# Patient Record
Sex: MALE | Race: BLACK OR AFRICAN AMERICAN | NOT HISPANIC OR LATINO | Employment: OTHER | URBAN - METROPOLITAN AREA
[De-identification: names, ages, dates, MRNs, and addresses within clinical notes are randomized per-mention and may not be internally consistent; named-entity substitution may affect disease eponyms.]

---

## 2017-04-06 ENCOUNTER — OFFICE VISIT (OUTPATIENT)
Dept: URGENT CARE | Facility: URGENT CARE | Age: 62
End: 2017-04-06
Payer: COMMERCIAL

## 2017-04-06 VITALS
DIASTOLIC BLOOD PRESSURE: 80 MMHG | BODY MASS INDEX: 30.6 KG/M2 | HEART RATE: 102 BPM | OXYGEN SATURATION: 97 % | TEMPERATURE: 98.6 F | WEIGHT: 189.6 LBS | SYSTOLIC BLOOD PRESSURE: 120 MMHG

## 2017-04-06 DIAGNOSIS — R05.9 COUGH: ICD-10-CM

## 2017-04-06 DIAGNOSIS — R52 BODY ACHES: ICD-10-CM

## 2017-04-06 DIAGNOSIS — J06.9 VIRAL URI: Primary | ICD-10-CM

## 2017-04-06 DIAGNOSIS — J02.9 ACUTE PHARYNGITIS, UNSPECIFIED: ICD-10-CM

## 2017-04-06 LAB
DEPRECATED S PYO AG THROAT QL EIA: NORMAL
FLUAV+FLUBV AG SPEC QL: NEGATIVE
FLUAV+FLUBV AG SPEC QL: NORMAL
MICRO REPORT STATUS: NORMAL
SPECIMEN SOURCE: NORMAL
SPECIMEN SOURCE: NORMAL

## 2017-04-06 PROCEDURE — 87804 INFLUENZA ASSAY W/OPTIC: CPT | Mod: 59 | Performed by: PHYSICIAN ASSISTANT

## 2017-04-06 PROCEDURE — 87880 STREP A ASSAY W/OPTIC: CPT | Performed by: FAMILY MEDICINE

## 2017-04-06 PROCEDURE — 99213 OFFICE O/P EST LOW 20 MIN: CPT | Performed by: PHYSICIAN ASSISTANT

## 2017-04-06 PROCEDURE — 87081 CULTURE SCREEN ONLY: CPT | Performed by: FAMILY MEDICINE

## 2017-04-06 NOTE — MR AVS SNAPSHOT
After Visit Summary   4/6/2017    Rachell Paige    MRN: [id number]           Patient Information     Date Of Birth          1955        Visit Information        Provider Department      4/6/2017 3:00 PM Flex Mejia PA-C [facility]        Today's Diagnoses     Viral URI    -  1    Acute pharyngitis, unspecified        Cough        Body aches          Care Instructions      Viral Upper Respiratory Illness (Adult)  You have a viral upper respiratory illness (URI), which is another term for the common cold. This illness is contagious during the first few days. It is spread through the air by coughing and sneezing. It may also be spread by direct contact (touching the sick person and then touching your own eyes, nose, or mouth). Frequent handwashing will decrease risk of spread. Most viral illnesses go away within 7 to 10 days with rest and simple home remedies. Sometimes the illness may last for several weeks. Antibiotics will not kill a virus, and they are generally not prescribed for this condition.    Home care    If symptoms are severe, rest at home for the first 2 to 3 days. When you resume activity, don't let yourself get too tired.    Avoid being exposed to cigarette smoke (yours or others ).    You may use acetaminophen or ibuprofen to control pain and fever, unless another medicine was prescribed. (Note: If you have chronic liver or kidney disease, have ever had a stomach ulcer or gastrointestinal bleeding, or are taking blood-thinning medicines, talk with your healthcare provider before using these medicines.) Aspirin should never be given to anyone under 18 years of age who is ill with a viral infection or fever. It may cause severe liver or brain damage.    Your appetite may be poor, so a light diet is fine. Avoid dehydration by drinking 6 to 8 glasses of fluids per day (water, soft drinks, juices, tea, or soup). Extra fluids will help loosen  secretions in the nose and lungs.    Over-the-counter cold medicines will not shorten the length of time you re sick, but they may be helpful for the following symptoms: cough, sore throat, and nasal and sinus congestion. (Note: Do not use decongestants if you have high blood pressure.)  Follow-up care  Follow up with your healthcare provider, or as advised.  When to seek medical advice  Call your healthcare provider right away if any of these occur:    Cough with lots of colored sputum (mucus)    Severe headache; face, neck, or ear pain    Difficulty swallowing due to throat pain    Fever of 100.4 F (38 C)  Call 911, or get immediate medical care  Call emergency services right away if any of these occur:    Chest pain, shortness of breath, wheezing, or difficulty breathing    Coughing up blood    Inability to swallow due to throat pain    [id number] [employer]. [address] [place] [address]. All rights reserved. This information is not intended as a substitute for professional medical care. Always follow your healthcare professional's instructions.              Follow-ups after your visit        Who to contact     If you have questions or need follow up information about today's clinic visit or your schedule please contact [facility] directly at [phone].  Normal or non-critical lab and imaging results will be communicated to you by [employer], letter or phone within 4 business days after the clinic has received the results. If you do not hear from us within 7 days, please contact the clinic through [employer] or phone. If you have a critical or abnormal lab result, we will notify you by phone as soon as possible.  Submit refill requests through [employer] or call your pharmacy and they will forward the refill request to us. Please allow 3 business days for your refill to be completed.          Additional Information About Your Visit        [employer] Information     [employer] lets  "you send messages to your doctor, view your test results, renew your prescriptions, schedule appointments and more. To sign up, go to [email or web address] . Click on \"Log in\" on the left side of the screen, which will take you to the Welcome page. Then click on \"Sign up Now\" on the right side of the page.     You will be asked to enter the access code listed below, as well as some personal information. Please follow the directions to create your username and password.     Your access code is: [email or web address]  Expires: 2017  5:20 PM     Your access code will  in 90 days. If you need help or a new code, please call your [facility] clinic or [phone].        Care EveryWhere ID     This is your Care EveryWhere ID. This could be used by other organizations to access your [facility] medical records  [id number]        Your Vitals Were     Pulse Temperature Pulse Oximetry BMI (Body Mass Index)          102 98.6  F (37  C) (Tympanic) 97% 30.6 kg/m2         Blood Pressure from Last 3 Encounters:   17 120/80   09/23/15 145/79   08/22/15 140/70    Weight from Last 3 Encounters:   17 189 lb 9.6 oz (86 kg)   08/21/15 180 lb (81.6 kg)   04/17/15 184 lb (83.5 kg)              We Performed the Following     Beta strep group A culture     Influenza A/B antigen     Strep, Rapid Screen        Primary Care Provider    Physician No Ref-Primary       No address on file        Thank you!     Thank you for choosing [facility]  for your care. Our goal is always to provide you with excellent care. Hearing back from our patients is one way we can continue to improve our services. Please take a few minutes to complete the written survey that you may receive in the mail after your visit with us. Thank you!             Your Updated Medication List - Protect others around you: Learn how to safely use, store and throw away your medicines at www.disposemymeds.org.          This list is accurate as of: 17  " 5:20 PM.  Always use your most recent med list.                   Brand Name Dispense Instructions for use    aspirin 81 MG tablet     30 tablet    Take 1 tablet (81 mg) by mouth daily       hydrocortisone 1%/eucerin 1% compounded cream     30 g    Apply topically 2 times daily       hydrOXYzine 25 MG tablet    ATARAX    20 tablet    Take 1-2 tablets (25-50 mg) by mouth every 6 hours as needed for itching       insulin aspart 100 UNITS/ML injection    NovoLOG VIAL    1 vial    For BS < 120     0 Units For -150 5  Units For -200 8  Units For -250 12  Units For -300 15  Units For -350 18  Units For -400 21 Units For -450 25 Units For  or greater call MD       insulin glargine 100 UNIT/ML injection    LANTUS    1 vial    42 units       lisinopril 5 MG tablet    PRINIVIL/ZESTRIL    19 tablet    Take 1 tablet (5 mg) by mouth daily SIG please see MD

## 2017-04-06 NOTE — PROGRESS NOTES
SUBJECTIVE:     Rachell Paige presents to  today for evaluation of URI sxs that may be consistent with Influenza.  Pt reports abrupt onset of fever (subjective), chills, ST, dry cough, nasal congestion, clear rhinorrhea, muscle and body aches and malaise 1 day ago.     ROS:     HEENT: Positive nasal congestion.   RESP: Positive dry cough as per above. Despite cough, denies any severe SOB.  Denies any hx of asthma or RAD.   GI: Denies any N/V/D. No abdominal pain. Normal BM's  SKIN: Denies rash  NEURO: Positive subjective fever as noted above. Denies any severe headaches, neck stiffness, photophobia, rash, mental status changes or lethargy.   ENDOCRINE: Positive hx of DM. Denies any elevated BS spikes since onset of acute illness sxs       Past Medical History:   Diagnosis Date     Anxiety 2/23/2015     Dermatitis 4/18/2015     DM type 2, goal A1C 7-8 2/23/2015     Does not have health insurance 4/18/2015     Financial problems 2/23/2015     HTN, goal below 140/90 2/23/2015     Hyperlipidemia LDL goal <100 2/23/2015     Microalbuminuria 4/18/2015     Onychomycosis 4/18/2015     Rash 2/23/2015       Current Outpatient Prescriptions:      lisinopril (PRINIVIL,ZESTRIL) 5 MG tablet, Take 1 tablet (5 mg) by mouth daily SIG please see MD, Disp: 19 tablet, Rfl: 0     aspirin 81 MG tablet, Take 1 tablet (81 mg) by mouth daily, Disp: 30 tablet, Rfl: OTC     hydrocortisone 1%/eucerin 1% compounded cream, Apply topically 2 times daily, Disp: 30 g, Rfl: 5     hydrOXYzine (ATARAX) 25 MG tablet, Take 1-2 tablets (25-50 mg) by mouth every 6 hours as needed for itching, Disp: 20 tablet, Rfl: 0     insulin glargine (LANTUS) 100 UNIT/ML vial, 42 units, Disp: 1 vial, Rfl: 5     insulin aspart (NOVOLOG VIAL) 100 UNITS/ML VIAL, For BS < 120     0 Units For -150 5  Units For -200 8  Units For -250 12  Units For -300 15  Units For -350 18  Units For -400 21 Units For -450 25 Units For   or greater call MD, Disp: 1 vial, Rfl: 5    Allergies   Allergen Reactions     Aleve      HA sweats     Clopidogrel Nausea and Vomiting     Pt to restart on 11/25/15 to see again if he tolerates it or not. His sx were only GI. Not a true allergy     Sulfamethoxazole-Trimethoprim      Other reaction(s): Renal Failure          OBJECTIVE:  /80 (BP Location: Right arm, Patient Position: Chair, Cuff Size: Adult Regular)  Pulse 102  Temp 98.6  F (37  C) (Tympanic)  Wt 189 lb 9.6 oz (86 kg)  SpO2 97%  BMI 30.6 kg/m2    General appearance: alert and no apparent distress  Skin color is uniform in color and without rash.  HEENT:   Conjunctiva not injected.  Sclera clear.  Left TM is normal: no effusions, no erythema, and normal landmarks.  Right TM is normal: no effusions, no erythema, and normal landmarks.  Nasal mucosa is congested  Oropharyngeal exam is normal: no lesions, erythema, adenopathy or exudate.  Neck is supple, FROM with no adenopathy  CARDIAC:NORMAL - regular rate and rhythm without murmur.  RESP: Normal - CTA without rales, rhonchi, or wheezing.    Component      Latest Ref Rng & Units 4/6/2017   Specimen Description       Throat   Rapid Strep A Screen       NEGATIVE: No Group A streptococcal antigen detected by immunoassay, await . . .   Micro Report Status       FINAL 04/06/2017   Influenza A/B Agn Specimen       Nasal   Influenza A      NEG Negative   Influenza B      NEG Negative . . .       ASSESSMENT/PLAN:    (J06.9,  B97.89) Viral URI  (primary encounter diagnosis)  Comment: Negative RST and Influenza testing today. Very reassuring exam.   Plan: Home comfort care measures advised. Follow-up with PCP  if sxs change, worsen or fail to resolve with home comfort care measures over the next 5-7 days.       (J02.9) Acute pharyngitis, unspecified  Comment: Patient requesting something to decrease pain in throat.   Plan: Strep, Rapid Screen, Influenza A/B antigen,         Beta strep group A culture,  lidocaine         (XYLOCAINE) 2 % solution    Follow-up with PCP if sxs change, worsen or fail to fully resolve with above tx.      (R05) Cough  Plan: Influenza A/B antigen  As per above     (R52) Body aches  Plan: Influenza A/B antigen  As per above

## 2017-04-06 NOTE — PATIENT INSTRUCTIONS

## 2017-04-06 NOTE — NURSING NOTE
"Chief Complaint   Patient presents with     Urgent Care     Pharyngitis     Pt states has had sore throat since last night.        Initial /80 (BP Location: Right arm, Patient Position: Chair, Cuff Size: Adult Regular)  Pulse 102  Temp 98.6  F (37  C) (Tympanic)  Wt 189 lb 9.6 oz (86 kg)  SpO2 97%  BMI 30.6 kg/m2 Estimated body mass index is 30.6 kg/(m^2) as calculated from the following:    Height as of 8/21/15: 5' 6\" (1.676 m).    Weight as of this encounter: 189 lb 9.6 oz (86 kg).  Medication Reconciliation: unable or not appropriate to perform   "

## 2017-04-08 LAB
BACTERIA SPEC CULT: NORMAL
MICRO REPORT STATUS: NORMAL
SPECIMEN SOURCE: NORMAL

## 2017-04-20 ENCOUNTER — APPOINTMENT (OUTPATIENT)
Dept: GENERAL RADIOLOGY | Facility: CLINIC | Age: 62
End: 2017-04-20
Attending: EMERGENCY MEDICINE
Payer: COMMERCIAL

## 2017-04-20 ENCOUNTER — APPOINTMENT (OUTPATIENT)
Dept: CT IMAGING | Facility: CLINIC | Age: 62
End: 2017-04-20
Attending: EMERGENCY MEDICINE
Payer: COMMERCIAL

## 2017-04-20 ENCOUNTER — HOSPITAL ENCOUNTER (OUTPATIENT)
Facility: CLINIC | Age: 62
Setting detail: OBSERVATION
Discharge: HOME | End: 2017-04-21
Attending: EMERGENCY MEDICINE | Admitting: HOSPITALIST
Payer: COMMERCIAL

## 2017-04-20 DIAGNOSIS — M62.81 GENERALIZED MUSCLE WEAKNESS: ICD-10-CM

## 2017-04-20 DIAGNOSIS — R55 NEAR SYNCOPE: ICD-10-CM

## 2017-04-20 DIAGNOSIS — I10 HTN, GOAL BELOW 140/90: ICD-10-CM

## 2017-04-20 DIAGNOSIS — R00.2 PALPITATIONS: ICD-10-CM

## 2017-04-20 DIAGNOSIS — R07.89 CHEST DISCOMFORT: ICD-10-CM

## 2017-04-20 LAB
ALBUMIN SERPL-MCNC: 3.5 G/DL (ref 3.4–5)
ALP SERPL-CCNC: 89 U/L (ref 40–150)
ALT SERPL W P-5'-P-CCNC: 23 U/L (ref 0–70)
ANION GAP SERPL CALCULATED.3IONS-SCNC: 8 MMOL/L (ref 3–14)
AST SERPL W P-5'-P-CCNC: 16 U/L (ref 0–45)
BASOPHILS # BLD AUTO: 0 10E9/L (ref 0–0.2)
BASOPHILS NFR BLD AUTO: 0.4 %
BILIRUB SERPL-MCNC: 0.4 MG/DL (ref 0.2–1.3)
BUN SERPL-MCNC: 21 MG/DL (ref 7–30)
CALCIUM SERPL-MCNC: 8.6 MG/DL (ref 8.5–10.1)
CHLORIDE SERPL-SCNC: 105 MMOL/L (ref 94–109)
CO2 SERPL-SCNC: 26 MMOL/L (ref 20–32)
CREAT SERPL-MCNC: 1.38 MG/DL (ref 0.66–1.25)
DIFFERENTIAL METHOD BLD: ABNORMAL
EOSINOPHIL # BLD AUTO: 0.4 10E9/L (ref 0–0.7)
EOSINOPHIL NFR BLD AUTO: 4.2 %
ERYTHROCYTE [DISTWIDTH] IN BLOOD BY AUTOMATED COUNT: 13.4 % (ref 10–15)
GFR SERPL CREATININE-BSD FRML MDRD: 52 ML/MIN/1.7M2
GLUCOSE BLDC GLUCOMTR-MCNC: 133 MG/DL (ref 70–99)
GLUCOSE BLDC GLUCOMTR-MCNC: 176 MG/DL (ref 70–99)
GLUCOSE BLDC GLUCOMTR-MCNC: 189 MG/DL (ref 70–99)
GLUCOSE BLDC GLUCOMTR-MCNC: 204 MG/DL (ref 70–99)
GLUCOSE SERPL-MCNC: 134 MG/DL (ref 70–99)
HCT VFR BLD AUTO: 39.9 % (ref 40–53)
HGB BLD-MCNC: 13.4 G/DL (ref 13.3–17.7)
IMM GRANULOCYTES # BLD: 0.1 10E9/L (ref 0–0.4)
IMM GRANULOCYTES NFR BLD: 0.8 %
INTERPRETATION ECG - MUSE: NORMAL
LACTATE BLD-SCNC: 1.2 MMOL/L (ref 0.7–2.1)
LYMPHOCYTES # BLD AUTO: 3.1 10E9/L (ref 0.8–5.3)
LYMPHOCYTES NFR BLD AUTO: 34.5 %
MAGNESIUM SERPL-MCNC: 1.9 MG/DL (ref 1.6–2.3)
MCH RBC QN AUTO: 28.8 PG (ref 26.5–33)
MCHC RBC AUTO-ENTMCNC: 33.6 G/DL (ref 31.5–36.5)
MCV RBC AUTO: 86 FL (ref 78–100)
MONOCYTES # BLD AUTO: 0.6 10E9/L (ref 0–1.3)
MONOCYTES NFR BLD AUTO: 6.5 %
NEUTROPHILS # BLD AUTO: 4.9 10E9/L (ref 1.6–8.3)
NEUTROPHILS NFR BLD AUTO: 53.6 %
NRBC # BLD AUTO: 0 10*3/UL
NRBC BLD AUTO-RTO: 0 /100
PLATELET # BLD AUTO: 354 10E9/L (ref 150–450)
POTASSIUM SERPL-SCNC: 4 MMOL/L (ref 3.4–5.3)
PROT SERPL-MCNC: 6.7 G/DL (ref 6.8–8.8)
RBC # BLD AUTO: 4.65 10E12/L (ref 4.4–5.9)
SODIUM SERPL-SCNC: 139 MMOL/L (ref 133–144)
TROPONIN I SERPL-MCNC: 0.03 UG/L (ref 0–0.04)
TROPONIN I SERPL-MCNC: 0.03 UG/L (ref 0–0.04)
TROPONIN I SERPL-MCNC: 0.04 UG/L (ref 0–0.04)
TROPONIN I SERPL-MCNC: 0.04 UG/L (ref 0–0.04)
WBC # BLD AUTO: 9.1 10E9/L (ref 4–11)

## 2017-04-20 PROCEDURE — G0378 HOSPITAL OBSERVATION PER HR: HCPCS

## 2017-04-20 PROCEDURE — 71020 XR CHEST 2 VW: CPT

## 2017-04-20 PROCEDURE — 00000146 ZZHCL STATISTIC GLUCOSE BY METER IP

## 2017-04-20 PROCEDURE — 96361 HYDRATE IV INFUSION ADD-ON: CPT

## 2017-04-20 PROCEDURE — 25000131 ZZH RX MED GY IP 250 OP 636 PS 637: Performed by: PHYSICIAN ASSISTANT

## 2017-04-20 PROCEDURE — 83735 ASSAY OF MAGNESIUM: CPT | Performed by: EMERGENCY MEDICINE

## 2017-04-20 PROCEDURE — 36415 COLL VENOUS BLD VENIPUNCTURE: CPT | Performed by: PHYSICIAN ASSISTANT

## 2017-04-20 PROCEDURE — 25000128 H RX IP 250 OP 636: Performed by: EMERGENCY MEDICINE

## 2017-04-20 PROCEDURE — 96372 THER/PROPH/DIAG INJ SC/IM: CPT

## 2017-04-20 PROCEDURE — 93005 ELECTROCARDIOGRAM TRACING: CPT

## 2017-04-20 PROCEDURE — 84484 ASSAY OF TROPONIN QUANT: CPT | Mod: 91 | Performed by: PHYSICIAN ASSISTANT

## 2017-04-20 PROCEDURE — 84484 ASSAY OF TROPONIN QUANT: CPT | Performed by: EMERGENCY MEDICINE

## 2017-04-20 PROCEDURE — 25000132 ZZH RX MED GY IP 250 OP 250 PS 637: Performed by: PHYSICIAN ASSISTANT

## 2017-04-20 PROCEDURE — 85025 COMPLETE CBC W/AUTO DIFF WBC: CPT | Performed by: EMERGENCY MEDICINE

## 2017-04-20 PROCEDURE — 80053 COMPREHEN METABOLIC PANEL: CPT | Performed by: EMERGENCY MEDICINE

## 2017-04-20 PROCEDURE — 99285 EMERGENCY DEPT VISIT HI MDM: CPT | Mod: 25

## 2017-04-20 PROCEDURE — 83605 ASSAY OF LACTIC ACID: CPT | Performed by: EMERGENCY MEDICINE

## 2017-04-20 PROCEDURE — 70450 CT HEAD/BRAIN W/O DYE: CPT

## 2017-04-20 PROCEDURE — 96374 THER/PROPH/DIAG INJ IV PUSH: CPT

## 2017-04-20 PROCEDURE — 25000128 H RX IP 250 OP 636: Performed by: PHYSICIAN ASSISTANT

## 2017-04-20 PROCEDURE — 36415 COLL VENOUS BLD VENIPUNCTURE: CPT | Performed by: EMERGENCY MEDICINE

## 2017-04-20 PROCEDURE — 96360 HYDRATION IV INFUSION INIT: CPT

## 2017-04-20 RX ORDER — NICOTINE POLACRILEX 4 MG
15-30 LOZENGE BUCCAL
Status: DISCONTINUED | OUTPATIENT
Start: 2017-04-20 | End: 2017-04-21 | Stop reason: HOSPADM

## 2017-04-20 RX ORDER — ACETAMINOPHEN 650 MG/1
650 SUPPOSITORY RECTAL EVERY 4 HOURS PRN
Status: DISCONTINUED | OUTPATIENT
Start: 2017-04-20 | End: 2017-04-21 | Stop reason: HOSPADM

## 2017-04-20 RX ORDER — NALOXONE HYDROCHLORIDE 0.4 MG/ML
.1-.4 INJECTION, SOLUTION INTRAMUSCULAR; INTRAVENOUS; SUBCUTANEOUS
Status: DISCONTINUED | OUTPATIENT
Start: 2017-04-20 | End: 2017-04-21 | Stop reason: HOSPADM

## 2017-04-20 RX ORDER — ASPIRIN 81 MG/1
162 TABLET, CHEWABLE ORAL ONCE
Status: DISCONTINUED | OUTPATIENT
Start: 2017-04-20 | End: 2017-04-21 | Stop reason: HOSPADM

## 2017-04-20 RX ORDER — ASPIRIN 325 MG
325 TABLET ORAL ONCE
Status: DISCONTINUED | OUTPATIENT
Start: 2017-04-20 | End: 2017-04-20

## 2017-04-20 RX ORDER — GABAPENTIN 300 MG/1
300 CAPSULE ORAL AT BEDTIME
COMMUNITY
End: 2020-07-28

## 2017-04-20 RX ORDER — SIMVASTATIN 10 MG
10 TABLET ORAL AT BEDTIME
COMMUNITY
End: 2020-09-01 | Stop reason: ALTCHOICE

## 2017-04-20 RX ORDER — INSULIN GLARGINE 100 [IU]/ML
52 INJECTION, SOLUTION SUBCUTANEOUS AT BEDTIME
Status: ON HOLD | COMMUNITY
End: 2018-12-27

## 2017-04-20 RX ORDER — LIDOCAINE 40 MG/G
CREAM TOPICAL
Status: DISCONTINUED | OUTPATIENT
Start: 2017-04-20 | End: 2017-04-21 | Stop reason: HOSPADM

## 2017-04-20 RX ORDER — SODIUM CHLORIDE 9 MG/ML
INJECTION, SOLUTION INTRAVENOUS CONTINUOUS
Status: ACTIVE | OUTPATIENT
Start: 2017-04-20 | End: 2017-04-21

## 2017-04-20 RX ORDER — ASPIRIN 81 MG/1
81 TABLET ORAL DAILY
Status: DISCONTINUED | OUTPATIENT
Start: 2017-04-21 | End: 2017-04-21 | Stop reason: HOSPADM

## 2017-04-20 RX ORDER — ALUMINA, MAGNESIA, AND SIMETHICONE 2400; 2400; 240 MG/30ML; MG/30ML; MG/30ML
15-30 SUSPENSION ORAL EVERY 4 HOURS PRN
Status: DISCONTINUED | OUTPATIENT
Start: 2017-04-20 | End: 2017-04-21 | Stop reason: HOSPADM

## 2017-04-20 RX ORDER — HYDROMORPHONE HCL/0.9% NACL/PF 0.2MG/0.2
0.2 SYRINGE (ML) INTRAVENOUS
Status: DISCONTINUED | OUTPATIENT
Start: 2017-04-20 | End: 2017-04-21 | Stop reason: HOSPADM

## 2017-04-20 RX ORDER — SODIUM CHLORIDE 9 MG/ML
1000 INJECTION, SOLUTION INTRAVENOUS CONTINUOUS
Status: DISCONTINUED | OUTPATIENT
Start: 2017-04-20 | End: 2017-04-20

## 2017-04-20 RX ORDER — LISINOPRIL 5 MG/1
5 TABLET ORAL DAILY
Status: DISCONTINUED | OUTPATIENT
Start: 2017-04-20 | End: 2017-04-21 | Stop reason: HOSPADM

## 2017-04-20 RX ORDER — ONDANSETRON 2 MG/ML
4 INJECTION INTRAMUSCULAR; INTRAVENOUS EVERY 6 HOURS PRN
Status: DISCONTINUED | OUTPATIENT
Start: 2017-04-20 | End: 2017-04-21 | Stop reason: HOSPADM

## 2017-04-20 RX ORDER — ACETAMINOPHEN 325 MG/1
650 TABLET ORAL EVERY 4 HOURS PRN
Status: DISCONTINUED | OUTPATIENT
Start: 2017-04-20 | End: 2017-04-21 | Stop reason: HOSPADM

## 2017-04-20 RX ORDER — DEXTROSE MONOHYDRATE 25 G/50ML
25-50 INJECTION, SOLUTION INTRAVENOUS
Status: DISCONTINUED | OUTPATIENT
Start: 2017-04-20 | End: 2017-04-21 | Stop reason: HOSPADM

## 2017-04-20 RX ORDER — ASPIRIN 81 MG/1
81 TABLET ORAL DAILY
Status: DISCONTINUED | OUTPATIENT
Start: 2017-04-21 | End: 2017-04-20

## 2017-04-20 RX ORDER — GABAPENTIN 300 MG/1
300 CAPSULE ORAL AT BEDTIME
Status: DISCONTINUED | OUTPATIENT
Start: 2017-04-20 | End: 2017-04-21 | Stop reason: HOSPADM

## 2017-04-20 RX ORDER — HYDROXYZINE HYDROCHLORIDE 25 MG/1
25-50 TABLET, FILM COATED ORAL EVERY 6 HOURS PRN
Status: DISCONTINUED | OUTPATIENT
Start: 2017-04-20 | End: 2017-04-21 | Stop reason: HOSPADM

## 2017-04-20 RX ORDER — ONDANSETRON 4 MG/1
4 TABLET, ORALLY DISINTEGRATING ORAL EVERY 6 HOURS PRN
Status: DISCONTINUED | OUTPATIENT
Start: 2017-04-20 | End: 2017-04-21 | Stop reason: HOSPADM

## 2017-04-20 RX ORDER — SIMVASTATIN 10 MG
10 TABLET ORAL AT BEDTIME
Status: DISCONTINUED | OUTPATIENT
Start: 2017-04-20 | End: 2017-04-21 | Stop reason: HOSPADM

## 2017-04-20 RX ORDER — LORAZEPAM 2 MG/ML
0.5 INJECTION INTRAMUSCULAR ONCE
Status: DISCONTINUED | OUTPATIENT
Start: 2017-04-20 | End: 2017-04-20

## 2017-04-20 RX ORDER — NITROGLYCERIN 0.4 MG/1
0.4 TABLET SUBLINGUAL EVERY 5 MIN PRN
Status: DISCONTINUED | OUTPATIENT
Start: 2017-04-20 | End: 2017-04-21 | Stop reason: HOSPADM

## 2017-04-20 RX ADMIN — INSULIN GLARGINE 52 UNITS: 100 INJECTION, SOLUTION SUBCUTANEOUS at 22:49

## 2017-04-20 RX ADMIN — ONDANSETRON 4 MG: 2 INJECTION INTRAMUSCULAR; INTRAVENOUS at 22:58

## 2017-04-20 RX ADMIN — GABAPENTIN 300 MG: 300 CAPSULE ORAL at 22:49

## 2017-04-20 RX ADMIN — SODIUM CHLORIDE 1000 ML: 9 INJECTION, SOLUTION INTRAVENOUS at 08:15

## 2017-04-20 RX ADMIN — LISINOPRIL 5 MG: 5 TABLET ORAL at 18:14

## 2017-04-20 RX ADMIN — SIMVASTATIN 10 MG: 10 TABLET, FILM COATED ORAL at 22:49

## 2017-04-20 RX ADMIN — SODIUM CHLORIDE: 9 INJECTION, SOLUTION INTRAVENOUS at 18:15

## 2017-04-20 ASSESSMENT — ENCOUNTER SYMPTOMS
NAUSEA: 1
BLOOD IN STOOL: 0
WEAKNESS: 1
DIAPHORESIS: 0
DIARRHEA: 0
LIGHT-HEADEDNESS: 1
FEVER: 0

## 2017-04-20 NOTE — ED PROVIDER NOTES
History     Chief Complaint:  Lightheadedness    HPI   Rachell Paige is a 61 year old male who presents to the emergency department today for evaluation of lightheadedness. The patient notes that this morning he woke up with some weakness. He stood up and suffered a fall as he was attempting to walk to the bathroom. There was no head trauma or loss of consciousness. He describes a feeling in his chest as well but this went away after a few seconds. The patient notes some nausea but no diarrhea, bloody stools, fevers, or diaphoresis. He states that recently he has been under more stress.     Allergies:  Aleve  Clopidogrel  Sulfamethoxazole      Medications:    Lidocaine  Lisinopril  Asprin   Atarax  Lantus  Novolog     Past Medical History:    Anxiety  Dermatitis  Diabetes mellitus type 2  Hypertension  Hyperlipidemia  Microalbuminuria  Onychomycosis     Past Surgical History:    History reviewed. No pertinent past surgical history.     Family History:    Family history of diabetes.     Social History:  The patient was accompanied to the ED by wife.  Smoking Status: former smoker  Alcohol Use: negative    Marital Status:   [2]    Review of Systems   Constitutional: Negative for diaphoresis and fever.   Gastrointestinal: Positive for nausea. Negative for blood in stool and diarrhea.   Neurological: Positive for weakness and light-headedness.   All other systems reviewed and are negative.    Physical Exam   Vitals:  Patient Vitals for the past 24 hrs:   BP Temp Temp src Pulse Resp SpO2 Height Weight   04/20/17 1245 133/82 - - - - 99 % - -   04/20/17 1200 163/84 - - - - - - -   04/20/17 1145 158/81 - - - - 100 % - -   04/20/17 1130 160/86 - - - - 96 % - -   04/20/17 1100 168/83 - - - - 96 % - -   04/20/17 1045 167/84 - - - - 96 % - -   04/20/17 1030 158/83 - - - - 97 % - -   04/20/17 1015 168/82 - - - - 96 % - -   04/20/17 1000 166/84 - - - - - - -   04/20/17 0930 161/90 - - - - 97 % - -   04/20/17 0900 (!)  "197/98 - - - - 97 % - -   04/20/17 0845 (!) 186/92 - - - - 96 % - -   04/20/17 0830 (!) 183/91 - - - - 97 % - -   04/20/17 0815 (!) 186/97 - - - - 97 % - -   04/20/17 0756 166/84 97.5  F (36.4  C) Oral 85 18 100 % 1.676 m (5' 6\") 85.7 kg (189 lb)       Physical Exam  General: Well-nourished, anxious   Eyes: PERRL, conjunctivae pink no scleral icterus or conjunctival injection  ENT:  Moist mucus membranes, posterior oropharynx clear without erythema or exudates  Respiratory:  Lungs clear to auscultation bilaterally, no crackles/rubs/wheezes.  Good air movement  CV: Normal rate and rhythm, no murmurs/rubs/gallops  GI:  Abdomen soft and non-distended.  Normoactive BS.  No tenderness, guarding or rebound  Skin: Warm, dry.  No rashes or petechiae  Musculoskeletal: No peripheral edema or calf tenderness  Neuro: Alert and oriented to person/place/time  Psychiatric: Normal affect      Emergency Department Course     ECG:  ECG taken at 0800, ECG read at 0804  Normal sinus rhythm  Normal ECG   Rate 85 bpm. OR interval 162. QRS duration 80. QT/QTc 342/406. P-R-T axes 57 -15 48.     Imaging:  Radiology findings were communicated with the patient who voiced understanding of the findings.    CT Head w/o Contrast   IMPRESSION: Normal head CT.      Radiation dose for this scan was reduced using automated exposure   control, adjustment of the mA and/or kV according to patient size, or   iterative reconstruction technique.      XR Chest 2 Views   IMPRESSION: Mildly enlarged cardiac silhouette not significantly   changed. No focal airspace disease, pleural effusion or pneumothorax.      [facility]     Laboratory:  Laboratory findings were communicated with the patient who voiced understanding of the findings.    CBC: WBC 9.1, HGB 13.4,   CMP: Glucose: 134 (H), GFR: 52 (L), Protein Total: 6.7 (L) o/w WNL (Creatinine: 1.38 (H))  Magnesium: 1.9   Lactic Acid (Collected at 0757): 1.2  Troponin (Collected 0757): 0.026   Glucose by " meter: 176 (H)   Troponin (Collected 1245): 0.036     Interventions:  0815 NS 1000 ml IV   Asprin 325 mg oral      Emergency Department Course:  Nursing notes and vitals reviewed.  I performed an exam of the patient as documented above.   The patient was sent for imaging per above while in the emergency department, results above.   IV was inserted and blood was drawn for laboratory testing, results above.   1:33 PM: I spoke with Dr. Luis of the hospitalist service regarding patient's presentation, findings, and plan of care.   I discussed the treatment plan with the patient. They expressed understanding of this plan and consented to admission. I discussed the patient with Dr. Luis, who will admit the patient to a monitored bed for further evaluation and treatment.   I personally reviewed the laboratory and imaging results with the patient and answered all related questions prior to admission.    Impression & Plan      Medical Decision Making:    HEART Score  Background  Calculates the overall risk of adverse event in patient's presenting with chest pain.  Based on 5 criteria (each assigned 0-2 points) including suspiciousness of history, EKG, age, risk factors and troponin.    Data  61 year old male  has DM type 2, goal A1C 7-8; HTN, goal below 140/90; Hyperlipidemia LDL goal <100; Financial problems; Anxiety; Rash; Microalbuminuria; Onychomycosis; Dermatitis; and Does not have health insurance on his problem list.   reports that he has quit smoking. He has never used smokeless tobacco.  family history is not on file.  Lab Results   Component Value Date    TROPI 0.036 04/20/2017     Criteria   0-2 points for each of 5 items (maximum of 10 points):  Score 1- History moderately suspicious for coronary syndrome  Score 0- EKG Normal  Score 1- Age 45 to 65 years old  Score 2- Three or more risk factors for or history of atherosclerotic disease  Score 0- Within normal limits for troponin levels  Interpretation  Risk  of adverse outcome  Heart Score: 4  Total Score 4-6- Adverse Outcome Risk 20.3% - Supports admission with standard rule-out management -serial troponins and stress testing    Diagnosis:    ICD-10-CM    1. Near syncope R55    2. Generalized muscle weakness M62.81      Rachell Paige is a 61 year old male with a history of diabetes, dyslipidemia, and hypertension who presents after a near syncopal episode with some vague complaints of chest discomfort and ongoing dizziness and not feeling well. His EKG is normal. His initial troponin was in the high side of normal limits. He continued to feel unwell here. Labs were otherwise unremarkable. Chest x-ray was clear. Head CT was obtained and is negative. He has a normal neurological examination, ambulated without difficulty, and is not vertiginous so I doubt a central cause. A repeat troponin was drawn and went up by more than 20% to 0.036. Given his risk factors and diabetes, I was concerned about the possibility of acute coronary syndrome. I feel he needs a formal rule out. I spoke with the hospitalist service where he will be admitted for observation for ongoing repeat troponins and cardiac monitoring given the near syncopal event. He was given an aspirin.      Disposition:   Admission    Scribe Disclosure:  I, Timi Uriel, am serving as a scribe at 7:58 AM on 4/20/2017 to document services personally performed by Hui Kinney MD, based on my observations and the provider's statements to me.   4/20/2017   [facility] EMERGENCY DEPARTMENT       Hui Kinney MD  04/20/17 8210

## 2017-04-20 NOTE — ED NOTES
"Attempted to administer Ativan, explained to pt it will help him to relax and possibly help with his dizziness as well. Pt refused, stated \"I don't need it\".   "

## 2017-04-20 NOTE — H&P
[facility] Outpatient / Observation Unit  History and Physical Exam     Rachell Paige MRN# [id number]   YOB: 1955 Age: 61 year old      Date of Admission:  4/20/2017    Primary care provider: No Ref-Primary, Physician          Assessment:   Rachell Paige is a 61 year old male with a PMH significant for DM II, PAD, HTN, HLD, who presents after an episode of sob, palpitations, and presyncope.   Work up in ED reveals: ECG demonstrates NSR w/ no ischemic changes. Serial troponins 0.026, then 0.036. CMP - 1.38, o/w unremarkable; CBC w/ diff wnl. CT of the head w/o contrast and CXR negative for acute abnormality.    Patient is being registered to observation for further evaluation and to rule out possible ACS.     1. Acute chest discomfort: Intermittent palpitations w/ associated sob that sounds like possible SVT. ECG demonstrates NSR w/ no ischemic findings and telemetry on ER has been unremarkable.  Currently free of discomfort.  Patient does not know his parents' or grandparents' past medical history but does have multiple personal cardiac risk factors including DM II, HTN, HLD.  Reports that his son is 36 y/o and has had two heart attacks.  Lifelong nonsmoker.  -Place on telemetry this evening.   -Trend 2 additional troponins.   -Echocardiogram.   -Lexiscan in AM (cannot tolerate exercise stress test as he has a left partial foot amputation).   2. Presyncope: Patient stood up from his bed this morning and either had a couple seconds-long period of syncope or simply presyncope that resolved after he immediately fell back into his bed. No head trauma.  He did note feeling warm and having palpitations and sob after this that resolved in a minute with rest sitting down.  He was then able to stand up and move about without difficulty.  Denies recent illness. Suspect related to dysrhythmia which will be worked up as listed above.  -Obtain orthostatics  -Telemetry and echocardiogram as listed above.  3. MARILYN on  CKD: Last creatinine 10/2016 was wnl at 1.18.  Today Cr 1.38.  Patient received 1 L IV NS in the ER. Will gently hydrate overnight and recheck BMP in AM.  4. DM II: His hemoglobin A1c 1/4/2017 is 7.4. He is s/p left partial foot amputation last year for worsening diabetic ulcers.  Has known peripheral neuropathy.  -Resume lantus 52 units HS.  -Resume PTA regimen of novolog 15-20 units with lunch and dinner.  5. HTN: Continue lisinopril 5 mg daily.  6. HLD: Continue simvastatin 10 mg HS.         Plan:     1. [place] to Observation  2. Continue telemetry  3. Follow serial troponins, check fasting lipids, obtain echocardiogram  4. Stress testing with Lexiscan Stress Thallium test  5. Cont Aspirin EC 81 mg po daily  6. Blood pressure control  7. Morphine and nitroglycerine PRN for pain    8. Cardiac prudent, low fat, low chol diet, No caffeine if Nuclear testing selected  9. DVT prophylaxis: pt at low risk, encourage ambulation  10. Code Status: FULL  11. Dispo: Anticipate < 2 evening stay                Chief Complaint:   Chest Pain         History of Present Illness:    Rachell, a 61 year old male, presents to the ED with complaint of presyncope, palpitations, and sob this morning.  The patient woke up this morning and when he tried to stand up out of bed be became profoundly lightheaded.  He is unsure if he truly lost consciousness, but he fell back into the bed and became short of breath and experienced palpitations and nausea for a little under a minute.  He denies any chest pain, diaphoresis, and vomiting.  He was able to stand up after that resolved with rest.  He reports that he has never had issues with presyncope/syncope before, but about 1-2 times a week for the past 5 months he has had issues with palpitations and sob.  He currently denies any chest discomfort or shortness of breath.  He denies recent illness or recent sick contacts.  He reports he has been drinking well, maybe a little decreased appetite.   No lower extremity swelling, PND, or orthopnea.  He endorses multiple emotional stressors, including his 36 y/o son who was recently discharged to his parents' home from a group home to recover from a CVA and his wife becoming ill as well.      When the patient arrived in the ED, VSS.  Work up in ED revealed: ECG demonstrates NSR w/ no ischemic changes. Serial troponins 0.026, then 0.036. CMP - 1.38, o/w unremarkable; CBC w/ diff wnl. CT of the head w/o contrast and CXR negative for acute abnormality.  The patient received 1 L IV NS, 325 mg aspirin, and 0.5 mg IV ativan in the ED.    Cardiac risk factors: abnormal lipids, diabetes mellitus, family history, hypertension, obesity, sedentary life style and stress          Past Medical History:     Past Medical History:   Diagnosis Date     Anxiety 2/23/2015     Dermatitis 4/18/2015     DM type 2, goal A1C 7-8 2/23/2015     Does not have health insurance 4/18/2015     Financial problems 2/23/2015     HTN, goal below 140/90 2/23/2015     Hyperlipidemia LDL goal <100 2/23/2015     Microalbuminuria 4/18/2015     Onychomycosis 4/18/2015     Rash 2/23/2015          Past Surgical History:   Left partial foot amputaiton         Social History:     Social History     Social History     Marital status:      Spouse name: N/A     Number of children: N/A     Years of education: N/A     Occupational History     Not on file.     Social History Main Topics     Smoking status: Former Smoker     Smokeless tobacco: Never Used     Alcohol use No     Drug use: No     Sexual activity: Yes     Partners: Female     Other Topics Concern     Not on file     Social History Narrative    Former  ([facility], [place])               Family History:     Family History   Problem Relation Age of Onset     DIABETES            Allergies:      Allergies   Allergen Reactions     Aleve      HA sweats     Clopidogrel Nausea and Vomiting     Pt to restart on 11/25/15 to see  "again if he tolerates it or not. His sx were only GI. Not a true allergy     Sulfamethoxazole-Trimethoprim      Other reaction(s): Renal Failure          Medications:     Prior to Admission medications    Medication Sig Last Dose Taking? Auth Provider   lidocaine (XYLOCAINE) 2 % solution Take 15 mLs by mouth every 6 hours as needed for moderate pain swish and spit every 6 hours as needed; max 8 doses/24 hour period   Flex Mejia PA-C   lisinopril (PRINIVIL,ZESTRIL) 5 MG tablet Take 1 tablet (5 mg) by mouth daily SIG please see Merrill Mosqueda MD   aspirin 81 MG tablet Take 1 tablet (81 mg) by mouth daily   Merrill Fonseca MD   hydrocortisone 1%/eucerin 1% compounded cream Apply topically 2 times daily   Merrill Fonseca MD   hydrOXYzine (ATARAX) 25 MG tablet Take 1-2 tablets (25-50 mg) by mouth every 6 hours as needed for itching   Antoine Ross MD   insulin glargine (LANTUS) 100 UNIT/ML vial 42 units   Merrill Fonseca MD   insulin aspart (NOVOLOG VIAL) 100 UNITS/ML VIAL For BS < 120     0 Units  For -150 5  Units  For -200 8  Units  For -250 12  Units  For -300 15  Units  For -350 18  Units  For -400 21 Units  For -450 25 Units  For  or greater call Merrill Mosqueda MD              Review of Systems:   A Comprehensive greater than 10 system review of systems was carried out.  Pertinent positives and negatives are noted above.  Otherwise negative for contributory information.          Physical Exam:   Blood pressure 133/82, pulse 85, temperature 97.5  F (36.4  C), temperature source Oral, resp. rate 18, height 1.676 m (5' 6\"), weight 85.7 kg (189 lb), SpO2 99 %.    GENERAL: healthy, alert and no distress  EYES: Eyes grossly normal to inspection, extraocular movements - intact, and PERRL  HENT: ear canals- normal; TMs- normal; Nose- normal; Mouth- no ulcers, no lesions  NECK: no tenderness, no adenopathy, no asymmetry, no " masses, no stiffness; thyroid- normal to palpation  RESP: lungs clear to auscultation - no rales, no rhonchi, no wheezes  CV: regular rates and rhythm and no murmur, click or rub  ABDOMEN: soft, no tenderness, no  hepatosplenomegaly, no masses, normal bowel sounds  MS: extremities- no gross deformities noted, no edema  SKIN: no suspicious lesions, no rashes  NEURO: strength and tone- normal, sensory exam- grossly normal, mentation- intact, speech- normal, reflexes- symmetric  PSYCH: Alert and oriented times 3; coherent speech. Affect is normal.            Data:     EKG demonstrates:  appears normal, NSR, normal axis, normal intervals, no acute ST/T changes c/w ischemia, no LVH by voltage criteria, unchanged from previous tracings.    Results for orders placed or performed during the hospital encounter of 04/20/17   XR Chest 2 Views    Narrative    XR CHEST 2 VW 4/20/2017 9:23 AM    COMPARISON: 9/11/2013    HISTORY: Chest pain      Impression    IMPRESSION: Mildly enlarged cardiac silhouette not significantly  changed. No focal airspace disease, pleural effusion or pneumothorax.    DESIRAE DE LA ROSA   CT Head w/o Contrast    Narrative    CT OF THE HEAD WITHOUT CONTRAST 4/20/2017 9:24 AM     COMPARISON: None.    HISTORY: Dizziness.    TECHNIQUE: Axial CT images of the head from the skull base to the  vertex were acquired without IV contrast.    FINDINGS: The ventricles and basal cisterns are within normal limits  in configuration. There is no midline shift. There are no extra-axial  fluid collections. Gray-white differentiation is well maintained.    No intracranial hemorrhage, mass or recent infarct.    The visualized paranasal sinuses are well-aerated. There is no  mastoiditis. There are no fractures of the visualized bones.      Impression    IMPRESSION: Normal head CT.      Radiation dose for this scan was reduced using automated exposure  control, adjustment of the mA and/or kV according to patient size, or  iterative  reconstruction technique.    JAMES GAMBOA MD   CBC with platelets differential   Result Value Ref Range    WBC 9.1 4.0 - 11.0 10e9/L    RBC Count 4.65 4.4 - 5.9 10e12/L    Hemoglobin 13.4 13.3 - 17.7 g/dL    Hematocrit 39.9 (L) 40.0 - 53.0 %    MCV 86 78 - 100 fl    MCH 28.8 26.5 - 33.0 pg    MCHC 33.6 31.5 - 36.5 g/dL    RDW 13.4 10.0 - 15.0 %    Platelet Count 354 150 - 450 10e9/L    Diff Method Automated Method     % Neutrophils 53.6 %    % Lymphocytes 34.5 %    % Monocytes 6.5 %    % Eosinophils 4.2 %    % Basophils 0.4 %    % Immature Granulocytes 0.8 %    Nucleated RBCs 0 0 /100    Absolute Neutrophil 4.9 1.6 - 8.3 10e9/L    Absolute Lymphocytes 3.1 0.8 - 5.3 10e9/L    Absolute Monocytes 0.6 0.0 - 1.3 10e9/L    Absolute Eosinophils 0.4 0.0 - 0.7 10e9/L    Absolute Basophils 0.0 0.0 - 0.2 10e9/L    Abs Immature Granulocytes 0.1 0 - 0.4 10e9/L    Absolute Nucleated RBC 0.0    Comprehensive metabolic panel   Result Value Ref Range    Sodium 139 133 - 144 mmol/L    Potassium 4.0 3.4 - 5.3 mmol/L    Chloride 105 94 - 109 mmol/L    Carbon Dioxide 26 20 - 32 mmol/L    Anion Gap 8 3 - 14 mmol/L    Glucose 134 (H) 70 - 99 mg/dL    Urea Nitrogen 21 7 - 30 mg/dL    Creatinine 1.38 (H) 0.66 - 1.25 mg/dL    GFR Estimate 52 (L) >60 mL/min/1.7m2    GFR Estimate If Black 64 >60 mL/min/1.7m2    Calcium 8.6 8.5 - 10.1 mg/dL    Bilirubin Total 0.4 0.2 - 1.3 mg/dL    Albumin 3.5 3.4 - 5.0 g/dL    Protein Total 6.7 (L) 6.8 - 8.8 g/dL    Alkaline Phosphatase 89 40 - 150 U/L    ALT 23 0 - 70 U/L    AST 16 0 - 45 U/L   Magnesium   Result Value Ref Range    Magnesium 1.9 1.6 - 2.3 mg/dL   Lactic acid whole blood   Result Value Ref Range    Lactic Acid 1.2 0.7 - 2.1 mmol/L   Troponin I   Result Value Ref Range    Troponin I ES 0.026 0.000 - 0.045 ug/L   Glucose by meter   Result Value Ref Range    Glucose 176 (H) 70 - 99 mg/dL   Troponin I (now)   Result Value Ref Range    Troponin I ES 0.036 0.000 - 0.045 ug/L   Glucose by meter    Result Value Ref Range    Glucose 133 (H) 70 - 99 mg/dL   EKG 12 lead   Result Value Ref Range    Interpretation ECG Click View Image link to view waveform and result      Keyla Berrios PA-C

## 2017-04-20 NOTE — PLAN OF CARE
"Problem: Discharge Planning  Goal: Discharge Planning (Adult, OB, Behavioral, Peds)  Outcome: Improving  PRIMARY DIAGNOSIS: Dizziness  OUTPATIENT/OBSERVATION GOALS TO BE MET BEFORE DISCHARGE:     1. Negative Serial Troponin: Yes, .026, .036, .034  2. Resolution of chest pain: Denies  3. Pain status: Denies  4. Negative stress test: Echo and samantha scheduled   5. Stable vital signs: Yes  6. ADLs back to baseline? Yes  7. Activity and level of assistance: indep  8. Barriers to discharge noted: Samantha and echo scheduled   9. Interpretation of rhythm per telemetry tech:      Vitals stable. Pt alert and oriented x4. Independent with cares. Pt denies chest pain/SOB/dizziness/palpitations. Pt does report \"a little\" nausea. Awaiting antiemetic order. trops neg but detectable. .026, .036, .034. Plan for echo and samantha scan. Pt resting comfortably. Wife at bedside. Will continue to monitor and provide supportive cares.                    "

## 2017-04-20 NOTE — PLAN OF CARE
Problem: Discharge Planning  Goal: Discharge Planning (Adult, OB, Behavioral, Peds)  Outcome: Improving  ROOM # 206-2     Living Situation (if not independent, order SW consult): with wife  Facility name: [facility]     Activity level at baseline: indep  Activity level on admit: indep     Is patient a falls risk? No  Falls armband on? No  Within Arm's Reach? No  Bed alarm turned on?  Na  Personal alarm in place and turned on?  NA     Patient registered to observation; given Patient Bill of Rights; given the opportunity to ask questions about observation status and their plan of care.  Patient has been oriented to the observation room, bathroom and call light is in place.     : Simon [phone]

## 2017-04-20 NOTE — ED NOTES
Pt presents via EMS c/o waking with dizziness and an episode of SOB this AM. Pt states he fell due to the dizziness this am-denies injuries. Pt also states he feels his heart is beating fast. Pt is still feeling dizziness. Pt is A&O, ABC's intact.

## 2017-04-20 NOTE — IP AVS SNAPSHOT
MRN:[id number]                      After Visit Summary   4/20/2017    Rachell Paige    MRN: [id number]           Thank you!     Thank you for choosing [facility] for your care. Our goal is always to provide you with excellent care. Hearing back from our patients is one way we can continue to improve our services. Please take a few minutes to complete the written survey that you may receive in the mail after you visit. If you would like to speak to someone directly about your visit please contact Patient Relations at [phone]. Thank you!          Patient Information     Date Of Birth          1955        About your hospital stay     You were admitted on:  April 20, 2017 You last received care in the:  [facility] Observation Department    You were discharged on:  April 21, 2017        Reason for your hospital stay       Dizziness, chest discomfort and palpitations                  Who to Call     For medical emergencies, please call 911.  For non-urgent questions about your medical care, please call your primary care provider or clinic, None          Attending Provider     Provider Specialty    Hui Kinney MD Emergency Medicine    Jt, Shad Delcid MD Internal Medicine    Chantale Paris DO Internal Medicine       Primary Care Provider    Physician No Ref-Primary       No address on file        After Care Instructions     Activity       Your activity upon discharge: activity as tolerated            Diet       Follow this diet upon discharge: Moderate consistent carbohydrate (9673-4351 ludivina / 4-6 CHO units per meal)                  Follow-up Appointments     Follow-up and recommended labs and tests        Follow up with primary care provider, Physician No Ref-Primary, within 7-14 days for hospital follow- up.  The following labs/tests are recommended: A1C. He is willing to see any Int Med physician.    He will also need follow up with endocrinology and  "diabetes ed.                  Your next 10 appointments already scheduled     2017  4:00 PM CDT   [facility]    [address]  [facility] [id number] [phone]           Bring a current list of meds and any records pertaining to this visit.  For Physicals, please bring immunization records and any forms needing to be filled out.  Please arrive 10 minutes early to complete paperwork.                         Pending Results     Date and Time Order Name Status Description    2017 1122 Zio Patch Holter In process             Statement of Approval     Ordered          17 1211  I have reviewed and agree with all the recommendations and orders detailed in this document.  EFFECTIVE NOW     Approved and electronically signed by:  Chad Luis PA-C             Admission Information     Date & Time Provider Department Dept. Phone    2017 Chantale Paris DO [facility] Observation Department [phone]      Your Vitals Were     Blood Pressure Pulse Temperature Respirations Height Weight    166/81 (BP Location: Left arm) 89 96.8  F (36  C) (Oral) 16 1.676 m (5' 6\") 83.8 kg (184 lb 12.8 oz)    Pulse Oximetry BMI (Body Mass Index)                99% 29.83 kg/m2          MyChart Information     [facility] lets you send messages to your doctor, view your test results, renew your prescriptions, schedule appointments and more. To sign up, go to [email or web address] . Click on \"Log in\" on the left side of the screen, which will take you to the Welcome page. Then click on \"Sign up Now\" on the right side of the page.     You will be asked to enter the access code listed below, as well as some personal information. Please follow the directions to create your username and password.     Your access code is: [email or web address]  Expires: 2017  5:20 PM     Your access code will  " in 90 days. If you need help or a new code, please call your [place] clinic or [phone].        Care EveryWhere ID     This is your Care EveryWhere ID. This could be used by other organizations to access your [place] medical records  [id number]           Review of your medicines      CONTINUE these medicines which have NOT CHANGED        Dose / Directions    aspirin 81 MG tablet   Used for:  DM type 2, goal A1C 7-8        Dose:  81 mg   Take 1 tablet (81 mg) by mouth daily   Quantity:  30 tablet   Refills:  OTC       gabapentin 300 MG capsule   Commonly known as:  NEURONTIN        Dose:  300 mg   Take 300 mg by mouth At Bedtime   Refills:  0       hydrOXYzine 25 MG tablet   Commonly known as:  ATARAX        Dose:  25-50 mg   Take 1-2 tablets (25-50 mg) by mouth every 6 hours as needed for itching   Quantity:  20 tablet   Refills:  0       insulin glargine 100 UNIT/ML injection   Commonly known as:  LANTUS        Dose:  52 Units   Inject 52 Units Subcutaneous At Bedtime   Refills:  0       lidocaine 2 % solution   Commonly known as:  XYLOCAINE   Used for:  Acute pharyngitis, unspecified        Dose:  15 mL   Take 15 mLs by mouth every 6 hours as needed for moderate pain swish and spit every 6 hours as needed; max 8 doses/24 hour period   Quantity:  100 mL   Refills:  0       lisinopril 5 MG tablet   Commonly known as:  PRINIVIL/ZESTRIL   Used for:  HTN, goal below 140/90        Dose:  5 mg   Take 1 tablet (5 mg) by mouth daily SIG please see MD   Quantity:  30 tablet   Refills:  1       NovoLOG FLEXPEN 100 UNIT/ML injection   Generic drug:  insulin aspart        Dose:  15-20 Units   Inject 15-20 Units Subcutaneous 2 times daily (with meals) With lunch and dinner   Refills:  0       ZOCOR 10 MG tablet   Generic drug:  simvastatin        Dose:  10 mg   Take 10 mg by mouth At Bedtime   Refills:  0            Where to get your medicines      These medications were sent to [facility]  [place], [place] - [address] [address]  [address] [facility] [address]     Phone:  [phone]     lisinopril 5 MG tablet                Protect others around you: Learn how to safely use, store and throw away your medicines at www.disposemymeds.org.             Medication List: This is a list of all your medications and when to take them. Check marks below indicate your daily home schedule. Keep this list as a reference.      Medications           Morning Afternoon Evening Bedtime As Needed    aspirin 81 MG tablet   Take 1 tablet (81 mg) by mouth daily                                gabapentin 300 MG capsule   Commonly known as:  NEURONTIN   Take 300 mg by mouth At Bedtime   Last time this was given:  300 mg on 4/20/2017 10:49 PM                                hydrOXYzine 25 MG tablet   Commonly known as:  ATARAX   Take 1-2 tablets (25-50 mg) by mouth every 6 hours as needed for itching                                insulin glargine 100 UNIT/ML injection   Commonly known as:  LANTUS   Inject 52 Units Subcutaneous At Bedtime   Last time this was given:  52 Units on 4/20/2017 10:49 PM                                lidocaine 2 % solution   Commonly known as:  XYLOCAINE   Take 15 mLs by mouth every 6 hours as needed for moderate pain swish and spit every 6 hours as needed; max 8 doses/24 hour period                                lisinopril 5 MG tablet   Commonly known as:  PRINIVIL/ZESTRIL   Take 1 tablet (5 mg) by mouth daily SIG please see MD   Last time this was given:  5 mg on 4/21/2017 12:13 PM                                NovoLOG FLEXPEN 100 UNIT/ML injection   Inject 15-20 Units Subcutaneous 2 times daily (with meals) With lunch and dinner   Generic drug:  insulin aspart                                ZOCOR 10 MG tablet   Take 10 mg by mouth At Bedtime   Last time this was given:  10 mg on 4/20/2017 10:49 PM   Generic drug:  simvastatin                                          More Information          *CHEST PAIN, UNCERTAIN CAUSE    Based on your exam today, the exact cause of your chest pain is not certain. Your condition does not seem serious at this time, and your pain does not appear to be coming from your heart. However, sometimes the signs of a serious problem take more time to appear. Therefore, watch for the warning signs listed below.  HOME CARE:  1. Rest today and avoid strenuous activity.  2. Take any prescribed medicine as directed.  FOLLOW UP with your doctor in 1-3 days.   GET PROMPT MEDICAL ATTENTION if any of the following occur:    A change in the type of pain: if it feels different, becomes more severe, lasts longer, or begins to spread into your shoulder, arm, neck, jaw or back    Shortness of breath or increased pain with breathing    Weakness, dizziness, or fainting    Cough with blood or dark colored sputum (phlegm)    Fever over 101  F (38.3  C)    Swelling, pain or redness in one leg    [id number] [address], [place] [address]. All rights reserved. This information is not intended as a substitute for professional medical care. Always follow your healthcare professional's instructions.

## 2017-04-20 NOTE — IP AVS SNAPSHOT
[facility] Observation Department    [address]    [facility] [address]    Phone:  [phone]                                       After Visit Summary   4/20/2017    Rachell Paige    MRN: [id number]           After Visit Summary Signature Page     I have received my discharge instructions, and my questions have been answered. I have discussed any challenges I see with this plan with the nurse or doctor.    ..........................................................................................................................................  Patient/Patient Representative Signature      ..........................................................................................................................................  Patient Representative Print Name and Relationship to Patient    ..................................................               ................................................  Date                                            Time    ..........................................................................................................................................  Reviewed by Signature/Title    ...................................................              ..............................................  Date                                                            Time

## 2017-04-20 NOTE — ED NOTES
Bed: ED11  Expected date: 4/20/17  Expected time: 7:48 AM  Means of arrival: Ambulance  Comments:  BV 60 m SOB

## 2017-04-20 NOTE — PHARMACY-ADMISSION MEDICATION HISTORY
Admission medication history interview status for this patient is complete. See [facility] admission navigator for allergy information, prior to admission medications and immunization status.     Medication history interview source(s):Patient  Medication history resources (including written lists, pill bottles, clinic record): none    Changes made to PTA medication list:  Added: prandial Novolog, simvastatin, gabapentin  Deleted: hydrocortisone/eucerin compounded cream, Novolog sliding scale  Changed: insulin glargine from 42 units 52 units qhs,     Actions taken by pharmacist (provider contacted, etc):None     Additional medication history information:None    Medication reconciliation/reorder completed by provider prior to medication history? No    For patients on insulin therapy: Yes  Lantus 52 units qhs  Sliding scale Novolog - NO   Patients eat three meals a day:   NO, skips breakfast, eats lunch and dinner    Any Barriers to therapy: NONE      Prior to Admission medications    Medication Sig Last Dose Taking? Auth Provider   insulin glargine (LANTUS) 100 UNIT/ML injection Inject 52 Units Subcutaneous At Bedtime 4/19/2017 at HS Yes Reported, Patient   insulin aspart (NOVOLOG FLEXPEN) 100 UNIT/ML injection Inject 15-20 Units Subcutaneous 2 times daily (with meals) With lunch and dinner 4/19/2017 Yes Reported, Patient   simvastatin (ZOCOR) 10 MG tablet Take 10 mg by mouth At Bedtime 4/19/2017 Yes Reported, Patient   gabapentin (NEURONTIN) 300 MG capsule Take 300 mg by mouth At Bedtime 4/19/2017 at HS Yes Reported, Patient   lidocaine (XYLOCAINE) 2 % solution Take 15 mLs by mouth every 6 hours as needed for moderate pain swish and spit every 6 hours as needed; max 8 doses/24 hour period  Yes Flex Mejia PA-C   lisinopril (PRINIVIL,ZESTRIL) 5 MG tablet Take 1 tablet (5 mg) by mouth daily SIG please see MD 4/19/2017 Yes Merrill Fonseca MD   aspirin 81 MG tablet Take 1 tablet (81 mg) by mouth daily    Merrill Fonseca MD   hydrOXYzine (ATARAX) 25 MG tablet Take 1-2 tablets (25-50 mg) by mouth every 6 hours as needed for itching   Antoine Ross MD

## 2017-04-21 ENCOUNTER — APPOINTMENT (OUTPATIENT)
Dept: NUCLEAR MEDICINE | Facility: CLINIC | Age: 62
End: 2017-04-21
Attending: PHYSICIAN ASSISTANT
Payer: COMMERCIAL

## 2017-04-21 ENCOUNTER — APPOINTMENT (OUTPATIENT)
Dept: CARDIOLOGY | Facility: CLINIC | Age: 62
End: 2017-04-21
Attending: PHYSICIAN ASSISTANT
Payer: COMMERCIAL

## 2017-04-21 VITALS
RESPIRATION RATE: 16 BRPM | TEMPERATURE: 96.8 F | DIASTOLIC BLOOD PRESSURE: 63 MMHG | BODY MASS INDEX: 29.7 KG/M2 | OXYGEN SATURATION: 100 % | SYSTOLIC BLOOD PRESSURE: 155 MMHG | HEART RATE: 90 BPM | WEIGHT: 184.8 LBS | HEIGHT: 66 IN

## 2017-04-21 PROBLEM — R07.89 ATYPICAL CHEST PAIN: Status: ACTIVE | Noted: 2017-04-21

## 2017-04-21 LAB
ANION GAP SERPL CALCULATED.3IONS-SCNC: 8 MMOL/L (ref 3–14)
BUN SERPL-MCNC: 17 MG/DL (ref 7–30)
CALCIUM SERPL-MCNC: 8.1 MG/DL (ref 8.5–10.1)
CHLORIDE SERPL-SCNC: 109 MMOL/L (ref 94–109)
CO2 SERPL-SCNC: 24 MMOL/L (ref 20–32)
CREAT SERPL-MCNC: 1.07 MG/DL (ref 0.66–1.25)
GFR SERPL CREATININE-BSD FRML MDRD: 70 ML/MIN/1.7M2
GLUCOSE BLDC GLUCOMTR-MCNC: 91 MG/DL (ref 70–99)
GLUCOSE BLDC GLUCOMTR-MCNC: 98 MG/DL (ref 70–99)
GLUCOSE SERPL-MCNC: 98 MG/DL (ref 70–99)
POTASSIUM SERPL-SCNC: 3.8 MMOL/L (ref 3.4–5.3)
SODIUM SERPL-SCNC: 141 MMOL/L (ref 133–144)

## 2017-04-21 PROCEDURE — 25000128 H RX IP 250 OP 636

## 2017-04-21 PROCEDURE — 99217 ZZC OBSERVATION CARE DISCHARGE: CPT | Performed by: PHYSICIAN ASSISTANT

## 2017-04-21 PROCEDURE — A9502 TC99M TETROFOSMIN: HCPCS | Performed by: INTERNAL MEDICINE

## 2017-04-21 PROCEDURE — 00000146 ZZHCL STATISTIC GLUCOSE BY METER IP

## 2017-04-21 PROCEDURE — 93227 XTRNL ECG REC<48 HR R&I: CPT | Performed by: INTERNAL MEDICINE

## 2017-04-21 PROCEDURE — 80048 BASIC METABOLIC PNL TOTAL CA: CPT | Performed by: PHYSICIAN ASSISTANT

## 2017-04-21 PROCEDURE — 93018 CV STRESS TEST I&R ONLY: CPT | Performed by: INTERNAL MEDICINE

## 2017-04-21 PROCEDURE — 78452 HT MUSCLE IMAGE SPECT MULT: CPT | Mod: 26 | Performed by: INTERNAL MEDICINE

## 2017-04-21 PROCEDURE — 78452 HT MUSCLE IMAGE SPECT MULT: CPT

## 2017-04-21 PROCEDURE — 93306 TTE W/DOPPLER COMPLETE: CPT | Mod: 26 | Performed by: INTERNAL MEDICINE

## 2017-04-21 PROCEDURE — 34300033 ZZH RX 343: Performed by: INTERNAL MEDICINE

## 2017-04-21 PROCEDURE — 93017 CV STRESS TEST TRACING ONLY: CPT

## 2017-04-21 PROCEDURE — 25000132 ZZH RX MED GY IP 250 OP 250 PS 637: Performed by: PHYSICIAN ASSISTANT

## 2017-04-21 PROCEDURE — 36415 COLL VENOUS BLD VENIPUNCTURE: CPT | Performed by: PHYSICIAN ASSISTANT

## 2017-04-21 PROCEDURE — 0296T ZIO PATCH HOLTER: CPT | Performed by: PHYSICIAN ASSISTANT

## 2017-04-21 PROCEDURE — G0378 HOSPITAL OBSERVATION PER HR: HCPCS

## 2017-04-21 PROCEDURE — 25500064 ZZH RX 255 OP 636: Performed by: INTERNAL MEDICINE

## 2017-04-21 PROCEDURE — 96361 HYDRATE IV INFUSION ADD-ON: CPT

## 2017-04-21 PROCEDURE — 40000264 ECHO COMPLETE WITH OPTISON

## 2017-04-21 RX ORDER — REGADENOSON 0.08 MG/ML
INJECTION, SOLUTION INTRAVENOUS
Status: COMPLETED
Start: 2017-04-21 | End: 2017-04-21

## 2017-04-21 RX ORDER — LISINOPRIL 5 MG/1
5 TABLET ORAL DAILY
Qty: 30 TABLET | Refills: 1 | Status: ON HOLD | OUTPATIENT
Start: 2017-04-21 | End: 2018-12-27

## 2017-04-21 RX ORDER — REGADENOSON 0.08 MG/ML
0.4 INJECTION, SOLUTION INTRAVENOUS ONCE
Status: COMPLETED | OUTPATIENT
Start: 2017-04-21 | End: 2017-04-21

## 2017-04-21 RX ADMIN — HUMAN ALBUMIN MICROSPHERES AND PERFLUTREN 3 ML: 10; .22 INJECTION, SOLUTION INTRAVENOUS at 08:03

## 2017-04-21 RX ADMIN — REGADENOSON 0.4 MG: 0.08 INJECTION, SOLUTION INTRAVENOUS at 08:58

## 2017-04-21 RX ADMIN — ASPIRIN 81 MG: 81 TABLET, COATED ORAL at 12:13

## 2017-04-21 RX ADMIN — TETROFOSMIN 33 MCI.: 0.23 INJECTION, POWDER, LYOPHILIZED, FOR SOLUTION INTRAVENOUS at 09:44

## 2017-04-21 RX ADMIN — LISINOPRIL 5 MG: 5 TABLET ORAL at 12:13

## 2017-04-21 RX ADMIN — TETROFOSMIN 11 MCI.: 0.23 INJECTION, POWDER, LYOPHILIZED, FOR SOLUTION INTRAVENOUS at 07:07

## 2017-04-21 NOTE — PROGRESS NOTES
Insurance inquiry showed that he is currently and has valid insurance as of April 2017.    Ely Hernández RN BSN CTS  [facility]   Care Management Coordinator  [email or web address]   [phone]

## 2017-04-21 NOTE — PLAN OF CARE
Problem: Discharge Planning  Goal: Discharge Planning (Adult, OB, Behavioral, Peds)  PRIMARY DIAGNOSIS: Dizziness  OUTPATIENT/OBSERVATION GOALS TO BE MET BEFORE DISCHARGE:      1. Negative Serial Troponin: Yes, 0.026, 0.036, 0.034, 0.036  2. Resolution of chest pain: Yes  3. Pain status: Denies  4. Negative stress test: Echo and samantha scheduled for AM   5. Stable vital signs: Yes  6. ADLs back to baseline? Yes  7. Activity and level of assistance: ind  8. Barriers to discharge noted: Samantha and echo scheduled for AM  9. Interpretation of rhythm per telemetry tech: SR HR 84      Denies chest pain, SOB, or diziness. Denies nausea. Trops neg but detectable; 0.026, 0.036, 0.034, 0.036. Plan for echo and samantha scan in AM. Pt resting comfortably. Will continue to monitor and provide supportive cares.

## 2017-04-21 NOTE — PLAN OF CARE
Problem: Discharge Planning  Goal: Discharge Planning (Adult, OB, Behavioral, Peds)  Pt states that he lost his supply of lisinopril. States wife pulled it out of his backpack yesterday to show ems what BP meds he is on. Can not find lisinopril. He stated he confirmed with his wife that she can't find meds and looked in backpack where originally found and unsure if it was lost in ems. Wanting lisinopril refilled even though he is not due until the 30th. Pharmacy notified.

## 2017-04-21 NOTE — PLAN OF CARE
Problem: Discharge Planning  Goal: Discharge Planning (Adult, OB, Behavioral, Peds)  Outcome: Adequate for Discharge Date Met:  04/21/17  Patient's After Visit Summary was reviewed with patient   Patient verbalized understanding of After Visit Summary, recommended follow up and was given an opportunity to ask questions.   Discharge medications sent home with patient/family: YES   Discharged by self     Pt. Reported he would take a taxi home     OBSERVATION patient END time: 1331

## 2017-04-21 NOTE — PROGRESS NOTES
Pre-procedure:    Initial vital signs: /88, , RR 16  Allergies: reviewed   Rhythm: Sinus tachycardia  Medications taken within 48 hours of procedure: NA   Last Caffeine: morning  Lung sounds: CTA, no wheezing, crackles or rtx  Health History (COPD, Asthma, etc): NA    Procedure: Lexiscan  Reaction/symptoms after receiving Samantha injection: Shortness of breath, headache  Vital Signs:/81, , RR 18  Reversal agent: N/A    Post:   Resolution of symptoms?: YES  Vital signs: /78, , RR 16  Walk: NO  Return to Radiology

## 2017-04-21 NOTE — PLAN OF CARE
Problem: Discharge Planning  Goal: Discharge Planning (Adult, OB, Behavioral, Peds)  PRIMARY DIAGNOSIS: Dizziness  OUTPATIENT/OBSERVATION GOALS TO BE MET BEFORE DISCHARGE:      1. Negative Serial Troponin: Yes, 0.026, 0.036, 0.034, 0.036  2. Resolution of chest pain: Yes  3. Pain status: Denies  4. Negative stress test: yes echo and samantha  5. Stable vital signs: Yes  6. ADLs back to baseline? Yes  7. Activity and level of assistance: ind  8. Barriers to discharge noted: none  9. Interpretation of rhythm per telemetry tech: SR HR 85      Patient will discharge with zio patch. Patient sent home with lisinopril. Care coordinator is aiding in helping with locating a primary to follow up on his zio patch for 14 days. Samantha and echo negative.

## 2017-04-21 NOTE — PLAN OF CARE
Problem: Discharge Planning  Goal: Discharge Planning (Adult, OB, Behavioral, Peds)  PRIMARY DIAGNOSIS: Dizziness  OUTPATIENT/OBSERVATION GOALS TO BE MET BEFORE DISCHARGE:      1. Negative Serial Troponin: Yes, 0.026, 0.036, 0.034, 0.036  2. Resolution of chest pain: Yes  3. Pain status: Denies  4. Negative stress test: Echo and samantha scheduled for AM   5. Stable vital signs: Yes  6. ADLs back to baseline? Yes  7. Activity and level of assistance: ind  8. Barriers to discharge noted: Samantha and echo scheduled for AM  9. Interpretation of rhythm per telemetry tech: SR HR 84      Pt A&Ox4, VSS. Independent with cares. Pt denies chest pain/SOB/dizziness/palpitations. Pt reports some nausea, VS checked and WNL, PRN zofran given. Trops neg but detectable; 0.026, 0.036, 0.034, 0.036. Plan for echo and samantha scan in AM. Pt resting comfortably. Will continue to monitor and provide supportive cares.

## 2017-04-21 NOTE — PLAN OF CARE
Problem: Discharge Planning  Goal: Discharge Planning (Adult, OB, Behavioral, Peds)  Outcome: Improving  PRIMARY DIAGNOSIS: Dizziness  OUTPATIENT/OBSERVATION GOALS TO BE MET BEFORE DISCHARGE:     1. Negative Serial Troponin: Yes, 0.026, 0.036, 0.034, 0.036  2. Resolution of chest pain: Yes  3. Pain status: Denies  4. Negative stress test: Echo and samantha scheduled for AM   5. Stable vital signs: Yes  6. ADLs back to baseline? Yes  7. Activity and level of assistance: ind  8. Barriers to discharge noted: Samantha and echo scheduled for AM  9. Interpretation of rhythm per telemetry tech: SR HR 84     Pt A&Ox4, VSS. Independent with cares. Pt denies chest pain/SOB/dizziness/palpitations. Trops neg but detectable; 0.026, 0.036, 0.034, 0.036. Plan for echo and samantha scan in AM. Pt resting comfortably. Will continue to monitor and provide supportive cares.

## 2017-04-21 NOTE — PROGRESS NOTES
Your next 10 appointments already scheduled      Apr 26, 2017  4:00 PM CDT   Office Visit with Richard Perez MD   [facility] ([facility])    [address]  [facility] [address] [phone]        Follow-up has been discussed with patient to see MD for hgb A1c and then again for the result of the ZIO-Patch.    Ely Hernández RN BSN CTS  [facility]   Care Management Coordinator  [email or web address]   [phone]

## 2017-04-21 NOTE — DISCHARGE SUMMARY
[facility]    Discharge Summary  Hospitalist    Date of Admission:  4/20/2017  Date of Discharge:  4/21/2017  Discharging Provider: Chad Luis PA-C  Date of Service (when I saw the patient): 04/21/17    Discharge Diagnoses   Acute chest pain/palpitations    History of Present Illness   Rachell Paige is an 61 year old male who presented with acute chest pain with associated SOB and palpitations. He also had a presyncopal/sycopal episode that resolved with sitting down.     Hospital Course   Rachell Paige was admitted on 4/20/2017.  The following problems were addressed during his hospitalization:    Mr. Paige was admitted for acute chest pain with palpitations and possible presyncopal episode vs orthostatic hypotension (though not orthostatic here). His TnIs were in the indeterminate range and possible relate to tachyarrhythmia, acutely elevated Cr (less likely) and/or possibly his acute HTN. His BP was in the 180s systolic on admission. His Cr was 1.38 on admission and improved with IVF. His other labs were unremarkable. His CXR was stable with mild cardiac enlargement and his EKG was without any acute ST-T changes. A head CT was performed for his dizziness and was negative. He was monitored on telemetry overnight and he remained in NSR without any arrhythmias. He was asymptomatic during his hospitalization. His TTE (full results below) was unremarkable. His Lexiscan showed nl EF and no evidence of ischemia (full results below). It is recommended that he go out with a Ziopatch to further monitor for arrhythmias such as A fib, VT or SVT. He may require BB pending those results. It is recommended that he see his PCP in the next few weeks (plans to establish care here) and he will need endocrinology/diabetes ed follow up for his diabetes. His last A1C was in 2015 and was 9.1 at that time. His ACE was refilled as he was out of this medication. It's possible he may require titration up for his BP,  but given Cr on admission would reassess as an outpatient. He was otherwise continued on his other home meds. He was discharged home with a Zio patch in place. He should follow up in the next few weeks as outlined above.      Principal Problem:    Atypical chest pain  Active Problems:    Type 2 diabetes mellitus with hemoglobin A1c goal of 7.0%-8.0% (H)    HTN, goal below 140/90    Hyperlipidemia LDL goal <100      Chad Luis PA-C    Significant Results and Procedures     TTE  Left ventricular systolic function is normal.  The visual ejection fraction is estimated at 55-60%.  No regional wall motion abnormalities noted.  The study was technically difficult. There is no comparison study available.    Lexiscan  Impression  1. Myocardial perfusion imaging using single isotope technique  demonstrated no evidence of ischemia or infarction.   2. Gated images demonstrated normal size left ventricle with normal  wall motion. The left ventricular systolic function is normal  ejection fraction 71%.    Pending Results   None  Unresulted Labs Ordered in the Past 30 Days of this Admission     No orders found for last 61 day(s).          Code Status   Full Code       Primary Care Physician   Physician No Ref-Primary    Physical Exam   Temp: 96.8  F (36  C) Temp src: Oral BP: 166/81 Pulse: 89   Resp: 16 SpO2: 99 % O2 Device: None (Room air)    Vitals:    04/20/17 0756 04/20/17 1629   Weight: 85.7 kg (189 lb) 83.8 kg (184 lb 12.8 oz)     Vital Signs with Ranges  Temp:  [95.4  F (35.2  C)-97.7  F (36.5  C)] 96.8  F (36  C)  Pulse:  [83-95] 89  Resp:  [16] 16  BP: (133-172)/(67-92) 166/81  SpO2:  [96 %-99 %] 99 %       General:  Patient appears comfortable and in no acute distress.   HEENT:  Head is atraumatic, normocephalic.  Pupils are equal, round and reactive to light.  No scleral icterus. Conjunctiva are without injection.     Neck: Neck is supple    Lymphatic: There is no cervical, supraclavicular adenopathy or tenderness  to palpation.   Respiratory: Lungs are clear to auscultation bilaterally.   Cardiovascular:   Regular rate and rhythm.  Normal S1 and S2.  No murmurs, rubs, or gallops.  Radial, dorsalis pedis and posterior tibialis pulses are 2+ bilaterally.  No jugular venous distention present.  No pretibial edema noted.    Abdomen:   Normal to visual inspection.  Normoactive bowel sounds.  Non-tender to palpation.  No masses or hepatosplenomegaly are appreciated.   Skin: No skin rashes or lesions to inspection or palpation.   Neurologic:  Cranial nerves II through XII are grossly intact and symmetric.   Sensation is intact to light touch in the upper and lower extremities bilaterally.   Musculoskeletal: There is full range of motion in the upper and lower extremities bilaterally.      Psychiatric: The patient is alert and oriented times 3.  Affect is not blunted and mood is appropriate.          Discharge Disposition   Discharged to home  Condition at discharge: Stable    Consultations This Hospital Stay   None    Time Spent on this Encounter   I, Chad Luis, personally saw the patient today and spent greater than 30 minutes discharging this patient.    Discharge Orders     Reason for your hospital stay   Dizziness, chest discomfort and palpitations     Follow-up and recommended labs and tests    Follow up with primary care provider, Physician No Ref-Primary, within 7-14 days for hospital follow- up.  The following labs/tests are recommended: A1C. He is willing to see any Int Med physician.    He will also need follow up with endocrinology and diabetes ed.     Activity   Your activity upon discharge: activity as tolerated     Full Code     Diet   Follow this diet upon discharge: Moderate consistent carbohydrate (8348-8882 ludivina / 4-6 CHO units per meal)       Discharge Medications   Current Discharge Medication List      CONTINUE these medications which have CHANGED    Details   lisinopril (PRINIVIL/ZESTRIL) 5 MG tablet Take 1  tablet (5 mg) by mouth daily SIG please see MD  Qty: 30 tablet, Refills: 1    Associated Diagnoses: HTN, goal below 140/90         CONTINUE these medications which have NOT CHANGED    Details   insulin glargine (LANTUS) 100 UNIT/ML injection Inject 52 Units Subcutaneous At Bedtime      insulin aspart (NOVOLOG FLEXPEN) 100 UNIT/ML injection Inject 15-20 Units Subcutaneous 2 times daily (with meals) With lunch and dinner      simvastatin (ZOCOR) 10 MG tablet Take 10 mg by mouth At Bedtime      gabapentin (NEURONTIN) 300 MG capsule Take 300 mg by mouth At Bedtime      lidocaine (XYLOCAINE) 2 % solution Take 15 mLs by mouth every 6 hours as needed for moderate pain swish and spit every 6 hours as needed; max 8 doses/24 hour period  Qty: 100 mL, Refills: 0    Associated Diagnoses: Acute pharyngitis, unspecified      aspirin 81 MG tablet Take 1 tablet (81 mg) by mouth daily  Qty: 30 tablet, Refills: OTC    Associated Diagnoses: DM type 2, goal A1C 7-8      hydrOXYzine (ATARAX) 25 MG tablet Take 1-2 tablets (25-50 mg) by mouth every 6 hours as needed for itching  Qty: 20 tablet, Refills: 0           Allergies   Allergies   Allergen Reactions     Aleve      HA sweats     Clopidogrel Nausea and Vomiting     Pt to restart on 11/25/15 to see again if he tolerates it or not. His sx were only GI. Not a true allergy     Sulfamethoxazole-Trimethoprim      Other reaction(s): Renal Failure     Data   Most Recent 3 CBC's:  Recent Labs   Lab Test  04/20/17   0757  09/22/15   2345  08/21/15   2345   WBC  9.1  11.4*  8.2   HGB  13.4  12.5*  12.9*   MCV  86  84  84   PLT  354  425  381      Most Recent 3 BMP's:  Recent Labs   Lab Test  04/21/17   0633  04/20/17   0757  09/22/15   2345   NA  141  139  135   POTASSIUM  3.8  4.0  4.1   CHLORIDE  109  105  102   CO2  24  26  26   BUN  17  21  14   CR  1.07  1.38*  1.43*   ANIONGAP  8  8  7   ALPHONSO  8.1*  8.6  8.6   GLC  98  134*  217*     Most Recent 2 LFT's:  Recent Labs   Lab Test   04/20/17   0757  04/17/15   1435   AST  16  13   ALT  23  25   ALKPHOS  89  113   BILITOTAL  0.4  0.5     Most Recent INR's and Anticoagulation Dosing History:  Anticoagulation Dose History     Recent Dosing and Labs Latest Ref Rng & Units 4/27/2011 1/22/2013    INR 0.86 - 1.14 0.94 0.90        Most Recent 3 Troponin's:  Recent Labs   Lab Test  04/20/17   2015  04/20/17   1611  04/20/17   1245   09/11/13   0523  09/11/13   0324   TROPI  0.036  0.034  0.036   < >   --    --    TROPONIN   --    --    --    --   0.00  0.01    < > = values in this interval not displayed.     Most Recent Cholesterol Panel:  Recent Labs   Lab Test  02/23/15   1500   CHOL  198   LDL  116   HDL  58   TRIG  119     Most Recent 6 Bacteria Isolates From Any Culture (See EPIC Reports for Culture Details):  Recent Labs   Lab Test  04/06/17   1606  09/22/15   2358  09/22/15   2345  03/17/12   2255  03/17/12   2253  04/27/11   1712   CULT  No Beta Streptococcus isolated  No growth  No growth  No growth after 6 days  No growth after 6 days  No Salmonella, Shigella, Campylobacter or E coli 0157 isolated.     Most Recent TSH, T4 and A1c Labs:  Recent Labs   Lab Test  04/17/15   1435   02/23/15   1500   TSH   --    --   1.60   A1C  9.1*   < >   --     < > = values in this interval not displayed.

## 2017-04-24 ENCOUNTER — TELEPHONE (OUTPATIENT)
Dept: FAMILY MEDICINE | Facility: CLINIC | Age: 62
End: 2017-04-24

## 2017-04-24 NOTE — TELEPHONE ENCOUNTER
ED / Discharge Outreach Protocol    Patient Contact    Attempt # 1    Was call answered?  No.  Left message on voicemail with information to call me back.    Evonne Mcginnis RN, BS  Clinical Nurse Triage.

## 2017-04-27 NOTE — TELEPHONE ENCOUNTER
ED / Discharge Outreach Protocol    Patient Contact    Attempt # 2    Was call answered?  No.  Left message on voicemail with information to call me back.    Sneha Stanley, RN, BSN, PHN

## 2017-06-24 ENCOUNTER — OFFICE VISIT (OUTPATIENT)
Dept: URGENT CARE | Facility: URGENT CARE | Age: 62
End: 2017-06-24
Payer: COMMERCIAL

## 2017-06-24 ENCOUNTER — RADIANT APPOINTMENT (OUTPATIENT)
Dept: GENERAL RADIOLOGY | Facility: CLINIC | Age: 62
End: 2017-06-24
Attending: FAMILY MEDICINE
Payer: COMMERCIAL

## 2017-06-24 VITALS
OXYGEN SATURATION: 97 % | BODY MASS INDEX: 29.73 KG/M2 | SYSTOLIC BLOOD PRESSURE: 124 MMHG | DIASTOLIC BLOOD PRESSURE: 64 MMHG | HEART RATE: 97 BPM | TEMPERATURE: 98.1 F | RESPIRATION RATE: 23 BRPM | WEIGHT: 184.2 LBS
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
## 2017-06-24 DIAGNOSIS — M54.2 CERVICALGIA: ICD-10-CM

## 2017-06-24 DIAGNOSIS — M54.9 ACUTE UPPER BACK PAIN: ICD-10-CM

## 2017-06-24 DIAGNOSIS — S16.1XXA STRAIN OF NECK MUSCLE, INITIAL ENCOUNTER: ICD-10-CM

## 2017-06-24 PROCEDURE — 99213 OFFICE O/P EST LOW 20 MIN: CPT | Performed by: FAMILY MEDICINE

## 2017-06-24 PROCEDURE — 72040 X-RAY EXAM NECK SPINE 2-3 VW: CPT
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
## 2017-06-25 NOTE — PATIENT INSTRUCTIONS
Tylenol  Ice onto the painful areas of the head, neck, and back for the first two days.   Place heat onto the painful areas of the head, neck, and back starting 2 days from now  follow up with the primary care provider if not better in 10 days.    Do the neck and upper back exercises described below.     Understanding Cervical Strain    There are 7 bones (vertebrae) in the neck that are part of the spine. These are called the cervical spine. Cervical strain is a medical term for neck pain. The neck has several layers of muscles. These are connected with tendons to the cervical spine and other bones. Neck pain is often the result of injury to these muscles and tendons.  Causes of cervical strain  Different types of stress on the neck can damage muscles and tendons (soft tissues) and cause cervical strain. Cervical tissues can be damaged by:    The neck being forced past its normal range of motion, such as in a car accident or sports injury    Constant, low-level stress, such as from poor posture or a poorly set-up workspace  Symptoms of cervical strain  These may include:    Neck pain or stiffness    Pain in the shoulders or upper back    Muscle spasms    Headache, often starting at the base of the neck    Irritability, difficulty concentrating, or sleeplessness  Treatment for cervical strain  This problem often gets better on its own. Treatments aim to reduce pain and inflammation and increase the range of motion of the neck. Possible treatments include:    Over-the-counter or prescription pain medicine. These help relieve pain and inflammation.    Stretching exercises to decrease neck stiffness.    Massage to decrease neck stiffness.    Cold or heat pack. These help reduce pain and swelling.  Call 911  Call emergency services right away if you have any of these:    Face drooping or numbness    Numbness or weakness, especially in the arms or on one side    Slurred speech or difficulty speaking    Blurred vision    When to call your healthcare provider  Call your healthcare provider right away if you have any of these:    Fever of 100.4 F (38 C) or higher, or as directed    Pain or stiffness that gets worse    Symptoms that don t get better, or get worse    Numbness, tingling, weakness or shooting pains into the arms or legs    New symptoms  Date Last Reviewed: 3/10/2016    2160-5752 [employer]. [address] [place] [address]. All rights reserved. This information is not intended as a substitute for professional medical care. Always follow your healthcare professional's instructions.        Reach and Hold Exercise       Do this exercise on your hands and knees. Keep your knees under your hips and your hands under your shoulders. Keep your spine in a neutral position (not arched or sagging). Keep your ears in line with your shoulders. Hold for a few seconds before starting the exercise:  1. Tighten your abdominal muscles and raise one arm straight in front of you, palm down. Hold for 5 seconds, then lower. Repeat 5 times.  2. Do the exercise again, this time lifting your arm to the side. Repeat 5 times.  3. Do the exercise again, this time lifting your arm backward, palm up. Repeat 5 times.  Switch sides and do each exercise with the other arm.  Date Last Reviewed: 8/16/2015 2000-2017 [employer]. [address] [place] [address]. All rights reserved. This information is not intended as a substitute for professional medical care. Always follow your healthcare professional's instructions.        Shoulder and Upper Back Stretch  To start, stand tall with your ears, shoulders, and hips in line. Your feet should be slightly apart, positioned just under your hips. Focus your eyes directly in front of you.  this position for a few seconds before starting your exercise. This helps increase your awareness of proper posture.  Reach overhead and slightly back with both arms.  Keep your shoulders and neck aligned and your elbows behind your shoulders:    With your palms facing the ceiling, turn your fingers inward.    Take a deep breath. Breathe out, and lower your elbows toward your buttocks. Hold for 5 seconds, then return to starting position.    Repeat 3 times.       Date Last Reviewed: 8/16/2015 2000-2017 [employer]. [address] [place] [address]. All rights reserved. This information is not intended as a substitute for professional medical care. Always follow your healthcare professional's instructions.        Shoulder Clock Exercise    To start, stand tall with your ears, shoulders, and hips in line. Your feet should be slightly apart, positioned just under your hips. Focus your eyes directly in front of you.  this position for a few seconds before starting your exercise. This helps increase your awareness of proper posture.    Imagine that your right shoulder is the center of a clock. With the outer point of your shoulder, roll it around to slowly trace the outer edge of the clock.    Move clockwise first, then counterclockwise.    Repeat 3 to 5 times. Switch shoulders.   Date Last Reviewed: 10/2/2015    8618-7763 [employer]. [address] [place] [address]. All rights reserved. This information is not intended as a substitute for professional medical care. Always follow your healthcare professional's instructions.        Shoulder Girdle Stretch    To start, sit in a chair with your feet flat on the floor. Your weight should be slightly forward so that you re balanced evenly on your buttocks. Relax your shoulders and keep your head level. Using a chair with arms may help you keep your balance:    Place 1 hand on the outside elbow of the other arm.    Pull the arm across your body. Hold for 30 to 60 seconds. Repeat once.    Switch sides.  For your safety, check with your healthcare provider before starting an exercise  program.   Date Last Reviewed: 8/16/2015 2000-2017 [employer]. [address] [place] [address]. All rights reserved. This information is not intended as a substitute for professional medical care. Always follow your healthcare professional's instructions.        Shoulder Exercises      To start, sit in a chair with your feet flat on the floor. Your weight should be slightly forward so that you re balanced evenly on your buttocks. Relax your shoulders and keep your head level. Avoid arching your back or rounding your shoulders. Using a chair with arms may help you keep your balance.    Raise your arms, elbows bent, to shoulder height.    Slowly move your forearms together. Hold for 5 seconds.    Return to starting position. Repeat 5 times.  Date Last Reviewed: 10/1/2015    [id number] [employer]. [address] [place] [address]. All rights reserved. This information is not intended as a substitute for professional medical care. Always follow your healthcare professional's instructions.        Shoulder Shrug Exercise    To start, sit in a chair with your feet flat on the floor. Shift your weight slightly forward to avoid rounding your back. Relax. Keep your ears, shoulders, and hips aligned:    Raise both of your shoulders as high as you can, as if you were trying to touch them to your ears. Keep your head and neck still and relaxed.    Hold for a count of 10. Release.    Repeat 5 times.  For your safety, check with your healthcare provider before starting an exercise program.   Date Last Reviewed: 8/16/2015 2000-2017 [employer]. [address] [place] [address]. All rights reserved. This information is not intended as a substitute for professional medical care. Always follow your healthcare professional's instructions.        Shoulder Squeeze Exercise    To start, sit in a chair with your feet flat on the floor. Shift your weight slightly  forward to avoid rounding your back. Relax. Keep your ears, shoulders, and hips aligned:    Raise your arms to shoulder height, elbows bent and palms forward.    Move your arms back, squeezing your shoulder blades together.    Hold for 10 seconds. Return to starting position.     Repeat 5 times.   For your safety, check with your healthcare provider before starting an exercise program.   Date Last Reviewed: 8/16/2015 2000-2017 [employer]. [address] [place] [address]. All rights reserved. This information is not intended as a substitute for professional medical care. Always follow your healthcare professional's instructions.        Neck Exercises: Active Neck Rotation    To start, lie on your back, knees bent and feet flat on the floor. Keep your ears, shoulders, and hips aligned, but don t press your lower back to the floor. Rest your hands on your pelvis. Breathe deeply and relax.    Use your neck muscles to turn your head to one side until you feel a stretch in the muscles.    Hold for 5 seconds. Then turn to the other side.    Repeat 5 times on each side.  Note: Keep your shoulders on the floor. Don t lift or tuck your chin as you turn your head.  Date Last Reviewed: 8/16/2015 2000-2017 [employer]. [address] [place] [address]. All rights reserved. This information is not intended as a substitute for professional medical care. Always follow your healthcare professional's instructions.        Neck Exercises: Arm Lift            To start, lie on your back, knees bent and feet flat on the floor. Keep your ears, shoulders, and hips aligned, but don t press your lower back to the floor. Rest your hands on your pelvis. Breathe deeply and relax. Tighten the abdominal muscles to keep the back from arching.    Raise one arm overhead, then lower it. As you lower that arm, raise the other arm.    Continue to move both arms in slow, smooth arcs. Keep your arms  straight and your head and neck relaxed.    Repeat 10 times with each arm.  For your safety, check with your healthcare provider before starting an exercise program.   Date Last Reviewed: 8/16/2015 2000-2017 [employer]. [address] [place] [address]. All rights reserved. This information is not intended as a substitute for professional medical care. Always follow your healthcare professional's instructions.        Neck Exercises: Passive Neck Rotation        To start, lie on your back, knees bent and feet flat on the floor. Keep your ears, shoulders, and hips aligned, but don t press your lower back to the floor. Rest your hands on your pelvis. Breathe deeply and relax.    With your neck relaxed, place the palm of one hand on your forehead. Use your hand to turn your head to one side until you feel a stretch in the neck muscles. Do not push through pain.    Hold for 5 seconds. Then turn to the other side.    Repeat 5 times on each side.   Note: Keep your shoulders on the floor. Don t lift your chin as you turn your head.  Date Last Reviewed: 8/16/2015 2000-2017 [employer]. [address] [place] [address]. All rights reserved. This information is not intended as a substitute for professional medical care. Always follow your healthcare professional's instructions.        Back Safety: Getting Into and Out of Bed  Good posture protects your back when you sit, stand, and walk. It is also important while getting into and out of bed. Follow the steps below to get out of bed. Reverse them to get into bed.  Safety tip: Sit at the side of the bed for a few seconds before standing up. Then, after you stand up, wait a moment before walking to be sure you re not dizzy.      Roll onto your side.   1. Roll onto your side    Keep your knees together.    Flatten your stomach muscles to keep your back from arching.    Put your hands on the bed in front of you.     Raise your  body.   2. Raise your body    Push your upper body off the bed as you swing your legs to the floor.    Keeping your back straight, move your whole body as one unit. Don t bend or twist at the waist.    Let the weight of your legs help you move.     Stand up.   3. Stand up    Lean forward from your hip and roll onto the balls of your feet.    Flatten your stomach muscles to keep your back from arching.    Using your arm and leg muscles, push yourself to a standing position.  Date Last Reviewed: 8/31/2015 2000-2017 [employer]. [address] [place] [address]. All rights reserved. This information is not intended as a substitute for professional medical care. Always follow your healthcare professional's instructions.

## 2018-06-22 ENCOUNTER — OFFICE VISIT (OUTPATIENT)
Dept: URGENT CARE | Facility: URGENT CARE | Age: 63
End: 2018-06-22
Payer: COMMERCIAL

## 2018-06-22 VITALS
TEMPERATURE: 98.1 F | BODY MASS INDEX: 30.51 KG/M2 | DIASTOLIC BLOOD PRESSURE: 68 MMHG | HEART RATE: 68 BPM | WEIGHT: 189 LBS | SYSTOLIC BLOOD PRESSURE: 110 MMHG | OXYGEN SATURATION: 97 %

## 2018-06-22 DIAGNOSIS — R79.89 ELEVATED SERUM CREATININE: ICD-10-CM

## 2018-06-22 DIAGNOSIS — R60.0 PEDAL EDEMA: Primary | ICD-10-CM

## 2018-06-22 LAB
ALBUMIN SERPL-MCNC: 3.6 G/DL (ref 3.4–5)
ALP SERPL-CCNC: 72 U/L (ref 40–150)
ALT SERPL W P-5'-P-CCNC: 23 U/L (ref 0–70)
ANION GAP SERPL CALCULATED.3IONS-SCNC: 11 MMOL/L (ref 3–14)
AST SERPL W P-5'-P-CCNC: 26 U/L (ref 0–45)
BASOPHILS # BLD AUTO: 0 10E9/L (ref 0–0.2)
BASOPHILS NFR BLD AUTO: 0.3 %
BILIRUB SERPL-MCNC: 0.6 MG/DL (ref 0.2–1.3)
BUN SERPL-MCNC: 21 MG/DL (ref 7–30)
CALCIUM SERPL-MCNC: 9.1 MG/DL (ref 8.5–10.1)
CHLORIDE SERPL-SCNC: 102 MMOL/L (ref 94–109)
CO2 SERPL-SCNC: 27 MMOL/L (ref 20–32)
CREAT SERPL-MCNC: 1.4 MG/DL (ref 0.66–1.25)
DIFFERENTIAL METHOD BLD: ABNORMAL
EOSINOPHIL # BLD AUTO: 0.3 10E9/L (ref 0–0.7)
EOSINOPHIL NFR BLD AUTO: 2.9 %
ERYTHROCYTE [DISTWIDTH] IN BLOOD BY AUTOMATED COUNT: 13.2 % (ref 10–15)
GFR SERPL CREATININE-BSD FRML MDRD: 52 ML/MIN/1.7M2
GLUCOSE SERPL-MCNC: 245 MG/DL (ref 70–99)
HCT VFR BLD AUTO: 37.3 % (ref 40–53)
HGB BLD-MCNC: 12.2 G/DL (ref 13.3–17.7)
LYMPHOCYTES # BLD AUTO: 2.5 10E9/L (ref 0.8–5.3)
LYMPHOCYTES NFR BLD AUTO: 28.1 %
MCH RBC QN AUTO: 28.9 PG (ref 26.5–33)
MCHC RBC AUTO-ENTMCNC: 32.7 G/DL (ref 31.5–36.5)
MCV RBC AUTO: 88 FL (ref 78–100)
MONOCYTES # BLD AUTO: 0.7 10E9/L (ref 0–1.3)
MONOCYTES NFR BLD AUTO: 8.1 %
NEUTROPHILS # BLD AUTO: 5.4 10E9/L (ref 1.6–8.3)
NEUTROPHILS NFR BLD AUTO: 60.6 %
PLATELET # BLD AUTO: 303 10E9/L (ref 150–450)
POTASSIUM SERPL-SCNC: 4.5 MMOL/L (ref 3.4–5.3)
PROT SERPL-MCNC: 6.9 G/DL (ref 6.8–8.8)
RBC # BLD AUTO: 4.22 10E12/L (ref 4.4–5.9)
SODIUM SERPL-SCNC: 140 MMOL/L (ref 133–144)
WBC # BLD AUTO: 9 10E9/L (ref 4–11)

## 2018-06-22 PROCEDURE — 84550 ASSAY OF BLOOD/URIC ACID: CPT | Performed by: NURSE PRACTITIONER

## 2018-06-22 PROCEDURE — 99214 OFFICE O/P EST MOD 30 MIN: CPT | Performed by: NURSE PRACTITIONER

## 2018-06-22 PROCEDURE — 80053 COMPREHEN METABOLIC PANEL: CPT | Performed by: NURSE PRACTITIONER

## 2018-06-22 PROCEDURE — 36415 COLL VENOUS BLD VENIPUNCTURE: CPT | Performed by: NURSE PRACTITIONER

## 2018-06-22 PROCEDURE — 85025 COMPLETE CBC W/AUTO DIFF WBC: CPT | Performed by: NURSE PRACTITIONER

## 2018-06-22 ASSESSMENT — ENCOUNTER SYMPTOMS
SHORTNESS OF BREATH: 0
HEMOPTYSIS: 0
TINGLING: 1
MUSCULOSKELETAL NEGATIVE: 1
FEVER: 0
COUGH: 0
CHILLS: 0
PALPITATIONS: 0

## 2018-06-22 NOTE — MR AVS SNAPSHOT
After Visit Summary   6/22/2018    Rachell Paige    MRN: [id number]           Patient Information     Date Of Birth          1955        Visit Information        Provider Department      6/22/2018 7:35 PM Reyes Roberson NP [facility]        Today's Diagnoses     Pedal edema    -  1    Elevated serum creatinine          Care Instructions      Discharge Instructions for Acute Kidney Injury  You have been diagnosed with acute kidney injury. This means that your kidneys are not working properly. When both kidneys are healthy, they help filter out fluid and waste from the blood and body. Acute kidney injury has many causes. These include urinary blockages, infection, lack of enough blood supply, and medicines that can injure kidneys. In some cases, acute kidney injury is short-term (temporary), lasting several days to a few months. This is because the kidney can repair itself. But acute kidney injury can also result in chronic kidney disease or end stage renal failure. Here are some instructions for you to follow as you recover.  Home care    Follow any instructions for eating and drinking given to you by your healthcare provider.  ? Drink less fluid, if instructed by your healthcare provider.  ? Keep a record of everything you eat and drink.    Measure the amount of urine and stool you have each day.    Weigh yourself every day, at the same time of day, and in the same kind of clothes. Keep a daily record of your daily weights.    Take your temperature every day. Keep a record of the results.    Learn to take your own blood pressure. Keep a record of your results. Ask your healthcare provider when you should seek emergency medical attention. Your provider will tell you which blood pressure reading is dangerous.    Avoid contact with people who have infections (colds, bronchitis, or skin conditions).    Practice good personal hygiene. This is especially important if you  have a catheter in place when you leave the hospital. Doing so helps keep you safe from infection.    Take your medicines exactly as directed.    You may require frequent blood and urine tests to monitor your kidney function.  Follow-up care  Follow up with your healthcare provider, or as advised.  When to seek medical care  Call your healthcare provider right away if you have any of the following:    Signs of bladder infection (urinating more often than usual, or burning, pain, bleeding, or hesitancy when you urinate)    Signs of infection around your catheter (redness, swelling, warmth, or drainage)    Rapid weight loss or weight gain, such as 3 pounds or more in 24 hours or 6 pounds or more in 7 days    Fever above 100.4 F (38.0 C) or chills    Muscle aches    Night sweats    Very little or no urine output    Swelling of your hands, legs, or feet    Back pain    Abdominal pain    Extreme tiredness   Date Last Reviewed: 2/1/2017 2000-2017 [employer] [address] [place] [address]. All rights reserved. This information is not intended as a substitute for professional medical care. Always follow your healthcare professional's instructions.                Follow-ups after your visit        Who to contact     If you have questions or need follow up information about today's clinic visit or your schedule please contact [facility] directly at [phone].  Normal or non-critical lab and imaging results will be communicated to you by MyChart, letter or phone within 4 business days after the clinic has received the results. If you do not hear from us within 7 days, please contact the clinic through MyChart or phone. If you have a critical or abnormal lab result, we will notify you by phone as soon as possible.  Submit refill requests through [employer] or call your pharmacy and they will forward the refill request to us. Please allow 3 business days for your refill to be  "completed.          Additional Information About Your Visit        [email or web address] Information     [employer] lets you send messages to your doctor, view your test results, renew your prescriptions, schedule appointments and more. To sign up, go to [email or web address] . Click on \"Log in\" on the left side of the screen, which will take you to the Welcome page. Then click on \"Sign up Now\" on the right side of the page.     You will be asked to enter the access code listed below, as well as some personal information. Please follow the directions to create your username and password.     Your access code is: [email or web address]  Expires: 2018  8:51 PM     Your access code will  in 90 days. If you need help or a new code, please call your [place] clinic or [phone].        Care EveryWhere ID     This is your Care EveryWhere ID. This could be used by other organizations to access your [place] medical records  [id number]        Your Vitals Were     Pulse Temperature Pulse Oximetry BMI (Body Mass Index)          68 98.1  F (36.7  C) (Tympanic) 97% 30.51 kg/m2         Blood Pressure from Last 3 Encounters:   18 110/68   17 124/64   17 155/63    Weight from Last 3 Encounters:   18 189 lb (85.7 kg)   17 184 lb 3.2 oz (83.6 kg)   17 184 lb 12.8 oz (83.8 kg)              We Performed the Following     **Comprehensive metabolic panel FUTURE anytime     CBC with platelets differential     Uric acid        Primary Care Provider Fax #    Physician No Ref-Primary [phone]       No address on file        Equal Access to Services     DHAVAL MOLINA : Gutierrez Doss, ron butler, manuela king. So [facility] [phone].    ATENCIÓN: Si habla español, tiene a stewart disposición servicios gratuitos de asistencia lingüística. Llame al [phone].    We comply with applicable federal civil rights laws and [place] laws. We do not " discriminate on the basis of race, color, national origin, age, disability, sex, sexual orientation, or gender identity.            Thank you!     Thank you for choosing [facility]  for your care. Our goal is always to provide you with excellent care. Hearing back from our patients is one way we can continue to improve our services. Please take a few minutes to complete the written survey that you may receive in the mail after your visit with us. Thank you!             Your Updated Medication List - Protect others around you: Learn how to safely use, store and throw away your medicines at www.disposemymeds.org.          This list is accurate as of 6/22/18  8:51 PM.  Always use your most recent med list.                   Brand Name Dispense Instructions for use Diagnosis    aspirin 81 MG tablet     30 tablet    Take 1 tablet (81 mg) by mouth daily    DM type 2, goal A1C 7-8       gabapentin 300 MG capsule    NEURONTIN     Take 300 mg by mouth At Bedtime        hydrOXYzine 25 MG tablet    ATARAX    20 tablet    Take 1-2 tablets (25-50 mg) by mouth every 6 hours as needed for itching        insulin glargine 100 UNIT/ML injection    LANTUS     Inject 52 Units Subcutaneous At Bedtime        lidocaine (viscous) 2 % solution    XYLOCAINE    100 mL    Take 15 mLs by mouth every 6 hours as needed for moderate pain swish and spit every 6 hours as needed; max 8 doses/24 hour period    Acute pharyngitis, unspecified       lisinopril 5 MG tablet    PRINIVIL/ZESTRIL    30 tablet    Take 1 tablet (5 mg) by mouth daily SIG please see MD    HTN, goal below 140/90       NovoLOG FLEXPEN 100 UNIT/ML injection   Generic drug:  insulin aspart      Inject 15-20 Units Subcutaneous 2 times daily (with meals) With lunch and dinner        ZOCOR 10 MG tablet   Generic drug:  simvastatin      Take 10 mg by mouth At Bedtime

## 2018-06-23 ENCOUNTER — TELEPHONE (OUTPATIENT)
Dept: URGENT CARE | Facility: URGENT CARE | Age: 63
End: 2018-06-23

## 2018-06-23 LAB — URATE SERPL-MCNC: 7.2 MG/DL (ref 3.5–7.2)

## 2018-06-23 NOTE — PROGRESS NOTES
SUBJECTIVE:                                                    Rachell Paige is a 62 year old male who presents to clinic today for the following health issues:    HPI  Right Foot Swelling      Duration: 2 days    Description (location/character/radiation): right foot swelling    Intensity:  moderate    Accompanying signs and symptoms: numbness and burning, though pt has this at baseline due to diabetes. No pain.    History (similar episodes/previous evaluation): None    Precipitating or alleviating factors: admits to walking more than usual over the last few days, except yesterday due to swelling. No recent medical procedures or extended immobility.    Therapies tried and outcome: soaking feet in solution of warm water, vinegar, and Epsom salt has not been helpful.       Problem list and histories reviewed & adjusted, as indicated.    Past Medical History:   Diagnosis Date     Anxiety 2/23/2015     Dermatitis 4/18/2015     DM type 2, goal A1C 7-8 2/23/2015     Does not have health insurance 4/18/2015     Financial problems 2/23/2015     HTN, goal below 140/90 2/23/2015     Hyperlipidemia LDL goal <100 2/23/2015     Microalbuminuria 4/18/2015     Onychomycosis 4/18/2015     Rash 2/23/2015       Patient Active Problem List   Diagnosis     Type 2 diabetes mellitus with hemoglobin A1c goal of 7.0%-8.0% (H)     HTN, goal below 140/90     Hyperlipidemia LDL goal <100     Financial problems     Anxiety     Rash     Microalbuminuria     Onychomycosis     Dermatitis     Does not have health insurance     Atypical chest pain     No past surgical history on file.    Social History   Substance Use Topics     Smoking status: Former Smoker     Smokeless tobacco: Never Used     Alcohol use No     Family History   Problem Relation Age of Onset     Diabetes Other          Current Outpatient Prescriptions   Medication Sig Dispense Refill     aspirin 81 MG tablet Take 1 tablet (81 mg) by mouth daily 30 tablet OTC     gabapentin  (NEURONTIN) 300 MG capsule Take 300 mg by mouth At Bedtime       hydrOXYzine (ATARAX) 25 MG tablet Take 1-2 tablets (25-50 mg) by mouth every 6 hours as needed for itching (Patient not taking: Reported on 6/22/2018) 20 tablet 0     insulin aspart (NOVOLOG FLEXPEN) 100 UNIT/ML injection Inject 15-20 Units Subcutaneous 2 times daily (with meals) With lunch and dinner       insulin glargine (LANTUS) 100 UNIT/ML injection Inject 52 Units Subcutaneous At Bedtime       lidocaine (XYLOCAINE) 2 % solution Take 15 mLs by mouth every 6 hours as needed for moderate pain swish and spit every 6 hours as needed; max 8 doses/24 hour period 100 mL 0     lisinopril (PRINIVIL/ZESTRIL) 5 MG tablet Take 1 tablet (5 mg) by mouth daily SIG please see MD 30 tablet 1     simvastatin (ZOCOR) 10 MG tablet Take 10 mg by mouth At Bedtime       Allergies   Allergen Reactions     Aleve      HA sweats     Clopidogrel Nausea and Vomiting     Pt to restart on 11/25/15 to see again if he tolerates it or not. His sx were only GI. Not a true allergy     Sulfamethoxazole-Trimethoprim      Other reaction(s): Renal Failure       Review of Systems   Constitutional: Negative for chills and fever.   Respiratory: Negative for cough, hemoptysis and shortness of breath.    Cardiovascular: Positive for leg swelling. Negative for chest pain and palpitations.   Musculoskeletal: Negative.    Neurological: Positive for tingling.         OBJECTIVE:     /68 (BP Location: Right arm, Patient Position: Chair, Cuff Size: Adult Regular)  Pulse 68  Temp 98.1  F (36.7  C) (Tympanic)  Wt 189 lb (85.7 kg)  SpO2 97%  BMI 30.51 kg/m2  Body mass index is 30.51 kg/(m^2).  Physical Exam   Constitutional: He is oriented to person, place, and time. No distress.   Cardiovascular: Normal rate, regular rhythm and normal heart sounds.    Pulses:       Dorsalis pedis pulses are 1+ on the right side        Posterior tibial pulses are 1+ on the right side   Pulmonary/Chest:  Effort normal and breath sounds normal.   Musculoskeletal: He exhibits edema.        Right foot: There is swelling (2+ pitting edema of foot and ankle). There is normal range of motion, no tenderness, normal capillary refill, no crepitus, no deformity and no laceration.        Feet:    Neurological: He is alert and oriented to person, place, and time.   Psychiatric: He has a normal mood and affect.         Diagnostic Test Results:  Results for orders placed or performed in visit on 06/22/18 (from the past 24 hour(s))   **Comprehensive metabolic panel FUTURE anytime   Result Value Ref Range    Sodium 140 133 - 144 mmol/L    Potassium 4.5 3.4 - 5.3 mmol/L    Chloride 102 94 - 109 mmol/L    Carbon Dioxide 27 20 - 32 mmol/L    Anion Gap 11 3 - 14 mmol/L    Glucose 245 (H) 70 - 99 mg/dL    Urea Nitrogen 21 7 - 30 mg/dL    Creatinine 1.40 (H) 0.66 - 1.25 mg/dL    GFR Estimate 52 (L) >60 mL/min/1.7m2    GFR Estimate If Black 62 >60 mL/min/1.7m2    Calcium 9.1 8.5 - 10.1 mg/dL    Bilirubin Total 0.6 0.2 - 1.3 mg/dL    Albumin 3.6 3.4 - 5.0 g/dL    Protein Total 6.9 6.8 - 8.8 g/dL    Alkaline Phosphatase 72 40 - 150 U/L    ALT 23 0 - 70 U/L    AST 26 0 - 45 U/L       ASSESSMENT/PLAN:       ICD-10-CM    1. Pedal edema R60.0 **Comprehensive metabolic panel FUTURE anytime     Uric acid     CBC with platelets differential     CANCELED: D dimer, quantitative     CANCELED: D dimer, quantitative   2. Elevated serum creatinine R79.89        Medical Decision Making:    Differential Diagnosis:  Gout, PVD, infection, MARILYN/AKD    Serious Comorbid Conditions:  Adult:  Diabetes    PLAN:    Force fluids and f/u with PCP asap    Followup:    If not improving or if condition worsens, follow up with your Primary Care Provider, If not improving or if conditions worsens over the next 12-24 hours, go to the Emergency Department    Reyes Milligan, DUANE, ARNP, FNP-C  [facility]

## 2018-06-23 NOTE — PATIENT INSTRUCTIONS
Discharge Instructions for Acute Kidney Injury  You have been diagnosed with acute kidney injury. This means that your kidneys are not working properly. When both kidneys are healthy, they help filter out fluid and waste from the blood and body. Acute kidney injury has many causes. These include urinary blockages, infection, lack of enough blood supply, and medicines that can injure kidneys. In some cases, acute kidney injury is short-term (temporary), lasting several days to a few months. This is because the kidney can repair itself. But acute kidney injury can also result in chronic kidney disease or end stage renal failure. Here are some instructions for you to follow as you recover.  Home care    Follow any instructions for eating and drinking given to you by your healthcare provider.  ? Drink less fluid, if instructed by your healthcare provider.  ? Keep a record of everything you eat and drink.    Measure the amount of urine and stool you have each day.    Weigh yourself every day, at the same time of day, and in the same kind of clothes. Keep a daily record of your daily weights.    Take your temperature every day. Keep a record of the results.    Learn to take your own blood pressure. Keep a record of your results. Ask your healthcare provider when you should seek emergency medical attention. Your provider will tell you which blood pressure reading is dangerous.    Avoid contact with people who have infections (colds, bronchitis, or skin conditions).    Practice good personal hygiene. This is especially important if you have a catheter in place when you leave the hospital. Doing so helps keep you safe from infection.    Take your medicines exactly as directed.    You may require frequent blood and urine tests to monitor your kidney function.  Follow-up care  Follow up with your healthcare provider, or as advised.  When to seek medical care  Call your healthcare provider right away if you have any of the  following:    Signs of bladder infection (urinating more often than usual, or burning, pain, bleeding, or hesitancy when you urinate)    Signs of infection around your catheter (redness, swelling, warmth, or drainage)    Rapid weight loss or weight gain, such as 3 pounds or more in 24 hours or 6 pounds or more in 7 days    Fever above 100.4 F (38.0 C) or chills    Muscle aches    Night sweats    Very little or no urine output    Swelling of your hands, legs, or feet    Back pain    Abdominal pain    Extreme tiredness   Date Last Reviewed: 2/1/2017 2000-2017 [employer]. [address] [place] [address]. All rights reserved. This information is not intended as a substitute for professional medical care. Always follow your healthcare professional's instructions.

## 2018-06-23 NOTE — TELEPHONE ENCOUNTER
SUBJECTIVE:  The patient's June 22, 2018, uric acid level was normal.     PLAN:  Please notify patient of this result.  He should follow up with his primary care provider as soon as possible regarding his foot problem.  If the foot problem continues to worsen this weekend, he should go to the emergency room for further evaluation.     Varinder Terry MD

## 2018-06-24 NOTE — TELEPHONE ENCOUNTER
Called and talked to pt to inform him re-Dr. ADAL Terry's message. Pt stated that foot is getting better and has an appt with PCP next week.    Sheron Regan CMA ([facility])

## 2018-07-27 ENCOUNTER — APPOINTMENT (OUTPATIENT)
Dept: GENERAL RADIOLOGY | Facility: CLINIC | Age: 63
End: 2018-07-27
Attending: EMERGENCY MEDICINE
Payer: COMMERCIAL

## 2018-07-27 ENCOUNTER — HOSPITAL ENCOUNTER (EMERGENCY)
Facility: CLINIC | Age: 63
Discharge: HOME | End: 2018-07-27
Attending: EMERGENCY MEDICINE | Admitting: EMERGENCY MEDICINE
Payer: COMMERCIAL

## 2018-07-27 VITALS
HEART RATE: 98 BPM | BODY MASS INDEX: 29.54 KG/M2 | TEMPERATURE: 97.6 F | OXYGEN SATURATION: 100 % | WEIGHT: 183 LBS | RESPIRATION RATE: 18 BRPM | SYSTOLIC BLOOD PRESSURE: 177 MMHG | DIASTOLIC BLOOD PRESSURE: 44 MMHG

## 2018-07-27 DIAGNOSIS — S92.411D CLOSED DISPLACED FRACTURE OF PROXIMAL PHALANX OF RIGHT GREAT TOE WITH ROUTINE HEALING, SUBSEQUENT ENCOUNTER: ICD-10-CM

## 2018-07-27 LAB — GLUCOSE BLDC GLUCOMTR-MCNC: 65 MG/DL (ref 70–99)

## 2018-07-27 PROCEDURE — 73660 X-RAY EXAM OF TOE(S): CPT | Mod: RT

## 2018-07-27 PROCEDURE — 99284 EMERGENCY DEPT VISIT MOD MDM: CPT

## 2018-07-27 PROCEDURE — 00000146 ZZHCL STATISTIC GLUCOSE BY METER IP

## 2018-07-27 NOTE — ED AVS SNAPSHOT
[facility] Emergency Department    [address] [facility] [address]    Phone:  [phone]    Fax:  [phone]                                       Rachell Paige   MRN: [id number]    Department:  [facility] Emergency Department   Date of Visit:  7/27/2018           Patient Information     Date Of Birth          1955        Your diagnoses for this visit were:     Closed displaced fracture of proximal phalanx of right great toe with routine healing, subsequent encounter        You were seen by Raul Pemberton MD.      Follow-up Information     Schedule an appointment as soon as possible for a visit with [facility].    Why:  may call to make appointment for 2nd opinion for your toe.     Contact information:    [address], [address] [address]  [phone]          Call [facility] .    Why:  call to discuss ED visit and follow-up         Follow up with [facility] Emergency Department.    Specialty:  EMERGENCY MEDICINE    Why:  As needed, If symptoms worsen    Contact information:    [address] [facility] [address]  [phone]        Discharge Instructions         Closed Toe Fracture  Your toe is broken (fractured). This causes local pain, swelling, and sometimes bruising. This injury usually takes about 4 to 6 weeks to heal, but can sometimes take longer. Toe injuries are often treated by taping the injured toe to the next one (buddy taping). This protects the injured toe and holds it in position.     If the toenail has been severely injured, it may fall off in 1 to 2 weeks. It takes up to 12 months for a new toenail to grow back.  Home care  Follow these guidelines when caring for yourself at home:    You may be given a cast shoe to wear to keep your toe from moving. If not, you can use a sandal or any shoe that doesn t put pressure on the injured toe until the  swelling and pain go away. If using a sandal, be careful not to strike your foot against anything. Another injury could make the fracture worse. If you were given crutches, don t put full weight on the injured foot until you can do so without pain, or as directed by your healthcare provider.    Keep your foot elevated to reduce pain and swelling. When sleeping, put a pillow under the injured leg. When sitting, support the injured leg so it is above your waist. This is very important during the first 2 days (48 hours).    Put an ice pack on the injured area. Do this for 20 minutes every 1 to 2 hours the first day for pain relief. You can make an ice pack by wrapping a plastic bag of ice cubes in a thin towel. As the ice melts, be careful that any cloth or paper tape doesn t get wet. Continue using the ice pack 3 to 4 times a day for the next 2 days. Then use the ice pack as needed to ease pain and swelling.    If buddy tape was used and it becomes wet or dirty, change it. You may replace it with paper, plastic, or cloth tape. Cloth tape and paper tapes must be kept dry.    You may use acetaminophen or ibuprofen to control pain, unless another pain medicine was prescribed. If you have chronic liver or kidney disease, talk with your healthcare provider before using these medicines. Also talk with your provider if you ve had a stomach ulcer or gastrointestinal bleeding.    You may return to sports or physical education activities after 4 weeks when you can run without pain, or as directed by your healthcare provider.  Follow-up care  Follow up with your healthcare provider in 1 week, or as advised. This is to make sure the bone is healing the way it should.  X-rays may be taken. You will be told of any new findings that may affect your care.  When to seek medical advice  Call your healthcare provider right away if any of these occur:    Pain or swelling gets worse    The cast/splint cracks    The cast and padding get  wet and stays wet more than 24 hours    Bad odor from the cast/splint or wound fluid stains the cast    Tightness or pressure under the cast/splint gets worse    Toe becomes cold, blue, numb, or tingly    You can t move the toe    Signs of infection: fever, redness, warmth, swelling, or drainage from the wound or cast    Fever of 100.4 F (38 C) or higher, or as directed by your healthcare provider  Date Last Reviewed: 2/1/2017 2000-2017 [employer]. [address] [place] [address]. All rights reserved. This information is not intended as a substitute for professional medical care. Always follow your healthcare professional's instructions.          24 Hour Appointment Hotline       To make an appointment at any [facility], call [phone] ([phone]). If you don't have a family doctor or clinic, we will help you find one. [facility] are conveniently located to serve the needs of you and your family.             Review of your medicines      Our records show that you are taking the medicines listed below. If these are incorrect, please call your family doctor or clinic.        Dose / Directions Last dose taken    aspirin 81 MG tablet   Dose:  81 mg   Quantity:  30 tablet        Take 1 tablet (81 mg) by mouth daily   Refills:  OTC        gabapentin 300 MG capsule   Commonly known as:  NEURONTIN   Dose:  300 mg        Take 300 mg by mouth At Bedtime   Refills:  0        hydrOXYzine 25 MG tablet   Commonly known as:  ATARAX   Dose:  25-50 mg   Quantity:  20 tablet        Take 1-2 tablets (25-50 mg) by mouth every 6 hours as needed for itching   Refills:  0        insulin glargine 100 UNIT/ML injection   Commonly known as:  LANTUS   Dose:  52 Units        Inject 52 Units Subcutaneous At Bedtime   Refills:  0        lidocaine (viscous) 2 % solution   Commonly known as:  XYLOCAINE   Dose:  15 mL   Quantity:  100 mL        Take 15 mLs by mouth every 6 hours as needed for  moderate pain swish and spit every 6 hours as needed; max 8 doses/24 hour period   Refills:  0        lisinopril 5 MG tablet   Commonly known as:  PRINIVIL/ZESTRIL   Dose:  5 mg   Quantity:  30 tablet        Take 1 tablet (5 mg) by mouth daily SIG please see MD   Refills:  1        NovoLOG FLEXPEN 100 UNIT/ML injection   Dose:  15-20 Units   Generic drug:  insulin aspart        Inject 15-20 Units Subcutaneous 2 times daily (with meals) With lunch and dinner   Refills:  0        ZOCOR 10 MG tablet   Dose:  10 mg   Generic drug:  simvastatin        Take 10 mg by mouth At Bedtime   Refills:  0                Procedures and tests performed during your visit     Glucose by meter    XR Toe Right G/E 2 Views      Orders Needing Specimen Collection     None      Pending Results     No orders found from 7/25/2018 to 7/28/2018.            Pending Culture Results     No orders found from 7/25/2018 to 7/28/2018.            Pending Results Instructions     If you had any lab results that were not finalized at the time of your Discharge, you can call the ED Lab Result RN at [phone]. You will be contacted by this team for any positive Lab results or changes in treatment. The nurses are available 7 days a week from 10A to 6:30P.  You can leave a message 24 hours per day and they will return your call.        Test Results From Your Hospital Stay        7/27/2018  9:49 PM      Narrative     TOE(S) TWO-THREE VIEWS RIGHT  7/27/2018 9:40 PM     HISTORY: known proximal phalanx fracture right great toe, re-injury  last night after a fall;     COMPARISON: None.        Impression     IMPRESSION: There is a fracture through the distal aspect of the  proximal phalanx of the great toe. The fracture line extends into the  interphalangeal joint. No significant displacement of the fragments..   Vascular calcifications.    MARYLIN DUKE MD         7/27/2018  9:43 PM      Component Results     Component Value Ref Range & Units Status    Glucose  65 (L) 70 - 99 mg/dL Final                Clinical Quality Measure: Blood Pressure Screening     Your blood pressure was checked while you were in the emergency department today. The last reading we obtained was  BP: 156/76 . Please read the guidelines below about what these numbers mean and what you should do about them.  If your systolic blood pressure (the top number) is less than 120 and your diastolic blood pressure (the bottom number) is less than 80, then your blood pressure is normal. There is nothing more that you need to do about it.  If your systolic blood pressure (the top number) is 120-139 or your diastolic blood pressure (the bottom number) is 80-89, your blood pressure may be higher than it should be. You should have your blood pressure rechecked within a year by a primary care provider.  If your systolic blood pressure (the top number) is 140 or greater or your diastolic blood pressure (the bottom number) is 90 or greater, you may have high blood pressure. High blood pressure is treatable, but if left untreated over time it can put you at risk for heart attack, stroke, or kidney failure. You should have your blood pressure rechecked by a primary care provider within the next 4 weeks.  If your provider in the emergency department today gave you specific instructions to follow-up with your doctor or provider even sooner than that, you should follow that instruction and not wait for up to 4 weeks for your follow-up visit.        Thank you for choosing [facility]       Thank you for choosing [facility] for your care. Our goal is always to provide you with excellent care. Hearing back from our patients is one way we can continue to improve our services. Please take a few minutes to complete the written survey that you may receive in the mail after you visit with us. Thank you!        [facility] Information     [employer] lets you send messages to your doctor, view your test results, renew your prescriptions,  "schedule appointments and more. To sign up, go to [email or web address] . Click on \"Log in\" on the left side of the screen, which will take you to the Welcome page. Then click on \"Sign up Now\" on the right side of the page.     You will be asked to enter the access code listed below, as well as some personal information. Please follow the directions to create your username and password.     Your access code is: [email or web address]  Expires: 2018  8:51 PM     Your access code will  in 90 days. If you need help or a new code, please call your [facility] clinic or [phone].        Care EveryWhere ID     This is your Care EveryWhere ID. This could be used by other organizations to access your [facility] medical records  [id number]        Equal Access to Services     DHAVAL MOLINA : Gutierrez Doss, ron butler, jing gann, manuela casarez . So [facility] [phone].    ATENCIÓN: Si habla español, tiene a stewart disposición servicios gratuitos de asistencia lingüística. Llame al [phone].    We comply with applicable federal civil rights laws and [place] laws. We do not discriminate on the basis of race, color, national origin, age, disability, sex, sexual orientation, or gender identity.            After Visit Summary       This is your record. Keep this with you and show to your community pharmacist(s) and doctor(s) at your next visit.                  "

## 2018-07-27 NOTE — ED AVS SNAPSHOT
[facility]    [address]    [facility] [address]    Phone:  [phone]    Fax:  [phone]                                       Rachell Paige   MRN: [id number]    Department:  [facility]   Date of Visit:  7/27/2018           After Visit Summary Signature Page     I have received my discharge instructions, and my questions have been answered. I have discussed any challenges I see with this plan with the nurse or doctor.    ..........................................................................................................................................  Patient/Patient Representative Signature      ..........................................................................................................................................  Patient Representative Print Name and Relationship to Patient    ..................................................               ................................................  Date                                            Time    ..........................................................................................................................................  Reviewed by Signature/Title    ...................................................              ..............................................  Date                                                            Time

## 2018-07-28 NOTE — DISCHARGE INSTRUCTIONS
Closed Toe Fracture  Your toe is broken (fractured). This causes local pain, swelling, and sometimes bruising. This injury usually takes about 4 to 6 weeks to heal, but can sometimes take longer. Toe injuries are often treated by taping the injured toe to the next one (buddy taping). This protects the injured toe and holds it in position.     If the toenail has been severely injured, it may fall off in 1 to 2 weeks. It takes up to 12 months for a new toenail to grow back.  Home care  Follow these guidelines when caring for yourself at home:    You may be given a cast shoe to wear to keep your toe from moving. If not, you can use a sandal or any shoe that doesn t put pressure on the injured toe until the swelling and pain go away. If using a sandal, be careful not to strike your foot against anything. Another injury could make the fracture worse. If you were given crutches, don t put full weight on the injured foot until you can do so without pain, or as directed by your healthcare provider.    Keep your foot elevated to reduce pain and swelling. When sleeping, put a pillow under the injured leg. When sitting, support the injured leg so it is above your waist. This is very important during the first 2 days (48 hours).    Put an ice pack on the injured area. Do this for 20 minutes every 1 to 2 hours the first day for pain relief. You can make an ice pack by wrapping a plastic bag of ice cubes in a thin towel. As the ice melts, be careful that any cloth or paper tape doesn t get wet. Continue using the ice pack 3 to 4 times a day for the next 2 days. Then use the ice pack as needed to ease pain and swelling.    If buddy tape was used and it becomes wet or dirty, change it. You may replace it with paper, plastic, or cloth tape. Cloth tape and paper tapes must be kept dry.    You may use acetaminophen or ibuprofen to control pain, unless another pain medicine was prescribed. If you have chronic liver or kidney disease,  talk with your healthcare provider before using these medicines. Also talk with your provider if you ve had a stomach ulcer or gastrointestinal bleeding.    You may return to sports or physical education activities after 4 weeks when you can run without pain, or as directed by your healthcare provider.  Follow-up care  Follow up with your healthcare provider in 1 week, or as advised. This is to make sure the bone is healing the way it should.  X-rays may be taken. You will be told of any new findings that may affect your care.  When to seek medical advice  Call your healthcare provider right away if any of these occur:    Pain or swelling gets worse    The cast/splint cracks    The cast and padding get wet and stays wet more than 24 hours    Bad odor from the cast/splint or wound fluid stains the cast    Tightness or pressure under the cast/splint gets worse    Toe becomes cold, blue, numb, or tingly    You can t move the toe    Signs of infection: fever, redness, warmth, swelling, or drainage from the wound or cast    Fever of 100.4 F (38 C) or higher, or as directed by your healthcare provider  Date Last Reviewed: 2/1/2017 2000-2017 [employer]. [address] [place] [id number]. All rights reserved. This information is not intended as a substitute for professional medical care. Always follow your healthcare professional's instructions.

## 2018-07-28 NOTE — ED PROVIDER NOTES
"  History     Chief Complaint:  Toe Pain    The history is provided by the patient.      Rachell Paige is a 62 year old male type II diabetic on Lantus 16 days post diagnosis with big toe fracture who presents for evaluation of toe pain. The patient reports that he sought evaluation with podiatry after his previous toe fracture and was sent home with a hard-soled shoe. The patient notes that he \"broke it again\" last night during a fall wherein the toe \"went the wrong way\". The patient presents today seeking x-ray to confirm fracture and blood test to determine if the toe is infected. Patient believes the toe to be infected due to subjective fever last night. Patient denies other complaint.     Allergies:  Aleve  Clopidogrel  Sulfamethoxazole-Trimethoprim      Medications:    Aspirin 81 mg  Neurontin  Atarax  Novolog flexpen  Lantus  Lisinopril  Zocor    Past Medical History:    Atypical chest pain  Microalbuminuria  Onychomycosis  Dermatitis  Type II diabetes  Hypertension  Anxiety    Past Surgical History:    The patient does not have any past pertinent medical history.     Family History:    Diabetes    Social History:  Presents alone   Tobacco use: Former smoker   Alcohol use: No  PCP: Physician No Ref-Primary    Marital Status:       Review of Systems   Constitutional: Positive for chills. Negative for fever.   Musculoskeletal:        Toe pain   Neurological: Positive for numbness (neuropathy).   All other systems reviewed and are negative.    Physical Exam     Patient Vitals for the past 24 hrs:   BP Temp Temp src Pulse Heart Rate Resp SpO2 Weight   07/27/18 2200 177/44 - - - - - - -   07/27/18 2145 156/76 - - - - - - -   07/27/18 2115 127/69 - - - - - - -   07/27/18 2100 139/72 - - - - - 100 % -   07/27/18 2034 145/82 97.6  F (36.4  C) Temporal 98 98 18 99 % 83 kg (183 lb)        Physical Exam  General: Patient is alert and interactive when I enter the room  Head:  The scalp, face, and head appear " normal  CV:  Normal rate, regular rhythm. DP pulses normal.   Resp:  No respiratory distress   Musc:  Right great toe is swollen.  There is no erythema or warmth.  Limited range of motion.  Skin:  No rash or lesions noted  Neuro: Speech is normal and fluent. Face is symmetric.     Moving all extremities well.   Psych:  Awake. Alert.  Normal affect.  Appropriate interactions.              Emergency Department Course     Imaging:  Radiographic findings were communicated with the patient who voiced understanding of the findings.    XR Toe right, 2 views:  IMPRESSION: There is a fracture through the distal aspect of the proximal phalanx of the great toe. The fracture line extends into the interphalangeal joint. No significant displacement of the fragments.  Vascular calcifications.    Imaging independently reviewed and agree with radiologist interpretation.       Emergency Department Course:  Past medical records, nursing notes, and vitals reviewed.  2105: I performed an exam of the patient and obtained history, as documented above.    The patient was sent for a toe x-ray while in the emergency department, findings above.     2210: I rechecked the patient. Findings and plan explained to the Patient. Patient discharged home with instructions regarding supportive care, medications, and reasons to return. The importance of close follow-up was reviewed.      Impression & Plan       Medical Decision Making:  Rachell Paige is a 62 year old male who presents with toe pain after known diagnosis of toe fracture. The patient presents concerned for infection, however, my exam reveals no erythema, weeping, or other qualities consistent with infection. X-ray reveals fracture as noted above.  Sensation intact, cap refill intact, no need for reduction as bones are in adequate alignment.  No tarsal-metatarsal pain or evidence of fracture.  No significant edema or erythema.  Toe was perez-taped and patient was encouraged to continue  using the walking shoe given to him by his podiatrist.  Patient is quite insistent that something be done to straighten out his toe.  I referred him to orthopedics so that he can get a second opinion on it.  Otherwise he may follow-up with his podiatrist.  No ED intervention indicated.    Diagnosis:    ICD-10-CM   1. Closed displaced fracture of proximal phalanx of right great toe with routine healing, subsequent encounter S92.411D       Disposition:  Discharged to home with plan as outlined.       Scribe Disclosure:  I, Moises Moura, am serving as a scribe at 9:00 PM on 7/27/2018 to document services personally performed by Raul Pemberton MD based on my observations and the provider's statements to me.  7/27/2018   [facility] EMERGENCY DEPARTMENT     Raul Pemberton MD  07/29/18 0538

## 2018-07-28 NOTE — ED TRIAGE NOTES
"Pt reports \"breaking his toe several days ago and reinjuring it last night\". Had hard sole shoe on  "

## 2018-07-29 ASSESSMENT — ENCOUNTER SYMPTOMS
FEVER: 0
CHILLS: 1
NUMBNESS: 1

## 2018-12-26 ENCOUNTER — HOSPITAL ENCOUNTER (INPATIENT)
Facility: CLINIC | Age: 63
LOS: 6 days | Discharge: HOME | End: 2019-01-02
Attending: EMERGENCY MEDICINE | Admitting: INTERNAL MEDICINE
Payer: COMMERCIAL

## 2018-12-26 ENCOUNTER — APPOINTMENT (OUTPATIENT)
Dept: GENERAL RADIOLOGY | Facility: CLINIC | Age: 63
End: 2018-12-26
Attending: EMERGENCY MEDICINE
Payer: COMMERCIAL

## 2018-12-26 DIAGNOSIS — M86.9 OSTEOMYELITIS OF RIGHT FOOT, UNSPECIFIED TYPE (H): ICD-10-CM

## 2018-12-26 DIAGNOSIS — L03.031 CELLULITIS OF TOE OF RIGHT FOOT: ICD-10-CM

## 2018-12-26 DIAGNOSIS — M86.171 OTHER ACUTE OSTEOMYELITIS OF RIGHT FOOT (H): Primary | ICD-10-CM

## 2018-12-26 DIAGNOSIS — L97.519 DIABETIC ULCER OF TOE OF RIGHT FOOT ASSOCIATED WITH TYPE 2 DIABETES MELLITUS, UNSPECIFIED ULCER STAGE (H): ICD-10-CM

## 2018-12-26 DIAGNOSIS — E11.621 DIABETIC ULCER OF TOE OF RIGHT FOOT ASSOCIATED WITH TYPE 2 DIABETES MELLITUS, UNSPECIFIED ULCER STAGE (H): ICD-10-CM

## 2018-12-26 LAB
ANION GAP SERPL CALCULATED.3IONS-SCNC: 3 MMOL/L (ref 3–14)
BASOPHILS # BLD AUTO: 0 10E9/L (ref 0–0.2)
BASOPHILS NFR BLD AUTO: 0.4 %
BUN SERPL-MCNC: 17 MG/DL (ref 7–30)
CALCIUM SERPL-MCNC: 8.9 MG/DL (ref 8.5–10.1)
CHLORIDE SERPL-SCNC: 107 MMOL/L (ref 94–109)
CO2 SERPL-SCNC: 28 MMOL/L (ref 20–32)
CREAT SERPL-MCNC: 1.08 MG/DL (ref 0.66–1.25)
CRP SERPL-MCNC: 6.1 MG/L (ref 0–8)
DIFFERENTIAL METHOD BLD: NORMAL
EOSINOPHIL # BLD AUTO: 0.3 10E9/L (ref 0–0.7)
EOSINOPHIL NFR BLD AUTO: 3 %
ERYTHROCYTE [DISTWIDTH] IN BLOOD BY AUTOMATED COUNT: 13.3 % (ref 10–15)
GFR SERPL CREATININE-BSD FRML MDRD: 73 ML/MIN/{1.73_M2}
GLUCOSE SERPL-MCNC: 119 MG/DL (ref 70–99)
HCT VFR BLD AUTO: 41.9 % (ref 40–53)
HGB BLD-MCNC: 13.5 G/DL (ref 13.3–17.7)
IMM GRANULOCYTES # BLD: 0.1 10E9/L (ref 0–0.4)
IMM GRANULOCYTES NFR BLD: 0.6 %
LYMPHOCYTES # BLD AUTO: 2.7 10E9/L (ref 0.8–5.3)
LYMPHOCYTES NFR BLD AUTO: 29.5 %
MCH RBC QN AUTO: 28.4 PG (ref 26.5–33)
MCHC RBC AUTO-ENTMCNC: 32.2 G/DL (ref 31.5–36.5)
MCV RBC AUTO: 88 FL (ref 78–100)
MONOCYTES # BLD AUTO: 0.7 10E9/L (ref 0–1.3)
MONOCYTES NFR BLD AUTO: 7.9 %
NEUTROPHILS # BLD AUTO: 5.4 10E9/L (ref 1.6–8.3)
NEUTROPHILS NFR BLD AUTO: 58.6 %
NRBC # BLD AUTO: 0 10*3/UL
NRBC BLD AUTO-RTO: 0 /100
PLATELET # BLD AUTO: 370 10E9/L (ref 150–450)
POTASSIUM SERPL-SCNC: 4.1 MMOL/L (ref 3.4–5.3)
RBC # BLD AUTO: 4.75 10E12/L (ref 4.4–5.9)
SODIUM SERPL-SCNC: 138 MMOL/L (ref 133–144)
WBC # BLD AUTO: 9.3 10E9/L (ref 4–11)

## 2018-12-26 PROCEDURE — 85025 COMPLETE CBC W/AUTO DIFF WBC: CPT | Performed by: EMERGENCY MEDICINE

## 2018-12-26 PROCEDURE — 99285 EMERGENCY DEPT VISIT HI MDM: CPT | Mod: 25

## 2018-12-26 PROCEDURE — 80048 BASIC METABOLIC PNL TOTAL CA: CPT | Performed by: EMERGENCY MEDICINE

## 2018-12-26 PROCEDURE — 86140 C-REACTIVE PROTEIN: CPT | Performed by: EMERGENCY MEDICINE

## 2018-12-26 PROCEDURE — 85652 RBC SED RATE AUTOMATED: CPT | Performed by: EMERGENCY MEDICINE

## 2018-12-26 PROCEDURE — 83036 HEMOGLOBIN GLYCOSYLATED A1C: CPT | Performed by: EMERGENCY MEDICINE

## 2018-12-26 PROCEDURE — 73660 X-RAY EXAM OF TOE(S): CPT | Mod: RT

## 2018-12-26 RX ORDER — CEFAZOLIN SODIUM 2 G/100ML
2 INJECTION, SOLUTION INTRAVENOUS ONCE
Status: COMPLETED | OUTPATIENT
Start: 2018-12-26 | End: 2018-12-27

## 2018-12-26 ASSESSMENT — ENCOUNTER SYMPTOMS
VOMITING: 0
FEVER: 0
CHILLS: 0
WOUND: 1
NAUSEA: 0

## 2018-12-27 ENCOUNTER — APPOINTMENT (OUTPATIENT)
Dept: MRI IMAGING | Facility: CLINIC | Age: 63
End: 2018-12-27
Attending: INTERNAL MEDICINE
Payer: COMMERCIAL

## 2018-12-27 ENCOUNTER — APPOINTMENT (OUTPATIENT)
Dept: ULTRASOUND IMAGING | Facility: CLINIC | Age: 63
End: 2018-12-27
Attending: PODIATRIST
Payer: COMMERCIAL

## 2018-12-27 PROBLEM — M86.9 OSTEOMYELITIS (H): Status: ACTIVE | Noted: 2018-12-27

## 2018-12-27 LAB
ERYTHROCYTE [SEDIMENTATION RATE] IN BLOOD BY WESTERGREN METHOD: 9 MM/H (ref 0–20)
GLUCOSE BLDC GLUCOMTR-MCNC: 104 MG/DL (ref 70–99)
GLUCOSE BLDC GLUCOMTR-MCNC: 116 MG/DL (ref 70–99)
GLUCOSE BLDC GLUCOMTR-MCNC: 120 MG/DL (ref 70–99)
GLUCOSE BLDC GLUCOMTR-MCNC: 126 MG/DL (ref 70–99)
GLUCOSE BLDC GLUCOMTR-MCNC: 147 MG/DL (ref 70–99)
GLUCOSE BLDC GLUCOMTR-MCNC: 156 MG/DL (ref 70–99)
GLUCOSE BLDC GLUCOMTR-MCNC: 240 MG/DL (ref 70–99)
GLUCOSE BLDC GLUCOMTR-MCNC: 243 MG/DL (ref 70–99)
HBA1C MFR BLD: 7.3 % (ref 0–5.6)

## 2018-12-27 PROCEDURE — 73723 MRI JOINT LWR EXTR W/O&W/DYE: CPT | Mod: RT

## 2018-12-27 PROCEDURE — 00000146 ZZHCL STATISTIC GLUCOSE BY METER IP

## 2018-12-27 PROCEDURE — 83036 HEMOGLOBIN GLYCOSYLATED A1C: CPT | Performed by: INTERNAL MEDICINE

## 2018-12-27 PROCEDURE — 25000128 H RX IP 250 OP 636: Performed by: INTERNAL MEDICINE

## 2018-12-27 PROCEDURE — 99222 1ST HOSP IP/OBS MODERATE 55: CPT | Performed by: INTERNAL MEDICINE

## 2018-12-27 PROCEDURE — 25000132 ZZH RX MED GY IP 250 OP 250 PS 637: Performed by: INTERNAL MEDICINE

## 2018-12-27 PROCEDURE — 25000131 ZZH RX MED GY IP 250 OP 636 PS 637: Performed by: INTERNAL MEDICINE

## 2018-12-27 PROCEDURE — 12000000 ZZH R&B MED SURG/OB

## 2018-12-27 PROCEDURE — 25000128 H RX IP 250 OP 636: Performed by: EMERGENCY MEDICINE

## 2018-12-27 PROCEDURE — A9585 GADOBUTROL INJECTION: HCPCS | Performed by: INTERNAL MEDICINE

## 2018-12-27 PROCEDURE — 93922 UPR/L XTREMITY ART 2 LEVELS: CPT

## 2018-12-27 PROCEDURE — 96374 THER/PROPH/DIAG INJ IV PUSH: CPT

## 2018-12-27 PROCEDURE — 25500064 ZZH RX 255 OP 636: Performed by: INTERNAL MEDICINE

## 2018-12-27 PROCEDURE — 99254 IP/OBS CNSLTJ NEW/EST MOD 60: CPT | Performed by: PODIATRIST

## 2018-12-27 RX ORDER — ONDANSETRON 4 MG/1
4 TABLET, ORALLY DISINTEGRATING ORAL EVERY 6 HOURS PRN
Status: DISCONTINUED | OUTPATIENT
Start: 2018-12-27 | End: 2019-01-02 | Stop reason: HOSPADM

## 2018-12-27 RX ORDER — HYDROCODONE BITARTRATE AND ACETAMINOPHEN 5; 325 MG/1; MG/1
1-2 TABLET ORAL EVERY 4 HOURS PRN
Status: DISCONTINUED | OUTPATIENT
Start: 2018-12-27 | End: 2018-12-29

## 2018-12-27 RX ORDER — NICOTINE POLACRILEX 4 MG
15-30 LOZENGE BUCCAL
Status: DISCONTINUED | OUTPATIENT
Start: 2018-12-27 | End: 2018-12-29

## 2018-12-27 RX ORDER — ACETAMINOPHEN 325 MG/1
650 TABLET ORAL EVERY 4 HOURS PRN
Status: DISCONTINUED | OUTPATIENT
Start: 2018-12-27 | End: 2019-01-02 | Stop reason: HOSPADM

## 2018-12-27 RX ORDER — SODIUM CHLORIDE 9 MG/ML
INJECTION, SOLUTION INTRAVENOUS CONTINUOUS
Status: DISCONTINUED | OUTPATIENT
Start: 2018-12-27 | End: 2018-12-27

## 2018-12-27 RX ORDER — LISINOPRIL 10 MG/1
10 TABLET ORAL DAILY
Status: DISCONTINUED | OUTPATIENT
Start: 2018-12-27 | End: 2018-12-30

## 2018-12-27 RX ORDER — GADOBUTROL 604.72 MG/ML
10 INJECTION INTRAVENOUS ONCE
Status: COMPLETED | OUTPATIENT
Start: 2018-12-27 | End: 2018-12-27

## 2018-12-27 RX ORDER — ONDANSETRON 2 MG/ML
4 INJECTION INTRAMUSCULAR; INTRAVENOUS EVERY 6 HOURS PRN
Status: DISCONTINUED | OUTPATIENT
Start: 2018-12-27 | End: 2019-01-02 | Stop reason: HOSPADM

## 2018-12-27 RX ORDER — LISINOPRIL 10 MG/1
10 TABLET ORAL DAILY
COMMUNITY
End: 2020-09-29

## 2018-12-27 RX ORDER — DEXTROSE MONOHYDRATE 25 G/50ML
25-50 INJECTION, SOLUTION INTRAVENOUS
Status: DISCONTINUED | OUTPATIENT
Start: 2018-12-27 | End: 2019-01-02 | Stop reason: HOSPADM

## 2018-12-27 RX ORDER — CEFAZOLIN SODIUM 1 G/50ML
1 INJECTION, SOLUTION INTRAVENOUS EVERY 8 HOURS
Status: DISCONTINUED | OUTPATIENT
Start: 2018-12-27 | End: 2018-12-31

## 2018-12-27 RX ORDER — GABAPENTIN 300 MG/1
300 CAPSULE ORAL AT BEDTIME
Status: DISCONTINUED | OUTPATIENT
Start: 2018-12-27 | End: 2019-01-02 | Stop reason: HOSPADM

## 2018-12-27 RX ORDER — ASPIRIN 81 MG/1
81 TABLET ORAL DAILY
Status: DISCONTINUED | OUTPATIENT
Start: 2018-12-27 | End: 2018-12-29

## 2018-12-27 RX ORDER — NALOXONE HYDROCHLORIDE 0.4 MG/ML
.1-.4 INJECTION, SOLUTION INTRAMUSCULAR; INTRAVENOUS; SUBCUTANEOUS
Status: DISCONTINUED | OUTPATIENT
Start: 2018-12-27 | End: 2019-01-02 | Stop reason: HOSPADM

## 2018-12-27 RX ORDER — NICOTINE POLACRILEX 4 MG
15-30 LOZENGE BUCCAL
Status: DISCONTINUED | OUTPATIENT
Start: 2018-12-27 | End: 2019-01-02 | Stop reason: HOSPADM

## 2018-12-27 RX ORDER — SIMVASTATIN 10 MG
10 TABLET ORAL AT BEDTIME
Status: DISCONTINUED | OUTPATIENT
Start: 2018-12-27 | End: 2019-01-02 | Stop reason: HOSPADM

## 2018-12-27 RX ORDER — DEXTROSE MONOHYDRATE 25 G/50ML
25-50 INJECTION, SOLUTION INTRAVENOUS
Status: DISCONTINUED | OUTPATIENT
Start: 2018-12-27 | End: 2018-12-29

## 2018-12-27 RX ADMIN — SIMVASTATIN 10 MG: 10 TABLET, FILM COATED ORAL at 02:24

## 2018-12-27 RX ADMIN — GABAPENTIN 300 MG: 300 CAPSULE ORAL at 21:37

## 2018-12-27 RX ADMIN — CEFAZOLIN SODIUM 1 G: 1 INJECTION, SOLUTION INTRAVENOUS at 09:51

## 2018-12-27 RX ADMIN — SIMVASTATIN 10 MG: 10 TABLET, FILM COATED ORAL at 21:38

## 2018-12-27 RX ADMIN — GABAPENTIN 300 MG: 300 CAPSULE ORAL at 02:25

## 2018-12-27 RX ADMIN — CEFAZOLIN SODIUM 1 G: 1 INJECTION, SOLUTION INTRAVENOUS at 19:14

## 2018-12-27 RX ADMIN — LISINOPRIL 10 MG: 10 TABLET ORAL at 19:14

## 2018-12-27 RX ADMIN — INSULIN GLARGINE 60 UNITS: 100 INJECTION, SOLUTION SUBCUTANEOUS at 21:38

## 2018-12-27 RX ADMIN — INSULIN ASPART 5 UNITS: 100 INJECTION, SOLUTION INTRAVENOUS; SUBCUTANEOUS at 05:42

## 2018-12-27 RX ADMIN — GADOBUTROL 8 ML: 604.72 INJECTION INTRAVENOUS at 09:18

## 2018-12-27 RX ADMIN — SODIUM CHLORIDE: 9 INJECTION, SOLUTION INTRAVENOUS at 02:08

## 2018-12-27 RX ADMIN — INSULIN ASPART 1 UNITS: 100 INJECTION, SOLUTION INTRAVENOUS; SUBCUTANEOUS at 10:00

## 2018-12-27 RX ADMIN — CEFAZOLIN SODIUM 2 G: 2 INJECTION, SOLUTION INTRAVENOUS at 00:14

## 2018-12-27 ASSESSMENT — ACTIVITIES OF DAILY LIVING (ADL)
ADLS_ACUITY_SCORE: 10

## 2018-12-27 NOTE — ED PROVIDER NOTES
History     Chief Complaint:  Wound Check    HPI   Rachell Paige is a 63 year old male who presents to the emergency department today for a wound check. The patient reports he has had an infection in his right foot 2 months ago and he saw his primary care doctor at that time. He states he was with his son in the hospital when he noticed the wound on his right foot had opened up. He notes only red drainage from the wound. He denies fevers, chills, nausea, or vomiting.     Allergies:  Aleve  Clopidogrel   Sulfamethoxazole      Medications:    Aspirin  Gabapentin  Atarax  Novolog  Lantus  Lisinopril  Zocor     Past Medical History:    Anxiety  Dermatitis   DM type 2  Financial problems   HTN  Hyperlipidemia   Microalbuminuria   Onychomycosis   Rash     Past Surgical History:    Amputation     Family History:    Other: diabetes    Social History:  Smoking Status: Former Smoker  Smokeless Tobacco: Never Used  Alcohol Use: Negative   Marital Status:        Review of Systems   Constitutional: Negative for chills and fever.   Gastrointestinal: Negative for nausea and vomiting.   Skin: Positive for wound.   All other systems reviewed and are negative.      Physical Exam     Patient Vitals for the past 24 hrs:   BP Temp Temp src Pulse Heart Rate Resp SpO2   12/26/18 2300 -- -- -- -- -- -- 99 %   12/26/18 2257 116/86 -- -- 84 -- -- --   12/26/18 2126 152/72 -- -- 87 -- -- --   12/26/18 1911 155/65 98.2  F (36.8  C) Oral -- 98 18 93 %      Physical Exam  General: Resting on the bed.  Head: No obvious trauma to head.  Ears, Nose, Throat:  External ears normal.  Nose normal.   Eyes:  Conjunctivae clear.  Pupils are equal, round, and reactive.   Neck: Normal range of motion.  Neck supple.   CV: Regular rate and rhythm.  No murmurs.      Respiratory: Effort normal and breath sounds normal.  No wheezing or crackles.   Gastrointestinal: Soft.  No distension. There is no tenderness.    Skin: Skin is warm and dry.  Small 1  cm ulceration over the right lateral 5th digit.  Mild drainage.  Minimal surrounding erythema.                Emergency Department Course     Imaging:  Radiology findings were communicated with the patient who voiced understanding of the findings.    XR Toe Right G/E 2 Views  There is soft tissue swelling over the fourth and fifth  toes. There appears to be mild demineralization or erosion of the  lateral cortex of the head of the proximal phalanx of the fifth toe.  This finding is suspicious for osteomyelitis. There is anterior  subluxation of the middle phalanx with respect to the proximal phalanx  of the fifth toe..    Deformity of the proximal phalanx of the first toe is again noted.  There is a persistent lucency across the mid shaft. These findings are  compatible nonunion fracture of the proximal phalanx of the first toe.  MARYLIN DUKE MD  Reading per radiology    Laboratory:  Laboratory findings were communicated with the patient who voiced understanding of the findings.    CBC: WBC 9.3, HGB 13.5,   BMP: glucose 119  o/w WNL (Creatinine 1.08)  CRP inflammation: 6.1  Erythrocyte sedimentation rate auto: pending    Interventions:  0014 Ancef 2 g IV    Emergency Department Course:     Nursing notes and vitals reviewed.     I performed an exam of the patient as documented above.      The patient was sent for a toe x-ray while in the emergency department, results above.      2258 IV was inserted and blood was drawn for laboratory testing, results above.     0009 I spoke with Dr. Sharma of the Hospitalist service regarding patient's presentation, findings, and plan of care.     21802 I personally reviewed the imaging results with the patient and answered all related questions prior to admission.    Impression & Plan      Medical Decision Makin-year-old male with a history of diabetes, diabetic ulcers, redness, presenting with wound concern.  Vital signs are unremarkable.  Broad differential  was pursued including not limited to cellulitis, diabetic ulcer, osteomized, electrolyte metabolic renal dysfunction, sepsis, etc.  Overall patient is well-appearing nontoxic.  CBC shows no leukocytosis or anemia.  No signs of sepsis at present.  BMP shows no acute electrolyte metabolic or renal dysfunction.  CRP is been within normal range.  ESR is pending.  X-ray is concerning for possible osteomyelitis.  The ulcer itself is not all that impressive and does not seem to probe to the bone but concerned that there is osteomyelitis on XR thus concerning for possible deeper infection.  Patient was given Ancef.  He will be admitted to the hospital for MRI and podiatry consult.  Patient was admitted to the hospitalist who graciously accepted.    Diagnosis:    ICD-10-CM    1. Diabetic ulcer of toe of right foot associated with type 2 diabetes mellitus, unspecified ulcer stage (H) E11.621 CBC with platelets differential    L97.519 Basic metabolic panel     CRP inflammation   2. Cellulitis of toe of right foot L03.031    3. Osteomyelitis of right foot, unspecified type (H) M86.9      Disposition:   The patient is admitted into the care of Dr. Sharma.     Scribe Disclosure:  I, Randa Lancaster, am serving as a scribe at 9:53 PM on 12/26/2018 to document services personally performed by Jazzy Parkinson MD based on my observations and the provider's statements to me.     [facility] EMERGENCY DEPARTMENT       Jazzy Parkinson MD  12/27/18 0056

## 2018-12-27 NOTE — H&P
Admitted:     12/26/2018      CHIEF COMPLAINT:  Wound to the right little toe.      HISTORY OF PRESENT ILLNESS:  This is a 63-year-old [place] male with a history of type 2 diabetes, peripheral arterial disease, hypertension, previous transmetatarsal amputation of his toes on the left lower extremity, who has had a wound that has not quite healed up for the past month.  He denies any trauma to the site.  He denies any fevers or chills; however, he has not paid attention to it as he has been dealing with his son and his wife who are both sick and admitted to the hospital.  Seldom does he have any kind of drainage from the toe.  He decided to come into the hospital today to get this evaluated.  In the ER over here, he was seen by Dr. Parkinson.  I discussed care with her.  I am asked to admit him for further evaluation.      ALLERGIES:     NAPROSYN    BACTRIM.     CITALOPRAM.   PLAVIX.      MEDICATIONS:  Aspirin, Lantus, NovoLog, Zocor, gabapentin, lisinopril, Ambien, Zoloft.      PAST MEDICAL HISTORY:  Hypertension, hyperlipidemia, peripheral arterial disease, type 2 diabetes, peripheral neuropathy.      SOCIAL HISTORY:  He is .  He does not smoke.  He does not drink alcohol.      FAMILY HISTORY:  Reviewed and noncontributory.      REVIEW OF SYSTEMS:  As mentioned in the HPI.  He denies any chest pain, shortness of breath, nausea, vomiting, diarrhea.  He denies any prior cardiac history.  States that his sugars typically run less than 200.  All other systems are reviewed and deemed unremarkable and negative.      PHYSICAL EXAMINATION:   VITAL SIGNS:  Temperature is 98.2, pulse 84, blood pressure is 116/86, respiratory rate 18, O2 saturation is 99% on room air.   GENERAL:  He is alert, awake, oriented, coherent, nontoxic, in no acute distress.   HEENT:  Pupils equal, round, react to light.  Pharynx, there is no exudate noted.   LUNGS:  Clear to auscultation bilaterally.   HEART:  Regular rate, S1, S2 normal.  No  murmurs or gallops.   ABDOMEN:  Soft, nontender, with good bowel sounds.   EXTREMITIES:  There is no edema.  He has decreased sensation to his lower extremities.  On exam of his left lower extremity, he has had a transmetatarsal amputation across his left foot which is well-healed.  On his right foot, he has an ulcer on the lateral aspect of his fifth digit.  It is around 1 cm.  There is no active drainage.  There is no surrounding cellulitis.      LABORATORY:  Lab work obtained included a basic metabolic panel which is grossly within normal limits.  A CBC with differential is grossly within normal limits.  An x-ray of the toe shows soft tissue swelling over the fourth and fifth toes.  There appears to be mild demineralization or erosion of the lateral cortex of the head and normal findings of the fifth toe.  This finding is suspicious for osteomyelitis.  There is anterior subluxation of the middle phalanx with respect to the proximal phalanx of the fifth toe.  Deformity of the proximal phalanx of the first toe is again noted.  There is persistent lucency across the mid shaft.  These findings are compatible with nonunion fracture of the proximal phalanx of the first toe.      ASSESSMENT AND PLAN:   1.  Possible osteomyelitis of the fifth toe.  Will admit him as an inpatient.  He has been initiated on IV Ancef, which I will continue.  We will get an MRI of the toe.  In addition, we will have Podiatry see him.   2.  Diabetes.  We will place him on insulin once his home medication regimen is reconciled.   3.  Peripheral arterial disease.  Will resume him on his aspirin as well as statin.      CODE STATUS:  Full code.      He will be admitted as an inpatient.         JEN SRINIVASAN MD             D: 2018   T: 2018   MT: MACIEL      Name:     JOSELINE HERNANDEZ   MRN:      [id number]        Account:      [id number]   :      1955        Admitted:     2018                   Document: [id number]

## 2018-12-27 NOTE — PROGRESS NOTES
Pt seen and examined.  Agree with admit H&P per Dr. Sharma from earlier this AM    Pt admitted with wound of R 5th toe.  Present x 2 weeks.  Draining.  No fevers or systemic sx.      Plan:   MRI ordered to eval for osteomyelitis  Podiatry consulted  Will order 2/3 of his normal Lantus dose this evening (40 units) given possible need for NPO and operative intervention tomorrow    addendum  As pt declining surgery and eating will increase Lantus to his usual dose of 60 unit(s) at bedtime

## 2018-12-27 NOTE — ED NOTES
RECEIVING UNIT ED HANDOFF REVIEW    Above ED Nurse Handoff Report was reviewed: Yes  Reviewed by: Gia Joyner on December 27, 2018 at 12:45 AM   [facility]  ED Nurse Handoff Report    Rachell Paige is a 63 year old male   ED Chief complaint: Wound Check  . ED Diagnosis:   Final diagnoses:   Diabetic ulcer of toe of right foot associated with type 2 diabetes mellitus, unspecified ulcer stage (H)   Cellulitis of toe of right foot   Osteomyelitis of right foot, unspecified type (H)     Allergies:   Allergies   Allergen Reactions     Aleve      HA sweats     Clopidogrel Nausea and Vomiting     Pt to restart on 11/25/15 to see again if he tolerates it or not. His sx were only GI. Not a true allergy     Sulfamethoxazole-Trimethoprim      Other reaction(s): Renal Failure       Code Status: Full Code  Activity level - Baseline/Home:  Independent. Activity Level - Current:   Independent. Lift room needed: No. Bariatric: No   Needed: No   Isolation: No. Infection: Not Applicable.     Vital Signs:   Vitals:    12/26/18 1911 12/26/18 2126 12/26/18 2257 12/26/18 2300   BP: 155/65 152/72 116/86    Pulse:  87 84    Resp: 18      Temp: 98.2  F (36.8  C)      TempSrc: Oral      SpO2: 93%   99%       Cardiac Rhythm:  ,      Pain level:    Patient confused: No. Patient Falls Risk: Yes.   Elimination Status: Has not voided in the ED   Patient Report - Initial Complaint: Wound check. Focused Assessment:    Pt presents for a wound check on R 5th digit. Pt is a diabetic, states sugars have been good, 130s-150s. Pt has been dealing with this wound x2 weeks, has been applying neosporin currently for it. Ambulatory in triage. ABCs intact.         Tests Performed: labs, xray. Abnormal Results:   Labs Ordered and Resulted from Time of ED Arrival Up to the Time of Departure from the ED   BASIC METABOLIC PANEL - Abnormal; Notable for the following components:       Result Value    Glucose 119 (*)     All other  components within normal limits   CBC WITH PLATELETS DIFFERENTIAL   CRP INFLAMMATION   ERYTHROCYTE SEDIMENTATION RATE AUTO   .   Treatments provided: IV antibiotics  Family Comments: NA  OBS brochure/video discussed/provided to patient:  N/A  ED Medications:   Medications   ceFAZolin (ANCEF) intermittent infusion 2 g in 100 mL dextrose PRE-MIX (not administered)     Drips infusing:  Yes  For the majority of the shift, the patient's behavior Green. Interventions performed were NA.     Severe Sepsis OR Septic Shock Diagnosis Present: No      ED Nurse Name/Phone Number: Alana Pretty,   11:59 PM

## 2018-12-27 NOTE — PLAN OF CARE
Vss. Afebrile. Lungs clr-room air high 90s. +bs/+gas, no nausea. Npo. Last bm 12/25/18. Voiding. Ivf infusing. Denies pain. Cms-baseline numbness/tingling to bles. Skin is dry/flaky with few bruises. Small non-healing wound to rt pinky toe-no drainage-Sarmad. Ambulating and repositioning self. Bear hugger on for warmth. Podiatry consult today. Mri of foot this am. No other significant issues noted overnight.

## 2018-12-27 NOTE — PHARMACY-ADMISSION MEDICATION HISTORY
"Admission medication history interview status for this patient is complete. See [facility] admission navigator for allergy information, prior to admission medications and immunization status.     Medication history interview source(s):Patient  Medication history resources (including written lists, pill bottles, clinic record):None  [facility]     Changes made to PTA medication list:  Added: none  Deleted: hydroxyzine, lidocaine  Changed: lantus from 52 to 60 units at bedtime, lisinopril from 5 to 10 mg     Actions taken by pharmacist (provider contacted, etc):None     Additional medication history information: does not use novolog every meal, he says it depends on blood sugars and what he is eating. If his blood sugar is \"low\" he won't use novolog.     Medication reconciliation/reorder completed by provider prior to medication history? Yes    Do you take OTC medications (eg tylenol, ibuprofen, fish oil, eye/ear drops, etc)? Y (Y/N)    For patients on insulin therapy: Y (Y/N)  Lantus/levemir/NPH/Mix 70/30 dose:  Y (Y/N) (see Med list for doses)   Sliding scale Novolog N  If Yes, do you have a baseline novolog pre-meal dose:  units with meals  Patients eat three meals a day:   Y    How many episodes of hypoglycemia do you have per week: 0  How many missed doses do you have per week: 0  How many times do you check your blood glucose per day: 1-2  Do you have a Continuous glucose monitor (CGM)   N (remind pt that not approved for hospital use)   Any Barriers to therapy - Be specific :  cost of medications, comfortable with giving injections (if applicable), comfortable and confident with current diabetes regimen: n/a      Prior to Admission medications    Medication Sig Last Dose Taking? Auth Provider   aspirin 81 MG tablet Take 1 tablet (81 mg) by mouth daily 12/26/2018 at Unknown time Yes Merrill Fonseca MD   gabapentin (NEURONTIN) 300 MG capsule Take 300 mg by mouth At Bedtime 12/26/2018 at pm Yes " Reported, Patient   insulin aspart (NOVOLOG FLEXPEN) 100 UNIT/ML injection Inject 15-20 Units Subcutaneous 2 times daily (with meals) With lunch and dinner 12/25/2018 at unknown time Yes Reported, Patient   Insulin Glargine (LANTUS SOLOSTAR SC) Inject 60 Units Subcutaneous At Bedtime 12/25/2018 at pm Yes Unknown, Entered By History   lisinopril (PRINIVIL/ZESTRIL) 10 MG tablet Take 10 mg by mouth daily 12/26/2018 at Unknown time Yes Unknown, Entered By History   simvastatin (ZOCOR) 10 MG tablet Take 10 mg by mouth At Bedtime 12/26/2018 at pm Yes Reported, Patient

## 2018-12-27 NOTE — ED TRIAGE NOTES
Pt presents for a wound check on R 5th digit. Pt is a diabetic, states sugars have been good, 130s-150s. Pt has been dealing with this wound x2 weeks, has been applying neosporin currently for it. Ambulatory in triage. ABCs intact.

## 2018-12-27 NOTE — CONSULTS
"[facility]/POD[facility]ATRY CONSULTATION  December 27, 2018      ASSESSMENT:   R 5th toe ulcer, osteomyelitis  PAD  R 1st toe fx  DM II with neuropathy     PLAN:  Reviewed patient's chart in epic.  Discussed condition and treatment options including pros and cons.    5th toe osteomyelitis as indicated on imaging.  I expect pt will need amputation of this toe.  Pt is open to this, but wants to delay surgery until \"next week\" as he has \"things to do.\"     PAD is a concern.  Will obtain ABIs.  If significantly abnormal, pt will need Vascular Surgery consultation, possible transfer to [facility].  We would delay any foot surgery until blood flow is optimized.    Pt agreed to stay in house until ABIs obtained and my colleague Dr. Lora follows up tomorrow to discuss results/plan.  He does not want to be scheduled for surgery at this time.    Continue IV abx for now.    Betadine and gauze to R 5th toe daily.    Will order post op shoe to protect subacute 1st toe fx.  WBAT.      Thank you for the consult.    Ba Baker DPM, FACFAS  Pager: [phone]      PATIENT HISTORY:  Rachell Paige is a 63 year old male who was admitted for R 5th toe wound.  Hx of DM II, PAD, HTN, prior TMA to L foot.  Reports R foot wound for a \"couple months\" and a R 1st toe fx for \"three months.\"  Does not recall injury.  Pt states he was being treated by [facility] for the 1st toe fx.  No other treatments reported.    I was requested to see this patient for this issue by Dr Raghavendra Sharma.    Review of Systems:  Patient denies f/c/n/v.  Rest of 10 pt ROS neg except for HPI.     PAST MEDICAL HISTORY:   Past Medical History:   Diagnosis Date     Anxiety 2/23/2015     Dermatitis 4/18/2015     DM type 2, goal A1C 7-8 2/23/2015     Does not have health insurance 4/18/2015     Financial problems 2/23/2015     HTN, goal below 140/90 2/23/2015     Hyperlipidemia LDL goal <100 2/23/2015     Microalbuminuria 4/18/2015     Onychomycosis " 4/18/2015     Rash 2/23/2015        PAST SURGICAL HISTORY: No past surgical history on file.     MEDICATIONS:   Current Facility-Administered Medications:      acetaminophen (TYLENOL) tablet 650 mg, 650 mg, Oral, Q4H PRN, Raghavendra Sharma MD     aspirin EC tablet 81 mg, 81 mg, Oral, Daily, Raghavendra Sharma MD, Stopped at 12/27/18 0951     ceFAZolin (ANCEF) intermittent infusion 1 g, 1 g, Intravenous, Q8H, Raghavendra Sharma MD, 1 g at 12/27/18 0951     glucose gel 15-30 g, 15-30 g, Oral, Q15 Min PRN **OR** dextrose 50 % injection 25-50 mL, 25-50 mL, Intravenous, Q15 Min PRN **OR** glucagon injection 1 mg, 1 mg, Subcutaneous, Q15 Min PRN, Raghavendra Sharma MD     gabapentin (NEURONTIN) capsule 300 mg, 300 mg, Oral, At Bedtime, Raghavendra Sharma MD, 300 mg at 12/27/18 0225     HYDROcodone-acetaminophen (NORCO) 5-325 MG per tablet 1-2 tablet, 1-2 tablet, Oral, Q4H PRN, Raghavendra Sharma MD     insulin aspart (NovoLOG) inj (RAPID ACTING), 1-12 Units, Subcutaneous, Q4H, Raghavendra Sharma MD, 1 Units at 12/27/18 1000     insulin glargine (LANTUS PEN) injection 40 Units, 40 Units, Subcutaneous, At Bedtime, Arash Saunders MD     lisinopril (PRINIVIL/ZESTRIL) tablet 10 mg, 10 mg, Oral, Daily, Arash Saunders MD     melatonin tablet 1 mg, 1 mg, Oral, At Bedtime PRN, Raghavendra Sharma MD     naloxone (NARCAN) injection 0.1-0.4 mg, 0.1-0.4 mg, Intravenous, Q2 Min PRN, Raghavendra Sharma MD     ondansetron (ZOFRAN-ODT) ODT tab 4 mg, 4 mg, Oral, Q6H PRN **OR** ondansetron (ZOFRAN) injection 4 mg, 4 mg, Intravenous, Q6H PRN, Raghavendra Sharma MD     simvastatin (ZOCOR) tablet 10 mg, 10 mg, Oral, At Bedtime, Raghavendra Sharma MD, 10 mg at 12/27/18 0224     sodium chloride 0.9% infusion, , Intravenous, Continuous, Raghavendra Sharma MD, Last Rate: 100 mL/hr at 12/27/18 0208     ALLERGIES:    Allergies   Allergen Reactions     Aleve      HA sweats     Clopidogrel Nausea and Vomiting     Pt to restart on 11/25/15 to see again if he  tolerates it or not. His sx were only GI. Not a true allergy     Sulfamethoxazole-Trimethoprim      Other reaction(s): Renal Failure        SOCIAL HISTORY:   Social History     Socioeconomic History     Marital status:      Spouse name: Not on file     Number of children: Not on file     Years of education: Not on file     Highest education level: Not on file   Social Needs     Financial resource strain: Not on file     Food insecurity - worry: Not on file     Food insecurity - inability: Not on file     Transportation needs - medical: Not on file     Transportation needs - non-medical: Not on file   Occupational History     Not on file   Tobacco Use     Smoking status: Former Smoker     Smokeless tobacco: Never Used   Substance and Sexual Activity     Alcohol use: No     Drug use: No     Sexual activity: Yes     Partners: Female   Other Topics Concern     Parent/sibling w/ CABG, MI or angioplasty before 65F 55M? Not Asked   Social History Narrative    Former  ([employer] [employer], [facility] [facility])             FAMILY HISTORY:   Family History   Problem Relation Age of Onset     Diabetes Other         EXAM:Vitals: /40   Pulse 84   Temp 97.3  F (36.3  C) (Oral)   Resp 16   SpO2 95%   BMI= There is no height or weight on file to calculate BMI.    General appearance: Patient is alert and fully cooperative with history & exam.  No sign of distress is noted during the visit.     Psychiatric: Affect is pleasant & appropriate.  Patient appears motivated to improve health.     Respiratory: Breathing is regular & unlabored while sitting.     HEENT: Hearing is intact to spoken word.  Speech is clear.  No gross evidence of visual impairment that would impact ambulation.     Dermatologic: 3mm diameter dry appearing ulcer to R lateral 5th toe, about 3mm diameter.  No deep probing.  Minimal redness. No other wounds b/l.      Vascular: DP & PT pulses are absent b/l.  No significant edema or varicosities  noted.       Neurologic: Lower extremity sensation is diminished to light touch b/l.     Musculoskeletal: s/p L TMA.  Some lateral deviation of R 1st toe.  No pain noted.  Patient is ambulatory without assistive device or brace.  No gross ankle deformity noted.  No foot or ankle joint effusion is noted.    Imaging reviewed with pt:      TOE(S) TWO-THREE VIEWS RIGHT  12/26/2018 10:15 PM      HISTORY: ulcer pain concern for infeciton     COMPARISON: Film dated 7/27/2018                                                                      IMPRESSION: There is soft tissue swelling over the fourth and fifth  toes. There appears to be mild demineralization or erosion of the  lateral cortex of the head of the proximal phalanx of the fifth toe.  This finding is suspicious for osteomyelitis. There is anterior  subluxation of the middle phalanx with respect to the proximal phalanx  of the fifth toe..       Deformity of the proximal phalanx of the first toe is again noted.  There is a persistent lucency across the mid shaft. These findings are  compatible nonunion fracture of the proximal phalanx of the first toe.     MARYLIN DUKE MD        MR RIGHT TOE WITHOUT AND WITH CONTRAST 12/27/2018 9:31 AM     HISTORY:  Cellulitis of fifth toe with open wound.     TECHNIQUE:  Multiplanar, multisequence without and with contrast. 8 mL  Gadavist.     FINDINGS:  Healing fracture deformity of the first proximal phalanx.  There is dislocation at the fifth PIP joint. There is bone marrow  edema and contrast enhancement of the fifth proximal phalanx. In light  of the overlying wound, this is felt to represent osteomyelitis. There  is nonspecific edema of the fifth middle phalanx. This does not have  contrast enhancement and therefore could represent contusion or  reactive edema. No focal abscess identified. There is some nonspecific  soft tissue edema along the foot. There is some contrast enhancement,  consistent with cellulitis.                                                                       IMPRESSION:  1. Osteomyelitis of the fifth proximal phalanx.  2. Nonspecific edema of the fifth middle phalanx. Dislocation of the  fifth PIP joint.  3. Cellulitis.     TESSA CHRISTOPHER MD      Lab Results   Component Value Date    WBC 9.3 12/26/2018     Lab Results   Component Value Date    RBC 4.75 12/26/2018     Lab Results   Component Value Date    HGB 13.5 12/26/2018     Lab Results   Component Value Date    HCT 41.9 12/26/2018     No components found for: MCT  Lab Results   Component Value Date    MCV 88 12/26/2018     Lab Results   Component Value Date    MCH 28.4 12/26/2018     Lab Results   Component Value Date    MCHC 32.2 12/26/2018     Lab Results   Component Value Date    RDW 13.3 12/26/2018     Lab Results   Component Value Date     12/26/2018          ESR, CRP normal

## 2018-12-27 NOTE — PLAN OF CARE
Pt is up independently.  Pt is voiding and denies pain.  Pt had a MRI this shift.  Pt is npo waiting for podiatry  to see pt.  Pt is requesting to eat and discharge to home.

## 2018-12-28 LAB
GLUCOSE BLDC GLUCOMTR-MCNC: 101 MG/DL (ref 70–99)
GLUCOSE BLDC GLUCOMTR-MCNC: 141 MG/DL (ref 70–99)
GLUCOSE BLDC GLUCOMTR-MCNC: 147 MG/DL (ref 70–99)
GLUCOSE BLDC GLUCOMTR-MCNC: 169 MG/DL (ref 70–99)
GLUCOSE BLDC GLUCOMTR-MCNC: 276 MG/DL (ref 70–99)
GLUCOSE BLDC GLUCOMTR-MCNC: 95 MG/DL (ref 70–99)

## 2018-12-28 PROCEDURE — 00000146 ZZHCL STATISTIC GLUCOSE BY METER IP

## 2018-12-28 PROCEDURE — 25000132 ZZH RX MED GY IP 250 OP 250 PS 637: Performed by: INTERNAL MEDICINE

## 2018-12-28 PROCEDURE — 12000000 ZZH R&B MED SURG/OB

## 2018-12-28 PROCEDURE — 99232 SBSQ HOSP IP/OBS MODERATE 35: CPT | Performed by: INTERNAL MEDICINE

## 2018-12-28 PROCEDURE — 25000131 ZZH RX MED GY IP 250 OP 636 PS 637: Performed by: INTERNAL MEDICINE

## 2018-12-28 PROCEDURE — 25000128 H RX IP 250 OP 636: Performed by: INTERNAL MEDICINE

## 2018-12-28 RX ORDER — CEFAZOLIN SODIUM 2 G/100ML
2 INJECTION, SOLUTION INTRAVENOUS
Status: CANCELLED | OUTPATIENT
Start: 2018-12-28

## 2018-12-28 RX ORDER — CEFAZOLIN SODIUM 1 G/50ML
1 INJECTION, SOLUTION INTRAVENOUS SEE ADMIN INSTRUCTIONS
Status: CANCELLED | OUTPATIENT
Start: 2018-12-28

## 2018-12-28 RX ORDER — DOCUSATE SODIUM 100 MG/1
100 CAPSULE, LIQUID FILLED ORAL 2 TIMES DAILY
Status: DISCONTINUED | OUTPATIENT
Start: 2018-12-28 | End: 2019-01-02 | Stop reason: HOSPADM

## 2018-12-28 RX ORDER — DOCUSATE SODIUM 100 MG/1
100 CAPSULE, LIQUID FILLED ORAL 2 TIMES DAILY PRN
Status: DISCONTINUED | OUTPATIENT
Start: 2018-12-28 | End: 2019-01-02 | Stop reason: HOSPADM

## 2018-12-28 RX ORDER — DEXTROSE MONOHYDRATE, SODIUM CHLORIDE, AND POTASSIUM CHLORIDE 50; 1.49; 4.5 G/1000ML; G/1000ML; G/1000ML
INJECTION, SOLUTION INTRAVENOUS CONTINUOUS
Status: DISCONTINUED | OUTPATIENT
Start: 2018-12-29 | End: 2018-12-29

## 2018-12-28 RX ADMIN — SIMVASTATIN 10 MG: 10 TABLET, FILM COATED ORAL at 21:52

## 2018-12-28 RX ADMIN — DOCUSATE SODIUM 100 MG: 100 CAPSULE, LIQUID FILLED ORAL at 21:52

## 2018-12-28 RX ADMIN — LISINOPRIL 10 MG: 10 TABLET ORAL at 08:59

## 2018-12-28 RX ADMIN — CEFAZOLIN SODIUM 1 G: 1 INJECTION, SOLUTION INTRAVENOUS at 02:13

## 2018-12-28 RX ADMIN — INSULIN GLARGINE 40 UNITS: 100 INJECTION, SOLUTION SUBCUTANEOUS at 21:52

## 2018-12-28 RX ADMIN — ASPIRIN 81 MG: 81 TABLET, COATED ORAL at 08:59

## 2018-12-28 RX ADMIN — GABAPENTIN 300 MG: 300 CAPSULE ORAL at 21:52

## 2018-12-28 RX ADMIN — CEFAZOLIN SODIUM 1 G: 1 INJECTION, SOLUTION INTRAVENOUS at 10:10

## 2018-12-28 RX ADMIN — CEFAZOLIN SODIUM 1 G: 1 INJECTION, SOLUTION INTRAVENOUS at 18:18

## 2018-12-28 ASSESSMENT — ACTIVITIES OF DAILY LIVING (ADL)
ADLS_ACUITY_SCORE: 10

## 2018-12-28 ASSESSMENT — MIFFLIN-ST. JEOR: SCORE: 1581.9

## 2018-12-28 NOTE — PROGRESS NOTES
Foot & Ankle Surgery  December 28, 2018      Patient seen at bedside this PM for follow up on R 5th toe.  Imaging indicates osteomyelitis of the 5th proximal phalanx, non-invasive vascular studies indicate sufficient blood flow to the right lower extremity.  He was hesitant about proceeding with surgery now, but has changed his mind and would like to get surgery done.    /57 (BP Location: Left arm)   Pulse 89   Temp 96.7  F (35.9  C) (Oral)   Resp 16   SpO2 98%     PE - R 5th toe - dry stable scab, wound not probed at bedside. minimal edema or erythema noted.      Imaging - IMPRESSION:  1. Osteomyelitis of the fifth proximal phalanx.  2. Nonspecific edema of the fifth middle phalanx. Dislocation of the  fifth PIP joint.  3. Cellulitis.    Imaging - IMPRESSION:   1. Normal right PIYUSH without evidence of arterial insufficiency.  2. Left PIYUSH shows moderate arterial insufficiency.  3. Right first digital pressure is abnormal.     PIYUSH CRITERIA:  >0.95 Normal  0.90 - 0.94 Mild  0.5 - 0.89 Moderate  0.2 - 0.49 Severe  <0.2 Critical    A/P - 64 yo neuropathic DMII male with osteomyelitis R 5th toe  -to OR tomorrow 8/8:30 for R 5th toe amputation.  Anticipate single surgery with closure  -discharge Sunday possible if surgical site is stable  -discussed risk of non-healing wound and need to revise surgery.    Clark Lora, MACY FACFAS FACFAOM  Podiatric Foot & Ankle Surgeon  [facility]  [phone]

## 2018-12-28 NOTE — PLAN OF CARE
Vss except intermittent low tachy hr. Afebrile. Lungs clr-room air high 90s. +bs/+gas, no nausea. Tolerating diet. Last bm 12/26/18. Voiding. Saline locked. Denies pain. Cms-baseline numbness/tingling to bles. Skin is dry/flaky with few bruises. Drsg-cdi. Ancef for antibiotic therapy. Podiatry to see to discuss results of tests and determine plan this am. No other significant issues noted overnight.

## 2018-12-28 NOTE — PROGRESS NOTES
S: Patient was seen today at the [facility] in room 601 with an order from Dr. Baker for an offloading shoe. Patient states that the ulcer at the dorsum of the RT 5th toe has been ongoing for 1.5 months. Patient states that it was caused by a post op shoe that he got from his primary physician. Patient states that he wears diabetic shoes and current shoe size is a men's 9.    O: Patient was in bed for the consult. RT foot has a bandage and surgical dressing covering the area above the 5th ray. RT foot appears swollen at the lateral aspect of the forefoot. Patient was alert/oriented during the appointment and was not in distress.     A: Dr. Baker's team was contacted to discuss the use of a post op shoe on the RT side. Spoke to Yary (clinical assistant for Dr. Baker) about not using a post op shoe due to patient's pervious issue. Yary spoke to Dr. Baker who D/C the order. Informed patient and nursing staff about Dr. Baker's decision.     P: Patient will follow up with the [place] clinic as needed.     This note was electronically signed by Ranjan SANTANA , ABC #[id number], License #[id number]

## 2018-12-28 NOTE — PROGRESS NOTES
BRIEF CLINICAL NUTRITION SERVICES ASSESSMENT     REASON FOR ASSESSMENT:  Nutrition Admission Risk Screen - large or non-healing wound, burn, or pressure injury.      NUTRITION HISTORY:  - Information obtained via EMR.   - Presented for wound check. H/o DM2, sugars have been good recently (noted 130-150s, typically <200). Other history includes peripheral artery disease, hypertension, previous transmetatarsal amp of toes on left lower extremity.   - Admits with osteomyelitis of 5th proximal phalanx. Pt now agreeable to surgery.   - Planned surgery 12/29 for 5th toe amputation      CURRENT DIET AND INTAKE:  Diet: Moderate consistent CHO  Intakes: % of meals recorded. No s/sx intolerance.      ANTHROPOMETRICS:  Height: Data Unavailable  Weight: 0 lbs 0 oz  There is no height or weight on file to calculate BMI.  Weight Status:  Unable to assess   IBW: unable to assess  % IBW: unable to assess  Weight History:   Wt Readings from Last 10 Encounters:   07/27/18 83 kg (183 lb)   06/22/18 85.7 kg (189 lb)   06/24/17 83.6 kg (184 lb 3.2 oz)   04/20/17 83.8 kg (184 lb 12.8 oz)   04/06/17 86 kg (189 lb 9.6 oz)   08/21/15 81.6 kg (180 lb)   04/17/15 83.5 kg (184 lb)   04/15/15 81.6 kg (180 lb)   02/23/15 83 kg (183 lb)   05/07/14 84.4 kg (186 lb)       LABS:  Labs reviewed  Recent Labs   Lab Test 12/26/18  2258 06/22/18  2012 04/21/17  0633 04/20/17  0757 09/22/15  2345   POTASSIUM 4.1 4.5 3.8 4.0 4.1     No results for input(s): PHOS in the last 77195 hours.  Recent Labs   Lab Test 04/20/17  0757   MAG 1.9     Recent Labs   Lab Test 12/26/18 2258 06/22/18 2012 04/21/17 0633 04/20/17 0757 09/22/15  2345    140 141 139 135     Recent Labs   Lab Test 12/26/18 2258 06/22/18 2012 04/21/17 0633 04/20/17  0757 09/22/15  2345   CR 1.08 1.40* 1.07 1.38* 1.43*     Recent Labs   Lab 12/26/18  2258   *     Lab Results   Component Value Date    A1C 7.3 12/26/2018    A1C 9.1 04/17/2015    A1C 9.8 02/23/2015    A1C  9.3 05/07/2014    A1C 9.4 04/27/2011     --> shows improved glucose control.     MEDS:  Meds reviewed     Dosing Weight None available. Ranges provided below.   ASSESSED NUTRITION NEEDS PER APPROVED PRACTICE GUIDELINES:  Estimated Energy Needs: (25-30 Kcal/Kg)  Justification: maintenance  Estimated Protein Needs: (1.2-1.5 g pro/Kg)  Justification: maintenance and post-op pending possible surgery   Estimated Fluid Needs: >1 mL/kcal   Justification: maintenance     MALNUTRITION:  Patient does not meet two of the following criteria necessary for diagnosing malnutrition: significant weight loss, reduced intake, subcutaneous fat loss, muscle loss or fluid retention     NUTRITION INTERVENTION:  Nutrition Diagnosis:  No nutrition diagnosis at this time.     Implementation:  Nutrition Education: No education needs assessed at this time.      FOLLOW UP/MONITORING:   Will re-evaluate in 7 - 10 days, or sooner, if re-consulted.        Jayna Coughlin RD, LD  3rd floor/ICU: [phone]  All other floors: [phone]  Weekend/holiday: [phone]  Office: [phone]

## 2018-12-28 NOTE — PROGRESS NOTES
"    [facility]  Hospitalist Progress Note  Arash Saunders MD 12/28/2018    Reason for Stay (Diagnosis): osteomyelitis         Assessment and Plan:      Summary of Stay: Rachell Piage is a 63-year-old [place] male with a history of type 2 diabetes, peripheral arterial disease, hypertension, previous transmetatarsal amputation of his toes on the left lower extremity, who has had a wound/ulcer of his R 5th toe that has persisted for several weeks.  He was admitted about concern for possible infection, with MRI showing osteomyelitis.  Surgery has been recommended; plan is for 5th toe amputation tomorrow AM.    - osteomyelitis of R 5th toe:  Amputation with podiatry in AM.  Not systemically ill.  On Ancef currently  - DM:  On Lantus 60 units at bedtime (home dose) and novolog with meals.  Will reduce Lantus dose to 40 units this evening in anticipation of surgery in AM.  a1c 7.3  - PAD:  PIYUSH this admit show adequate blood flow to RLE    DVT Prophylaxis: Low Risk/Ambulatory with no VTE prophylaxis indicated  Code Status: Full Code  Discharge Dispo: home  Estimated Disch Date / # of Days until Disch: likely POD#1 (Sunday)        Interval History (Subjective):      Initially hesitant to do surgery this admit; now agreeable                  Physical Exam:      Last Vital Signs:  /57 (BP Location: Left arm)   Pulse 89   Temp 96.7  F (35.9  C) (Oral)   Resp 16   Ht 1.676 m (5' 6\")   Wt 83.9 kg (185 lb)   SpO2 98%   BMI 29.86 kg/m        Intake/Output Summary (Last 24 hours) at 12/28/2018 1539  Last data filed at 12/28/2018 1030  Gross per 24 hour   Intake 1195 ml   Output 4 ml   Net 1191 ml       Constitutional: Awake, alert, cooperative, no apparent distress   Respiratory: Clear to auscultation bilaterally, no crackles or wheezing   Cardiovascular: Regular rate and rhythm, normal S1 and S2, and no murmur noted   Abdomen: Normal bowel sounds, soft, non-distended, non-tender   Skin: No rashes, " no cyanosis, dry to touch   Neuro: Alert and oriented x3, no weakness, numbness, memory loss   Extremities: No edema, normal range of motion   Other(s):        All other systems: Negative          Medications:      All current medications were reviewed with changes reflected in problem list.         Data:      All new lab and imaging data was reviewed.   Labs:  Recent Labs   Lab 12/26/18  2258   WBC 9.3   HGB 13.5   HCT 41.9   MCV 88         Imaging:   Recent Results (from the past 24 hour(s))   US PIYUSH Doppler No Exercise    Narrative    US ANKLE-BRACHIAL INDEX DOPPLER NO EXERCISE, ONE-TWO LEVELS, QUESTION  BILATERAL   12/27/2018 5:15 PM     HISTORY: Right 5th toe wound.    COMPARISON: None.    FINDINGS:  Right PIYUSH: 1.04.  Left PIYUSH: 0.87.  Right first digital pressure: 0.31. Normal right first digital  waveform.    Waveforms: Triphasic in the right femoral and popliteal biphasic in  the right posterior tibial and dorsalis pedis. Biphasic throughout the  left.      Impression    IMPRESSION:   1. Normal right PIYUSH without evidence of arterial insufficiency.  2. Left PIYUSH shows moderate arterial insufficiency.  3. Right first digital pressure is abnormal.    PIYUSH CRITERIA:  >0.95 Normal  0.90 - 0.94 Mild  0.5 - 0.89 Moderate  0.2 - 0.49 Severe  <0.2 Critical    JOVANY THOMAS DO

## 2018-12-28 NOTE — PLAN OF CARE
Pt right   foot and 4th and 5th  toes remain covered with dressing , no drainage noted. Pt encouraged to keep elevated. Pt denies pain. Afebrile. Blood sugars monitored, 101 and 169, appetitie good. Pt is anxious to talk to the podiatry. He now is in agreement for the surgery. Pt seems stressed about wife and sons health situations.

## 2018-12-28 NOTE — PLAN OF CARE
Dressing changed to right 5th toe, scabbed area noted, tiny bit of drainage noted on old dressing . Betadine applied with gauze  Dressing applied. Denies pain.

## 2018-12-28 NOTE — PLAN OF CARE
Pt up indep in room. Good appetite. BG covered with sliding scale. Baseline BLE neuropathy. Wound care done to right foot 5th digit. Podiatry to follow up with pt tomorrow regarding results of doppler and plan of care- possible transfer to [facility]. Pts son and wife both hospitalized here at this time. Voiding. LS clear. BS active. Will continue to monitor.

## 2018-12-29 ENCOUNTER — ANESTHESIA EVENT (OUTPATIENT)
Dept: SURGERY | Facility: CLINIC | Age: 63
End: 2018-12-29
Payer: COMMERCIAL

## 2018-12-29 ENCOUNTER — ANESTHESIA (OUTPATIENT)
Dept: SURGERY | Facility: CLINIC | Age: 63
End: 2018-12-29
Payer: COMMERCIAL

## 2018-12-29 LAB
GLUCOSE BLDC GLUCOMTR-MCNC: 119 MG/DL (ref 70–99)
GLUCOSE BLDC GLUCOMTR-MCNC: 128 MG/DL (ref 70–99)
GLUCOSE BLDC GLUCOMTR-MCNC: 144 MG/DL (ref 70–99)
GLUCOSE BLDC GLUCOMTR-MCNC: 181 MG/DL (ref 70–99)
GLUCOSE BLDC GLUCOMTR-MCNC: 194 MG/DL (ref 70–99)
GLUCOSE BLDC GLUCOMTR-MCNC: 275 MG/DL (ref 70–99)

## 2018-12-29 PROCEDURE — 12000000 ZZH R&B MED SURG/OB

## 2018-12-29 PROCEDURE — 25000128 H RX IP 250 OP 636: Performed by: INTERNAL MEDICINE

## 2018-12-29 PROCEDURE — 25000131 ZZH RX MED GY IP 250 OP 636 PS 637: Performed by: INTERNAL MEDICINE

## 2018-12-29 PROCEDURE — 00000146 ZZHCL STATISTIC GLUCOSE BY METER IP

## 2018-12-29 PROCEDURE — 25000132 ZZH RX MED GY IP 250 OP 250 PS 637: Performed by: INTERNAL MEDICINE

## 2018-12-29 PROCEDURE — 99232 SBSQ HOSP IP/OBS MODERATE 35: CPT | Performed by: INTERNAL MEDICINE

## 2018-12-29 PROCEDURE — 25000125 ZZHC RX 250: Performed by: HOSPITALIST

## 2018-12-29 RX ORDER — OXYCODONE HYDROCHLORIDE 5 MG/1
5 TABLET ORAL EVERY 4 HOURS PRN
Status: DISCONTINUED | OUTPATIENT
Start: 2018-12-29 | End: 2019-01-02 | Stop reason: HOSPADM

## 2018-12-29 RX ORDER — FENTANYL CITRATE 50 UG/ML
25-50 INJECTION, SOLUTION INTRAMUSCULAR; INTRAVENOUS
Status: CANCELLED | OUTPATIENT
Start: 2018-12-29

## 2018-12-29 RX ORDER — SODIUM CHLORIDE, SODIUM LACTATE, POTASSIUM CHLORIDE, CALCIUM CHLORIDE 600; 310; 30; 20 MG/100ML; MG/100ML; MG/100ML; MG/100ML
INJECTION, SOLUTION INTRAVENOUS CONTINUOUS
Status: CANCELLED | OUTPATIENT
Start: 2018-12-29

## 2018-12-29 RX ORDER — ONDANSETRON 2 MG/ML
4 INJECTION INTRAMUSCULAR; INTRAVENOUS EVERY 30 MIN PRN
Status: CANCELLED | OUTPATIENT
Start: 2018-12-29

## 2018-12-29 RX ORDER — HYDROXYZINE HYDROCHLORIDE 25 MG/1
25-50 TABLET, FILM COATED ORAL EVERY 6 HOURS PRN
Status: DISCONTINUED | OUTPATIENT
Start: 2018-12-29 | End: 2019-01-02 | Stop reason: HOSPADM

## 2018-12-29 RX ORDER — GINSENG 100 MG
CAPSULE ORAL 3 TIMES DAILY PRN
Status: DISCONTINUED | OUTPATIENT
Start: 2018-12-29 | End: 2019-01-02 | Stop reason: HOSPADM

## 2018-12-29 RX ORDER — HYDRALAZINE HYDROCHLORIDE 20 MG/ML
2.5-5 INJECTION INTRAMUSCULAR; INTRAVENOUS EVERY 10 MIN PRN
Status: CANCELLED | OUTPATIENT
Start: 2018-12-29

## 2018-12-29 RX ORDER — LABETALOL HYDROCHLORIDE 5 MG/ML
10 INJECTION, SOLUTION INTRAVENOUS
Status: CANCELLED | OUTPATIENT
Start: 2018-12-29

## 2018-12-29 RX ORDER — HYDROMORPHONE HYDROCHLORIDE 1 MG/ML
.3-.5 INJECTION, SOLUTION INTRAMUSCULAR; INTRAVENOUS; SUBCUTANEOUS EVERY 5 MIN PRN
Status: CANCELLED | OUTPATIENT
Start: 2018-12-29

## 2018-12-29 RX ORDER — ONDANSETRON 4 MG/1
4 TABLET, ORALLY DISINTEGRATING ORAL EVERY 30 MIN PRN
Status: CANCELLED | OUTPATIENT
Start: 2018-12-29

## 2018-12-29 RX ADMIN — DOCUSATE SODIUM 100 MG: 100 CAPSULE, LIQUID FILLED ORAL at 08:37

## 2018-12-29 RX ADMIN — BACITRACIN: 500 OINTMENT TOPICAL at 21:32

## 2018-12-29 RX ADMIN — DOCUSATE SODIUM 100 MG: 100 CAPSULE, LIQUID FILLED ORAL at 21:31

## 2018-12-29 RX ADMIN — GABAPENTIN 300 MG: 300 CAPSULE ORAL at 21:31

## 2018-12-29 RX ADMIN — LISINOPRIL 10 MG: 10 TABLET ORAL at 08:37

## 2018-12-29 RX ADMIN — CEFAZOLIN SODIUM 1 G: 1 INJECTION, SOLUTION INTRAVENOUS at 01:56

## 2018-12-29 RX ADMIN — ACETAMINOPHEN 650 MG: 325 TABLET, FILM COATED ORAL at 16:20

## 2018-12-29 RX ADMIN — CEFAZOLIN SODIUM 1 G: 1 INJECTION, SOLUTION INTRAVENOUS at 10:25

## 2018-12-29 RX ADMIN — INSULIN GLARGINE 60 UNITS: 100 INJECTION, SOLUTION SUBCUTANEOUS at 22:36

## 2018-12-29 RX ADMIN — CEFAZOLIN SODIUM 1 G: 1 INJECTION, SOLUTION INTRAVENOUS at 17:57

## 2018-12-29 RX ADMIN — SIMVASTATIN 10 MG: 10 TABLET, FILM COATED ORAL at 21:31

## 2018-12-29 ASSESSMENT — ACTIVITIES OF DAILY LIVING (ADL)
ADLS_ACUITY_SCORE: 10

## 2018-12-29 NOTE — PROGRESS NOTES
Pt right   foot and 4th and 5th  toes remain covered with dressing , no drainage noted. Pt encouraged to keep elevated. Pt denies pain. Afebrile. Blood sugars monitored, 141 and 147, appetitie good. Pt is anxious about surgery especially after recalling that he was told not to have any kind of surgery after taking ASA. Pt goes on to say that he knows many people in his community that blood to death in the OR as they forgot to tell their doctors that they were taking ASA. Pt agreed to stay NPO after midnight in case surgery is performed. Podiatry will talk to pt in the morning according to OR nurse. Pt reassured that all necessary lab work will be done prior to surgery and that it will only be done if deemed to be safe for pt. Pt refused to have surgical bath saying that if he decides to go on with it he will shower tomorrow. Pt non compliant with his diet as  He kept snacking and asking for juice saying that he will not be able to eat after midnight. Will keep monitoring.

## 2018-12-29 NOTE — PROVIDER NOTIFICATION
REASON FOR CONTACT: pt is now refusing am surgery because he had his ASA today and remembers that a surgeon told him previously not to take ASA for 2 days preop. Do you want to discuss this with him?  PROVIDER CONTACTED: admitting hospitalist   TIME CONTACTED: now  MODE OF CONTACT: web text  RESPONSE:

## 2018-12-29 NOTE — ANESTHESIA PREPROCEDURE EVALUATION
Anesthesia Pre-Procedure Evaluation    Patient: Rachell Paige   MRN: [id number] : 1955          Preoperative Diagnosis: unknown    Procedure(s):  RIGHT FIFTH TOE AMPUTATION    Past Medical History:   Diagnosis Date     Anxiety 2015     Dermatitis 2015     DM type 2, goal A1C 7-8 2015     Does not have health insurance 2015     Financial problems 2015     HTN, goal below 140/90 2015     Hyperlipidemia LDL goal <100 2015     Microalbuminuria 2015     Onychomycosis 2015     Rash 2015     No past surgical history on file.  Anesthesia Evaluation     . Pt has had prior anesthetic.            ROS/MED HX    ENT/Pulmonary:  - neg pulmonary ROS     Neurologic:  - neg neurologic ROS     Cardiovascular:     (+) Dyslipidemia, hypertension-Peripheral Vascular Disease---. : . . . :. .       METS/Exercise Tolerance:     Hematologic:         Musculoskeletal:         GI/Hepatic:  - neg GI/hepatic ROS       Renal/Genitourinary:     (+) Other Renal/ Genitourinary, microalbum.      Endo:     (+) type II DM .      Psychiatric:     (+) psychiatric history anxiety      Infectious Disease:   (+) Other Infectious Disease toe osteo      Malignancy:         Other:                          Physical Exam  Normal systems: cardiovascular and pulmonary    Airway   Mallampati: II  TM distance: >3 FB  Neck ROM: full    Dental     Cardiovascular       Pulmonary             Lab Results   Component Value Date    WBC 9.3 2018    HGB 13.5 2018    HCT 41.9 2018     2018    CRP 6.1 2018    SED 9 2018     2018    POTASSIUM 4.1 2018    CHLORIDE 107 2018    CO2 28 2018    BUN 17 2018    CR 1.08 2018     (H) 2018    ALPHONSO 8.9 2018    MAG 1.9 2017    ALBUMIN 3.6 2018    PROTTOTAL 6.9 2018    ALT 23 2018    AST 26 2018    ALKPHOS 72 2018    BILITOTAL 0.6 2018     "LIPASE 54 09/11/2013    INR 0.90 01/22/2013    TSH 1.60 02/23/2015       Preop Vitals  BP Readings from Last 3 Encounters:   12/29/18 130/61   07/27/18 177/44   06/22/18 110/68    Pulse Readings from Last 3 Encounters:   12/27/18 89   07/27/18 98   06/22/18 68      Resp Readings from Last 3 Encounters:   12/29/18 16   07/27/18 18   06/24/17 23    SpO2 Readings from Last 3 Encounters:   12/29/18 96%   07/27/18 100%   06/22/18 97%      Temp Readings from Last 1 Encounters:   12/29/18 97.9  F (36.6  C) (Oral)    Ht Readings from Last 1 Encounters:   12/28/18 1.676 m (5' 6\")      Wt Readings from Last 1 Encounters:   12/28/18 83.9 kg (185 lb)    Estimated body mass index is 29.86 kg/m  as calculated from the following:    Height as of this encounter: 1.676 m (5' 6\").    Weight as of this encounter: 83.9 kg (185 lb).       Anesthesia Plan      History & Physical Review  History and physical reviewed and following examination; no interval change.    ASA Status:  3 .    NPO Status:  > 8 hours    Plan for MAC with Intravenous and Propofol induction. Maintenance will be Balanced.    PONV prophylaxis:  Ondansetron (or other 5HT-3)       Postoperative Care  Postoperative pain management:  IV analgesics.      Consents  Anesthetic plan, risks, benefits and alternatives discussed with:  Patient.  Use of blood products discussed: Yes.   Use of blood products discussed with Patient.  Consented to blood products.  .                 Mane Gonzalze MD                    .  "

## 2018-12-29 NOTE — PROGRESS NOTES
"Foot & Ankle Surgery  December 29, 2018    Patient seen at bedside to discuss surgery.  He is quite anxious about having the toe amputation as he had his aspirin dose yesterday.  \"What if I bleed to death\".      /61 (BP Location: Right arm)   Pulse 89   Temp 97.9  F (36.6  C) (Oral)   Resp 16   Ht 1.676 m (5' 6\")   Wt 83.9 kg (185 lb)   SpO2 96%   BMI 29.86 kg/m      Dressing intact    A/P - 64 yo neuropathic DMII male with osteomyelitis R 5th toe  -case cancelled today at patient request  -he would prefer to wait a few days, willing to proceed with surgery Monday, but absolutely does not want to have surgery today  -we discussed aspirin will likely be in his system Monday if he took Friday  -we discussed pros/cons/risks of toe amp while on aspirin.  If this were an elective surgery, waiting would be prudent.  However, osteomyelitis should be addressed sooner rather than later, and proceeding while on aspirin is a reasonable risk.  However, the toe is stable, he's on IV abx and I don't expect to see any significant deterioration of the toe over the next 2 days  -diabetic diet ordered, patient ok to eat  -aspirin held.    Clark Lora, ANTONIOM FACFAS FACFAOM  Podiatric Foot & Ankle Surgeon  [facility]  [phone]      "

## 2018-12-29 NOTE — PLAN OF CARE
"Vss. Afebrile. Lungs clr-room air mid 90s. +bs/+gas, no nausea. Npo. Last bm 12/26/18. Voiding. Ivf infusing. Denies pain. Cms-baseline numbness/tingling to bles. Skin is dry/flaky with few bruises. Drsg-cdi. Ancef for antibiotic therapy. Pt ambulating and repositioning self. Pt refusing start of new ivf. Stated \" I am not having surgery today.\" Told pt the dr would be in and he needed to ask all his questions about his concerns with having the surgery. Pt in agreement. Pre-op nurse aware that pt is not wanting surgery at this time and will speak with the dr. No other significant issues noted overnight.    "

## 2018-12-29 NOTE — PROGRESS NOTES
[facility]    Medicine Progress Note - Hospitalist Service       Date of Admission:  12/26/2018  Assessment & Plan   Summary of Stay: Rachell Paige is a 63-year-old [place]ian male with a history of type 2 diabetes, peripheral arterial disease, hypertension, previous transmetatarsal amputation of his toes on the left lower extremity, who has had a wound/ulcer of his R 5th toe that has persisted for several weeks.  He was admitted about concern for possible infection, with MRI showing osteomyelitis.  Surgery has been recommended;     1.  Right fifth toe osteomyelitis.  Podiatry following.  Plan for right fifth toe resection on 12/31/18.  Continue IV cefazolin.  Hold aspirin for planned resection.    2.  Diabetes mellitus.  Increase Lantus to home dose of 60 units daily.  Continue NovoLog scheduled 10 units 3 times a day.  NovoLog sliding scale.    3.  Hypertension.  Continue lisinopril.    4.  Hyperlipidemia.  Continue simvastatin.    Diet: Moderate Consistent CHO Diet    DVT Prophylaxis: Pneumatic Compression Devices  Michel Catheter: not present  Code Status: Full Code      Disposition Plan   Expected discharge: 2 - 3 days, recommended to prior living arrangement   Entered: Librado Garcia DO 12/29/2018, 3:44 PM       Librado Garcia DO  Hospitalist Service  [facility]    ______________________________________________________________________    Interval History   Having some right foot pain.  Denies chest pain, shortness of breath, fevers, chills, nausea, vomiting, or diarrhea.    Data reviewed today: I reviewed all medications, new labs and imaging results over the last 24 hours.     Physical Exam   Vital Signs: Temp: 97.2  F (36.2  C) Temp src: Oral BP: 150/71   Heart Rate: 95 Resp: 16 SpO2: 95 % O2 Device: None (Room air)    Weight: 185 lbs 0 oz  Gen:  NAD, A&Ox3.  Eyes:  PERRL, sclera anicteric.  OP:  MMM, no lesions.  Neck:  Supple.  CV:  Regular, no murmurs.  Lung:  CTA b/l,  normal effort.  Ab:  +BS, soft.  Skin:  Warm, dry to touch.  Dressing on right foot not removed.  Ext:  No pitting edema LE b/l.      Data   Recent Labs   Lab 12/26/18  2258   WBC 9.3   HGB 13.5   MCV 88         POTASSIUM 4.1   CHLORIDE 107   CO2 28   BUN 17   CR 1.08   ANIONGAP 3   ALPHONSO 8.9   *

## 2018-12-29 NOTE — PLAN OF CARE
VSS. Denies pain. R foot dressing change done. On IV ancef. Plan to hold aspirin and surgery on Monday. Will continue to monitor.

## 2018-12-30 LAB
ANION GAP SERPL CALCULATED.3IONS-SCNC: 6 MMOL/L (ref 3–14)
BUN SERPL-MCNC: 22 MG/DL (ref 7–30)
CALCIUM SERPL-MCNC: 8.7 MG/DL (ref 8.5–10.1)
CHLORIDE SERPL-SCNC: 107 MMOL/L (ref 94–109)
CO2 SERPL-SCNC: 25 MMOL/L (ref 20–32)
CREAT SERPL-MCNC: 1.09 MG/DL (ref 0.66–1.25)
ERYTHROCYTE [DISTWIDTH] IN BLOOD BY AUTOMATED COUNT: 13.5 % (ref 10–15)
GFR SERPL CREATININE-BSD FRML MDRD: 72 ML/MIN/{1.73_M2}
GLUCOSE BLDC GLUCOMTR-MCNC: 105 MG/DL (ref 70–99)
GLUCOSE BLDC GLUCOMTR-MCNC: 135 MG/DL (ref 70–99)
GLUCOSE BLDC GLUCOMTR-MCNC: 135 MG/DL (ref 70–99)
GLUCOSE BLDC GLUCOMTR-MCNC: 146 MG/DL (ref 70–99)
GLUCOSE BLDC GLUCOMTR-MCNC: 148 MG/DL (ref 70–99)
GLUCOSE BLDC GLUCOMTR-MCNC: 149 MG/DL (ref 70–99)
GLUCOSE BLDC GLUCOMTR-MCNC: 222 MG/DL (ref 70–99)
GLUCOSE SERPL-MCNC: 149 MG/DL (ref 70–99)
HCT VFR BLD AUTO: 40.8 % (ref 40–53)
HGB BLD-MCNC: 13.1 G/DL (ref 13.3–17.7)
MCH RBC QN AUTO: 28.2 PG (ref 26.5–33)
MCHC RBC AUTO-ENTMCNC: 32.1 G/DL (ref 31.5–36.5)
MCV RBC AUTO: 88 FL (ref 78–100)
PLATELET # BLD AUTO: 356 10E9/L (ref 150–450)
POTASSIUM SERPL-SCNC: 3.8 MMOL/L (ref 3.4–5.3)
RBC # BLD AUTO: 4.64 10E12/L (ref 4.4–5.9)
SODIUM SERPL-SCNC: 138 MMOL/L (ref 133–144)
WBC # BLD AUTO: 8.3 10E9/L (ref 4–11)

## 2018-12-30 PROCEDURE — 25000132 ZZH RX MED GY IP 250 OP 250 PS 637: Performed by: HOSPITALIST

## 2018-12-30 PROCEDURE — 12000000 ZZH R&B MED SURG/OB

## 2018-12-30 PROCEDURE — 25000128 H RX IP 250 OP 636: Performed by: INTERNAL MEDICINE

## 2018-12-30 PROCEDURE — 25000132 ZZH RX MED GY IP 250 OP 250 PS 637: Performed by: INTERNAL MEDICINE

## 2018-12-30 PROCEDURE — 00000146 ZZHCL STATISTIC GLUCOSE BY METER IP

## 2018-12-30 PROCEDURE — 99232 SBSQ HOSP IP/OBS MODERATE 35: CPT | Performed by: INTERNAL MEDICINE

## 2018-12-30 PROCEDURE — 80048 BASIC METABOLIC PNL TOTAL CA: CPT | Performed by: INTERNAL MEDICINE

## 2018-12-30 PROCEDURE — 25000131 ZZH RX MED GY IP 250 OP 636 PS 637: Performed by: INTERNAL MEDICINE

## 2018-12-30 PROCEDURE — 36415 COLL VENOUS BLD VENIPUNCTURE: CPT | Performed by: INTERNAL MEDICINE

## 2018-12-30 PROCEDURE — 85027 COMPLETE CBC AUTOMATED: CPT | Performed by: INTERNAL MEDICINE

## 2018-12-30 PROCEDURE — 99233 SBSQ HOSP IP/OBS HIGH 50: CPT | Performed by: PODIATRIST

## 2018-12-30 RX ORDER — POTASSIUM CL/LIDO/0.9 % NACL 10MEQ/0.1L
10 INTRAVENOUS SOLUTION, PIGGYBACK (ML) INTRAVENOUS
Status: DISCONTINUED | OUTPATIENT
Start: 2018-12-30 | End: 2019-01-02 | Stop reason: HOSPADM

## 2018-12-30 RX ORDER — POTASSIUM CHLORIDE 29.8 MG/ML
20 INJECTION INTRAVENOUS
Status: DISCONTINUED | OUTPATIENT
Start: 2018-12-30 | End: 2019-01-02 | Stop reason: HOSPADM

## 2018-12-30 RX ORDER — POTASSIUM CHLORIDE 7.45 MG/ML
10 INJECTION INTRAVENOUS
Status: DISCONTINUED | OUTPATIENT
Start: 2018-12-30 | End: 2019-01-02 | Stop reason: HOSPADM

## 2018-12-30 RX ORDER — POTASSIUM CHLORIDE 1500 MG/1
20-40 TABLET, EXTENDED RELEASE ORAL
Status: DISCONTINUED | OUTPATIENT
Start: 2018-12-30 | End: 2019-01-02 | Stop reason: HOSPADM

## 2018-12-30 RX ORDER — DEXTROSE MONOHYDRATE, SODIUM CHLORIDE, AND POTASSIUM CHLORIDE 50; 1.49; 4.5 G/1000ML; G/1000ML; G/1000ML
INJECTION, SOLUTION INTRAVENOUS CONTINUOUS
Status: DISCONTINUED | OUTPATIENT
Start: 2018-12-31 | End: 2019-01-01

## 2018-12-30 RX ORDER — POLYETHYLENE GLYCOL 3350 17 G/17G
17 POWDER, FOR SOLUTION ORAL 2 TIMES DAILY PRN
Status: DISCONTINUED | OUTPATIENT
Start: 2018-12-30 | End: 2019-01-02 | Stop reason: HOSPADM

## 2018-12-30 RX ORDER — AMOXICILLIN 250 MG
1 CAPSULE ORAL 2 TIMES DAILY
Status: DISCONTINUED | OUTPATIENT
Start: 2018-12-30 | End: 2019-01-02 | Stop reason: HOSPADM

## 2018-12-30 RX ORDER — POTASSIUM CHLORIDE 1.5 G/1.58G
20-40 POWDER, FOR SOLUTION ORAL
Status: DISCONTINUED | OUTPATIENT
Start: 2018-12-30 | End: 2019-01-02 | Stop reason: HOSPADM

## 2018-12-30 RX ADMIN — INSULIN GLARGINE 40 UNITS: 100 INJECTION, SOLUTION SUBCUTANEOUS at 22:25

## 2018-12-30 RX ADMIN — SENNOSIDES AND DOCUSATE SODIUM 1 TABLET: 8.6; 5 TABLET ORAL at 22:23

## 2018-12-30 RX ADMIN — DOCUSATE SODIUM 100 MG: 100 CAPSULE, LIQUID FILLED ORAL at 22:24

## 2018-12-30 RX ADMIN — CEFAZOLIN SODIUM 1 G: 1 INJECTION, SOLUTION INTRAVENOUS at 09:10

## 2018-12-30 RX ADMIN — GABAPENTIN 300 MG: 300 CAPSULE ORAL at 22:24

## 2018-12-30 RX ADMIN — LISINOPRIL 10 MG: 10 TABLET ORAL at 09:10

## 2018-12-30 RX ADMIN — DOCUSATE SODIUM 100 MG: 100 CAPSULE, LIQUID FILLED ORAL at 09:10

## 2018-12-30 RX ADMIN — CEFAZOLIN SODIUM 1 G: 1 INJECTION, SOLUTION INTRAVENOUS at 17:19

## 2018-12-30 RX ADMIN — SIMVASTATIN 10 MG: 10 TABLET, FILM COATED ORAL at 22:24

## 2018-12-30 RX ADMIN — CEFAZOLIN SODIUM 1 G: 1 INJECTION, SOLUTION INTRAVENOUS at 02:12

## 2018-12-30 ASSESSMENT — ACTIVITIES OF DAILY LIVING (ADL)
ADLS_ACUITY_SCORE: 10

## 2018-12-30 NOTE — PROGRESS NOTES
D: Per nursing pt needs a ride home.   A: EVELYN met with pt. Her daughter was originally going to provide transportation at 6:00pm but is no longer able to until 11:00pm. Pt stated that she has no money and no one as to call as her  is also a patient in the hospital. EVELYN called MNBRIAN [phone] to arrange a ride through pt's insurance. As of 4:15pm [facility] had not secured a taxi service and are to call SW back if its before 4:30 or the nursing station if its after 4:30. EVELYN went to inform pt, pt was not in her room. [facility] the [facility] [place] [facility] pt discharged at 4:04pm. SW searched the halls and the front lobby and did not see pt.  P: Pt discharged home.     Magdalene Guzman, MAYCO  Casual EVELYN [phone]

## 2018-12-30 NOTE — PROGRESS NOTES
Pt is alert and oriented, VSS,independent in the room.Tolerateing moderate carb diet well. Passing flatus. refused assessment.Denies pain, stating the only problem he got was on his toe, has no problem in his lungs to be checked, explained the importance of assessment, but pt continued to decline. Voiding adequate amounts.On iv, ancef. Plan to have surgery on tomorrow.

## 2018-12-30 NOTE — PROGRESS NOTES
Pt stable independent in his room, still on IV ancef. Pt encouraged to follow his diet but he still eating hospital food and ordering food additional food that he is ordering and asking family to bring for him. IV replaced as he was concerned that the old one was there for too long putting him at risk for infection. Bacitracin ointment applied as he requested. Will keep monitoring.

## 2018-12-30 NOTE — PLAN OF CARE
VSS. Denies pain. R foot dressing change done. On IV ancef. Plan for surgery at 1730 tomorrow, NPO after breakfast per podiatrist note. Will continue to monitor.

## 2018-12-30 NOTE — PROGRESS NOTES
"Foot & Ankle Surgery  December 30, 2018    Patient seen at bedside this AM for follow up on R 5th toe.      /70 (BP Location: Left arm)   Pulse 89   Temp 96.3  F (35.7  C) (Oral)   Resp 16   Ht 1.676 m (5' 6\")   Wt 83.9 kg (185 lb)   SpO2 96%   BMI 29.86 kg/m      PE - stable wound R 5th toe with minimal edema/erythema.  No current drainage    Imaging - MRI R foot - IMPRESSION:  1. Osteomyelitis of the fifth proximal phalanx.  2. Nonspecific edema of the fifth middle phalanx. Dislocation of the  fifth PIP joint.  3. Cellulitis.    A/P - 64 yo neuropathic DMII male with wound and osteomyelitis R 5th toe  -to OR tomorrow, 12/31/18 @ 1730 for R 5th toe amputation; NPO after breakfast  -discussed pros/cons/risks of surgery    Clark Lora, ANTONIOM FACFAS FACFAOM  Podiatric Foot & Ankle Surgeon  [facility]  [phone]      "

## 2018-12-30 NOTE — PROGRESS NOTES
[facility]    Medicine Progress Note - Hospitalist Service       Date of Admission:  12/26/2018  Assessment & Plan   Summary of Stay: Rachell Paige is a 63-year-old Spanish male with a history of type 2 diabetes, peripheral arterial disease, hypertension, previous transmetatarsal amputation of his toes on the left lower extremity, who has had a wound/ulcer of his R 5th toe that has persisted for several weeks.  He was admitted about concern for possible infection, with MRI showing osteomyelitis.  Surgery has been recommended.     1.  Right fifth toe osteomyelitis.  Podiatry following.  Plan for right fifth toe resection on 12/31/18.  Continue IV cefazolin.  Hold aspirin for planned resection.     2.  Diabetes mellitus.  Decrease Lantus to home dose of 40 units tonight for planned surgery tomorrow.  Continue NovoLog scheduled 10 units 3 times a day with meals.  NovoLog sliding scale.     3.  Hypertension.  Hold lisinopril tomorrow for planned surgery.     4.  Hyperlipidemia.  Continue simvastatin.    5.  Constipation.  Start scheduled senna S.  Have MiraLAX available as needed.        Diet: Moderate Consistent CHO Diet    DVT Prophylaxis: Pneumatic Compression Devices  Michel Catheter: not present  Code Status: Full Code        Librado Garcia DO  Hospitalist Service  [facility]    ______________________________________________________________________    Interval History   Having some right foot pain.  Feels constipated.  Denies chest pain, shortness of breath, fevers, chills, nausea, vomiting, or diarrhea.    Data reviewed today: I reviewed all medications, new labs and imaging results over the last 24 hours.    Physical Exam   Vital Signs: Temp: 97.3  F (36.3  C) Temp src: Oral BP: 113/46   Heart Rate: 82 Resp: 16 SpO2: 99 % O2 Device: None (Room air)    Weight: 185 lbs 0 oz  Gen:  NAD, A&Ox3.  Eyes:  PERRL, sclera anicteric.  OP:  MMM, no lesions.  Neck:  Supple.  CV:  Regular, no  murmurs.  Lung:  CTA b/l, normal effort.  Ab:  +BS, soft.  Skin:  Warm, dry to touch.  Mild erythema on right fifth toe.  Ext:  No pitting edema LE b/l.      Data   Recent Labs   Lab 12/30/18  0746 12/26/18  2258   WBC 8.3 9.3   HGB 13.1* 13.5   MCV 88 88    370    138   POTASSIUM 3.8 4.1   CHLORIDE 107 107   CO2 25 28   BUN 22 17   CR 1.09 1.08   ANIONGAP 6 3   ALPHONSO 8.7 8.9   * 119*

## 2018-12-31 LAB
ANION GAP SERPL CALCULATED.3IONS-SCNC: 6 MMOL/L (ref 3–14)
BUN SERPL-MCNC: 20 MG/DL (ref 7–30)
CALCIUM SERPL-MCNC: 8.5 MG/DL (ref 8.5–10.1)
CHLORIDE SERPL-SCNC: 109 MMOL/L (ref 94–109)
CO2 SERPL-SCNC: 26 MMOL/L (ref 20–32)
CREAT SERPL-MCNC: 1.1 MG/DL (ref 0.66–1.25)
ERYTHROCYTE [DISTWIDTH] IN BLOOD BY AUTOMATED COUNT: 13.5 % (ref 10–15)
GFR SERPL CREATININE-BSD FRML MDRD: 71 ML/MIN/{1.73_M2}
GLUCOSE BLDC GLUCOMTR-MCNC: 110 MG/DL (ref 70–99)
GLUCOSE BLDC GLUCOMTR-MCNC: 110 MG/DL (ref 70–99)
GLUCOSE BLDC GLUCOMTR-MCNC: 127 MG/DL (ref 70–99)
GLUCOSE BLDC GLUCOMTR-MCNC: 140 MG/DL (ref 70–99)
GLUCOSE BLDC GLUCOMTR-MCNC: 146 MG/DL (ref 70–99)
GLUCOSE BLDC GLUCOMTR-MCNC: 299 MG/DL (ref 70–99)
GLUCOSE BLDC GLUCOMTR-MCNC: 93 MG/DL (ref 70–99)
GLUCOSE BLDC GLUCOMTR-MCNC: 98 MG/DL (ref 70–99)
GLUCOSE SERPL-MCNC: 85 MG/DL (ref 70–99)
HCT VFR BLD AUTO: 40.4 % (ref 40–53)
HGB BLD-MCNC: 13.1 G/DL (ref 13.3–17.7)
MCH RBC QN AUTO: 28.6 PG (ref 26.5–33)
MCHC RBC AUTO-ENTMCNC: 32.4 G/DL (ref 31.5–36.5)
MCV RBC AUTO: 88 FL (ref 78–100)
PLATELET # BLD AUTO: 336 10E9/L (ref 150–450)
POTASSIUM SERPL-SCNC: 3.9 MMOL/L (ref 3.4–5.3)
RBC # BLD AUTO: 4.58 10E12/L (ref 4.4–5.9)
SODIUM SERPL-SCNC: 141 MMOL/L (ref 133–144)
WBC # BLD AUTO: 8.7 10E9/L (ref 4–11)

## 2018-12-31 PROCEDURE — 25000125 ZZHC RX 250: Performed by: PODIATRIST

## 2018-12-31 PROCEDURE — 25000131 ZZH RX MED GY IP 250 OP 636 PS 637: Performed by: INTERNAL MEDICINE

## 2018-12-31 PROCEDURE — 25000132 ZZH RX MED GY IP 250 OP 250 PS 637: Performed by: INTERNAL MEDICINE

## 2018-12-31 PROCEDURE — 87070 CULTURE OTHR SPECIMN AEROBIC: CPT | Performed by: PODIATRIST

## 2018-12-31 PROCEDURE — 25000128 H RX IP 250 OP 636: Performed by: PODIATRIST

## 2018-12-31 PROCEDURE — 25000128 H RX IP 250 OP 636: Performed by: INTERNAL MEDICINE

## 2018-12-31 PROCEDURE — 80048 BASIC METABOLIC PNL TOTAL CA: CPT | Performed by: INTERNAL MEDICINE

## 2018-12-31 PROCEDURE — 99232 SBSQ HOSP IP/OBS MODERATE 35: CPT | Performed by: INTERNAL MEDICINE

## 2018-12-31 PROCEDURE — 27210794 ZZH OR GENERAL SUPPLY STERILE: Performed by: PODIATRIST

## 2018-12-31 PROCEDURE — 85027 COMPLETE CBC AUTOMATED: CPT | Performed by: INTERNAL MEDICINE

## 2018-12-31 PROCEDURE — 40000275 ZZH STATISTIC RCP TIME EA 10 MIN

## 2018-12-31 PROCEDURE — 36000050 ZZH SURGERY LEVEL 2 1ST 30 MIN: Performed by: PODIATRIST

## 2018-12-31 PROCEDURE — 93005 ELECTROCARDIOGRAM TRACING: CPT

## 2018-12-31 PROCEDURE — 71000027 ZZH RECOVERY PHASE 2 EACH 15 MINS: Performed by: PODIATRIST

## 2018-12-31 PROCEDURE — 40000305 ZZH STATISTIC PRE PROC ASSESS I: Performed by: PODIATRIST

## 2018-12-31 PROCEDURE — 87176 TISSUE HOMOGENIZATION CULTR: CPT | Performed by: PODIATRIST

## 2018-12-31 PROCEDURE — 28750 FUSION OF BIG TOE JOINT: CPT | Mod: T9 | Performed by: PODIATRIST

## 2018-12-31 PROCEDURE — 25800025 ZZH RX 258: Performed by: PODIATRIST

## 2018-12-31 PROCEDURE — 00000146 ZZHCL STATISTIC GLUCOSE BY METER IP

## 2018-12-31 PROCEDURE — 25000128 H RX IP 250 OP 636: Performed by: NURSE ANESTHETIST, CERTIFIED REGISTERED

## 2018-12-31 PROCEDURE — 93010 ELECTROCARDIOGRAM REPORT: CPT | Performed by: INTERNAL MEDICINE

## 2018-12-31 PROCEDURE — 37000008 ZZH ANESTHESIA TECHNICAL FEE, 1ST 30 MIN: Performed by: PODIATRIST

## 2018-12-31 PROCEDURE — 37000009 ZZH ANESTHESIA TECHNICAL FEE, EACH ADDTL 15 MIN: Performed by: PODIATRIST

## 2018-12-31 PROCEDURE — 87077 CULTURE AEROBIC IDENTIFY: CPT | Performed by: PODIATRIST

## 2018-12-31 PROCEDURE — 25000125 ZZHC RX 250: Performed by: NURSE ANESTHETIST, CERTIFIED REGISTERED

## 2018-12-31 PROCEDURE — 36415 COLL VENOUS BLD VENIPUNCTURE: CPT | Performed by: INTERNAL MEDICINE

## 2018-12-31 PROCEDURE — 87186 SC STD MICRODIL/AGAR DIL: CPT | Performed by: PODIATRIST

## 2018-12-31 PROCEDURE — 87075 CULTR BACTERIA EXCEPT BLOOD: CPT | Performed by: PODIATRIST

## 2018-12-31 PROCEDURE — 0Y6X0Z1 DETACHMENT AT RIGHT 5TH TOE, HIGH, OPEN APPROACH: ICD-10-PCS | Performed by: PODIATRIST

## 2018-12-31 PROCEDURE — 12000000 ZZH R&B MED SURG/OB

## 2018-12-31 RX ORDER — MAGNESIUM HYDROXIDE 1200 MG/15ML
LIQUID ORAL PRN
Status: DISCONTINUED | OUTPATIENT
Start: 2018-12-31 | End: 2018-12-31 | Stop reason: HOSPADM

## 2018-12-31 RX ORDER — NALOXONE HYDROCHLORIDE 0.4 MG/ML
.1-.4 INJECTION, SOLUTION INTRAMUSCULAR; INTRAVENOUS; SUBCUTANEOUS
Status: ACTIVE | OUTPATIENT
Start: 2018-12-31 | End: 2019-01-01

## 2018-12-31 RX ORDER — BUPIVACAINE HYDROCHLORIDE 2.5 MG/ML
INJECTION, SOLUTION EPIDURAL; INFILTRATION; INTRACAUDAL PRN
Status: DISCONTINUED | OUTPATIENT
Start: 2018-12-31 | End: 2018-12-31 | Stop reason: HOSPADM

## 2018-12-31 RX ORDER — CEFAZOLIN SODIUM 2 G/100ML
2 INJECTION, SOLUTION INTRAVENOUS EVERY 8 HOURS
Status: DISCONTINUED | OUTPATIENT
Start: 2018-12-31 | End: 2018-12-31

## 2018-12-31 RX ORDER — CEFAZOLIN SODIUM 1 G/3ML
1 INJECTION, POWDER, FOR SOLUTION INTRAMUSCULAR; INTRAVENOUS SEE ADMIN INSTRUCTIONS
Status: DISCONTINUED | OUTPATIENT
Start: 2018-12-31 | End: 2018-12-31 | Stop reason: HOSPADM

## 2018-12-31 RX ORDER — LIDOCAINE 40 MG/G
CREAM TOPICAL
Status: DISCONTINUED | OUTPATIENT
Start: 2018-12-31 | End: 2019-01-02 | Stop reason: HOSPADM

## 2018-12-31 RX ORDER — PROPOFOL 10 MG/ML
INJECTION, EMULSION INTRAVENOUS CONTINUOUS PRN
Status: DISCONTINUED | OUTPATIENT
Start: 2018-12-31 | End: 2018-12-31

## 2018-12-31 RX ORDER — SODIUM CHLORIDE, SODIUM LACTATE, POTASSIUM CHLORIDE, CALCIUM CHLORIDE 600; 310; 30; 20 MG/100ML; MG/100ML; MG/100ML; MG/100ML
INJECTION, SOLUTION INTRAVENOUS CONTINUOUS PRN
Status: DISCONTINUED | OUTPATIENT
Start: 2018-12-31 | End: 2018-12-31

## 2018-12-31 RX ORDER — CEFAZOLIN SODIUM 2 G/100ML
2 INJECTION, SOLUTION INTRAVENOUS EVERY 8 HOURS
Status: DISCONTINUED | OUTPATIENT
Start: 2019-01-01 | End: 2019-01-02 | Stop reason: HOSPADM

## 2018-12-31 RX ORDER — CEFAZOLIN SODIUM 2 G/100ML
2 INJECTION, SOLUTION INTRAVENOUS
Status: COMPLETED | OUTPATIENT
Start: 2018-12-31 | End: 2018-12-31

## 2018-12-31 RX ADMIN — SENNOSIDES AND DOCUSATE SODIUM 1 TABLET: 8.6; 5 TABLET ORAL at 09:30

## 2018-12-31 RX ADMIN — GABAPENTIN 300 MG: 300 CAPSULE ORAL at 22:36

## 2018-12-31 RX ADMIN — CEFAZOLIN SODIUM 1 G: 1 INJECTION, SOLUTION INTRAVENOUS at 02:11

## 2018-12-31 RX ADMIN — MIDAZOLAM 2 MG: 1 INJECTION INTRAMUSCULAR; INTRAVENOUS at 18:29

## 2018-12-31 RX ADMIN — PROPOFOL 40 MCG/KG/MIN: 10 INJECTION, EMULSION INTRAVENOUS at 18:38

## 2018-12-31 RX ADMIN — SIMVASTATIN 10 MG: 10 TABLET, FILM COATED ORAL at 22:36

## 2018-12-31 RX ADMIN — CEFAZOLIN SODIUM 2 G: 2 INJECTION, SOLUTION INTRAVENOUS at 18:35

## 2018-12-31 RX ADMIN — SODIUM CHLORIDE, POTASSIUM CHLORIDE, SODIUM LACTATE AND CALCIUM CHLORIDE: 600; 310; 30; 20 INJECTION, SOLUTION INTRAVENOUS at 18:29

## 2018-12-31 RX ADMIN — INSULIN GLARGINE 40 UNITS: 100 INJECTION, SOLUTION SUBCUTANEOUS at 22:35

## 2018-12-31 RX ADMIN — DOCUSATE SODIUM 100 MG: 100 CAPSULE, LIQUID FILLED ORAL at 22:36

## 2018-12-31 RX ADMIN — POTASSIUM CHLORIDE, DEXTROSE MONOHYDRATE AND SODIUM CHLORIDE: 150; 5; 450 INJECTION, SOLUTION INTRAVENOUS at 11:53

## 2018-12-31 RX ADMIN — SENNOSIDES AND DOCUSATE SODIUM 1 TABLET: 8.6; 5 TABLET ORAL at 22:36

## 2018-12-31 RX ADMIN — CEFAZOLIN SODIUM 2 G: 2 INJECTION, SOLUTION INTRAVENOUS at 11:53

## 2018-12-31 ASSESSMENT — ACTIVITIES OF DAILY LIVING (ADL)
ADLS_ACUITY_SCORE: 10

## 2018-12-31 NOTE — PROGRESS NOTES
[facility]    Medicine Progress Note - Hospitalist Service       Date of Admission:  12/26/2018  Assessment & Plan   Summary of Stay: Rachell Paige is a 63-year-old Citizen of [place] male with a history of type 2 diabetes, peripheral arterial disease, hypertension, previous transmetatarsal amputation of his toes on the left lower extremity, who has had a wound/ulcer of his R 5th toe that has persisted for several weeks.  He was admitted about concern for possible infection, with MRI showing osteomyelitis.  Surgery has been recommended.     1.  Right fifth toe osteomyelitis.  Podiatry following.  Plan for right fifth toe resection on 12/31/18.  Continue IV cefazolin.  Hold aspirin for planned resection.  Pain medications as needed.  Use incentive spirometry.     2.  Diabetes mellitus.   Continue Lantus at reduced dose of 40 units a day.  NovoLog 3 times a day with meals.  NovoLog sliding scale.     3.  Hypertension.  Hold lisinopril.     4.  Hyperlipidemia.  Continue simvastatin.     5.  Constipation.  Continue scheduled senna S.  Have MiraLAX available as needed.            Diet: Moderate Consistent CHO Diet    DVT Prophylaxis: Pneumatic Compression Devices  Michel Catheter: not present  Code Status: Full Code         Librado Garcia DO  Hospitalist Service  [facility]    ______________________________________________________________________    Interval History   Some right foot pain.  Denies shortness of breath, fevers, chills, nausea, vomiting, or diarrhea.  Did have a small bowel movement this morning.    Data reviewed today: I reviewed all medications, new labs and imaging results over the last 24 hours.     Physical Exam   Vital Signs: Temp: 97.1  F (36.2  C) Temp src: Oral BP: 129/62   Heart Rate: 89 Resp: 16 SpO2: 97 % O2 Device: None (Room air)    Weight: 185 lbs 0 oz  Gen:  NAD, A&Ox3.  Eyes:  PERRL, sclera anicteric.  OP:  MMM, no lesions.  Neck:  Supple.  CV:  Regular, no  murmurs.  Lung:  CTA b/l, normal effort.  Ab:  +BS, soft.  Skin:  Warm, dry to touch.  No rash.  Right foot dressing not removed.  Ext:  No pitting edema LE b/l.      Data   Recent Labs   Lab 12/31/18  0728 12/30/18  0746 12/26/18  2258   WBC 8.7 8.3 9.3   HGB 13.1* 13.1* 13.5   MCV 88 88 88    356 370    138 138   POTASSIUM 3.9 3.8 4.1   CHLORIDE 109 107 107   CO2 26 25 28   BUN 20 22 17   CR 1.10 1.09 1.08   ANIONGAP 6 6 3   ALPHONSO 8.5 8.7 8.9   GLC 85 149* 119*

## 2018-12-31 NOTE — PLAN OF CARE
A&O x4. VSS. LS CTA all fields. BS active x4. Had sm BM yesterday but still c/o constipation scheduled laxatives given. Dressing in place to RLE. Baseline neuropathy otherwise CMS intact. Monitor BG levels. isidro mod CHO diet well, up independently in room. Denies pain. voiding in good amts. Surgery scheduled for 1730 today. Will continue to monitor.

## 2018-12-31 NOTE — PLAN OF CARE
VSS. Denies pain ex baseline neuropathy in BLE. NPO since breakfast at 0930. Patient asked for BG to be checked frequently stating he feels low - always stable 93, 127, 110, and 140. Surgical shower with scrub and wipes done per patient. Right foot dressing changed. Voided. Patient left for surgery.

## 2019-01-01 ENCOUNTER — APPOINTMENT (OUTPATIENT)
Dept: GENERAL RADIOLOGY | Facility: CLINIC | Age: 64
End: 2019-01-01
Attending: PODIATRIST
Payer: COMMERCIAL

## 2019-01-01 ENCOUNTER — APPOINTMENT (OUTPATIENT)
Dept: PHYSICAL THERAPY | Facility: CLINIC | Age: 64
End: 2019-01-01
Attending: PODIATRIST
Payer: COMMERCIAL

## 2019-01-01 LAB
ERYTHROCYTE [DISTWIDTH] IN BLOOD BY AUTOMATED COUNT: 13.5 % (ref 10–15)
GLUCOSE BLDC GLUCOMTR-MCNC: 137 MG/DL (ref 70–99)
GLUCOSE BLDC GLUCOMTR-MCNC: 175 MG/DL (ref 70–99)
GLUCOSE BLDC GLUCOMTR-MCNC: 233 MG/DL (ref 70–99)
GLUCOSE BLDC GLUCOMTR-MCNC: 237 MG/DL (ref 70–99)
GLUCOSE BLDC GLUCOMTR-MCNC: 337 MG/DL (ref 70–99)
GLUCOSE SERPL-MCNC: 171 MG/DL (ref 70–99)
HCT VFR BLD AUTO: 40.1 % (ref 40–53)
HGB BLD-MCNC: 13 G/DL (ref 13.3–17.7)
MCH RBC QN AUTO: 28.3 PG (ref 26.5–33)
MCHC RBC AUTO-ENTMCNC: 32.4 G/DL (ref 31.5–36.5)
MCV RBC AUTO: 87 FL (ref 78–100)
PLATELET # BLD AUTO: 340 10E9/L (ref 150–450)
RBC # BLD AUTO: 4.59 10E12/L (ref 4.4–5.9)
WBC # BLD AUTO: 8.5 10E9/L (ref 4–11)

## 2019-01-01 PROCEDURE — 85027 COMPLETE CBC AUTOMATED: CPT | Performed by: PODIATRIST

## 2019-01-01 PROCEDURE — 00000146 ZZHCL STATISTIC GLUCOSE BY METER IP

## 2019-01-01 PROCEDURE — 82947 ASSAY GLUCOSE BLOOD QUANT: CPT | Performed by: PODIATRIST

## 2019-01-01 PROCEDURE — 25000128 H RX IP 250 OP 636: Performed by: INTERNAL MEDICINE

## 2019-01-01 PROCEDURE — 25000132 ZZH RX MED GY IP 250 OP 250 PS 637: Performed by: INTERNAL MEDICINE

## 2019-01-01 PROCEDURE — 36415 COLL VENOUS BLD VENIPUNCTURE: CPT | Performed by: PODIATRIST

## 2019-01-01 PROCEDURE — 99232 SBSQ HOSP IP/OBS MODERATE 35: CPT | Performed by: INTERNAL MEDICINE

## 2019-01-01 PROCEDURE — 40000986 XR FOOT PORT RT 2 VW: Mod: RT

## 2019-01-01 PROCEDURE — 12000000 ZZH R&B MED SURG/OB

## 2019-01-01 PROCEDURE — 97161 PT EVAL LOW COMPLEX 20 MIN: CPT | Mod: GP

## 2019-01-01 PROCEDURE — 40000193 ZZH STATISTIC PT WARD VISIT

## 2019-01-01 PROCEDURE — 25000131 ZZH RX MED GY IP 250 OP 636 PS 637: Performed by: INTERNAL MEDICINE

## 2019-01-01 RX ORDER — ASPIRIN 81 MG/1
81 TABLET ORAL DAILY
Status: DISCONTINUED | OUTPATIENT
Start: 2019-01-02 | End: 2019-01-02 | Stop reason: HOSPADM

## 2019-01-01 RX ORDER — LISINOPRIL 10 MG/1
10 TABLET ORAL DAILY
Status: DISCONTINUED | OUTPATIENT
Start: 2019-01-02 | End: 2019-01-02 | Stop reason: HOSPADM

## 2019-01-01 RX ADMIN — SENNOSIDES AND DOCUSATE SODIUM 1 TABLET: 8.6; 5 TABLET ORAL at 10:59

## 2019-01-01 RX ADMIN — DOCUSATE SODIUM 100 MG: 100 CAPSULE, LIQUID FILLED ORAL at 10:59

## 2019-01-01 RX ADMIN — GABAPENTIN 300 MG: 300 CAPSULE ORAL at 21:48

## 2019-01-01 RX ADMIN — ACETAMINOPHEN 325 MG: 325 TABLET, FILM COATED ORAL at 21:47

## 2019-01-01 RX ADMIN — SIMVASTATIN 10 MG: 10 TABLET, FILM COATED ORAL at 21:47

## 2019-01-01 RX ADMIN — CEFAZOLIN SODIUM 2 G: 2 INJECTION, SOLUTION INTRAVENOUS at 03:03

## 2019-01-01 RX ADMIN — CEFAZOLIN SODIUM 2 G: 2 INJECTION, SOLUTION INTRAVENOUS at 20:29

## 2019-01-01 RX ADMIN — OXYCODONE HYDROCHLORIDE 5 MG: 5 TABLET ORAL at 19:35

## 2019-01-01 RX ADMIN — DOCUSATE SODIUM 100 MG: 100 CAPSULE, LIQUID FILLED ORAL at 21:47

## 2019-01-01 RX ADMIN — SENNOSIDES AND DOCUSATE SODIUM 1 TABLET: 8.6; 5 TABLET ORAL at 19:30

## 2019-01-01 RX ADMIN — CEFAZOLIN SODIUM 2 G: 2 INJECTION, SOLUTION INTRAVENOUS at 10:57

## 2019-01-01 RX ADMIN — OXYCODONE HYDROCHLORIDE 5 MG: 5 TABLET ORAL at 12:24

## 2019-01-01 RX ADMIN — INSULIN GLARGINE 60 UNITS: 100 INJECTION, SOLUTION SUBCUTANEOUS at 22:22

## 2019-01-01 ASSESSMENT — ACTIVITIES OF DAILY LIVING (ADL)
ADLS_ACUITY_SCORE: 10
ADLS_ACUITY_SCORE: 11
ADLS_ACUITY_SCORE: 10

## 2019-01-01 NOTE — OP NOTE
Procedure Date: 12/31/2018      1st Assistant:     2nd Assistant:          PREOPERATIVE DIAGNOSIS:     1.  Diabetes mellitus with peripheral neuropathy.   2.  Osteomyelitis, right fifth proximal phalanx.       POSTOPERATIVE DIAGNOSIS:    1.  Diabetes mellitus with peripheral neuropathy.   2.  Osteomyelitis, right fifth proximal phalanx.       PROCEDURE:  Right fifth toe amputation.       SURGEON:  Clark Lora DPM      PATHOLOGY:  Bone was sent off for aerobic and anaerobic cultures.      ANESTHESIA:  MAC with local.      HEMOSTASIS:  None.      ESTIMATED BLOOD LOSS:  3 mL.      MATERIALS:  None.      INJECTABLES:  7 mL of 0.25% bupivacaine plain.      COMPLICATIONS:  None apparent.      INDICATIONS FOR PROCEDURE:  The patient is a pleasant 63-year-old neuropathic diabetic male who was admitted for a right fifth toe wound and infection.  An MRI upon admission showed evidence consistent with osteomyelitis of the proximal phalanx.  He was originally scheduled for surgery on 12/29/2018, but elected to cancel because he had an aspirin that morning.  He was rescheduled for today at his request.        PREOPERATIVE EXAMINATION:  The patient had a full-thickness wound at the dorsal lateral right fifth toe that readily probed to bone on examination.  Minimal surrounding erythema and edema were noted and no purulence was noted.      DESCRIPTION OF PROCEDURE:  After obtaining written consent, the patient was transferred to the operating room, placed in supine position on the operating table.  IV sedation was initiated.  The foot was anesthetized with preoperative local.  It was then prepped and draped in normal aseptic fashion.  No tourniquet was used.  The patient, procedure, and site were correctly identified by OR staff.      Procedure #1:  Attention was directed to the right fifth toe where a teardrop incision with apex proximal laterally was carried down to underlying bone at the level of the proximal phalanx.   Soft tissues reflected off of the base of the proximal phalanx and the metatarsophalangeal joint was disarticulated.  The toe was handed off to the back table and bone at the level of the ulceration, which was soft and necrotic-appearing, was sent off for cultures.  The wound was flushed with copious amounts of normal saline.  No further deep infection or necrosis was noted.  After adequate hemostasis was achieved, a single layer closure was performed with 4-0 nylon.      A dry sterile dressing was applied to the patient's right foot.  He appeared to tolerate the procedure and anesthesia well and was transferred to the PACU with vital signs stable and vascular status intact to remaining digits of the foot.      The patient  will be heel weightbearing and readmitted to the floor for monitoring.         FLORENCIA HUNT DPM             D: 2018   T: 2019   MT: SINDI      Name:     JOSELINE HERNANDEZ   MRN:      [id number]        Account:        [id number]   :      1955           Procedure Date: 2018      Document: [id number]

## 2019-01-01 NOTE — BRIEF OP NOTE
[facility]    Brief Operative Note    Pre-operative diagnosis: infected right fifth toe  Post-operative diagnosis sp right 5th toe amputation  Procedure: Procedure(s):  AMPUTATE FIFTH TOE  Surgeon: Surgeon(s) and Role:     * Clark Lora DPM - Primary  Anesthesia: General   Estimated blood loss: <3cc  Drains: None  Specimens:   ID Type Source Tests Collected by Time Destination   1 : bone right 5th toe Tissue Toe ANAEROBIC BACTERIAL CULTURE, TISSUE CULTURE AEROBIC BACTERIAL Clark Lora DPM 12/31/2018  6:46 PM      Findings:   healthy base without SOI/necrosis.  Complications: None.  Implants: None.

## 2019-01-01 NOTE — PROGRESS NOTES
"Foot & Ankle Surgery  January 1, 2019    Patient seen at bedside this AM POD#1 sp R 5th toe amputation.  Pain levels manageable.  No acute concerns    /68 (BP Location: Left arm)   Pulse 80   Temp 95.9  F (35.5  C) (Axillary)   Resp 18   Ht 1.676 m (5' 6\")   Wt 83.9 kg (185 lb)   SpO2 96%   BMI 29.86 kg/m      PE - sutures intact, skin margins are well-coapted.  No necrosis, minimal edema, no erythema.    Imaging - pending    Cultures - pending    Labs -   Component      Latest Ref Rng & Units 12/31/2018 1/1/2019   WBC      4.0 - 11.0 10e9/L 8.7 8.5   RBC Count      4.4 - 5.9 10e12/L 4.58 4.59   Hemoglobin      13.3 - 17.7 g/dL 13.1 (L) 13.0 (L)   Hematocrit      40.0 - 53.0 % 40.4 40.1   MCV      78 - 100 fl 88 87   MCH      26.5 - 33.0 pg 28.6 28.3   MCHC      31.5 - 36.5 g/dL 32.4 32.4   RDW      10.0 - 15.0 % 13.5 13.5   Platelet Count      150 - 450 10e9/L 336 340     A/P - 64 yo neuropathic DMII male POD#1 sp 5th toe amp  -dressing change, incision doing quite well  -patient states he's not ready for discharge to home today, and states he'd like to stay until Thursday  -Dr Gomez to follow up tomorrow; if incision is stable, patient can go home from our standpoint.  Foot & Ankle discharge orders placed.    Clark Lora DPM FACFAS FACFAOM  Podiatric Foot & Ankle Surgeon  [facility]  [phone]      "

## 2019-01-01 NOTE — PLAN OF CARE
A&O x4. VSS. LS CTA all fields. BS active x4. Pt DMII, should be mod CHO but has outside food and tolerates will, discussed diet with patient. novolog and lantus, with BG check. Patient insists on being up independently with walker. See previous note. Patient partial heel WB to RLE and appears to be following, but does not have ortho boot yet. Dressing to  RLE is CDI. Baseline neuropathy otherwise CMS intact. Denies pain. voiding in good amts. Will continue to  monitor.

## 2019-01-01 NOTE — PROGRESS NOTES
Report received from ELVIA. Pt arrived to room 601 at 1940 via cart. Security called and patient retrieved his belongings he had placed there during surgery. Rates pain 0/10. A&O x4. VSS. Dressing in place to R foot, baseline neuropathy otherwise CMS intact. Transferred to bed with A1 and gait belt with heel WB to RLE. Patient in stable condition, will continue to monitor.

## 2019-01-01 NOTE — PROGRESS NOTES
01/01/19 1415   Quick Adds   Type of Visit Initial PT Evaluation   Living Environment   Lives With child(marie), adult;spouse   Living Arrangements house   Home Accessibility wheelchair accessible   Self-Care   Usual Activity Tolerance moderate   Current Activity Tolerance moderate   Equipment Currently Used at Home none   Activity/Exercise/Self-Care Comment Has son's shower chair that he can use, would like walker at discharge   Functional Level Prior   Ambulation 0-->independent   Transferring 0-->independent   Toileting 0-->independent   Bathing 0-->independent   Communication 0-->understands/communicates without difficulty   Swallowing 0-->swallows foods/liquids without difficulty   Cognition 0 - no cognition issues reported   Fall history within last six months no   Which of the above functional risks had a recent onset or change? ambulation;transferring   Prior Functional Level Comment Patient independent with mobility prior to admission   General Information   Onset of Illness/Injury or Date of Surgery - Date 12/31/18   Referring Physician Clark Lora DPM   Patient/Family Goals Statement discharge to home with 2WW   Pertinent History of Current Problem (include personal factors and/or comorbidities that impact the POC) Patient with PMH including type 2 diabetes, PAD, HTN, previous transmetatarsal amputation of toes at left LE now POD#1 s/p right 5th toe resection on 12/31/18.    Precautions/Limitations fall precautions   Weight-Bearing Status - RLE other (see comments)  (heel weight bearing only)   Cognitive Status Examination   Orientation orientation to person, place and time   Pain Assessment   Patient Currently in Pain Yes, see Vital Sign flowsheet   Posture    Posture Forward head position;Protracted shoulders   Range of Motion (ROM)   ROM Comment WFL   Strength   Strength Comments WFL   Bed Mobility   Bed Mobility Comments independent   Transfer Skills   Transfer Comments independent with 2WW  "  Gait   Gait Comments amb 20 feet with 2WW independently demonstrated heel weight bearing at RLE   Balance   Balance Comments no LOB noted   Clinical Impression   Criteria for Skilled Therapeutic Intervention evaluation only   PT Diagnosis decreased independence with ambulation   Influenced by the following impairments pain, right heel weight bearing   Clinical Presentation Stable/Uncomplicated   Clinical Presentation Rationale complex pmh, stable presentation, good social support   Clinical Decision Making (Complexity) Low complexity   Therapy Frequency` (evaluation only)   Predicted Duration of Therapy Intervention (days/wks) evaluation only   Anticipated Equipment Needs at Discharge walker   Anticipated Discharge Disposition Home   Risk & Benefits of therapy have been explained Yes   Patient, Family & other staff in agreement with plan of care Yes   [facility] TM \"6 Clicks\"   2016, Trustees of [facility], under license to [employer].  All rights reserved.   6 Clicks Short Forms Basic Mobility Inpatient Short Form   [facility]  \"6 Clicks\" V.2 Basic Mobility Inpatient Short Form   1. Turning from your back to your side while in a flat bed without using bedrails? 4 - None   2. Moving from lying on your back to sitting on the side of a flat bed without using bedrails? 4 - None   3. Moving to and from a bed to a chair (including a wheelchair)? 4 - None   4. Standing up from a chair using your arms (e.g., wheelchair, or bedside chair)? 4 - None   5. To walk in hospital room? 4 - None   6. Climbing 3-5 steps with a railing? 3 - A Little   Basic Mobility Raw Score (Score out of 24.Lower scores equate to lower levels of function) 23   Total Evaluation Time   Total Evaluation Time (Minutes) 15     "

## 2019-01-01 NOTE — PROGRESS NOTES
"Patient refuses to have bed alarm on at this point and does not want help walking. Discussed with patient why it is important that we help him because he can be unstable and possibly fall. He insists \"I am capable, I don't need help.\" Discussed how patient is only partial weight bearing on his R heel, but he states \"I know how to do this because of my L foot experience.\" Placed walker at patient bedside and encouraged him to use the urinal for tonight at least until therapy works with him. Urinal placed at bedside. Asked for patient to at least call and let us help him walk to the bathroom, and he was agreeable to this but insists that staff not be in the bathroom with him. Discussed risks of falling, bleeding, hurting surgical site, or causing a new injury.  "

## 2019-01-01 NOTE — CONSULTS
Pt doesn't have insurance listed.  Financial advisors saw pt.  See below:    DOS 12/26/18: [place] was [place] [employer]. Pt stated that he made his payment and  coverage will start 1/1/19. Case# [id number]. Explained CC program. Gave pt CC deisy  and letter    Pt also doesn't have a primary care provider listed.  Will give resources.      Thea FRANCOIS CTS [id number]

## 2019-01-01 NOTE — ANESTHESIA CARE TRANSFER NOTE
Patient: Rachell Paige    Procedure(s):  AMPUTATE FIFTH TOE    Diagnosis: infected right fifth toe  Diagnosis Additional Information: No value filed.    Anesthesia Type:   MAC     Note:  Airway :Room Air  Patient transferred to:Phase II  Comments: VSS.  Spontaneously breathing room air.  Awake and conversing.  Report given to RN.Handoff Report: Identifed the Patient, Identified the Reponsible Provider, Reviewed the pertinent medical history, Discussed the surgical course, Reviewed Intra-OP anesthesia mangement and issues during anesthesia, Set expectations for post-procedure period and Allowed opportunity for questions and acknowledgement of understanding      Vitals: (Last set prior to Anesthesia Care Transfer)    CRNA VITALS  12/31/2018 1829 - 12/31/2018 1903      12/31/2018             NIBP:  94/89    Pulse:  88    NIBP Mean:  93    SpO2:  100 %                Electronically Signed By: SYLVESTER Christine CRNA  December 31, 2018  7:03 PM

## 2019-01-01 NOTE — PLAN OF CARE
PT: Patient seen by physical therapy for evaluation only.  Patient with PMH including type 2 diabetes, PAD, HTN, previous transmetatarsal amputation of toes at left LE now POD#1 s/p right 5th toe resection on 12/31/18.  Patient lives in a handicap accessible apartment (patient reports that son is disabled).  Patient does not have a walker at home and will need one at discharge, can use son's shower seat.    Discharge Planner PT   Patient plan for discharge: home  Current status: Patient demonstrates understanding of heel weight bearing at right LE.  Patient independent with bed mobility.  Patient independent with sit to stand transfer with 2WW.  Patient amb 20 feet in room with 2WW demonstrating heel weight bearing at right LE, declined further ambulation at this time.  Patient returned to supine at end of session.  Barriers to return to prior living situation: none  Recommendations for discharge: home with 2WW  Rationale for recommendations: Patient presents with independent mobility with 2WW.  Will issue a 2WW for patient in the morning.  No further inpatient physical therapy needs, will complete order.       Entered by: Judie Bosch 01/01/2019 2:35 PM

## 2019-01-01 NOTE — CONSULTS
Pt is agreeable to f/u with podiatry and establish care with [facility].  I scheduled pt.:    Your next 10 appointments already scheduled     Jan 07, 2019  3:00 PM CST  Return Visit with Clark Lora DPM  [facility] ([facility] [address]  [address]  [place] [address]  [phone]   Jan 14, 2019  4:00 PM CST  [facility] with [facility]ev Fernandez [facility]  [facility] [address]  [facility] [address] [phone]   Bring a current list of meds and any records pertaining to this visit. For Physicals, please bring immunization records and any forms needing to be filled out. Please arrive 10 minutes early to complete paperwork.     Thea FRANCOIS CTS [id number]

## 2019-01-01 NOTE — PLAN OF CARE
Pt up indep in room. Insulin given for DM2. Pain managed with tylenol, oxy. Dressing changed today via podiatry. Ortho boot to come tomorrow morning. PT getting pt walker to take home. Pt does not like to be touched- ask and explain all cares. Refuses full skin check. Uses own lancet for blood sugars. Plan to discharge home tomorrow will continue to monitor.

## 2019-01-01 NOTE — PROGRESS NOTES
[facility]    Medicine Progress Note - Hospitalist Service       Date of Admission:  12/26/2018  Assessment & Plan   Summary of Stay: Rachell Paige is a 63-year-old [place] male with a history of type 2 diabetes, peripheral arterial disease, hypertension, previous transmetatarsal amputation of his toes on the left lower extremity, who has had a wound/ulcer of his R 5th toe that has persisted for several weeks.  He was admitted about concern for possible infection, with MRI showing osteomyelitis.  Surgery has been recommended.     1.  Right fifth toe osteomyelitis.  Podiatry following.  S/P right fifth toe resection on 12/31/18.  Continue IV cefazolin.  Pain medications as needed.  Use incentive spirometry.     2.  Diabetes mellitus.   Increase Lantus to 60 units a day.  Increase NovoLog 3 times a day with meals to 15 units.  Give additional 10 units once now.  NovoLog sliding scale.  Stop dextrose in IV fluids.     3.  Hypertension.  Restart lisinopril 10 mg/day.     4.  Hyperlipidemia.  Continue simvastatin.     5.  Constipation.  Continue scheduled senna S.  Have MiraLAX available as needed.        Diet: Advance Diet as Tolerated: Regular Diet Adult; 1144-3342 Calories: Moderate Consistent CHO (4-6 CHO units/meal)    DVT Prophylaxis: Pneumatic Compression Devices  Michel Catheter: not present  Code Status: Full Code      Disposition Plan   Expected discharge: Tomorrow, recommended to prior living arrangement   Entered: Librado Garcia DO 01/01/2019, 1:28 PM           Librado Garcia DO  Hospitalist Service  [facility]    ______________________________________________________________________    Interval History   Having some right foot pain.  Denies chest pain, shortness of breath, fevers, chills, nausea, vomiting, or diarrhea.    Data reviewed today: I reviewed all medications, new labs and imaging results over the last 24 hours.     Physical Exam   Vital Signs: Temp: 96.6  F  (35.9  C) Temp src: Oral BP: 125/55 Pulse: 96 Heart Rate: 93 Resp: 16 SpO2: 97 % O2 Device: None (Room air)    Weight: 185 lbs 0 oz  Gen:  NAD, A&Ox3.  Eyes:  PERRL, sclera anicteric.  OP:  MMM, no lesions.  Neck:  Supple.  CV:  Regular, no murmurs.  Lung:  CTA b/l, normal effort.  Ab:  +BS, soft.  Skin:  Warm, dry to touch.  No rash.  Dressing on right foot not removed.  Ext:  No pitting edema LE b/l.      Data   Recent Labs   Lab 01/01/19  0626 12/31/18  0728 12/30/18  0746 12/26/18  2258   WBC 8.5 8.7 8.3 9.3   HGB 13.0* 13.1* 13.1* 13.5   MCV 87 88 88 88    336 356 370   NA  --  141 138 138   POTASSIUM  --  3.9 3.8 4.1   CHLORIDE  --  109 107 107   CO2  --  26 25 28   BUN  --  20 22 17   CR  --  1.10 1.09 1.08   ANIONGAP  --  6 6 3   ALPHONSO  --  8.5 8.7 8.9   * 85 149* 119*

## 2019-01-02 VITALS
TEMPERATURE: 96 F | RESPIRATION RATE: 16 BRPM | DIASTOLIC BLOOD PRESSURE: 69 MMHG | HEIGHT: 66 IN | HEART RATE: 81 BPM | WEIGHT: 185 LBS | OXYGEN SATURATION: 99 % | SYSTOLIC BLOOD PRESSURE: 137 MMHG | BODY MASS INDEX: 29.73 KG/M2

## 2019-01-02 LAB
GLUCOSE BLDC GLUCOMTR-MCNC: 240 MG/DL (ref 70–99)
GLUCOSE BLDC GLUCOMTR-MCNC: 83 MG/DL (ref 70–99)
INTERPRETATION ECG - MUSE: NORMAL

## 2019-01-02 PROCEDURE — 25000132 ZZH RX MED GY IP 250 OP 250 PS 637: Performed by: INTERNAL MEDICINE

## 2019-01-02 PROCEDURE — 99239 HOSP IP/OBS DSCHRG MGMT >30: CPT | Performed by: INTERNAL MEDICINE

## 2019-01-02 PROCEDURE — 85027 COMPLETE CBC AUTOMATED: CPT | Performed by: PODIATRIST

## 2019-01-02 PROCEDURE — L3260 AMBULATORY SURGICAL BOOT EAC: HCPCS

## 2019-01-02 PROCEDURE — 25000128 H RX IP 250 OP 636: Performed by: INTERNAL MEDICINE

## 2019-01-02 PROCEDURE — 00000146 ZZHCL STATISTIC GLUCOSE BY METER IP

## 2019-01-02 RX ORDER — AMOXICILLIN 250 MG
1 CAPSULE ORAL 2 TIMES DAILY
Qty: 60 TABLET | Refills: 0 | Status: SHIPPED | OUTPATIENT
Start: 2019-01-02 | End: 2019-02-01

## 2019-01-02 RX ORDER — CEPHALEXIN 500 MG/1
500 CAPSULE ORAL 4 TIMES DAILY
Qty: 28 CAPSULE | Refills: 0 | Status: SHIPPED | OUTPATIENT
Start: 2019-01-02 | End: 2019-01-09

## 2019-01-02 RX ORDER — OXYCODONE HYDROCHLORIDE 5 MG/1
5 TABLET ORAL EVERY 4 HOURS PRN
Qty: 15 TABLET | Refills: 0 | Status: SHIPPED | OUTPATIENT
Start: 2019-01-02 | End: 2019-01-05

## 2019-01-02 RX ADMIN — LISINOPRIL 10 MG: 10 TABLET ORAL at 09:22

## 2019-01-02 RX ADMIN — CEFAZOLIN SODIUM 2 G: 2 INJECTION, SOLUTION INTRAVENOUS at 06:26

## 2019-01-02 RX ADMIN — DOCUSATE SODIUM 100 MG: 100 CAPSULE, LIQUID FILLED ORAL at 09:22

## 2019-01-02 RX ADMIN — SENNOSIDES AND DOCUSATE SODIUM 1 TABLET: 8.6; 5 TABLET ORAL at 09:22

## 2019-01-02 RX ADMIN — CEFAZOLIN SODIUM 2 G: 2 INJECTION, SOLUTION INTRAVENOUS at 13:58

## 2019-01-02 RX ADMIN — ASPIRIN 81 MG: 81 TABLET, COATED ORAL at 09:21

## 2019-01-02 ASSESSMENT — ACTIVITIES OF DAILY LIVING (ADL)
ADLS_ACUITY_SCORE: 11

## 2019-01-02 NOTE — PROGRESS NOTES
Prescriptions for Oxycodone, Keflex, and Senna given to pt.  Personal belongings gathered including walker and pt escorted via w/c accompanied by LPN to hospital entrance.

## 2019-01-02 NOTE — PROGRESS NOTES
Fit patient with post up shoe to protect the surgery.  I lined the lateral side of the shoe with plastizote for cushion because patient said that a post op shoe is what caused the issue to begin with.  Please call orthotics if questions.  Chandrakant BE.

## 2019-01-02 NOTE — PLAN OF CARE
Patient refusing to read discharge instructions.  Did allow nurse to remove ace to check dressing.  Dressing CDI.  This nurse instructed patient to check dressing daily and call clinic if there is any drainage.  Instructed patient not to change dressing on his own.  Also instructed patient to not get dressing wet.  Patient verbalized understanding.

## 2019-01-02 NOTE — PLAN OF CARE
Up independently.  Refusing lab draws and am insulin.  Blood sugar this am 83.  MD aware.  Agreed to have glucose tested at 1130, one hour after his breakfast.  Will reassess insulin injection at that time.  Orthotics fitted.  Awaiting Podiatry for discharge plan.  Patient would like to discharge home today.

## 2019-01-02 NOTE — PLAN OF CARE
VS: stable  A/O: x4  Tele: NA  Glucose checks: refused overnight, bedtime 237 with 2 units of correction  Activity: independent with walker, partial weight bearing on right heel  Diet: moderate carb  GI: normoactive  : voiding  Respiratory: diminished, RA    Patient had adequate pain control with oral pain medications.  Plan for discharge to home today.  Patient refused antibiotics overnight.  Patient refused morning labs.

## 2019-01-02 NOTE — DISCHARGE SUMMARY
[facility]  Hospitalist Discharge Summary       Date of Admission:  12/26/2018  Date of Discharge:  1/2/2019  Discharging Provider: Librado Garcia DO      Discharge Diagnoses   1.  Right fifth toe osteomyelitis.  Podiatry following.  S/P right fifth toe resection on 12/31/18.  Change IV cefazolin to oral Keflex 500 mg 4 times a day for the next 7 days.  Pain medications as needed.  Use incentive spirometry.  Follow-up with podiatry in 7 days.     2.  Diabetes mellitus.  Resume prior to admission dosing of Lantus and NovoLog.     3.  Hypertension.  Restart lisinopril 10 mg/day.     4.  Hyperlipidemia.  Continue simvastatin.     5.  Constipation.  Continue scheduled senna S.            Follow-ups Needed After Discharge   Follow-up Appointments     Follow-up and recommended labs and tests       Thank you for choosing [facility]!    Follow up with Dr Hunt at one of the clinic locations within 5-7 days of discharge.    DR. HUNT'S CLINIC LOCATIONS:    MONDAY [facility]    [address] [place], [place] [id number] [phone]     TUESDAY - [place]  [address]   [place] [address] [phone]    THURSDAY AM - [place]   [address]   [place] [address] [phone]       [facility]SDAY PM [facility]  [address]  [place] [address] [phone]         FRIDAY AM - [place]   [address] [place] [address] [phone]         Follow-up and recommended labs and tests       Follow up with primary care provider, Kev Fernandez, within 7 days for hospital follow- up.  The following labs/tests are recommended: BMP in 7 days.  Follow-up with podiatry within 7 days.                   Consultations This Hospital Stay   PODIATRY IP CONSULT  ORTHOSIS EXTREMITY LOWER REFERRAL IP CONSULT  PHYSICAL THERAPY ADULT IP CONSULT  CARE COORDINATOR IP CONSULT  CARE COORDINATOR IP CONSULT  ORTHOSIS EXTREMITY LOWER REFERRAL IP  CONSULT    Code Status   Full Code    Time Spent on this Encounter   I spent 35 minutes with Mr. Paige and working on discharge on 1/2/19.       Librado Garcia, DO  [facility]  ______________________________________________________________________    Physical Exam   Vital Signs: Temp: 96  F (35.6  C) Temp src: Oral BP: 137/69 Pulse: 81 Heart Rate: 83 Resp: 16 SpO2: 99 % O2 Device: None (Room air)    Weight: 185 lbs 0 oz  Gen:  NAD, A&Ox3.  Eyes:  PERRL, sclera anicteric.  OP:  MMM, no lesions.  Neck:  Supple.  CV:  Regular, no murmurs.  Lung:  CTA b/l, normal effort.  Ab:  +BS, soft.  Skin:  Warm, dry to touch.  Right foot dressing not removed.  Ext:  No pitting edema LE b/l.         Primary Care Physician   Kev Fernandez    Discharge Disposition   Discharged to home  Condition at discharge: Stable        Discharge Orders      Follow-up and recommended labs and tests     Thank you for choosing [facility]!    Follow up with Dr Hunt at one of the clinic locations within 5-7 days of discharge.    DR. HUNT'S CLINIC LOCATIONS:    MONDAY - [place]    [address] [place] [address] [phone]     TUESDAY - [place]  [address]   [place] [address] [phone]    THURSDAY AM - [place]   [address]   [place] [address] [phone]       THURSDAY PM - [facility]  [address]  [place] [address] [phone]         FRIDAY AM - [place]   [address] [place] [address] [phone]     Activity    1.  Perform the following activities every 2 hours x 5 minutes:  -ankle ROM/calf massaging bilateral lower extremity.  If you are not comfortable moving the surgical ankle, you can wiggle the toes on that foot  -deep breathing/coughing exercises  -ambulation; keep in mind your weightbearing restrictions    2.  Elevate surgical limb above hip level 23/24 hours per day for aggressive swelling control.   This is mostly for the first 2 weeks after surgery    3.  Apply ice pack to surgical site and behind right knee every 2 hours x 20 minutes.  Do not apply ice pack directly to skin.     Wound care and dressings    Keep bandage clean, dry and intact. You may shower, but cover bandage to prevent it from getting wet.  If the bandage becomes saturated, dirty or is falling off, follow up in clinic immediately for a dressing change.     Follow-up and recommended labs and tests     Follow up with primary care provider, Kev Fernandez, within 7 days for hospital follow- up.  The following labs/tests are recommended: BMP in 7 days.  Follow-up with podiatry within 7 days.     Activity    Your activity upon discharge: activity as tolerated     Diet    Follow this diet upon discharge: Moderate consistent carbohydrate (9636-9531 ludivina / 4-6 CHO units per meal)     Discharge Medications   Current Discharge Medication List      START taking these medications    Details   cephALEXin (KEFLEX) 500 MG capsule Take 1 capsule (500 mg) by mouth 4 times daily for 7 days  Qty: 28 capsule, Refills: 0    Associated Diagnoses: Other acute osteomyelitis of right foot (H)      order for DME Equipment being ordered: Walker Wheels () and Walker ()  Treatment Diagnosis: decreased stability with gait  Qty: 1 each, Refills: 0    Associated Diagnoses: Osteomyelitis of right foot, unspecified type (H)      oxyCODONE (ROXICODONE) 5 MG tablet Take 1 tablet (5 mg) by mouth every 4 hours as needed for moderate to severe pain  Qty: 15 tablet, Refills: 0    Associated Diagnoses: Other acute osteomyelitis of right foot (H)         CONTINUE these medications which have NOT CHANGED    Details   aspirin 81 MG tablet Take 1 tablet (81 mg) by mouth daily  Qty: 30 tablet, Refills: OTC    Associated Diagnoses: DM type 2, goal A1C 7-8      gabapentin (NEURONTIN) 300 MG capsule Take 300 mg by mouth At Bedtime      insulin aspart (NOVOLOG FLEXPEN) 100 UNIT/ML  injection Inject 15-20 Units Subcutaneous 2 times daily (with meals) With lunch and dinner      Insulin Glargine (LANTUS SOLOSTAR SC) Inject 60 Units Subcutaneous At Bedtime      lisinopril (PRINIVIL/ZESTRIL) 10 MG tablet Take 10 mg by mouth daily      simvastatin (ZOCOR) 10 MG tablet Take 10 mg by mouth At Bedtime           Allergies   Allergies   Allergen Reactions     Aleve      HA sweats     Clopidogrel Nausea and Vomiting     Pt to restart on 11/25/15 to see again if he tolerates it or not. His sx were only GI. Not a true allergy     Sulfamethoxazole-Trimethoprim      Other reaction(s): Renal Failure

## 2019-01-03 LAB
BACTERIA SPEC CULT: ABNORMAL
SPECIMEN SOURCE: ABNORMAL

## 2019-01-07 ENCOUNTER — OFFICE VISIT (OUTPATIENT)
Dept: PODIATRY | Facility: CLINIC | Age: 64
End: 2019-01-07
Payer: MEDICAID

## 2019-01-07 VITALS
WEIGHT: 185 LBS | SYSTOLIC BLOOD PRESSURE: 132 MMHG | DIASTOLIC BLOOD PRESSURE: 70 MMHG | HEIGHT: 66 IN | BODY MASS INDEX: 29.73 KG/M2

## 2019-01-07 DIAGNOSIS — Z89.421 S/P AMPUTATION OF LESSER TOE, RIGHT (H): Primary | ICD-10-CM

## 2019-01-07 LAB
BACTERIA SPEC CULT: NORMAL
BACTERIA SPEC CULT: NORMAL
Lab: NORMAL
SPECIMEN SOURCE: NORMAL

## 2019-01-07 PROCEDURE — 99024 POSTOP FOLLOW-UP VISIT: CPT | Performed by: PODIATRIST

## 2019-01-07 ASSESSMENT — MIFFLIN-ST. JEOR: SCORE: 1581.9

## 2019-01-07 NOTE — PROGRESS NOTES
"Foot & Ankle Surgery  January 7, 2019    S:  Patient in today approx 1 week sp R 5th toe amputation.  Pain levels low.  Heel WB in surgical shoe, following post-op instructions.  Has 6 Keflex tablets remaining    /70   Ht 1.676 m (5' 6\")   Wt 83.9 kg (185 lb)   BMI 29.86 kg/m        ROS - positive for CC.  Patient denies current nausea, vomiting, chills, fevers, belly pain, calf pain, chest pain or SOB.  Complete remainder of ROS is otherwise neg.    PE - sutures intact, skin margins well coapted.  Minimal drainage/maceration at proximal 2mm, otherwise sealed/healing well.  Minimal edema, wnl for this stage post-op.  No SOI  Skin shows no trophic, color or temperature changes otherwise.  No calf redness, swelling or pain noted otherwise.    Imaging - IMPRESSION: Fifth toe amputation changes. Destructive changes in the  first proximal phalanx. No gross erosive change.    Cultures -   Specimen Description 12/31/2018  6:46    Toe Right Tissue 5TH    Culture Micro (Abnormal) 12/31/2018  6:46    Abnormal   Moderate growth   Staphylococcus lugdunensis     Culture Micro (Abnormal) 12/31/2018  6:46    Abnormal   Light growth   Coagulase negative Staphylococcus   Susceptibility testing not routinely done     Culture Micro (Abnormal) 12/31/2018  6:46    Abnormal   These bacteria are part of normal skin nina, but on occasion, may be true pathogens.     Clinical correlation must be applied to interpreting this microbiology result.      Anaerobic cultures neg    A/P - 63 year old yo patient approx 1 week sp above procedure  -rebandaged; keep bandage clean, dry and intact  -continue all post-op instructions; reviewed  -heel WB in surgical shoe, but advised to minimize activities(reviewed resting, elevation instructions)  -he asked if antibiotic ointment is needed on the incision; it is not  -he asked if further PO abx are needed; they are not    Follow up  -  1 week or sooner with acute " issues      Body mass index is 29.86 kg/m .  Weight management plan: Patient was referred to their PCP to discuss a diet and exercise plan.      Clark Lora DPM FACFAS FACFAOM  Podiatric Foot & Ankle Surgeon  [facility]  [phone]

## 2019-01-09 ENCOUNTER — OFFICE VISIT (OUTPATIENT)
Dept: URGENT CARE | Facility: URGENT CARE | Age: 64
End: 2019-01-09
Payer: COMMERCIAL

## 2019-01-09 ENCOUNTER — ANCILLARY PROCEDURE (OUTPATIENT)
Dept: GENERAL RADIOLOGY | Facility: CLINIC | Age: 64
End: 2019-01-09
Payer: COMMERCIAL

## 2019-01-09 VITALS
DIASTOLIC BLOOD PRESSURE: 68 MMHG | OXYGEN SATURATION: 97 % | TEMPERATURE: 97.9 F | HEART RATE: 91 BPM | SYSTOLIC BLOOD PRESSURE: 132 MMHG

## 2019-01-09 DIAGNOSIS — R07.81 RIB PAIN ON LEFT SIDE: Primary | ICD-10-CM

## 2019-01-09 DIAGNOSIS — S98.131A AMPUTATED TOE OF RIGHT FOOT (H): ICD-10-CM

## 2019-01-09 DIAGNOSIS — R07.81 RIB PAIN ON LEFT SIDE: ICD-10-CM

## 2019-01-09 PROCEDURE — 71101 X-RAY EXAM UNILAT RIBS/CHEST: CPT | Mod: LT

## 2019-01-09 PROCEDURE — 99214 OFFICE O/P EST MOD 30 MIN: CPT | Performed by: PHYSICIAN ASSISTANT

## 2019-01-09 NOTE — PATIENT INSTRUCTIONS
Patient Education     Rib Contusion     A rib contusion is a bruise to one or more rib bones. It may cause pain, tenderness, swelling and a purplish discoloration. There may be a sharp pain while breathing.  You will be assessed for other injuries. You will likely be given pain medicine. Rib contusions heal on their own, without further treatment. However, pain may take weeks to months to go away.   Note that a small crack (fracture) in the rib may cause the same symptoms as a rib contusion. The small crack may not be seen on a chest X-ray. However, the conditions are managed in the same way.  Home care    Rest. Avoid heavy lifting, strenuous exertion, or any activity that causes pain.    Ice the area to reduce pain and swelling. Put ice cubes in a plastic bag or use a cold pack. (Wrap the cold source in a thin towel. Do not place it directly on your skin.) Ice the injured area for 20 minutes every 1 to 2 hours the first day. Continue with ice packs 3 to 4 times a day for the next 2 days, then as needed for the relief of pain and swelling.    Take any prescribed pain medicine as directed by your healthcare provider. If none was prescribed, take acetaminophen, ibuprofen, or naproxen to control pain.    If you have a significant injury, you may be given a device called an incentive spirometer to keep your lungs healthy. Use as directed.  Follow-up care  Follow up with your healthcare provider during the next week or as directed.  When to seek medical advice  Call your healthcare provider for any of the following:    Shortness of breath or trouble breathing    Increasing chest pain with breathing    Coughing    Dizziness, weakness, or fainting    New or worsening pain    Fever of 100.4 F (38 C) or higher, or as directed by your healthcare provider  Date Last Reviewed: 2/1/2017 2000-2018 [employer]. [address] [place] [address], [place] [address]. All rights reserved. This information is not intended as a  substitute for professional medical care. Always follow your healthcare professional's instructions.

## 2019-01-09 NOTE — PROGRESS NOTES
Rib pain    Cleaned and dressed    SUBJECTIVE:  Chief Complaint   Patient presents with     Urgent Care     Musculoskeletal Problem     fell yesterday morning, and having left side pain and pinky toe pain     Rachell Paige is a 63 year old male presents with a chief complaint of left rib pain.  The injury occurred 1 day(s) ago.   The injury happened while at home. How: slip and fall on ice.  The patient complained of moderate pain  and has not had decreased ROM.  Pain exacerbated by coughing.  Relieved by rest.  He treated it initially with ice and heat. This is the first time this type of injury has occurred to this patient.     Patient would also like his toe examined.  Amputation 1 week ago.  Checked by podiatry yesterday.  Currently on keflex.        Past Medical History:   Diagnosis Date     Anxiety 2/23/2015     Dermatitis 4/18/2015     DM type 2, goal A1C 7-8 2/23/2015     Does not have health insurance 4/18/2015     Financial problems 2/23/2015     HTN, goal below 140/90 2/23/2015     Hyperlipidemia LDL goal <100 2/23/2015     Microalbuminuria 4/18/2015     Onychomycosis 4/18/2015     Rash 2/23/2015     Current Outpatient Medications   Medication Sig Dispense Refill     aspirin 81 MG tablet Take 1 tablet (81 mg) by mouth daily 30 tablet OTC     cephALEXin (KEFLEX) 500 MG capsule Take 1 capsule (500 mg) by mouth 4 times daily for 7 days 28 capsule 0     gabapentin (NEURONTIN) 300 MG capsule Take 300 mg by mouth At Bedtime       insulin aspart (NOVOLOG FLEXPEN) 100 UNIT/ML injection Inject 15-20 Units Subcutaneous 2 times daily (with meals) With lunch and dinner       Insulin Glargine (LANTUS SOLOSTAR SC) Inject 60 Units Subcutaneous At Bedtime       lisinopril (PRINIVIL/ZESTRIL) 10 MG tablet Take 10 mg by mouth daily       order for DME Equipment being ordered: Walker Wheels () and Walker ()  Treatment Diagnosis: decreased stability with gait 1 each 0     senna-docusate (SENOKOT-S/PERICOLACE)  8.6-50 MG tablet Take 1 tablet by mouth 2 times daily 60 tablet 0     simvastatin (ZOCOR) 10 MG tablet Take 10 mg by mouth At Bedtime       Social History     Tobacco Use     Smoking status: Former Smoker     Smokeless tobacco: Never Used   Substance Use Topics     Alcohol use: No       ROS:  10 point ROS negative except as listed above      EXAM:   /68 (BP Location: Right arm, Patient Position: Chair, Cuff Size: Adult Regular)   Pulse 91   Temp 97.9  F (36.6  C) (Tympanic)   SpO2 97%   Gen: healthy,alert,no distress  Rib: point tenderness at approximately 9th rib, no bruising, step-offs, crepitis  Foot: Sutures intact. Some dried blood.  No heat or discharge.    CHEST: clear to auscultation  CV: regular rate and rhythm  EXTREMITIES: peripheral pulses normal  SKIN: no suspicious lesions or rashes  NEURO: Normal strength and tone, sensory exam grossly normal, mentation intact and speech normal    X-RAY was not done.    ASSESSMENT:   (R07.81) Rib pain on left side  (primary encounter diagnosis)  Plan: XR Ribs & Chest Left G/E 3 Views  Rest, ice, follow up if not improving in 4-7 days    (Z89.421) Amputated toe of right foot (H)  Comment: no evidence of infection appreciated  Plan: Cleaned with betadine, re-wrapped  Follow up Monday as scheduled    Patient Instructions     Patient Education     Rib Contusion     A rib contusion is a bruise to one or more rib bones. It may cause pain, tenderness, swelling and a purplish discoloration. There may be a sharp pain while breathing.  You will be assessed for other injuries. You will likely be given pain medicine. Rib contusions heal on their own, without further treatment. However, pain may take weeks to months to go away.   Note that a small crack (fracture) in the rib may cause the same symptoms as a rib contusion. The small crack may not be seen on a chest X-ray. However, the conditions are managed in the same way.  Home care    Rest. Avoid heavy lifting,  strenuous exertion, or any activity that causes pain.    Ice the area to reduce pain and swelling. Put ice cubes in a plastic bag or use a cold pack. (Wrap the cold source in a thin towel. Do not place it directly on your skin.) Ice the injured area for 20 minutes every 1 to 2 hours the first day. Continue with ice packs 3 to 4 times a day for the next 2 days, then as needed for the relief of pain and swelling.    Take any prescribed pain medicine as directed by your healthcare provider. If none was prescribed, take acetaminophen, ibuprofen, or naproxen to control pain.    If you have a significant injury, you may be given a device called an incentive spirometer to keep your lungs healthy. Use as directed.  Follow-up care  Follow up with your healthcare provider during the next week or as directed.  When to seek medical advice  Call your healthcare provider for any of the following:    Shortness of breath or trouble breathing    Increasing chest pain with breathing    Coughing    Dizziness, weakness, or fainting    New or worsening pain    Fever of 100.4 F (38 C) or higher, or as directed by your healthcare provider  Date Last Reviewed: 2/1/2017 2000-2018 [employer]. [address] [place] [address], [place] [address]. All rights reserved. This information is not intended as a substitute for professional medical care. Always follow your healthcare professional's instructions.

## 2019-01-14 ENCOUNTER — OFFICE VISIT (OUTPATIENT)
Dept: PODIATRY | Facility: CLINIC | Age: 64
End: 2019-01-14
Payer: COMMERCIAL

## 2019-01-14 VITALS
HEIGHT: 66 IN | SYSTOLIC BLOOD PRESSURE: 130 MMHG | DIASTOLIC BLOOD PRESSURE: 66 MMHG | BODY MASS INDEX: 29.73 KG/M2 | WEIGHT: 185 LBS

## 2019-01-14 DIAGNOSIS — S80.811A ABRASION OF RIGHT LOWER EXTREMITY, INITIAL ENCOUNTER: ICD-10-CM

## 2019-01-14 DIAGNOSIS — Z98.890 S/P FOOT SURGERY, RIGHT: Primary | ICD-10-CM

## 2019-01-14 DIAGNOSIS — Z89.421 S/P AMPUTATION OF LESSER TOE, RIGHT (H): ICD-10-CM

## 2019-01-14 PROCEDURE — 99212 OFFICE O/P EST SF 10 MIN: CPT | Performed by: PODIATRIST

## 2019-01-14 ASSESSMENT — MIFFLIN-ST. JEOR: SCORE: 1581.9

## 2019-01-14 NOTE — PROGRESS NOTES
"Foot & Ankle Surgery  January 14, 2019    S:  Patient in today approx 2 weeks sp R 5th toe amputation.  Pain levels a little elevated, as he had a fall recently and bumped his R foot.  Dr Jason happened to be in clinic and looked and had no concerns.  He also injured his L leg, hitting it with a door about 2 weeks ago when opening the door for his wife.  He has been putting ointment and bandaids on it, states \"someone needs to look at it\".  He states it itches, wonders if he can put an ointment on it.     /66   Ht 1.676 m (5' 6\")   Wt 83.9 kg (185 lb)   BMI 29.86 kg/m        ROS - positive for CC.  Patient denies current nausea, vomiting, chills, fevers, belly pain, calf pain, chest pain or SOB.  Complete remainder of ROS is otherwise neg.    PE - R 5th toe amp site - sutures intact, skin margins well coapted. n o acute changes.  No drainage, dehiscence or SOI.  Left lower extremity - superficial abrasions pre-tibial area without SOI.  Skin shows no trophic, color or temperature changes otherwise.  No calf redness, swelling or pain noted otherwise.    A/P - 63 year old yo patient approx 2 weeks sp above procedure; abrasions L lower leg  -3 sutures removed without gapping, but patient would prefer to leave them in a little longer, so we'll have him back in 1 week for remaining suture removal  -foot dressed today, keep c/d/i  -continue heel WB in surgical shoe  -continue all post-op orders    2.  Abrasion left lower extremity pre-tibial area  -no SOI, no indication for PO abx  -daily cares - wash/dry, abx ointment/island bandaid  -he inquired about ointment for the itching.  Advised he not apply a topical steroid cream to an open wound  -recommend PO benadryl for itching      Follow up  -  1 week or sooner with acute issues    Body mass index is 29.86 kg/m .  Weight management plan: Patient was referred to their PCP to discuss a diet and exercise plan.      Clark Lora DPM FACFAS FACFAOM  Podiatric " Foot & Ankle Surgeon  [facility]  [phone]

## 2019-01-21 ENCOUNTER — OFFICE VISIT (OUTPATIENT)
Dept: PODIATRY | Facility: CLINIC | Age: 64
End: 2019-01-21
Payer: COMMERCIAL

## 2019-01-21 ENCOUNTER — ANCILLARY PROCEDURE (OUTPATIENT)
Dept: GENERAL RADIOLOGY | Facility: CLINIC | Age: 64
End: 2019-01-21
Payer: COMMERCIAL

## 2019-01-21 VITALS
HEIGHT: 66 IN | SYSTOLIC BLOOD PRESSURE: 132 MMHG | BODY MASS INDEX: 29.73 KG/M2 | WEIGHT: 185 LBS | DIASTOLIC BLOOD PRESSURE: 68 MMHG

## 2019-01-21 DIAGNOSIS — Z98.890 S/P FOOT SURGERY, RIGHT: Primary | ICD-10-CM

## 2019-01-21 DIAGNOSIS — Z98.890 S/P FOOT SURGERY, RIGHT: ICD-10-CM

## 2019-01-21 LAB — ERYTHROCYTE [SEDIMENTATION RATE] IN BLOOD BY WESTERGREN METHOD: 16 MM/H (ref 0–20)

## 2019-01-21 PROCEDURE — 73630 X-RAY EXAM OF FOOT: CPT | Mod: RT

## 2019-01-21 PROCEDURE — 36415 COLL VENOUS BLD VENIPUNCTURE: CPT | Performed by: PODIATRIST

## 2019-01-21 PROCEDURE — 85652 RBC SED RATE AUTOMATED: CPT | Performed by: PODIATRIST

## 2019-01-21 PROCEDURE — 99024 POSTOP FOLLOW-UP VISIT: CPT | Performed by: PODIATRIST

## 2019-01-21 PROCEDURE — 86140 C-REACTIVE PROTEIN: CPT | Performed by: PODIATRIST

## 2019-01-21 ASSESSMENT — MIFFLIN-ST. JEOR: SCORE: 1581.9

## 2019-01-21 NOTE — PROGRESS NOTES
"Foot & Ankle Surgery  January 21, 2019    S:  Patient in today approx 3 weeks sp R 5th toe amputation.  Pain levels elevated at night.  He was advised to keep the bandage c/d/i, but he did a dressing change to check for \"gangrene\".  Pain levels improved when he applied lotion to the skin.  He noticed a small amount of blood on the bandage.  The patient is concerned about infection that is spreading to the rest of the foot, would like xrays and \"blood test\".      /68   Ht 1.676 m (5' 6\")   Wt 83.9 kg (185 lb)   BMI 29.86 kg/m        ROS - positive for CC.  Patient denies current nausea, vomiting, chills, fevers, belly pain, calf pain, chest pain or SOB.  Complete remainder of ROS is otherwise neg.    PE - a few more sutures were removed and there was some superficial gapping.  There are no SOI at either the incision or any other part of the foot.  No drainage, no redness, no swelling  Skin shows no trophic, color or temperature changes otherwise.  No calf redness, swelling or pain noted otherwise.    Imaging - R foot - 5th toe amp.  Mild osteopenic changes, but no lytic lesions to suggest osteomyelitis of the 5th met.    Labs -   Component      Latest Ref Rng & Units 1/21/2019   Sed Rate      0 - 20 mm/h 16   CRP Inflammation      0.0 - 8.0 mg/L 4.1       A/P - 63 year old yo patient approx 3 weeks sp above procedure  -xrays and labs at patient request, personally reviewed results  -steri-strips applied to incision to prevent gapping, but remaining sutures left intact  -bandage to foot; keep c/d/i  -heel wB in surgical shoe  -no signs of infection, either at incision or in the rest of his foot; no indication for PO abx course    Follow up  -  1 week or sooner with acute issues      Body mass index is 29.86 kg/m .  Weight management plan: Patient was referred to their PCP to discuss a diet and exercise plan.      Clark Lora, MACY FACFAS FACFAOM  Podiatric Foot & Ankle Surgeon  [facility]  [phone]

## 2019-01-22 LAB — CRP SERPL-MCNC: 4.1 MG/L (ref 0–8)

## 2019-01-28 ENCOUNTER — OFFICE VISIT (OUTPATIENT)
Dept: PODIATRY | Facility: CLINIC | Age: 64
End: 2019-01-28
Payer: COMMERCIAL

## 2019-01-28 VITALS
DIASTOLIC BLOOD PRESSURE: 70 MMHG | WEIGHT: 185 LBS | HEIGHT: 66 IN | BODY MASS INDEX: 29.73 KG/M2 | SYSTOLIC BLOOD PRESSURE: 128 MMHG

## 2019-01-28 DIAGNOSIS — Z89.421 S/P AMPUTATION OF LESSER TOE, RIGHT (H): Primary | ICD-10-CM

## 2019-01-28 PROCEDURE — 99024 POSTOP FOLLOW-UP VISIT: CPT | Performed by: PODIATRIST

## 2019-01-28 ASSESSMENT — MIFFLIN-ST. JEOR: SCORE: 1581.9

## 2019-01-28 NOTE — PATIENT INSTRUCTIONS
Products available online if you do not have insurance coverage:    1.  Iodosorb topical wound care gel

## 2019-01-28 NOTE — PROGRESS NOTES
"Foot & Ankle Surgery  January 28, 2019    S:  Patient in today approx 4 weeks sp R 5th toe amputation.  Pain levels minimal.  WB in surgical shoe.      /70   Ht 1.676 m (5' 6\")   Wt 83.9 kg (185 lb)   BMI 29.86 kg/m        ROS - positive for CC.  Patient denies current nausea, vomiting, chills, fevers, belly pain, calf pain, chest pain or SOB.  Complete remainder of ROS is otherwise neg.    PE - eschar/sutures removed, revealing partial-thickness 8 x 4mm open area.  No drainage, no SOI.  Skin shows no trophic, color or temperature changes otherwise.  No calf redness, swelling or pain noted otherwise.    A/P - 63 year old yo patient approx 4 weeks sp above procedure  -reviewed labs and images with patient personally from last visit  -Excisional debridement was performed, partial-thickness(limited to skin breakdown, no exposed subcutaneous fat), sharply debriding the wound, excising nonviable tissue to the above dimensions with a tissue nipper  -Rx for iodosorb for daily wound care; OTC med iodosorb  -continue surgical shoe     Follow up  -  2 weeks or sooner with acute issues    Body mass index is 29.86 kg/m .  Weight management plan: Patient was referred to their PCP to discuss a diet and exercise plan.      Clark Lora DPM FACFAS FACFAOM  Podiatric Foot & Ankle Surgeon  [facility]  [phone]    "

## 2019-02-11 ENCOUNTER — OFFICE VISIT (OUTPATIENT)
Dept: PODIATRY | Facility: CLINIC | Age: 64
End: 2019-02-11
Payer: COMMERCIAL

## 2019-02-11 VITALS
HEART RATE: 90 BPM | SYSTOLIC BLOOD PRESSURE: 133 MMHG | BODY MASS INDEX: 31.34 KG/M2 | WEIGHT: 195 LBS | DIASTOLIC BLOOD PRESSURE: 64 MMHG | HEIGHT: 66 IN

## 2019-02-11 DIAGNOSIS — T81.89XD NON-HEALING SURGICAL WOUND, SUBSEQUENT ENCOUNTER: ICD-10-CM

## 2019-02-11 DIAGNOSIS — Z89.421 S/P AMPUTATION OF LESSER TOE, RIGHT (H): Primary | ICD-10-CM

## 2019-02-11 PROCEDURE — 11042 DBRDMT SUBQ TIS 1ST 20SQCM/<: CPT | Mod: 78 | Performed by: PODIATRIST

## 2019-02-11 PROCEDURE — 99024 POSTOP FOLLOW-UP VISIT: CPT | Performed by: PODIATRIST

## 2019-02-11 ASSESSMENT — MIFFLIN-ST. JEOR: SCORE: 1627.26

## 2019-02-11 NOTE — PROGRESS NOTES
"Foot & Ankle Surgery  February 11, 2019    S:  Patient in today approx 6 weeks sp R 5th toe amputation.  Pain levels minimal.  Heel WB in surgical shoe, doing microklenz/iodosorb dressing to the wound \"every night\"    /64   Pulse 90   Ht 1.676 m (5' 6\")   Wt 88.5 kg (195 lb)   BMI 31.47 kg/m        ROS - positive for CC.  Patient denies current nausea, vomiting, chills, fevers, belly pain, calf pain, chest pain or SOB.  Complete remainder of ROS is otherwise neg.    PE - wound is full-thickness, but down to 5 x 3mm.  Granular base after debridement, no SOI.  Skin shows no trophic, color or temperature changes otherwise.  No calf redness, swelling or pain noted otherwise.    A/P - 63 year old yo patient approx 6 weeks sp above procedure  -Excisional debridement was performed, full-thickness, sharply debriding the wound down to and including exposed sub-cutaneous tissue/fat, excising nonviable tissue to the above dimensions with a tissue nipper  -microklenz, iodosorb bandage applied today  -heel WB in surgical shoe  -continue activity restrictions/limitations     Follow up  -  2 weeks or sooner with acute issues      Body mass index is 31.47 kg/m .  Weight management plan: Patient was referred to their PCP to discuss a diet and exercise plan.      Clark Lora, MACY FACFAS FACFAOM  Podiatric Foot & Ankle Surgeon  [facility]  [phone]      "

## 2019-02-25 ENCOUNTER — OFFICE VISIT (OUTPATIENT)
Dept: PODIATRY | Facility: CLINIC | Age: 64
End: 2019-02-25
Payer: COMMERCIAL

## 2019-02-25 VITALS
BODY MASS INDEX: 31.34 KG/M2 | SYSTOLIC BLOOD PRESSURE: 126 MMHG | DIASTOLIC BLOOD PRESSURE: 70 MMHG | WEIGHT: 195 LBS | HEIGHT: 66 IN

## 2019-02-25 DIAGNOSIS — L97.411 DIABETIC ULCER OF RIGHT MIDFOOT ASSOCIATED WITH TYPE 2 DIABETES MELLITUS, LIMITED TO BREAKDOWN OF SKIN (H): Primary | ICD-10-CM

## 2019-02-25 DIAGNOSIS — E11.621 DIABETIC ULCER OF RIGHT MIDFOOT ASSOCIATED WITH TYPE 2 DIABETES MELLITUS, LIMITED TO BREAKDOWN OF SKIN (H): Primary | ICD-10-CM

## 2019-02-25 LAB
CRP SERPL-MCNC: 4.7 MG/L (ref 0–8)
ERYTHROCYTE [SEDIMENTATION RATE] IN BLOOD BY WESTERGREN METHOD: 40 MM/H (ref 0–20)

## 2019-02-25 PROCEDURE — 97597 DBRDMT OPN WND 1ST 20 CM/<: CPT | Performed by: PODIATRIST

## 2019-02-25 PROCEDURE — 36415 COLL VENOUS BLD VENIPUNCTURE: CPT | Performed by: PODIATRIST

## 2019-02-25 PROCEDURE — 99213 OFFICE O/P EST LOW 20 MIN: CPT | Mod: 25 | Performed by: PODIATRIST

## 2019-02-25 PROCEDURE — 85652 RBC SED RATE AUTOMATED: CPT | Performed by: PODIATRIST

## 2019-02-25 PROCEDURE — 86140 C-REACTIVE PROTEIN: CPT | Performed by: PODIATRIST

## 2019-02-25 ASSESSMENT — MIFFLIN-ST. JEOR: SCORE: 1627.26

## 2019-02-25 NOTE — PROGRESS NOTES
"Foot & Ankle Surgery  February 25, 2019    S:  Patient in today approx 8 weeks sp R 5th toe amp.  Pain levels minimal.  He developed a new blister on the top of the right foot, along the distal strap of the surgical shoe.  He's concerned about \"bone infection\", asking for a test to evaluate.  Daily iodosorb dressing.      /70   Ht 1.676 m (5' 6\")   Wt 88.5 kg (195 lb)   BMI 31.47 kg/m        ROS - positive for CC.  Patient denies current nausea, vomiting, chills, fevers, belly pain, calf pain, chest pain or SOB.  Complete remainder of ROS is otherwise neg.    PE - amp site wound is 2 x 1mm, partial thickness.  Dorsolateral foerfoot shoes a blister, deroofed with partial thickness wound and granular base.  No SOI at either wound.  Skin shows no trophic, color or temperature changes otherwise.  No calf redness, swelling or pain noted otherwise.    A/P - 63 year old yo patient approx 8 weeks sp above procedure with new partial thickness wound  -Excisional debridement was performed, partial-thickness(limited to skin breakdown, no exposed subcutaneous fat), sharply debriding the wound, excising nonviable tissue to the above dimensions with a tissue nipper  -continue daily wash/dry, iodosorb and bandaid to both wounds  -he has been doing a gauze/ACE dressing.  This is not necessary, he can simply do an island bandaid.  If the surgical shoe is applying too much pressure, I think his diabetic shoe is sufficient  -ESR and CRP ordered at patient request, but there are no clinical SOI today  -he was upset that someone in clinic has been spreading a rumor about him, concerned it may have been our MA.  I strongly advised him of being sure of whomever is spreading a rumor before accusing someone of such, as I have never heard this rumor being discussed with my MA     Follow up  -  2 weeks or sooner with acute issues      Body mass index is 31.47 kg/m .  Weight management plan: Patient was referred to their PCP to discuss " a diet and exercise plan.      Clark Lora DPM FACFAS FACFAOM  Podiatric Foot & Ankle Surgeon  [facility]  [phone]

## 2019-05-28 ENCOUNTER — OFFICE VISIT (OUTPATIENT)
Dept: URGENT CARE | Facility: URGENT CARE | Age: 64
End: 2019-05-28
Payer: COMMERCIAL

## 2019-05-28 VITALS
BODY MASS INDEX: 31.47 KG/M2 | SYSTOLIC BLOOD PRESSURE: 140 MMHG | DIASTOLIC BLOOD PRESSURE: 78 MMHG | OXYGEN SATURATION: 97 % | WEIGHT: 195 LBS | RESPIRATION RATE: 18 BRPM | TEMPERATURE: 98.9 F | HEART RATE: 96 BPM

## 2019-05-28 DIAGNOSIS — J02.9 SORE THROAT: ICD-10-CM

## 2019-05-28 DIAGNOSIS — J02.9 SORE THROAT: Primary | ICD-10-CM

## 2019-05-28 LAB
DEPRECATED S PYO AG THROAT QL EIA: NORMAL
SPECIMEN SOURCE: NORMAL

## 2019-05-28 PROCEDURE — 87880 STREP A ASSAY W/OPTIC: CPT | Performed by: PHYSICIAN ASSISTANT

## 2019-05-28 PROCEDURE — 87081 CULTURE SCREEN ONLY: CPT | Performed by: FAMILY MEDICINE

## 2019-05-28 PROCEDURE — 99213 OFFICE O/P EST LOW 20 MIN: CPT | Performed by: FAMILY MEDICINE

## 2019-05-28 RX ORDER — CODEINE PHOSPHATE AND GUAIFENESIN 10; 100 MG/5ML; MG/5ML
1-2 SOLUTION ORAL EVERY 4 HOURS PRN
Qty: 120 ML | Refills: 0 | Status: SHIPPED | OUTPATIENT
Start: 2019-05-28 | End: 2019-08-12

## 2019-05-28 RX ORDER — DIPHENHYDRAMINE HYDROCHLORIDE AND LIDOCAINE HYDROCHLORIDE AND ALUMINUM HYDROXIDE AND MAGNESIUM HYDRO
5-10 KIT EVERY 6 HOURS PRN
Qty: 120 ML | Refills: 0 | Status: SHIPPED | OUTPATIENT
Start: 2019-05-28 | End: 2019-05-30

## 2019-05-28 RX ORDER — CEFDINIR 300 MG/1
300 CAPSULE ORAL 2 TIMES DAILY
Qty: 20 CAPSULE | Refills: 0 | Status: SHIPPED | OUTPATIENT
Start: 2019-05-28 | End: 2019-08-12

## 2019-05-29 LAB
BACTERIA SPEC CULT: NORMAL
SPECIMEN SOURCE: NORMAL

## 2019-05-29 NOTE — TELEPHONE ENCOUNTER
Pharmacy note:    What ingredients do you want in the magic mouthwash? We don't know what your standard recipe is. Please send ingredients and quantities-Thanks

## 2019-05-29 NOTE — TELEPHONE ENCOUNTER
Requested Prescriptions   Pending Prescriptions Disp Refills     magic mouthwash (FIRST-MOUTHWASH BLM) compounding kit  Last Written Prescription Date:  05/28/2019  Last Fill Quantity: 120 mL,  # refills: 0    Last Office Visit: 05/28/2019 Don Arambula MD       Future Office Visit:      120 mL 0     Sig: Swish and swallow 5-10 mLs in mouth every 6 hours as needed for mouth sores       There is no refill protocol information for this order          Routing refill request to provider for review/approval because:  visit

## 2019-05-30 RX ORDER — DIPHENHYDRAMINE HYDROCHLORIDE AND LIDOCAINE HYDROCHLORIDE AND ALUMINUM HYDROXIDE AND MAGNESIUM HYDRO
5-10 KIT EVERY 6 HOURS PRN
Qty: 120 ML | Refills: 0 | Status: SHIPPED | OUTPATIENT
Start: 2019-05-30 | End: 2019-08-12

## 2019-06-15 NOTE — PROGRESS NOTES
SUBJECTIVE:   Rachell Paige is a 63 year old male who complains of sore throat cough congestion for 10 days. He denies a history of no other unusual symptoms. He denies a history of asthma. Patient does not smoke cigarettes.     OBJECTIVE:  Vitals as noted by Nurse/MA above.  Appearance: in no apparent distress.   ENT- post nasal drip noted and nasal mucosa congested.   Chest - no tachypnea, retractions or cyanosis, S1, S2 normal, no murmur, no gallop, rate regular and coarse breath sounds bilaterally.    ASSESSMENT:   Bronchiolitis and bronchitis    PLAN:  Symptomatic therapy suggested: push fluids and rest Call or return to clinic prn if these symptoms worsen or fail to improve as anticipated.

## 2019-08-12 ENCOUNTER — OFFICE VISIT (OUTPATIENT)
Dept: URGENT CARE | Facility: URGENT CARE | Age: 64
End: 2019-08-12
Payer: COMMERCIAL

## 2019-08-12 DIAGNOSIS — J06.9 VIRAL URI WITH COUGH: Primary | ICD-10-CM

## 2019-08-12 PROCEDURE — 99214 OFFICE O/P EST MOD 30 MIN: CPT | Performed by: PHYSICIAN ASSISTANT

## 2019-08-12 RX ORDER — BENZONATATE 100 MG/1
200 CAPSULE ORAL 3 TIMES DAILY PRN
Qty: 40 CAPSULE | Refills: 0 | Status: ON HOLD | OUTPATIENT
Start: 2019-08-12 | End: 2020-10-11

## 2019-08-12 NOTE — PATIENT INSTRUCTIONS

## 2019-08-13 VITALS
SYSTOLIC BLOOD PRESSURE: 124 MMHG | WEIGHT: 195 LBS | RESPIRATION RATE: 16 BRPM | BODY MASS INDEX: 31.47 KG/M2 | HEART RATE: 88 BPM | TEMPERATURE: 98.4 F | DIASTOLIC BLOOD PRESSURE: 64 MMHG | OXYGEN SATURATION: 98 %

## 2019-08-13 NOTE — PROGRESS NOTES
SUBJECTIVE:   Rachell Paige is a 63 year old male presenting with a chief complaint of runny nose and cough for the past 3-4 days. Thursday of last week.   Course of illness is same.    Severity moderately severe  Current and Associated symptoms: runny nose, post nasal drainage and dry cough. When he coughs he does note some discomfort in his chest. Patient notes the cough seems to be triggered by post nasal drainage. He has taken OTC medication for his symptoms.   Predisposing factors include: He was recently in the hospital with his son and thinks that he got it from there.  Denies fever/chills, HA, CP, pleuritic chest pain, hemoptysis, SOB, congestion, abd pain, N/V/D, rash, or any other symptoms. Patient denies history of DVT/PE, recent travel/surgery, tobacco use, leg pain or swelling, or history of cancer.    Past Medical History:   Diagnosis Date     Anxiety 2/23/2015     Dermatitis 4/18/2015     DM type 2, goal A1C 7-8 2/23/2015     Does not have health insurance 4/18/2015     Financial problems 2/23/2015     HTN, goal below 140/90 2/23/2015     Hyperlipidemia LDL goal <100 2/23/2015     Microalbuminuria 4/18/2015     Onychomycosis 4/18/2015     Rash 2/23/2015     Current Outpatient Medications   Medication Sig Dispense Refill     aspirin 81 MG tablet Take 1 tablet (81 mg) by mouth daily 30 tablet OTC     benzonatate (TESSALON) 100 MG capsule Take 2 capsules (200 mg) by mouth 3 times daily as needed for cough 40 capsule 0     Cadexomer Iodine, topical, 0.9% (IODOSORB) 0.9 % GEL gel Apply to wound right foot daily 10 g 0     gabapentin (NEURONTIN) 300 MG capsule Take 300 mg by mouth At Bedtime       insulin aspart (NOVOLOG FLEXPEN) 100 UNIT/ML injection Inject 15-20 Units Subcutaneous 2 times daily (with meals) With lunch and dinner       Insulin Glargine (LANTUS SOLOSTAR SC) Inject 60 Units Subcutaneous At Bedtime       lisinopril (PRINIVIL/ZESTRIL) 10 MG tablet Take 10 mg by mouth daily       order for  DME Equipment being ordered: Walker Wheels () and Walker ()  Treatment Diagnosis: decreased stability with gait 1 each 0     simvastatin (ZOCOR) 10 MG tablet Take 10 mg by mouth At Bedtime       Social History     Tobacco Use     Smoking status: Former Smoker     Smokeless tobacco: Never Used   Substance Use Topics     Alcohol use: No       ROS:  Review of systems negative except as stated above.    OBJECTIVE:  /64 (Cuff Size: Adult Large)   Pulse 101   Temp 98.4  F (36.9  C) (Oral)   Resp 16   Wt 88.5 kg (195 lb)   SpO2 98%   BMI 31.47 kg/m    GENERAL APPEARANCE: healthy, alert and no distress  EYES: EOMI,  PERRL, conjunctiva clear  HENT: ear canals and TM's normal.  Nose and mouth without ulcers, erythema or lesions  NECK: supple, nontender, no lymphadenopathy  RESP: lungs clear to auscultation - no rales, rhonchi or wheezes  Chest Wall: TTP over anterior aspect of chest wall B/L  CV: regular rates and rhythm, normal S1 S2, no murmur noted. Pulses strong.   ABDOMEN:  soft, nontender  EXT: Homans sign neg B/L  NEURO: Normal strength and tone, sensory exam grossly normal,  normal speech and mentation  SKIN: no suspicious lesions or rashes    ASSESSMENT / PLAN:  1. Viral URI with cough  Patient has had cold symptoms for the past 4 days. He reports a dry cough that is worsened with post nasal drainage. His lungs are CTAB, initially he was a bit tachycardic, but on recheck was WNL. I encouraged him to do fluids and rest. Discussed that having DM, puts him at a higher risk of infection, if he is to develop fever, chills, chest pain, pleurisy, hemoptysis or weakness I want him to be seen right away in UC / ER. Patient agrees with treatment plan.   - benzonatate (TESSALON) 100 MG capsule; Take 2 capsules (200 mg) by mouth 3 times daily as needed for cough  Dispense: 40 capsule; Refill: 0    Diagnosis and treatment plan was reviewed with patient and/or family.   We went over any labs or imaging.  Discussed worsening symptoms or little to no relief despite treatment plan to follow-up with PCP, UC or ED.  Patient verbalizes understanding. All questions were addressed and answered.   Randa Desir PA-C

## 2019-08-15 ENCOUNTER — APPOINTMENT (OUTPATIENT)
Dept: GENERAL RADIOLOGY | Facility: CLINIC | Age: 64
End: 2019-08-15
Attending: EMERGENCY MEDICINE
Payer: COMMERCIAL

## 2019-08-15 ENCOUNTER — HOSPITAL ENCOUNTER (EMERGENCY)
Facility: CLINIC | Age: 64
Discharge: HOME | End: 2019-08-15
Attending: EMERGENCY MEDICINE | Admitting: EMERGENCY MEDICINE
Payer: COMMERCIAL

## 2019-08-15 ENCOUNTER — APPOINTMENT (OUTPATIENT)
Dept: CT IMAGING | Facility: CLINIC | Age: 64
End: 2019-08-15
Attending: EMERGENCY MEDICINE
Payer: COMMERCIAL

## 2019-08-15 VITALS
DIASTOLIC BLOOD PRESSURE: 74 MMHG | RESPIRATION RATE: 16 BRPM | OXYGEN SATURATION: 97 % | TEMPERATURE: 98.1 F | SYSTOLIC BLOOD PRESSURE: 149 MMHG | HEART RATE: 87 BPM

## 2019-08-15 DIAGNOSIS — J20.9 ACUTE BRONCHITIS, UNSPECIFIED ORGANISM: ICD-10-CM

## 2019-08-15 DIAGNOSIS — R91.8 PULMONARY NODULES: ICD-10-CM

## 2019-08-15 LAB
ANION GAP SERPL CALCULATED.3IONS-SCNC: 4 MMOL/L (ref 3–14)
BASOPHILS # BLD AUTO: 0 10E9/L (ref 0–0.2)
BASOPHILS NFR BLD AUTO: 0.4 %
BUN SERPL-MCNC: 11 MG/DL (ref 7–30)
CALCIUM SERPL-MCNC: 8.8 MG/DL (ref 8.5–10.1)
CHLORIDE SERPL-SCNC: 106 MMOL/L (ref 94–109)
CO2 SERPL-SCNC: 27 MMOL/L (ref 20–32)
CREAT SERPL-MCNC: 1.06 MG/DL (ref 0.66–1.25)
DIFFERENTIAL METHOD BLD: ABNORMAL
EOSINOPHIL # BLD AUTO: 0.3 10E9/L (ref 0–0.7)
EOSINOPHIL NFR BLD AUTO: 4.2 %
ERYTHROCYTE [DISTWIDTH] IN BLOOD BY AUTOMATED COUNT: 13.7 % (ref 10–15)
GFR SERPL CREATININE-BSD FRML MDRD: 74 ML/MIN/{1.73_M2}
GLUCOSE SERPL-MCNC: 176 MG/DL (ref 70–99)
HCT VFR BLD AUTO: 40.4 % (ref 40–53)
HGB BLD-MCNC: 13.1 G/DL (ref 13.3–17.7)
IMM GRANULOCYTES # BLD: 0.1 10E9/L (ref 0–0.4)
IMM GRANULOCYTES NFR BLD: 0.6 %
LYMPHOCYTES # BLD AUTO: 2.3 10E9/L (ref 0.8–5.3)
LYMPHOCYTES NFR BLD AUTO: 29.9 %
MCH RBC QN AUTO: 28.2 PG (ref 26.5–33)
MCHC RBC AUTO-ENTMCNC: 32.4 G/DL (ref 31.5–36.5)
MCV RBC AUTO: 87 FL (ref 78–100)
MONOCYTES # BLD AUTO: 0.7 10E9/L (ref 0–1.3)
MONOCYTES NFR BLD AUTO: 8.3 %
NEUTROPHILS # BLD AUTO: 4.4 10E9/L (ref 1.6–8.3)
NEUTROPHILS NFR BLD AUTO: 56.6 %
NRBC # BLD AUTO: 0 10*3/UL
NRBC BLD AUTO-RTO: 0 /100
NT-PROBNP SERPL-MCNC: 36 PG/ML (ref 0–900)
PLATELET # BLD AUTO: 327 10E9/L (ref 150–450)
POTASSIUM SERPL-SCNC: 3.9 MMOL/L (ref 3.4–5.3)
RBC # BLD AUTO: 4.64 10E12/L (ref 4.4–5.9)
SODIUM SERPL-SCNC: 137 MMOL/L (ref 133–144)
TROPONIN I SERPL-MCNC: 0.01 UG/L (ref 0–0.04)
WBC # BLD AUTO: 7.8 10E9/L (ref 4–11)

## 2019-08-15 PROCEDURE — 93005 ELECTROCARDIOGRAM TRACING: CPT

## 2019-08-15 PROCEDURE — 71046 X-RAY EXAM CHEST 2 VIEWS: CPT

## 2019-08-15 PROCEDURE — 71260 CT THORAX DX C+: CPT

## 2019-08-15 PROCEDURE — 83880 ASSAY OF NATRIURETIC PEPTIDE: CPT | Performed by: EMERGENCY MEDICINE

## 2019-08-15 PROCEDURE — 25000125 ZZHC RX 250: Performed by: EMERGENCY MEDICINE

## 2019-08-15 PROCEDURE — 80048 BASIC METABOLIC PNL TOTAL CA: CPT | Performed by: EMERGENCY MEDICINE

## 2019-08-15 PROCEDURE — 85025 COMPLETE CBC W/AUTO DIFF WBC: CPT | Performed by: EMERGENCY MEDICINE

## 2019-08-15 PROCEDURE — 99285 EMERGENCY DEPT VISIT HI MDM: CPT | Mod: 25

## 2019-08-15 PROCEDURE — 25000128 H RX IP 250 OP 636: Performed by: EMERGENCY MEDICINE

## 2019-08-15 PROCEDURE — 84484 ASSAY OF TROPONIN QUANT: CPT | Performed by: EMERGENCY MEDICINE

## 2019-08-15 RX ORDER — ALBUTEROL SULFATE 90 UG/1
2 AEROSOL, METERED RESPIRATORY (INHALATION) EVERY 4 HOURS PRN
Qty: 1 INHALER | Refills: 0 | Status: ON HOLD | OUTPATIENT
Start: 2019-08-15 | End: 2020-10-11

## 2019-08-15 RX ORDER — IOPAMIDOL 755 MG/ML
500 INJECTION, SOLUTION INTRAVASCULAR ONCE
Status: COMPLETED | OUTPATIENT
Start: 2019-08-15 | End: 2019-08-15

## 2019-08-15 RX ADMIN — SODIUM CHLORIDE 500 ML: 9 INJECTION, SOLUTION INTRAVENOUS at 18:53

## 2019-08-15 RX ADMIN — IOPAMIDOL 71 ML: 755 INJECTION, SOLUTION INTRAVENOUS at 20:11

## 2019-08-15 RX ADMIN — SODIUM CHLORIDE 85 ML: 9 INJECTION, SOLUTION INTRAVENOUS at 20:11

## 2019-08-15 ASSESSMENT — ENCOUNTER SYMPTOMS
APPETITE CHANGE: 1
WEAKNESS: 1
COUGH: 1
FEVER: 0

## 2019-08-15 NOTE — ED TRIAGE NOTES
Patient arrives from home with complaints of coughing for two weeks. Reports cold symptoms began two weeks ago and he is now experiencing cough and headache. ABCs in tact.

## 2019-08-15 NOTE — ED PROVIDER NOTES
History     Chief Complaint:  Cough    HPI   Rachell Paige is a 63 year old male with a history of type 2 diabetes and HTN who presents for the evaluation of a cough. The patient states that he has been having a productive cough for the past 2 weeks. Today, he began feeling weak. He states that he has trouble breathing when he coughs a lot. He also endorses some decreased appetite. He denies a fever, asthma, COPD, and heart failure.     Allergies:  Aleve  Clopidogrel  Sulfamethoxazole-Trimethoprim       Medications:    Aspirin 81 mg  Neurontin  Atarax  Novolog flexpen  Lantus  Lisinopril  Zocor     Past Medical History:    Atypical chest pain  Microalbuminuria  Onychomycosis  Dermatitis  Type II diabetes  Hypertension  Anxiety  Osteomyelitis  Hyperlipidemia  No asthma  No COPD  No heart failure     Past Surgical History:    Right fifth toe amputation      Family History:    Diabetes     Social History:  Smoking Status: Former smoker  Smokeless Tobacco: Never Used  Alcohol Use: Negative  Drug Use: Negative  PCP: No Ref-Primary, Physician  Marital Status:       Review of Systems   Constitutional: Positive for appetite change. Negative for fever.   Respiratory: Positive for cough.         Trouble breathing when he coughs a lot   Neurological: Positive for weakness.   All other systems reviewed and are negative.    Physical Exam     Patient Vitals for the past 24 hrs:   BP Temp Temp src Pulse Heart Rate Resp SpO2   08/15/19 2055 (!) 149/74 -- -- 87 -- -- --   08/15/19 2053 -- -- -- -- 89 -- --   08/15/19 2004 -- -- -- -- -- -- 97 %   08/15/19 2000 -- -- -- -- -- -- 99 %   08/15/19 1835 (!) 164/85 98.1  F (36.7  C) Oral -- 99 16 99 %      Physical Exam  General: Patient is alert and interactive when I enter the room  Head:  The scalp, face, and head appear normal  Eyes:  Conjunctivae are normal  ENT:    The nose is normal    Pinnae are normal    External acoustic canals are normal  Neck:  Trachea  midline  CV:  Pulses are normal, RRR   Resp:  No respiratory distress, CTAB, no wheezing   Abdomen:      Soft, non-tender, non-distended  Musc:  Normal muscular tone    No major joint effusions    No asymmetric leg swelling  Skin:  No rash or lesions noted  Neuro:  Speech is normal and fluent. Face is symmetric.     Moving all extremities well.   Psych: Awake. Alert.  Normal affect.  Appropriate interactions.    Emergency Department Course     ECG:  ECG taken at 1953, ECG read at 1957  Normal sinus rhythm  Normal ECG  Rate 92 bpm. WA interval 168 ms. QRS duration 90 ms. QT/QTc 342/422 ms. P-R-T axes 58 -27 69.     Imaging:  Radiology findings were communicated with the patient who voiced understanding of the findings.     CT Chest Pulmonary Embolism w Contrast  1.  No evidence for pulmonary embolism.  2.  Indeterminate probably benign right middle lobe pulmonary nodule measuring 6 mm. Follow up recommended in 12 months.  Reading per radiology    XR CHEST 2 VW  Cardiac enlargement with pulmonary venous hypertension. Lungs clear. No change.  Reading per radiology     Laboratory:  Laboratory findings were communicated with the patient who voiced understanding of the findings.    CBC: WBC 7.8, HGB 13.1 (L),   BMP: glucose 176 (H), o/w WNL (Creatinine 1.06)  BNP: 36  Troponin (Collected 1853): 0.015     Interventions:  1853  mL IV    Emergency Department Course:    1830 Nursing notes and vitals reviewed.    1840 I performed an exam of the patient as documented above.      1853 IV was inserted and blood was drawn for laboratory testing, results above.     1901 The patient was sent for a XR while in the emergency department, results above.      1953 EKG obtained as noted above.     2009 The patient was sent for a CT while in the emergency department, results above.      Impression & Plan      Medical Decision Making:  Rachell Paige is a 64 yo M presented for cough.  Evaluation today showed bronchitis. No  evidence of pneumonia.  No clinical concern for cardiac cause of symptoms or PE. His CXR showed possible pulmonary congestion so a CT chest and BNP done. BNP negative. CT chest showed no acute findings. No evidence of respiratory failure.  Based on time course of illness, I suspect viral process and no indication for antibiotics at this time.  Plan for albuterol and symptomatic management.  Return for progressive SOB or worsening fever curve.    Diagnosis:    ICD-10-CM    1. Acute bronchitis, unspecified organism J20.9    2. Pulmonary nodules R91.8      Disposition:   The patient is discharged to home.     Discharge Medications:     START taking      Dose / Directions   albuterol 108 (90 Base) MCG/ACT inhaler  Commonly known as:  PROAIR HFA      Dose:  2 puff  Inhale 2 puffs into the lungs every 4 hours as needed for shortness of breath / dyspnea  Quantity:  1 Inhaler  Refills:  0                 Where to get your medicines      Some of these will need a paper prescription and others can be bought over the counter. Ask your nurse if you have questions.    Bring a paper prescription for each of these medications    albuterol 108 (90 Base) MCG/ACT inhaler       Scribe Disclosure:  I, Ebenezer Cadena, am serving as a scribe at 6:35 PM on 8/15/2019 to document services personally performed by Alondra Dupont MD based on my observations and the provider's statements to me.  [facility] EMERGENCY DEPARTMENT       lAondra Dupont MD  08/20/19 4436

## 2019-08-15 NOTE — ED AVS SNAPSHOT
[facility] Emergency Department  [address]  [facility] [address]  Phone:  [phone]  Fax:  [phone]                                    Rachell Paige   MRN: [id number]    Department:  [facility] Emergency Department   Date of Visit:  8/15/2019           After Visit Summary Signature Page    I have received my discharge instructions, and my questions have been answered. I have discussed any challenges I see with this plan with the nurse or doctor.    ..........................................................................................................................................  Patient/Patient Representative Signature      ..........................................................................................................................................  Patient Representative Print Name and Relationship to Patient    ..................................................               ................................................  Date                                   Time    ..........................................................................................................................................  Reviewed by Signature/Title    ...................................................              ..............................................  Date                                               Time          22EPIC Rev 08/18

## 2019-08-16 LAB — INTERPRETATION ECG - MUSE: NORMAL

## 2019-09-16 ENCOUNTER — OFFICE VISIT (OUTPATIENT)
Dept: PODIATRY | Facility: CLINIC | Age: 64
End: 2019-09-16
Payer: COMMERCIAL

## 2019-09-16 VITALS — WEIGHT: 197 LBS | SYSTOLIC BLOOD PRESSURE: 124 MMHG | DIASTOLIC BLOOD PRESSURE: 54 MMHG | BODY MASS INDEX: 31.8 KG/M2

## 2019-09-16 DIAGNOSIS — E11.9 TYPE 2 DIABETES MELLITUS WITH HEMOGLOBIN A1C GOAL OF 7.0%-8.0% (H): Primary | ICD-10-CM

## 2019-09-16 PROCEDURE — 99214 OFFICE O/P EST MOD 30 MIN: CPT | Performed by: PODIATRIST

## 2019-09-16 RX ORDER — METFORMIN HCL 500 MG
1000 TABLET, EXTENDED RELEASE 24 HR ORAL DAILY
Status: ON HOLD | COMMUNITY
Start: 2019-09-04 | End: 2020-10-11

## 2019-09-16 NOTE — LETTER
9/16/2019         RE: Rachell Paige  [address] [facility] [address]        Dear Colleague,    Thank you for referring your patient, Rachell Paige, to the [facility]. Please see a copy of my visit note below.    Foot & Ankle Surgery   September 16, 2019    S:  Pt is seen today for evaluation of bilateral foot pain.  Previous 5th toe amp right lower extremity, this has healed.  Previous left lower extremity TMA.  Bilateral foot pain, can be more pronounced at bedtime.  On gabapentin 300mg at bedtime.    Vitals:    09/16/19 1435   BP: 124/54   Weight: 89.4 kg (197 lb)   '      ROS - Pos for CC.  Patient denies current nausea, vomiting, chills, fevers, belly pain, calf pain, chest pain or SOB.  Complete remainder of ROS it otherwise neg.      PE:  Gen:   No apparent distress  Eye:    Visual scanning without deficit  Ear:    Response to auditory stimuli wnl  Lung:    Non-labored breathing on RA noted  Abd:    NTND per patient report  Lymph:    Neg for pitting/non-pitting edema BLE  Vasc:    Pulses palpable, CFT minimally delayed  Neuro:    Light touch sensation intact to all sensory nerve distributions with painful paresthesias  Derm:    Neg for nodules, lesions or ulcerations  MSK:    R 5th toe and partial L foot amputations.  No MSK pain noted today  Calf:    Neg for redness, swelling or tenderness    Assessment:  63 year old male with diabetic neuropathy pain sp partial foot amputation bilateral       Plan:  Discussed etiologies, anatomy and options  1. Painful Diabetic neuropathy sp partial foot amputation bilateral   -orthotic lab referral for new extra-depth diabetic shoes/orthotics  -OTC lidocaine handout; utilize at night when symptoms more pronounced  -advised he discussed with prescribing provider, to increase gabapentin dose  -consider Pain Management referral    Follow up:  prn or sooner with acute issues      Body mass index is 31.8 kg/m .  Weight management plan: Patient  was referred to their PCP to discuss a diet and exercise plan.         Clark Lora DPM [facility] FACFAOM  Podiatric Foot & Ankle Surgeon  [facility]  [phone]      Again, thank you for allowing me to participate in the care of your patient.        Sincerely,        Clark Lora DPM, DPM

## 2019-09-16 NOTE — PROGRESS NOTES
Foot & Ankle Surgery   September 16, 2019    S:  Pt is seen today for evaluation of bilateral foot pain.  Previous 5th toe amp right lower extremity, this has healed.  Previous left lower extremity TMA.  Bilateral foot pain, can be more pronounced at bedtime.  On gabapentin 300mg at bedtime.    Vitals:    09/16/19 1435   BP: 124/54   Weight: 89.4 kg (197 lb)   '      ROS - Pos for CC.  Patient denies current nausea, vomiting, chills, fevers, belly pain, calf pain, chest pain or SOB.  Complete remainder of ROS it otherwise neg.      PE:  Gen:   No apparent distress  Eye:    Visual scanning without deficit  Ear:    Response to auditory stimuli wnl  Lung:    Non-labored breathing on RA noted  Abd:    NTND per patient report  Lymph:    Neg for pitting/non-pitting edema BLE  Vasc:    Pulses palpable, CFT minimally delayed  Neuro:    Light touch sensation intact to all sensory nerve distributions with painful paresthesias  Derm:    Neg for nodules, lesions or ulcerations  MSK:    R 5th toe and partial L foot amputations.  No MSK pain noted today  Calf:    Neg for redness, swelling or tenderness    Assessment:  63 year old male with diabetic neuropathy pain sp partial foot amputation bilateral       Plan:  Discussed etiologies, anatomy and options  1. Painful Diabetic neuropathy sp partial foot amputation bilateral   -orthotic lab referral for new extra-depth diabetic shoes/orthotics  -OTC lidocaine handout; utilize at night when symptoms more pronounced  -advised he discussed with prescribing provider, to increase gabapentin dose  -consider Pain Management referral    Follow up:  prn or sooner with acute issues      Body mass index is 31.8 kg/m .  Weight management plan: Patient was referred to their PCP to discuss a diet and exercise plan.         Clark Lora DPM FACFAS FACFAOM  Podiatric Foot & Ankle Surgeon  [facility]  [phone]

## 2019-09-16 NOTE — PATIENT INSTRUCTIONS
Thank you for choosing [facility] / [facility]!    DR. [facility]S CLIN[facility] LOCATIONS:   MONDAY - [place] TUESDAY - [place]   [address] [facility]  [address]   [place] [address] [place] [address]   [phone]       THURSDAY AM - [place] THURSDAY PM - [place]   [address] [facility] [address]   [place] [address] [place] [address] [phone]       FRIDAY AM - [place] SET UP SURGERY: [phone] [address] APPOINTMENTS: [phone]   [place] [address] BILLING QUESTIONS: [phone] FAX NUMBER: [phone]     Follow Up: as needed    Products available online if you do not have insurance coverage:    1.  Iodosorb topical wound care gel     2.  Woun'Dres topical wound care gel                      3.  Topical lidocaine gel        [facility] LOCATIONS  [facility]  [address]  [place] [address]  Phone: [phone]  Fax: [phone] [facility]  [address]  [place] [address]  Phone: [phone]   Fax: [phone]   [facility]  [address]  [place] [address]  Phone: [phone]  Fax: [phone] [facility]  [address]  [place] [address]  Phone: [phone]   Fax: [phone]   [facility]   [address]  [place] [address]  Phone: [phone]  Fax: [phone] * Please call any location listed to make an appointment for a casting/fitting. Your referral was sent to their central office and they will all have the order on file.     WEARING YOUR CUSTOM FOOT ORTHOTICS   Most insurance plans cover one pair of orthotics per year. You must check with your   insurance plan to see what your payment responsibility will be. Please call your   insurance company by calling the number on the back of your  insurance card.   Orthotic's are non-refundable and non-returnable.   Orthotics are made of various designs. Some orthotics are covered with material that extends beyond your toes. If your orthotic is of this design, you will likely need to trim the toe end to get a proper fit. The insole from your shoe can be used as a template. Simply overlay the shoe insert on top of the custom orthotic. Align the heel end while tracing the length of the insert onto the custom orthotic. Use a large scissor to trim the toe end until you get a proper fit in the shoe.   The orthotic needs to be pushed as far back in the shoe as possible. The heel portion should not ride forward so as not to irritate your heel.   Orthotics are designed to work with socks. Excessive perspiration will shorten the life span of the orthotics. Remove the orthotic from the shoe frequently for proper drying.   The break-in period lasts for weeks. People new to orthotics will likely experience new aches and pains. The orthotic is forcing your foot into a new position. Arch, foot and leg muscle aches and fatigue are common during these weeks. Minor discomfort can be considered normal break in phenomenon. Start wearing your orthotic around your home your first day. Limited activity for one to two hours is recommended. You can increase one or two additional hours each day provided the aches and pains are subsiding. The degree of discomfort, fatigue and problems will dictate the speed of break in. You may require multiple weeks to work up to full time use.   Do not continue wearing your orthotics if they are creating problems such as blisters or sores. Do not hesitate to call the clinic to speak with a nurse regarding orthotic   break in, fit, trimming, etc. You may also need to see the doctor if the orthotics are   simply not working out. Adjustments are sometimes made to improve orthotic   function.     Orthotics will only work in certain styles and types of  shoes. Orthotics rarely work in dress shoes. Slip-ons, clogs, sandals and heels are particularly troublesome. Specially designed orthotics may be necessary for these types of shoes. Your custom orthotic was designed for activities that require appropriate walking or running shoes. Lace up athletic shoes, walking shoes or work boots should work appropriately. You may need a wider or longer shoe. Shoes with a removable  or insert work best. In general, you want to remove an insert from the shoe before placing the orthotic into the shoe. Shoes without a removable liner may not work as well.     When purchasing new shoes, bring your orthotics along to get a proper fit. Shop at stores that are familiar with orthotics.   Frequent washing of the orthotic may shorten the life span of the top cover. The top cover can be replaced but will generally last one to five years depending on use and foot perspiration.     OVER THE COUNTER INSERTS    Most of these can be found at your local [employer], [employer], or online:    SuperFeet   Sofsole Fit Spenco   Power Step   Walk-Fit  ([employer]) Arch Cradles       **A good high quality over the counter insert should cost around $40-$50            [employer] [facility]  [address] [place] [address]  [phone]   [address]  [phone] [place]  [address] [place] [address]  [phone]   [place]  [address] [place] [address] [place]  [phone]   [place]  [address]  [phone] [place]  [address] [place] [address]  [phone]   [place]  [address]  [phone]   [place]  [address] [place] [address]  [phone] [place]  [address]  [phone]           FYI: The following information is included in the after visit summary for all patients:  Body weight can be a sensitive issue to discuss in clinic, but we think the following information is very important. Although we focus on the feet and  ankles, we do support the overall health of our patients. Many things can cause foot and ankle problems. Foot structure, activity level, foot mechanics and injuries are common causes of pain. One very important issue that often goes unmentioned, is body weight. Extra weight can cause increased stress on muscles, ligaments, bones and tendons. Sometimes just a few extra pounds is all it takes to put one over her/his threshold. Without reducing that stress, it can be difficult to alleviate pain. As Foot & Ankle specialists, our job is addressing the lower extremity problem and possible causes. Regarding extra body weight, we encourage patients to discuss diet and weight management plans with their primary care doctors. It is this team approach that gives you the best opportunity for pain relief and getting you back on your feet. [place] [place] has a Comprehensive Weight Management Program. This program includes counseling, education, non-surgical and surgical approaches to weight loss. If you are interested in learning more either talk to you primary care provider or call [phone].

## 2019-11-26 ENCOUNTER — NURSE TRIAGE (OUTPATIENT)
Dept: NURSING | Facility: CLINIC | Age: 64
End: 2019-11-26

## 2019-11-26 ENCOUNTER — HOSPITAL ENCOUNTER (EMERGENCY)
Facility: CLINIC | Age: 64
Discharge: HOME | End: 2019-11-26
Admitting: PHYSICIAN ASSISTANT
Payer: COMMERCIAL

## 2019-11-26 ENCOUNTER — OFFICE VISIT (OUTPATIENT)
Dept: PODIATRY | Facility: CLINIC | Age: 64
End: 2019-11-26
Payer: COMMERCIAL

## 2019-11-26 VITALS
DIASTOLIC BLOOD PRESSURE: 80 MMHG | OXYGEN SATURATION: 98 % | BODY MASS INDEX: 29.38 KG/M2 | SYSTOLIC BLOOD PRESSURE: 157 MMHG | WEIGHT: 182 LBS | RESPIRATION RATE: 18 BRPM | TEMPERATURE: 98.1 F

## 2019-11-26 VITALS
SYSTOLIC BLOOD PRESSURE: 152 MMHG | HEIGHT: 66 IN | DIASTOLIC BLOOD PRESSURE: 86 MMHG | BODY MASS INDEX: 32.13 KG/M2 | WEIGHT: 199.9 LBS | HEART RATE: 84 BPM

## 2019-11-26 DIAGNOSIS — E11.9 TYPE 2 DIABETES MELLITUS WITH HEMOGLOBIN A1C GOAL OF 7.0%-8.0% (H): ICD-10-CM

## 2019-11-26 DIAGNOSIS — S91.119A TOE LACERATION: ICD-10-CM

## 2019-11-26 DIAGNOSIS — S91.214A LACERATION OF LESSER TOE OF RIGHT FOOT WITHOUT FOREIGN BODY WITH DAMAGE TO NAIL, INITIAL ENCOUNTER: Primary | ICD-10-CM

## 2019-11-26 DIAGNOSIS — S91.114A LACERATION OF LESSER TOE OF RIGHT FOOT WITHOUT FOREIGN BODY PRESENT OR DAMAGE TO NAIL, INITIAL ENCOUNTER: ICD-10-CM

## 2019-11-26 PROCEDURE — 99283 EMERGENCY DEPT VISIT LOW MDM: CPT

## 2019-11-26 PROCEDURE — 99214 OFFICE O/P EST MOD 30 MIN: CPT | Performed by: PODIATRIST

## 2019-11-26 PROCEDURE — 12001 RPR S/N/AX/GEN/TRNK 2.5CM/<: CPT

## 2019-11-26 RX ORDER — CEPHALEXIN 500 MG/1
500 CAPSULE ORAL 4 TIMES DAILY
Qty: 20 CAPSULE | Refills: 0 | Status: SHIPPED | OUTPATIENT
Start: 2019-11-26 | End: 2019-12-01

## 2019-11-26 RX ORDER — BACITRACIN ZINC 500 [USP'U]/G
OINTMENT TOPICAL 2 TIMES DAILY
Qty: 14 G | Refills: 0 | Status: ON HOLD | OUTPATIENT
Start: 2019-11-26 | End: 2020-10-11

## 2019-11-26 RX ORDER — LIDOCAINE HYDROCHLORIDE AND EPINEPHRINE 10; 10 MG/ML; UG/ML
INJECTION, SOLUTION INFILTRATION; PERINEURAL
Status: DISCONTINUED
Start: 2019-11-26 | End: 2019-11-26 | Stop reason: HOSPADM

## 2019-11-26 ASSESSMENT — ENCOUNTER SYMPTOMS: WOUND: 1

## 2019-11-26 ASSESSMENT — MIFFLIN-ST. JEOR: SCORE: 1644.49

## 2019-11-26 NOTE — LETTER
"    11/26/2019         RE: Rachell Paige  [address] [facility] [address]        Dear Colleague,    Thank you for referring your patient, Rachell Paige, to the [facility]. Please see a copy of my visit note below.    PATIENT HISTORY:  Rachell Paige is a 64 year old male who presents to clinic for R 4th toe concern.  Pt reports cutting his toe while trimming his nails last night.  He went to the ED today where the area was cleaned and sutured.  Dermabond was placed over the nail abrasion area.  Presents with bandage on the toe.  Pt wonders if he needs the \"cream\" for the wound he had for a prior wound.  3/10 pain reported.  Hx of DM, prior R 5th toe amputation, L TMA.  Hx of prior osteomyelitis.  Normally sees Dr. Lora.  No fevers, chills.  Nonsmoker.  Not working.  No related family hx.       EXAM:Vitals: BP (!) 152/86   Pulse 84   Ht 1.676 m (5' 6\")   Wt 90.7 kg (199 lb 14.4 oz)   BMI 32.26 kg/m     BMI= Body mass index is 32.26 kg/m .    General appearance: Patient is alert and fully cooperative with history & exam.  No sign of distress is noted during the visit.     Dermatologic: R tip of 4th toe with suture noted, wound with skin breakdown to distal nail/toe area, covered in dermabond. No paronychia or evidence of soft tissue infection is noted.     Vascular: DP & PT pulses are intact & regular.  No significant edema or varicosities noted.  CFT and skin temperature are normal to both lower extremities.     Neurologic: Lower extremity sensation is diminished to light touch b/l.     Musculoskeletal: s/p R 5th toe amputation, L TMA.  Patient is ambulatory without assistive device or brace.  No gross ankle deformity noted.  No foot or ankle joint effusion is noted.     ASSESSMENT:   R 4th toe laceration/injury  DM II with peripheral polyneuropathy     PLAN:  Reviewed patient's chart in epic.  Discussed condition and treatment options including pros and cons.    Toe appears " stable, no sign of infection.  Pt asking about prior wound cream, likely referring to iodosorb.  I don't see a need for this currently as area is sutured and covered in dermabond.  Advised following ED instructions for now, thin layer of bacitracin and bandage daily.  Keep dry.  Pt plans to f/u next week with Dr. Lora for recheck.  Offloading advised.  Discussed risk of infection, amputation.  Seek immediate care for worsening redness, drainage, swelling, pain, fever, chills, nausea, vomiting.    Ba Baker DPM, FACFAS    Weight management plan: Patient was referred to their PCP to discuss a diet and exercise plan.  Mohamed to follow up with Primary Care provider regarding elevated blood pressure.        Again, thank you for allowing me to participate in the care of your patient.        Sincerely,        Ba Baker DPM

## 2019-11-26 NOTE — ED TRIAGE NOTES
Pt arrives with laceration to R 4th toe. States has hx of diabetes, was doing foot care and cut toe nail too short and won't stop bleeding. ABCs intact.

## 2019-11-26 NOTE — DISCHARGE INSTRUCTIONS
Watch the area surrounding the wound for signs of infection which can include increased redness, drainage, fevers, or swelling. Inspect the area daily. No swimming or baths for the next 3-5 days, showering is ok. See primary clinic in 7-10 days for stitches/staple removal. Apply antibacterial ointment such as bacitracin to the wound daily.     Discharge Instructions  Laceration (Cut)    You were seen today for a laceration (cut).  Your doctor examined your laceration for any problems such a buried foreign body (like glass, a splinter, or gravel), or injury to blood vessels, tendons, and nerves.  Your doctor may have also rinsed and/or scrubbed your laceration to help prevent an infection.  Your laceration may have been closed with glue, staples or sutures (stitches).      It may not be possible to find all problems with your laceration on the first visit, and we can't always prevent infections.  Antibiotics are only given when the benefit is more than the risk, and don't prevent all infections. Some lacerations are too high risk to close, and are left open to heal.  All lacerations, no matter how expertly repaired, will cause scarring.    Return to the Emergency Department right away if:  You have more redness, swelling, pain, drainage (pus), a bad smell, or red streaking from your laceration.    You have a fever of 101oF or more.  You have bleeding that you can t stop at home. If your cut starts to bleed, hold pressure on the bleeding area with a clean cloth or put pressure over the bandage.  If the bleeding doesn t stop after using constant pressure for 30 minutes, you should return to the Emergency Department for further treatment.  An area past the laceration is cool, pale, or blue compared with the other side, or has a slower return of color when squeezed.  Your dressing seems too tight or starts to get uncomfortable or painful.  You have loss of normal function or use of an area, such as being unable to  "straighten or bend a finger normally.  You have a numb area past the laceration.    Return to the Emergency Department or see your regular doctor if:  The laceration starts to come open.   You have something coming out of the cut or a feeling that there is something in the laceration.  Your wound will not heal, or keeps breaking open. There can always be glass, wood, dirt or other things in any wound.  They won t always show up, even on x-rays.  If a wound doesn t heal, this may be why, and it is important to follow-up with your regular doctor.    Home Care:  Take your dressing off in 12 hours, or as instructed by your doctor, to check your laceration. Remove the dressing sooner if it seems too tight or painful, or if it is getting numb, tingly, or pale past the dressing.  Gently wash your laceration 2 times a day with clean cloth and soap.   It is okay to shower, but do not let the laceration soak in water.    If your laceration was closed with wound adhesive or strips: pat it dry and leave it open to the air.   For all other repairs: after you wash your laceration, or at least 2 times a day, apply bacitracin or other antibiotic ointment to the laceration, then cover it with a Band-Aid  or gauze.  Keep the laceration clean. Wear gloves or other protective clothing if you are around dirt.    Follow-up:  You need to follow-up with your regular doctor in 7-10 days.  Your sutures or staples need to be removed in 7-10 days. Schedule an appointment with your regular doctor to have this done.    Scars:  To help minimize scarring:  Wear sunscreen over the healed laceration when out in the sun.  Massage the area regularly.  You may use Vitamin E oil.  Wait a year.  Most scars will start to fade within a year.    Probiotics: If you have been given an antibiotic, you may want to also take a probiotic pill or eat yogurt with live cultures. Probiotics have \"good bacteria\" to help your intestines stay healthy. Studies have shown " that probiotics help prevent diarrhea and other intestine problems (including C. diff infection) when you take antibiotics. You can buy these without a prescription in the pharmacy section of the store.     If you were given a prescription for medicine here today, be sure to read all of the information (including the package insert) that comes with your prescription.  This will include important information about the medicine, its side effects, and any warnings that you need to know about.  The pharmacist who fills the prescription can provide more information and answer questions you may have about the medicine.  If you have questions or concerns that the pharmacist cannot address, please call or return to the Emergency Department.     Opioid Medication Information    Pain medications are among the most commonly prescribed medicines, so we are including this information for all our patients. If you did not receive pain medication or get a prescription for pain medicine, you can ignore it.     You may have been given a prescription for an opioid (narcotic) pain medicine and/or have received a pain medicine while here in the Emergency Department. These medicines can make you drowsy or impaired. You must not drive, operate dangerous equipment, or engage in any other dangerous activities while taking these medications. If you drive while taking these medications, you could be arrested for DUI, or driving under the influence. Do not drink any alcohol while you are taking these medications.     Opioid pain medications can cause addiction. If you have a history of chemical dependency of any type, you are at a higher risk of becoming addicted to pain medications.  Only take these prescribed medications to treat your pain when all other options have been tried. Take it for as short a time and as few doses as possible. Store your pain pills in a secure place, as they are frequently stolen and provide a dangerous opportunity  for children or visitors in your house to start abusing these powerful medications. We will not replace any lost or stolen medicine.  As soon as your pain is better, you should flush all your remaining medication.     Many prescription pain medications contain Tylenol  (acetaminophen), including Vicodin , Tylenol #3 , Norco , Lortab , and Percocet .  You should not take any extra pills of Tylenol  if you are using these prescription medications or you can get very sick.  Do not ever take more than 3000 mg of acetaminophen in any 24 hour period.    All opioids tend to cause constipation. Drink plenty of water and eat foods that have a lot of fiber, such as fruits, vegetables, prune juice, apple juice and high fiber cereal.  Take a laxative if you don t move your bowels at least every other day. Miralax , Milk of Magnesia, Colace , or Senna  can be used to keep you regular.      Remember that you can always come back to the Emergency Department if you are not able to see your regular doctor in the amount of time listed above, if you get any new symptoms, or if there is anything that worries you.

## 2019-11-26 NOTE — ED PROVIDER NOTES
History     Chief Complaint:  Laceration    HPI   Rachell Paige is a 64 year old male, with a history of diabetes, who presents with a laceration. Patient states that he was cutting his toenails and he cut his small toe. He notes that the toe was bleeding a lot, so he drove to the emergency department. He states that during the enttire drive the toe was bleeding. Of note, his last teanus was in 2014.     Allergies:  Aleve  Clopidogrel  Sulfamethoxazole- Trimethoprim    Medications:    Albuterol  Tessalon  Neurontin  Novolog  Lisinopril  Metformin  zocor    Past Medical History:    Anxiety  Dermatitis  Diabetes  Hypertension  Hyperlipidemia  Microalbuminuria  Onychomycosis    Past Surgical History:    Right fifth toe amputation    Family History:    No past pertinent family history.    Social History:  Former smoker  Denies alcohol use  Denies drug use  Marital Status:   [2]    Review of Systems   Skin: Positive for wound.   All other systems reviewed and are negative.    Physical Exam   First Vitals:  Patient Vitals for the past 24 hrs:   BP Temp Temp src Heart Rate Resp SpO2 Weight   11/26/19 0020 -- -- -- -- -- -- 82.6 kg (182 lb)   11/26/19 0010 (!) 157/80 98.1  F (36.7  C) Oral 97 18 98 % --       Physical Exam  General: Well appearing, well nourished. Normal mood and affect.  Skin: Approximately 0.5 centimeter laceration to the fourth right toe.  Superficial in nature, no visualized tendon, bony, nerve, vascular damage.  Half of the toenail here has also been clipped away.  HEENT: Head: Normocephalic, atraumatic, no visible masses.   Eyes: Conjunctiva clear.  Cardiac: Normal rate and regular rhythm, no murmur or gallop.   Lungs: Clear to auscultation.   Musculoskeletal: Normal gait and station.   Right foot: Previous amputation of the fifth toe.  Dorsalis pedis and posterior tibial arteries intact. Normal capillary refill to all toes. Toes are grossly normal. Normal sensation to the foot. Flexors and  extensors to the toes are normal. Dorsiflexion and plantarflexion to the ankle/foot is normal.  Onychomycosis noted on the second and third toes.  Previous amputation of numerous toes on the left foot.   Neurologic: Oriented x 3. GCS: 15.  Psychiatric: Intact recent and remote memory, judgment and insight, normal mood and affect.     Emergency Department Course     Procedures:    Narrative: Procedure: Laceration Repair        LACERATION:  A simple clean 0.5 cm laceration.      LOCATION: Pad of the right fourth toe      FUNCTION:  Distally sensation, circulation, motor and tendon function are intact.      ANESTHESIA:  Local using 1% lidocaine with epinephrine.  Total of 0.5 mLs      PREPARATION:  Irrigation with Normal Saline and Shur Clens      DEBRIDEMENT:  no debridement      CLOSURE:  Wound was closed with One Layer.  Skin closed with 1 x 5.0 Ethylon using interrupted sutures.  Dermabond was also placed over the nailbed where toenail had been removed.    Interventions:  None     Emergency Department Course:  Nursing notes and vitals reviewed. (0014) I performed an exam of the patient as documented above.     Laceration repair completed as noted above.  Discharge instructions discussed.  Patient will also be placed on antibiotics.  Follow-up discussed.    Impression & Plan    Medical Decision Making:  Rachell Paige is a 64 year old male who presented to the ED today for evaluation of a laceration after cutting his toenails at home.  Details of the patient's history can be noted in the HPI.  The wound was carefully explored and evaluated.  The laceration was closed as noted in the procedure note above.  There was no evidence of muscular, tendon, bone, or nerve damage with this laceration.  There is no evidence of foreign body.  Possible complications such as infection and scarring were reviewed with the patient.  They will return to the ED for any signs of increased redness, streaking, drainage, fevers, new  concerns.  They will apply bacitracin daily.  Additional suture care was discussed they will follow-up with her primary care provider or another medical facility and provided in the discharge paperwork.  The patient is a diabetic and has had significant foot infections in the past, will place him on prophylactic antibiotics.  Dermabond had also been placed over the portion of the toenail that he had cut too low.  Do not feel that additional interventions need to be completed to this region at this time as her remaining nail bed is well protected.  Suture removal in 7-10 days. Tetanus is up-to-date. All questions were answered prior to the patient's discharge.  They were in agreement with the treatment plan as stated above.    Diagnosis:    ICD-10-CM    1. Toe laceration S91.119A      Disposition:  discharged to home    Discharge Medications:  New Prescriptions    No medications on file     Scribe Disclosure:  Jeffery SHIN, am serving as a scribe on 11/26/2019 at 12:25 AM to personally document services performed by YANDEL Nielson found based on my observations and the provider's statements to me.     Jeffery Medrano  11/26/2019   [facility] EMERGENCY DEPARTMENT    This was created at least in part with a voice recognition software. Mistakes/typos may be present.        Molly Knutson PA  11/26/19 0126

## 2019-11-26 NOTE — ED AVS SNAPSHOT
[facility] Emergency Department  [address]  [facility] [address]  Phone:  [phone]  Fax:  [phone]                                    Rachell Paige   MRN: [id number]    Department:  [facility] Emergency Department   Date of Visit:  11/26/2019           After Visit Summary Signature Page    I have received my discharge instructions, and my questions have been answered. I have discussed any challenges I see with this plan with the nurse or doctor.    ..........................................................................................................................................  Patient/Patient Representative Signature      ..........................................................................................................................................  Patient Representative Print Name and Relationship to Patient    ..................................................               ................................................  Date                                   Time    ..........................................................................................................................................  Reviewed by Signature/Title    ...................................................              ..............................................  Date                                               Time          22EPIC Rev 08/18

## 2019-11-26 NOTE — PATIENT INSTRUCTIONS
"Continue wound care per ED instructions.  Seek immediate care for worsening redness, drainage, swelling, pain, fever, chills, nausea, vomiting.        Thank you for choosing [facility]!    DR. HUNT'S CLINIC LOCATIONS:   MONDAY - [place] TUESDAY - [place]   [address] [facility]  [address]   [place] [address] [place] [address]   [phone]       THURSDAY AM - [place] THURSDAY PM - [place]   [address]   [place] [address] [place], [place] [address] [phone]       FRIDAY AM - [place] SET UP SURGERY: [phone] [address] APPOINTMENTS: [phone]   [place] [address] BILLING QUESTIONS: [phone] FAX NUMBER: [phone]     FOLLOW UP: 1 week with Dr. Hunt     DIABETES AND YOUR FEET  Diabetes can result in several problems in the feet including ulcers (open sores) and amputations. Two of the most important reasons why people develop foot problems when they have diabetes is : 1. Neuropathy (loss of feeling)  2. Vascular disease (loss or decrease of blood flow).    Neuropathy is a term used to describe a loss of nerve function.  Patients with diabetes are at risk of developing neuropathy if their sugars continue to run high and are above the normal value. One theory for neuropathy is that the \"extra\" sugar in the body enters the nerves and is broken down. These by-products build up in the nerve causing it to swell and impairing nerve function. Often times, this can be prevented by controlling your sugars, dieting and exercise.    When a person develops neuropathy, they usually begin to feel numbness or tingling in their feet and sometime in their legs.  Other symptoms may include painful burning or hot feet, tingling or feeling like insects or ants are crawling on your feet or legs.  If the diabetes is sever and the sugars run high for long periods of time, " neuropathy can also occur in the hands.    Vascular disease  is a term used to describe a loss or decrease in circulation (blood flow). There is a problem in getting blood and oxygen to areas that need it. Similar to neuropathy, sugars can build up in the walls of the arteries (blood vessels) and cause them to become swollen, thickened and hardened. This decreases the amount of blood that can go to an area that needs it. Though this is common in the legs of diabetic patients, it can also affect other arteries (blood vessels) in the body such as in the heart and eyes.    In the legs, vascular disease usually results in cramping. Patients who develop leg cramps after walking the same distance every time (i.e. One block, half a mile, ect.) need to let their doctors know so that their circulation may be checked. Cramps causing severe pain in the feet and/or legs while sleeping and the cramps go away when you stand or hang your legs off the side of the bed, may also be a sign of poor blood circulation.  Occasional cramping in cold weather or on rare occasions with activity may not be due to poor circulation, but you should inform your doctor.    PREVENTION OF THESE DISEASES  The key to prevention is good blood sugar control. Poor blood sugar control is a big reason many of these problems start. Physical activity (exercise) is a very good way to help decrease your blood sugars. Exercise can lower your blood sugar, blood pressure, and cholesterol. It also reduces your risk for heart disease and stroke, relieves stress, and strengthens your heart, muscles and bones.  In addition, regular activity helps insulin work better, improves your blood circulation, and keeps your joints flexible. If you're trying to lose weight, a combination of exercise and wise food choices can help you reach your target weight and maintain it.      PAIN MANAGEMENT  1.Blood Sugar Control - Most important  2. Medications such as:  Amytriptylline,  duloxetine, gabapentin, lyrica, tramadol  3. Nutritional therapy:  Vitamin B6 (100mg daily), Vitamin B12 (75mcg daily), Vitamin D 2000 IU daily), Alpha-Lipoic Acid (600-1800mg daily), Acetyl-L-Carnitine (500-1000mg TID, L-methyl folate (1500mcg daily)    ** Metformin can block Vitamin B6 and B12 so it is important to supplement**    FOOT CARE RECOMMENDATIONS   1. Wash your feet with lukewarm water and a mild soap and then dry them thoroughly, especially between the toes.     2. Examine your feet daily looking for cuts, corns, blisters, cracks, ect, especially after wearing new shoes. Make sure to look between your toes. If you cannot see the bottom of your feet, set a mirror on the floor and hold your foot over it, or ask a spouse, friend or family member to examine your feet for you. Contact your doctor immediately if new problems are noted or if sores are not healing.     3. Immediately apply moisturizer to the tops and bottoms of your feet, avoiding areas between the toes. Hand lotion (Intesive Care, Liliane, Eucerin, Neutrogena, Curel, ect) is sufficient unless your doctor prescribes a medicated lotion. Apply sunscreen to your feet when going swimming outside.     4. Use clean comfortable shoes, wear white socks (if you have any bleeding or drainage, you will see it on white socks). Socks should not have thick seams or cut off the circulation around the leg. Break in new shoes slowly and rotate with older shoes until broken in. Check the inside of your shoes with your hand to look for areas of irritation or objects that may have fallen into your shoes.       5. Keep slippers by the side of your bed for use during the night.     6.  Shoes should be fitted by a professional and should not cause areas of irritation.  Check your feet regularly when wearing a new pair of shoes and replace them as needed.     7.  Talk to your doctor about proper exercise. Exercise and stretching stimulate blood flow to your feet and  maintain proper glucose levels.     8.  Monitor your blood glucose level as instructed by your doctor. Notify your doctor immediately if your blood sugar is abnormally high or low.    9. Cut your nails straight across, but then gently round any sharp edges with a cardboard nail file. If you have neuropathy, peripheral vascular disease or cannot see that well to trim your own toenails contact [facility] ([phone]) or [facility] ([phone]).      THINGS TO AVOID DOING   1.  Do not soak your feet if you have an open sore. Use only lukewarm water and always check the temperature with your hand as hot water can easily burn your feet.       2.  Never use a hot water bottle or heating pad on your feet. Also do not apply cold compresses to your feet. With decreased sensation, you could burn or freeze your feet.       3.  Do not apply any of these to your feet:    -  Over the counter medicine for corns or warts    -  Harsh chemicals like boric acid    -  Do not self-treat corns, cuts, blisters or infections. Always consult your doctor.       4.  Do not wear sandals, slippers or walk barefoot, especially on hot sand or concrete or other harsh surfaces.     5.  If you smoke, stop!!!      FYI: The following information is included in the after visit summary for all patients:  Body weight can be a sensitive issue to discuss in clinic, but we think the following information is very important. Although we focus on the feet and ankles, we do support the overall health of our patients. Many things can cause foot and ankle problems. Foot structure, activity level, foot mechanics and injuries are common causes of pain. One very important issue that often goes unmentioned, is body weight. Extra weight can cause increased stress on muscles, ligaments, bones and tendons. Sometimes just a few extra pounds is all it takes to put one over her/his threshold. Without reducing that stress, it can be difficult to alleviate pain. As  Foot & Ankle specialists, our job is addressing the lower extremity problem and possible causes. Regarding extra body weight, we encourage patients to discuss diet and weight management plans with their primary care doctors. It is this team approach that gives you the best opportunity for pain relief and getting you back on your feet. [facility] has a Comprehensive Weight Management Program. This program includes counseling, education, non-surgical and surgical approaches to weight loss. If you are interested in learning more either talk to you primary care provider or call [phone].

## 2019-11-26 NOTE — TELEPHONE ENCOUNTER
Grecia had toes amputated was at [facility] last night and was told to schedule a follow up appointment.    Two charts have been merged.  This chart and MRN [id number].  [facility] made a duplicate chart and sent a IT request to have merged.

## 2019-11-26 NOTE — TELEPHONE ENCOUNTER
Last night Rachell was into [facility] and was told to schedule a follow up visit.  Today Rachell is calling to schedule a follow up visit with MD Lora.  No triage Necessary.  An extra chart was made and [facility] sent IT request to merge charts.

## 2019-11-27 NOTE — PROGRESS NOTES
"PATIENT HISTORY:  Rachell Paige is a 64 year old male who presents to clinic for R 4th toe concern.  Pt reports cutting his toe while trimming his nails last night.  He went to the ED today where the area was cleaned and sutured.  Dermabond was placed over the nail abrasion area.  Presents with bandage on the toe.  Pt wonders if he needs the \"cream\" for the wound he had for a prior wound.  3/10 pain reported.  Hx of DM, prior R 5th toe amputation, L TMA.  Hx of prior osteomyelitis.  Normally sees Dr. Lora.  No fevers, chills.  Nonsmoker.  Not working.  No related family hx.       EXAM:Vitals: BP (!) 152/86   Pulse 84   Ht 1.676 m (5' 6\")   Wt 90.7 kg (199 lb 14.4 oz)   BMI 32.26 kg/m    BMI= Body mass index is 32.26 kg/m .    General appearance: Patient is alert and fully cooperative with history & exam.  No sign of distress is noted during the visit.     Dermatologic: R tip of 4th toe with suture noted, wound with skin breakdown to distal nail/toe area, covered in dermabond. No paronychia or evidence of soft tissue infection is noted.     Vascular: DP & PT pulses are intact & regular.  No significant edema or varicosities noted.  CFT and skin temperature are normal to both lower extremities.     Neurologic: Lower extremity sensation is diminished to light touch b/l.     Musculoskeletal: s/p R 5th toe amputation, L TMA.  Patient is ambulatory without assistive device or brace.  No gross ankle deformity noted.  No foot or ankle joint effusion is noted.     ASSESSMENT:   R 4th toe laceration/injury  DM II with peripheral polyneuropathy     PLAN:  Reviewed patient's chart in epic.  Discussed condition and treatment options including pros and cons.    Toe appears stable, no sign of infection.  Pt asking about prior wound cream, likely referring to iodosorb.  I don't see a need for this currently as area is sutured and covered in dermabond.  Advised following ED instructions for now, thin layer of bacitracin " and bandage daily.  Keep dry.  Pt plans to f/u next week with Dr. Lora for recheck.  Offloading advised.  Discussed risk of infection, amputation.  Seek immediate care for worsening redness, drainage, swelling, pain, fever, chills, nausea, vomiting.    Ba Baker DPM, FACFAS    Weight management plan: Patient was referred to their PCP to discuss a diet and exercise plan.  Mohamed to follow up with Primary Care provider regarding elevated blood pressure.

## 2019-11-29 ENCOUNTER — ANCILLARY PROCEDURE (OUTPATIENT)
Dept: GENERAL RADIOLOGY | Facility: CLINIC | Age: 64
End: 2019-11-29
Attending: STUDENT IN AN ORGANIZED HEALTH CARE EDUCATION/TRAINING PROGRAM
Payer: COMMERCIAL

## 2019-11-29 ENCOUNTER — OFFICE VISIT (OUTPATIENT)
Dept: URGENT CARE | Facility: URGENT CARE | Age: 64
End: 2019-11-29
Payer: COMMERCIAL

## 2019-11-29 VITALS
OXYGEN SATURATION: 99 % | HEART RATE: 101 BPM | TEMPERATURE: 98.6 F | DIASTOLIC BLOOD PRESSURE: 88 MMHG | SYSTOLIC BLOOD PRESSURE: 158 MMHG

## 2019-11-29 DIAGNOSIS — M79.674 PAIN OF TOE OF RIGHT FOOT: Primary | ICD-10-CM

## 2019-11-29 DIAGNOSIS — M79.674 PAIN OF TOE OF RIGHT FOOT: ICD-10-CM

## 2019-11-29 DIAGNOSIS — S99.921S: ICD-10-CM

## 2019-11-29 LAB
BASOPHILS # BLD AUTO: 0 10E9/L (ref 0–0.2)
BASOPHILS NFR BLD AUTO: 0.2 %
DIFFERENTIAL METHOD BLD: NORMAL
EOSINOPHIL # BLD AUTO: 0.2 10E9/L (ref 0–0.7)
EOSINOPHIL NFR BLD AUTO: 2.2 %
ERYTHROCYTE [DISTWIDTH] IN BLOOD BY AUTOMATED COUNT: 13.7 % (ref 10–15)
ERYTHROCYTE [SEDIMENTATION RATE] IN BLOOD BY WESTERGREN METHOD: 10 MM/H (ref 0–20)
HCT VFR BLD AUTO: 42.6 % (ref 40–53)
HGB BLD-MCNC: 13.8 G/DL (ref 13.3–17.7)
LYMPHOCYTES # BLD AUTO: 2.5 10E9/L (ref 0.8–5.3)
LYMPHOCYTES NFR BLD AUTO: 29.5 %
MCH RBC QN AUTO: 28.2 PG (ref 26.5–33)
MCHC RBC AUTO-ENTMCNC: 32.4 G/DL (ref 31.5–36.5)
MCV RBC AUTO: 87 FL (ref 78–100)
MONOCYTES # BLD AUTO: 0.6 10E9/L (ref 0–1.3)
MONOCYTES NFR BLD AUTO: 6.9 %
NEUTROPHILS # BLD AUTO: 5.2 10E9/L (ref 1.6–8.3)
NEUTROPHILS NFR BLD AUTO: 61.2 %
PLATELET # BLD AUTO: 360 10E9/L (ref 150–450)
RBC # BLD AUTO: 4.9 10E12/L (ref 4.4–5.9)
WBC # BLD AUTO: 8.5 10E9/L (ref 4–11)

## 2019-11-29 PROCEDURE — 86140 C-REACTIVE PROTEIN: CPT | Performed by: STUDENT IN AN ORGANIZED HEALTH CARE EDUCATION/TRAINING PROGRAM

## 2019-11-29 PROCEDURE — 73630 X-RAY EXAM OF FOOT: CPT | Mod: RT

## 2019-11-29 PROCEDURE — 36415 COLL VENOUS BLD VENIPUNCTURE: CPT | Performed by: STUDENT IN AN ORGANIZED HEALTH CARE EDUCATION/TRAINING PROGRAM

## 2019-11-29 PROCEDURE — 99214 OFFICE O/P EST MOD 30 MIN: CPT | Performed by: STUDENT IN AN ORGANIZED HEALTH CARE EDUCATION/TRAINING PROGRAM

## 2019-11-29 PROCEDURE — 85025 COMPLETE CBC W/AUTO DIFF WBC: CPT | Performed by: STUDENT IN AN ORGANIZED HEALTH CARE EDUCATION/TRAINING PROGRAM

## 2019-11-29 PROCEDURE — 85652 RBC SED RATE AUTOMATED: CPT | Performed by: STUDENT IN AN ORGANIZED HEALTH CARE EDUCATION/TRAINING PROGRAM

## 2019-11-30 LAB — CRP SERPL-MCNC: 4.3 MG/L (ref 0–8)

## 2019-11-30 NOTE — PROGRESS NOTES
SUBJECTIVE:   Rachell Paige is a 64 year old male presenting with a chief complaint of   Chief Complaint   Patient presents with     Urgent Care     RECHECK     pt had suture done 2 days ago and he is worried because he is diabetic and concern about infection     65 yo M with PMHx of T2DM seen in the ER on 11/26/19 after traumatic abrasion of R fourth foot digit while clipping his toenails. He was subsequently seen by Podiatry. Sutures and dermabond was applied and return precautions discussed. He is here out of concern for toe infection because he feels the toe is swollen and painful. He had these symptoms in the past when he ended up needing amputation of his 5th digit. He is having pain in the toe while walking. Last night, he had subjective fever. No nausea or vomiting. No cough or diarrhea.. Last A1c was 7.3% one year ago and has not seen anyone about his diabetes in a while he says. BG last night was 135 when he checked. It feels itchy on top of the foot. He has been applying antibiotic ointment.     A pertinent 10-point ROS was negative unless otherwise specified in the HPI.     Past Medical History:   Diagnosis Date     Anxiety 2/23/2015     Dermatitis 4/18/2015     DM type 2, goal A1C 7-8 2/23/2015     Does not have health insurance 4/18/2015     Financial problems 2/23/2015     HTN, goal below 140/90 2/23/2015     Hyperlipidemia LDL goal <100 2/23/2015     Microalbuminuria 4/18/2015     Onychomycosis 4/18/2015     Rash 2/23/2015     Family History   Problem Relation Age of Onset     Diabetes Other      Current Outpatient Medications   Medication Sig Dispense Refill     amoxicillin-clavulanate (AUGMENTIN) 875-125 MG tablet Take 1 tablet by mouth 2 times daily for 7 days 14 tablet 0     albuterol (PROAIR HFA) 108 (90 Base) MCG/ACT inhaler Inhale 2 puffs into the lungs every 4 hours as needed for shortness of breath / dyspnea 1 Inhaler 0     aspirin 81 MG tablet Take 1 tablet (81 mg) by mouth daily 30  tablet OTC     bacitracin 500 UNIT/GM external ointment Apply topically 2 times daily 14 g 0     benzonatate (TESSALON) 100 MG capsule Take 2 capsules (200 mg) by mouth 3 times daily as needed for cough 40 capsule 0     Cadexomer Iodine, topical, 0.9% (IODOSORB) 0.9 % GEL gel Apply to wound right foot daily 10 g 0     cephALEXin (KEFLEX) 500 MG capsule Take 1 capsule (500 mg) by mouth 4 times daily for 5 days 20 capsule 0     gabapentin (NEURONTIN) 300 MG capsule Take 300 mg by mouth At Bedtime       insulin aspart (NOVOLOG FLEXPEN) 100 UNIT/ML injection Inject 15-20 Units Subcutaneous 2 times daily (with meals) With lunch and dinner       Insulin Glargine (LANTUS SOLOSTAR SC) Inject 60 Units Subcutaneous At Bedtime       lisinopril (PRINIVIL/ZESTRIL) 10 MG tablet Take 10 mg by mouth daily       metFORMIN (GLUCOPHAGE-XR) 500 MG 24 hr tablet Take 1,000 mg by mouth daily       order for DME Equipment being ordered: Walker Wheels () and Walker ()  Treatment Diagnosis: decreased stability with gait (Patient not taking: Reported on 9/16/2019) 1 each 0     simvastatin (ZOCOR) 10 MG tablet Take 10 mg by mouth At Bedtime       Social History     Tobacco Use     Smoking status: Former Smoker     Smokeless tobacco: Never Used   Substance Use Topics     Alcohol use: No       OBJECTIVE  BP (!) (P) 140/78   Pulse 101   Temp 98.6  F (37  C) (Tympanic)   SpO2 99%     GENERAL: No acute distress, non-toxic appearing  HEAD: Atraumatic, normocephalic  EYES: PERRL, EOMI, no scleral or conjunctival injection, anicteric  NOSE: Septum midline, no discharge  CV: Regular rate and rhythm, no murmurs or rubs  LUNGS: Respirations unlabored, no wheezes, crackles or rales  EXT: R 5th toe amputation previously, R 4th toe with suture in distal toe laceration, which has granulation tissue without discharge, there is no redness but there is mild swelling of the digit and some pain on palpation, normal capillary refill in all  toes  NEURO: Alert, coherent, interacts appropriately, no gross neurologic deficits  PSYCH: euthymic, normal affect, thought content is appropriate     Labs:  Results for orders placed or performed in visit on 11/29/19 (from the past 24 hour(s))   CBC with platelets and differential   Result Value Ref Range    WBC 8.5 4.0 - 11.0 10e9/L    RBC Count 4.90 4.4 - 5.9 10e12/L    Hemoglobin 13.8 13.3 - 17.7 g/dL    Hematocrit 42.6 40.0 - 53.0 %    MCV 87 78 - 100 fl    MCH 28.2 26.5 - 33.0 pg    MCHC 32.4 31.5 - 36.5 g/dL    RDW 13.7 10.0 - 15.0 %    Platelet Count 360 150 - 450 10e9/L    % Neutrophils 61.2 %    % Lymphocytes 29.5 %    % Monocytes 6.9 %    % Eosinophils 2.2 %    % Basophils 0.2 %    Absolute Neutrophil 5.2 1.6 - 8.3 10e9/L    Absolute Lymphocytes 2.5 0.8 - 5.3 10e9/L    Absolute Monocytes 0.6 0.0 - 1.3 10e9/L    Absolute Eosinophils 0.2 0.0 - 0.7 10e9/L    Absolute Basophils 0.0 0.0 - 0.2 10e9/L    Diff Method Automated Method    ESR: Erythrocyte sedimentation rate   Result Value Ref Range    Sed Rate 10 0 - 20 mm/h   CRP is pending.    X-Ray was done and personally reviewed: IMPRESSION: Status post amputation of the fifth toe at the MTP joint.  Chronic deformity of the first proximal phalanx. No acute fracture or  malalignment. No definite evidence of osteomyelitis radiographically.  Vascular calcification.    ASSESSMENT & PLAN:      ICD-10-CM    1. Pain of toe of right foot M79.674 XR Foot Right G/E 3 Views     CBC with platelets and differential     CRP, inflammation     ESR: Erythrocyte sedimentation rate     amoxicillin-clavulanate (AUGMENTIN) 875-125 MG tablet   2. Toe trauma, right, sequela S99.921S       63 Yo M with DM here for concern for right 4th toe infection after trauma and sutures on 11/26. He was given Keflex in the ER and has been taking this but notes worsening pain in toe while walking and swelling. On evaluation, he has some swelling in the toe and pain with deep palpation but no  purulence. No systemic symptoms. Labs reassuring with normal CBC, ESR. CRP pending. XR without soft tissue gas or fracture. Discussed symptoms may be early infection of toe and we will switch to Augmentin for anaerobic coverage. Follow up with Podiatry early next week. Return precautions discussed.       Kisha Pat MD      Patient Instructions   Continue to care for your toe with topical antibiotic ointment. Start taking Augmentin for infection prevention.     Please follow up with Podiatry within 5 days for recheck.    Be seen in the ER if you have any new fever, chills, vomiting, diarrhea, worsening foot pain, redness, discharge from toe or other worrisome symptoms.

## 2019-11-30 NOTE — PATIENT INSTRUCTIONS
Continue to care for your toe with topical antibiotic ointment. Start taking Augmentin for infection prevention.     Please follow up with Podiatry within 5 days for recheck.    Be seen in the ER if you have any new fever, chills, vomiting, diarrhea, worsening foot pain, redness, discharge from toe or other worrisome symptoms.

## 2019-12-02 ENCOUNTER — OFFICE VISIT (OUTPATIENT)
Dept: PODIATRY | Facility: CLINIC | Age: 64
End: 2019-12-02
Payer: COMMERCIAL

## 2019-12-02 VITALS
WEIGHT: 199.9 LBS | DIASTOLIC BLOOD PRESSURE: 74 MMHG | BODY MASS INDEX: 32.13 KG/M2 | SYSTOLIC BLOOD PRESSURE: 136 MMHG | HEIGHT: 66 IN

## 2019-12-02 DIAGNOSIS — S91.214A LACERATION OF LESSER TOE OF RIGHT FOOT WITHOUT FOREIGN BODY WITH DAMAGE TO NAIL, INITIAL ENCOUNTER: Primary | ICD-10-CM

## 2019-12-02 DIAGNOSIS — E11.621 DIABETIC ULCER OF TOE OF RIGHT FOOT ASSOCIATED WITH TYPE 2 DIABETES MELLITUS, WITH FAT LAYER EXPOSED (H): ICD-10-CM

## 2019-12-02 DIAGNOSIS — L97.512 DIABETIC ULCER OF TOE OF RIGHT FOOT ASSOCIATED WITH TYPE 2 DIABETES MELLITUS, WITH FAT LAYER EXPOSED (H): ICD-10-CM

## 2019-12-02 PROCEDURE — 11042 DBRDMT SUBQ TIS 1ST 20SQCM/<: CPT | Performed by: PODIATRIST

## 2019-12-02 RX ORDER — BACITRACIN ZINC 500 [USP'U]/G
OINTMENT TOPICAL
Qty: 14 G | Refills: 0 | Status: ON HOLD | OUTPATIENT
Start: 2019-12-02 | End: 2020-10-11

## 2019-12-02 ASSESSMENT — MIFFLIN-ST. JEOR: SCORE: 1644.49

## 2019-12-02 NOTE — PROGRESS NOTES
"Foot & Ankle Surgery   December 2, 2019    S:  Pt is seen today for evaluation of right 4th toe.  previous laceration when he was cutting his nails, he was seen in the ER and then by Dr Baker 11/26/19.  He has a Rx for Keflex that he was given by the ER doctor.  He had a laceration repaired in the ER, and Dermabond applied to the distal abrasion.  He's in today for fu.    Vitals:    12/02/19 1304   BP: 136/74   Weight: 90.7 kg (199 lb 14.4 oz)   Height: 1.676 m (5' 6\")   '      ROS - Pos for CC.  Patient denies current nausea, vomiting, chills, fevers, belly pain, calf pain, chest pain or SOB.  Complete remainder of ROS it otherwise neg.      PE:  Gen:   No apparent distress  Eye:    Visual scanning without deficit  Ear:    Response to auditory stimuli wnl  Lung:    Non-labored breathing on RA noted  Abd:    NTND per patient report  Lymph:    Neg for pitting/non-pitting edema BLE  Vasc:    Pulses palpable, CFT minimally delayed  Neuro:    Light touch sensation greatly diminished distally  Derm:   Laceration healed.  Abrasion/wound at the distal aspect of the 4th toe.  Full-thickness, healthy base, no exposed bone/deep fascia, no SOI  MSK:    right lower extremity - 5th toe amp, lesser digital hammertoes.  No acute pathology  Calf:    Neg for redness, swelling or tenderness    Assessment:  64 year old male with laceration with wound from cutting nails in setting of diabetic with neuropathy sp previous 5th toe amputation      Plan:  Discussed etiologies, anatomy and options  1.  laceration with wound from cutting nails in setting of diabetic with neuropathy sp previous 5th toe amputation  -suture removed, laceration fully healed  -Excisional debridement was performed, full-thickness, sharply debriding the wound down to and including exposed sub-cutaneous tissue/fat, excising nonviable tissue to the above dimensions with a tissue nipper  -Rx for bacitracin; daily dressing change with bandaid  -finish PO abx course; " no indication for new/different PO abx     Follow up:  2 weeks for wound check or sooner with acute issues      Body mass index is 32.26 kg/m .  Weight management plan: Patient was referred to their PCP to discuss a diet and exercise plan.         Clark Lora DPM FACNoland Hospital Dothan FACFAOM  Podiatric Foot & Ankle Surgeon  [facility] ([place], [place])  [phone]

## 2020-02-22 ENCOUNTER — HOSPITAL ENCOUNTER (EMERGENCY)
Facility: CLINIC | Age: 65
Discharge: HOME | End: 2020-02-22
Attending: INTERNAL MEDICINE | Admitting: INTERNAL MEDICINE
Payer: COMMERCIAL

## 2020-02-22 ENCOUNTER — APPOINTMENT (OUTPATIENT)
Dept: GENERAL RADIOLOGY | Facility: CLINIC | Age: 65
End: 2020-02-22
Attending: INTERNAL MEDICINE

## 2020-02-22 VITALS
HEART RATE: 93 BPM | DIASTOLIC BLOOD PRESSURE: 62 MMHG | OXYGEN SATURATION: 99 % | RESPIRATION RATE: 16 BRPM | SYSTOLIC BLOOD PRESSURE: 135 MMHG | TEMPERATURE: 98 F

## 2020-02-22 DIAGNOSIS — M79.662 PAIN OF LEFT LOWER LEG: ICD-10-CM

## 2020-02-22 LAB
ALBUMIN SERPL-MCNC: 3.5 G/DL (ref 3.4–5)
ALP SERPL-CCNC: 106 U/L (ref 40–150)
ALT SERPL W P-5'-P-CCNC: 24 U/L (ref 0–70)
ANION GAP SERPL CALCULATED.3IONS-SCNC: 1 MMOL/L (ref 3–14)
AST SERPL W P-5'-P-CCNC: 18 U/L (ref 0–45)
BASOPHILS # BLD AUTO: 0 10E9/L (ref 0–0.2)
BASOPHILS NFR BLD AUTO: 0.4 %
BILIRUB SERPL-MCNC: 0.4 MG/DL (ref 0.2–1.3)
BUN SERPL-MCNC: 17 MG/DL (ref 7–30)
CALCIUM SERPL-MCNC: 8.8 MG/DL (ref 8.5–10.1)
CHLORIDE SERPL-SCNC: 106 MMOL/L (ref 94–109)
CO2 SERPL-SCNC: 28 MMOL/L (ref 20–32)
CREAT SERPL-MCNC: 1.23 MG/DL (ref 0.66–1.25)
DIFFERENTIAL METHOD BLD: NORMAL
EOSINOPHIL # BLD AUTO: 0.2 10E9/L (ref 0–0.7)
EOSINOPHIL NFR BLD AUTO: 2.2 %
ERYTHROCYTE [DISTWIDTH] IN BLOOD BY AUTOMATED COUNT: 13.2 % (ref 10–15)
GFR SERPL CREATININE-BSD FRML MDRD: 62 ML/MIN/{1.73_M2}
GLUCOSE SERPL-MCNC: 207 MG/DL (ref 70–99)
HCT VFR BLD AUTO: 41.9 % (ref 40–53)
HGB BLD-MCNC: 13.3 G/DL (ref 13.3–17.7)
IMM GRANULOCYTES # BLD: 0.1 10E9/L (ref 0–0.4)
IMM GRANULOCYTES NFR BLD: 1 %
LYMPHOCYTES # BLD AUTO: 2.4 10E9/L (ref 0.8–5.3)
LYMPHOCYTES NFR BLD AUTO: 29.9 %
MCH RBC QN AUTO: 28.1 PG (ref 26.5–33)
MCHC RBC AUTO-ENTMCNC: 31.7 G/DL (ref 31.5–36.5)
MCV RBC AUTO: 88 FL (ref 78–100)
MONOCYTES # BLD AUTO: 0.5 10E9/L (ref 0–1.3)
MONOCYTES NFR BLD AUTO: 6 %
NEUTROPHILS # BLD AUTO: 4.9 10E9/L (ref 1.6–8.3)
NEUTROPHILS NFR BLD AUTO: 60.5 %
NRBC # BLD AUTO: 0 10*3/UL
NRBC BLD AUTO-RTO: 0 /100
PLATELET # BLD AUTO: 379 10E9/L (ref 150–450)
POTASSIUM SERPL-SCNC: 4.5 MMOL/L (ref 3.4–5.3)
PROT SERPL-MCNC: 6.8 G/DL (ref 6.8–8.8)
RBC # BLD AUTO: 4.74 10E12/L (ref 4.4–5.9)
SODIUM SERPL-SCNC: 135 MMOL/L (ref 133–144)
TROPONIN I SERPL-MCNC: 0.02 UG/L (ref 0–0.04)
WBC # BLD AUTO: 8.1 10E9/L (ref 4–11)

## 2020-02-22 PROCEDURE — 71046 X-RAY EXAM CHEST 2 VIEWS: CPT

## 2020-02-22 PROCEDURE — 84484 ASSAY OF TROPONIN QUANT: CPT | Performed by: INTERNAL MEDICINE

## 2020-02-22 PROCEDURE — 85025 COMPLETE CBC W/AUTO DIFF WBC: CPT | Performed by: INTERNAL MEDICINE

## 2020-02-22 PROCEDURE — 99285 EMERGENCY DEPT VISIT HI MDM: CPT | Mod: 25

## 2020-02-22 PROCEDURE — 80053 COMPREHEN METABOLIC PANEL: CPT | Performed by: INTERNAL MEDICINE

## 2020-02-22 PROCEDURE — 25000125 ZZHC RX 250: Performed by: INTERNAL MEDICINE

## 2020-02-22 PROCEDURE — 93005 ELECTROCARDIOGRAM TRACING: CPT

## 2020-02-22 RX ORDER — LIDOCAINE 50 MG/G
1 PATCH TOPICAL EVERY 24 HOURS
Qty: 10 PATCH | Refills: 0 | Status: SHIPPED | OUTPATIENT
Start: 2020-02-22 | End: 2020-03-03

## 2020-02-22 RX ORDER — LIDOCAINE 50 MG/G
OINTMENT TOPICAL PRN
Qty: 50 G | Refills: 0 | Status: SHIPPED | OUTPATIENT
Start: 2020-02-22 | End: 2020-07-28

## 2020-02-22 RX ORDER — LIDOCAINE HYDROCHLORIDE 20 MG/ML
1 JELLY TOPICAL ONCE
Status: COMPLETED | OUTPATIENT
Start: 2020-02-22 | End: 2020-02-22

## 2020-02-22 RX ADMIN — LIDOCAINE HYDROCHLORIDE 1 TUBE: 20 JELLY TOPICAL at 19:24

## 2020-02-22 ASSESSMENT — ENCOUNTER SYMPTOMS
ARTHRALGIAS: 1
DIAPHORESIS: 0
PALPITATIONS: 1
CHILLS: 0

## 2020-02-22 NOTE — ED AVS SNAPSHOT
[facility]  [address]  [facility] [address]  Phone:  [phone]  Fax:  [phone]                                    Rachell Paige   MRN: [id number]    Department:  [facility]   Date of Visit:  2/22/2020           After Visit Summary Signature Page    I have received my discharge instructions, and my questions have been answered. I have discussed any challenges I see with this plan with the nurse or doctor.    ..........................................................................................................................................  Patient/Patient Representative Signature      ..........................................................................................................................................  Patient Representative Print Name and Relationship to Patient    ..................................................               ................................................  Date                                   Time    ..........................................................................................................................................  Reviewed by Signature/Title    ...................................................              ..............................................  Date                                               Time          22EPIC Rev 08/18

## 2020-02-22 NOTE — ED TRIAGE NOTES
64 year old male with hx of diabetic neuropathy complains of left foot pain. Had all toes on left foot amputated two years ago and is now having shooting pain that gives him palpitations.

## 2020-02-22 NOTE — ED PROVIDER NOTES
History     Chief Complaint:  Palpitations and Foot Pain    HPI   Rachell Paige is a 64 year old male with a history of diabetic neuropathy who presents to the emergency department today with palpitations and foot pain. The patient reports shooting pain every 2 seconds from his left foot (which had a 5 toe amputation 2 years ago) which started last night, with associated chest pain and palpitations. Every time the pain shoots from his leg, it goes to the chest. He denies chills, diaphoresis. He states he could not sleep last night due to shooting pains. He states he took a baby aspirin. He is not on blood thinners.     FROM CHART REVIEW:   Patient called stating he wants to see Dr. Merrill Hernández for a second opinion for bilat foot pain that is constant in nature. He does not have typical claudication symptoms. He denies rest pain and does get some relief with exercise. Patient has seen Dr. LUIS ANTONIO Hernández in 2016 and most recently Dr. Chow (11/11/19). Bilat arterial duplex/seg pressure and PIYUSH were done. The studies suggesting tibial vessel versus small vessel issues. He has undergone a TMA previously on his left foot and has had a toe amputation of his fifth digit on the right Patient has diabetic peripheral neuropathy.   Per patient's angiogram report at [facility]  01/22/2016  Severe stenosis proximal posterior tibial artery treated with PTA   to 2.5 mm with good results. 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   I transferred patient to [facility] to arrange second opinion. No imaging needed. Tri Rivera, Vascular RN 12/3/2019 3:49 PM    Allergies:  Aleve  Clopidogrel  Sulfamethoxazole-Trimethoprim     Medications:    Albuterol  Aspirin  Bacitracin  Tessalon  Neurontin  Novolog  Lantus   Prinivil  Glucophage  Zocor       Past Medical History:    Anxiety  Atypical chest  pain  Dermatitis  DM type 2, goal A1C 7-8  HTN,  Hyperlipidemia   Microalbuminuria  Onychomycosis  Osteomyelitis   Rash    Past Surgical History:    Toe amputations right 5th toe and all of left toes.      Family History:    Diabetes     Social History:  The patient was alone.   Smoking Status: Former  Smokeless Tobacco: Never  Alcohol Use: No    Marital Status:       Review of Systems   Constitutional: Negative for chills and diaphoresis.   Cardiovascular: Positive for chest pain and palpitations.   Musculoskeletal: Positive for arthralgias (left foot pain).   All other systems reviewed and are negative.      Physical Exam     Patient Vitals for the past 24 hrs:   BP Temp Temp src Pulse Heart Rate Resp SpO2   02/22/20 2010 135/62 -- -- -- 92 16 99 %   02/22/20 1745 (!) 147/69 98  F (36.7  C) Oral 93 -- 18 99 %   02/22/20 1613 (!) 169/76 98.1  F (36.7  C) Oral 97 -- 18 100 %      Physical Exam  Constitutional:       Comments: Pleasant and cooperative   HENT:      Right Ear: Tympanic membrane normal.      Left Ear: Tympanic membrane normal.      Mouth/Throat:      Pharynx: No posterior oropharyngeal erythema.   Eyes:      Conjunctiva/sclera: Conjunctivae normal.   Neck:      Musculoskeletal: Neck supple.   Cardiovascular:      Rate and Rhythm: Normal rate and regular rhythm.      Heart sounds: Normal heart sounds.   Pulmonary:      Effort: Pulmonary effort is normal.      Breath sounds: Normal breath sounds.   Abdominal:      General: Bowel sounds are normal. There is no distension.      Palpations: Abdomen is soft.      Tenderness: There is no abdominal tenderness. There is no guarding or rebound.   Musculoskeletal: Normal range of motion.      Comments: Transmetatarsal distal amputation at the left foot.  The left lower extremity feels slightly cool to the touch.   Skin:     General: Skin is warm and dry.   Neurological:      Mental Status: He is alert.         Emergency Department Course   ECG:  Indication:  chest pain  Completed at 1614.  Read at 1620.   Normal sinus rhythm   Normal ECG   No significant change compared to 8/15/19   Rate 98 bpm. NV interval 172. QRS duration 82. QT/QTc 328/418. P-R-T axes 62 -19 65.     Imaging:  Radiology findings were communicated with the patient who voiced understanding of the findings.  XR Chest 2 Views   Final Result   IMPRESSION: Negative chest.      CT Aortic Survey w Contrast    (Results Pending)   Report per radiology      Laboratory:  Laboratory findings were communicated with the patient who voiced understanding of the findings.  Troponin (Collected 1648): 0.018   CBC: AWNL (WBC 8.1, HGB 13.3, )   CMP: 1 anion gap and 207 glucose o/w WNL (Creatinine 1.23)      Interventions:  1924: Xylocaine 1 tube topical     Emergency Department Course:  Nursing notes and vitals reviewed.  1610: I performed an exam of the patient as documented above.   IV was inserted and blood was drawn for laboratory testing, results above.  The patient was sent for a Chest XR  while in the emergency department, results above.   Patient rechecked and updated.    1958: Findings and plan explained to the Patient. Patient discharged home with instructions regarding supportive care, medications, and reasons to return. The importance of close follow-up was reviewed. The patient was prescribed Lidoderm and Xylocaine.    I personally reviewed the laboratory and imaging results with the Patient and answered all related questions prior to discharge.      Impression & Plan    Medical Decision Making:    Rachell Paige is a 64 year old male who presents to the emergency department with pain in the left lower leg that kept him from sleeping.  He is a known vasculopath and the legs seemed cool so I was concerned about critical limb ischemia.  We were able to Doppler pulses throughout the left lower extremity and blood pressure in both ankles was actually satisfactory and equal.  Initially it had sounded like  he was saying the pain was radiating up into his chest so I considered an aortic etiology.  He refused CT aortogram.  We talked more.  He was quite sure that the problem was musculoskeletal pain because he walked excessively yesterday.  He requested topical lidocaine because this is been successful before.  After thorough discussion I think this is a reasonable choice.  I will discharge him with lidocaine as needed, close follow-up with primary care, return if problems.    Diagnosis:    ICD-10-CM    1. Pain of left lower leg M79.662        Disposition:  discharged to home    Discharge Medications:  Discharge Medication List as of 2/22/2020  7:59 PM      START taking these medications    Details   lidocaine (LIDODERM) 5 % patch Place 1 patch onto the skin every 24 hours for 10 daysDisp-10 patch, R-0Local Print      lidocaine (XYLOCAINE) 5 % external ointment Apply topically as needed for moderate painDisp-50 g, R-0Local Print           Scribe Disclosure:  I, Lizeth Pope MD, am serving as a scribe at 4:15 PM on 2/22/2020 to document services personally performed by Yoselin Howard MD based on my observations and the provider's statements to me.    2/22/2020   [facility] EMERGENCY DEPARTMENT       Yoselin Howard MD  02/22/20 5704

## 2020-02-23 LAB — INTERPRETATION ECG - MUSE: NORMAL

## 2020-02-23 NOTE — DISCHARGE INSTRUCTIONS
Discharge Instructions  Extremity Injury    You were seen today for an injury to an extremity (arm, hand, leg, or foot). You may have a bruise, strain, or fracture (broken bone).    Return to the Emergency Department or see your regular doctor if your injured area is not back to normal within 5-7 days.    Return to the Emergency Department right away if:  Your pain seems to change or get worse or there is pain in a new area.  Your extremity becomes pale, cool, blue, or numb or tingling past the injury.  You have more drainage, redness or pain in the area of the cut or abrasion.  You have pain that you can t control with the medicine recommended or prescribed here, or you have pain that seems too much for your injury.  Your child will not stop crying or is much more fussy than normal.  You have new symptoms or anything that worries you.    What to Expect:  Your swelling and pain may be worse the day after your injury, but should not be severe and should start getting better after that. You should not have new symptoms and your pain should not get worse.  You may start to get a bruise over the injured area or below the injured area.  Your movement and strength should get better with time.  Some injuries may not show up until after you have left the Emergency Department so it is important to follow-up with your regular doctor.  Your injury may prevent you from working.  Follow-up with your regular doctor to get a work release note.  Pain medications or your injury may make it unsafe to drive or operate machinery.    Home Care:  Apply ice your injured area for 15 minutes at a time, at least 3 times a day. Use a cloth between the ice bag and your skin to prevent frostbite.   Do not sleep with an ice pack or heating pad on, since this can cause burns or skin injury.  Rest your injured area for at least 1-2 days. After that you may start using your extremity again as long as there is not too much pain.   Raise the  injured area above the level of your heart as much as possible in the first 1-2 days.  Use Tylenol  (acetaminophen), Motrin (ibuprofen), or Advil  (ibuprofen) for your pain unless you have an allergy or are told not to use these medications by your doctor.  Take the medications as instructed on the package. Tylenol  (acetaminophen) is in many prescription medicines and non-prescription medicines--check all of your medicines to be sure you aren t taking more than 3000 mg per day.  You may use an elastic bandage (Ace  Wrap) if it makes you more comfortable. Wrap it just tight enough to provide light compression, like a new pair of socks feels. Loosen the bandage if you have swelling past the bandage.    Please follow any other instructions that were discussed with you by your doctor.    MORE INFORMATION:    X-rays:  X-rays done today were read by your doctor but will also be read by a radiologist.  We will contact you if the radiologist sees anything different on the x-ray.  Your regular doctor may also want to review your x-rays on follow-up.    You could have a fracture (break), even if we told you your x-rays were normal. X-rays are not always certain, and some fractures are hard to see and may not show up right away.  Also, your x-ray may look like you have a fracture, even though you do not.  It is important to follow-up with your regular doctor.     Stretching:  If your injury was to your arm or shoulder and your doctor put you in a sling or an immobilizer, it is important that you take off your immobilizer within 3 days and stretch/move your shoulder, unless your doctor specifically tells you to not move your shoulder.  This is to prevent further injury such as a  frozen shoulder .     If you were given a prescription for medicine here today, be sure to read all of the information (including the package insert) that comes with your prescription.  This will include important information about the medicine, its  side effects, and any warnings that you need to know about.  The pharmacist who fills the prescription can provide more information and answer questions you may have about the medicine.  If you have questions or concerns that the pharmacist cannot address, please call or return to the Emergency Department.     Opioid Medication Information    Pain medications are among the most commonly prescribed medicines, so we are including this information for all our patients. If you did not receive pain medication or get a prescription for pain medicine, you can ignore it.     You may have been given a prescription for an opioid (narcotic) pain medicine and/or have received a pain medicine while here in the Emergency Department. These medicines can make you drowsy or impaired. You must not drive, operate dangerous equipment, or engage in any other dangerous activities while taking these medications. If you drive while taking these medications, you could be arrested for DUI, or driving under the influence. Do not drink any alcohol while you are taking these medications.     Opioid pain medications can cause addiction. If you have a history of chemical dependency of any type, you are at a higher risk of becoming addicted to pain medications.  Only take these prescribed medications to treat your pain when all other options have been tried. Take it for as short a time and as few doses as possible. Store your pain pills in a secure place, as they are frequently stolen and provide a dangerous opportunity for children or visitors in your house to start abusing these powerful medications. We will not replace any lost or stolen medicine.  As soon as your pain is better, you should flush all your remaining medication.     Many prescription pain medications contain Tylenol  (acetaminophen), including Vicodin , Tylenol #3 , Norco , Lortab , and Percocet .  You should not take any extra pills of Tylenol  if you are using these  prescription medications or you can get very sick.  Do not ever take more than 3000 mg of acetaminophen in any 24 hour period.    All opioids tend to cause constipation. Drink plenty of water and eat foods that have a lot of fiber, such as fruits, vegetables, prune juice, apple juice and high fiber cereal.  Take a laxative if you don t move your bowels at least every other day. Miralax , Milk of Magnesia, Colace , or Senna  can be used to keep you regular.      Remember that you can always come back to the Emergency Department if you are not able to see your regular doctor in the amount of time listed above, if you get any new symptoms, or if there is anything that worries you.

## 2020-05-20 ENCOUNTER — TELEPHONE (OUTPATIENT)
Dept: OPTOMETRY | Facility: CLINIC | Age: 65
End: 2020-05-20

## 2020-05-20 NOTE — TELEPHONE ENCOUNTER
Pt called at 4pm stating that he was suppose to get a call back and he hasn't received a call yet. Explained to him that he talked to a tech who triage the call and had said that it was determined that we will contact him to schedule when our restrictions are lifted. Pt stated that he has pressure and its an emergency that he needs to be seen. I stated that we were closed and that the tech was gone. That I could put a message in but it will not get address until tomorrow 4/21. Pt stated that he wanted me to put a message in. Please call pt back.

## 2020-05-20 NOTE — TELEPHONE ENCOUNTER
"Patient called wanting an appointment to Follow up regarding his eye pressure.  He is using 2 different glaucoma drops, but doesn't know the names.  Last appointment was at [facility] about 2 months ago, but now he has new insurance and needs to be seen at [place].  He describes a \"pressure feeling\" in his eyes, but no redness or other associated symptoms.  Explained to him that he would be placed on a list, and contacted to schedule, once the COVID-19 restrictions were eased.  He prefers to go to the [place] location.    "

## 2020-05-21 NOTE — TELEPHONE ENCOUNTER
5/21/2020    No response from patient, I'm closing the encounter    Debra Cheung Optometric Assistant

## 2020-05-21 NOTE — TELEPHONE ENCOUNTER
[facility], IOP was not elevated, angles were open and no glaucoma meds in pharmacy records. So this is not making sense, does not seem urgent, but Happy to see him if there is concern.    Batool Jordan OD

## 2020-05-21 NOTE — TELEPHONE ENCOUNTER
5/21/2020  I called patient right away this morning per his last conversation with the . He did not answer the phone so I left a message asking him to call me back on my direct line.  When he calls I will ask some follow up questions to the ones that Suzie asked, and if necessary add him to Dr Carpio schedule.     Debra Cheung Optometric Assistant

## 2020-06-10 ENCOUNTER — OFFICE VISIT (OUTPATIENT)
Dept: OPTOMETRY | Facility: CLINIC | Age: 65
End: 2020-06-10
Payer: COMMERCIAL

## 2020-06-10 DIAGNOSIS — H52.4 PRESBYOPIA: ICD-10-CM

## 2020-06-10 DIAGNOSIS — Z01.00 ENCOUNTER FOR EXAMINATION OF EYES AND VISION WITHOUT ABNORMAL FINDINGS: Primary | ICD-10-CM

## 2020-06-10 DIAGNOSIS — H40.003 GLAUCOMA SUSPECT, BILATERAL: ICD-10-CM

## 2020-06-10 DIAGNOSIS — H52.221 REGULAR ASTIGMATISM OF RIGHT EYE: ICD-10-CM

## 2020-06-10 DIAGNOSIS — H25.813 COMBINED FORMS OF AGE-RELATED CATARACT OF BOTH EYES: ICD-10-CM

## 2020-06-10 DIAGNOSIS — H52.11 MYOPIA, RIGHT: ICD-10-CM

## 2020-06-10 DIAGNOSIS — E11.9 TYPE 2 DIABETES MELLITUS WITHOUT RETINOPATHY (H): ICD-10-CM

## 2020-06-10 PROCEDURE — 92004 COMPRE OPH EXAM NEW PT 1/>: CPT | Performed by: OPTOMETRIST

## 2020-06-10 ASSESSMENT — REFRACTION_WEARINGRX
OD_AXIS: 020
OS_SPHERE: +0.50
OD_SPHERE: PLANO
OS_CYLINDER: +1.00
OS_AXIS: 015
SPECS_TYPE: SVL
OD_CYLINDER: +1.00

## 2020-06-10 ASSESSMENT — CUP TO DISC RATIO
OD_RATIO: 0.55
OS_RATIO: 0.45

## 2020-06-10 ASSESSMENT — REFRACTION_MANIFEST
OD_ADD: +2.50
OS_SPHERE: PLANO
OD_AXIS: 014
OS_CYLINDER: SPHERE
OD_CYLINDER: +1.00
OD_SPHERE: -0.50
OS_ADD: +2.50

## 2020-06-10 ASSESSMENT — TONOMETRY
OS_IOP_MMHG: 28
IOP_METHOD: APPLANATION
OD_IOP_MMHG: 25

## 2020-06-10 ASSESSMENT — CONF VISUAL FIELD
OD_SUPERIOR_TEMPORAL_RESTRICTION: 3
OS_NORMAL: 1

## 2020-06-10 ASSESSMENT — SLIT LAMP EXAM - LIDS
COMMENTS: NORMAL
COMMENTS: NORMAL

## 2020-06-10 ASSESSMENT — VISUAL ACUITY
OS_CC: 20/25-
METHOD: SNELLEN - LINEAR
OD_CC: 20/25-

## 2020-06-10 ASSESSMENT — EXTERNAL EXAM - LEFT EYE: OS_EXAM: NORMAL

## 2020-06-10 ASSESSMENT — EXTERNAL EXAM - RIGHT EYE: OD_EXAM: NORMAL

## 2020-06-10 NOTE — PROGRESS NOTES
Chief Complaint   Patient presents with     Annual Eye Exam       Hemoglobin A1C   Date Value Ref Range Status   12/26/2018 7.3 (H) 0 - 5.6 % Final     Comment:     Normal <5.7% Prediabetes 5.7-6.4%  Diabetes 6.5% or higher - adopted from ADA   consensus guidelines.     04/17/2015 9.1 (H) 4.3 - 6.0 % Final     Comment:     Reviewed: OK with previous   02/23/2015 9.8 (H) 4.3 - 6.0 % Final         Last Eye Exam: 1 year  Dilated Previously: Yes    What are you currently using to see?  glasses    Distance Vision Acuity: Satisfied with vision    Near Vision Acuity: Not satisfied     Eye Comfort: good  Do you use eye drops? : Yes: reports history of timolol and another unknown eyedrop    **Patient reports history of being followed as glaucoma patient with [facility] and [facility] [facility]. He reports a change in insurance and now needs to be followed through [facility] system.        Medical, surgical and family histories reviewed and updated 6/10/2020.       OBJECTIVE: See Ophthalmology exam    ASSESSMENT:    ICD-10-CM    1. Encounter for examination of eyes and vision without abnormal findings  Z01.00 EYE EXAM (SIMPLE-NONBILLABLE)   2. Glaucoma suspect, bilateral  H40.003 EYE EXAM (SIMPLE-NONBILLABLE)     OPHTHALMOLOGY ADULT REFERRAL   3. Type 2 diabetes mellitus without retinopathy (H)  E11.9 EYE EXAM (SIMPLE-NONBILLABLE)   4. Combined forms of age-related cataract of both eyes  H25.813 EYE EXAM (SIMPLE-NONBILLABLE)   5. Myopia, right  H52.11 EYE EXAM (SIMPLE-NONBILLABLE)     Refraction   6. Regular astigmatism of right eye  H52.221 EYE EXAM (SIMPLE-NONBILLABLE)     Refraction   7. Presbyopia  H52.4 EYE EXAM (SIMPLE-NONBILLABLE)     Refraction      PLAN:    Rachell Paige aware  eye exam results will be sent to No Ref-Primary, Physician.  Patient Instructions   Patient reports history of being followed as glaucoma patient with [facility] and at [facility]. Previously on 2 eyedrops, one of which was timolol. Unsure  of what other drop was.   Has been out of eyedrops for about 1 week. Started taking the eyedrops roughly one year ago.     Refer to [facility] for glaucoma monitoring.     Patient educated on importance of good blood sugar control.  Letter sent to primary care provider with diabetic eye exam report.     You have the formation of cataracts.  You may notice some blurred vision or glare with night driving.  It is important that you wear good sunglasses to protect your eyes from the ultraviolet light from the sun.     Updated distance-only glasses prescription provided today, per patient request.     The effects of the dilating drops last for 4- 6 hours.  You will be more sensitive to light and vision will be blurry up close.  Mydriatic sunglasses were given if needed.    Davide Herring O.D.  [facility] [place]  [address] [place] [address]

## 2020-06-10 NOTE — LETTER
6/10/2020         RE: Rachell Paige  [address] [address] [address]        Dear Colleague,    Thank you for referring your patient, Rachell Paige, to the [facility]. Please see a copy of my visit note below.    Chief Complaint   Patient presents with     Annual Eye Exam       Hemoglobin A1C   Date Value Ref Range Status   12/26/2018 7.3 (H) 0 - 5.6 % Final     Comment:     Normal <5.7% Prediabetes 5.7-6.4%  Diabetes 6.5% or higher - adopted from ADA   consensus guidelines.     04/17/2015 9.1 (H) 4.3 - 6.0 % Final     Comment:     Reviewed: OK with previous   02/23/2015 9.8 (H) 4.3 - 6.0 % Final         Last Eye Exam: 1 year  Dilated Previously: Yes    What are you currently using to see?  glasses    Distance Vision Acuity: Satisfied with vision    Near Vision Acuity: Not satisfied     Eye Comfort: good  Do you use eye drops? : Yes: reports history of timolol and another unknown eyedrop    **Patient reports history of being followed as glaucoma patient with [facility]. He reports a change in insurance and now needs to be followed through [facility] system.        Medical, surgical and family histories reviewed and updated 6/10/2020.       OBJECTIVE: See Ophthalmology exam    ASSESSMENT:    ICD-10-CM    1. Encounter for examination of eyes and vision without abnormal findings  Z01.00 EYE EXAM (SIMPLE-NONBILLABLE)   2. Glaucoma suspect, bilateral  H40.003 EYE EXAM (SIMPLE-NONBILLABLE)     OPHTHALMOLOGY ADULT REFERRAL   3. Type 2 diabetes mellitus without retinopathy (H)  E11.9 EYE EXAM (SIMPLE-NONBILLABLE)   4. Combined forms of age-related cataract of both eyes  H25.813 EYE EXAM (SIMPLE-NONBILLABLE)   5. Myopia, right  H52.11 EYE EXAM (SIMPLE-NONBILLABLE)     Refraction   6. Regular astigmatism of right eye  H52.221 EYE EXAM (SIMPLE-NONBILLABLE)     Refraction   7. Presbyopia  H52.4 EYE EXAM (SIMPLE-NONBILLABLE)     Refraction      PLAN:    Rachell ANTONETTE Paige aware   eye exam results will be sent to No Ref-Primary, Physician.  Patient Instructions   Patient reports history of being followed as glaucoma patient with [facility] and at [facility]. Previously on 2 eyedrops, one of which was timolol. Unsure of what other drop was.   Has been out of eyedrops for about 1 week. Started taking the eyedrops roughly one year ago.     Refer to [place] [facility] for glaucoma monitoring.     Patient educated on importance of good blood sugar control.  Letter sent to primary care provider with diabetic eye exam report.     You have the formation of cataracts.  You may notice some blurred vision or glare with night driving.  It is important that you wear good sunglasses to protect your eyes from the ultraviolet light from the sun.     Updated distance-only glasses prescription provided today, per patient request.     The effects of the dilating drops last for 4- 6 hours.  You will be more sensitive to light and vision will be blurry up close.  Mydriatic sunglasses were given if needed.    Davide Herring O.D.  [facility] [place]  [address] [place] [address] [place] [address]               Again, thank you for allowing me to participate in the care of your patient.        Sincerely,        Davide Herring, EKATERINA

## 2020-06-10 NOTE — PATIENT INSTRUCTIONS
Patient reports history of being followed as glaucoma patient with [facility] and at [facility]. Previously on 2 eyedrops, one of which was timolol. Unsure of what other drop was.   Has been out of eyedrops for about 1 week. Started taking the eyedrops roughly one year ago.     Refer to [place] ophthalmology for glaucoma monitoring.     Patient educated on importance of good blood sugar control.  Letter sent to primary care provider with diabetic eye exam report.     You have the formation of cataracts.  You may notice some blurred vision or glare with night driving.  It is important that you wear good sunglasses to protect your eyes from the ultraviolet light from the sun.     Updated distance-only glasses prescription provided today, per patient request.     The effects of the dilating drops last for 4- 6 hours.  You will be more sensitive to light and vision will be blurry up close.  Mydriatic sunglasses were given if needed.    Davide Herring O.D.  [facility] [place]  [address] [place] [address]

## 2020-06-11 ENCOUNTER — TELEPHONE (OUTPATIENT)
Dept: OPHTHALMOLOGY | Facility: CLINIC | Age: 65
End: 2020-06-11

## 2020-06-11 NOTE — TELEPHONE ENCOUNTER
Dr. Herring recommended the patient see Ophthalmology for glaucoma testing.  Patient will be added to our Priority list of patients to be scheduled.

## 2020-06-18 ENCOUNTER — OFFICE VISIT (OUTPATIENT)
Dept: OPHTHALMOLOGY | Facility: CLINIC | Age: 65
End: 2020-06-18
Payer: COMMERCIAL

## 2020-06-18 DIAGNOSIS — H40.1131 PRIMARY OPEN ANGLE GLAUCOMA (POAG) OF BOTH EYES, MILD STAGE: Primary | ICD-10-CM

## 2020-06-18 PROCEDURE — 92083 EXTENDED VISUAL FIELD XM: CPT | Performed by: STUDENT IN AN ORGANIZED HEALTH CARE EDUCATION/TRAINING PROGRAM

## 2020-06-18 PROCEDURE — 92002 INTRM OPH EXAM NEW PATIENT: CPT | Performed by: STUDENT IN AN ORGANIZED HEALTH CARE EDUCATION/TRAINING PROGRAM

## 2020-06-18 PROCEDURE — 92133 CPTRZD OPH DX IMG PST SGM ON: CPT | Performed by: STUDENT IN AN ORGANIZED HEALTH CARE EDUCATION/TRAINING PROGRAM

## 2020-06-18 RX ORDER — TIMOLOL MALEATE 5 MG/ML
1 SOLUTION/ DROPS OPHTHALMIC 2 TIMES DAILY
Qty: 1 BOTTLE | Refills: 12 | Status: SHIPPED | OUTPATIENT
Start: 2020-06-18 | End: 2020-07-14

## 2020-06-18 RX ORDER — DORZOLAMIDE HCL 20 MG/ML
1 SOLUTION/ DROPS OPHTHALMIC 2 TIMES DAILY
COMMUNITY
Start: 2020-06-06 | End: 2020-07-14

## 2020-06-18 ASSESSMENT — EXTERNAL EXAM - RIGHT EYE: OD_EXAM: NORMAL

## 2020-06-18 ASSESSMENT — SLIT LAMP EXAM - LIDS
COMMENTS: NORMAL
COMMENTS: NORMAL

## 2020-06-18 ASSESSMENT — TONOMETRY
OS_IOP_MMHG: 28
IOP_METHOD: APPLANATION
OD_IOP_MMHG: 24

## 2020-06-18 ASSESSMENT — VISUAL ACUITY
METHOD: SNELLEN - LINEAR
OS_CC+: -1+1
OS_CC: 20/25
OD_CC+: +1
OD_CC: 20/30
CORRECTION_TYPE: GLASSES

## 2020-06-18 ASSESSMENT — CUP TO DISC RATIO
OD_RATIO: 0.55
OS_RATIO: 0.45

## 2020-06-18 ASSESSMENT — PACHYMETRY
OS_CT(UM): .608
OD_CT(UM): .609

## 2020-06-18 ASSESSMENT — EXTERNAL EXAM - LEFT EYE: OS_EXAM: NORMAL

## 2020-06-18 NOTE — PROGRESS NOTES
Current Eye Medications:  Dorzolamide BID to TID both eyes, depending on how he feels. Also has Timolol rx but pharmacies are out around the cities.     Subjective:  Referral from Dr. Herring for HVF OCT and pachy for glaucoma eval. Has been previously had care at the [facility]. Can't get Timolol refilled so has not been taking ([facility] is his typical pharmacy).  Would rather have a different one that is not on backorder. Thinks Father had glaucoma as well, went blind. Itchy when putting drops in, but after ok. Also tastes the medication when uses dorzolamide. Has been on drops for about a year now.     Objective:  See Ophthalmology Exam.       Assessment:  Rachell Paige is a 64 year old male who presents with:   Encounter Diagnosis   Name Primary?     Primary open angle glaucoma (POAG) of both eyes, mild stage Penny visual field (HVF) 24-2: early superior arcuate defect right eye, mild scattered loss right eye.     OCT optic nerve: avg retinal nerve fiber layer 71/74. Thin inf both eyes.     Intraocular pressure 24/28 today with thick central corneal thickness (609/608). FH: father with glaucoma, went blind.     Our pharmacy has plenty of timolol in stock - will send a new prescription to our pharmacy so he can get a bottle now, then transfer the prescription to a [facility] pharmacy closer to home.        Plan:  Resume timolol (yellow top) twice a day in both eyes (our pharmacy has plenty in stock)    Continue dorzolamide (orange top) twice a day in both eyes     It is very important to take your drops every day as directed and come for recommended appointments. Otherwise you risk permanent vision loss.    Return for eye pressure check in 6 months    Hood Marr MD  [phone]

## 2020-06-18 NOTE — LETTER
6/18/2020         RE: Rachell Paige  [address] [address] [address]        Dear Colleague,    Thank you for referring your patient, Rachell Paige, to the [facility]. Please see a copy of my visit note below.     Current Eye Medications:  Dorzolamide BID to TID both eyes, depending on how he feels. Also has Timolol rx but pharmacies are out around the [facility].     Subjective:  Referral from Dr. Herring for HVF OCT and anil for glaucoma eval. Has been previously had care at the [facility]. Can't get Timolol refilled so has not been taking ([facility] is his typical pharmacy).  Would rather have a different one that is not on backorder. Thinks Father had glaucoma as well, went blind. Itchy when putting drops in, but after ok. Also tastes the medication when uses dorzolamide. Has been on drops for about a year now.     Objective:  See Ophthalmology Exam.       Assessment:  Rachell Paige is a 64 year old male who presents with:   Encounter Diagnosis   Name Primary?     Primary open angle glaucoma (POAG) of both eyes, mild stage Penny visual field (HVF) 24-2: early superior arcuate defect right eye, mild scattered loss right eye.     OCT optic nerve: avg retinal nerve fiber layer 71/74. Thin inf both eyes.     Intraocular pressure 24/28 today with thick central corneal thickness (609/608). FH: father with glaucoma, went blind.     Our pharmacy has plenty of timolol in stock - will send a new prescription to our pharmacy so he can get a bottle now, then transfer the prescription to a [facility] pharmacy closer to home.        Plan:  Resume timolol (yellow top) twice a day in both eyes (our pharmacy has plenty in stock)    Continue dorzolamide (orange top) twice a day in both eyes     It is very important to take your drops every day as directed and come for recommended appointments. Otherwise you risk permanent vision loss.    Return for eye pressure check in 6  months    Hood Marr MD  [phone]            Again, thank you for allowing me to participate in the care of your patient.        Sincerely,        Hood Marr MD

## 2020-06-18 NOTE — PATIENT INSTRUCTIONS
Resume timolol (yellow top) twice a day in both eyes (our pharmacy has plenty in stock)    Continue dorzolamide (orange top) twice a day in both eyes     It is very important to take your drops every day as directed and come for recommended appointments. Otherwise you risk permanent vision loss.    Return for eye pressure check in 6 months    Hood Marr MD  [phone]

## 2020-07-14 ENCOUNTER — TELEPHONE (OUTPATIENT)
Dept: OPHTHALMOLOGY | Facility: CLINIC | Age: 65
End: 2020-07-14

## 2020-07-14 DIAGNOSIS — H40.1131 PRIMARY OPEN ANGLE GLAUCOMA (POAG) OF BOTH EYES, MILD STAGE: ICD-10-CM

## 2020-07-14 RX ORDER — TIMOLOL MALEATE 5 MG/ML
1 SOLUTION/ DROPS OPHTHALMIC 2 TIMES DAILY
Qty: 1 BOTTLE | Refills: 11 | Status: SHIPPED | OUTPATIENT
Start: 2020-07-14 | End: 2020-08-14

## 2020-07-14 RX ORDER — DORZOLAMIDE HCL 20 MG/ML
1 SOLUTION/ DROPS OPHTHALMIC 2 TIMES DAILY
Qty: 10 ML | Refills: 11 | Status: SHIPPED | OUTPATIENT
Start: 2020-07-14 | End: 2020-08-14

## 2020-07-14 NOTE — TELEPHONE ENCOUNTER
Patient is wanting refill on RX: Dorzolamide, Timolol. Please send request to [facility] in [place]. Any questions please call [phone]

## 2020-07-28 ENCOUNTER — OFFICE VISIT (OUTPATIENT)
Dept: FAMILY MEDICINE | Facility: CLINIC | Age: 65
End: 2020-07-28
Payer: COMMERCIAL

## 2020-07-28 VITALS
HEART RATE: 94 BPM | TEMPERATURE: 97.5 F | DIASTOLIC BLOOD PRESSURE: 74 MMHG | OXYGEN SATURATION: 99 % | SYSTOLIC BLOOD PRESSURE: 138 MMHG | HEIGHT: 66 IN | BODY MASS INDEX: 31.82 KG/M2 | WEIGHT: 198 LBS

## 2020-07-28 DIAGNOSIS — E11.42 DIABETIC POLYNEUROPATHY ASSOCIATED WITH TYPE 2 DIABETES MELLITUS (H): ICD-10-CM

## 2020-07-28 DIAGNOSIS — E11.49 OTHER DIABETIC NEUROLOGICAL COMPLICATION ASSOCIATED WITH TYPE 2 DIABETES MELLITUS (H): Primary | ICD-10-CM

## 2020-07-28 DIAGNOSIS — Z91.148 NONCOMPLIANCE WITH MEDICATION REGIMEN: ICD-10-CM

## 2020-07-28 DIAGNOSIS — E78.5 HYPERLIPIDEMIA LDL GOAL <100: ICD-10-CM

## 2020-07-28 DIAGNOSIS — R80.9 MICROALBUMINURIA: ICD-10-CM

## 2020-07-28 DIAGNOSIS — I73.9 CLAUDICATION (H): ICD-10-CM

## 2020-07-28 DIAGNOSIS — E11.9 TYPE 2 DIABETES MELLITUS WITH HEMOGLOBIN A1C GOAL OF 7.0%-8.0% (H): ICD-10-CM

## 2020-07-28 DIAGNOSIS — I10 HTN, GOAL BELOW 140/90: ICD-10-CM

## 2020-07-28 DIAGNOSIS — R91.1 PULMONARY NODULE, RIGHT: ICD-10-CM

## 2020-07-28 DIAGNOSIS — E78.5 HYPERLIPIDEMIA LDL GOAL <70: ICD-10-CM

## 2020-07-28 LAB
ERYTHROCYTE [DISTWIDTH] IN BLOOD BY AUTOMATED COUNT: 14.2 % (ref 10–15)
HBA1C MFR BLD: 7.4 % (ref 0–5.6)
HCT VFR BLD AUTO: 44 % (ref 40–53)
HGB BLD-MCNC: 14.7 G/DL (ref 13.3–17.7)
MCH RBC QN AUTO: 28.9 PG (ref 26.5–33)
MCHC RBC AUTO-ENTMCNC: 33.4 G/DL (ref 31.5–36.5)
MCV RBC AUTO: 87 FL (ref 78–100)
PLATELET # BLD AUTO: 338 10E9/L (ref 150–450)
RBC # BLD AUTO: 5.08 10E12/L (ref 4.4–5.9)
WBC # BLD AUTO: 8.5 10E9/L (ref 4–11)

## 2020-07-28 PROCEDURE — 36415 COLL VENOUS BLD VENIPUNCTURE: CPT | Performed by: INTERNAL MEDICINE

## 2020-07-28 PROCEDURE — 83036 HEMOGLOBIN GLYCOSYLATED A1C: CPT | Performed by: INTERNAL MEDICINE

## 2020-07-28 PROCEDURE — 99204 OFFICE O/P NEW MOD 45 MIN: CPT | Performed by: INTERNAL MEDICINE

## 2020-07-28 PROCEDURE — 85027 COMPLETE CBC AUTOMATED: CPT | Performed by: INTERNAL MEDICINE

## 2020-07-28 PROCEDURE — 82043 UR ALBUMIN QUANTITATIVE: CPT | Performed by: INTERNAL MEDICINE

## 2020-07-28 PROCEDURE — 80053 COMPREHEN METABOLIC PANEL: CPT | Performed by: INTERNAL MEDICINE

## 2020-07-28 RX ORDER — LIDOCAINE 50 MG/G
OINTMENT TOPICAL PRN
Qty: 50 G | Refills: 0 | Status: ON HOLD | OUTPATIENT
Start: 2020-07-28 | End: 2020-10-11

## 2020-07-28 RX ORDER — BLOOD SUGAR DIAGNOSTIC
1 STRIP MISCELLANEOUS
COMMUNITY
End: 2020-07-28

## 2020-07-28 RX ORDER — GABAPENTIN 300 MG/1
300 CAPSULE ORAL AT BEDTIME
Qty: 90 CAPSULE | Refills: 0 | Status: SHIPPED | OUTPATIENT
Start: 2020-07-28 | End: 2021-02-10

## 2020-07-28 RX ORDER — LIDOCAINE HYDROCHLORIDE 10 MG/ML
INJECTION, SOLUTION INFILTRATION
COMMUNITY
End: 2022-02-09

## 2020-07-28 RX ORDER — BLOOD SUGAR DIAGNOSTIC
1 STRIP MISCELLANEOUS 4 TIMES DAILY
Qty: 120 EACH | Refills: 11 | Status: SHIPPED | OUTPATIENT
Start: 2020-07-28 | End: 2021-11-19

## 2020-07-28 RX ORDER — LISINOPRIL 5 MG/1
5 TABLET ORAL DAILY
COMMUNITY
End: 2020-09-29

## 2020-07-28 ASSESSMENT — MIFFLIN-ST. JEOR: SCORE: 1635.87

## 2020-07-28 NOTE — LETTER
July 31, 2020      Rachell Paige  [address] [address] [address]        Dear ,    We are writing to inform you of your test results.    Urine microalbumin, that is protein in the urine is elevated at 603.  Which means you are spilling protein in the urine that results from the uncontrolled diabetes and high blood pressure.   You need to be taking lisinopril which protect the kidney and help prevent the proteinuria from progressing.  Please call and verify whether you are taking lisinopril 10 mg which I recommend or the 5 mg dose.   MTM medical therapy management team (pharmacists) have tried reaching you but there was no response; I strongly recommend that you follow with a specialty referral and MTM and diabetic educator as advised in the clinic.  We will put a referral to care coordination to help you with referrals and coordination of care.     Dr. Nichols     Resulted Orders   Hemoglobin A1c   Result Value Ref Range    Hemoglobin A1C 7.4 (H) 0 - 5.6 %      Comment:      Normal <5.7% Prediabetes 5.7-6.4%  Diabetes 6.5% or higher - adopted from ADA   consensus guidelines.     Comprehensive metabolic panel (BMP + Alb, Alk Phos, ALT, AST, Total. Bili, TP)   Result Value Ref Range    Sodium 139 133 - 144 mmol/L    Potassium 4.1 3.4 - 5.3 mmol/L    Chloride 108 94 - 109 mmol/L    Carbon Dioxide 26 20 - 32 mmol/L    Anion Gap 5 3 - 14 mmol/L    Glucose 68 (L) 70 - 99 mg/dL    Urea Nitrogen 15 7 - 30 mg/dL    Creatinine 1.28 (H) 0.66 - 1.25 mg/dL    GFR Estimate 59 (L) >60 mL/min/[1.73_m2]      Comment:      Non  GFR Calc  Starting 12/18/2018, serum creatinine based estimated GFR (eGFR) will be   calculated using the Chronic Kidney Disease Epidemiology Collaboration   (CKD-EPI) equation.      GFR Estimate If Black 68 >60 mL/min/[1.73_m2]      Comment:       GFR Calc  Starting 12/18/2018, serum creatinine based estimated GFR (eGFR) will be   calculated using the  Chronic Kidney Disease Epidemiology Collaboration   (CKD-EPI) equation.      Calcium 9.2 8.5 - 10.1 mg/dL    Bilirubin Total 0.5 0.2 - 1.3 mg/dL    Albumin 3.7 3.4 - 5.0 g/dL    Protein Total 7.7 6.8 - 8.8 g/dL    Alkaline Phosphatase 106 40 - 150 U/L    ALT 26 0 - 70 U/L    AST 16 0 - 45 U/L   CBC with platelets   Result Value Ref Range    WBC 8.5 4.0 - 11.0 10e9/L    RBC Count 5.08 4.4 - 5.9 10e12/L    Hemoglobin 14.7 13.3 - 17.7 g/dL    Hematocrit 44.0 40.0 - 53.0 %    MCV 87 78 - 100 fl    MCH 28.9 26.5 - 33.0 pg    MCHC 33.4 31.5 - 36.5 g/dL    RDW 14.2 10.0 - 15.0 %    Platelet Count 338 150 - 450 10e9/L   Albumin Random Urine Quantitative with Creat Ratio   Result Value Ref Range    Creatinine Urine 91 mg/dL    Albumin Urine mg/L 549 mg/L    Albumin Urine mg/g Cr 603.30 (H) 0 - 17 mg/g Cr       If you have any questions or concerns, please call the clinic at the number listed above.       Sincerely,        Olivia Nichols MD

## 2020-07-28 NOTE — PROGRESS NOTES
Subjective     Rachell Paige is a 64 year old male who presents to clinic today for the following health issues:    HPI    Chief Complaint   Patient presents with     New Patient     diabetes      Referral     to Oncology -- or benig nodule right side per CT scan in Sep 2019      New Patient/Transfer of Care  Patient presenting for [facility] care.  Has diabetes, neuropathy, peripheral artery disease, left forefoot amputation, right fifth toe amputation, currently complaining of claudication bilateral lower extremities and requesting evaluation.  He describes having a nodule on his right upper abdomen, review of the CT chest results there was a right lobe pulmonary nodule.  He has stopped taking metformin remains compliant with his insulins glargine 70 units once daily.  He is known diabetic for 34 years as he describes.  Denies any chest pain shortness of breath, difficulty breathing, palpitations, presyncope or syncope.  No leg swelling.  No GI or  symptoms.  He is taking 5 mg of lisinopril and has stopped his statin, he states he does not want to take medications so as not to hurt his kidneys.  He wants referral to ophthalmology for diabetic eye exam.  He reports his son had a stroke and is diabetic as well.    Patient Active Problem List   Diagnosis     Type 2 diabetes mellitus with hemoglobin A1c goal of 7.0%-8.0% (H)     HTN, goal below 140/90     Hyperlipidemia LDL goal <100     Financial problems     Anxiety     Rash     Microalbuminuria     Onychomycosis     Dermatitis     Does not have health insurance     Atypical chest pain     Osteomyelitis (H)     Past Surgical History:   Procedure Laterality Date     AMPUTATE TOE(S) Right 2018    Procedure: Right fifth toe amputation;  Surgeon: Clark Lora DPM;  Location:  OR       Social History     Tobacco Use     Smoking status: Former Smoker     Types: Cigarettes     Last attempt to quit: 1990     Years since quittin.5     Smokeless  tobacco: Never Used   Substance Use Topics     Alcohol use: No     Family History   Problem Relation Age of Onset     Diabetes Other      LUNG DISEASE Mother      Glaucoma Father      Cerebrovascular Disease Son      Diabetes Son      Diabetes Daughter          Current Outpatient Medications   Medication Sig Dispense Refill     aspirin 81 MG tablet Take 1 tablet (81 mg) by mouth daily 30 tablet OTC     blood glucose (ACCU-CHEK GUIDE) test strip 1 strip by In Vitro route 4 times daily Use to test blood sugar 3 -4  times daily or as directed. 120 each 11     Cadexomer Iodine, topical, 0.9% (IODOSORB) 0.9 % GEL gel Apply to wound right foot daily 10 g 0     dorzolamide (TRUSOPT) 2 % ophthalmic solution Place 1 drop into both eyes 2 times daily 10 mL 11     gabapentin (NEURONTIN) 300 MG capsule Take 1 capsule (300 mg) by mouth At Bedtime 90 capsule 0     insulin aspart (NOVOLOG FLEXPEN) 100 UNIT/ML injection Inject 15-20 Units Subcutaneous 2 times daily (with meals) With lunch and dinner       Insulin Glargine (LANTUS SOLOSTAR SC) Inject 60 Units Subcutaneous At Bedtime       lidocaine (XYLOCAINE) 5 % external ointment Apply topically as needed for moderate pain 50 g 0     lisinopril (ZESTRIL) 5 MG tablet Take 5 mg by mouth daily       metFORMIN (GLUCOPHAGE-XR) 500 MG 24 hr tablet Take 1,000 mg by mouth daily       simvastatin (ZOCOR) 10 MG tablet Take 10 mg by mouth At Bedtime       timolol maleate (TIMOPTIC) 0.5 % ophthalmic solution Place 1 drop into both eyes 2 times daily 1 Bottle 11     albuterol (PROAIR HFA) 108 (90 Base) MCG/ACT inhaler Inhale 2 puffs into the lungs every 4 hours as needed for shortness of breath / dyspnea (Patient not taking: Reported on 7/28/2020) 1 Inhaler 0     bacitracin 500 UNIT/GM external ointment Apply to wound right 4th toe daily with bandaid (Patient not taking: Reported on 7/28/2020) 14 g 0     bacitracin 500 UNIT/GM external ointment Apply topically 2 times daily (Patient not  "taking: Reported on 7/28/2020) 14 g 0     benzonatate (TESSALON) 100 MG capsule Take 2 capsules (200 mg) by mouth 3 times daily as needed for cough (Patient not taking: Reported on 7/28/2020) 40 capsule 0     Lido-Pentaf-Tetrafl-Ultrasound (ACCUCAINE) 1 % KIT        lisinopril (PRINIVIL/ZESTRIL) 10 MG tablet Take 10 mg by mouth daily       Allergies   Allergen Reactions     Aleve      HA sweats     Citalopram Itching     Clopidogrel Nausea and Vomiting     Pt to restart on 11/25/15 to see again if he tolerates it or not. His sx were only GI. Not a true allergy     Naproxen Itching     About 10 yrs ago, itching all over from taking Aleve  \"get sick and really sick\"       Sulfamethoxazole-Trimethoprim      Other reaction(s): Renal Failure  Other reaction(s): Renal Failure     Recent Labs   Lab Test 07/28/20  1616 02/22/20  1648 08/15/19  1853  12/26/18  2258 06/22/18 2012 04/20/17  0757  04/17/15  1435  02/23/15  1500 05/07/14  1340   A1C 7.4*  --   --   --  7.3*  --   --   --   --  9.1*   < >  --  9.3*   LDL  --   --   --   --   --   --   --   --   --   --   --  116 129   HDL  --   --   --   --   --   --   --   --   --   --   --  58 40*   TRIG  --   --   --   --   --   --   --   --   --   --   --  119 142   ALT  --  24  --   --   --  23  --  23  --  25  --   --   --    CR  --  1.23 1.06   < > 1.08 1.40*   < > 1.38*   < > 1.03   < >  --  0.94   GFRESTIMATED  --  62 74   < > 73 52*   < > 52*   < > 74   < >  --  83   GFRESTBLACK  --  72 86   < > 85 62   < > 64   < > 90   < >  --  >90   POTASSIUM  --  4.5 3.9   < > 4.1 4.5   < > 4.0   < > 4.3   < >  --  4.3   TSH  --   --   --   --   --   --   --   --   --   --   --  1.60 2.45    < > = values in this interval not displayed.      BP Readings from Last 3 Encounters:   07/28/20 138/74   02/22/20 135/62   12/02/19 136/74    Wt Readings from Last 3 Encounters:   07/28/20 89.8 kg (198 lb)   12/02/19 90.7 kg (199 lb 14.4 oz)   11/26/19 90.7 kg (199 lb 14.4 oz)                " "    Reviewed and updated as needed this visit by Provider  Tobacco  Allergies  Meds  Problems  Med Hx  Surg Hx  Fam Hx  Soc Hx          Review of Systems   Constitutional, HEENT, cardiovascular, pulmonary, GI, , musculoskeletal, neuro, skin, endocrine and psych systems are negative, except as otherwise noted.      Objective    /74 (BP Location: Left arm, Patient Position: Chair, Cuff Size: Adult Regular)   Pulse 94   Temp 97.5  F (36.4  C) (Temporal)   Ht 1.676 m (5' 6\")   Wt 89.8 kg (198 lb)   SpO2 99%   BMI 31.96 kg/m    Body mass index is 31.96 kg/m .  Physical Exam   GENERAL: healthy, alert and no distress  EYES: Eyes grossly normal to inspection, PERRL and conjunctivae and sclerae normal    NECK: no adenopathy, no asymmetry, masses, or scars and thyroid normal to palpation  RESP: lungs clear to auscultation - no rales, rhonchi or wheezes  CV: regular rate and rhythm, normal S1 S2, no S3 or S4, no murmur, click or rub, no peripheral edema and peripheral pulses +1 distally  ABDOMEN: soft, nontender, no hepatosplenomegaly, no masses and bowel sounds normal  MS: no gross musculoskeletal defects noted, no edema.  Has left forefoot amputation, and right fifth toe amputation  SKIN: Has hypopigmented scars on anterior chin of bilateral lower extremity below his knees  NEURO: Normal strength and tone, mentation intact and speech normal  PSYCH: mentation appears normal, affect normal/bright    Diagnostic Test Results:  Labs reviewed in Epic        Assessment & Plan   Problem List Items Addressed This Visit        Endocrine    Type 2 diabetes mellitus with hemoglobin A1c goal of 7.0%-8.0% (H)    Relevant Medications    blood glucose (ACCU-CHEK GUIDE) test strip    Other Relevant Orders    Hemoglobin A1c (Completed)    Comprehensive metabolic panel (BMP + Alb, Alk Phos, ALT, AST, Total. Bili, TP) (Completed)    CBC with platelets (Completed)    MED THERAPY MANAGE REFERRAL    AMBULATORY ADULT DIABETES " "EDUCATOR REFERRAL    ENDOCRINOLOGY ADULT REFERRAL    Albumin Random Urine Quantitative with Creat Ratio (Completed)    OPHTHALMOLOGY ADULT REFERRAL    Hyperlipidemia LDL goal <100       Circulatory    HTN, goal below 140/90    Relevant Medications    lisinopril (ZESTRIL) 5 MG tablet    Other Relevant Orders    MED THERAPY MANAGE REFERRAL    Albumin Random Urine Quantitative with Creat Ratio (Completed)       Other    Microalbuminuria      Other Visit Diagnoses     Other diabetic neurological complication associated with type 2 diabetes mellitus (H)    -  Primary    Relevant Medications    blood glucose (ACCU-CHEK GUIDE) test strip    Other Relevant Orders    MED THERAPY MANAGE REFERRAL    Diabetic polyneuropathy associated with type 2 diabetes mellitus (H)        Relevant Medications    lidocaine (XYLOCAINE) 5 % external ointment    gabapentin (NEURONTIN) 300 MG capsule    blood glucose (ACCU-CHEK GUIDE) test strip    Other Relevant Orders    MED THERAPY MANAGE REFERRAL    Hyperlipidemia LDL goal <70        Relevant Orders    Lipid panel reflex to direct LDL Non-fasting    MED THERAPY MANAGE REFERRAL    Claudication (H)        Relevant Orders    VASCULAR MEDICINE REFERRAL    CARDIOLOGY EVAL ADULT REFERRAL    MED THERAPY MANAGE REFERRAL    Pulmonary nodule, right        Relevant Orders    PULMONARY MEDICINE REFERRAL    Noncompliance with medication regimen             Discussed importance of compliance with medical recommendations and importance of being on statin as well as lisinopril; increase the dose to 10 mg he was reluctant for both changes.  We will have MTM involved with his management, as well as diabetic educator and endocrinology.  Refer to vascular surgery for claudication and cardiology for establishing care.  I advised patient he does have a pulmonary nodule noted well-developed and abdominal nodule; \"he kept repeating that nodule is in his right abdomen\".  Will refer to pulmonary nodule clinic.  We " "will put a referral to ophthalmology for diabetic eye exam.  He will need labs checked today including lipid panel, HbA1c, comprehensive panel etc.    BMI:   Estimated body mass index is 31.96 kg/m  as calculated from the following:    Height as of this encounter: 1.676 m (5' 6\").    Weight as of this encounter: 89.8 kg (198 lb).   Weight management plan: Discussed healthy diet and exercise guidelines          Work on weight loss  Regular exercise  See Patient Instructions  No follow-ups on file.    Olivia Nichols MD  [facility]  "

## 2020-07-28 NOTE — LETTER
July 29, 2020      Rachell Paige  [address] [address] [address]        Dear ,    Comprehensive panel shows normal electrolytes, slight worsening of the kidney function GFR which is filtration rate down to 59 from 62, creatinine is up to 1.28 from 1.23.  Encourage increase fluid intake.  Need to call us with blood pressure readings.  Take blood pressure medicines as prescribed.   Calcium level, total protein albumin and liver enzymes ALT and AST are all normal.   Diabetes test called HbA1c is stable at 7.4.,  Please schedule appointments with diabetic educator and endocrinology as advised in the clinic.   CBC shows normal white blood cell count, suggest no underlying infection, normal hemoglobin hematocrit there is no anemia and normal platelet count.    Dr. Nichols     Resulted Orders   Hemoglobin A1c   Result Value Ref Range    Hemoglobin A1C 7.4 (H) 0 - 5.6 %      Comment:      Normal <5.7% Prediabetes 5.7-6.4%  Diabetes 6.5% or higher - adopted from ADA   consensus guidelines.     Comprehensive metabolic panel (BMP + Alb, Alk Phos, ALT, AST, Total. Bili, TP)   Result Value Ref Range    Sodium 139 133 - 144 mmol/L    Potassium 4.1 3.4 - 5.3 mmol/L    Chloride 108 94 - 109 mmol/L    Carbon Dioxide 26 20 - 32 mmol/L    Anion Gap 5 3 - 14 mmol/L    Glucose 68 (L) 70 - 99 mg/dL    Urea Nitrogen 15 7 - 30 mg/dL    Creatinine 1.28 (H) 0.66 - 1.25 mg/dL    GFR Estimate 59 (L) >60 mL/min/[1.73_m2]      Comment:      Non  GFR Calc  Starting 12/18/2018, serum creatinine based estimated GFR (eGFR) will be   calculated using the Chronic Kidney Disease Epidemiology Collaboration   (CKD-EPI) equation.      GFR Estimate If Black 68 >60 mL/min/[1.73_m2]      Comment:       GFR Calc  Starting 12/18/2018, serum creatinine based estimated GFR (eGFR) will be   calculated using the Chronic Kidney Disease Epidemiology Collaboration   (CKD-EPI) equation.      Calcium 9.2 8.5 -  10.1 mg/dL    Bilirubin Total 0.5 0.2 - 1.3 mg/dL    Albumin 3.7 3.4 - 5.0 g/dL    Protein Total 7.7 6.8 - 8.8 g/dL    Alkaline Phosphatase 106 40 - 150 U/L    ALT 26 0 - 70 U/L    AST 16 0 - 45 U/L   CBC with platelets   Result Value Ref Range    WBC 8.5 4.0 - 11.0 10e9/L    RBC Count 5.08 4.4 - 5.9 10e12/L    Hemoglobin 14.7 13.3 - 17.7 g/dL    Hematocrit 44.0 40.0 - 53.0 %    MCV 87 78 - 100 fl    MCH 28.9 26.5 - 33.0 pg    MCHC 33.4 31.5 - 36.5 g/dL    RDW 14.2 10.0 - 15.0 %    Platelet Count 338 150 - 450 10e9/L       If you have any questions or concerns, please call the clinic at the number listed above.       Sincerely,        Olivia Nichols MD

## 2020-07-29 ENCOUNTER — TELEPHONE (OUTPATIENT)
Dept: FAMILY MEDICINE | Facility: CLINIC | Age: 65
End: 2020-07-29

## 2020-07-29 ENCOUNTER — TELEPHONE (OUTPATIENT)
Dept: OTHER | Facility: CLINIC | Age: 65
End: 2020-07-29

## 2020-07-29 DIAGNOSIS — I73.9 PAD (PERIPHERAL ARTERY DISEASE) (H): Primary | ICD-10-CM

## 2020-07-29 LAB
ALBUMIN SERPL-MCNC: 3.7 G/DL (ref 3.4–5)
ALP SERPL-CCNC: 106 U/L (ref 40–150)
ALT SERPL W P-5'-P-CCNC: 26 U/L (ref 0–70)
ANION GAP SERPL CALCULATED.3IONS-SCNC: 5 MMOL/L (ref 3–14)
AST SERPL W P-5'-P-CCNC: 16 U/L (ref 0–45)
BILIRUB SERPL-MCNC: 0.5 MG/DL (ref 0.2–1.3)
BUN SERPL-MCNC: 15 MG/DL (ref 7–30)
CALCIUM SERPL-MCNC: 9.2 MG/DL (ref 8.5–10.1)
CHLORIDE SERPL-SCNC: 108 MMOL/L (ref 94–109)
CO2 SERPL-SCNC: 26 MMOL/L (ref 20–32)
CREAT SERPL-MCNC: 1.28 MG/DL (ref 0.66–1.25)
CREAT UR-MCNC: 91 MG/DL
GFR SERPL CREATININE-BSD FRML MDRD: 59 ML/MIN/{1.73_M2}
GLUCOSE SERPL-MCNC: 68 MG/DL (ref 70–99)
MICROALBUMIN UR-MCNC: 549 MG/L
MICROALBUMIN/CREAT UR: 603.3 MG/G CR (ref 0–17)
POTASSIUM SERPL-SCNC: 4.1 MMOL/L (ref 3.4–5.3)
PROT SERPL-MCNC: 7.7 G/DL (ref 6.8–8.8)
SODIUM SERPL-SCNC: 139 MMOL/L (ref 133–144)

## 2020-07-29 NOTE — LETTER
August 4, 2020      Asimitzel WHITMAN Grecia  [address] [address] [address]        Andrea Lovett,     Here are your results from Dr Nichols  Let us know if you have questions    Thanks,  ALESSANDRO Tinajero,   I reviewed your labs:  Comprehensive panel shows normal electrolytes, slight worsening of the kidney function GFR which is filtration rate down to 59 from 62, creatinine is up to 1.28 from 1.23.  Encourage increase fluid intake.  Need to call us with blood pressure readings.  Take blood pressure medicines as prescribed.  Calcium level, total protein albumin and liver enzymes ALT and AST are all normal.  Diabetes test called HbA1c is stable at 7.4.,  Please schedule appointments with diabetic educator and endocrinology as advised in the clinic.  CBC shows normal white blood cell count, suggest no underlying infection, normal hemoglobin hematocrit there is no anemia and normal platelet count..  Your urine microalbumin, that is protein in the urine is elevated at 603.  Which means you are spilling protein in the urine that results from the uncontrolled diabetes and high blood pressure.   You need to be taking lisinopril which protect the kidney and help prevent the proteinuria from progressing. Are you taking lisinopril 10 mg which I recommend or the 5 mg dose?  He also wants you to see MTM medical therapy management team - they've been trying to reach you but there was no response; I strongly recommend that you follow with a specialty referral and MTM and diabetic educator as advised in the clinic.  We will put a referral to care coordination to help you with referrals and coordination of care.   Dr. Nichols

## 2020-07-29 NOTE — TELEPHONE ENCOUNTER
Referred by Dr. Nichols of [facility] for claudication.    Of note:    diabetes, neuropathy, peripheral artery disease, left forefoot amputation, right fifth toe amputation, currently complaining of claudication bilateral lower extremities and requesting evaluation    [facility]  01/22/2016  Severe stenosis proximal posterior tibial artery treated with PTA   to 2.5 mm with good results. 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     11/1/19 Dr. Chow (Vascular): He does not have typical claudication symptoms. He denies rest pain and does get some relief with exercise  ABIs done available in Care Everywhere:  The studies suggesting tibial vessel versus small vessel issues    PIYUSH 12/27/18 in Epic  PIYUSH 11/11/19 in Care Everywhere.      ABIs. (w/tbis)  New patient consult with   IR or Vascular Medicine.

## 2020-07-29 NOTE — TELEPHONE ENCOUNTER
Patient is scheduled for his Ultrasound on 08/07/20 in [place]. He is scheduled for his in clinic Consult Appointment with Dr Parson on 08/11/20.

## 2020-07-29 NOTE — RESULT ENCOUNTER NOTE
Please notify patient of the following lab results  Rachell, I reviewed your labs,  Comprehensive panel shows normal electrolytes, slight worsening of the kidney function GFR which is filtration rate down to 59 from 62, creatinine is up to 1.28 from 1.23.  Encourage increase fluid intake.  Need to call us with blood pressure readings.  Take blood pressure medicines as prescribed.  Calcium level, total protein albumin and liver enzymes ALT and AST are all normal.  Diabetes test called HbA1c is stable at 7.4.,  Please schedule appointments with diabetic educator and endocrinology as advised in the clinic.  CBC shows normal white blood cell count, suggest no underlying infection, normal hemoglobin hematocrit there is no anemia and normal platelet count..  Dr. Nichols

## 2020-07-29 NOTE — TELEPHONE ENCOUNTER
Left message asking patient to call back   Also - mailed letter to patient     Neva CHAVIS RN    *Addendum 7/31 (additional lab results)   Mailed additional letter to patient     Olivia Nichols MD P [facility] Triage  Cc: Neva Sahni, RN               Please notify patient his urine microalbumin, that is protein in the urine is elevated at 603.  Which means he spilling protein in the urine that results from the uncontrolled diabetes and high blood pressure.   He needs to be taking lisinopril which protect the kidney and help prevent the proteinuria from progressing.  Please clarify with patient is he taking lisinopril 10 mg which I recommend or the 5 mg dose.   Please advise patient MTM medical therapy management team have tried reaching him but there was no response; I strongly recommend that he follows with a specialty referral and MTM and diabetic educator as advised in the clinic.  We will put a referral to care coordination to help patient with referrals and coordination of care.   Dr. Nichols

## 2020-07-29 NOTE — TELEPHONE ENCOUNTER
----- Message from Olivia Nichols MD sent at 7/29/2020  9:42 AM CDT -----  Please notify patient of the following lab results  Asimamed, I reviewed your labs,  Comprehensive panel shows normal electrolytes, slight worsening of the kidney function GFR which is filtration rate down to 59 from 62, creatinine is up to 1.28 from 1.23.  Encourage increase fluid intake.  Need to call us with blood pressure readings.  Take blood pressure medicines as prescribed.  Calcium level, total protein albumin and liver enzymes ALT and AST are all normal.  Diabetes test called HbA1c is stable at 7.4.,  Please schedule appointments with diabetic educator and endocrinology as advised in the clinic.  CBC shows normal white blood cell count, suggest no underlying infection, normal hemoglobin hematocrit there is no anemia and normal platelet count..  Dr. Nichols

## 2020-07-30 ENCOUNTER — TELEPHONE (OUTPATIENT)
Dept: PHARMACY | Facility: OTHER | Age: 65
End: 2020-07-30

## 2020-07-30 DIAGNOSIS — Z78.9 MEDICALLY COMPLEX PATIENT: Primary | ICD-10-CM

## 2020-07-30 NOTE — TELEPHONE ENCOUNTER
Sent referral letter.    Leonard GutierrezD, [facility]  Medication Therapy Management Provider  Pager: [phone]

## 2020-07-30 NOTE — TELEPHONE ENCOUNTER
MTM referral from: [facility] visit (referral by provider)    MTM referral outreach attempt #2 on July 30, 2020 at 3:41 PM      Outcome: Patient not reachable after several attempts, will route to MTM Pharmacist/Provider as an FYI. Thank you for the referral.    Melva Del Real, MTM Coordinator

## 2020-07-30 NOTE — LETTER
July 30, 2020      Rachell Paige  [address] [address] [address]        Dear Dr. Magdalena Lovett has recommended you schedule a Medication Therapy Management (MTM) appointment. MTM is designed to help you get the most of out of your medicines.     During an MTM appointment a specially trained pharmacist will review all of your medicines, both prescription and over-the-counter. They will make sure your medicines are the best choice for you and are safe and convenient for you.  MTM pharmacists work together with you and your doctor to help you understand your medicines, solve any problems related to your medicines and help you get the best results from taking your medicines.     At [facility], we strongly believe in a team approach to health care. We want to help you understand your medicines and health conditions. To learn more about how you might benefit from MTM services, watch the patient video at [email or web address].     To make an appointment, please call the clinic at [phone] or the MTM scheduling line at [phone] (toll-free at [phone]).    We look forward to hearing from you!        Liseth Watt PharmD, [facility]  Medication Therapy Management Provider  Pager: [phone]     Lisandra Bernstein PharmD  Medication Therapy Management Resident  Pager: [phone]

## 2020-07-31 ENCOUNTER — PATIENT OUTREACH (OUTPATIENT)
Dept: CARE COORDINATION | Facility: CLINIC | Age: 65
End: 2020-07-31

## 2020-07-31 ENCOUNTER — TELEPHONE (OUTPATIENT)
Dept: FAMILY MEDICINE | Facility: CLINIC | Age: 65
End: 2020-07-31

## 2020-07-31 NOTE — RESULT ENCOUNTER NOTE
Please notify patient his urine microalbumin, that is protein in the urine is elevated at 603.  Which means he spilling protein in the urine that results from the uncontrolled diabetes and high blood pressure.  He needs to be taking lisinopril which protect the kidney and help prevent the proteinuria from progressing.  Please clarify with patient is he taking lisinopril 10 mg which I recommend or the 5 mg dose.  Please advise patient MTM medical therapy management team have tried reaching him but there was no response; I strongly recommend that he follows with a specialty referral and MTM and diabetic educator as advised in the clinic.  We will put a referral to care coordination to help patient with referrals and coordination of care.  Dr. Nichols

## 2020-07-31 NOTE — LETTER
[place] CARE COORDINATION  [facility]  August 3, 2020    Rachell Paige  [address] [address] [address]      Dear Rachell,    I am a clinic community health worker who works with Dr. Nichols and the [facility]. I have been trying to reach you recently to introduce Clinic Care Coordination and to see if there was anything I could assist you with.  Below is a description of clinic care coordination and how I can further assist you.      The clinic care coordination team is made up of a registered nurse,  and community health worker who understand the health care system. The goal of clinic care coordination is to help you manage your health and improve access to the health care system in the most efficient manner. The team can assist you in meeting your health care goals by providing education, coordinating services, strengthening the communication among your providers and supporting you with any resource needs.    Please feel free to contact me at [phone] with any questions or concerns. We are focused on providing you with the highest-quality healthcare experience possible and that all starts with you.     Sincerely,     LULA oMya   Clinic Care Coordination  [facility] Clinics:  [place], [place], [place], [place], and [place]  Phone: [phone]

## 2020-07-31 NOTE — PROGRESS NOTES
Clinic Care Coordination Contact  [facility]/Voicemail  Left Voicemail     Clinical Data: Care Coordinator Outreach  Outreach attempted x 1.  Left message on patient's voicemail with call back information and requested return call.    Chart Review: Referral from PCP for uncontrolled diabetes and hypertension, concern with compliance with medical treatment.    Plan: Care Coordinator will try to reach patient again in 1-2 business days.    LULA Moya   Clinic Care Coordination  [facility] Clinics:  [place], [place], [place], [place], and [place]  Phone: [phone]

## 2020-07-31 NOTE — TELEPHONE ENCOUNTER
----- Message from Olivia Nichols MD sent at 7/30/2020 10:30 PM CDT -----  Please notify patient his urine microalbumin, that is protein in the urine is elevated at 603.  Which means he spilling protein in the urine that results from the uncontrolled diabetes and high blood pressure.  He needs to be taking lisinopril which protect the kidney and help prevent the proteinuria from progressing.  Please clarify with patient is he taking lisinopril 10 mg which I recommend or the 5 mg dose.  Please advise patient MTM medical therapy management team have tried reaching him but there was no response; I strongly recommend that he follows with a specialty referral and MTM and diabetic educator as advised in the clinic.  We will put a referral to care coordination to help patient with referrals and coordination of care.  Dr. Nichols

## 2020-08-03 NOTE — PROGRESS NOTES
Clinic Care Coordination Contact  [facility]/Voicemail  Unable to leave voicemail     Clinical Data: Care Coordinator Outreach  Outreach attempted x 2.  Left message on patient's voicemail with call back information and requested return call.    Chart Review: Referral from PCP for uncontrolled diabetes and hypertension, concern with compliance with medical treatment. Medically complex patient.    Plan: Care Coordinator will send care coordination introduction letter with care coordinator contact information and explanation of care coordination services via mail. Care Coordinator will do no further outreaches at this time.    LULA Moya   Clinic Care Coordination  [facility] Clinics:  [place], [place], [place], [place], and [place]  Phone: [phone]

## 2020-08-04 NOTE — TELEPHONE ENCOUNTER
Dr Nichols,   [facility]:  Spoke with pt   He wants letter mailed to him   Noted letter mailed, but don't see anything in chart  Mailed another letter with results attached  He's currently on vacation in [place] and did not want to discuss with me by phone  Wants to review in letter  Dulce Maria WATSON RN

## 2020-08-05 ENCOUNTER — TELEPHONE (OUTPATIENT)
Dept: FAMILY MEDICINE | Facility: CLINIC | Age: 65
End: 2020-08-05

## 2020-08-05 DIAGNOSIS — E11.9 TYPE 2 DIABETES MELLITUS WITH HEMOGLOBIN A1C GOAL OF 7.0%-8.0% (H): Primary | ICD-10-CM

## 2020-08-05 RX ORDER — GLUCOSAMINE HCL/CHONDROITIN SU 500-400 MG
CAPSULE ORAL
Qty: 120 EACH | Refills: 6 | Status: SHIPPED | OUTPATIENT
Start: 2020-08-05

## 2020-08-05 RX ORDER — LANCETS
EACH MISCELLANEOUS
Qty: 200 EACH | Refills: 6 | Status: SHIPPED | OUTPATIENT
Start: 2020-08-05 | End: 2021-06-25

## 2020-08-05 NOTE — TELEPHONE ENCOUNTER
Accu-chek meter is not covered by insurance policy. Generic testing supplies pended for signature with instructions to fill per insurance recommendations for meter.    Clark Fallon CMA on 8/5/2020 at 9:08 AM

## 2020-08-07 ENCOUNTER — HOSPITAL ENCOUNTER (OUTPATIENT)
Dept: ULTRASOUND IMAGING | Facility: CLINIC | Age: 65
Discharge: HOME | End: 2020-08-07
Attending: INTERNAL MEDICINE | Admitting: INTERNAL MEDICINE
Payer: COMMERCIAL

## 2020-08-07 DIAGNOSIS — I73.9 PAD (PERIPHERAL ARTERY DISEASE) (H): ICD-10-CM

## 2020-08-07 PROCEDURE — 93924 LWR XTR VASC STDY BILAT: CPT

## 2020-08-13 DIAGNOSIS — H40.1131 PRIMARY OPEN ANGLE GLAUCOMA (POAG) OF BOTH EYES, MILD STAGE: ICD-10-CM

## 2020-08-13 NOTE — TELEPHONE ENCOUNTER
Pt called stating that he needs refills on his Dorzolamide and his Timolol Maleate. Please contact pt if there is any questions.

## 2020-08-14 RX ORDER — TIMOLOL MALEATE 5 MG/ML
1 SOLUTION/ DROPS OPHTHALMIC 2 TIMES DAILY
Qty: 1 BOTTLE | Refills: 11 | Status: SHIPPED | OUTPATIENT
Start: 2020-08-14 | End: 2021-09-16

## 2020-08-14 RX ORDER — DORZOLAMIDE HCL 20 MG/ML
1 SOLUTION/ DROPS OPHTHALMIC 2 TIMES DAILY
Qty: 10 ML | Refills: 11 | Status: SHIPPED | OUTPATIENT
Start: 2020-08-14 | End: 2021-09-16

## 2020-08-19 ENCOUNTER — DOCUMENTATION ONLY (OUTPATIENT)
Dept: CARE COORDINATION | Facility: CLINIC | Age: 65
End: 2020-08-19

## 2020-08-27 ENCOUNTER — OFFICE VISIT (OUTPATIENT)
Dept: OPHTHALMOLOGY | Facility: CLINIC | Age: 65
End: 2020-08-27
Attending: INTERNAL MEDICINE
Payer: COMMERCIAL

## 2020-08-27 DIAGNOSIS — H40.1130 PRIMARY OPEN ANGLE GLAUCOMA OF BOTH EYES, UNSPECIFIED GLAUCOMA STAGE: ICD-10-CM

## 2020-08-27 DIAGNOSIS — H52.4 PRESBYOPIA OF BOTH EYES: ICD-10-CM

## 2020-08-27 DIAGNOSIS — H02.88A MEIBOMIAN GLAND DYSFUNCTION (MGD) OF UPPER AND LOWER LIDS OF BOTH EYES: Primary | ICD-10-CM

## 2020-08-27 DIAGNOSIS — H02.88B MEIBOMIAN GLAND DYSFUNCTION (MGD) OF UPPER AND LOWER LIDS OF BOTH EYES: Primary | ICD-10-CM

## 2020-08-27 ASSESSMENT — SLIT LAMP EXAM - LIDS
COMMENTS: 1+ MGD
COMMENTS: 1+ MGD

## 2020-08-27 ASSESSMENT — REFRACTION_WEARINGRX
OD_CYLINDER: +1.00
OD_SPHERE: PLANO
OD_AXIS: 020
OS_CYLINDER: +1.00
OS_SPHERE: +0.50
OS_AXIS: 015
SPECS_TYPE: SVL

## 2020-08-27 ASSESSMENT — VISUAL ACUITY
CORRECTION_TYPE: GLASSES
OS_CC+: -1
OD_CC+: -3
OD_CC: 20/25
METHOD: SNELLEN - LINEAR
OS_CC: 20/20

## 2020-08-27 ASSESSMENT — REFRACTION_MANIFEST
OD_SPHERE: -0.50
OS_CYLINDER: SPHERE
OD_CYLINDER: +1.00
OS_SPHERE: PLANO
OD_AXIS: 014

## 2020-08-27 ASSESSMENT — CONF VISUAL FIELD
OS_NORMAL: 1
METHOD: COUNTING FINGERS
OD_NORMAL: 1

## 2020-08-27 ASSESSMENT — TONOMETRY
OD_IOP_MMHG: 23
IOP_METHOD: TONOPEN
OS_IOP_MMHG: 26

## 2020-08-27 ASSESSMENT — EXTERNAL EXAM - RIGHT EYE: OD_EXAM: NORMAL

## 2020-08-27 ASSESSMENT — EXTERNAL EXAM - LEFT EYE: OS_EXAM: NORMAL

## 2020-08-27 NOTE — PROGRESS NOTES
History  HPI     Diabetic Eye Exam     Vision is blurred for distance.  Associated symptoms include photophobia.  Diabetes characteristics include Type 2.  Blood sugar level is controlled.  Treatments tried include eye drops.  Pain was noted as 2/10.              Comments     Diabetic eye exam and glasses check. Pt states has problems with blurred vision and runny eyes. Timolol and Dorzolamide BID each eye- pt states he gets it in twice a day but was not able to do it this morning. Also did not get to check blood sugar this AM.     Lab Results       Component                Value               Date                       A1C                      7.4                 07/28/2020                 A1C                      7.3                 12/26/2018                 A1C                      9.1                 04/17/2015                 A1C                      9.8                 02/23/2015                 A1C                      9.3                 05/07/2014              Padao May, COT COT 2:07 PM August 27, 2020             Last edited by Tavia May COT on 8/27/2020  2:07 PM. (History)          Assessment/Plan  (H02.88A,  H02.88B) Meibomian gland dysfunction (MGD) of upper and lower lids of both eyes  (primary encounter diagnosis)  Comment: Symptomatic with feeling of pressure in the eyes  Plan:  Educated patient on condition and clinical findings. Recommended warm compresses twice each day for ten minutes and artificial tears as needed. Monitor annually, or sooner if symptoms worsen.    (H52.4) Presbyopia of both eyes  Comment: Sees well with most recent prescription (has not filled prescription yet)  Plan:  Provided patient with copy of previous prescription. No change recommended today.    (H40.1130) Primary open angle glaucoma of both eyes, unspecified glaucoma stage  Comment: Reports good compliance with drops but did not take most recent dose, pressures 23/26 today  Plan:  Stressed importance of good  compliance and taking medications the day of the next appointment. Return in 1 month for follow-up with gonioscopy and dilation. Consider OCT and visual field.    Continue use of dorzolamide twice each day and timolol twice each day.    Return to clinic in 1 month for comprehensive eye exam with glaucoma testing.    Complete documentation of historical and exam elements from today's encounter can  be found in the full encounter summary report (not reduplicated in this progress  note). I personally obtained the chief complaint(s) and history of present illness. I  confirmed and edited as necessary the review of systems, past medical/surgical  history, family history, social history, and examination findings as documented by  others; and I examined the patient myself. I personally reviewed the relevant tests,  images, and reports as documented above. I formulated and edited as necessary the  assessment and plan and discussed the findings and management plan with the  patient and family.    Timi Etienne OD, FAAO

## 2020-08-27 NOTE — NURSING NOTE
Chief Complaints and History of Present Illnesses   Patient presents with     Diabetic Eye Exam     Chief Complaint(s) and History of Present Illness(es)     Diabetic Eye Exam     Vision: is blurred for distance    Associated symptoms: photophobia    Diabetes Type: Type 2    Blood Sugars: is controlled    Treatments tried: eye drops    Pain scale: 2/10              Comments     Diabetic eye exam and glasses check. Pt states has problems with blurred vision and runny eyes. Timolol and Dorzolamide BID each eye- pt states he gets it in twice a day but was not able to do it this morning. Also did not get to check blood sugar this AM.     Lab Results       Component                Value               Date                       A1C                      7.4                 07/28/2020                 A1C                      7.3                 12/26/2018                 A1C                      9.1                 04/17/2015                 A1C                      9.8                 02/23/2015                 A1C                      9.3                 05/07/2014              Padao May, COT COT 2:07 PM August 27, 2020

## 2020-09-01 ENCOUNTER — OFFICE VISIT (OUTPATIENT)
Dept: OTHER | Facility: CLINIC | Age: 65
End: 2020-09-01
Attending: INTERNAL MEDICINE
Payer: COMMERCIAL

## 2020-09-01 VITALS
HEIGHT: 66 IN | WEIGHT: 197 LBS | OXYGEN SATURATION: 98 % | DIASTOLIC BLOOD PRESSURE: 80 MMHG | SYSTOLIC BLOOD PRESSURE: 138 MMHG | RESPIRATION RATE: 18 BRPM | HEART RATE: 70 BPM | BODY MASS INDEX: 31.66 KG/M2

## 2020-09-01 DIAGNOSIS — I73.9 PAD (PERIPHERAL ARTERY DISEASE) (H): Primary | ICD-10-CM

## 2020-09-01 DIAGNOSIS — N18.30 CKD (CHRONIC KIDNEY DISEASE) STAGE 3, GFR 30-59 ML/MIN (H): ICD-10-CM

## 2020-09-01 DIAGNOSIS — R09.89 CAROTID BRUIT: ICD-10-CM

## 2020-09-01 DIAGNOSIS — I73.9 CLAUDICATION (H): ICD-10-CM

## 2020-09-01 PROCEDURE — G0463 HOSPITAL OUTPT CLINIC VISIT: HCPCS

## 2020-09-01 PROCEDURE — 99204 OFFICE O/P NEW MOD 45 MIN: CPT | Mod: ZP | Performed by: INTERNAL MEDICINE

## 2020-09-01 RX ORDER — ROSUVASTATIN CALCIUM 10 MG/1
10 TABLET, COATED ORAL DAILY
Qty: 30 TABLET | Refills: 3 | Status: SHIPPED | OUTPATIENT
Start: 2020-09-01 | End: 2020-09-29 | Stop reason: ALTCHOICE

## 2020-09-01 ASSESSMENT — MIFFLIN-ST. JEOR: SCORE: 1631.34

## 2020-09-01 NOTE — PROGRESS NOTES
"Rachell Paige is a 64 year old male who presents for:  Chief Complaint   Patient presents with     RECHECK     [id number]  Referred by Dr. Nichols of [facility] for claudication. PIYUSH done 08/07/20  )PIYUSH 12/27/18 in Epic - PIYUSH 11/11/19 in Care Everywhere) *LMB 07/29/20 Resched from 08/11/20        Vitals:    Vitals:    09/01/20 1403 09/01/20 1404   BP: (!) 158/78 (!) 157/81   BP Location: Right arm Left arm   Patient Position: Chair Chair   Cuff Size: Adult Regular Adult Regular   Pulse: 70    Resp: 18    SpO2: 98%    Weight: 197 lb (89.4 kg)    Height: 5' 6\" (1.676 m)        BMI:  Estimated body mass index is 31.8 kg/m  as calculated from the following:    Height as of this encounter: 5' 6\" (1.676 m).    Weight as of this encounter: 197 lb (89.4 kg).    Pain Score:  Data Unavailable        Yovana Sanchez CMA    "

## 2020-09-01 NOTE — PROGRESS NOTES
Vascular Medicine Progress Note     Rachell Paige is a 64 year old male who is here for establishing care for PAD    Interval History   Patient is here for establishing care for PAD, patient is diabetic, he is on insulin, his last a A1c was 7.4%, patient is hyperlipidemic with LDL of 72, he is non-smoker, he is hypertensive, he has PAD and he is status post left midtarsal amputation for chronic wound and status post right fifth toe amputation for osteomyelitis.  Patient right PIYUSH is 0.96, left PIYUSH is 0.75 with an noncompressed PT, his right digital brachial index is 0.26 which is not sufficient for wound healing left digital brachial index was not calculated as the patient had toe amputation his waveforms are almost flatlined bilaterally post exercise his PIYUSH went up from 0.96-1.15 which is considered as a normal physiological response to exercise his left side went from 0.75-0.67 which is or which is considered decrease in exercise or post exercise PIYUSH, patient is totally asymptomatic, he denies any history of rest pain or nocturnal pain but he is here to establish care.    Patient PIYUSH definitely is less then his PIYUSH which was done almost 2 years ago.  Patient denies any history of chest pain shortness of breath or palpitations, patient denies any history of neurological focal signs or symptoms    Physical Exam       BP: 138/80 Pulse: 70   Resp: 18 SpO2: 98 %      Vitals:    09/01/20 1403   Weight: 89.4 kg (197 lb)     Vital Signs with Ranges  Pulse:  [70] 70  Resp:  [18] 18  BP: (136-158)/(76-81) 138/80  SpO2:  [98 %] 98 %  [unfilled]    Constitutional: awake, alert, cooperative, no apparent distress, and appears stated age  Eyes: Lids and lashes normal, pupils equal, round and reactive to light, extra ocular muscles intact, sclera clear, conjunctiva normal  ENT: normocepalic, without obvious abnormality, oropharynx pink and moist  Hematologic / Lymphatic: no lymphadenopathy  Respiratory: No increased  work of breathing, good air exchange, clear to auscultation bilaterally, no crackles or wheezing  Cardiovascular: regular rate and rhythm, normal S1 and S2 and no murmur noted  GI: Normal bowel sounds, soft, non-distended, non-tender  Skin: no redness, warmth, or swelling, no rashes and no lesions  Musculoskeletal: There is no redness, warmth, or swelling of the joints.  Full range of motion noted.  Motor strength is 5 out of 5 all extremities bilaterally.  Tone is normal.  Neurologic: Awake, alert, oriented to name, place and time.  Cranial nerves II-XII are grossly intact.  Motor is 5 out of 5 bilaterally.    Neuropsychiatric:  Normal affect, memory, insight.  Pulses: Nonpalpable DP and PT pulses yet the Doppler signal is almost monophasic  Bilateral carotid bruits appreciated.     Medications         Data   No results found for this or any previous visit (from the past 24 hour(s)).    Assessment & Plan   (I73.9) PAD (peripheral artery disease) (H)  (primary encounter diagnosis)  Comment: PAD resting on the right side is considered normal with normal exercise response, on the left side it is considered moderate    We will continue with vascular risk factors modifications, target A1c less than 7%, target LDL less than 70, blood pressure 130/80 or less, will add Xarelto 2.5 mg p.o. daily to his current regimen including the aspirin 81 mg  CTA with bilateral runoff    Plan: rivaroxaban ANTICOAGULANT (XARELTO         ANTICOAGULANT) 2.5 MG TABS tablet, rosuvastatin        (CRESTOR) 10 MG tablet        Change patient's statin from Zocor to Crestor    (I73.9) Claudication (H)  Comment: Same as above  Plan: rivaroxaban ANTICOAGULANT (XARELTO         ANTICOAGULANT) 2.5 MG TABS tablet, rosuvastatin        (CRESTOR) 10 MG tablet                  Summary: Patient was noted to have bilateral carotid bruit we will go ahead and obtain carotid Doppler arterial ultrasound bilaterally    Continue with vascular risk factors  modifications as noted above    Follow-up with the patient once test results are available  If there are significant lesions above the knee patient would be amicable for endovascular intervention.    Pio Parson MD

## 2020-09-03 ENCOUNTER — HOSPITAL ENCOUNTER (EMERGENCY)
Facility: CLINIC | Age: 65
Discharge: HOME | End: 2020-09-03
Attending: PHYSICIAN ASSISTANT | Admitting: PHYSICIAN ASSISTANT
Payer: COMMERCIAL

## 2020-09-03 ENCOUNTER — OFFICE VISIT (OUTPATIENT)
Dept: URGENT CARE | Facility: URGENT CARE | Age: 65
End: 2020-09-03
Payer: COMMERCIAL

## 2020-09-03 ENCOUNTER — APPOINTMENT (OUTPATIENT)
Dept: CT IMAGING | Facility: CLINIC | Age: 65
End: 2020-09-03
Attending: PHYSICIAN ASSISTANT
Payer: COMMERCIAL

## 2020-09-03 VITALS
TEMPERATURE: 98.2 F | HEART RATE: 88 BPM | SYSTOLIC BLOOD PRESSURE: 158 MMHG | OXYGEN SATURATION: 100 % | BODY MASS INDEX: 31.82 KG/M2 | DIASTOLIC BLOOD PRESSURE: 65 MMHG | HEIGHT: 66 IN | RESPIRATION RATE: 16 BRPM | WEIGHT: 198 LBS

## 2020-09-03 VITALS
DIASTOLIC BLOOD PRESSURE: 84 MMHG | TEMPERATURE: 98.6 F | WEIGHT: 198 LBS | HEART RATE: 72 BPM | SYSTOLIC BLOOD PRESSURE: 138 MMHG | RESPIRATION RATE: 18 BRPM | BODY MASS INDEX: 31.96 KG/M2

## 2020-09-03 DIAGNOSIS — R73.9 HYPERGLYCEMIA: ICD-10-CM

## 2020-09-03 DIAGNOSIS — R10.9 FLANK PAIN: Primary | ICD-10-CM

## 2020-09-03 DIAGNOSIS — R10.9 LEFT FLANK PAIN: ICD-10-CM

## 2020-09-03 LAB
ALBUMIN UR-MCNC: 30 MG/DL
ANION GAP SERPL CALCULATED.3IONS-SCNC: 4 MMOL/L (ref 3–14)
APPEARANCE UR: CLEAR
BASOPHILS # BLD AUTO: 0 10E9/L (ref 0–0.2)
BASOPHILS NFR BLD AUTO: 0.3 %
BILIRUB UR QL STRIP: NEGATIVE
BUN SERPL-MCNC: 13 MG/DL (ref 7–30)
CALCIUM SERPL-MCNC: 9.1 MG/DL (ref 8.5–10.1)
CHLORIDE SERPL-SCNC: 105 MMOL/L (ref 94–109)
CO2 SERPL-SCNC: 27 MMOL/L (ref 20–32)
COLOR UR AUTO: ABNORMAL
CREAT SERPL-MCNC: 1.27 MG/DL (ref 0.66–1.25)
DIFFERENTIAL METHOD BLD: ABNORMAL
EOSINOPHIL # BLD AUTO: 0.2 10E9/L (ref 0–0.7)
EOSINOPHIL NFR BLD AUTO: 2.6 %
ERYTHROCYTE [DISTWIDTH] IN BLOOD BY AUTOMATED COUNT: 13.3 % (ref 10–15)
GFR SERPL CREATININE-BSD FRML MDRD: 59 ML/MIN/{1.73_M2}
GLUCOSE SERPL-MCNC: 215 MG/DL (ref 70–99)
GLUCOSE UR STRIP-MCNC: NEGATIVE MG/DL
HCT VFR BLD AUTO: 45.7 % (ref 40–53)
HGB BLD-MCNC: 14.1 G/DL (ref 13.3–17.7)
HGB UR QL STRIP: NEGATIVE
IMM GRANULOCYTES # BLD: 0 10E9/L (ref 0–0.4)
IMM GRANULOCYTES NFR BLD: 0.3 %
KETONES UR STRIP-MCNC: NEGATIVE MG/DL
LEUKOCYTE ESTERASE UR QL STRIP: NEGATIVE
LYMPHOCYTES # BLD AUTO: 2.2 10E9/L (ref 0.8–5.3)
LYMPHOCYTES NFR BLD AUTO: 25.6 %
MCH RBC QN AUTO: 27.8 PG (ref 26.5–33)
MCHC RBC AUTO-ENTMCNC: 30.9 G/DL (ref 31.5–36.5)
MCV RBC AUTO: 90 FL (ref 78–100)
MONOCYTES # BLD AUTO: 0.6 10E9/L (ref 0–1.3)
MONOCYTES NFR BLD AUTO: 7.3 %
NEUTROPHILS # BLD AUTO: 5.5 10E9/L (ref 1.6–8.3)
NEUTROPHILS NFR BLD AUTO: 63.9 %
NITRATE UR QL: NEGATIVE
NRBC # BLD AUTO: 0 10*3/UL
NRBC BLD AUTO-RTO: 0 /100
PH UR STRIP: 6.5 PH (ref 5–7)
PLATELET # BLD AUTO: 337 10E9/L (ref 150–450)
POTASSIUM SERPL-SCNC: 3.8 MMOL/L (ref 3.4–5.3)
RBC # BLD AUTO: 5.08 10E12/L (ref 4.4–5.9)
RBC #/AREA URNS AUTO: <1 /HPF (ref 0–2)
SODIUM SERPL-SCNC: 136 MMOL/L (ref 133–144)
SOURCE: ABNORMAL
SP GR UR STRIP: 1.01 (ref 1–1.03)
SQUAMOUS #/AREA URNS AUTO: <1 /HPF (ref 0–1)
UROBILINOGEN UR STRIP-MCNC: NORMAL MG/DL (ref 0–2)
WBC # BLD AUTO: 8.6 10E9/L (ref 4–11)
WBC #/AREA URNS AUTO: <1 /HPF (ref 0–5)

## 2020-09-03 PROCEDURE — 25000132 ZZH RX MED GY IP 250 OP 250 PS 637: Performed by: PHYSICIAN ASSISTANT

## 2020-09-03 PROCEDURE — 96360 HYDRATION IV INFUSION INIT: CPT

## 2020-09-03 PROCEDURE — 85025 COMPLETE CBC W/AUTO DIFF WBC: CPT | Performed by: PHYSICIAN ASSISTANT

## 2020-09-03 PROCEDURE — 74176 CT ABD & PELVIS W/O CONTRAST: CPT

## 2020-09-03 PROCEDURE — 80048 BASIC METABOLIC PNL TOTAL CA: CPT | Performed by: PHYSICIAN ASSISTANT

## 2020-09-03 PROCEDURE — 25800030 ZZH RX IP 258 OP 636: Performed by: PHYSICIAN ASSISTANT

## 2020-09-03 PROCEDURE — 99284 EMERGENCY DEPT VISIT MOD MDM: CPT | Mod: 25

## 2020-09-03 PROCEDURE — 99215 OFFICE O/P EST HI 40 MIN: CPT | Performed by: PHYSICIAN ASSISTANT

## 2020-09-03 PROCEDURE — 81001 URINALYSIS AUTO W/SCOPE: CPT | Performed by: EMERGENCY MEDICINE

## 2020-09-03 RX ORDER — LIDOCAINE 4 G/G
1 PATCH TOPICAL ONCE
Status: DISCONTINUED | OUTPATIENT
Start: 2020-09-03 | End: 2020-09-04 | Stop reason: HOSPADM

## 2020-09-03 RX ORDER — LIDOCAINE 50 MG/G
1 PATCH TOPICAL EVERY 24 HOURS
Qty: 10 PATCH | Refills: 0 | Status: SHIPPED | OUTPATIENT
Start: 2020-09-03 | End: 2020-09-13

## 2020-09-03 RX ORDER — ACETAMINOPHEN 500 MG
1000 TABLET ORAL ONCE
Status: DISCONTINUED | OUTPATIENT
Start: 2020-09-03 | End: 2020-09-04 | Stop reason: HOSPADM

## 2020-09-03 RX ADMIN — LIDOCAINE 1 PATCH: 560 PATCH PERCUTANEOUS; TOPICAL; TRANSDERMAL at 22:25

## 2020-09-03 RX ADMIN — SODIUM CHLORIDE 1000 ML: 9 INJECTION, SOLUTION INTRAVENOUS at 19:47

## 2020-09-03 ASSESSMENT — ENCOUNTER SYMPTOMS
CONSTITUTIONAL NEGATIVE: 1
VOMITING: 0
DIZZINESS: 0
EYE DISCHARGE: 0
HEADACHES: 0
NEUROLOGICAL NEGATIVE: 1
NAUSEA: 0
ABDOMINAL PAIN: 0
NAUSEA: 0
DYSURIA: 0
WEAKNESS: 0
FEVER: 0
EYES NEGATIVE: 1
FLANK PAIN: 1
ADENOPATHY: 0
RHINORRHEA: 0
LIGHT-HEADEDNESS: 0
POLYDIPSIA: 0
PALPITATIONS: 0
CARDIOVASCULAR NEGATIVE: 1
SHORTNESS OF BREATH: 0
MYALGIAS: 0
WHEEZING: 0
VOMITING: 0
ENDOCRINE NEGATIVE: 1
CHEST TIGHTNESS: 0
GASTROINTESTINAL NEGATIVE: 1
RESPIRATORY NEGATIVE: 1
HEMATURIA: 0
DIAPHORESIS: 0
ABDOMINAL PAIN: 0
EYE REDNESS: 0
COUGH: 0
FREQUENCY: 0
FLANK PAIN: 1
EYE ITCHING: 0
SORE THROAT: 0
CHILLS: 0
DIARRHEA: 0

## 2020-09-03 ASSESSMENT — MIFFLIN-ST. JEOR: SCORE: 1635.87

## 2020-09-03 NOTE — ED AVS SNAPSHOT
[facility] Emergency Department  [address]  [facility] [address]  Phone:  [phone]  Fax:  [phone]                                    Rachell Paige   MRN: [id number]    Department:  [facility] Emergency Department   Date of Visit:  9/3/2020           After Visit Summary Signature Page    I have received my discharge instructions, and my questions have been answered. I have discussed any challenges I see with this plan with the nurse or doctor.    ..........................................................................................................................................  Patient/Patient Representative Signature      ..........................................................................................................................................  Patient Representative Print Name and Relationship to Patient    ..................................................               ................................................  Date                                   Time    ..........................................................................................................................................  Reviewed by Signature/Title    ...................................................              ..............................................  Date                                               Time          22EPIC Rev 08/18

## 2020-09-03 NOTE — PROGRESS NOTES
Pt presents in clinic today with Lt rib pain. No known injuries since last night. Pt have not taken anything for pain.    Tri ROBERTS, ADELSO,AAMA

## 2020-09-03 NOTE — PROGRESS NOTES
Chief Complaint:    Chief Complaint   Patient presents with     Chest Pain     Rib pain Lt side since lastnight       HPI: Rachell Paige is an 64 year old male who presents for evaluation and treatment of L sided flank pain. Patient has a complicated medical Hx including HTN, DM2, CKD, and PAD. Symptoms started last night and has not changed.  The pain is sharp in nature and constant.  He has not tried anything for the pain.  Nothing makes the pain better or worse.  He denies any chest pain or SOB.  No urinary symptoms at this time.        ROS:      Review of Systems   Constitutional: Negative.  Negative for chills, diaphoresis and fever.   HENT: Negative.  Negative for congestion, ear pain, rhinorrhea and sore throat.    Eyes: Negative.  Negative for discharge, redness and itching.   Respiratory: Negative.  Negative for cough, chest tightness, shortness of breath and wheezing.    Cardiovascular: Negative.  Negative for chest pain and palpitations.   Gastrointestinal: Negative.  Negative for abdominal pain, diarrhea, nausea and vomiting.   Endocrine: Negative.  Negative for polydipsia and polyuria.   Genitourinary: Positive for flank pain. Negative for dysuria, frequency, hematuria and urgency.   Musculoskeletal: Negative for myalgias.   Skin: Negative for rash.   Allergic/Immunologic: Negative for immunocompromised state.   Neurological: Negative.  Negative for dizziness, weakness, light-headedness and headaches.   Hematological: Negative for adenopathy.        Family History   Family History   Problem Relation Age of Onset     Diabetes Other      LUNG DISEASE Mother      Glaucoma Father      Cerebrovascular Disease Son      Diabetes Son      Diabetes Daughter        Social History  Social History     Socioeconomic History     Marital status:      Spouse name: Not on file     Number of children: Not on file     Years of education: Not on file     Highest education level: Not on file   Occupational History      Not on file   Social Needs     Financial resource strain: Not on file     Food insecurity     Worry: Not on file     Inability: Not on file     Transportation needs     Medical: Not on file     Non-medical: Not on file   Tobacco Use     Smoking status: Former Smoker     Types: Cigarettes     Last attempt to quit: 1990     Years since quittin.6     Smokeless tobacco: Never Used   Substance and Sexual Activity     Alcohol use: No     Drug use: No     Sexual activity: Yes     Partners: Female   Lifestyle     Physical activity     Days per week: Not on file     Minutes per session: Not on file     Stress: Not on file   Relationships     Social connections     Talks on phone: Not on file     Gets together: Not on file     Attends [employer] service: Not on file     Active member of club or organization: Not on file     Attends meetings of clubs or organizations: Not on file     Relationship status: Not on file     Intimate partner violence     Fear of current or ex partner: Not on file     Emotionally abused: Not on file     Physically abused: Not on file     Forced sexual activity: Not on file   Other Topics Concern     Parent/sibling w/ CABG, MI or angioplasty before 65F 55M? Not Asked   Social History Narrative    Former  ([facility], [place])             Surgical History:  Past Surgical History:   Procedure Laterality Date     AMPUTATE TOE(S) Right 2018    Procedure: Right fifth toe amputation;  Surgeon: Clark Lora DPM;  Location:  OR        Problem List:  Patient Active Problem List   Diagnosis     Type 2 diabetes mellitus with hemoglobin A1c goal of 7.0%-8.0% (H)     HTN, goal below 140/90     Hyperlipidemia LDL goal <100     Financial problems     Anxiety     Rash     Microalbuminuria     Onychomycosis     Dermatitis     Does not have health insurance     Atypical chest pain     Osteomyelitis (H)     CKD (chronic kidney disease) stage 3, GFR 30-59 ml/min (H)       "  Allergies:  Allergies   Allergen Reactions     Aleve      HA sweats     Citalopram Itching     Clopidogrel Nausea and Vomiting     Pt to restart on 11/25/15 to see again if he tolerates it or not. His sx were only GI. Not a true allergy     Naproxen Itching     About 10 yrs ago, itching all over from taking Aleve  \"get sick and really sick\"       Sulfamethoxazole-Trimethoprim      Other reaction(s): Renal Failure  Other reaction(s): Renal Failure        Current Meds:    Current Outpatient Medications:      albuterol (PROAIR HFA) 108 (90 Base) MCG/ACT inhaler, Inhale 2 puffs into the lungs every 4 hours as needed for shortness of breath / dyspnea, Disp: 1 Inhaler, Rfl: 0     alcohol swab prep pads, Use to swab area of injection/pedro pablo as directed., Disp: 120 each, Rfl: 6     aspirin 81 MG tablet, Take 1 tablet (81 mg) by mouth daily, Disp: 30 tablet, Rfl: OTC     bacitracin 500 UNIT/GM external ointment, Apply to wound right 4th toe daily with bandaid, Disp: 14 g, Rfl: 0     bacitracin 500 UNIT/GM external ointment, Apply topically 2 times daily, Disp: 14 g, Rfl: 0     benzonatate (TESSALON) 100 MG capsule, Take 2 capsules (200 mg) by mouth 3 times daily as needed for cough, Disp: 40 capsule, Rfl: 0     blood glucose (ACCU-CHEK GUIDE) test strip, 1 strip by In Vitro route 4 times daily Use to test blood sugar 3 -4  times daily or as directed., Disp: 120 each, Rfl: 11     blood glucose (NO BRAND SPECIFIED) test strip, Use to test blood sugar 4 times daily or as directed. To accompany: Blood Glucose Monitor Brands: per insurance., Disp: 150 strip, Rfl: 6     blood glucose calibration (NO BRAND SPECIFIED) solution, To accompany: Blood Glucose Monitor Brands: per insurance., Disp: 1 Bottle, Rfl: 3     blood glucose monitoring (NO BRAND SPECIFIED) meter device kit, Use to test blood sugar 4 times daily or as directed. Preferred blood glucose meter OR supplies to accompany: Blood Glucose Monitor Brands: per insurance., " Disp: 1 kit, Rfl: 0     Cadexomer Iodine, topical, 0.9% (IODOSORB) 0.9 % GEL gel, Apply to wound right foot daily, Disp: 10 g, Rfl: 0     dorzolamide (TRUSOPT) 2 % ophthalmic solution, Place 1 drop into both eyes 2 times daily, Disp: 10 mL, Rfl: 11     gabapentin (NEURONTIN) 300 MG capsule, Take 1 capsule (300 mg) by mouth At Bedtime, Disp: 90 capsule, Rfl: 0     insulin aspart (NOVOLOG FLEXPEN) 100 UNIT/ML injection, Inject 15-20 Units Subcutaneous 2 times daily (with meals) With lunch and dinner, Disp: , Rfl:      Insulin Glargine (LANTUS SOLOSTAR SC), Inject 60 Units Subcutaneous At Bedtime, Disp: , Rfl:      insulin pen needle (32G X 6 MM) 32G X 6 MM miscellaneous, Use 3 pen needles daily or as directed., Disp: 300 each, Rfl: 0     Lido-Pentaf-Tetrafl-Ultrasound (ACCUCAINE) 1 % KIT, , Disp: , Rfl:      lidocaine (XYLOCAINE) 5 % external ointment, Apply topically as needed for moderate pain, Disp: 50 g, Rfl: 0     lisinopril (PRINIVIL/ZESTRIL) 10 MG tablet, Take 10 mg by mouth daily, Disp: , Rfl:      lisinopril (ZESTRIL) 5 MG tablet, Take 5 mg by mouth daily, Disp: , Rfl:      metFORMIN (GLUCOPHAGE-XR) 500 MG 24 hr tablet, Take 1,000 mg by mouth daily, Disp: , Rfl:      rivaroxaban ANTICOAGULANT (XARELTO ANTICOAGULANT) 2.5 MG TABS tablet, Take 1 tablet (2.5 mg) by mouth 2 times daily, Disp: 60 tablet, Rfl: 3     rosuvastatin (CRESTOR) 10 MG tablet, Take 1 tablet (10 mg) by mouth daily, Disp: 30 tablet, Rfl: 3     thin (NO BRAND SPECIFIED) lancets, Use with lanceting device. To accompany: Blood Glucose Monitor Brands: per insurance., Disp: 200 each, Rfl: 6     timolol maleate (TIMOPTIC) 0.5 % ophthalmic solution, Place 1 drop into both eyes 2 times daily, Disp: 1 Bottle, Rfl: 11     PHYSICAL EXAM:     Vital signs noted and reviewed by Antoine J. Sadorus, PA-C  /84   Pulse 72   Temp 98.6  F (37  C) (Oral)   Resp 18   Wt 89.8 kg (198 lb)   BMI 31.96 kg/m       PEFR:    Physical Exam  Vitals signs and  nursing note reviewed.   Constitutional:       General: He is not in acute distress.     Appearance: He is well-developed. He is not ill-appearing, toxic-appearing or diaphoretic.   HENT:      Head: Normocephalic and atraumatic.      Right Ear: Hearing, tympanic membrane, ear canal and external ear normal. Tympanic membrane is not perforated, erythematous, retracted or bulging.      Left Ear: Hearing, tympanic membrane, ear canal and external ear normal. Tympanic membrane is not perforated, erythematous, retracted or bulging.      Nose: Nose normal. No mucosal edema or rhinorrhea.      Mouth/Throat:      Pharynx: No oropharyngeal exudate or posterior oropharyngeal erythema.      Tonsils: No tonsillar exudate or tonsillar abscesses. 0 on the right. 0 on the left.   Eyes:      Pupils: Pupils are equal, round, and reactive to light.   Neck:      Musculoskeletal: Normal range of motion and neck supple.   Cardiovascular:      Rate and Rhythm: Normal rate and regular rhythm.      Heart sounds: Normal heart sounds, S1 normal and S2 normal. Heart sounds not distant. No murmur. No friction rub. No gallop.    Pulmonary:      Effort: Pulmonary effort is normal. No respiratory distress.      Breath sounds: Normal breath sounds. No decreased breath sounds, wheezing, rhonchi or rales.   Abdominal:      General: Bowel sounds are normal. There is no distension.      Palpations: Abdomen is soft.      Tenderness: There is no abdominal tenderness. There is left CVA tenderness. There is no right CVA tenderness, guarding or rebound.   Lymphadenopathy:      Cervical: No cervical adenopathy.   Skin:     General: Skin is warm and dry.      Findings: No rash.   Neurological:      Mental Status: He is alert.      Cranial Nerves: No cranial nerve deficit.   Psychiatric:         Attention and Perception: He is attentive.         Speech: Speech normal.         Behavior: Behavior normal. Behavior is cooperative.         Thought Content: Thought  content normal.         Judgment: Judgment normal.          Labs:     No results found for any visits on 09/03/20.    Medical Decision Making:    Differential Diagnosis:  Pneumonia, PE, pyelonephritis, renal failure, renal stone.     ASSESSMENT:     1. Flank pain         PLAN:     Patient presents with L sided flank pain.  Patient appear uncomfortable in clinic.  He is afebrile with stable vital signs.  He has L sided CVA tenderness.  Highly suspicious for renal stone.  Patient instructed to go to the ED now for further evaluation, labs, and imaging.  Patient declined EMS transport.  Patient instructed to follow up with PCP in 3 days if symptoms are not improving.  Sooner if symptoms worsen.  Patient verbalized understanding and agreed with this plan.  Patient discharged in stable condition.    Antoine Chavez PA-C  9/3/2020, 4:50 PM

## 2020-09-03 NOTE — ED TRIAGE NOTES
Left flank pain since last evening at 2000.  He was sent from [facility] for probable kidney stone.  He is diabetic.  ABCs intact.  Patient is alert and oriented x3.

## 2020-09-04 NOTE — ED PROVIDER NOTES
"History     Chief Complaint:  Flank Pain       HPI  Rachell Paige is a 64 year old year old male with a history of type II diabetes, chronic kidney disease, hypertension, and hyperlipidemia who presents for evaluation of flank pain. The patient presented earlier at [facility] for left sided back pain that began at 8pm suddenly yesterday evening, and urgent care said he had a kidney stone and told him to present here. He says the pain does not radiate anywhere and is currently a \"6/10\" but comes in waves. He has not taken any ibuprofen or tylenol. Of note, he says his sugars have been pretty under control, and he takes 60 Lantus at night and sometimes does Novolog if his sugar is high that day. The patient denies nausea, vomiting, falls or trauma, abdominal pain, chest pain, scrotum or penile pain.      Allergies:  Aleve  Citalopram  Clopidogrel  Naproxen  Sulfamethoxazole-Trimethoprim       Medications:   Albuterol inhaler  Aspirin 81  Tessalon  Neurontin  Novolog  Lantus  Prinivil  Metformin  Xarelto  Crestor      Medical History:   Anxiety  Dermatitis  Diabetes mellitus type II  Glaucoma  Hypertension  Hyperlipidemia  Microalbuminuria  Onychomycosis  Chronic kidney disease, stage 3  Osteomyelitis  Lumbgao  Tinea pedis of both feet      Surgical History   Amputate toes      Family History:   Diabetes  Lung disease  Glaucoma  Cerebrovascular disease      Social History:  Smoking Status: Former Smoker    Type: Cigarettes    Quit 1990  Smokeless Tobacco: Never Used  Alcohol Use: Negative  Drug Use: Negative  Primary Care: Olivia Nichols         Review of Systems   Cardiovascular: Negative for chest pain.   Gastrointestinal: Negative for abdominal pain, nausea and vomiting.   Genitourinary: Positive for flank pain. Negative for penile pain.   All other systems reviewed and are negative.      Physical Exam     Patient Vitals for the past 24 hrs:   BP Temp Temp src Pulse Resp SpO2 Height Weight   09/03/20 " "1750 (!) 158/65 98.2  F (36.8  C) Oral 88 16 100 % 1.676 m (5' 6\") 89.8 kg (198 lb)          Physical Exam  General: Alert and interactive. Appears well. Cooperative and pleasant.   Eyes: The pupils are equal and round. EOMs intact. No scleral icterus.  ENT: No abnormalities to the external nose or ears. Mucous membranes moist. Posterior oropharynx is non-erythematous.    Neck: Trachea is in the midline. No nuchal rigidity.     CV: Regular rate and rhythm. S1 and S2 normal without murmur, click, gallop or rub.   Resp: Breath sounds are clear bilaterally, without rhonchi, wheezes, rales. Non-labored, no retractions or accessory muscle use.     GI: Abdomen is soft without distension. Left CVA tenderness without obvious bruising or posterior flail chest.   MS: Moving all extremities well. Good muscle tone. Tenderness along left inferior costal margin.   Skin: Warm and dry. No rash or lesions noted.  Neuro: Alert and oriented x 3. No focal neurologic deficits. Good strength and sensation in upper and lower extremities.   Psych: Awake. Alert.  Normal affect. Appropriate interactions.  Lymph: No anterior or posterior cervical lymphadenopathy noted.    Emergency Department Course     Imaging:  Radiology results were communicated with the patient who voiced understanding of the findings.    Abd/pelvis CT no contrast - Stone Protocol  1.  No evidence for obstructive uropathy or other acute abnormality in the abdomen or pelvis.  2.  Small hiatal hernia.    Reading per radiology     Laboratory:  Laboratory findings were communicated with the patient who voiced understanding of the findings.    UA with micro: Protein albumin 30 (A) o/w negative    CBC: WBC 8.6, HGB 14.1,   BMP: Glucose 215 (H) (Creatinine 1.27 (H)) o/w WNL       Interventions:   1947 NS 1000 mL IV  2225 Lidocaine 1 patch trandermal      Emergency Department Course:    1754 A urine sample was obtained for laboratory testing as documented " above.    1931 Nursing notes and vitals reviewed.    1945 I performed an exam of the patient as documented above.     1946 IV was inserted and blood was drawn for laboratory testing, results above.    2011 The patient was sent for CT while in the emergency department, results above.     2152 I rechecked and updated the patient.    2157 Findings and plan explained to the Patient. Patient discharged home with instructions regarding supportive care, medications, and reasons to return. The importance of close follow-up was reviewed. The patient was prescribed as below.    Impression & Plan     Medical Decision Making:  Rachell Paige is a 64 year old male who presents with acute onset left flank pain. Considered appendicitis, diverticulitis, kidney stone AAA, aortic dissection, MSK source, shingles, pyelonephritis, and many others. Fortunately, labs and imaging reveal no sinister cause for flank pain. No signs of the above pathologies on CT scan, and patient's labs are unremarkable apart from stable baseline kidney dysfunction, hyperglycemia, and an incidental hiatal hernia on CT. UA shows no blood or markers for infection. No chest pain or shortness of breath and vitals are normal; therefore, I do not suspect cardiopulmonary cause like ACS/PE. I suspect this could be a muscle strain, as patient admits to recently lifting heavy boxes and sleeping on a new mattress. Will have him watch for shingles rash. Otherwise, he can use Tylenol, Lidoderm patches, and should ice for pain. Return for fevers, chills, worsening pain, persistent vomiting.     Diagnosis:     ICD-10-CM    1. Left flank pain  R10.9    2. Hyperglycemia  R73.9         Disposition:  Discharged to home.    Discharge Medications:  New Prescriptions    LIDOCAINE (LIDODERM) 5 % PATCH    Place 1 patch onto the skin every 24 hours for 10 days       Scribe Disclosure:  Mila SHIN, am serving as a scribe at 7:41 PM on 9/3/2020 to document services personally  performed by Emily Cline PA-C based on my observations and the provider's statements to me.      Emily Cline PA-C  09/04/20 0359

## 2020-09-04 NOTE — DISCHARGE INSTRUCTIONS
Continue ice, Tylenol, and use Lidoderm patches for pain control.   Follow up with primary care for recheck in a few days to ensure improvement.   Your kidney function looks good!

## 2020-09-14 ENCOUNTER — VIRTUAL VISIT (OUTPATIENT)
Dept: ENDOCRINOLOGY | Facility: CLINIC | Age: 65
End: 2020-09-14
Payer: COMMERCIAL

## 2020-09-14 ENCOUNTER — HOSPITAL ENCOUNTER (OUTPATIENT)
Dept: ULTRASOUND IMAGING | Facility: CLINIC | Age: 65
Discharge: HOME | End: 2020-09-14
Attending: INTERNAL MEDICINE | Admitting: INTERNAL MEDICINE
Payer: COMMERCIAL

## 2020-09-14 DIAGNOSIS — R09.89 CAROTID BRUIT: ICD-10-CM

## 2020-09-14 DIAGNOSIS — Z53.9 ERRONEOUS ENCOUNTER--DISREGARD: Primary | ICD-10-CM

## 2020-09-14 DIAGNOSIS — E11.9 TYPE 2 DIABETES MELLITUS WITH HEMOGLOBIN A1C GOAL OF 7.0%-8.0% (H): ICD-10-CM

## 2020-09-14 PROCEDURE — 93880 EXTRACRANIAL BILAT STUDY: CPT

## 2020-09-14 RX ORDER — BLOOD SUGAR DIAGNOSTIC
1 STRIP MISCELLANEOUS 4 TIMES DAILY
Qty: 120 EACH | Refills: 11 | Status: CANCELLED | OUTPATIENT
Start: 2020-09-14

## 2020-09-14 NOTE — PROGRESS NOTES
Patient unable to do a Video Visit today... with [facility] or [facility], so appointment cancelled.  I spoke with him briefly by phone, asked him to contact his wife for her (cell phone) assistance, then call our [facility] clinic back with future dates/times that she is available to assist him.  We will then try to schedule a work-in new patient appt with me.  Until then, he should followup with his PCP for management of the diabetes mellitus.    SU Bear MD, MS  Endocrinology  [facility]

## 2020-09-14 NOTE — LETTER
9/14/2020         RE: Rachell Paige  [address] [address] [address]        Dear Colleague,    Thank you for referring your patient, Rachell Paige, to the [facility]. Please see a copy of my visit note below.    Patient unable to do a Video Visit today... with [facility] or [facility], so appointment cancelled.  I spoke with him briefly by phone, asked him to contact his wife for her (cell phone) assistance, then call our [facility] clinic back with future dates/times that she is available to assist him.  We will then try to schedule a work-in new patient appt with me.  Until then, he should followup with his PCP for management of the diabetes mellitus.    SU Bear MD, MS  Endocrinology  [facility]            This encounter was opened in error. Please disregard.    Again, thank you for allowing me to participate in the care of your patient.        Sincerely,        Simón Bear MD

## 2020-09-15 ENCOUNTER — OFFICE VISIT (OUTPATIENT)
Dept: OTHER | Facility: CLINIC | Age: 65
End: 2020-09-15
Attending: INTERNAL MEDICINE
Payer: COMMERCIAL

## 2020-09-15 VITALS
DIASTOLIC BLOOD PRESSURE: 84 MMHG | RESPIRATION RATE: 16 BRPM | HEART RATE: 90 BPM | BODY MASS INDEX: 31.66 KG/M2 | HEIGHT: 66 IN | OXYGEN SATURATION: 100 % | WEIGHT: 197 LBS | SYSTOLIC BLOOD PRESSURE: 142 MMHG

## 2020-09-15 DIAGNOSIS — I10 HTN, GOAL BELOW 140/90: ICD-10-CM

## 2020-09-15 DIAGNOSIS — E78.5 HYPERLIPIDEMIA LDL GOAL <100: ICD-10-CM

## 2020-09-15 DIAGNOSIS — I73.9 PAD (PERIPHERAL ARTERY DISEASE) (H): ICD-10-CM

## 2020-09-15 DIAGNOSIS — E11.9 TYPE 2 DIABETES MELLITUS WITH HEMOGLOBIN A1C GOAL OF 7.0%-8.0% (H): Primary | ICD-10-CM

## 2020-09-15 PROCEDURE — 99214 OFFICE O/P EST MOD 30 MIN: CPT | Mod: ZP | Performed by: INTERNAL MEDICINE

## 2020-09-15 PROCEDURE — G0463 HOSPITAL OUTPT CLINIC VISIT: HCPCS

## 2020-09-15 RX ORDER — LISINOPRIL 10 MG/1
10 TABLET ORAL DAILY
Qty: 30 TABLET | Refills: 3 | Status: SHIPPED | OUTPATIENT
Start: 2020-09-15 | End: 2020-11-06

## 2020-09-15 RX ORDER — CILOSTAZOL 50 MG/1
50 TABLET ORAL DAILY
Qty: 30 TABLET | Refills: 3 | Status: ON HOLD | OUTPATIENT
Start: 2020-09-15 | End: 2020-10-11

## 2020-09-15 RX ORDER — ROSUVASTATIN CALCIUM 10 MG/1
10 TABLET, COATED ORAL DAILY
Qty: 30 TABLET | Refills: 3 | Status: SHIPPED | OUTPATIENT
Start: 2020-09-15 | End: 2020-09-29 | Stop reason: ALTCHOICE

## 2020-09-15 ASSESSMENT — MIFFLIN-ST. JEOR: SCORE: 1631.34

## 2020-09-15 NOTE — PROGRESS NOTES
"Rachell Paige is a 64 year old male who presents for:  Chief Complaint   Patient presents with     RECHECK     F/u to 09/01/20 appointment with Dr. Parson; CTA ABD PEL BILAT LEG RUNOFF W 9/11/2020 & US CAROTID BILATERAL 9/14/2020 in [facility]. [place]        Vitals:    Vitals:    09/15/20 1033 09/15/20 1034   BP: 134/58 (!) 142/84   BP Location: Right arm Left arm   Patient Position: Chair Chair   Cuff Size: Adult Large Adult Large   Pulse: 90    Resp: 16    SpO2: 100%    Weight: 197 lb (89.4 kg)    Height: 5' 6\" (1.676 m)        BMI:  Estimated body mass index is 31.8 kg/m  as calculated from the following:    Height as of this encounter: 5' 6\" (1.676 m).    Weight as of this encounter: 197 lb (89.4 kg).    Pain Score:  Data Unavailable        Yovana Sanchez CMA    "

## 2020-09-15 NOTE — PROGRESS NOTES
Vascular Medicine Progress Note     Rachell Paige is a 64 year old male who is here for follow-up on carotid Doppler arterial ultrasound    Interval History   Carotid Doppler arterial ultrasound revealed less than 50% stenosis with antegrade flow in both vertebral and subclavian arteries no evidence of steal  Patient is or was on Xarelto 2.5 mg p.o. twice daily he felt well but he mentioned that he cannot take the medication because it is so expensive and he will finish the current quantity that he is having and he will stop taking the medication currently is on aspirin and his vascular risk factors are very well controlled  We will continue with vascular risk factors modifications and continue with aspirin and I went ahead and prescribed cilostazol 50 mg p.o. daily his last echocardiogram was done almost 2 years ago and it was 55 to 60% patient since then did not have any events that might decrease his ejection fraction and he does not have prolonged QT so there is not much of a contraindication to starting cilostazol in this patient will give it a try for 3 months and will see him back for follow-up    Physical Exam       BP: (!) 142/84 Pulse: 90   Resp: 16 SpO2: 100 %      Vitals:    09/15/20 1033   Weight: 89.4 kg (197 lb)     Vital Signs with Ranges  Pulse:  [90] 90  Resp:  [16] 16  BP: (134-142)/(58-84) 142/84  SpO2:  [100 %] 100 %  [unfilled]    Constitutional: awake, alert, cooperative, no apparent distress, and appears stated age  Eyes: Lids and lashes normal, pupils equal, round and reactive to light, extra ocular muscles intact, sclera clear, conjunctiva normal  ENT: normocepalic, without obvious abnormality, oropharynx pink and moist  Hematologic / Lymphatic: no lymphadenopathy  Respiratory: No increased work of breathing, good air exchange, clear to auscultation bilaterally, no crackles or wheezing  Cardiovascular: regular rate and rhythm, normal S1 and S2 and no murmur noted  GI: Normal  bowel sounds, soft, non-distended, non-tender  Skin: no redness, warmth, or swelling, no rashes and no lesions  Musculoskeletal: There is no redness, warmth, or swelling of the joints.  Full range of motion noted.  Motor strength is 5 out of 5 all extremities bilaterally.  Tone is normal.  Neurologic: Awake, alert, oriented to name, place and time.  Cranial nerves II-XII are grossly intact.  Motor is 5 out of 5 bilaterally.    Neuropsychiatric:  Normal affect, memory, insight.  Pulses: Bilateral palpable and equal  . No carotid bruits appreciated.     Medications         Data   Recent Results (from the past 24 hour(s))   US Carotid Bilateral    Narrative    BILATERAL CAROTID ULTRASOUND September 14, 2020 2:50 PM     HISTORY: History of carotid bruit. Carotid bruit.    COMPARISON: None.    RIGHT CAROTID FINDINGS: There is minimal atherosclerotic plaque at the  carotid bifurcation and proximal internal carotid artery.  Right ICA PSV:  85  cm/sec.  Right ICA EDV:  26 cm/sec.  Right ICA/CCA PSV Ratio:  0.85.    These indicate less than 50% diameter stenosis of the right ICA.    Right Vertebral: Antegrade flow.   Right ECA: Antegrade flow.     LEFT CAROTID FINDINGS: There is minimal atherosclerotic plaque at the  carotid bifurcation and proximal internal carotid artery.  Left ICA PSV:  108  cm/sec.  Left ICA EDV:  28 cm/sec.  Left ICA/CCA PSV Ratio:  1.0.    These indicate less than 50% diameter stenosis of the left ICA.    Left Vertebral: Antegrade flow.   Left ECA: Antegrade flow.     Causes of Decreased Accuracy: None.       Impression    IMPRESSION:    1. Less than 50% diameter stenosis of the right internal carotid  artery relative to the distal internal carotid artery diameter.  2. Less than 50% diameter stenosis of the left internal carotid artery  relative to the distal internal carotid artery diameter.    KRISTIAN MYERS MD       Assessment & Plan   (E11.9) Type 2 diabetes mellitus with hemoglobin A1c goal of  7.0%-8.0% (H)  (primary encounter diagnosis)  Comment: Went ahead and I renewed patient's medications including his blood sugar test strips  Plan: lisinopril (ZESTRIL) 10 MG tablet, blood         glucose (NO BRAND SPECIFIED) test strip            (I73.9) PAD (peripheral artery disease) (H)    Plan: cilostazol (PLETAL) 50 MG tablet            (I10) HTN, goal below 140/90  Comment: Controlled, will continue the same current medications  Plan: lisinopril (ZESTRIL) 10 MG tablet            (E78.5) Hyperlipidemia LDL goal <100    Plan: rosuvastatin (CRESTOR) 10 MG tablet                Summary: Follow-up in 3 months, patient has an upcoming CTA with runoffs in 10 days from now we will review the results and let the patient know    Pio Parson MD

## 2020-09-16 ENCOUNTER — TELEPHONE (OUTPATIENT)
Dept: OPHTHALMOLOGY | Facility: CLINIC | Age: 65
End: 2020-09-16

## 2020-09-21 ENCOUNTER — TELEPHONE (OUTPATIENT)
Dept: OTHER | Facility: CLINIC | Age: 65
End: 2020-09-21

## 2020-09-21 NOTE — TELEPHONE ENCOUNTER
[facility]    Who is the name of the provider?:  Blank      What is the location you see this provider at?: [place]    Reason for call:  Did not have the scheduled CT scan today because the dosage of contrast was too high for his kidneys.  Had a CT scan on 9/2 at  ED.    Can we leave a detailed message on this number?  YES

## 2020-09-22 NOTE — TELEPHONE ENCOUNTER
Patients kidney function on 9/3/20 was creat 1.27 and GFR 59    Patient needs to be re-scheduled for CTA abdomen pelvis. Please put in appointment note CT needs to contact Dr. Parson prior to cancelling the CTA.     Dipti GAITANN, RN    [facility]  Office: [phone]  Fax: [phone]         Vaccine Information Statement(s) was given today. This has been reviewed, questions answered, and verbal consent given by Parent for injection(s) and administration of Influenza (Inactivated), Multi Vaccines between birth and 6 months and Rotavirus.    1. Does the patient have a moderate to severe fever?  No  2. Has the patient had a serious reaction to a flu shot before?   No  3. Has the patient ever had Guillian Maynard Syndrome within 6 weeks of a previous flu shot?  No  4. Does the patient have a serious allergy to eggs?  No  5. Is the patient less that 6 months of age?  No    Patient is eligible to receive the vaccine based on all questions being answered as 'No'.        Patient tolerated without incident. See immunization grid for documentation.

## 2020-09-22 NOTE — TELEPHONE ENCOUNTER
Patient called stating that after his first dose of Pletal he started experiencing tachycardia, nausea and weakness. I advised him to stop it and take baby aspirin.   Patient also stated he did not complete his CTA yesterday because he is concerned for his kidneys. I explained his kidney function is within normal for him to complete the CTA and we would ask him to drink lots of water after to help flush the contrast from his kidneys. He stated he will think about this and call back to schedule if he decides to move forward with it.     Dipti GAITANN, RN    [facility]  [facility]  Office: [phone]  Fax: [phone]

## 2020-09-24 NOTE — TELEPHONE ENCOUNTER
September 24, 2020    Patient rescheduled CTA for 09/30/2020.     Janelle Moncada    [facility]  Office: [phone]  Fax [phone]

## 2020-09-25 ENCOUNTER — TELEPHONE (OUTPATIENT)
Dept: CARDIOLOGY | Facility: CLINIC | Age: 65
End: 2020-09-25

## 2020-09-25 ENCOUNTER — TELEPHONE (OUTPATIENT)
Dept: FAMILY MEDICINE | Facility: CLINIC | Age: 65
End: 2020-09-25

## 2020-09-25 NOTE — TELEPHONE ENCOUNTER
PCP,    Dr. Higuera' office called regarding the cardiology referral to establish care. They state Dr. Parson (vascular medicine) is already managing his HTN and hyperlipidemia. Do you still want pt to see cardiology?    Italo RN phone number if calling back today: [phone]  If calling back Monday [phone]    Thank you,  Ward DANIEL RN

## 2020-09-25 NOTE — TELEPHONE ENCOUNTER
"PMD/Dr. Olivia Nichols referred patient to vascular medicine for claudication and cardiology for establishing care.      -----------------------------------------------------------------------  Patient is followed by Vascular Medicine.   Patient had an office visit with Dr. Parson on 09-15-20.     Assessment & Plan       (E11.9) Type 2 diabetes mellitus with hemoglobin A1c goal of 7.0%-8.0% (H)  (primary encounter diagnosis)  Comment: Went ahead and I renewed patient's medications including his blood sugar test strips  Plan: lisinopril (ZESTRIL) 10 MG tablet, blood         glucose (NO BRAND SPECIFIED) test strip          (I73.9) PAD (peripheral artery disease) (H)  Plan: cilostazol (PLETAL) 50 MG tablet     (I10) HTN, goal below 140/90  Comment: Controlled, will continue the same current medications  Plan: lisinopril (ZESTRIL) 10 MG tablet     (E78.5) Hyperlipidemia LDL goal <100  Plan: rosuvastatin (CRESTOR) 10 MG tablet        Summary: Follow-up in 3 months, patient has an upcoming CTA with runoffs in 10 days from now we will review the results and let the patient know     Pio Parson MD  -----------------------------------------------------------------------------------     It appears that Dr. Parson is managing patient's hypertension and hyperlipidemia. Not sure if cardiology services are needed.      Called Dr. Olivia Nichols's office and spoke with Ward FRANCOIS. Will discuss with Dr. Sol, if cardiology consult is still recommended.     Doreen \"Jan\" RN, BSN/Dr. Pereyra's Nurse    [facility], [place]  [phone]         "

## 2020-09-26 NOTE — TELEPHONE ENCOUNTER
Patient has multiple cardiovascular risk factors, and uncontrolled HTN, I want him to be seen by cardiology one time as consult and do an echo.   Thank you

## 2020-09-28 ENCOUNTER — TELEPHONE (OUTPATIENT)
Dept: CARDIOLOGY | Facility: CLINIC | Age: 65
End: 2020-09-28

## 2020-09-28 NOTE — TELEPHONE ENCOUNTER
"Reviewed patient's chart.     See Dr. Nichols's note from 09-25-20, \"Patient has multiple cardiovascular risk factors, and uncontrolled HTN, I want him to be seen by cardiology one time as consult and do an echo.  Thank you\"      Patient will f/u with Dr. Pereyra as planned.     Armin RN, BSN  [facility] ~ [place], [place]         "

## 2020-09-29 ENCOUNTER — OFFICE VISIT (OUTPATIENT)
Dept: CARDIOLOGY | Facility: CLINIC | Age: 65
End: 2020-09-29
Attending: INTERNAL MEDICINE
Payer: COMMERCIAL

## 2020-09-29 VITALS
SYSTOLIC BLOOD PRESSURE: 162 MMHG | BODY MASS INDEX: 31.95 KG/M2 | WEIGHT: 198.8 LBS | HEIGHT: 66 IN | DIASTOLIC BLOOD PRESSURE: 78 MMHG | HEART RATE: 88 BPM

## 2020-09-29 DIAGNOSIS — I73.9 CLAUDICATION (H): ICD-10-CM

## 2020-09-29 DIAGNOSIS — I10 HTN, GOAL BELOW 140/90: ICD-10-CM

## 2020-09-29 DIAGNOSIS — E78.5 HYPERLIPIDEMIA LDL GOAL <100: ICD-10-CM

## 2020-09-29 DIAGNOSIS — E11.9 TYPE 2 DIABETES MELLITUS WITH HEMOGLOBIN A1C GOAL OF 7.0%-8.0% (H): Primary | ICD-10-CM

## 2020-09-29 PROCEDURE — 99204 OFFICE O/P NEW MOD 45 MIN: CPT | Performed by: INTERNAL MEDICINE

## 2020-09-29 RX ORDER — SIMVASTATIN 10 MG
10 TABLET ORAL AT BEDTIME
Status: ON HOLD | COMMUNITY
End: 2020-10-29

## 2020-09-29 ASSESSMENT — MIFFLIN-ST. JEOR: SCORE: 1639.5

## 2020-09-29 NOTE — LETTER
9/29/2020    Olivia Nichols MD  [address] [facility] [address]  [facility] [address]    RE: Rachell Paige       Dear Colleague,    I had the pleasure of seeing Rachell Paige in the [facility].    HISTORY:    Rachell Paige is a pleasant 64-year-old gentleman seen for the first time in cardiology clinic today at the request of his primary care physician.  He has a history of hypertension, type 2 diabetes, peripheral vascular disease with multiple toes amputated on the left, peripheral neuropathy, and hyperlipidemia.  He was asked to see me a single time for poorly controlled hypertension.    The patient reports that he continues to work driving a car and has no issues with exertional chest arm neck shoulder jaw discomfort.  He also denies palpitations, PND/orthopnea, syncope or near syncope, strokelike symptoms, or significant peripheral edema.  He does have quite a lot of discomfort in his feet from his neuropathy but he denies claudication when he walks.  He does complain of feeling weak every day and he is angry and under a lot of stress.  He has a lot of emotional stressors including the fact that his son was shot and killed last year and another son is in the hospital after a stroke and not expected to survive.    Patient was recently started on cilostazol and Crestor and began experiencing headaches weakness heart pounding and muscle aches.  He stopped both of these and his symptoms immediately resolved.  He has been on simvastatin for many years and has tolerated it well.    Review of records show that his last echocardiogram was done in 2017 with a normal ejection fraction and no wall motion abnormalities.  He underwent a Lexiscan in 2017 as well and it showed no areas of ischemia.  A Zile patch was done at that time and was fairly unremarkable.  His last lipids available to me showed an LDL of 78.  His A1c will was last 7.4.  He has abnormal ABIs, indicating moderate disease.  He  has had a CT of his abdomen and pelvis showing no aneurysm.  He had a carotid ultrasound showing less than 50% bilateral stenosis.    Rodriguez reports that he checks his own blood pressure at home and it is almost never greater than 140.  Even when it was close to 140 he would often check it a few minutes later and it would be substantially lower.  At the last 2 visits his blood pressure was found to be elevated, today 162/78 and on the last visit 142/84.  Prior to that his values have been in the 130s.      ASSESSMENT/PLAN:    1.  Peripheral vascular disease.  The patient does not complain of significant symptoms of claudication and had side effects from cilostazol.  He is already on aspirin.  The cilostazol reduces symptoms but does not change long-term outcome.  At this point it does not seem that he needs aggressive treatment for his peripheral vascular disease other than aggressive risk factor management.  2.  Hypertension.  The patient's home blood pressures are normal.  I asked him to bring in his blood pressure cuff to make sure it is accurate.  If he is shown to have ongoing hypertension I would recommend that his lisinopril be titrated upward to as high as 40 mg daily and then consider adding chlorthalidone if the blood pressure remains elevated.  3.  Hyperlipidemia.  Patient was intolerant of Crestor with muscle aches.  He is only on low-dose simvastatin and he could easily and safely go up to 20 or 40 mg to achieve an LDL cholesterol of less than 70.  If this is unsuccessful, Lipitor is chemically closely related and may be tolerated.    4.  Diabetes.  Given his peripheral vascular disease he is at high risk of cardiac problems.'s.  It has been for 1/2-year since his last stress test and diabetics frequently have less angina than nondiabetics.  I think a stress test is warranted so I will arrange a nuclear stress test.  I will also arrange an echocardiogram at the request of his primary care physician.   This will be helpful especially in light of his hypertension.    Thank you for inviting me to participate in your patient's care.  Please do not hesitate to call if I can be of further assistance.    Orders Placed This Encounter   Procedures     Echocardiogram Complete     Orders Placed This Encounter   Medications     simvastatin (ZOCOR) 10 MG tablet     Sig: Take 10 mg by mouth At Bedtime     Medications Discontinued During This Encounter   Medication Reason     rosuvastatin (CRESTOR) 10 MG tablet Alternate therapy     rosuvastatin (CRESTOR) 10 MG tablet Alternate therapy     lisinopril (ZESTRIL) 5 MG tablet Medication Reconciliation Clean Up     lisinopril (PRINIVIL/ZESTRIL) 10 MG tablet Medication Reconciliation Clean Up       10 year ASCVD risk: The ASCVD Risk score (Savanahdelvin JONES Jr., et al., 2013) failed to calculate for the following reasons:    Cannot find a previous HDL lab    Cannot find a previous total cholesterol lab    Encounter Diagnoses   Name Primary?     Claudication (H)      Type 2 diabetes mellitus with hemoglobin A1c goal of 7.0%-8.0% (H) Yes     HTN, goal below 140/90      Hyperlipidemia LDL goal <100        CURRENT MEDICATIONS:  Current Outpatient Medications   Medication Sig Dispense Refill     alcohol swab prep pads Use to swab area of injection/pedro pablo as directed. 120 each 6     aspirin 81 MG tablet Take 1 tablet (81 mg) by mouth daily 30 tablet OTC     blood glucose (ACCU-CHEK GUIDE) test strip 1 strip by In Vitro route 4 times daily Use to test blood sugar 3 -4  times daily or as directed. 120 each 11     blood glucose (NO BRAND SPECIFIED) test strip Use to test blood sugar 2 times daily or as directed. 60 strip 4     blood glucose (NO BRAND SPECIFIED) test strip Use to test blood sugar 4 times daily or as directed. To accompany: Blood Glucose Monitor Brands: per insurance. 150 strip 6     blood glucose calibration (NO BRAND SPECIFIED) solution To accompany: Blood Glucose Monitor Brands: per  insurance. 1 Bottle 3     blood glucose monitoring (NO BRAND SPECIFIED) meter device kit Use to test blood sugar 4 times daily or as directed. Preferred blood glucose meter OR supplies to accompany: Blood Glucose Monitor Brands: per insurance. 1 kit 0     dorzolamide (TRUSOPT) 2 % ophthalmic solution Place 1 drop into both eyes 2 times daily 10 mL 11     gabapentin (NEURONTIN) 300 MG capsule Take 1 capsule (300 mg) by mouth At Bedtime 90 capsule 0     insulin aspart (NOVOLOG FLEXPEN) 100 UNIT/ML injection Inject 15-20 Units Subcutaneous 2 times daily (with meals) With lunch and dinner       Insulin Glargine (LANTUS SOLOSTAR SC) Inject 60 Units Subcutaneous At Bedtime       insulin pen needle (32G X 6 MM) 32G X 6 MM miscellaneous Use 3 pen needles daily or as directed. 300 each 0     Lido-Pentaf-Tetrafl-Ultrasound (ACCUCAINE) 1 % KIT        lisinopril (ZESTRIL) 10 MG tablet Take 1 tablet (10 mg) by mouth daily 30 tablet 3     simvastatin (ZOCOR) 10 MG tablet Take 10 mg by mouth At Bedtime       thin (NO BRAND SPECIFIED) lancets Use with lanceting device. To accompany: Blood Glucose Monitor Brands: per insurance. 200 each 6     timolol maleate (TIMOPTIC) 0.5 % ophthalmic solution Place 1 drop into both eyes 2 times daily 1 Bottle 11     albuterol (PROAIR HFA) 108 (90 Base) MCG/ACT inhaler Inhale 2 puffs into the lungs every 4 hours as needed for shortness of breath / dyspnea (Patient not taking: Reported on 9/29/2020) 1 Inhaler 0     bacitracin 500 UNIT/GM external ointment Apply to wound right 4th toe daily with bandaid (Patient not taking: Reported on 9/29/2020) 14 g 0     bacitracin 500 UNIT/GM external ointment Apply topically 2 times daily (Patient not taking: Reported on 9/29/2020) 14 g 0     benzonatate (TESSALON) 100 MG capsule Take 2 capsules (200 mg) by mouth 3 times daily as needed for cough (Patient not taking: Reported on 9/29/2020) 40 capsule 0     Cadexomer Iodine, topical, 0.9% (IODOSORB) 0.9 % GEL gel  "Apply to wound right foot daily (Patient not taking: Reported on 9/29/2020) 10 g 0     cilostazol (PLETAL) 50 MG tablet Take 1 tablet (50 mg) by mouth daily (Patient not taking: Reported on 9/29/2020) 30 tablet 3     lidocaine (XYLOCAINE) 5 % external ointment Apply topically as needed for moderate pain 50 g 0     metFORMIN (GLUCOPHAGE-XR) 500 MG 24 hr tablet Take 1,000 mg by mouth daily         ALLERGIES     Allergies   Allergen Reactions     Aleve      HA sweats     Citalopram Itching     Clopidogrel Nausea and Vomiting     Pt to restart on 11/25/15 to see again if he tolerates it or not. His sx were only GI. Not a true allergy     Naproxen Itching     About 10 yrs ago, itching all over from taking Aleve  \"get sick and really sick\"       Sulfamethoxazole-Trimethoprim      Other reaction(s): Renal Failure  Other reaction(s): Renal Failure       PAST MEDICAL HISTORY:  Past Medical History:   Diagnosis Date     Anxiety 2/23/2015     Dermatitis 4/18/2015     DM type 2, goal A1C 7-8 2/23/2015     Does not have health insurance 4/18/2015     Financial problems 2/23/2015     Glaucoma (increased eye pressure)      HTN, goal below 140/90 2/23/2015     Hyperlipidemia LDL goal <100 2/23/2015     Microalbuminuria 4/18/2015     Onychomycosis 4/18/2015     Rash 2/23/2015       PAST SURGICAL HISTORY:  Past Surgical History:   Procedure Laterality Date     AMPUTATE TOE(S) Right 12/31/2018    Procedure: Right fifth toe amputation;  Surgeon: Clark Lora DPM;  Location: [facility] OR       FAMILY HISTORY:  Family History   Problem Relation Age of Onset     Diabetes Other      LUNG DISEASE Mother      Glaucoma Father      Cerebrovascular Disease Son      Diabetes Son      Diabetes Daughter        SOCIAL HISTORY:  Social History     Socioeconomic History     Marital status:      Spouse name: None     Number of children: None     Years of education: None     Highest education level: None   Occupational History     None   Social " "Needs     Financial resource strain: None     Food insecurity     Worry: None     Inability: None     Transportation needs     Medical: None     Non-medical: None   Tobacco Use     Smoking status: Former Smoker     Types: Cigarettes     Last attempt to quit: 1990     Years since quittin.7     Smokeless tobacco: Never Used   Substance and Sexual Activity     Alcohol use: No     Drug use: No     Sexual activity: Yes     Partners: Female   Lifestyle     Physical activity     Days per week: None     Minutes per session: None     Stress: None   Relationships     Social connections     Talks on phone: None     Gets together: None     Attends Confucianism service: None     Active member of club or organization: None     Attends meetings of clubs or organizations: None     Relationship status: None     Intimate partner violence     Fear of current or ex partner: None     Emotionally abused: None     Physically abused: None     Forced sexual activity: None   Other Topics Concern     Parent/sibling w/ CABG, MI or angioplasty before 65F 55M? Not Asked   Social History Narrative    Former  ([employer], hospitals)            Review of Systems:  Skin:  Negative     Eyes:  Positive for glasses;cataracts  ENT:  Negative    Respiratory:  Negative    Cardiovascular:    Positive for;palpitations;fatigue  Gastroenterology: not assessed    Genitourinary:  not assessed    Musculoskeletal:  Positive for foot pain  Neurologic:  Positive for numbness or tingling of feet  Psychiatric:  Positive for excessive stress  Heme/Lymph/Imm:  Positive for allergies  Endocrine:  Positive for diabetes    Physical Exam:  Vitals: BP (!) 162/78 (BP Location: Right arm, Patient Position: Sitting, Cuff Size: Adult Regular)   Pulse 88   Ht 1.676 m (5' 6\")   Wt 90.2 kg (198 lb 12.8 oz)   BMI 32.09 kg/m      Constitutional:  cooperative, alert and oriented, well developed, well nourished, in no acute distress overweight      Skin:  " warm and dry to the touch, no apparent skin lesions or masses noted        Head:  normocephalic, no masses or lesions        Eyes:  sclera white;no nystagmus;no xanthalasma        ENT:  no pallor or cyanosis   masked    Neck:  carotid pulses are full and equal bilaterally, JVP normal, no carotid bruit        Chest:  normal breath sounds, clear to auscultation, normal A-P diameter, normal symmetry, normal respiratory excursion, no use of accessory muscles        Cardiac: regular rhythm, normal S1/S2, no S3 or S4, apical impulse not displaced, no murmurs, gallops or rubs                  Abdomen:  abdomen soft;BS normoactive        Vascular:       2+         1+   2+         0        Extremities and Back:  no edema        Neurological:  no gross motor deficits          Recent Lab Results:  LIPID RESULTS:  Lab Results   Component Value Date    CHOL 198 02/23/2015    HDL 58 02/23/2015     02/23/2015    TRIG 119 02/23/2015    CHOLHDLRATIO 3.4 02/23/2015       LIVER ENZYME RESULTS:  Lab Results   Component Value Date    AST 16 07/28/2020    ALT 26 07/28/2020       CBC RESULTS:  Lab Results   Component Value Date    WBC 8.6 09/03/2020    RBC 5.08 09/03/2020    HGB 14.1 09/03/2020    HCT 45.7 09/03/2020    MCV 90 09/03/2020    MCH 27.8 09/03/2020    MCHC 30.9 (L) 09/03/2020    RDW 13.3 09/03/2020     09/03/2020       BMP RESULTS:  Lab Results   Component Value Date     09/03/2020    POTASSIUM 3.8 09/03/2020    CHLORIDE 105 09/03/2020    CO2 27 09/03/2020    ANIONGAP 4 09/03/2020     (H) 09/03/2020    BUN 13 09/03/2020    CR 1.27 (H) 09/03/2020    GFRESTIMATED 59 (L) 09/03/2020    GFRESTBLACK 69 09/03/2020    ALPHONSO 9.1 09/03/2020        A1C RESULTS:  Lab Results   Component Value Date    A1C 7.4 (H) 07/28/2020       INR RESULTS:  Lab Results   Component Value Date    INR 0.90 01/22/2013    INR 0.94 04/27/2011       Thank you for allowing me to participate in the care of your patient.    Sincerely,      Janes Pereyra MD     [facility]

## 2020-09-29 NOTE — PROGRESS NOTES
HISTORY:    Rachell Paige is a pleasant 64-year-old gentleman seen for the first time in cardiology clinic today at the request of his primary care physician.  He has a history of hypertension, type 2 diabetes, peripheral vascular disease with multiple toes amputated on the left, peripheral neuropathy, and hyperlipidemia.  He was asked to see me a single time for poorly controlled hypertension.    The patient reports that he continues to work driving a car and has no issues with exertional chest arm neck shoulder jaw discomfort.  He also denies palpitations, PND/orthopnea, syncope or near syncope, strokelike symptoms, or significant peripheral edema.  He does have quite a lot of discomfort in his feet from his neuropathy but he denies claudication when he walks.  He does complain of feeling weak every day and he is angry and under a lot of stress.  He has a lot of emotional stressors including the fact that his son was shot and killed last year and another son is in the hospital after a stroke and not expected to survive.    Patient was recently started on cilostazol and Crestor and began experiencing headaches weakness heart pounding and muscle aches.  He stopped both of these and his symptoms immediately resolved.  He has been on simvastatin for many years and has tolerated it well.    Review of records show that his last echocardiogram was done in 2017 with a normal ejection fraction and no wall motion abnormalities.  He underwent a Lexiscan in 2017 as well and it showed no areas of ischemia.  A Zile patch was done at that time and was fairly unremarkable.  His last lipids available to me showed an LDL of 78.  His A1c will was last 7.4.  He has abnormal ABIs, indicating moderate disease.  He has had a CT of his abdomen and pelvis showing no aneurysm.  He had a carotid ultrasound showing less than 50% bilateral stenosis.    Rodriguez reports that he checks his own blood pressure at home and it is almost never  greater than 140.  Even when it was close to 140 he would often check it a few minutes later and it would be substantially lower.  At the last 2 visits his blood pressure was found to be elevated, today 162/78 and on the last visit 142/84.  Prior to that his values have been in the 130s.      ASSESSMENT/PLAN:    1.  Peripheral vascular disease.  The patient does not complain of significant symptoms of claudication and had side effects from cilostazol.  He is already on aspirin.  The cilostazol reduces symptoms but does not change long-term outcome.  At this point it does not seem that he needs aggressive treatment for his peripheral vascular disease other than aggressive risk factor management.  2.  Hypertension.  The patient's home blood pressures are normal.  I asked him to bring in his blood pressure cuff to make sure it is accurate.  If he is shown to have ongoing hypertension I would recommend that his lisinopril be titrated upward to as high as 40 mg daily and then consider adding chlorthalidone if the blood pressure remains elevated.  3.  Hyperlipidemia.  Patient was intolerant of Crestor with muscle aches.  He is only on low-dose simvastatin and he could easily and safely go up to 20 or 40 mg to achieve an LDL cholesterol of less than 70.  If this is unsuccessful, Lipitor is chemically closely related and may be tolerated.    4.  Diabetes.  Given his peripheral vascular disease he is at high risk of cardiac problems.'s.  It has been for 1/2-year since his last stress test and diabetics frequently have less angina than nondiabetics.  I think a stress test is warranted so I will arrange a nuclear stress test.  I will also arrange an echocardiogram at the request of his primary care physician.  This will be helpful especially in light of his hypertension.    Thank you for inviting me to participate in your patient's care.  Please do not hesitate to call if I can be of further assistance.    Orders Placed This  Encounter   Procedures     Echocardiogram Complete     Orders Placed This Encounter   Medications     simvastatin (ZOCOR) 10 MG tablet     Sig: Take 10 mg by mouth At Bedtime     Medications Discontinued During This Encounter   Medication Reason     rosuvastatin (CRESTOR) 10 MG tablet Alternate therapy     rosuvastatin (CRESTOR) 10 MG tablet Alternate therapy     lisinopril (ZESTRIL) 5 MG tablet Medication Reconciliation Clean Up     lisinopril (PRINIVIL/ZESTRIL) 10 MG tablet Medication Reconciliation Clean Up       10 year ASCVD risk: The ASCVD Risk score (Savanah JONES Jr., et al., 2013) failed to calculate for the following reasons:    Cannot find a previous HDL lab    Cannot find a previous total cholesterol lab    Encounter Diagnoses   Name Primary?     Claudication (H)      Type 2 diabetes mellitus with hemoglobin A1c goal of 7.0%-8.0% (H) Yes     HTN, goal below 140/90      Hyperlipidemia LDL goal <100        CURRENT MEDICATIONS:  Current Outpatient Medications   Medication Sig Dispense Refill     alcohol swab prep pads Use to swab area of injection/pedro pablo as directed. 120 each 6     aspirin 81 MG tablet Take 1 tablet (81 mg) by mouth daily 30 tablet OTC     blood glucose (ACCU-CHEK GUIDE) test strip 1 strip by In Vitro route 4 times daily Use to test blood sugar 3 -4  times daily or as directed. 120 each 11     blood glucose (NO BRAND SPECIFIED) test strip Use to test blood sugar 2 times daily or as directed. 60 strip 4     blood glucose (NO BRAND SPECIFIED) test strip Use to test blood sugar 4 times daily or as directed. To accompany: Blood Glucose Monitor Brands: per insurance. 150 strip 6     blood glucose calibration (NO BRAND SPECIFIED) solution To accompany: Blood Glucose Monitor Brands: per insurance. 1 Bottle 3     blood glucose monitoring (NO BRAND SPECIFIED) meter device kit Use to test blood sugar 4 times daily or as directed. Preferred blood glucose meter OR supplies to accompany: Blood Glucose Monitor  Brands: per insurance. 1 kit 0     dorzolamide (TRUSOPT) 2 % ophthalmic solution Place 1 drop into both eyes 2 times daily 10 mL 11     gabapentin (NEURONTIN) 300 MG capsule Take 1 capsule (300 mg) by mouth At Bedtime 90 capsule 0     insulin aspart (NOVOLOG FLEXPEN) 100 UNIT/ML injection Inject 15-20 Units Subcutaneous 2 times daily (with meals) With lunch and dinner       Insulin Glargine (LANTUS SOLOSTAR SC) Inject 60 Units Subcutaneous At Bedtime       insulin pen needle (32G X 6 MM) 32G X 6 MM miscellaneous Use 3 pen needles daily or as directed. 300 each 0     Lido-Pentaf-Tetrafl-Ultrasound (ACCUCAINE) 1 % KIT        lisinopril (ZESTRIL) 10 MG tablet Take 1 tablet (10 mg) by mouth daily 30 tablet 3     simvastatin (ZOCOR) 10 MG tablet Take 10 mg by mouth At Bedtime       thin (NO BRAND SPECIFIED) lancets Use with lanceting device. To accompany: Blood Glucose Monitor Brands: per insurance. 200 each 6     timolol maleate (TIMOPTIC) 0.5 % ophthalmic solution Place 1 drop into both eyes 2 times daily 1 Bottle 11     albuterol (PROAIR HFA) 108 (90 Base) MCG/ACT inhaler Inhale 2 puffs into the lungs every 4 hours as needed for shortness of breath / dyspnea (Patient not taking: Reported on 9/29/2020) 1 Inhaler 0     bacitracin 500 UNIT/GM external ointment Apply to wound right 4th toe daily with bandaid (Patient not taking: Reported on 9/29/2020) 14 g 0     bacitracin 500 UNIT/GM external ointment Apply topically 2 times daily (Patient not taking: Reported on 9/29/2020) 14 g 0     benzonatate (TESSALON) 100 MG capsule Take 2 capsules (200 mg) by mouth 3 times daily as needed for cough (Patient not taking: Reported on 9/29/2020) 40 capsule 0     Cadexomer Iodine, topical, 0.9% (IODOSORB) 0.9 % GEL gel Apply to wound right foot daily (Patient not taking: Reported on 9/29/2020) 10 g 0     cilostazol (PLETAL) 50 MG tablet Take 1 tablet (50 mg) by mouth daily (Patient not taking: Reported on 9/29/2020) 30 tablet 3      "lidocaine (XYLOCAINE) 5 % external ointment Apply topically as needed for moderate pain 50 g 0     metFORMIN (GLUCOPHAGE-XR) 500 MG 24 hr tablet Take 1,000 mg by mouth daily         ALLERGIES     Allergies   Allergen Reactions     Aleve      HA sweats     Citalopram Itching     Clopidogrel Nausea and Vomiting     Pt to restart on 11/25/15 to see again if he tolerates it or not. His sx were only GI. Not a true allergy     Naproxen Itching     About 10 yrs ago, itching all over from taking Aleve  \"get sick and really sick\"       Sulfamethoxazole-Trimethoprim      Other reaction(s): Renal Failure  Other reaction(s): Renal Failure       PAST MEDICAL HISTORY:  Past Medical History:   Diagnosis Date     Anxiety 2/23/2015     Dermatitis 4/18/2015     DM type 2, goal A1C 7-8 2/23/2015     Does not have health insurance 4/18/2015     Financial problems 2/23/2015     Glaucoma (increased eye pressure)      HTN, goal below 140/90 2/23/2015     Hyperlipidemia LDL goal <100 2/23/2015     Microalbuminuria 4/18/2015     Onychomycosis 4/18/2015     Rash 2/23/2015       PAST SURGICAL HISTORY:  Past Surgical History:   Procedure Laterality Date     AMPUTATE TOE(S) Right 12/31/2018    Procedure: Right fifth toe amputation;  Surgeon: Clark Lora DPM;  Location: [facility] OR       FAMILY HISTORY:  Family History   Problem Relation Age of Onset     Diabetes Other      LUNG DISEASE Mother      Glaucoma Father      Cerebrovascular Disease Son      Diabetes Son      Diabetes Daughter        SOCIAL HISTORY:  Social History     Socioeconomic History     Marital status:      Spouse name: None     Number of children: None     Years of education: None     Highest education level: None   Occupational History     None   Social Needs     Financial resource strain: None     Food insecurity     Worry: None     Inability: None     Transportation needs     Medical: None     Non-medical: None   Tobacco Use     Smoking status: Former Smoker     " "Types: Cigarettes     Last attempt to quit:      Years since quittin.7     Smokeless tobacco: Never Used   Substance and Sexual Activity     Alcohol use: No     Drug use: No     Sexual activity: Yes     Partners: Female   Lifestyle     Physical activity     Days per week: None     Minutes per session: None     Stress: None   Relationships     Social connections     Talks on phone: None     Gets together: None     Attends Cheondoism service: None     Active member of club or organization: None     Attends meetings of clubs or organizations: None     Relationship status: None     Intimate partner violence     Fear of current or ex partner: None     Emotionally abused: None     Physically abused: None     Forced sexual activity: None   Other Topics Concern     Parent/sibling w/ CABG, MI or angioplasty before 65F 55M? Not Asked   Social History Narrative    Former  ([employer] [facility] [employer], [place])            Review of Systems:  Skin:  Negative     Eyes:  Positive for glasses;cataracts  ENT:  Negative    Respiratory:  Negative    Cardiovascular:    Positive for;palpitations;fatigue  Gastroenterology: not assessed    Genitourinary:  not assessed    Musculoskeletal:  Positive for foot pain  Neurologic:  Positive for numbness or tingling of feet  Psychiatric:  Positive for excessive stress  Heme/Lymph/Imm:  Positive for allergies  Endocrine:  Positive for diabetes    Physical Exam:  Vitals: BP (!) 162/78 (BP Location: Right arm, Patient Position: Sitting, Cuff Size: Adult Regular)   Pulse 88   Ht 1.676 m (5' 6\")   Wt 90.2 kg (198 lb 12.8 oz)   BMI 32.09 kg/m      Constitutional:  cooperative, alert and oriented, well developed, well nourished, in no acute distress overweight      Skin:  warm and dry to the touch, no apparent skin lesions or masses noted        Head:  normocephalic, no masses or lesions        Eyes:  sclera white;no nystagmus;no xanthalasma        ENT:  no pallor or cyanosis   " masked    Neck:  carotid pulses are full and equal bilaterally, JVP normal, no carotid bruit        Chest:  normal breath sounds, clear to auscultation, normal A-P diameter, normal symmetry, normal respiratory excursion, no use of accessory muscles        Cardiac: regular rhythm, normal S1/S2, no S3 or S4, apical impulse not displaced, no murmurs, gallops or rubs                  Abdomen:  abdomen soft;BS normoactive        Vascular:       2+         1+   2+         0        Extremities and Back:  no edema        Neurological:  no gross motor deficits          Recent Lab Results:  LIPID RESULTS:  Lab Results   Component Value Date    CHOL 198 02/23/2015    HDL 58 02/23/2015     02/23/2015    TRIG 119 02/23/2015    CHOLHDLRATIO 3.4 02/23/2015       LIVER ENZYME RESULTS:  Lab Results   Component Value Date    AST 16 07/28/2020    ALT 26 07/28/2020       CBC RESULTS:  Lab Results   Component Value Date    WBC 8.6 09/03/2020    RBC 5.08 09/03/2020    HGB 14.1 09/03/2020    HCT 45.7 09/03/2020    MCV 90 09/03/2020    MCH 27.8 09/03/2020    MCHC 30.9 (L) 09/03/2020    RDW 13.3 09/03/2020     09/03/2020       BMP RESULTS:  Lab Results   Component Value Date     09/03/2020    POTASSIUM 3.8 09/03/2020    CHLORIDE 105 09/03/2020    CO2 27 09/03/2020    ANIONGAP 4 09/03/2020     (H) 09/03/2020    BUN 13 09/03/2020    CR 1.27 (H) 09/03/2020    GFRESTIMATED 59 (L) 09/03/2020    GFRESTBLACK 69 09/03/2020    ALPHONSO 9.1 09/03/2020        A1C RESULTS:  Lab Results   Component Value Date    A1C 7.4 (H) 07/28/2020       INR RESULTS:  Lab Results   Component Value Date    INR 0.90 01/22/2013    INR 0.94 04/27/2011         Janes Pereyra MD, FACC    CC  Olivia Nichols MD  [address]  [place] [address]

## 2020-10-07 ENCOUNTER — APPOINTMENT (OUTPATIENT)
Dept: GENERAL RADIOLOGY | Facility: CLINIC | Age: 65
End: 2020-10-07
Attending: EMERGENCY MEDICINE
Payer: COMMERCIAL

## 2020-10-07 ENCOUNTER — HOSPITAL ENCOUNTER (EMERGENCY)
Facility: CLINIC | Age: 65
Discharge: HOME | End: 2020-10-07
Attending: EMERGENCY MEDICINE | Admitting: EMERGENCY MEDICINE
Payer: COMMERCIAL

## 2020-10-07 VITALS
TEMPERATURE: 99.9 F | WEIGHT: 198.41 LBS | DIASTOLIC BLOOD PRESSURE: 78 MMHG | RESPIRATION RATE: 17 BRPM | HEART RATE: 95 BPM | OXYGEN SATURATION: 96 % | SYSTOLIC BLOOD PRESSURE: 163 MMHG | BODY MASS INDEX: 32.02 KG/M2

## 2020-10-07 DIAGNOSIS — R05.9 COUGH: ICD-10-CM

## 2020-10-07 DIAGNOSIS — M62.81 GENERALIZED MUSCLE WEAKNESS: ICD-10-CM

## 2020-10-07 DIAGNOSIS — Z20.822 SUSPECTED COVID-19 VIRUS INFECTION: ICD-10-CM

## 2020-10-07 LAB
ANION GAP SERPL CALCULATED.3IONS-SCNC: 5 MMOL/L (ref 3–14)
BASOPHILS # BLD AUTO: 0 10E9/L (ref 0–0.2)
BASOPHILS NFR BLD AUTO: 0.6 %
BUN SERPL-MCNC: 11 MG/DL (ref 7–30)
CALCIUM SERPL-MCNC: 8.1 MG/DL (ref 8.5–10.1)
CHLORIDE SERPL-SCNC: 105 MMOL/L (ref 94–109)
CO2 SERPL-SCNC: 25 MMOL/L (ref 20–32)
CREAT SERPL-MCNC: 1.33 MG/DL (ref 0.66–1.25)
DIFFERENTIAL METHOD BLD: ABNORMAL
EOSINOPHIL # BLD AUTO: 0.2 10E9/L (ref 0–0.7)
EOSINOPHIL NFR BLD AUTO: 2.6 %
ERYTHROCYTE [DISTWIDTH] IN BLOOD BY AUTOMATED COUNT: 13.2 % (ref 10–15)
GFR SERPL CREATININE-BSD FRML MDRD: 56 ML/MIN/{1.73_M2}
GLUCOSE SERPL-MCNC: 131 MG/DL (ref 70–99)
HCT VFR BLD AUTO: 41.6 % (ref 40–53)
HGB BLD-MCNC: 13.1 G/DL (ref 13.3–17.7)
IMM GRANULOCYTES # BLD: 0.1 10E9/L (ref 0–0.4)
IMM GRANULOCYTES NFR BLD: 1 %
INTERPRETATION ECG - MUSE: NORMAL
LYMPHOCYTES # BLD AUTO: 1.2 10E9/L (ref 0.8–5.3)
LYMPHOCYTES NFR BLD AUTO: 19.4 %
MCH RBC QN AUTO: 28.2 PG (ref 26.5–33)
MCHC RBC AUTO-ENTMCNC: 31.5 G/DL (ref 31.5–36.5)
MCV RBC AUTO: 90 FL (ref 78–100)
MONOCYTES # BLD AUTO: 1 10E9/L (ref 0–1.3)
MONOCYTES NFR BLD AUTO: 16.5 %
NEUTROPHILS # BLD AUTO: 3.7 10E9/L (ref 1.6–8.3)
NEUTROPHILS NFR BLD AUTO: 59.9 %
NRBC # BLD AUTO: 0 10*3/UL
NRBC BLD AUTO-RTO: 0 /100
PLATELET # BLD AUTO: 284 10E9/L (ref 150–450)
POTASSIUM SERPL-SCNC: 3.8 MMOL/L (ref 3.4–5.3)
RBC # BLD AUTO: 4.65 10E12/L (ref 4.4–5.9)
SODIUM SERPL-SCNC: 135 MMOL/L (ref 133–144)
TROPONIN I SERPL-MCNC: 0.02 UG/L (ref 0–0.04)
WBC # BLD AUTO: 6.2 10E9/L (ref 4–11)

## 2020-10-07 PROCEDURE — 96360 HYDRATION IV INFUSION INIT: CPT

## 2020-10-07 PROCEDURE — 99285 EMERGENCY DEPT VISIT HI MDM: CPT | Mod: 25

## 2020-10-07 PROCEDURE — U0003 INFECTIOUS AGENT DETECTION BY NUCLEIC ACID (DNA OR RNA); SEVERE ACUTE RESPIRATORY SYNDROME CORONAVIRUS 2 (SARS-COV-2) (CORONAVIRUS DISEASE [COVID-19]), AMPLIFIED PROBE TECHNIQUE, MAKING USE OF HIGH THROUGHPUT TECHNOLOGIES AS DESCRIBED BY CMS-2020-01-R: HCPCS | Performed by: EMERGENCY MEDICINE

## 2020-10-07 PROCEDURE — 250N000013 HC RX MED GY IP 250 OP 250 PS 637: Performed by: EMERGENCY MEDICINE

## 2020-10-07 PROCEDURE — 93005 ELECTROCARDIOGRAM TRACING: CPT

## 2020-10-07 PROCEDURE — 80048 BASIC METABOLIC PNL TOTAL CA: CPT | Performed by: EMERGENCY MEDICINE

## 2020-10-07 PROCEDURE — 84484 ASSAY OF TROPONIN QUANT: CPT | Performed by: EMERGENCY MEDICINE

## 2020-10-07 PROCEDURE — 258N000003 HC RX IP 258 OP 636: Performed by: EMERGENCY MEDICINE

## 2020-10-07 PROCEDURE — C9803 HOPD COVID-19 SPEC COLLECT: HCPCS

## 2020-10-07 PROCEDURE — 85025 COMPLETE CBC W/AUTO DIFF WBC: CPT | Performed by: EMERGENCY MEDICINE

## 2020-10-07 PROCEDURE — 71045 X-RAY EXAM CHEST 1 VIEW: CPT

## 2020-10-07 RX ORDER — ACETAMINOPHEN 325 MG/1
650 TABLET ORAL ONCE
Status: COMPLETED | OUTPATIENT
Start: 2020-10-07 | End: 2020-10-07

## 2020-10-07 RX ADMIN — ACETAMINOPHEN 325 MG: 325 TABLET, FILM COATED ORAL at 13:04

## 2020-10-07 RX ADMIN — SODIUM CHLORIDE 1000 ML: 9 INJECTION, SOLUTION INTRAVENOUS at 13:04

## 2020-10-07 ASSESSMENT — ENCOUNTER SYMPTOMS
WEAKNESS: 1
MYALGIAS: 1
HEADACHES: 1
COUGH: 1
FEVER: 1

## 2020-10-07 NOTE — ED AVS SNAPSHOT
[facility]  [address]  [facility] [address]  Phone: [phone]  Fax: [phone]                                    Rachell Paige   MRN: [id number]    Department: [facility]   Date of Visit: 10/7/2020           After Visit Summary Signature Page    I have received my discharge instructions, and my questions have been answered. I have discussed any challenges I see with this plan with the nurse or doctor.    ..........................................................................................................................................  Patient/Patient Representative Signature      ..........................................................................................................................................  Patient Representative Print Name and Relationship to Patient    ..................................................               ................................................  Date                                   Time    ..........................................................................................................................................  Reviewed by Signature/Title    ...................................................              ..............................................  Date                                               Time          22EPIC Rev 08/18

## 2020-10-07 NOTE — DISCHARGE INSTRUCTIONS
Discharge Instructions  COVID-19    COVID-19 is the disease caused by a new coronavirus. The virus spreads from person-to-person primarily by droplets when an infected person coughs or sneezes and the droplet either lands on another person or that other person touches a surface with the droplet on it. There are tests available to diagnose COVID-19. There is no specific treatment or medicine for the disease.    You may have been diagnosed with COVID, may be being tested for COVID and have a pending test result, or may have been exposed to COVID.    Symptoms of COVID-19  Many people have no symptoms or mild symptoms.  Symptoms may usually appear 4 to 5 days (up to 14 days) after contact with a person with COVID-19. Some people will get severe symptoms and pneumonia. Usual symptoms are:     ? Fever  ? Cough  ? Trouble breathing    Less common symptoms are: Headache, body aches, sore throat, sneezing, diarrhea,loss of taste or smell.    Isolation and Quarantine    You were seen because you have symptoms, had an exposure, or had some other concern about possible COVID. The best way to stop the spread of the virus is to avoid contact with others.  Isolation refers to sick people staying away from people who are not sick. A person in quarantine is limiting activity because they were exposed and are waiting to see if they might become sick.    If you test positive for COVID, you should stay home (isolation) for at least 10 days after your symptoms began, and for 24 hours with no fever and improvement of symptoms--whichever is longer. (Your fever should be gone for 24 hours without using fever-reducing medicine). If you have no symptoms, you should stay home (isolation) for 10 days from the day of the test.    For example, if you have a fever and cough for 6 days, you need to stay home 4 more days with no fever for a total of 10 days. Or, if you have a fever and cough for 10 days, you need to stay home one more day with no  fever for a total of 11 days.    If you have a high-risk exposure to COVID (you spent 15 minutes or more within six feet of somebody who has COVID), you should stay home (quarantine) for 14 days. Even if you test negative for COVID, the CDC recommends a 14-day quarantine from the time of your last exposure to that individual.    If you have symptoms but a negative test, you should stay at home until you are symptom-free and without fever for 24 hours, using the same judgment you would for when it is safe to return to work/school from strep throat, influenza, or the common cold. If you worsen, you should consider being re-evaluated.    If you are being tested for COVID and your test is pending, you should stay home until you know your test result.    How should I protect myself and others?    Do not go to work or school. Have a friend or relative do your shopping. Do not use public transportation (bus, train) or ridesharing (Lyft, Uber).    Separate yourself from other people in your home.?As much as possible, you should stay in one room and away from other people in your home. Also, use a separate bathroom, if possible. Avoid handling pets or other animals while sick.     Wear a facemask if you need to be around other people and cover your mouth and nose with a tissue when you cough or sneeze.     Avoid sharing personal household items. You should not share dishes, drinking glasses, forks/knives/spoons, towels, or bedding with other people in your home. After using these items, they should be washed with soap and water. Clean parts of your home that are touched often (doorknobs, faucets, countertops, etc.) daily.     Wash your hands often with soap and water for at least 20 seconds or use an alcohol-based hand  containing at least 60% alcohol.     Avoid touching your face.    Treat your symptoms. You can take Acetaminophen (Tylenol) to treat body aches and fever as needed for comfort. Ibuprofen (Advil or  Motrin) can be used as well if you still have symptoms after taking Tylenol. Drink fluids. Rest.    Watch for worsening symptoms such as shortness of breath/difficulty breathing or very severe weakness.    Employers/workplaces are being asked by the Centers for Disease Control (CDC) to not request notes/documentation for you to return to work or prove that you were ill. You may choose to show your employer this paperwork. Also, repeat testing should not be required to return to work.    Return to the Emergency Department if:    If you are developing worsening breathing, shortness of breath, or feel worse you should seek medical attention.  If you are uncertain, contact your health care provider/clinic. If you need emergency medical attention, call 911 and tell them you have been ill.

## 2020-10-07 NOTE — ED PROVIDER NOTES
History     Chief Complaint:    Generalized Body Aches, Cough, and Fever      The history is provided by the patient.      Rachell Paige is a 64-year-old male with a PMH significant for HTN, DM type II, HLD, and PAD, who presents to the ED for evaluation of generalized body aches, cough, and fever.  Patient reports his symptoms first began yesterday.  He initially noted a tickle to the back of his throat.  This was followed by a cough yesterday morning.  His grandson picked up NyQuil, and he took 3 doses yesterday, with near resolution of his cough.  He experiences ongoing generalized weakness, and body aches.  He denies overt fever.  He has not had residual cough.  He notes an intermittent mild headache.  He denies any other known sick contacts.  His son unfortunately is hospitalized following a stroke.  His wife has been feeling well at home.    Allergies:  Aleve  Citalopram  Clopidogrel  Naproxen  Sulfamethoxazole-Trimethoprim    Medications:    Ambien  Aspirin 81 mg  Neurontin  Zestril  Novolog  Lantus  Glucophage  Zocor    Past Medical History:    Anxiety  Type 2 diabetes  Glaucoma  Hypertension  Hyperlipidemia  Microalbuminuria  Onychomycosis  CKD  Osteomyelitis  Diabetic peripheral neuropathy  PAD  Diabetic ulcer of right foot  PVD    Past Surgical History:    Amputate right fifth toe    Family History:    Mother: Lung disease  Father: Glaucoma    Social History:  Smoking Status: Former Smoker   Quit in 1990  Smokeless Tobacco: Never Used  Alcohol Use: Negative  Drug Use: Negative  PCP: Olivia Nichols    Marital Status:       Review of Systems   Constitutional: Positive for fever.   Respiratory: Positive for cough.    Musculoskeletal: Positive for myalgias.   Neurological: Positive for weakness and headaches.   All other systems reviewed and are negative.    Physical Exam     Patient Vitals for the past 24 hrs:   BP Temp Temp src Pulse Resp SpO2 Weight   10/07/20 1415 -- -- -- 95 17 96 % --    10/07/20 1400 -- -- -- 93 23 96 % --   10/07/20 1345 -- -- -- 93 22 95 % --   10/07/20 1330 -- -- -- 94 28 95 % --   10/07/20 1315 -- -- -- 99 19 95 % --   10/07/20 1300 (!) 163/78 -- -- 100 -- 97 % --   10/07/20 1230 -- -- -- 101 22 96 % --   10/07/20 1215 -- -- -- 101 -- 97 % --   10/07/20 1200 113/78 -- -- -- -- -- --   10/07/20 1118 -- 99.9  F (37.7  C) Oral -- -- -- --   10/07/20 1116 (!) 200/93 -- -- 106 20 97 % 90 kg (198 lb 6.6 oz)       Physical Exam  General:              Well-nourished              Speaking in full sentences   Resting comfortably on gurney  Eyes:              Conjunctiva without injection or scleral icterus  ENT:              Moist mucous membranes              Nares patent              Pinnae normal  Neck:              Full ROM              No stiffness appreciated  Resp:              Lungs CTAB              No crackles, wheezing or audible rubs              Good air movement  CV:                    Normal rate, regular rhythm              S1 and S2 present              No murmur, gallop or rub  GI:              BS present              Abdomen soft without distention              Non-tender to light and deep palpation              No guarding or rebound tenderness  Skin:              Warm, dry, well perfused              No rashes or open wounds on exposed skin  MSK:              Moves all extremities              No focal deformities or swelling  Neuro:              Alert              Answers questions appropriately              Moves all extremities equally              Gait stable  Psych:              Normal affect, normal mood    Emergency Department Course     ECG:  Indication: PAD  Time: 1205  Vent. Rate 104 bpm. OR interval 194. QRS duration 84. QT/QTc 324/426. P-R-T axis 53 -24 70. Sinus tachycardia. Otherwise normal ECG. No significant changes when compared to prior dated 02/22/2020 Read time: 1215       Imaging:  Radiology findings were communicated with the patient who  voiced understanding of the findings.    XR Chest, Portable, G/E 1 view:   No acute cardiopulmonary disease. As per radiology.    Laboratory:  Laboratory findings were communicated with the patient who voiced understanding of the findings.    CBC: WBC: 6.2 HGB: 13.1 (L), PLT: 284    BMP: Glucose 131 (H), Creatinine: 1.33 (H), GFR: 56 (L), Calcium: 8.1 (L), o/w WNL    1211 Troponin: 0.018    COVID-19 Virus (Coronavirus), PCR NP Swab: Pending      Interventions:  1304 NS 1L IV  1304 Tylenol 325 mg PO    Emergency Department Course:  Past medical records, nursing notes, and vitals reviewed.    1145 I performed an exam of the patient as documented above.     EKG obtained in the ED, see results above.     IV was inserted and blood was drawn for laboratory testing, results above.    The patient was sent for a Chest X-ray while in the emergency department, results above.     ED Course as of Oct 07 1936   Wed Oct 07, 2020   1359 Patient re-evaluated        Findings and plan explained to the Patient. Patient discharged home with instructions regarding supportive care, medications, and reasons to return. The importance of close follow-up was reviewed.     I personally reviewed the laboratory and imaging results with the Patient and answered all related questions prior to discharge.     Impression & Plan     Covid-19  Rachell Paige was evaluated during a global COVID-19 pandemic, which necessitated consideration that the patient might be at risk for infection with the SARS-CoV-2 virus that causes COVID-19.   Applicable protocols for evaluation were followed during the patient's care.   COVID-19 was considered as part of the patient's evaluation. The plan for testing is:  a test was obtained during this visit.    Medical Decision Making:  Rachell Paige is A 64-year-old male with a PMH significant for DM type II, HTN, HLD, and PAD, who presents to the ED for evaluation of generalized body aches, cough, and fever.  VS on  presentation notable for elevated BP to 200/93, which improved during his ED course, HR of 106, and T of 99.9, though otherwise are unremarkable.  Based on the above history and evaluation, I am most suspicious for underlying viral syndrome, including COVID-19.  He notes associated symptoms including mild scratchy throat, cough, as well as loss of taste.  At this time, he is unaware of any known positive sick contacts.  Overall, he clinically appears well.  Chest x-ray was obtained in light of his cough, which returned negative for evidence of acute infiltrate.  His laboratory studies do reveal normal WBC count, stable anemia, and normal platelet count.  Renal function is mildly elevated with a creatinine of 1.33, though within rough ranges of previous.  ACS considered though unlikely given the above history, absence of chest pain, absence of acute ischemic changes on EKG, and normal troponin.  His troponin did return detectable at 0.018, though this too is within range of previous values.  He denies any associated urinary symptoms to suggest UTI or pyelonephritis.  No other symptoms such as abdominal pain, vomiting, nor diarrhea to suggest intra-abdominal pathology.  His weakness is described as generalized in nature, and not suggestive of CNS pathology such as CVA/TIA.    Patient was reevaluated during his ED course.  He was noting improvements in symptoms following 1 L IV fluids.  HR improved in conjunction with this.  We discussed the results and clinical impression.  At this time, with reasonable clinical certainty I feel he is appropriate for discharge from the ED with supportive outpatient treatment and close follow-up.  I have recommended use of Tylenol as needed for any body aches, or low-grade fevers.  We discussed that given his comorbid disease, he is at higher risk for complications with COVID, although at this time, would continue supportive cares.  He is without hypoxia, or respiratory distress during  his entire ED evaluation.  I do not feel he requires hospitalization at this time though strict return precautions were discussed including worsening cough, worsening weakness, shortness of breath, persistent fevers, or any other new or troubling symptoms.  He will maintain isolation in accordance with guidelines put forth by the CDC/[facility], especially while awaiting results of COVID testing.  Patient felt comfortable with this plan of care.  All questions were answered prior to discharge.      Diagnosis:    ICD-10-CM    1. Generalized muscle weakness  M62.81 Symptomatic COVID-19 Virus (Coronavirus) by PCR   2. Cough  R05    3. Suspected COVID-19 virus infection  Z20.828        Disposition:  Discharged to home.    Scribe Disclosure:  I, Farhat Quiroz, am serving as a scribe at 11:45 AM on 10/7/2020 to document services personally performed by Merrill Wilson MD based on my observations and the provider's statements to me.        Merrill Wilson MD  10/07/20 1941

## 2020-10-08 ENCOUNTER — TELEPHONE (OUTPATIENT)
Dept: NURSING | Facility: CLINIC | Age: 65
End: 2020-10-08

## 2020-10-08 LAB
SARS-COV-2 RNA SPEC QL NAA+PROBE: ABNORMAL
SPECIMEN SOURCE: ABNORMAL

## 2020-10-08 NOTE — TELEPHONE ENCOUNTER
"Coronavirus (COVID-19) Notification    Caller Name (Patient, parent, daughter/son, grandparent, etc)  Patient    Reason for call  Notify of Positive Coronavirus (COVID-19) lab results, assess symptoms,  review [facility] recommendations    Lab Result    Lab test:  2019-nCoV rRt-PCR or SARS-CoV-2 PCR    Oropharyngeal AND/OR nasopharyngeal swabs is POSITIVE for 2019-nCoV RNA/SARS-COV-2 PCR (COVID-19 virus)    RN Recommendations/Instructions per [facility] Coronavirus COVID-19 recommendations    Brief introduction script  Introduce self then review script:  \"I am calling on behalf of [facility].  We were notified that your Coronavirus test (COVID-19) for was POSITIVE for the virus.  I have some information to relay to you but first I wanted to mention that the [place] Dept of Health will be contacting you shortly [it's possible MD already called Patient] to talk to you more about how you are feeling and other people you have had contact with who might now also have the virus.  Also, [facility] is Partnering with the [facility] for Covid-19 research, you may be contacted directly by research staff.\"    Assessment (Inquire about Patient's current symptoms)   Assessment   Current Symptoms at time of phone call: (if no symptoms, document No symptoms] Slight cough, loss of smell   Symptoms onset (if applicable) Headache, body ache     If at time of call, Patients symptoms hare worsened, the Patient should contact 911 or have someone drive them to Emergency Dept promptly:      If Patient calling 911, inform 911 personal that you have tested positive for the Coronavirus (COVID-19).  Place mask on and await 911 to arrive.    If Emergency Dept, If possible, please have another adult drive you to the Emergency Dept but you need to wear mask when in contact with other people.      Review information with Patient    Your result was positive. This means you have COVID-19 (coronavirus).  We have sent you " a letter that reviews the information that I'll be reviewing with you now.    How can I protect others?    If you have symptoms: stay home and away from others (self-isolate) until:    You've had no fever--and no medicine that reduces fever--for 1 full day (24 hours). And       Your other symptoms have gotten better. For example, your cough or breathing has improved. And     At least 10 days have passed since your symptoms started. (If you've been told by a doctor that you have a weak immune system, wait 20 days.)     If you don't have symptoms: Stay home and away from others (self-isolate) until at least 10 days have passed since your first positive COVID-19 test. (Date test collected)    During this time:    Stay in your own room, including for meals. Use your own bathroom if you can.    Stay away from others in your home. No hugging, kissing or shaking hands. No visitors.     Don't go to work, school or anywhere else.     Clean  high touch  surfaces often (doorknobs, counters, handles, etc.). Use a household cleaning spray or wipes. You'll find a full list on the EPA website at www.epa.gov/pesticide-registration/list-n-disinfectants-use-against-sars-cov-2.     Cover your mouth and nose with a mask, tissue or other face covering to avoid spreading germs.    Wash your hands and face often with soap and water.    Caregivers in these groups are at risk for severe illness due to COVID-19:  o People 65 years and older  o People who live in a nursing home or long-term care facility  o People with chronic disease (lung, heart, cancer, diabetes, kidney, liver, immunologic)  o People who have a weakened immune system, including those who:  - Are in cancer treatment  - Take medicine that weakens the immune system, such as corticosteroids  - Had a bone marrow or organ transplant  - Have an immune deficiency  - Have poorly controlled HIV or AIDS  - Are obese (body mass index of 40 or higher)  - Smoke regularly    Caregivers  should wear gloves while washing dishes, handling laundry and cleaning bedrooms and bathrooms.    Wash and dry laundry with special caution. Don't shake dirty laundry, and use the warmest water setting you can.    If you have a weakened immune system, ask your doctor about other actions you should take.    For more tips, go to www.cdc.gov/coronavirus/2019-ncov/downloads/10Things.pdf.    You should not go back to work until you meet the guidelines above for ending your home isolation. You don't need to be retested for COVID-19 before going back to work--studies show that you won't spread the virus if it's been at least 10 days since your symptoms started (or 20 days, if you have a weak immune system).    Employers: This document serves as formal notice of your employee's medical guidelines for going back to work. They must meet the above guidelines before going back to work in person.    How can I take care of myself?    1. Get lots of rest. Drink extra fluids (unless a doctor has told you not to).    2. Take Tylenol (acetaminophen) for fever or pain. If you have liver or kidney problems, ask your family doctor if it's okay to take Tylenol.     Take either:     650 mg (two 325 mg pills) every 4 to 6 hours, or     1,000 mg (two 500 mg pills) every 8 hours as needed.     Note: Don't take more than 3,000 mg in one day. Acetaminophen is found in many medicines (both prescribed and over-the-counter medicines). Read all labels to be sure you don't take too much.    For children, check the Tylenol bottle for the right dose (based on their age or weight).    3. If you have other health problems (like cancer, heart failure, an organ transplant or severe kidney disease): Call your specialty clinic if you don't feel better in the next 2 days.    4. Know when to call 911: Emergency warning signs include:    Trouble breathing or shortness of breath    Pain or pressure in the chest that doesn't go away    Feeling confused like you  haven't felt before, or not being able to wake up    Bluish-colored lips or face    5. Sign up for [employer]. We know it's scary to hear that you have COVID-19. We want to track your symptoms to make sure you're okay over the next 2 weeks. Please look for an email from [employer]--this is a free, online program that we'll use to keep in touch. To sign up, follow the link in the email. Learn more at [email or web address].    Where can I get more information?    [facility]: [email or web address]    Coronavirus Basics: www.health.[facility].[place]./diseases/coronavirus/basics.html    What to Do If You're Sick: www.cdc.gov/coronavirus/2019-ncov/about/steps-when-sick.html    Ending Home Isolation: www.cdc.gov/coronavirus/2019-ncov/hcp/disposition-in-home-patients.html     Caring for Someone with COVID-19: www.cdc.gov/coronavirus/2019-ncov/if-you-are-sick/care-for-someone.html     [facility] clinical trials (COVID-19 research studies): clinicalaffairs.[facility]-clinical-trials     A Positive COVID-19 letter will be sent via [employer] or the mail. (Exception, no letters sent to Presurgerical/Preprocedure Patients)    [Name]  Kalyan Etienne RN/[facility], 10/08/20, 4:15 PM.

## 2020-10-09 ENCOUNTER — PATIENT OUTREACH (OUTPATIENT)
Dept: NURSING | Facility: CLINIC | Age: 65
End: 2020-10-09
Payer: COMMERCIAL

## 2020-10-09 DIAGNOSIS — U07.1 LAB TEST POSITIVE FOR DETECTION OF COVID-19 VIRUS: Primary | ICD-10-CM

## 2020-10-09 SDOH — ECONOMIC STABILITY: FOOD INSECURITY: WITHIN THE PAST 12 MONTHS, YOU WORRIED THAT YOUR FOOD WOULD RUN OUT BEFORE YOU GOT MONEY TO BUY MORE.: NEVER TRUE

## 2020-10-09 SDOH — ECONOMIC STABILITY: TRANSPORTATION INSECURITY
IN THE PAST 12 MONTHS, HAS THE LACK OF TRANSPORTATION KEPT YOU FROM MEDICAL APPOINTMENTS OR FROM GETTING MEDICATIONS?: NO

## 2020-10-09 SDOH — ECONOMIC STABILITY: INCOME INSECURITY: HOW HARD IS IT FOR YOU TO PAY FOR THE VERY BASICS LIKE FOOD, HOUSING, MEDICAL CARE, AND HEATING?: NOT HARD AT ALL

## 2020-10-09 SDOH — ECONOMIC STABILITY: FOOD INSECURITY: WITHIN THE PAST 12 MONTHS, THE FOOD YOU BOUGHT JUST DIDN'T LAST AND YOU DIDN'T HAVE MONEY TO GET MORE.: NEVER TRUE

## 2020-10-09 SDOH — ECONOMIC STABILITY: TRANSPORTATION INSECURITY
IN THE PAST 12 MONTHS, HAS LACK OF TRANSPORTATION KEPT YOU FROM MEETINGS, WORK, OR FROM GETTING THINGS NEEDED FOR DAILY LIVING?: NO

## 2020-10-09 SDOH — SOCIAL STABILITY: SOCIAL NETWORK: ARE YOU MARRIED, WIDOWED, DIVORCED, SEPARATED, NEVER MARRIED, OR LIVING WITH A PARTNER?: MARRIED

## 2020-10-09 SDOH — SOCIAL STABILITY: SOCIAL NETWORK: HOW OFTEN DO YOU GET TOGETHER WITH FRIENDS OR RELATIVES?: MORE THAN THREE TIMES A WEEK

## 2020-10-09 SDOH — HEALTH STABILITY: MENTAL HEALTH
STRESS IS WHEN SOMEONE FEELS TENSE, NERVOUS, ANXIOUS, OR CAN'T SLEEP AT NIGHT BECAUSE THEIR MIND IS TROUBLED. HOW STRESSED ARE YOU?: NOT AT ALL

## 2020-10-09 ASSESSMENT — ACTIVITIES OF DAILY LIVING (ADL): DEPENDENT_IADLS:: INDEPENDENT

## 2020-10-09 NOTE — LETTER
[place] CARE COORDINATION  [address] [place] [address]    October 9, 2020    Rachell Paige  [address]  [place] [address]      Dear Rachell,    I am a clinic care coordinator who works with Olivia Nichols MD at [facility]. I wanted to thank you for spending the time to talk with me.  Below is a description of clinic care coordination and how I can further assist you.      The clinic care coordination team is made up of a registered nurse,  and community health worker who understand the health care system. The goal of clinic care coordination is to help you manage your health and improve access to the health care system in the most efficient manner. The team can assist you in meeting your health care goals by providing education, coordinating services, strengthening the communication among your providers and supporting you with any resource needs.    Please feel free to contact me at [phone] with any questions or concerns. We are focused on providing you with the highest-quality healthcare experience possible and that all starts with you.     Sincerely,     ALEKSANDER Choi, [facility]  Clinic Care Coordinator  [facility]  [phone]  [email or web address]

## 2020-10-09 NOTE — PROGRESS NOTES
Clinic Care Coordination Contact    Clinic Care Coordination Contact  OUTREACH    Referral Information:  Referral Source: ED Follow-Up    Primary Diagnosis: Other (include Comment box)(COVID)    Chief Complaint   Patient presents with     Clinic Care Coordination - Initial     Clinic Care Coordination - Post Hospital        Universal Utilization: ED visit on 10/7 for COVID like symptoms  Clinic Utilization  Difficulty keeping appointments:: No  Compliance Concerns: No  No-Show Concerns: No  No PCP office visit in Past Year: No  Utilization    Last refreshed: 10/8/2020  4:54 PM: Hospital Admissions 0           Last refreshed: 10/8/2020  4:54 PM: ED Visits 4           Last refreshed: 10/8/2020  4:54 PM: No Show Count (past year) 9              Current as of: 10/8/2020  4:54 PM            Clinical Concerns:  WILNER RIVAS spoke with pt regarding his recent ED visit and positive COVID results. Pt stated that today he is feeling good, breathing well and no muscle pain. He understands that he still needs to quarantine for 12 more days. He explained that he is staying in his bedroom. His wife continues to feel fine and is bringing him meals. His wife is interested in have a COVID test done due to exposure. WILNER RIVAS stated that this is reasonable and the [facility] process was discussed. Pt will relay information to his wife to have this done.    Current Medical Concerns:  COVID    Current Behavioral Concerns: none    Education Provided to patient: CC role   Pain  Pain (GOAL):: No  Health Maintenance Reviewed: Up to date  Clinical Pathway: None    Medication Management:  Post-discharge medication reconciliation status: Discharge medications reviewed and reconciled.  Continue medications without change.       Functional Status:  Dependent ADLs:: Independent  Dependent IADLs:: Independent  Bed or wheelchair confined:: No  Mobility Status: Independent  Fallen 2 or more times in the past year?: No  Any fall with injury in the past year?:  No    Living Situation:  Current living arrangement:: I live in a private home with spouse  Type of residence:: Private home - stairs    Lifestyle & Psychosocial Needs:  Lifestyle     Physical activity     Days per week: Not on file     Minutes per session: Not on file     Stress: Not at all     Social Needs     Financial resource strain: Not hard at all     Food insecurity     Worry: Never true     Inability: Never true     Transportation needs     Medical: No     Non-medical: No     Diet:: Regular  Inadequate nutrition (GOAL):: No  Tube Feeding: No  Inadequate activity/exercise (GOAL):: No  Significant changes in sleep pattern (GOAL): No  Transportation means:: Regular car     Gnosticist or spiritual beliefs that impact treatment:: No  Mental health DX:: No  Mental health management concern (GOAL):: No  Informal Support system:: Family, Spouse   Socioeconomic History     Marital status:      Spouse name: Not on file     Number of children: Not on file     Years of education: Not on file     Highest education level: Not on file   Relationships     Social connections     Talks on phone: Not on file     Gets together: More than three times a week     Attends Zoroastrianism service: Not on file     Active member of club or organization: Not on file     Attends meetings of clubs or organizations: Not on file     Relationship status:      Intimate partner violence     Fear of current or ex partner: Not on file     Emotionally abused: Not on file     Physically abused: Not on file     Forced sexual activity: Not on file     Tobacco Use     Smoking status: Former Smoker     Types: Cigarettes     Quit date:      Years since quittin.     Smokeless tobacco: Never Used   Substance and Sexual Activity     Alcohol use: No     Drug use: No     Sexual activity: Yes     Partners: Female     COVID-19 Alexey Loop:  Testing Results: positive  Email on file OR Smartphone: Yes  Does the patient speak English:  Yes  Candidate for GetWell Loop: Yes    Referral placed per criteria above.     Resources and Interventions:  Current Resources:      Community Resources: None  Supplies used at home:: None  Equipment Currently Used at Home: none    Advance Care Plan/Directive  Advanced Care Plans/Directives on file:: No    Referrals Placed: None    Patient/Caregiver understanding: Pt reports understanding and denies any additional questions or concerns at this times. SW CC engaged in AIDET communication during encounter.       Future Appointments              In 1 week [id number], [facility]    In 2 months Hood Marr MD [facility] Son[facility]        Plan: At this time, pt denies outstanding need for connection or referral to resources or assistance navigating recommended follow up care. No further outreaches will be made at this time unless a new referral is made or a change in the pt's status occurs. Patient was provided with CC SW contact information and encouraged to call with any questions or concerns.    ALEKSANDER Choi, [facility]  Clinic Care Coordinator  [facility]  [phone]  [email or web address]

## 2020-10-10 ENCOUNTER — APPOINTMENT (OUTPATIENT)
Dept: GENERAL RADIOLOGY | Facility: CLINIC | Age: 65
DRG: 177 | End: 2020-10-10
Attending: EMERGENCY MEDICINE
Payer: COMMERCIAL

## 2020-10-10 ENCOUNTER — HOSPITAL ENCOUNTER (INPATIENT)
Facility: CLINIC | Age: 65
LOS: 10 days | Discharge: HOME | DRG: 177 | End: 2020-10-21
Attending: EMERGENCY MEDICINE | Admitting: HOSPITALIST
Payer: COMMERCIAL

## 2020-10-10 DIAGNOSIS — R10.13 EPIGASTRIC PAIN: ICD-10-CM

## 2020-10-10 DIAGNOSIS — U07.1 2019 NOVEL CORONAVIRUS DISEASE (COVID-19): ICD-10-CM

## 2020-10-10 DIAGNOSIS — M62.81 GENERALIZED MUSCLE WEAKNESS: ICD-10-CM

## 2020-10-10 DIAGNOSIS — M79.604 LEG PAIN, BILATERAL: ICD-10-CM

## 2020-10-10 DIAGNOSIS — E87.1 HYPONATREMIA: ICD-10-CM

## 2020-10-10 DIAGNOSIS — M79.605 LEG PAIN, BILATERAL: ICD-10-CM

## 2020-10-10 DIAGNOSIS — M79.89 SWELLING OF BOTH LOWER EXTREMITIES: ICD-10-CM

## 2020-10-10 LAB
ALBUMIN SERPL-MCNC: 3.4 G/DL (ref 3.4–5)
ALP SERPL-CCNC: 91 U/L (ref 40–150)
ALT SERPL W P-5'-P-CCNC: 24 U/L (ref 0–70)
ANION GAP SERPL CALCULATED.3IONS-SCNC: 6 MMOL/L (ref 3–14)
AST SERPL W P-5'-P-CCNC: 25 U/L (ref 0–45)
BASOPHILS # BLD AUTO: 0 10E9/L (ref 0–0.2)
BASOPHILS NFR BLD AUTO: 0.2 %
BILIRUB SERPL-MCNC: 0.3 MG/DL (ref 0.2–1.3)
BUN SERPL-MCNC: 14 MG/DL (ref 7–30)
CALCIUM SERPL-MCNC: 7.6 MG/DL (ref 8.5–10.1)
CHLORIDE SERPL-SCNC: 97 MMOL/L (ref 94–109)
CO2 SERPL-SCNC: 26 MMOL/L (ref 20–32)
CREAT SERPL-MCNC: 1.52 MG/DL (ref 0.66–1.25)
DIFFERENTIAL METHOD BLD: NORMAL
EOSINOPHIL # BLD AUTO: 0 10E9/L (ref 0–0.7)
EOSINOPHIL NFR BLD AUTO: 0 %
ERYTHROCYTE [DISTWIDTH] IN BLOOD BY AUTOMATED COUNT: 13.2 % (ref 10–15)
GFR SERPL CREATININE-BSD FRML MDRD: 48 ML/MIN/{1.73_M2}
GLUCOSE SERPL-MCNC: 144 MG/DL (ref 70–99)
HCT VFR BLD AUTO: 42.4 % (ref 40–53)
HGB BLD-MCNC: 13.8 G/DL (ref 13.3–17.7)
IMM GRANULOCYTES # BLD: 0 10E9/L (ref 0–0.4)
IMM GRANULOCYTES NFR BLD: 0.4 %
LIPASE SERPL-CCNC: 56 U/L (ref 73–393)
LYMPHOCYTES # BLD AUTO: 1.6 10E9/L (ref 0.8–5.3)
LYMPHOCYTES NFR BLD AUTO: 30.6 %
MCH RBC QN AUTO: 28.3 PG (ref 26.5–33)
MCHC RBC AUTO-ENTMCNC: 32.5 G/DL (ref 31.5–36.5)
MCV RBC AUTO: 87 FL (ref 78–100)
MONOCYTES # BLD AUTO: 0.5 10E9/L (ref 0–1.3)
MONOCYTES NFR BLD AUTO: 8.9 %
NEUTROPHILS # BLD AUTO: 3 10E9/L (ref 1.6–8.3)
NEUTROPHILS NFR BLD AUTO: 59.9 %
NRBC # BLD AUTO: 0 10*3/UL
NRBC BLD AUTO-RTO: 0 /100
PLATELET # BLD AUTO: 261 10E9/L (ref 150–450)
POTASSIUM SERPL-SCNC: 3.5 MMOL/L (ref 3.4–5.3)
PROT SERPL-MCNC: 7.3 G/DL (ref 6.8–8.8)
RBC # BLD AUTO: 4.88 10E12/L (ref 4.4–5.9)
SODIUM SERPL-SCNC: 129 MMOL/L (ref 133–144)
TROPONIN I SERPL-MCNC: 0.03 UG/L (ref 0–0.04)
WBC # BLD AUTO: 5.1 10E9/L (ref 4–11)

## 2020-10-10 PROCEDURE — 71045 X-RAY EXAM CHEST 1 VIEW: CPT

## 2020-10-10 PROCEDURE — 84484 ASSAY OF TROPONIN QUANT: CPT | Performed by: EMERGENCY MEDICINE

## 2020-10-10 PROCEDURE — 93005 ELECTROCARDIOGRAM TRACING: CPT

## 2020-10-10 PROCEDURE — 250N000011 HC RX IP 250 OP 636: Performed by: EMERGENCY MEDICINE

## 2020-10-10 PROCEDURE — 96374 THER/PROPH/DIAG INJ IV PUSH: CPT

## 2020-10-10 PROCEDURE — 96375 TX/PRO/DX INJ NEW DRUG ADDON: CPT

## 2020-10-10 PROCEDURE — 86140 C-REACTIVE PROTEIN: CPT | Performed by: EMERGENCY MEDICINE

## 2020-10-10 PROCEDURE — 258N000003 HC RX IP 258 OP 636: Performed by: EMERGENCY MEDICINE

## 2020-10-10 PROCEDURE — 83690 ASSAY OF LIPASE: CPT | Performed by: EMERGENCY MEDICINE

## 2020-10-10 PROCEDURE — 80053 COMPREHEN METABOLIC PANEL: CPT | Performed by: EMERGENCY MEDICINE

## 2020-10-10 PROCEDURE — 85025 COMPLETE CBC W/AUTO DIFF WBC: CPT | Performed by: EMERGENCY MEDICINE

## 2020-10-10 PROCEDURE — 999N001017 HC STATISTIC GLUCOSE BY METER IP

## 2020-10-10 PROCEDURE — 96361 HYDRATE IV INFUSION ADD-ON: CPT

## 2020-10-10 PROCEDURE — 99285 EMERGENCY DEPT VISIT HI MDM: CPT | Mod: 25

## 2020-10-10 PROCEDURE — 85379 FIBRIN DEGRADATION QUANT: CPT | Performed by: EMERGENCY MEDICINE

## 2020-10-10 RX ORDER — MORPHINE SULFATE 4 MG/ML
4 INJECTION, SOLUTION INTRAMUSCULAR; INTRAVENOUS ONCE
Status: COMPLETED | OUTPATIENT
Start: 2020-10-10 | End: 2020-10-10

## 2020-10-10 RX ORDER — ONDANSETRON 2 MG/ML
4 INJECTION INTRAMUSCULAR; INTRAVENOUS EVERY 30 MIN PRN
Status: DISCONTINUED | OUTPATIENT
Start: 2020-10-10 | End: 2020-10-11

## 2020-10-10 RX ADMIN — MORPHINE SULFATE 4 MG: 4 INJECTION INTRAVENOUS at 23:33

## 2020-10-10 RX ADMIN — ONDANSETRON 4 MG: 2 INJECTION INTRAMUSCULAR; INTRAVENOUS at 23:32

## 2020-10-10 RX ADMIN — SODIUM CHLORIDE 1000 ML: 9 INJECTION, SOLUTION INTRAVENOUS at 22:37

## 2020-10-11 PROBLEM — U07.1 2019 NOVEL CORONAVIRUS DISEASE (COVID-19): Status: ACTIVE | Noted: 2020-10-11

## 2020-10-11 PROBLEM — R10.13 EPIGASTRIC PAIN: Status: ACTIVE | Noted: 2020-10-11

## 2020-10-11 PROBLEM — M62.81 GENERALIZED MUSCLE WEAKNESS: Status: ACTIVE | Noted: 2020-10-11

## 2020-10-11 LAB
CRP SERPL-MCNC: 33.3 MG/L (ref 0–8)
D DIMER PPP FEU-MCNC: 0.6 UG/ML FEU (ref 0–0.5)
GLUCOSE BLDC GLUCOMTR-MCNC: 141 MG/DL (ref 70–99)
GLUCOSE BLDC GLUCOMTR-MCNC: 156 MG/DL (ref 70–99)
GLUCOSE BLDC GLUCOMTR-MCNC: 166 MG/DL (ref 70–99)
GLUCOSE BLDC GLUCOMTR-MCNC: 187 MG/DL (ref 70–99)
GLUCOSE BLDC GLUCOMTR-MCNC: 212 MG/DL (ref 70–99)
GLUCOSE BLDC GLUCOMTR-MCNC: 225 MG/DL (ref 70–99)
GLUCOSE BLDC GLUCOMTR-MCNC: 240 MG/DL (ref 70–99)
GLUCOSE BLDC GLUCOMTR-MCNC: 249 MG/DL (ref 70–99)
GLUCOSE BLDC GLUCOMTR-MCNC: 286 MG/DL (ref 70–99)
LACTATE BLD-SCNC: 0.8 MMOL/L (ref 0.7–2)
TROPONIN I SERPL-MCNC: 0.03 UG/L (ref 0–0.04)
TROPONIN I SERPL-MCNC: 0.03 UG/L (ref 0–0.04)
TROPONIN I SERPL-MCNC: 0.04 UG/L (ref 0–0.04)

## 2020-10-11 PROCEDURE — 99223 1ST HOSP IP/OBS HIGH 75: CPT | Mod: AI | Performed by: HOSPITALIST

## 2020-10-11 PROCEDURE — 36415 COLL VENOUS BLD VENIPUNCTURE: CPT | Performed by: HOSPITALIST

## 2020-10-11 PROCEDURE — 258N000003 HC RX IP 258 OP 636: Performed by: INTERNAL MEDICINE

## 2020-10-11 PROCEDURE — 84484 ASSAY OF TROPONIN QUANT: CPT | Performed by: HOSPITALIST

## 2020-10-11 PROCEDURE — 250N000013 HC RX MED GY IP 250 OP 250 PS 637: Performed by: HOSPITALIST

## 2020-10-11 PROCEDURE — C9113 INJ PANTOPRAZOLE SODIUM, VIA: HCPCS | Performed by: HOSPITALIST

## 2020-10-11 PROCEDURE — 250N000009 HC RX 250: Performed by: EMERGENCY MEDICINE

## 2020-10-11 PROCEDURE — 96361 HYDRATE IV INFUSION ADD-ON: CPT

## 2020-10-11 PROCEDURE — 999N001017 HC STATISTIC GLUCOSE BY METER IP

## 2020-10-11 PROCEDURE — 250N000009 HC RX 250: Performed by: HOSPITALIST

## 2020-10-11 PROCEDURE — 96372 THER/PROPH/DIAG INJ SC/IM: CPT | Performed by: HOSPITALIST

## 2020-10-11 PROCEDURE — 83605 ASSAY OF LACTIC ACID: CPT | Performed by: HOSPITALIST

## 2020-10-11 PROCEDURE — 250N000012 HC RX MED GY IP 250 OP 636 PS 637: Performed by: HOSPITALIST

## 2020-10-11 PROCEDURE — 250N000013 HC RX MED GY IP 250 OP 250 PS 637: Performed by: EMERGENCY MEDICINE

## 2020-10-11 PROCEDURE — 250N000011 HC RX IP 250 OP 636: Performed by: HOSPITALIST

## 2020-10-11 PROCEDURE — 120N000001 HC R&B MED SURG/OB

## 2020-10-11 RX ORDER — ACETAMINOPHEN 325 MG/1
650 TABLET ORAL EVERY 4 HOURS PRN
Status: DISCONTINUED | OUTPATIENT
Start: 2020-10-11 | End: 2020-10-21 | Stop reason: HOSPADM

## 2020-10-11 RX ORDER — DEXTROSE MONOHYDRATE 25 G/50ML
25-50 INJECTION, SOLUTION INTRAVENOUS
Status: DISCONTINUED | OUTPATIENT
Start: 2020-10-11 | End: 2020-10-21 | Stop reason: HOSPADM

## 2020-10-11 RX ORDER — HEPARIN SODIUM 5000 [USP'U]/.5ML
5000 INJECTION, SOLUTION INTRAVENOUS; SUBCUTANEOUS EVERY 12 HOURS
Status: DISCONTINUED | OUTPATIENT
Start: 2020-10-11 | End: 2020-10-13

## 2020-10-11 RX ORDER — HYDROCODONE BITARTRATE AND ACETAMINOPHEN 5; 325 MG/1; MG/1
1 TABLET ORAL ONCE
Status: COMPLETED | OUTPATIENT
Start: 2020-10-11 | End: 2020-10-11

## 2020-10-11 RX ORDER — ALBUTEROL SULFATE 90 UG/1
2 AEROSOL, METERED RESPIRATORY (INHALATION) EVERY 4 HOURS PRN
Status: DISCONTINUED | OUTPATIENT
Start: 2020-10-11 | End: 2020-10-11

## 2020-10-11 RX ORDER — ONDANSETRON 2 MG/ML
4 INJECTION INTRAMUSCULAR; INTRAVENOUS EVERY 6 HOURS PRN
Status: DISCONTINUED | OUTPATIENT
Start: 2020-10-11 | End: 2020-10-21 | Stop reason: HOSPADM

## 2020-10-11 RX ORDER — LISINOPRIL 10 MG/1
10 TABLET ORAL DAILY
Status: DISCONTINUED | OUTPATIENT
Start: 2020-10-11 | End: 2020-10-21 | Stop reason: HOSPADM

## 2020-10-11 RX ORDER — ONDANSETRON 4 MG/1
4 TABLET, ORALLY DISINTEGRATING ORAL EVERY 6 HOURS PRN
Status: DISCONTINUED | OUTPATIENT
Start: 2020-10-11 | End: 2020-10-21 | Stop reason: HOSPADM

## 2020-10-11 RX ORDER — BENZONATATE 100 MG/1
200 CAPSULE ORAL 3 TIMES DAILY PRN
Status: DISCONTINUED | OUTPATIENT
Start: 2020-10-11 | End: 2020-10-11

## 2020-10-11 RX ORDER — TIMOLOL MALEATE 5 MG/ML
1 SOLUTION/ DROPS OPHTHALMIC 2 TIMES DAILY
Status: DISCONTINUED | OUTPATIENT
Start: 2020-10-11 | End: 2020-10-21 | Stop reason: HOSPADM

## 2020-10-11 RX ORDER — SODIUM CHLORIDE, SODIUM LACTATE, POTASSIUM CHLORIDE, CALCIUM CHLORIDE 600; 310; 30; 20 MG/100ML; MG/100ML; MG/100ML; MG/100ML
INJECTION, SOLUTION INTRAVENOUS CONTINUOUS
Status: DISCONTINUED | OUTPATIENT
Start: 2020-10-11 | End: 2020-10-21 | Stop reason: HOSPADM

## 2020-10-11 RX ORDER — SIMVASTATIN 10 MG
10 TABLET ORAL AT BEDTIME
Status: DISCONTINUED | OUTPATIENT
Start: 2020-10-11 | End: 2020-10-21 | Stop reason: HOSPADM

## 2020-10-11 RX ORDER — LIDOCAINE 40 MG/G
CREAM TOPICAL
Status: DISCONTINUED | OUTPATIENT
Start: 2020-10-11 | End: 2020-10-21 | Stop reason: HOSPADM

## 2020-10-11 RX ORDER — NALOXONE HYDROCHLORIDE 0.4 MG/ML
.1-.4 INJECTION, SOLUTION INTRAMUSCULAR; INTRAVENOUS; SUBCUTANEOUS
Status: DISCONTINUED | OUTPATIENT
Start: 2020-10-11 | End: 2020-10-21 | Stop reason: HOSPADM

## 2020-10-11 RX ORDER — NICOTINE POLACRILEX 4 MG
15-30 LOZENGE BUCCAL
Status: DISCONTINUED | OUTPATIENT
Start: 2020-10-11 | End: 2020-10-21 | Stop reason: HOSPADM

## 2020-10-11 RX ORDER — HEPARIN SODIUM 5000 [USP'U]/.5ML
5000 INJECTION, SOLUTION INTRAVENOUS; SUBCUTANEOUS EVERY 12 HOURS
Status: DISCONTINUED | OUTPATIENT
Start: 2020-10-11 | End: 2020-10-11

## 2020-10-11 RX ADMIN — LIDOCAINE HYDROCHLORIDE 30 ML: 20 SOLUTION ORAL; TOPICAL at 00:02

## 2020-10-11 RX ADMIN — INSULIN GLARGINE 40 UNITS: 100 INJECTION, SOLUTION SUBCUTANEOUS at 21:28

## 2020-10-11 RX ADMIN — SODIUM CHLORIDE, POTASSIUM CHLORIDE, SODIUM LACTATE AND CALCIUM CHLORIDE: 600; 310; 30; 20 INJECTION, SOLUTION INTRAVENOUS at 12:08

## 2020-10-11 RX ADMIN — SIMVASTATIN 10 MG: 10 TABLET, FILM COATED ORAL at 22:31

## 2020-10-11 RX ADMIN — INSULIN ASPART 2 UNITS: 100 INJECTION, SOLUTION INTRAVENOUS; SUBCUTANEOUS at 21:29

## 2020-10-11 RX ADMIN — ACETAMINOPHEN 650 MG: 325 TABLET, FILM COATED ORAL at 03:48

## 2020-10-11 RX ADMIN — LISINOPRIL 10 MG: 10 TABLET ORAL at 14:15

## 2020-10-11 RX ADMIN — HEPARIN SODIUM 5000 UNITS: 10000 INJECTION, SOLUTION INTRAVENOUS; SUBCUTANEOUS at 08:19

## 2020-10-11 RX ADMIN — ACETAMINOPHEN 650 MG: 325 TABLET, FILM COATED ORAL at 23:49

## 2020-10-11 RX ADMIN — ONDANSETRON 4 MG: 2 INJECTION INTRAMUSCULAR; INTRAVENOUS at 09:29

## 2020-10-11 RX ADMIN — TIMOLOL MALEATE 1 DROP: 5 SOLUTION/ DROPS OPHTHALMIC at 21:28

## 2020-10-11 RX ADMIN — HEPARIN SODIUM 5000 UNITS: 10000 INJECTION, SOLUTION INTRAVENOUS; SUBCUTANEOUS at 19:11

## 2020-10-11 RX ADMIN — HYDROCODONE BITARTRATE AND ACETAMINOPHEN 1 TABLET: 5; 325 TABLET ORAL at 02:33

## 2020-10-11 ASSESSMENT — ACTIVITIES OF DAILY LIVING (ADL)
ADLS_ACUITY_SCORE: 12

## 2020-10-11 NOTE — PROVIDER NOTIFICATION
"Paged MD : [phone] Pt c/o Chest Pressure, Tele: SR, Pt stated \"I am getting too much fluid, please stop them.\" LR 100mL stopped per pt request (lisinopril administered at 1415.) BP now 160/78        BG check 240 pt is concerned about only receiving 3 units of Sliding scale coverage.  Pt is requesting more.  Please advise.    1950: Spoke with admitting MD about pt refusing medications and requesting increased dose of insulin.  Per MD continue to monitor, no changes made and follow up in AM.       Paged MD [phone]: \"PT is very concerned about not getting adequate amount of insulin.  Is requesting to speak to an MD.  Recent BG was 286 gave 2 units Sliding scale for bedtime, pt received 3 units earlier with dinner for a BG of 249.  He feels like his life in endangered.  Very anxious. Please advise.  Thank you\"     2200 MD ordered 7 units 1 time and a recheck in 1 hour.    "

## 2020-10-11 NOTE — PHARMACY-ADMISSION MEDICATION HISTORY
Admission medication history interview status for this patient is complete. See [facility] admission navigator for allergy information, prior to admission medications and immunization status.     Medication history interview done via telephone during Covid-19 pandemic, indicate source(s): Patient  Medication history resources (including written lists, pill bottles, clinic record):[facility]  Pharmacy: [facility]    Changes made to PTA medication list:  Added: none  Deleted: albuterol, bacitracin, benzonatate, iodosord, lidocaine, cilostazol  Changed: none    Actions taken by pharmacist (provider contacted, etc):None     Additional medication history information:None    Medication reconciliation/reorder completed by provider prior to medication history?  N     For patients on insulin therapy:   Do you use sliding scale insulin based on blood sugars? No    What is your pre-meal insulin coverage?  novolog 10 - 20 units, lantus 60 units  Do you typically eat three meals a day? yes  How many times do you check your blood glucose per day? 3 times   How many episodes of hypoglycemia do you typically have per month? 5 episode   Do you have a Continuous Glucose Monitor (CGM)?  yes    Prior to Admission medications    Medication Sig Last Dose Taking? Auth Provider   aspirin 81 MG tablet Take 1 tablet (81 mg) by mouth daily 10/10/2020 at Unknown time Yes Merrill Fonseca MD   lisinopril (ZESTRIL) 10 MG tablet Take 1 tablet (10 mg) by mouth daily 10/10/2020 at am Yes Pio Parson MD   alcohol swab prep pads Use to swab area of injection/pedro pablo as directed.   Olivia Nichols MD   blood glucose (ACCU-CHEK GUIDE) test strip 1 strip by In Vitro route 4 times daily Use to test blood sugar 3 -4  times daily or as directed.   Olivia Nichols MD   blood glucose (NO BRAND SPECIFIED) test strip Use to test blood sugar 2 times daily or as directed.   Pio Parson MD   blood glucose (NO BRAND SPECIFIED)  test strip Use to test blood sugar 4 times daily or as directed. To accompany: Blood Glucose Monitor Brands: per insurance.   Olivia Nichols MD   blood glucose calibration (NO BRAND SPECIFIED) solution To accompany: Blood Glucose Monitor Brands: per insurance.   Olivia Nichols MD   blood glucose monitoring (NO BRAND SPECIFIED) meter device kit Use to test blood sugar 4 times daily or as directed. Preferred blood glucose meter OR supplies to accompany: Blood Glucose Monitor Brands: per insurance.   Olivia Nichols MD   dorzolamide (TRUSOPT) 2 % ophthalmic solution Place 1 drop into both eyes 2 times daily 10/9/2020 yes Hood Marr MD   gabapentin (NEURONTIN) 300 MG capsule Take 1 capsule (300 mg) by mouth At Bedtime 10/9/2020 yes Olivia Nichols MD   insulin aspart (NOVOLOG FLEXPEN) 100 UNIT/ML injection Inject 10-20 Units Subcutaneous 2 times daily (with meals) With lunch and dinner 10/9/2020 yes Reported, Patient   Insulin Glargine (LANTUS SOLOSTAR SC) Inject 60 Units Subcutaneous At Bedtime 10/9/2020 yes Unknown, Entered By History   insulin pen needle (32G X 6 MM) 32G X 6 MM miscellaneous Use 3 pen needles daily or as directed.   Olivia Nichols MD   Lido-Pentaf-Tetrafl-Ultrasound (ACCUCAINE) 1 % KIT    Reported, Patient   simvastatin (ZOCOR) 10 MG tablet Take 10 mg by mouth At Bedtime 10/9/2020 yes Reported, Patient   thin (NO BRAND SPECIFIED) lancets Use with lanceting device. To accompany: Blood Glucose Monitor Brands: per insurance.   Olivia Nichols MD   timolol maleate (TIMOPTIC) 0.5 % ophthalmic solution Place 1 drop into both eyes 2 times daily 10/9/2020 yes Hood Marr MD

## 2020-10-11 NOTE — PROGRESS NOTES
[facility]  Hospitalist Progress Note  Admit 10/10/2020 10:28 PM    Name: Rachell Paige    MRN: [id number]  Provider:  Valentin Burgos MD, [facility]    Date of Service: 10/11/2020     Reason for Stay (Diagnosis): COVID-19 infection         Summary of hospital stay & Assessment/Plan:   Summary of Stay: Rachell Paige is a 64 year old male who was admitted on 10/10/2020  64 year old male with a past medical history of type 2 diabetes mellitus, anxiety, hypertension, hyperlipidemia and recent diagnosis of COVID-19 on 10/7 who presents with generalized weakness, mild shortness of breath and upper abdominal pain.  Further evaluation revealed no evidence of hypoxia, however patient reports significant fatigue, not feeling well, CRP and d-dimer are elevated, rest of the COVID lab is still pending, also troponin is mildly abnormal.    Problem List:   1. COVID-19 infection  2. Abdominal discomfort vague likely secondary to cope with infection  3. Mild acute kidney injury baseline creatinine 1.2, admission creatinine 1.52  4. Mild hyponatremia  5. Diabetes mellitus on insulin  6. Hypertension  7. Hyperlipidemia    Steroid was not initiated because patient is not hypoxic and he has insulin-dependent diabetes so the risk probably for now outweigh the benefit  Check COVID lab, monitor in the hospital for at least another 24 hours to make sure there is no clinical deterioration, if there is hypoxia consider starting steroid and consult physician for further therapeutic or transfer to [place]  Continue DVT prophylaxis with subcutaneous heparin because of increased kidney function   Give IV fluid hydration and repeat creatinine in the morning  Interleukin-6, LDH, troponin follow-up      DVT Prophylaxis: Heparin SQ  Code Status:  Full Code    Disposition Plan     Expected discharge in 1-2  days to prior living arrangement once shows clinical improvement and no respiratory deterioration.     Entered: Valentin Burgos  10/11/2020, 9:47 AM                 Interval History:         Reports breathing the same, no worsening abdominal pain, he is just not feeling well and feels at least he needs another day of monitoring.  No supplemental oxygen requirement    Today's plan detailed above discussed with nursing               Physical Exam:   Physical Exam   Temp: 97.4  F (36.3  C) Temp src: Oral BP: (!) 147/67 Pulse: 83   Resp: 20 SpO2: 98 % O2 Device: None (Room air)    Vitals:    10/11/20 0152 10/11/20 0324   Weight: 90.7 kg (200 lb) 88 kg (193 lb 14.4 oz)     No intake/output data recorded.      GENERAL:  Comfortable.   PSYCH: pleasant, oriented, No acute distress.  EYES: PERRLA, Normal conjunctiva.  HEART:  Normal S1, S2 with no edema.  LUNGS:  Clear to auscultation, normal Respiratory effort.  ABDOMEN:  Soft, no hepatosplenomegaly, normal bowel sounds.  SKIN:  Dry to touch, No rash.  Neuro: Non focal with normal motor power, sensation, CN's and Reflexes.    Medications     lactated ringers         heparin ANTICOAGULANT  5,000 Units Subcutaneous Q12H     insulin aspart  1-7 Units Subcutaneous TID AC     insulin aspart  1-5 Units Subcutaneous At Bedtime     insulin glargine  40 Units Subcutaneous At Bedtime     lisinopril  10 mg Oral Daily     pantoprazole (PROTONIX) IV  40 mg Intravenous Daily with breakfast     simvastatin  10 mg Oral At Bedtime     sodium chloride (PF)  3 mL Intracatheter Q8H     timolol maleate  1 drop Both Eyes BID     Data     -Data reviewed today:  I personally reviewed  all new labs and imaging results over the last 24 hours.    Recent Labs   Lab 10/10/20  2235 10/07/20  1211   WBC 5.1 6.2   HGB 13.8 13.1*   HCT 42.4 41.6   MCV 87 90    284     Recent Labs   Lab 10/10/20  2235 10/07/20  1211   * 135   POTASSIUM 3.5 3.8   CHLORIDE 97 105   CO2 26 25   ANIONGAP 6 5   * 131*   BUN 14 11   CR 1.52* 1.33*   GFRESTIMATED 48* 56*   GFRESTBLACK 55* 65   ALPHONSO 7.6* 8.1*       Recent Results (from the  past 24 hour(s))   XR Chest Port 1 View    Narrative    EXAM: XR CHEST PORT 1 VW  LOCATION: [facility]  DATE/TIME: 10/10/2020 11:19 PM    INDICATION: Chest  COMPARISON: 02/22/2020      Impression    IMPRESSION: Negative chest.       This document was produced using voice recognition software

## 2020-10-11 NOTE — PLAN OF CARE
Admission: Pt admitted on 10/10 for evaluation of weakness from COVID 19 positive   History: Anxiety, DM type 2, HTN, CKD-stage 3, See chart for complete list   Labs/Protocols: Troponin-0.033, Blood glucose this shift: 156, 187  Vitals: Temp: 97.4  F (36.3  C) Temp src: Oral BP: (!) 147/67 Pulse: 83   Resp: 20 SpO2: 98 % O2 Device: None (Room air)    Pain: Denies pain at this time   Tele: SR  Cardiac: Denies chest pain   Respiratory: Lungs are clear throughout-Remains on RA and sating well  Neuro: A&Ox4  GI/: Continent of B/B  Skin: Intact   LDA: PIV-Right running LR @100ml/hr  Diet: Mod CHO   Activity: SBA   Pt educated on current POC: Monitor respiratory status   Discharge Plan: TBD

## 2020-10-11 NOTE — H&P
[facility]    History and Physical  Hospitalist       Date of Admission:  10/10/2020    Assessment & Plan   Rachell Paige is a 64 year old male with a past medical history of type 2 diabetes mellitus, anxiety, hypertension, hyperlipidemia and recent diagnosis of COVID-19 on 10/7 who presents with generalized weakness, mild shortness of breath and upper abdominal pain.    #COVID-19: Patient presenting with generalized weakness, mild shortness of breath, upper abdominal pain and myalgias.  Was initially seen in the ED on 10/7.  Work-up at that time was negative and discharged home.  COVID-19 subsequently did come back positive.  Patient states unable to manage at home.  Too fatigued.  -Patient is currently afebrile, non-tachycardic and saturating well on room air.  Chest x-ray does not show significant CP disease.  D-dimer is slightly positive at 0.6.  -Continuous oximetry, daily COVID-19 lab panel ordered.  -Subcu heparin prophylaxis ordered given positive COVID status and mild d-dimer elevation.  -Albuterol inhaler as needed for shortness of breath.  Incentive spirometer encouraged  -No indications at this time for dexamethasone, not needing oxygen therapy.  Would start if needing supplemental oxygen.  -Maintain precautions.    #Upper abdominal Pain: Patient describes pressure and bloating of the upper abdomen.  Worse when he presses down on his upper belly.  Does not seem to have active chest pain.  EKG is within normal limits.  Troponin is detectable but negative.  -PPI.  Will check another troponin to ensure not uptrending.  If uptrending then would consider TTE and anticoagulation.    Chronic Med Conditions   #DM: At home patient does endorse taking 60 units glargine nightly.  Uses correction scale with meals.  Continue reduced glargine dose of 40 units daily.  Sliding scale insulin.  #Hypertension: Continue home lisinopril  #Hyperlipidemia continue home statin    DVT Prophylaxis:  Heparin SQ  Code Status: Full Code  Dispo: Admit to inpatient given generalized weakness, shortness of breath in the setting of COVID-19+ status.    Arash Long MD    Primary Care Physician   Olivia Nichols    Chief Complaint   Generalized weakness, shortness of breath, upper abdominal pain.    History is obtained from the patient, patient's chart discussed with ER physician    History of Present Illness   Rachell Paige is a 64 year old male with a past medical history of type 2 diabetes mellitus, anxiety, hypertension, hyperlipidemia and recent diagnosis of COVID-19 on 10/7 who presents with generalized weakness, mild shortness of breath and upper abdominal pain.    Patient was seen in the ED 3 days ago for nausea and mild shortness of breath.  He was also complaining of myalgias, cough and subjective fever at that time.  His work-up at that time was unremarkable.  His COVID-19 ultimately did come back positive.  Since being discharged he has had ongoing chills, epigastric abdominal pain as well as mild shortness of breath.  He feels generally weak.  Feels not able to manage at home on his own.  He denies any chest pain but does note upper abdominal pain.  Feels like pressure.  Worse with pushing down on his belly.  Mainly, patient does not feel well enough to be at home.  Feels too weak.    In the ED, patient afebrile, non-tachycardic and hypertensive.  X-ray done that did not show acute CP process.  EKG showed sinus rhythm without acute ST segment changes.  BMP showed mild hyponatremia.  Creatinine mildly elevated at 1.52 (baseline 1.2-1.3).  CBC unremarkable.  Troponin negative but detectable.  D-dimer a bit up at 0.6.    Past Medical History    I have reviewed this patient's medical history and updated it with pertinent information if needed.   Past Medical History:   Diagnosis Date     Anxiety 2/23/2015     Dermatitis 4/18/2015     DM type 2, goal A1C 7-8 2/23/2015     Does not have health insurance  2015     Financial problems 2015     Glaucoma (increased eye pressure)      HTN, goal below 140/90 2015     Hyperlipidemia LDL goal <100 2015     Microalbuminuria 2015     Onychomycosis 2015     Rash 2015       Past Surgical History   I have reviewed this patient's surgical history and updated it with pertinent information if needed.  Past Surgical History:   Procedure Laterality Date     AMPUTATE TOE(S) Right 2018    Procedure: Right fifth toe amputation;  Surgeon: Clark Lora DPM;  Location: [facility] OR       Prior to Admission Medications   Prior to Admission Medications   Prescriptions Last Dose Informant Patient Reported? Taking?   Cadexomer Iodine, topical, 0.9% (IODOSORB) 0.9 % GEL gel   No No   Sig: Apply to wound right foot daily   Patient not taking: Reported on 2020   Insulin Glargine (LANTUS SOLOSTAR SC)   Yes No   Sig: Inject 60 Units Subcutaneous At Bedtime   Lido-Pentaf-Tetrafl-Ultrasound (ACCUCAINE) 1 % KIT   Yes No   albuterol (PROAIR HFA) 108 (90 Base) MCG/ACT inhaler   No No   Sig: Inhale 2 puffs into the lungs every 4 hours as needed for shortness of breath / dyspnea   Patient not taking: Reported on 2020   alcohol swab prep pads   No No   Sig: Use to swab area of injection/pedro pablo as directed.   aspirin 81 MG tablet   No No   Sig: Take 1 tablet (81 mg) by mouth daily   bacitracin 500 UNIT/GM external ointment   No No   Sig: Apply topically 2 times daily   Patient not taking: Reported on 2020   bacitracin 500 UNIT/GM external ointment   No No   Sig: Apply to wound right 4th toe daily with bandaid   Patient not taking: Reported on 2020   benzonatate (TESSALON) 100 MG capsule   No No   Sig: Take 2 capsules (200 mg) by mouth 3 times daily as needed for cough   Patient not taking: Reported on 2020   blood glucose (ACCU-CHEK GUIDE) test strip   No No   Si strip by In Vitro route 4 times daily Use to test blood sugar 3 -4  times  daily or as directed.   blood glucose (NO BRAND SPECIFIED) test strip   No No   Sig: Use to test blood sugar 4 times daily or as directed. To accompany: Blood Glucose Monitor Brands: per insurance.   blood glucose (NO BRAND SPECIFIED) test strip   No No   Sig: Use to test blood sugar 2 times daily or as directed.   blood glucose calibration (NO BRAND SPECIFIED) solution   No No   Sig: To accompany: Blood Glucose Monitor Brands: per insurance.   blood glucose monitoring (NO BRAND SPECIFIED) meter device kit   No No   Sig: Use to test blood sugar 4 times daily or as directed. Preferred blood glucose meter OR supplies to accompany: Blood Glucose Monitor Brands: per insurance.   cilostazol (PLETAL) 50 MG tablet   No No   Sig: Take 1 tablet (50 mg) by mouth daily   Patient not taking: Reported on 9/29/2020   dorzolamide (TRUSOPT) 2 % ophthalmic solution   No No   Sig: Place 1 drop into both eyes 2 times daily   gabapentin (NEURONTIN) 300 MG capsule   No No   Sig: Take 1 capsule (300 mg) by mouth At Bedtime   insulin aspart (NOVOLOG FLEXPEN) 100 UNIT/ML injection   Yes No   Sig: Inject 15-20 Units Subcutaneous 2 times daily (with meals) With lunch and dinner   insulin pen needle (32G X 6 MM) 32G X 6 MM miscellaneous   No No   Sig: Use 3 pen needles daily or as directed.   lidocaine (XYLOCAINE) 5 % external ointment   No No   Sig: Apply topically as needed for moderate pain   lisinopril (ZESTRIL) 10 MG tablet   No No   Sig: Take 1 tablet (10 mg) by mouth daily   metFORMIN (GLUCOPHAGE-XR) 500 MG 24 hr tablet   Yes No   Sig: Take 1,000 mg by mouth daily   simvastatin (ZOCOR) 10 MG tablet   Yes No   Sig: Take 10 mg by mouth At Bedtime   thin (NO BRAND SPECIFIED) lancets   No No   Sig: Use with lanceting device. To accompany: Blood Glucose Monitor Brands: per insurance.   timolol maleate (TIMOPTIC) 0.5 % ophthalmic solution   No No   Sig: Place 1 drop into both eyes 2 times daily      Facility-Administered Medications: None  "    Allergies   Allergies   Allergen Reactions     Aleve      HA sweats     Citalopram Itching     Clopidogrel Nausea and Vomiting     Pt to restart on 11/25/15 to see again if he tolerates it or not. His sx were only GI. Not a true allergy     Naproxen Itching     About 10 yrs ago, itching all over from taking Aleve  \"get sick and really sick\"       Sulfamethoxazole-Trimethoprim      Other reaction(s): Renal Failure  Other reaction(s): Renal Failure       Social History   I have reviewed this patient's social history and updated it with pertinent information if needed. Rachell Paige  reports that he quit smoking about 30 years ago. His smoking use included cigarettes. He has never used smokeless tobacco. He reports that he does not drink alcohol or use drugs.    Family History   I have reviewed this patient's family history and updated it with pertinent information if needed.   Family History   Problem Relation Age of Onset     Diabetes Other      LUNG DISEASE Mother      Glaucoma Father      Cerebrovascular Disease Son      Diabetes Son      Diabetes Daughter        Review of Systems   The 10 point Review of Systems is negative other than noted in the HPI or here.     Physical Exam   Temp: 98.7  F (37.1  C) Temp src: Oral BP: (!) 155/74 Pulse: 89   Resp: 18 SpO2: 94 % O2 Device: None (Room air)    Vital Signs with Ranges  Temp:  [98.7  F (37.1  C)] 98.7  F (37.1  C)  Pulse:  [89-98] 89  Resp:  [18] 18  BP: (155-173)/(73-85) 155/74  SpO2:  [93 %-99 %] 94 %  200 lbs 0 oz    Constitutional: Obese, NAD, Nontoxic  HEENT: Normocephalic, MMM, no elevation of JVD noted  Respiratory: Nl WOB, clear, no wheezes.  Cannot appreciate any rhonchi.  Cardiovascular: Regular, no murmur  GI: Bowel sounds present, mildly tender in the epigastric region without rebound or guarding.  Positive mild distention.  Lymph/Hematologic: No bruising. No cervical LAD  Skin: No rash  Musculoskeletal: Amputation of left toes noted.  No " edema.  Neurologic: A&Ox3, Answers appropriately. CNII-XII intact. Moves all extremities. No tremor  Psychiatric: Calm    Data   Data reviewed today:  I personally reviewed  Recent Labs   Lab 10/10/20  2235 10/07/20  1211   WBC 5.1 6.2   HGB 13.8 13.1*   MCV 87 90    284   * 135   POTASSIUM 3.5 3.8   CHLORIDE 97 105   CO2 26 25   BUN 14 11   CR 1.52* 1.33*   ANIONGAP 6 5   ALPHONSO 7.6* 8.1*   * 131*   ALBUMIN 3.4  --    PROTTOTAL 7.3  --    BILITOTAL 0.3  --    ALKPHOS 91  --    ALT 24  --    AST 25  --    LIPASE 56*  --    TROPI 0.028 0.018       Recent Results (from the past 24 hour(s))   XR Chest Port 1 View    Narrative    EXAM: XR CHEST PORT 1 VW  LOCATION: [facility]  DATE/TIME: 10/10/2020 11:19 PM    INDICATION: Chest  COMPARISON: 02/22/2020      Impression    IMPRESSION: Negative chest.

## 2020-10-11 NOTE — ED NOTES
"[facility]  ED Nurse Handoff Report    COVID POSITIVE    Rachell Paige is a 64 year old male   ED Chief complaint: Chest Pain  . ED Diagnosis:   Final diagnoses:   2019 novel coronavirus disease (COVID-19)   Epigastric pain   Generalized muscle weakness     Allergies:   Allergies   Allergen Reactions     Aleve      HA sweats     Citalopram Itching     Clopidogrel Nausea and Vomiting     Pt to restart on 11/25/15 to see again if he tolerates it or not. His sx were only GI. Not a true allergy     Naproxen Itching     About 10 yrs ago, itching all over from taking Aleve  \"get sick and really sick\"       Sulfamethoxazole-Trimethoprim      Other reaction(s): Renal Failure  Other reaction(s): Renal Failure       Code Status: Full Code  Activity level - Baseline/Home:  Independent. Activity Level - Current:   Stand by Assist. Lift room needed: No. Bariatric: No   Needed: No   Isolation: Yes. Infection: COVID r/o and special precautions.     Vital Signs:   Vitals:    10/11/20 0050 10/11/20 0100 10/11/20 0115 10/11/20 0130   BP:  (!) 165/85  (!) 155/74   Pulse:  90  89   Resp:       Temp:       TempSrc:       SpO2: 96% 97% 96% 94%       Cardiac Rhythm:  ,      Pain level: 0-10 Pain Scale: 5  Patient confused: No. Patient Falls Risk: Yes.   Elimination Status: Has voided and able to use urinal at bedside   Patient Report - Initial Complaint: Dx COVID 19 this past week. Pt c/o intense chest pressure, weakness, unable to eat, nausea, and fatigue. Pt reports he is \"Too weak\" to carry on ADLs. ABC in tact. A/Ox4  . Focused Assessment: generalized weakness. No cough or SOB when at rest. Easily fatigued with any activity.   Complaining of epigastric pain   Tests Performed:   XR Chest Port 1 View   Final Result   IMPRESSION: Negative chest.        . Abnormal Results:   Labs Ordered and Resulted from Time of ED Arrival Up to the Time of Departure from the ED   COMPREHENSIVE METABOLIC PANEL - Abnormal; " Notable for the following components:       Result Value    Sodium 129 (*)     Glucose 144 (*)     Creatinine 1.52 (*)     GFR Estimate 48 (*)     GFR Estimate If Black 55 (*)     Calcium 7.6 (*)     All other components within normal limits   LIPASE - Abnormal; Notable for the following components:    Lipase 56 (*)     All other components within normal limits   CBC WITH PLATELETS DIFFERENTIAL   TROPONIN I     History of diabetes   Treatments provided: see MAR  Family Comments: pt has been in contact with wife via phone  OBS brochure/video discussed/provided to patient:  Yes. Will provide with sheet of paper. No video due to covid.  ED Medications:   Medications   ondansetron (ZOFRAN) injection 4 mg (4 mg Intravenous Given 10/10/20 2332)   0.9% sodium chloride BOLUS (0 mLs Intravenous Stopped 10/11/20 0136)   morphine (PF) injection 4 mg (4 mg Intravenous Given 10/10/20 2333)   lidocaine (XYLOCAINE) 2 % 15 mL, alum & mag hydroxide-simethicone (MAALOX  ES) 15 mL GI Cocktail (30 mLs Oral Given 10/11/20 0002)     Drips infusing:  No  For the majority of the shift, the patient's behavior Green. Interventions performed were none.    Sepsis treatment initiated: No     Patient tested for COVID 19 prior to admission: YES. Positive for covid on 10/7    ED Nurse Name/Phone Number: Pita Sánchez RN,   1:45 AM      RECEIVING UNIT ED HANDOFF REVIEW    Above ED Nurse Handoff Report was reviewed: Yes  Reviewed by: Brittni Argueta RN on October 11, 2020 at 3:12 AM

## 2020-10-11 NOTE — ED TRIAGE NOTES
"Dx COVID 19 this past week. Pt c/o intense chest pressure, weakness, unable to eat, nausea, and fatigue. Pt reports he is \"Too weak\" to carry on ADLs. ABC in tact. A/Ox4  "

## 2020-10-11 NOTE — ED PROVIDER NOTES
History   Chief Complaint:  Chest Pain    HPI   Rachell Paige is a 64 year old male with history of type 2 diabetes, anxiety, hypertension, and hyperlipidemia who presents for evaluation of chest and epigastric abdominal pain, associated with nausea and mild shortness of breath, that worsened since his discharge from the ED 3 days ago. On chart review, the patient presented to this ED for evaluation of generalized myalgias, cough, and a subjective fever. He had a unremarkable work-up and was ultimately discharged home after feeling improved with recommendations to return if he had any worsening symptoms. Since discharge, he has had chills, chest pain, and epigastric abdominal pain as well as mild shortness of breath and nausea. He also feels generally weak and presents to the ED today out of concern for his worsening symptoms.     Here, the patient states he would like to be admitted to the hospital. He denies any other symptoms prompting his presentation.     XR Chest, Portable, G/E 1 view 10/07/2020  No acute cardiopulmonary disease.    Laboratory Results 10/07/2020  CBC: WBC: 6.2 HGB: 13.1 (L), PLT: 284  BMP: Glucose 131 (H), Creatinine: 1.33 (H), GFR: 56 (L), Calcium: 8.1 (L), o/w WNL  Troponin (Collected 1211): 0.018   COVID-19 Virus (Coronavirus), PCR NP Swab: Positive     Allergies:  Aleve  Citalopram  Clopidogrel  Naproxen  Sulfamethoxazole-Trimethoprim    Medications:   Albuterol inhaler  Aspirin 81 mg   Tessalon   Pletal  Neurontin  Novolog   Lantus   Zestril   Metformin  Zocor    Past Medical History:    Anxiety   Type 2 diabetes  Glaucoma  Hypertension  Hyperlipidemia  Osteomyelitis   Stage 3 chronic kidney disease   Peripheral vascular disease     Past Surgical History:    Amputate toe(s) - right      Family History:    Mother - Lung disease   Father - Glaucoma     Social History:  The patient was unaccompanied to the ED.  Smoking Status: Former Smoker, 1990  Smokeless Tobacco: Never Used  Alcohol  Use: No  Drug Use: No  PCP: Olivia Nichols   Marital Status:      Review of Systems  +shortness of breath, nuasea, cough, myalgias  Denies diarrhea  10 point review of systems was obtained and negative other than mentioned above.    Physical Exam     Patient Vitals for the past 24 hrs:   BP Temp Temp src Pulse Resp SpO2 Weight   10/11/20 0152 -- -- -- -- -- -- 90.7 kg (200 lb)   10/11/20 0130 (!) 155/74 -- -- 89 -- 94 % --   10/11/20 0115 -- -- -- -- -- 96 % --   10/11/20 0100 (!) 165/85 -- -- 90 -- 97 % --   10/11/20 0050 -- -- -- -- -- 96 % --   10/11/20 0040 -- -- -- -- -- 98 % --   10/11/20 0035 -- -- -- -- -- 94 % --   10/11/20 0030 (!) 162/79 -- -- 90 -- 94 % --   10/11/20 0015 -- -- -- -- -- 93 % --   10/11/20 0000 (!) 163/73 -- -- 90 -- 98 % --   10/10/20 2345 -- -- -- -- -- 99 % --   10/10/20 2330 (!) 169/78 -- -- -- -- 96 % --   10/10/20 2315 -- -- -- -- -- 96 % --   10/10/20 2300 -- -- -- -- -- 98 % --   10/10/20 2235 (!) 173/84 98.7  F (37.1  C) Oral 98 18 94 % --     Physical Exam  General: Sitting up in bed  Eyes:  The pupils are equal and round    Conjunctivae and sclerae are normal  ENT:    Wearing a mask  Neck:  Normal range of motion  CV:  Regular rate, regular rhythm     Skin warm and well perfused   Resp:  Non labored breathing on room air    Slight tachypnea at times    No cough heard  GI:  Abdomen is soft, there is no rigidity    No distension    No rebound tenderness     Mild epigastric tenderness  MS:  Normal muscular tone  Skin:  No rash or acute skin lesions noted  Neuro:   Awake, alert.      Speech is normal and fluent.    Face is symmetric.     Moves all extremities equally  Psych: Normal affect.  Appropriate interactions.    Emergency Department Course     ECG:  ECG taken at 2309, ECG read at 2315 by Maryam Rendon MD  Sinus rhythm  Possible Anterior infarct , age undetermined  Abnormal ECG  Rate 95 bpm. ND interval 154. QRS duration 82. QT/QTc 334/419. P-R-T axes 58 -29  63.      Imaging:  Radiology findings were communicated with the patient and Admitting MD who voiced understanding of the findings.    XR Chest Port 1 View   IMPRESSION: Negative chest.  Reading per radiology.      Laboratory:  Laboratory findings were communicated with the patient and Admitting MD who voiced understanding of the findings.    CBC: AWNL (WBC 5.1, HGB 13.8, )  CMP:  (L), Glucose 144 (H), Creatinine 1.52 (H), GFR 48 (L), Calcium 7.6 (L) o/w WNL   Troponin (Collected 2235): 0.028   Glucose by Meter (Collected 2340): 141 (H)   D Dimer (Collected 2235): 0.6 (H)  CRP inflammation: 33.3    Interventions:  2332 Zofran 4 mg IV  2333 Morphine 4 mg IV  2237 0.9% NaCl bolus 1000 mL IV   0002 GI Cocktail (Maalox/Mylanta and viscous Lidocaine), 30 mL suspension, PO    0233 Norco 5-325 mg 1 tablet PO    Emergency Department Course:  Past medical records, nursing notes, and vitals reviewed.  EKG obtained in the ED, see results above.    The patient was sent for a XR Chest Port 1 View while in the emergency department, results above.    IV was inserted and blood was drawn for laboratory testing, results above.     (2229)   I performed an exam of the patient as documented above. History obtained from patient.     (0140)   I rechecked the patient and discussed results and plan of care.     (0201)   I spoke with Dr. Long of the Hospitalist service regarding patient's presentation, findings, and plan of care.     Findings and plan explained to the Patient who consents to admission. Discussed the patient with Dr. Long, who will admit the patient to a [facility] bed for further monitoring, evaluation, and treatment.  I personally reviewed the laboratory and imaging results with the Patient and answered all related questions prior to admission.     Impression & Plan   Covid-19  Rachell Paige was evaluated during a global COVID-19 pandemic, which necessitated consideration that the patient might be at risk for  infection with the SARS-CoV-2 virus that causes COVID-19.   Applicable protocols for evaluation were followed during the patient's care.   COVID-19 was considered as part of the patient's evaluation. The plan for testing is:  a test was obtained at a previous visit and reviewed & considered today.    Medical Decision Making:  Rachell Paige is a 64 year old male who presented to the ED with multiple concerns. Is known covid positive. No hypoxia in ED. Minimal epigastric tenderness and don't think CT abdomen is indicated. Labs with mild hyponatremia, mild elevation of creatininea above baseline. Chest xray clear. Doubt PE and d-dimer wnl for age adjusted. Troponin detectable but similar to prior levels. Patient feels too weak to function at home given his illness. Will hospitalize and discussed patient with hospitalist for admission.    Diagnosis:    ICD-10-CM    1. 2019 novel coronavirus disease (COVID-19)  U07.1 Glucose by meter     Glucose by meter   2. Epigastric pain  R10.13    3. Generalized muscle weakness  M62.81    4. Hyponatremia  E87.1      Disposition:  Admitted to a [facility] bed under the care of Dr. Long.    Scribe Disclosure:  I, Scar Umana, am serving as a scribe at 10:29 PM on 10/10/2020 to document services personally performed by Maryam Rendon MD based on my observations and the provider's statements to me.  October 10, 2020   [facility] EMERGENCY DEPARTMENT        Maryam Rendon MD  10/11/20 0522

## 2020-10-11 NOTE — ED NOTES
Patient does not feel well enough to attempt to walk. Complaining of epigastric pain and muscle aches.

## 2020-10-11 NOTE — PLAN OF CARE
"Admitting dx: COVID positive, came in for c/o CP, SOB, and gen weakness.   Hx: DMII, HTN, anxiety, HLD. Initial COVID positive result was on 10/7/20  Vitals: /59 (BP Location: Left arm)   Pulse 89   Temp 100.9  F (38.3  C) (Oral)   Resp 26   Wt 88 kg (193 lb 14.4 oz)   SpO2 94%   BMI 31.30 kg/m    Tylenol x1  Neuro: A&Ox4  Respiratory: LS dim, denies SOB   Cardiac/tele: C/o epigastric pain, stated \"it has improved since earlier\" Tele SR  GI/: WDL, continent of bowel and bladder  Skin: All toes on left foot are amputated. Pt reports he had this surgery about 5 years ago due to neuropathy.   LDAs: PIV to right SL  Labs: COVID positive, trops 0.033, lactate 0.8,   Diet: mod carb  Activity: A1 with gb  Plan: Discharge TBD. Will continue POC.         "

## 2020-10-12 LAB
ALBUMIN SERPL-MCNC: 2.8 G/DL (ref 3.4–5)
ALP SERPL-CCNC: 77 U/L (ref 40–150)
ALT SERPL W P-5'-P-CCNC: 20 U/L (ref 0–70)
ANION GAP SERPL CALCULATED.3IONS-SCNC: 7 MMOL/L (ref 3–14)
AST SERPL W P-5'-P-CCNC: 26 U/L (ref 0–45)
BASOPHILS # BLD AUTO: 0 10E9/L (ref 0–0.2)
BASOPHILS NFR BLD AUTO: 0.2 %
BILIRUB SERPL-MCNC: 0.3 MG/DL (ref 0.2–1.3)
BUN SERPL-MCNC: 13 MG/DL (ref 7–30)
CALCIUM SERPL-MCNC: 7.6 MG/DL (ref 8.5–10.1)
CHLORIDE SERPL-SCNC: 100 MMOL/L (ref 94–109)
CO2 SERPL-SCNC: 25 MMOL/L (ref 20–32)
CREAT SERPL-MCNC: 1.41 MG/DL (ref 0.66–1.25)
CRP SERPL-MCNC: 59 MG/L (ref 0–8)
D DIMER PPP FEU-MCNC: 0.5 UG/ML FEU (ref 0–0.5)
DIFFERENTIAL METHOD BLD: ABNORMAL
EOSINOPHIL # BLD AUTO: 0 10E9/L (ref 0–0.7)
EOSINOPHIL NFR BLD AUTO: 0 %
ERYTHROCYTE [DISTWIDTH] IN BLOOD BY AUTOMATED COUNT: 13.2 % (ref 10–15)
FIBRINOGEN PPP-MCNC: 465 MG/DL (ref 200–420)
GFR SERPL CREATININE-BSD FRML MDRD: 52 ML/MIN/{1.73_M2}
GLUCOSE BLDC GLUCOMTR-MCNC: 125 MG/DL (ref 70–99)
GLUCOSE BLDC GLUCOMTR-MCNC: 164 MG/DL (ref 70–99)
GLUCOSE BLDC GLUCOMTR-MCNC: 175 MG/DL (ref 70–99)
GLUCOSE BLDC GLUCOMTR-MCNC: 187 MG/DL (ref 70–99)
GLUCOSE BLDC GLUCOMTR-MCNC: 219 MG/DL (ref 70–99)
GLUCOSE BLDC GLUCOMTR-MCNC: 227 MG/DL (ref 70–99)
GLUCOSE SERPL-MCNC: 151 MG/DL (ref 70–99)
HCT VFR BLD AUTO: 39.8 % (ref 40–53)
HGB BLD-MCNC: 12.9 G/DL (ref 13.3–17.7)
IMM GRANULOCYTES # BLD: 0 10E9/L (ref 0–0.4)
IMM GRANULOCYTES NFR BLD: 0.6 %
INR PPP: 0.94 (ref 0.86–1.14)
INTERPRETATION ECG - MUSE: NORMAL
LACTATE BLD-SCNC: 1 MMOL/L (ref 0.7–2)
LDH SERPL L TO P-CCNC: 276 U/L (ref 85–227)
LYMPHOCYTES # BLD AUTO: 2 10E9/L (ref 0.8–5.3)
LYMPHOCYTES NFR BLD AUTO: 36.5 %
MCH RBC QN AUTO: 28.5 PG (ref 26.5–33)
MCHC RBC AUTO-ENTMCNC: 32.4 G/DL (ref 31.5–36.5)
MCV RBC AUTO: 88 FL (ref 78–100)
MONOCYTES # BLD AUTO: 0.3 10E9/L (ref 0–1.3)
MONOCYTES NFR BLD AUTO: 5.8 %
NEUTROPHILS # BLD AUTO: 3.1 10E9/L (ref 1.6–8.3)
NEUTROPHILS NFR BLD AUTO: 56.9 %
NRBC # BLD AUTO: 0 10*3/UL
NRBC BLD AUTO-RTO: 0 /100
PLATELET # BLD AUTO: 219 10E9/L (ref 150–450)
POTASSIUM SERPL-SCNC: 3.8 MMOL/L (ref 3.4–5.3)
PROT SERPL-MCNC: 6.5 G/DL (ref 6.8–8.8)
RBC # BLD AUTO: 4.53 10E12/L (ref 4.4–5.9)
RETICS # AUTO: 16.3 10E9/L (ref 25–95)
RETICS/RBC NFR AUTO: 0.4 % (ref 0.5–2)
SODIUM SERPL-SCNC: 132 MMOL/L (ref 133–144)
TROPONIN I SERPL-MCNC: 0.04 UG/L (ref 0–0.04)
WBC # BLD AUTO: 5.4 10E9/L (ref 4–11)

## 2020-10-12 PROCEDURE — 250N000013 HC RX MED GY IP 250 OP 250 PS 637: Performed by: HOSPITALIST

## 2020-10-12 PROCEDURE — 250N000011 HC RX IP 250 OP 636: Performed by: HOSPITALIST

## 2020-10-12 PROCEDURE — 84484 ASSAY OF TROPONIN QUANT: CPT | Performed by: INTERNAL MEDICINE

## 2020-10-12 PROCEDURE — 85045 AUTOMATED RETICULOCYTE COUNT: CPT | Performed by: INTERNAL MEDICINE

## 2020-10-12 PROCEDURE — 250N000012 HC RX MED GY IP 250 OP 636 PS 637: Performed by: INTERNAL MEDICINE

## 2020-10-12 PROCEDURE — C9113 INJ PANTOPRAZOLE SODIUM, VIA: HCPCS | Performed by: HOSPITALIST

## 2020-10-12 PROCEDURE — 85379 FIBRIN DEGRADATION QUANT: CPT | Performed by: INTERNAL MEDICINE

## 2020-10-12 PROCEDURE — 99233 SBSQ HOSP IP/OBS HIGH 50: CPT | Performed by: INTERNAL MEDICINE

## 2020-10-12 PROCEDURE — 250N000013 HC RX MED GY IP 250 OP 250 PS 637: Performed by: INTERNAL MEDICINE

## 2020-10-12 PROCEDURE — 83520 IMMUNOASSAY QUANT NOS NONAB: CPT | Performed by: INTERNAL MEDICINE

## 2020-10-12 PROCEDURE — 999N001017 HC STATISTIC GLUCOSE BY METER IP

## 2020-10-12 PROCEDURE — 36415 COLL VENOUS BLD VENIPUNCTURE: CPT | Performed by: INTERNAL MEDICINE

## 2020-10-12 PROCEDURE — 85025 COMPLETE CBC W/AUTO DIFF WBC: CPT | Performed by: INTERNAL MEDICINE

## 2020-10-12 PROCEDURE — 80053 COMPREHEN METABOLIC PANEL: CPT | Performed by: INTERNAL MEDICINE

## 2020-10-12 PROCEDURE — 85610 PROTHROMBIN TIME: CPT | Performed by: INTERNAL MEDICINE

## 2020-10-12 PROCEDURE — 83605 ASSAY OF LACTIC ACID: CPT | Performed by: INTERNAL MEDICINE

## 2020-10-12 PROCEDURE — 85384 FIBRINOGEN ACTIVITY: CPT | Performed by: INTERNAL MEDICINE

## 2020-10-12 PROCEDURE — 120N000001 HC R&B MED SURG/OB

## 2020-10-12 PROCEDURE — 86140 C-REACTIVE PROTEIN: CPT | Performed by: INTERNAL MEDICINE

## 2020-10-12 PROCEDURE — 83615 LACTATE (LD) (LDH) ENZYME: CPT | Performed by: INTERNAL MEDICINE

## 2020-10-12 RX ORDER — BISACODYL 5 MG
10 TABLET, DELAYED RELEASE (ENTERIC COATED) ORAL DAILY PRN
Status: DISCONTINUED | OUTPATIENT
Start: 2020-10-12 | End: 2020-10-21 | Stop reason: HOSPADM

## 2020-10-12 RX ORDER — BISACODYL 5 MG
5 TABLET, DELAYED RELEASE (ENTERIC COATED) ORAL DAILY PRN
Status: DISCONTINUED | OUTPATIENT
Start: 2020-10-12 | End: 2020-10-12

## 2020-10-12 RX ORDER — POLYETHYLENE GLYCOL 3350 17 G/17G
17 POWDER, FOR SOLUTION ORAL DAILY PRN
Status: DISCONTINUED | OUTPATIENT
Start: 2020-10-12 | End: 2020-10-21 | Stop reason: HOSPADM

## 2020-10-12 RX ORDER — AMOXICILLIN 250 MG
1 CAPSULE ORAL 2 TIMES DAILY PRN
Status: DISCONTINUED | OUTPATIENT
Start: 2020-10-12 | End: 2020-10-21 | Stop reason: HOSPADM

## 2020-10-12 RX ADMIN — SIMVASTATIN 10 MG: 10 TABLET, FILM COATED ORAL at 22:29

## 2020-10-12 RX ADMIN — LISINOPRIL 10 MG: 10 TABLET ORAL at 08:50

## 2020-10-12 RX ADMIN — TIMOLOL MALEATE 1 DROP: 5 SOLUTION/ DROPS OPHTHALMIC at 09:05

## 2020-10-12 RX ADMIN — ACETAMINOPHEN 650 MG: 325 TABLET, FILM COATED ORAL at 08:50

## 2020-10-12 RX ADMIN — INSULIN GLARGINE 50 UNITS: 100 INJECTION, SOLUTION SUBCUTANEOUS at 22:29

## 2020-10-12 RX ADMIN — BISACODYL 10 MG: 5 TABLET, COATED ORAL at 19:28

## 2020-10-12 RX ADMIN — POLYETHYLENE GLYCOL 3350 17 G: 17 POWDER, FOR SOLUTION ORAL at 13:27

## 2020-10-12 RX ADMIN — PANTOPRAZOLE SODIUM 40 MG: 40 INJECTION, POWDER, FOR SOLUTION INTRAVENOUS at 08:50

## 2020-10-12 RX ADMIN — INSULIN ASPART 10 UNITS: 100 INJECTION, SOLUTION INTRAVENOUS; SUBCUTANEOUS at 19:56

## 2020-10-12 RX ADMIN — ACETAMINOPHEN 325 MG: 325 TABLET, FILM COATED ORAL at 19:28

## 2020-10-12 ASSESSMENT — ACTIVITIES OF DAILY LIVING (ADL)
ADLS_ACUITY_SCORE: 12

## 2020-10-12 NOTE — PLAN OF CARE
A&Ox4. T-max 101.5. Tylenol given for fever and headache. /175. Up independently. Miralax given for constipation. Tele SR. Possible discharge 1-2 days.

## 2020-10-12 NOTE — PLAN OF CARE
"Pt A/Ox4, Activity: SBA. Pt denies pain, N/V. IV Saline locked.  VSS. Tele SR. Covid 19 Positive. Attention seeking behavior (PT is extremely anxious and fears \"I am going to die\").. Continue with POC.    Major Shift Event/Provider Notifications:  Stopped Infusing LR, because pt, refused. See note.  At dinner BG was 249, 3 units administered, at 2200 BG was 286, Provider notified, see note.  MD ordered 7 additional units of Novolog, administered at 2230, recheck in 1 hour.        Recheck was 212 at 2330, pt requesting keeps saying \"I must keep my BG under 200 otherwise I will suffer severe complications from COVID\"    Pt called for help stating he fell, writer was in room within 2 mins, pt was seen in bed wrapped up in blankets watching TV.   Pt is known for attention seeking behaviors.    2347: Pt recheck BG was 212. PT is stating he fell on his knees near his bed.  Pt tremulous, A&O, denies pain, no signs of injury, VSS except temp 102.4.  Tylenol and Ice pack offered.     Administered PRN tylenol 650mg and provided pt with ice pack for temp.      Bed alarms on and reminded him to call for help.       "

## 2020-10-12 NOTE — PLAN OF CARE
Vitals: Temp: 99.9  F (37.7  C) Temp src: Oral BP: (!) 158/71 Pulse: 103   Resp: 24 SpO2: 93 % O2 Device: None (Room air)     Pt denies pain   Neuro: AO4, pt extremely anxious about situation. Multiple nonpharmacologic measures offered and given to help with anxiety such as: calming music, deep breathing, active listening, calm environment promoted, needs met. Pt stated he would not take any medication to help with is anxiety if it was offered.   Respiratory: LS crackles. Pt denied SOB or difficulty breathing. O2 stable throughout night on room air.   Cardiac/tele: Tele SR. Pt denied chest pain.   GI/: WDL  Skin: WDL  LDAs: PIV to left SL   Labs: COVID positive. Airborne precautions maintained.   Diet: Modcarb  Activity: SBA  Plan: Possible discharge 0-1 days pending no respiratory deterioration. Will continue POC.

## 2020-10-12 NOTE — PROGRESS NOTES
[facility]  Hospitalist Progress Note  Admit 10/10/2020 10:28 PM    Name: Rachell Paige    MRN: [id number]  Provider:  Delaney Simmons MD    Date of Service: 10/12/2020     Reason for Stay (Diagnosis): COVID-19 infection         Summary of hospital stay & Assessment/Plan:   Summary of Stay: Rachell Paige is a 64 year old male who was admitted on 10/10/2020  64 year old male with a past medical history of type 2 diabetes mellitus, anxiety, hypertension, hyperlipidemia and recent diagnosis of COVID-19 on 10/7 who presents with generalized weakness, mild shortness of breath and upper abdominal pain.  Further evaluation revealed no evidence of hypoxia, however patient reports significant fatigue, not feeling well, CRP and d-dimer are elevated, rest of the COVID lab is still pending, also troponin is mildly abnormal.    Problem List:   1. COVID-19 infection  -Steroid was not initiated because patient is not hypoxic and he has insulin-dependent diabetes so the risk probably for now outweigh the benefit  Check COVID lab, monitor in the hospital for at least another 24 hours to make sure there is no clinical deterioration, if there is hypoxia consider starting steroid and consult physician for further therapeutic or transfer to [place].  Interleukin-6, LDH, troponin follow-up    2. Abdominal discomfort vague likely secondary to cope with infection: pain better today    3. Mild acute kidney injury baseline creatinine 1.2, admission creatinine 1.52  -Will monitor renal function. Will continue gentle hydration    4. Mild hyponatremia: Will monitor BMP    5. Diabetes mellitus on insulin  -Will increase Lantus insulin to 50 units at bedtime. Will continue insulin sliding scale.    6. Hypertension    7. Hyperlipidemia    DVT Prophylaxis: Heparin SQ  Code Status:  Full Code    Disposition Plan     Expected discharge in 1-2  days to prior living arrangement once shows clinical improvement and no respiratory  "deterioration.     Entered: Delaney Simmons MD 10/12/2020, 11:57 AM           Interval History:         Patient seen and examined. He stated that he is feeling 'terrible\". He has no nausea or vomiting. He has fever.  He has no abdominal pain.                Physical Exam:   Physical Exam   Temp: 101.5  F (38.6  C) Temp src: Oral BP: 131/44 Pulse: 98   Resp: 20 SpO2: 94 % O2 Device: None (Room air)    Vitals:    10/11/20 0152 10/11/20 0324   Weight: 90.7 kg (200 lb) 88 kg (193 lb 14.4 oz)     I/O last 3 completed shifts:  In: 1065 [P.O.:860; I.V.:205]  Out: 500 [Urine:500]      GENERAL:  Comfortable.   PSYCH: pleasant, oriented, No acute distress.  EYES: PERRLA, Normal conjunctiva.  HEART:  Normal S1, S2 with no edema.  LUNGS:  Clear to auscultation, normal Respiratory effort.  ABDOMEN:  Soft, no hepatosplenomegaly, normal bowel sounds.  SKIN:  Dry to touch, No rash.  Neuro: Non focal with normal motor power, sensation, CN's and Reflexes.    Medications     lactated ringers Stopped (10/11/20 1720)       heparin ANTICOAGULANT  5,000 Units Subcutaneous Q12H     insulin aspart  1-7 Units Subcutaneous TID AC     insulin aspart  1-5 Units Subcutaneous At Bedtime     insulin glargine  50 Units Subcutaneous At Bedtime     lisinopril  10 mg Oral Daily     pantoprazole (PROTONIX) IV  40 mg Intravenous Daily with breakfast     simvastatin  10 mg Oral At Bedtime     sodium chloride (PF)  3 mL Intracatheter Q8H     timolol maleate  1 drop Both Eyes BID     Data     -Data reviewed today:  I personally reviewed  all new labs and imaging results over the last 24 hours.    Recent Labs   Lab 10/12/20  0632 10/10/20  2235 10/07/20  1211   WBC 5.4 5.1 6.2   HGB 12.9* 13.8 13.1*   HCT 39.8* 42.4 41.6   MCV 88 87 90    261 284     Recent Labs   Lab 10/12/20  0632 10/10/20  2235 10/07/20  1211   * 129* 135   POTASSIUM 3.8 3.5 3.8   CHLORIDE 100 97 105   CO2 25 26 25   ANIONGAP 7 6 5   * 144* 131*   BUN 13 14 11 "   CR 1.41* 1.52* 1.33*   GFRESTIMATED 52* 48* 56*   GFRESTBLACK 61 55* 65   ALPHONSO 7.6* 7.6* 8.1*       No results found for this or any previous visit (from the past 24 hour(s)).    This document was produced using voice recognition software

## 2020-10-13 LAB
ANION GAP SERPL CALCULATED.3IONS-SCNC: 8 MMOL/L (ref 3–14)
BASOPHILS # BLD AUTO: 0 10E9/L (ref 0–0.2)
BASOPHILS NFR BLD AUTO: 0 %
BUN SERPL-MCNC: 16 MG/DL (ref 7–30)
CALCIUM SERPL-MCNC: 7.5 MG/DL (ref 8.5–10.1)
CHLORIDE SERPL-SCNC: 97 MMOL/L (ref 94–109)
CO2 SERPL-SCNC: 22 MMOL/L (ref 20–32)
CREAT SERPL-MCNC: 1.34 MG/DL (ref 0.66–1.25)
CRP SERPL-MCNC: 102 MG/L (ref 0–8)
D DIMER PPP FEU-MCNC: 0.6 UG/ML FEU (ref 0–0.5)
DIFFERENTIAL METHOD BLD: ABNORMAL
EOSINOPHIL # BLD AUTO: 0 10E9/L (ref 0–0.7)
EOSINOPHIL NFR BLD AUTO: 0 %
ERYTHROCYTE [DISTWIDTH] IN BLOOD BY AUTOMATED COUNT: 13.1 % (ref 10–15)
FIBRINOGEN PPP-MCNC: 524 MG/DL (ref 200–420)
GFR SERPL CREATININE-BSD FRML MDRD: 56 ML/MIN/{1.73_M2}
GLUCOSE BLDC GLUCOMTR-MCNC: 139 MG/DL (ref 70–99)
GLUCOSE BLDC GLUCOMTR-MCNC: 164 MG/DL (ref 70–99)
GLUCOSE BLDC GLUCOMTR-MCNC: 215 MG/DL (ref 70–99)
GLUCOSE BLDC GLUCOMTR-MCNC: 217 MG/DL (ref 70–99)
GLUCOSE BLDC GLUCOMTR-MCNC: 297 MG/DL (ref 70–99)
GLUCOSE SERPL-MCNC: 170 MG/DL (ref 70–99)
HCT VFR BLD AUTO: 38 % (ref 40–53)
HGB BLD-MCNC: 12.5 G/DL (ref 13.3–17.7)
IL6 SERPL-MCNC: 95.61 PG/ML
IMM GRANULOCYTES # BLD: 0 10E9/L (ref 0–0.4)
IMM GRANULOCYTES NFR BLD: 0.2 %
INR PPP: 0.95 (ref 0.86–1.14)
LACTATE BLD-SCNC: 1.1 MMOL/L (ref 0.7–2)
LDH SERPL L TO P-CCNC: 294 U/L (ref 85–227)
LYMPHOCYTES # BLD AUTO: 1.4 10E9/L (ref 0.8–5.3)
LYMPHOCYTES NFR BLD AUTO: 26.7 %
MCH RBC QN AUTO: 27.9 PG (ref 26.5–33)
MCHC RBC AUTO-ENTMCNC: 32.9 G/DL (ref 31.5–36.5)
MCV RBC AUTO: 85 FL (ref 78–100)
MONOCYTES # BLD AUTO: 0.2 10E9/L (ref 0–1.3)
MONOCYTES NFR BLD AUTO: 4.6 %
NEUTROPHILS # BLD AUTO: 3.6 10E9/L (ref 1.6–8.3)
NEUTROPHILS NFR BLD AUTO: 68.5 %
NRBC # BLD AUTO: 0 10*3/UL
NRBC BLD AUTO-RTO: 0 /100
PLATELET # BLD AUTO: 224 10E9/L (ref 150–450)
POTASSIUM SERPL-SCNC: 4.1 MMOL/L (ref 3.4–5.3)
RBC # BLD AUTO: 4.48 10E12/L (ref 4.4–5.9)
RETICS # AUTO: 17.5 10E9/L (ref 25–95)
RETICS/RBC NFR AUTO: 0.4 % (ref 0.5–2)
SODIUM SERPL-SCNC: 127 MMOL/L (ref 133–144)
WBC # BLD AUTO: 5.3 10E9/L (ref 4–11)

## 2020-10-13 PROCEDURE — 250N000013 HC RX MED GY IP 250 OP 250 PS 637: Performed by: HOSPITALIST

## 2020-10-13 PROCEDURE — 250N000012 HC RX MED GY IP 250 OP 636 PS 637: Performed by: INTERNAL MEDICINE

## 2020-10-13 PROCEDURE — 85610 PROTHROMBIN TIME: CPT | Performed by: HOSPITALIST

## 2020-10-13 PROCEDURE — 99233 SBSQ HOSP IP/OBS HIGH 50: CPT | Performed by: INTERNAL MEDICINE

## 2020-10-13 PROCEDURE — 36415 COLL VENOUS BLD VENIPUNCTURE: CPT | Performed by: INTERNAL MEDICINE

## 2020-10-13 PROCEDURE — 3E0333Z INTRODUCTION OF ANTI-INFLAMMATORY INTO PERIPHERAL VEIN, PERCUTANEOUS APPROACH: ICD-10-PCS | Performed by: INTERNAL MEDICINE

## 2020-10-13 PROCEDURE — 80048 BASIC METABOLIC PNL TOTAL CA: CPT | Performed by: HOSPITALIST

## 2020-10-13 PROCEDURE — 250N000011 HC RX IP 250 OP 636: Performed by: HOSPITALIST

## 2020-10-13 PROCEDURE — 86140 C-REACTIVE PROTEIN: CPT | Performed by: HOSPITALIST

## 2020-10-13 PROCEDURE — 36415 COLL VENOUS BLD VENIPUNCTURE: CPT | Performed by: HOSPITALIST

## 2020-10-13 PROCEDURE — 83615 LACTATE (LD) (LDH) ENZYME: CPT | Performed by: HOSPITALIST

## 2020-10-13 PROCEDURE — C9113 INJ PANTOPRAZOLE SODIUM, VIA: HCPCS | Performed by: HOSPITALIST

## 2020-10-13 PROCEDURE — 85025 COMPLETE CBC W/AUTO DIFF WBC: CPT | Performed by: HOSPITALIST

## 2020-10-13 PROCEDURE — 85045 AUTOMATED RETICULOCYTE COUNT: CPT | Performed by: HOSPITALIST

## 2020-10-13 PROCEDURE — 85379 FIBRIN DEGRADATION QUANT: CPT | Performed by: HOSPITALIST

## 2020-10-13 PROCEDURE — 999N001017 HC STATISTIC GLUCOSE BY METER IP

## 2020-10-13 PROCEDURE — 250N000011 HC RX IP 250 OP 636: Performed by: INTERNAL MEDICINE

## 2020-10-13 PROCEDURE — 120N000001 HC R&B MED SURG/OB

## 2020-10-13 PROCEDURE — 83605 ASSAY OF LACTIC ACID: CPT | Performed by: INTERNAL MEDICINE

## 2020-10-13 PROCEDURE — 85384 FIBRINOGEN ACTIVITY: CPT | Performed by: HOSPITALIST

## 2020-10-13 RX ORDER — FLUTICASONE PROPIONATE 50 MCG
1 SPRAY, SUSPENSION (ML) NASAL DAILY
Status: DISCONTINUED | OUTPATIENT
Start: 2020-10-14 | End: 2020-10-14

## 2020-10-13 RX ORDER — DEXAMETHASONE SODIUM PHOSPHATE 4 MG/ML
6 INJECTION, SOLUTION INTRA-ARTICULAR; INTRALESIONAL; INTRAMUSCULAR; INTRAVENOUS; SOFT TISSUE EVERY 24 HOURS
Status: DISCONTINUED | OUTPATIENT
Start: 2020-10-13 | End: 2020-10-21 | Stop reason: HOSPADM

## 2020-10-13 RX ADMIN — LISINOPRIL 10 MG: 10 TABLET ORAL at 10:20

## 2020-10-13 RX ADMIN — TIMOLOL MALEATE 1 DROP: 5 SOLUTION/ DROPS OPHTHALMIC at 02:06

## 2020-10-13 RX ADMIN — ENOXAPARIN SODIUM 40 MG: 40 INJECTION SUBCUTANEOUS at 10:18

## 2020-10-13 RX ADMIN — INSULIN GLARGINE 60 UNITS: 100 INJECTION, SOLUTION SUBCUTANEOUS at 22:13

## 2020-10-13 RX ADMIN — ACETAMINOPHEN 650 MG: 325 TABLET, FILM COATED ORAL at 10:20

## 2020-10-13 RX ADMIN — INSULIN ASPART 2 UNITS: 100 INJECTION, SOLUTION INTRAVENOUS; SUBCUTANEOUS at 22:19

## 2020-10-13 RX ADMIN — PANTOPRAZOLE SODIUM 40 MG: 40 INJECTION, POWDER, FOR SOLUTION INTRAVENOUS at 10:20

## 2020-10-13 RX ADMIN — ACETAMINOPHEN 650 MG: 325 TABLET, FILM COATED ORAL at 02:06

## 2020-10-13 RX ADMIN — SIMVASTATIN 10 MG: 10 TABLET, FILM COATED ORAL at 22:13

## 2020-10-13 RX ADMIN — TIMOLOL MALEATE 1 DROP: 5 SOLUTION/ DROPS OPHTHALMIC at 22:13

## 2020-10-13 RX ADMIN — INSULIN ASPART 10 UNITS: 100 INJECTION, SOLUTION INTRAVENOUS; SUBCUTANEOUS at 17:56

## 2020-10-13 RX ADMIN — INSULIN ASPART 10 UNITS: 100 INJECTION, SOLUTION INTRAVENOUS; SUBCUTANEOUS at 10:26

## 2020-10-13 RX ADMIN — DEXAMETHASONE SODIUM PHOSPHATE 6 MG: 4 INJECTION, SOLUTION INTRAMUSCULAR; INTRAVENOUS at 10:14

## 2020-10-13 ASSESSMENT — ACTIVITIES OF DAILY LIVING (ADL)
ADLS_ACUITY_SCORE: 12

## 2020-10-13 NOTE — PROGRESS NOTES
Cross cover:    Notified that patient is requesting insulin and we are giving him currently.  At home he reported taking up to 20 units with meals, currently not ordered.  --Based on blood sugar trend so far we will add 10 units twice daily with meals  --I note that evening Lantus was already increased from 40 units to a planned 50 units this evening  --Recheck blood sugar later this evening, can certainly add additional Lantus or short acting insulin if needed.  --The patient is COVID-19 positive and being treated with Decadron so at risk for hyperglycemia.    Ba Bernal MD

## 2020-10-13 NOTE — PLAN OF CARE
Vitals: Temp: 102.1  F (38.9  C) Temp src: Oral BP: (!) 151/68 Pulse: 102   Resp: 27 SpO2: 94 % O2 Device: None (Room air)    Tylenol 625mg x1 for temp of 102.1  Neuro: Ao4, very anxious. Multiple holistic interventions such as active listening, deep breathing exercises, promoting calm environment, encouraging sleep, reassurance, and validation utilized. Some effectiveness.     Respiratory: LS dim/crackles. Denies SOB.   Cardiac/tele: Tele SR/ST. Pt reported mid sternal chest pain 5/10. Cardiac monitoring continued. Declined medication intervention other than tylenol.  GI/: BS normoactive x4, pt reports that medications administered on previous shift for constipation helped a little.   Skin: WDL  LDAs: PIV to left SL.   Labs: , covid positive. Airborne precautions maintained.   Diet: mod carb  Activity: Ind in room as pt refuses bed alarm. Education provided.   Plan: Plan to monitor for any clinical deterioration. Will continue POC.

## 2020-10-13 NOTE — PROGRESS NOTES
[facility]  Hospitalist Progress Note  Admit 10/10/2020 10:28 PM    Name: Rachell Paige    MRN: [id number]  Provider:  Delaney Simmons MD    Date of Service: 10/13/2020     Reason for Stay (Diagnosis): COVID-19 infection         Summary of hospital stay & Assessment/Plan:   Summary of Stay: Rachell Paige is a 64 year old male who was admitted on 10/10/2020  64 year old male with a past medical history of type 2 diabetes mellitus, anxiety, hypertension, hyperlipidemia and recent diagnosis of COVID-19 on 10/7 who presents with generalized weakness, mild shortness of breath and upper abdominal pain.  Further evaluation revealed no evidence of hypoxia, however patient reports significant fatigue, not feeling well, CRP and d-dimer are elevated, rest of the COVID lab is still pending, also troponin is mildly abnormal.  Patient was initially not requiring oxygen. On 10/13 his oxygen requirement dropped to 88% on room air.  He was started on oxygen supplement.  He was also started on dexamethasone.    Problem List:   1. COVID-19 infection  -Steroid was not initially initiated because patient was not hypoxic.  Today, he is oxygen saturation dropped to 88% on room air.  Started on dexamethasone 6 mg IV daily.  Consider transfer to [facility] if remains hypoxic.  Interleukin-6, LDH, troponin follow-up    2. Abdominal discomfort vague likely secondary to cope with infection: Abdominal discomfort improved    3. Mild acute kidney injury baseline creatinine between 1.1-1.47.admission creatinine 1.52  -Creatinine down to 1.34 today.  Will discontinue IV fluids.    4. Mild hyponatremia: Improved with IV fluids Will monitor BMP    5. Diabetes mellitus on insulin  -Lantus insulin increased to 60 units units at bedtime(home dose). Will continue insulin sliding scale.  Will monitor blood sugar    6. Hypertension    7. Hyperlipidemia    DVT Prophylaxis: Heparin SQ  Code Status:  Full Code    Disposition Plan      Expected discharge: Patient now requiring oxygen.  Discharge date TBD.  May need [facility] to [facility] she remains hypoxic.  Started on dexamethasone today     Entered: Delaney Simmons MD 10/13/2020, 11:25 AM           Interval History:         Patient seen and examined today.  He states he is feeling miserable.  He has shortness of breath.  He still has ongoing fever.  No nausea or vomiting.  He also denies diarrhea.  Has no dysuria, urgency or frequency.               Physical Exam:   Physical Exam   Temp: 100.6  F (38.1  C) Temp src: Oral BP: 128/55 Pulse: 93   Resp: 28 SpO2: 95 % O2 Device: Nasal cannula Oxygen Delivery: 2 LPM  Vitals:    10/11/20 0152 10/11/20 0324   Weight: 90.7 kg (200 lb) 88 kg (193 lb 14.4 oz)     I/O last 3 completed shifts:  In: 240 [P.O.:240]  Out: -       GENERAL:  Comfortable.   PSYCH: pleasant, oriented, No acute distress.  EYES: PERRLA, Normal conjunctiva.  HEART:  Normal S1, S2 with no edema.  LUNGS:  Clear to auscultation, normal Respiratory effort.  ABDOMEN:  Soft, no hepatosplenomegaly, normal bowel sounds.  SKIN:  Dry to touch, No rash.  Neuro: Non focal with normal motor power, sensation, CN's and Reflexes.    Medications     lactated ringers Stopped (10/11/20 1720)       dexamethasone  6 mg Intravenous Q24H     enoxaparin ANTICOAGULANT  40 mg Subcutaneous Q24H     insulin aspart  10 Units Subcutaneous BID w/meals     insulin aspart  1-7 Units Subcutaneous TID AC     insulin aspart  1-5 Units Subcutaneous At Bedtime     insulin glargine  60 Units Subcutaneous At Bedtime     lisinopril  10 mg Oral Daily     pantoprazole (PROTONIX) IV  40 mg Intravenous Daily with breakfast     simvastatin  10 mg Oral At Bedtime     sodium chloride (PF)  3 mL Intracatheter Q8H     timolol maleate  1 drop Both Eyes BID     Data     -Data reviewed today:  I personally reviewed  all new labs and imaging results over the last 24 hours.    Recent Labs   Lab 10/13/20  4680  10/12/20  0632 10/10/20  2235   WBC 5.3 5.4 5.1   HGB 12.5* 12.9* 13.8   HCT 38.0* 39.8* 42.4   MCV 85 88 87    219 261     Recent Labs   Lab 10/13/20  0607 10/12/20  0632 10/10/20  2235   * 132* 129*   POTASSIUM 4.1 3.8 3.5   CHLORIDE 97 100 97   CO2 22 25 26   ANIONGAP 8 7 6   * 151* 144*   BUN 16 13 14   CR 1.34* 1.41* 1.52*   GFRESTIMATED 56* 52* 48*   GFRESTBLACK 64 61 55*   ALPHONSO 7.5* 7.6* 7.6*       No results found for this or any previous visit (from the past 24 hour(s)).    This document was produced using voice recognition software

## 2020-10-13 NOTE — PLAN OF CARE
Pt A/O x 4, VSS (ex temp 101.1), pt denies dizziness, N/V & SOB. Pt reports headache, but refused Tylenol. Pt up indep in room. Pt also reports constipation - administered PO Dulcolax and provided prune juice. Lung sounds diminished, RA. Tele: SR - (18 beat run, but leads were not all in place, writer assessed pt - in bathroom; not symptomatic - will continue to monitor). Blood glucose levels: 187, 227. Later in the shift, Pt reluctantly agreed to take one Tylenol (325 mg) for temperature and headache.Pt very anxious about being COVID positive - believes he will not recover from this illness. Will continue with plan of care.    Pt very concerned regarding blood glucose management - wants increased Novolog doses before meals - paged MD - added 10 units BID. Pt also wants his urine checked for ketones. Writer encouraged pt to discuss further with MD in the morning.

## 2020-10-13 NOTE — PLAN OF CARE
A&Ox4. T max 101.9. Tylenol given for fever. Denies pain. Reports shortness of breath. Oxygen dropped to 88% on room air. Currently on 2 L nasal cannula sats mid 90's. IV decadron started. Up independently in the room. Poor appetite. Discharge plan TBD.

## 2020-10-14 LAB
ANION GAP SERPL CALCULATED.3IONS-SCNC: 8 MMOL/L (ref 3–14)
BASOPHILS # BLD AUTO: 0 10E9/L (ref 0–0.2)
BASOPHILS NFR BLD AUTO: 0 %
BUN SERPL-MCNC: 22 MG/DL (ref 7–30)
CALCIUM SERPL-MCNC: 8.2 MG/DL (ref 8.5–10.1)
CHLORIDE SERPL-SCNC: 98 MMOL/L (ref 94–109)
CO2 SERPL-SCNC: 22 MMOL/L (ref 20–32)
CREAT SERPL-MCNC: 1.34 MG/DL (ref 0.66–1.25)
CRP SERPL-MCNC: 89.7 MG/L (ref 0–8)
D DIMER PPP FEU-MCNC: 0.7 UG/ML FEU (ref 0–0.5)
DIFFERENTIAL METHOD BLD: NORMAL
EOSINOPHIL # BLD AUTO: 0 10E9/L (ref 0–0.7)
EOSINOPHIL NFR BLD AUTO: 0 %
ERYTHROCYTE [DISTWIDTH] IN BLOOD BY AUTOMATED COUNT: 13.1 % (ref 10–15)
FIBRINOGEN PPP-MCNC: 596 MG/DL (ref 200–420)
GFR SERPL CREATININE-BSD FRML MDRD: 56 ML/MIN/{1.73_M2}
GLUCOSE BLDC GLUCOMTR-MCNC: 164 MG/DL (ref 70–99)
GLUCOSE BLDC GLUCOMTR-MCNC: 182 MG/DL (ref 70–99)
GLUCOSE BLDC GLUCOMTR-MCNC: 220 MG/DL (ref 70–99)
GLUCOSE BLDC GLUCOMTR-MCNC: 220 MG/DL (ref 70–99)
GLUCOSE BLDC GLUCOMTR-MCNC: 272 MG/DL (ref 70–99)
GLUCOSE SERPL-MCNC: 192 MG/DL (ref 70–99)
HCT VFR BLD AUTO: 40.7 % (ref 40–53)
HGB BLD-MCNC: 13.3 G/DL (ref 13.3–17.7)
IMM GRANULOCYTES # BLD: 0 10E9/L (ref 0–0.4)
IMM GRANULOCYTES NFR BLD: 0.5 %
INR PPP: 0.94 (ref 0.86–1.14)
LDH SERPL L TO P-CCNC: 325 U/L (ref 85–227)
LYMPHOCYTES # BLD AUTO: 0.9 10E9/L (ref 0.8–5.3)
LYMPHOCYTES NFR BLD AUTO: 22.7 %
MCH RBC QN AUTO: 27.9 PG (ref 26.5–33)
MCHC RBC AUTO-ENTMCNC: 32.7 G/DL (ref 31.5–36.5)
MCV RBC AUTO: 85 FL (ref 78–100)
MONOCYTES # BLD AUTO: 0.3 10E9/L (ref 0–1.3)
MONOCYTES NFR BLD AUTO: 7.8 %
NEUTROPHILS # BLD AUTO: 2.7 10E9/L (ref 1.6–8.3)
NEUTROPHILS NFR BLD AUTO: 69 %
NRBC # BLD AUTO: 0 10*3/UL
NRBC BLD AUTO-RTO: 0 /100
PLATELET # BLD AUTO: 249 10E9/L (ref 150–450)
POTASSIUM SERPL-SCNC: 4.4 MMOL/L (ref 3.4–5.3)
RBC # BLD AUTO: 4.77 10E12/L (ref 4.4–5.9)
RETICS # AUTO: 18.6 10E9/L (ref 25–95)
RETICS/RBC NFR AUTO: 0.4 % (ref 0.5–2)
SODIUM SERPL-SCNC: 128 MMOL/L (ref 133–144)
WBC # BLD AUTO: 4 10E9/L (ref 4–11)

## 2020-10-14 PROCEDURE — 120N000001 HC R&B MED SURG/OB

## 2020-10-14 PROCEDURE — 85045 AUTOMATED RETICULOCYTE COUNT: CPT | Performed by: HOSPITALIST

## 2020-10-14 PROCEDURE — 99233 SBSQ HOSP IP/OBS HIGH 50: CPT | Performed by: INTERNAL MEDICINE

## 2020-10-14 PROCEDURE — 250N000013 HC RX MED GY IP 250 OP 250 PS 637: Performed by: INTERNAL MEDICINE

## 2020-10-14 PROCEDURE — C9113 INJ PANTOPRAZOLE SODIUM, VIA: HCPCS | Performed by: HOSPITALIST

## 2020-10-14 PROCEDURE — 258N000003 HC RX IP 258 OP 636: Performed by: INTERNAL MEDICINE

## 2020-10-14 PROCEDURE — 250N000011 HC RX IP 250 OP 636: Performed by: HOSPITALIST

## 2020-10-14 PROCEDURE — XW033E5 INTRODUCTION OF REMDESIVIR ANTI-INFECTIVE INTO PERIPHERAL VEIN, PERCUTANEOUS APPROACH, NEW TECHNOLOGY GROUP 5: ICD-10-PCS | Performed by: INTERNAL MEDICINE

## 2020-10-14 PROCEDURE — 250N000013 HC RX MED GY IP 250 OP 250 PS 637: Performed by: HOSPITALIST

## 2020-10-14 PROCEDURE — 85384 FIBRINOGEN ACTIVITY: CPT | Performed by: HOSPITALIST

## 2020-10-14 PROCEDURE — 250N000009 HC RX 250: Performed by: INTERNAL MEDICINE

## 2020-10-14 PROCEDURE — 85610 PROTHROMBIN TIME: CPT | Performed by: HOSPITALIST

## 2020-10-14 PROCEDURE — 999N001017 HC STATISTIC GLUCOSE BY METER IP

## 2020-10-14 PROCEDURE — 80048 BASIC METABOLIC PNL TOTAL CA: CPT | Performed by: HOSPITALIST

## 2020-10-14 PROCEDURE — 86140 C-REACTIVE PROTEIN: CPT | Performed by: HOSPITALIST

## 2020-10-14 PROCEDURE — 250N000012 HC RX MED GY IP 250 OP 636 PS 637: Performed by: INTERNAL MEDICINE

## 2020-10-14 PROCEDURE — 83615 LACTATE (LD) (LDH) ENZYME: CPT | Performed by: HOSPITALIST

## 2020-10-14 PROCEDURE — 36415 COLL VENOUS BLD VENIPUNCTURE: CPT | Performed by: HOSPITALIST

## 2020-10-14 PROCEDURE — 85379 FIBRIN DEGRADATION QUANT: CPT | Performed by: HOSPITALIST

## 2020-10-14 PROCEDURE — 250N000011 HC RX IP 250 OP 636: Performed by: INTERNAL MEDICINE

## 2020-10-14 PROCEDURE — 85025 COMPLETE CBC W/AUTO DIFF WBC: CPT | Performed by: HOSPITALIST

## 2020-10-14 RX ORDER — FLUTICASONE PROPIONATE 50 MCG
1 SPRAY, SUSPENSION (ML) NASAL DAILY
Status: DISCONTINUED | OUTPATIENT
Start: 2020-10-14 | End: 2020-10-21 | Stop reason: HOSPADM

## 2020-10-14 RX ORDER — DORZOLAMIDE HCL 20 MG/ML
1 SOLUTION/ DROPS OPHTHALMIC 2 TIMES DAILY
Status: DISCONTINUED | OUTPATIENT
Start: 2020-10-14 | End: 2020-10-21 | Stop reason: HOSPADM

## 2020-10-14 RX ADMIN — PANTOPRAZOLE SODIUM 40 MG: 40 INJECTION, POWDER, FOR SOLUTION INTRAVENOUS at 09:56

## 2020-10-14 RX ADMIN — INSULIN GLARGINE 60 UNITS: 100 INJECTION, SOLUTION SUBCUTANEOUS at 21:42

## 2020-10-14 RX ADMIN — INSULIN ASPART 10 UNITS: 100 INJECTION, SOLUTION INTRAVENOUS; SUBCUTANEOUS at 17:59

## 2020-10-14 RX ADMIN — INSULIN ASPART 10 UNITS: 100 INJECTION, SOLUTION INTRAVENOUS; SUBCUTANEOUS at 09:57

## 2020-10-14 RX ADMIN — LISINOPRIL 10 MG: 10 TABLET ORAL at 09:55

## 2020-10-14 RX ADMIN — FLUTICASONE PROPIONATE 1 SPRAY: 50 SPRAY, METERED NASAL at 01:24

## 2020-10-14 RX ADMIN — REMDESIVIR 200 MG: 100 INJECTION, POWDER, LYOPHILIZED, FOR SOLUTION INTRAVENOUS at 15:00

## 2020-10-14 RX ADMIN — DORZOLAMIDE HYDROCHLORIDE 1 DROP: 20 SOLUTION/ DROPS OPHTHALMIC at 21:42

## 2020-10-14 RX ADMIN — DEXAMETHASONE SODIUM PHOSPHATE 6 MG: 4 INJECTION, SOLUTION INTRAMUSCULAR; INTRAVENOUS at 09:55

## 2020-10-14 RX ADMIN — ENOXAPARIN SODIUM 40 MG: 40 INJECTION SUBCUTANEOUS at 15:01

## 2020-10-14 RX ADMIN — TIMOLOL MALEATE 1 DROP: 5 SOLUTION/ DROPS OPHTHALMIC at 21:42

## 2020-10-14 RX ADMIN — SIMVASTATIN 10 MG: 10 TABLET, FILM COATED ORAL at 21:42

## 2020-10-14 RX ADMIN — TIMOLOL MALEATE 1 DROP: 5 SOLUTION/ DROPS OPHTHALMIC at 09:57

## 2020-10-14 ASSESSMENT — ACTIVITIES OF DAILY LIVING (ADL)
ADLS_ACUITY_SCORE: 12

## 2020-10-14 NOTE — PLAN OF CARE
Vitals: Temp: 98.6  F (37  C) Temp src: Oral BP: (!) 158/72 Pulse: 89   Resp: 28 SpO2: 95 % O2 Device: Nasal cannula with humidification Oxygen Delivery: Others (comment)(3.5L)  Pt denied pain during shift.   Neuro: Ao4  Respiratory: LS crackles. Supplemental o2 via nc. Continuous pulse ox on. Sats stable on 3.5L NC. Humidity added per pt request.   Cardiac/tele: Tele SR, denied chest pain   GI/: WDL  Skin: WDL  LDAs: PIV to left SL   Labs: COVID positive, airborne precautions maintained.   Diet: Regular   Activity: Ind in room   Plan: Discharge TBD. Will continue POC

## 2020-10-14 NOTE — PLAN OF CARE
Pt A&O x4, up in room ad nehal. 94% on 3.5L NC, tried to wean 02 today but dropped to 85% while eating. BP elevated on lisinopril. Pt verbalized feelings of anxiousness and apprehension of illness. Writer provided supported and and reassurance. Deep breathing education provided. Discharge plan TBD, possibly to [place]. Will continue POC.     Addendum 1300: Writer spoke to pt regarding new anti-viral medicaton (remdesivir), explained purpose and side effects. Pt verbalized approval, writer notified Boris from pharmacy.

## 2020-10-14 NOTE — PROGRESS NOTES
Remdesivir Allocation Note      This patient has been selected using standard criteria to receive remdesivir by the [employer] [facility] Interdisciplinary Triage Team based on criteria developed and distributed by the [facility] on May 23, 2020.   This is under the FDA Emergency Use Authorization.     The treatment team should order Remdesivir 200 mg IV x1 and then 100 mg daily for 4 subsequent days if the patient/decision maker consent and if medically indicated.       Please contact Dr. Timi Wooten with questions at [phone].    Flex Long PA-C

## 2020-10-14 NOTE — PROVIDER NOTIFICATION
Web based page @ 0034: Pt requesting nasal spray for decongestion. Flonase is scheduled for 0800. Can pt have something now? Thank you.     Addendum: new orders

## 2020-10-14 NOTE — PLAN OF CARE
Diagnosis. COVID  History. DM, Hyperlipidemia   Labs/Protocol. INR- 0.95, Creatinine-1.34  Vitals. VSS  Tele. SR  Respiratory. Patient on 2.5L of O2. Lungs sounds diminished.   Neuro. WDL  GI/. WDL  Skin. Patient generally bruised.  LDA's. Left PIV saline locked.   Diet. Mod carb diet   Activity. Independent in room.  Plan. May be transferred to [facility] if bed is available. Continue to monitor oxygen.

## 2020-10-14 NOTE — PLAN OF CARE
Diagnosis. COVID  History. DM, Hyperlipidemia   Labs/Protocol. INR- 0.95.   Vitals. VSS  Tele. SR  Respiratory. Patient on 3L of O2. Lungs sounds diminished.   Neuro. WDL  GI/. WDL  Skin.WDL  LDA's. Left PIV saline locked.   Diet. Mod carb diet   Activity. Independent in room.  Plan. Patient received one time dose of 10 units of insulin per MD order. Stay for one more day and then return home.

## 2020-10-14 NOTE — PROGRESS NOTES
[facility]  Hospitalist Progress Note  Admit 10/10/2020 10:28 PM    Name: Rachell Paige    MRN: [id number]  Provider:  Delaney Simmons MD    Date of Service: 10/14/2020     Reason for Stay (Diagnosis): COVID-19 infection         Summary of hospital stay & Assessment/Plan:   Summary of Stay: Rachell Paige is a 64 year old male who was admitted on 10/10/2020  64 year old male with a past medical history of type 2 diabetes mellitus, anxiety, hypertension, hyperlipidemia and recent diagnosis of COVID-19 on 10/7 who presented with generalized weakness, mild shortness of breath and upper abdominal pain.  Further evaluation revealed no evidence of hypoxia, however patient reports significant fatigue, not feeling well, CRP and d-dimer are elevated, also troponin is mildly abnormal.  Patient was initially not requiring oxygen. On 10/13 his oxygen saturation dropped to 88% on room air.  He was started on oxygen supplement. He was also started on dexamethasone.  He was evaluated today and eligible for remdesivir. Started on remdesivir today.     Problem List:   1. Acute hypoxic respiratory failure secondary to COVID-19 infection  -Steroid was not initially initiated because patient was not hypoxic.  On 10/13 his oxygen saturation dropped to 88% on room air. Currently on oxygen 3.5 L/min.   -Elevated inflammatory markers.  CRP up from 59 to 89.7 , interleukin-6 95.6   Started on dexamethasone 6 mg IV daily on 10/13. Also evaluated for remdesivir and deemed eligible.  We will start him on remdesivir 200 mg IV x1 dose and 100 mg daily IV for 4 more days.  Informed that no bed available at [facility] today.  We will continue care here. Consider transferring him to [facility] tomorrow.    2. Abdominal discomfort vague likely secondary to COVID infection: Abdominal discomfort improved    3. Mild acute kidney injury baseline creatinine between 1.1-1.47.admission creatinine 1.52  -Creatinine down to  1.34 today.  Discontinued IV fluids    4. Mild hyponatremia: Improved with IV fluids Will monitor BMP    5. Diabetes mellitus on insulin  -Lantus insulin increased to 60 units units at bedtime(home dose). Will continue insulin sliding scale.  Will closely monitor blood sugar.     6. Hypertension: Blood pressure elevated at 160/71 today.  We will continue lisinopril.  Will put as needed hydralazine.  Will monitor blood pressure.    7. Hyperlipidemia: We will continue simvastatin    DVT Prophylaxis: We will continue enoxaparin 40 mg subcu every 24 hours  Code Status:  Full Code    Disposition Plan     Expected discharge: Patient started on remdesivir today. Anticipate  more than 2 vnights of hospoital course. Consider transferring to [facility] tomorrow.      Entered: Delaney Simmons MD 10/14/2020, 11:48 AM           Interval History:         Patient seen and examined.  He stated that he still feeling short of breath.  Fever a little better today.  Has intermittent cough.  No nausea or vomiting.  Denies diarrhea.  Discussed at length with the patient regarding starting him on remdesivir and he is agreeable               Physical Exam:   Physical Exam   Temp: 98.5  F (36.9  C) Temp src: Oral BP: (!) 160/71 Pulse: 83   Resp: 18 SpO2: 92 % O2 Device: Nasal cannula with humidification Oxygen Delivery: 3 LPM(3.5L)  Vitals:    10/11/20 0152 10/11/20 0324   Weight: 90.7 kg (200 lb) 88 kg (193 lb 14.4 oz)     I/O last 3 completed shifts:  In: 243 [P.O.:240; I.V.:3]  Out: -       GENERAL:  Comfortable on 3.5 L oxygen  PSYCH: pleasant, oriented, No acute distress.  EYES: PERRLA, Normal conjunctiva.  HEART:  Normal S1, S2 with no edema.  LUNGS:  Clear to auscultation, normal Respiratory effort.  ABDOMEN:  Soft, no hepatosplenomegaly, normal bowel sounds.  SKIN:  Dry to touch, No rash.  Neuro: Non focal with normal motor power, sensation, CN's and Reflexes.    Medications     lactated ringers Stopped (10/11/20 1720)        dexamethasone  6 mg Intravenous Q24H     enoxaparin ANTICOAGULANT  40 mg Subcutaneous Q24H     fluticasone  1 spray Both Nostrils Daily     insulin aspart  10 Units Subcutaneous BID w/meals     insulin aspart  1-7 Units Subcutaneous TID AC     insulin aspart  1-5 Units Subcutaneous At Bedtime     insulin glargine  60 Units Subcutaneous At Bedtime     lisinopril  10 mg Oral Daily     pantoprazole (PROTONIX) IV  40 mg Intravenous Daily with breakfast     remdesivir  200 mg Intravenous Once    Followed by     [START ON 10/15/2020] remdesivir  100 mg Intravenous Q24H     simvastatin  10 mg Oral At Bedtime     sodium chloride (PF)  3 mL Intracatheter Q8H     timolol maleate  1 drop Both Eyes BID     Data     -Data reviewed today:  I personally reviewed  all new labs and imaging results over the last 24 hours.    Recent Labs   Lab 10/14/20  0652 10/13/20  0607 10/12/20  0632   WBC 4.0 5.3 5.4   HGB 13.3 12.5* 12.9*   HCT 40.7 38.0* 39.8*   MCV 85 85 88    224 219     Recent Labs   Lab 10/14/20  0652 10/13/20  0607 10/12/20  0632   * 127* 132*   POTASSIUM 4.4 4.1 3.8   CHLORIDE 98 97 100   CO2 22 22 25   ANIONGAP 8 8 7   * 170* 151*   BUN 22 16 13   CR 1.34* 1.34* 1.41*   GFRESTIMATED 56* 56* 52*   GFRESTBLACK 64 64 61   ALPHONSO 8.2* 7.5* 7.6*       No results found for this or any previous visit (from the past 24 hour(s)).    This document was produced using voice recognition software

## 2020-10-15 LAB
ANION GAP SERPL CALCULATED.3IONS-SCNC: 9 MMOL/L (ref 3–14)
BASOPHILS # BLD AUTO: 0 10E9/L (ref 0–0.2)
BASOPHILS NFR BLD AUTO: 0 %
BUN SERPL-MCNC: 24 MG/DL (ref 7–30)
CALCIUM SERPL-MCNC: 8.2 MG/DL (ref 8.5–10.1)
CHLORIDE SERPL-SCNC: 100 MMOL/L (ref 94–109)
CO2 SERPL-SCNC: 22 MMOL/L (ref 20–32)
CREAT SERPL-MCNC: 1.08 MG/DL (ref 0.66–1.25)
CRP SERPL-MCNC: 39 MG/L (ref 0–8)
D DIMER PPP FEU-MCNC: 0.4 UG/ML FEU (ref 0–0.5)
DIFFERENTIAL METHOD BLD: NORMAL
EOSINOPHIL # BLD AUTO: 0 10E9/L (ref 0–0.7)
EOSINOPHIL NFR BLD AUTO: 0 %
ERYTHROCYTE [DISTWIDTH] IN BLOOD BY AUTOMATED COUNT: 13.2 % (ref 10–15)
FIBRINOGEN PPP-MCNC: 535 MG/DL (ref 200–420)
GFR SERPL CREATININE-BSD FRML MDRD: 72 ML/MIN/{1.73_M2}
GLUCOSE BLDC GLUCOMTR-MCNC: 153 MG/DL (ref 70–99)
GLUCOSE BLDC GLUCOMTR-MCNC: 177 MG/DL (ref 70–99)
GLUCOSE BLDC GLUCOMTR-MCNC: 217 MG/DL (ref 70–99)
GLUCOSE BLDC GLUCOMTR-MCNC: 240 MG/DL (ref 70–99)
GLUCOSE BLDC GLUCOMTR-MCNC: 244 MG/DL (ref 70–99)
GLUCOSE BLDC GLUCOMTR-MCNC: 294 MG/DL (ref 70–99)
GLUCOSE SERPL-MCNC: 249 MG/DL (ref 70–99)
HCT VFR BLD AUTO: 40.8 % (ref 40–53)
HGB BLD-MCNC: 13.3 G/DL (ref 13.3–17.7)
IMM GRANULOCYTES # BLD: 0 10E9/L (ref 0–0.4)
IMM GRANULOCYTES NFR BLD: 0.6 %
INR PPP: 0.9 (ref 0.86–1.14)
LDH SERPL L TO P-CCNC: 359 U/L (ref 85–227)
LYMPHOCYTES # BLD AUTO: 1 10E9/L (ref 0.8–5.3)
LYMPHOCYTES NFR BLD AUTO: 15.4 %
MCH RBC QN AUTO: 28 PG (ref 26.5–33)
MCHC RBC AUTO-ENTMCNC: 32.6 G/DL (ref 31.5–36.5)
MCV RBC AUTO: 86 FL (ref 78–100)
MONOCYTES # BLD AUTO: 0.5 10E9/L (ref 0–1.3)
MONOCYTES NFR BLD AUTO: 8 %
NEUTROPHILS # BLD AUTO: 5.1 10E9/L (ref 1.6–8.3)
NEUTROPHILS NFR BLD AUTO: 76 %
NRBC # BLD AUTO: 0 10*3/UL
NRBC BLD AUTO-RTO: 0 /100
PLATELET # BLD AUTO: 328 10E9/L (ref 150–450)
POTASSIUM SERPL-SCNC: 4.7 MMOL/L (ref 3.4–5.3)
RBC # BLD AUTO: 4.75 10E12/L (ref 4.4–5.9)
RETICS # AUTO: 23.3 10E9/L (ref 25–95)
RETICS/RBC NFR AUTO: 0.5 % (ref 0.5–2)
SODIUM SERPL-SCNC: 131 MMOL/L (ref 133–144)
WBC # BLD AUTO: 6.7 10E9/L (ref 4–11)

## 2020-10-15 PROCEDURE — 999N001017 HC STATISTIC GLUCOSE BY METER IP

## 2020-10-15 PROCEDURE — 120N000001 HC R&B MED SURG/OB

## 2020-10-15 PROCEDURE — 83615 LACTATE (LD) (LDH) ENZYME: CPT | Performed by: HOSPITALIST

## 2020-10-15 PROCEDURE — 86140 C-REACTIVE PROTEIN: CPT | Performed by: HOSPITALIST

## 2020-10-15 PROCEDURE — 250N000013 HC RX MED GY IP 250 OP 250 PS 637: Performed by: HOSPITALIST

## 2020-10-15 PROCEDURE — 85610 PROTHROMBIN TIME: CPT | Performed by: HOSPITALIST

## 2020-10-15 PROCEDURE — 36415 COLL VENOUS BLD VENIPUNCTURE: CPT | Performed by: HOSPITALIST

## 2020-10-15 PROCEDURE — C9113 INJ PANTOPRAZOLE SODIUM, VIA: HCPCS | Performed by: HOSPITALIST

## 2020-10-15 PROCEDURE — 83520 IMMUNOASSAY QUANT NOS NONAB: CPT | Performed by: INTERNAL MEDICINE

## 2020-10-15 PROCEDURE — 250N000009 HC RX 250: Performed by: INTERNAL MEDICINE

## 2020-10-15 PROCEDURE — 36415 COLL VENOUS BLD VENIPUNCTURE: CPT | Performed by: INTERNAL MEDICINE

## 2020-10-15 PROCEDURE — 80048 BASIC METABOLIC PNL TOTAL CA: CPT | Performed by: HOSPITALIST

## 2020-10-15 PROCEDURE — 258N000003 HC RX IP 258 OP 636: Performed by: INTERNAL MEDICINE

## 2020-10-15 PROCEDURE — 85384 FIBRINOGEN ACTIVITY: CPT | Performed by: HOSPITALIST

## 2020-10-15 PROCEDURE — 250N000012 HC RX MED GY IP 250 OP 636 PS 637: Performed by: INTERNAL MEDICINE

## 2020-10-15 PROCEDURE — 85379 FIBRIN DEGRADATION QUANT: CPT | Performed by: HOSPITALIST

## 2020-10-15 PROCEDURE — 85025 COMPLETE CBC W/AUTO DIFF WBC: CPT | Performed by: HOSPITALIST

## 2020-10-15 PROCEDURE — 250N000011 HC RX IP 250 OP 636: Performed by: INTERNAL MEDICINE

## 2020-10-15 PROCEDURE — 85045 AUTOMATED RETICULOCYTE COUNT: CPT | Performed by: HOSPITALIST

## 2020-10-15 PROCEDURE — 250N000011 HC RX IP 250 OP 636: Performed by: HOSPITALIST

## 2020-10-15 PROCEDURE — 99233 SBSQ HOSP IP/OBS HIGH 50: CPT | Performed by: INTERNAL MEDICINE

## 2020-10-15 RX ADMIN — INSULIN GLARGINE 60 UNITS: 100 INJECTION, SOLUTION SUBCUTANEOUS at 22:10

## 2020-10-15 RX ADMIN — TIMOLOL MALEATE 1 DROP: 5 SOLUTION/ DROPS OPHTHALMIC at 22:14

## 2020-10-15 RX ADMIN — INSULIN ASPART 13 UNITS: 100 INJECTION, SOLUTION INTRAVENOUS; SUBCUTANEOUS at 18:59

## 2020-10-15 RX ADMIN — INSULIN ASPART 10 UNITS: 100 INJECTION, SOLUTION INTRAVENOUS; SUBCUTANEOUS at 10:33

## 2020-10-15 RX ADMIN — DEXAMETHASONE SODIUM PHOSPHATE 6 MG: 4 INJECTION, SOLUTION INTRAMUSCULAR; INTRAVENOUS at 10:18

## 2020-10-15 RX ADMIN — TIMOLOL MALEATE 1 DROP: 5 SOLUTION/ DROPS OPHTHALMIC at 09:00

## 2020-10-15 RX ADMIN — ENOXAPARIN SODIUM 40 MG: 40 INJECTION SUBCUTANEOUS at 10:17

## 2020-10-15 RX ADMIN — DORZOLAMIDE HYDROCHLORIDE 1 DROP: 20 SOLUTION/ DROPS OPHTHALMIC at 09:31

## 2020-10-15 RX ADMIN — REMDESIVIR 100 MG: 100 INJECTION, POWDER, LYOPHILIZED, FOR SOLUTION INTRAVENOUS at 14:19

## 2020-10-15 RX ADMIN — DORZOLAMIDE HYDROCHLORIDE 1 DROP: 20 SOLUTION/ DROPS OPHTHALMIC at 22:13

## 2020-10-15 RX ADMIN — LISINOPRIL 10 MG: 10 TABLET ORAL at 09:30

## 2020-10-15 RX ADMIN — INSULIN ASPART 2 UNITS: 100 INJECTION, SOLUTION INTRAVENOUS; SUBCUTANEOUS at 22:09

## 2020-10-15 RX ADMIN — PANTOPRAZOLE SODIUM 40 MG: 40 INJECTION, POWDER, FOR SOLUTION INTRAVENOUS at 09:30

## 2020-10-15 RX ADMIN — SIMVASTATIN 10 MG: 10 TABLET, FILM COATED ORAL at 22:11

## 2020-10-15 RX ADMIN — FLUTICASONE PROPIONATE 1 SPRAY: 50 SPRAY, METERED NASAL at 09:30

## 2020-10-15 ASSESSMENT — ACTIVITIES OF DAILY LIVING (ADL)
ADLS_ACUITY_SCORE: 12
ADLS_ACUITY_SCORE: 11
ADLS_ACUITY_SCORE: 12
ADLS_ACUITY_SCORE: 11

## 2020-10-15 NOTE — PROGRESS NOTES
[facility]  Hospitalist Progress Note  Admit 10/10/2020 10:28 PM    Name: Rachell Paige    MRN: [id number]  Provider: Tania Rutledge MD    Date of Service: 10/15/2020     Reason for Stay (Diagnosis): COVID-19 infection         Summary of hospital stay & Assessment/Plan:   Summary of Stay: Rachell Paige is a 64 year old male who was admitted on 10/10/2020  64 year old male with a past medical history of type 2 diabetes mellitus, anxiety, hypertension, hyperlipidemia and recent diagnosis of COVID-19 on 10/7 who presented with generalized weakness, mild shortness of breath and upper abdominal pain.  Further evaluation revealed no evidence of hypoxia, however patient reports significant fatigue, not feeling well, CRP and d-dimer are elevated, also troponin is mildly abnormal.  Patient was initially not requiring oxygen. On 10/13 his oxygen saturation dropped to 88% on room air.  He was started on oxygen supplement. He was also started on dexamethasone.  He was evaluated today and eligible for remdesivir. Started on remdesivir today.       1. Acute hypoxic respiratory failure secondary to COVID-19 infection  -Steroid was not initially initiated because patient was not hypoxic.  On 10/13 his oxygen saturation dropped to 88% on room air. Currently on oxygen 3.5 L/min.   -Elevated inflammatory markers.  CRP up from 59 to 89.7 , interleukin-6 95.6   Started on dexamethasone 6 mg IV daily on 10/13. Also evaluated for remdesivir and deemed eligible.   - on remdesivir 200 mg IV x1 dose and 100 mg daily IV for 4 more days.  -Patient declined transfer to [facility]  -We will check IL-6 still requiring supplemental O2 desats to 85%      2. Abdominal discomfort vague likely secondary to COVID infection: Abdominal discomfort improved    3. Mild acute kidney injury baseline creatinine between 1.1-1.47.admission creatinine 1.52  -Creatinine down to 1.34 today.  Discontinued IV fluids    4. Mild  hyponatremia: Improved with IV fluids Will monitor BMP    5. Diabetes mellitus on insulin  -Lantus insulin increased to 60 units units at bedtime(home dose). Will continue insulin sliding scale.  Will closely monitor blood sugar.     6. Hypertension: Blood pressure elevated at 160/71 today.  We will continue lisinopril.  Will put as needed hydralazine.  Will monitor blood pressure.    7. Hyperlipidemia: We will continue simvastatin    DVT Prophylaxis: We will continue enoxaparin 40 mg subcu every 24 hours  Code Status:  Full Code    Disposition Plan     Expected discharge: Patient started on remdesivir today.  3 to 4 days       entered: Tania Rutledge 10/15/2020, 8:37 AM           Interval History:   Assumed care reviewed chart, patient still nervous about need for oxygen.  D satting.  Required supplemental O2 with minimal exertion goes down to 85%.  Denies any chest pain.  Ongoing shortness of breath and dyspnea on exertion review of all the other symptoms are negative               Physical Exam:   Physical Exam   Temp: 97.5  F (36.4  C) Temp src: Oral BP: 135/59 Pulse: 87   Resp: 16 SpO2: 96 % O2 Device: Nasal cannula Oxygen Delivery: 2.5 LPM  Vitals:    10/11/20 0152 10/11/20 0324   Weight: 90.7 kg (200 lb) 88 kg (193 lb 14.4 oz)     I/O last 3 completed shifts:  In: 843 [P.O.:840; I.V.:3]  Out: -       GENERAL:  Comfortable on 3.5 L oxygen  PSYCH: pleasant, oriented, No acute distress.  EYES: PERRLA, Normal conjunctiva.  HEART:  Normal S1, S2 with no edema.  LUNGS:  Clear to auscultation, normal Respiratory effort.  ABDOMEN:  Soft, no hepatosplenomegaly, normal bowel sounds.  SKIN:  Dry to touch, No rash.  Neuro: Non focal with normal motor power, sensation, CN's and Reflexes.    Medications     lactated ringers 10 mL/hr at 10/15/20 0007       dexamethasone  6 mg Intravenous Q24H     dorzolamide  1 drop Both Eyes BID     enoxaparin ANTICOAGULANT  40 mg Subcutaneous Q24H     fluticasone  1 spray Both  Nostrils Daily     insulin aspart  10 Units Subcutaneous BID w/meals     insulin aspart  1-7 Units Subcutaneous TID AC     insulin aspart  1-5 Units Subcutaneous At Bedtime     insulin glargine  60 Units Subcutaneous At Bedtime     lisinopril  10 mg Oral Daily     pantoprazole (PROTONIX) IV  40 mg Intravenous Daily with breakfast     remdesivir  100 mg Intravenous Q24H     simvastatin  10 mg Oral At Bedtime     sodium chloride (PF)  3 mL Intracatheter Q8H     timolol maleate  1 drop Both Eyes BID     Data     -Data reviewed today:  I personally reviewed  all new labs and imaging results over the last 24 hours.    Recent Labs   Lab 10/15/20  0709 10/14/20  0652 10/13/20  0607   WBC 6.7 4.0 5.3   HGB 13.3 13.3 12.5*   HCT 40.8 40.7 38.0*   MCV 86 85 85    249 224     Recent Labs   Lab 10/15/20  0709 10/14/20  0652 10/13/20  0607   * 128* 127*   POTASSIUM 4.7 4.4 4.1   CHLORIDE 100 98 97   CO2 22 22 22   ANIONGAP 9 8 8   * 192* 170*   BUN 24 22 16   CR 1.08 1.34* 1.34*   GFRESTIMATED 72 56* 56*   GFRESTBLACK 84 64 64   ALPHONSO 8.2* 8.2* 7.5*       No results found for this or any previous visit (from the past 24 hour(s)).    This document was produced using voice recognition software

## 2020-10-15 NOTE — PLAN OF CARE
Pt. Is A/O x 4. VSS ex BP: 145/68. O2 at 2.5 LPM, sats 95%. Sats. Drop to 88% with ambulation to bathroom. Denies SOB, chest pain, N/V. IV: LR at 10 ml/hr. BLS: diminished. Independent with cares, but calls for assistance due to continuous IV and pulse ox. Plan: Transfer to [facility] today. B. Tele:

## 2020-10-16 LAB
ANION GAP SERPL CALCULATED.3IONS-SCNC: 7 MMOL/L (ref 3–14)
BASOPHILS # BLD AUTO: 0 10E9/L (ref 0–0.2)
BASOPHILS NFR BLD AUTO: 0.1 %
BUN SERPL-MCNC: 25 MG/DL (ref 7–30)
CALCIUM SERPL-MCNC: 8.2 MG/DL (ref 8.5–10.1)
CHLORIDE SERPL-SCNC: 101 MMOL/L (ref 94–109)
CO2 SERPL-SCNC: 23 MMOL/L (ref 20–32)
CREAT SERPL-MCNC: 1.13 MG/DL (ref 0.66–1.25)
CRP SERPL-MCNC: 17.1 MG/L (ref 0–8)
D DIMER PPP FEU-MCNC: 0.3 UG/ML FEU (ref 0–0.5)
DIFFERENTIAL METHOD BLD: NORMAL
EOSINOPHIL # BLD AUTO: 0 10E9/L (ref 0–0.7)
EOSINOPHIL NFR BLD AUTO: 0 %
ERYTHROCYTE [DISTWIDTH] IN BLOOD BY AUTOMATED COUNT: 13.2 % (ref 10–15)
FIBRINOGEN PPP-MCNC: 559 MG/DL (ref 200–420)
GFR SERPL CREATININE-BSD FRML MDRD: 68 ML/MIN/{1.73_M2}
GLUCOSE BLDC GLUCOMTR-MCNC: 141 MG/DL (ref 70–99)
GLUCOSE BLDC GLUCOMTR-MCNC: 190 MG/DL (ref 70–99)
GLUCOSE BLDC GLUCOMTR-MCNC: 231 MG/DL (ref 70–99)
GLUCOSE BLDC GLUCOMTR-MCNC: 266 MG/DL (ref 70–99)
GLUCOSE BLDC GLUCOMTR-MCNC: 286 MG/DL (ref 70–99)
GLUCOSE BLDC GLUCOMTR-MCNC: 292 MG/DL (ref 70–99)
GLUCOSE SERPL-MCNC: 161 MG/DL (ref 70–99)
HCT VFR BLD AUTO: 41.7 % (ref 40–53)
HGB BLD-MCNC: 13.8 G/DL (ref 13.3–17.7)
IL6 SERPL-MCNC: 19.27 PG/ML
IMM GRANULOCYTES # BLD: 0.1 10E9/L (ref 0–0.4)
IMM GRANULOCYTES NFR BLD: 0.8 %
INR PPP: 0.98 (ref 0.86–1.14)
LDH SERPL L TO P-CCNC: 331 U/L (ref 85–227)
LYMPHOCYTES # BLD AUTO: 1.3 10E9/L (ref 0.8–5.3)
LYMPHOCYTES NFR BLD AUTO: 17.6 %
MCH RBC QN AUTO: 28.1 PG (ref 26.5–33)
MCHC RBC AUTO-ENTMCNC: 33.1 G/DL (ref 31.5–36.5)
MCV RBC AUTO: 85 FL (ref 78–100)
MONOCYTES # BLD AUTO: 0.7 10E9/L (ref 0–1.3)
MONOCYTES NFR BLD AUTO: 9.8 %
NEUTROPHILS # BLD AUTO: 5.4 10E9/L (ref 1.6–8.3)
NEUTROPHILS NFR BLD AUTO: 71.7 %
NRBC # BLD AUTO: 0 10*3/UL
NRBC BLD AUTO-RTO: 0 /100
PLATELET # BLD AUTO: 381 10E9/L (ref 150–450)
POTASSIUM SERPL-SCNC: 4.3 MMOL/L (ref 3.4–5.3)
RBC # BLD AUTO: 4.91 10E12/L (ref 4.4–5.9)
RETICS # AUTO: 23.6 10E9/L (ref 25–95)
RETICS/RBC NFR AUTO: 0.5 % (ref 0.5–2)
SODIUM SERPL-SCNC: 131 MMOL/L (ref 133–144)
WBC # BLD AUTO: 7.6 10E9/L (ref 4–11)

## 2020-10-16 PROCEDURE — 250N000013 HC RX MED GY IP 250 OP 250 PS 637: Performed by: HOSPITALIST

## 2020-10-16 PROCEDURE — 86140 C-REACTIVE PROTEIN: CPT | Performed by: INTERNAL MEDICINE

## 2020-10-16 PROCEDURE — 250N000011 HC RX IP 250 OP 636: Performed by: HOSPITALIST

## 2020-10-16 PROCEDURE — 83615 LACTATE (LD) (LDH) ENZYME: CPT | Performed by: INTERNAL MEDICINE

## 2020-10-16 PROCEDURE — 250N000009 HC RX 250: Performed by: INTERNAL MEDICINE

## 2020-10-16 PROCEDURE — 80048 BASIC METABOLIC PNL TOTAL CA: CPT | Performed by: INTERNAL MEDICINE

## 2020-10-16 PROCEDURE — 999N001017 HC STATISTIC GLUCOSE BY METER IP

## 2020-10-16 PROCEDURE — 120N000001 HC R&B MED SURG/OB

## 2020-10-16 PROCEDURE — 258N000003 HC RX IP 258 OP 636: Performed by: INTERNAL MEDICINE

## 2020-10-16 PROCEDURE — 85045 AUTOMATED RETICULOCYTE COUNT: CPT | Performed by: INTERNAL MEDICINE

## 2020-10-16 PROCEDURE — 85379 FIBRIN DEGRADATION QUANT: CPT | Performed by: INTERNAL MEDICINE

## 2020-10-16 PROCEDURE — C9113 INJ PANTOPRAZOLE SODIUM, VIA: HCPCS | Performed by: HOSPITALIST

## 2020-10-16 PROCEDURE — 85025 COMPLETE CBC W/AUTO DIFF WBC: CPT | Performed by: INTERNAL MEDICINE

## 2020-10-16 PROCEDURE — 85384 FIBRINOGEN ACTIVITY: CPT | Performed by: INTERNAL MEDICINE

## 2020-10-16 PROCEDURE — 36415 COLL VENOUS BLD VENIPUNCTURE: CPT | Performed by: INTERNAL MEDICINE

## 2020-10-16 PROCEDURE — 250N000011 HC RX IP 250 OP 636: Performed by: INTERNAL MEDICINE

## 2020-10-16 PROCEDURE — 99232 SBSQ HOSP IP/OBS MODERATE 35: CPT | Performed by: INTERNAL MEDICINE

## 2020-10-16 PROCEDURE — 85610 PROTHROMBIN TIME: CPT | Performed by: INTERNAL MEDICINE

## 2020-10-16 PROCEDURE — 250N000012 HC RX MED GY IP 250 OP 636 PS 637: Performed by: INTERNAL MEDICINE

## 2020-10-16 RX ORDER — PANTOPRAZOLE SODIUM 40 MG/1
40 TABLET, DELAYED RELEASE ORAL
Status: DISCONTINUED | OUTPATIENT
Start: 2020-10-17 | End: 2020-10-21 | Stop reason: HOSPADM

## 2020-10-16 RX ADMIN — INSULIN GLARGINE 60 UNITS: 100 INJECTION, SOLUTION SUBCUTANEOUS at 21:18

## 2020-10-16 RX ADMIN — REMDESIVIR 100 MG: 100 INJECTION, POWDER, LYOPHILIZED, FOR SOLUTION INTRAVENOUS at 14:13

## 2020-10-16 RX ADMIN — TIMOLOL MALEATE 1 DROP: 5 SOLUTION/ DROPS OPHTHALMIC at 11:53

## 2020-10-16 RX ADMIN — SIMVASTATIN 10 MG: 10 TABLET, FILM COATED ORAL at 21:06

## 2020-10-16 RX ADMIN — PANTOPRAZOLE SODIUM 40 MG: 40 INJECTION, POWDER, FOR SOLUTION INTRAVENOUS at 09:43

## 2020-10-16 RX ADMIN — ENOXAPARIN SODIUM 40 MG: 40 INJECTION SUBCUTANEOUS at 09:43

## 2020-10-16 RX ADMIN — LISINOPRIL 10 MG: 10 TABLET ORAL at 09:44

## 2020-10-16 RX ADMIN — DEXAMETHASONE SODIUM PHOSPHATE 6 MG: 4 INJECTION, SOLUTION INTRAMUSCULAR; INTRAVENOUS at 09:43

## 2020-10-16 RX ADMIN — TIMOLOL MALEATE 1 DROP: 5 SOLUTION/ DROPS OPHTHALMIC at 21:09

## 2020-10-16 RX ADMIN — DORZOLAMIDE HYDROCHLORIDE 1 DROP: 20 SOLUTION/ DROPS OPHTHALMIC at 11:52

## 2020-10-16 RX ADMIN — INSULIN ASPART 10 UNITS: 100 INJECTION, SOLUTION INTRAVENOUS; SUBCUTANEOUS at 16:57

## 2020-10-16 RX ADMIN — INSULIN ASPART 10 UNITS: 100 INJECTION, SOLUTION INTRAVENOUS; SUBCUTANEOUS at 10:45

## 2020-10-16 RX ADMIN — INSULIN ASPART 2 UNITS: 100 INJECTION, SOLUTION INTRAVENOUS; SUBCUTANEOUS at 21:16

## 2020-10-16 RX ADMIN — DORZOLAMIDE HYDROCHLORIDE 1 DROP: 20 SOLUTION/ DROPS OPHTHALMIC at 21:07

## 2020-10-16 ASSESSMENT — ACTIVITIES OF DAILY LIVING (ADL)
ADLS_ACUITY_SCORE: 12

## 2020-10-16 NOTE — PROGRESS NOTES
"NUTRITION ASSESSMENT    REASON FOR ASSESSMENT:  LOS  History of type 2 diabetes mellitus, anxiety, hypertension, hyperlipidemia and recent diagnosis of COVID-19 on 10/7 who presents with generalized weakness, mild shortness of breath and upper abdominal pain.  CURRENT DIET AND NOURISHMENT ORDER:  Information obtained from chart review - unable to reach patient by phone  Patient is on a regular diet at home  Food allergies/intolerances: NKFA    Diet: Moderate Consistent CHO   Current Intake/Tolerance: Per flow sheet review, % intake for majority of documented meals.    ANTHROPOMETRICS  Height: 5' 6\"  Weight: 87 kg   Body mass index is 31.3 kg/m .  Weight Status:  Obesity Grade I BMI 30-34.9  Weight History:  Wt Readings from Last 10 Encounters:   10/11/20 88 kg (193 lb 14.4 oz)   10/07/20 90 kg (198 lb 6.6 oz)   09/29/20 90.2 kg (198 lb 12.8 oz)   09/15/20 89.4 kg (197 lb)   09/03/20 89.8 kg (198 lb)   09/03/20 89.8 kg (198 lb)   09/01/20 89.4 kg (197 lb)   07/28/20 89.8 kg (198 lb)   12/02/19 90.7 kg (199 lb 14.4 oz)   11/26/19 90.7 kg (199 lb 14.4 oz)       ASSESSED NUTRITION NEEDS (PER APPROVED PRACTICE GUIDELINES, Dosing weight: 88 kg)  Estimated Energy Needs: 2200+ kcals (25+ Kcal/Kg)  Justification: maintenance  Estimated Protein Needs: 88+ grams protein (1+ g pro/Kg)  Justification: preservation of lean body mass with acute illness  Estimated Fluid Needs:per MD    LABS/MEDS/PHYSICAL FINDINGS:  Nutrition focused physical exam deferred (PPE preservation, COVID19+ room)  Generalized weakness   Meds reviewed  Labs reviewed  Electrolytes  Potassium (mmol/L)   Date Value   10/16/2020 4.3   10/15/2020 4.7   10/14/2020 4.4    Blood Glucose  Glucose (mg/dL)   Date Value   10/16/2020 161 (H)   10/15/2020 249 (H)   10/14/2020 192 (H)   10/13/2020 170 (H)   10/12/2020 151 (H)     Hemoglobin A1C (%)   Date Value   07/28/2020 7.4 (H)   12/26/2018 7.3 (H)   04/17/2015 9.1 (H)    Inflammatory Markers  CRP Inflammation " (mg/L)   Date Value   10/16/2020 17.1 (H)   10/15/2020 39.0 (H)   10/14/2020 89.7 (H)     WBC (10e9/L)   Date Value   10/16/2020 7.6   10/15/2020 6.7   10/14/2020 4.0     Albumin (g/dL)   Date Value   10/12/2020 2.8 (L)   10/10/2020 3.4      Magnesium (mg/dL)   Date Value   04/20/2017 1.9     Sodium (mmol/L)   Date Value   10/16/2020 131 (L)   10/15/2020 131 (L)   10/14/2020 128 (L)    Renal  Urea Nitrogen (mg/dL)   Date Value   10/16/2020 25   10/15/2020 24   10/14/2020 22     Creatinine (mg/dL)   Date Value   10/16/2020 1.13   10/15/2020 1.08   10/14/2020 1.34 (H)     Additional  Triglycerides (mg/dL)   Date Value   02/23/2015 119   05/07/2014 142   01/07/2003 190 (H)     Ketones Urine (mg/dL)   Date Value   09/03/2020 Negative           Malnutrition:  Unable to determine if patient meets two of the following criteria necessary for diagnosing malnutrition: significant weight loss, reduced intake, subcutaneous fat loss, muscle loss or fluid retention     INTERVENTION:  Nutrition Diagnosis:  Predicted inadequate nutrient intake (protein energy) related to increased needs 2/2 infection, prolonged hospitalization as evidenced by diet recall    Implementation:  Nutrition Education: Per MD order  Medical food supplement: Boost glucose control if any decline in oral intake volume  Collaboration and Referral of care: Discussed patient during interdisciplinary care rounds this morning    Follow Up/Monitoring:   Progress towards goals will be monitored and evaluated per protocol and Practice Guidelines        Linette Cooper, MS, RDN, LD, CNSC  Pager - 3rd floor/ICU: [phone]  Pager - All other floors: [phone]  Pager - Weekend/holiday: [phone]  Office: [phone]

## 2020-10-16 NOTE — PROGRESS NOTES
[facility]  Hospitalist Progress Note  Admit 10/10/2020 10:28 PM    Name: Rachell Paige    MRN: [id number]  Provider: Tania Rutledge MD    Date of Service: 10/16/2020     Reason for Stay (Diagnosis): COVID-19 infection         Summary of hospital stay & Assessment/Plan:   Summary of Stay: Rachell Paige is a 64 year old male who was admitted on 10/10/2020  64 year old male with a past medical history of type 2 diabetes mellitus, anxiety, hypertension, hyperlipidemia and recent diagnosis of COVID-19 on 10/7 who presented with generalized weakness, mild shortness of breath and upper abdominal pain.  Further evaluation revealed no evidence of hypoxia, however patient reports significant fatigue, not feeling well, CRP and d-dimer are elevated, also troponin is mildly abnormal.  Patient was initially not requiring oxygen. On 10/13 his oxygen saturation dropped to 88% on room air.  He was started on oxygen supplement. He was also started on dexamethasone and remdesivir.  Continues to require supplemental O2    Supplemental O2 down to 1.5 L.  Inflammatory markers are improving.  Currently on remdesivir and Decadron        1. Acute hypoxic respiratory failure secondary to COVID-19 infection  -Steroid was not initially initiated because patient was not hypoxic.  On 10/13 his oxygen saturation dropped to 88% on room air. Currently on oxygen 3.5 L/min.   -Elevated inflammatory markers.  CRP up from 59 to 89.7 , interleukin-6 95.6   Started on dexamethasone 6 mg IV daily on 10/13. Also evaluated for remdesivir and deemed eligible.   - on remdesivir 200 mg IV x1 dose and 100 mg daily IV for 4 more days.  -Patient declined transfer to [facility]  -Supplemental O2 down to 1.5  -Inflammatory markers IL-6, CRP and d-dimer trending down      2. Abdominal discomfort vague likely secondary to COVID infection: Abdominal discomfort improved    3. Mild acute kidney injury baseline creatinine between  1.1-1.47.resolved   -admission creatinine 1.52      4. Mild hyponatremia: Improved with IV fluids Will monitor BMP    5. Diabetes mellitus on insulin  -Lantus insulin increased to 60 units units at bedtime(home dose). Will continue insulin sliding scale.  Will closely monitor blood sugar.     6. Hypertension: Blood pressure elevated at 160/71 today.  We will continue lisinopril.  Will put as needed hydralazine.  Will monitor blood pressure.    7. Hyperlipidemia: We will continue simvastatin    DVT Prophylaxis: We will continue enoxaparin 40 mg subcu every 24 hours  Code Status:  Full Code    Disposition Plan     Expected discharge: Patient started on remdesivir .  2-3days and once off supplemental O2       entered: Tania Rutledge 10/16/2020, 8:35 AM           Interval History:   Reviewed chart, patient clinically looks better today requiring 1.5 L of supplemental O2 , which are markers improving.  Less anxious today.  Denies chest pain review of all the other symptoms are negative               Physical Exam:   Physical Exam   Temp: 97.3  F (36.3  C) Temp src: Oral BP: (!) 146/68 Pulse: 93   Resp: 20 SpO2: 94 % O2 Device: Nasal cannula with humidification Oxygen Delivery: 2.5 LPM  Vitals:    10/11/20 0152 10/11/20 0324   Weight: 90.7 kg (200 lb) 88 kg (193 lb 14.4 oz)     I/O last 3 completed shifts:  In: -   Out: 625 [Urine:625]      GENERAL:  Comfortable on 3.5 L oxygen  PSYCH: pleasant, oriented, No acute distress.  EYES: PERRLA, Normal conjunctiva.  HEART:  Normal S1, S2 with no edema.  LUNGS:  Clear to auscultation, normal Respiratory effort.  ABDOMEN:  Soft, no hepatosplenomegaly, normal bowel sounds.  SKIN:  Dry to touch, No rash.  Neuro: Non focal with normal motor power, sensation, CN's and Reflexes.    Medications     lactated ringers 10 mL/hr at 10/15/20 0007       dexamethasone  6 mg Intravenous Q24H     dorzolamide  1 drop Both Eyes BID     enoxaparin ANTICOAGULANT  40 mg Subcutaneous Q24H      fluticasone  1 spray Both Nostrils Daily     insulin aspart  10 Units Subcutaneous BID w/meals     insulin aspart  1-7 Units Subcutaneous TID AC     insulin aspart  1-5 Units Subcutaneous At Bedtime     insulin glargine  60 Units Subcutaneous At Bedtime     lisinopril  10 mg Oral Daily     pantoprazole (PROTONIX) IV  40 mg Intravenous Daily with breakfast     remdesivir  100 mg Intravenous Q24H     simvastatin  10 mg Oral At Bedtime     sodium chloride (PF)  3 mL Intracatheter Q8H     timolol maleate  1 drop Both Eyes BID     Data     -Data reviewed today:  I personally reviewed  all new labs and imaging results over the last 24 hours.    Recent Labs   Lab 10/16/20  0553 10/15/20  0709 10/14/20  0652   WBC 7.6 6.7 4.0   HGB 13.8 13.3 13.3   HCT 41.7 40.8 40.7   MCV 85 86 85    328 249     Recent Labs   Lab 10/16/20  0553 10/15/20  0709 10/14/20  0652   * 131* 128*   POTASSIUM 4.3 4.7 4.4   CHLORIDE 101 100 98   CO2 23 22 22   ANIONGAP 7 9 8   * 249* 192*   BUN 25 24 22   CR 1.13 1.08 1.34*   GFRESTIMATED 68 72 56*   GFRESTBLACK 79 84 64   ALPHONSO 8.2* 8.2* 8.2*       No results found for this or any previous visit (from the past 24 hour(s)).    This document was produced using voice recognition software

## 2020-10-16 NOTE — PLAN OF CARE
VSS ex. BP: 146/68, continues to require supp. Oxygen at 2.5 LPM via NC to maintain oxygen sats > 92%. Denies chest pain N/V, and SOB at rest. Did complain of SOB when ambulating back to bed from the bathroom. BLS: clear/diminished. Discharge in 3-4 days. Continue POC.

## 2020-10-16 NOTE — PLAN OF CARE
VS stable, denied pain. Up independently in room, good appetite.C/o SOB with activities. Patient on NC 2.5L. See MD notes for Covid treatment, continue isolation. Continue to monitor closely.

## 2020-10-16 NOTE — PLAN OF CARE
Seems to be feeling some better and not as anxious.  No fevers or cough.  Continue covid treatment course  02 down to 1.5L and continue to monitor sats and tolerance on that  Tele SR

## 2020-10-17 LAB
ALBUMIN SERPL-MCNC: 2.7 G/DL (ref 3.4–5)
ALP SERPL-CCNC: 82 U/L (ref 40–150)
ALT SERPL W P-5'-P-CCNC: 52 U/L (ref 0–70)
ANION GAP SERPL CALCULATED.3IONS-SCNC: 8 MMOL/L (ref 3–14)
AST SERPL W P-5'-P-CCNC: 28 U/L (ref 0–45)
BASOPHILS # BLD AUTO: 0 10E9/L (ref 0–0.2)
BASOPHILS NFR BLD AUTO: 0.3 %
BILIRUB DIRECT SERPL-MCNC: 0.1 MG/DL (ref 0–0.2)
BILIRUB SERPL-MCNC: 0.5 MG/DL (ref 0.2–1.3)
BUN SERPL-MCNC: 24 MG/DL (ref 7–30)
CALCIUM SERPL-MCNC: 8.2 MG/DL (ref 8.5–10.1)
CHLORIDE SERPL-SCNC: 102 MMOL/L (ref 94–109)
CO2 SERPL-SCNC: 22 MMOL/L (ref 20–32)
CREAT SERPL-MCNC: 1.11 MG/DL (ref 0.66–1.25)
CRP SERPL-MCNC: 8.8 MG/L (ref 0–8)
D DIMER PPP FEU-MCNC: 0.6 UG/ML FEU (ref 0–0.5)
DIFFERENTIAL METHOD BLD: NORMAL
EOSINOPHIL # BLD AUTO: 0 10E9/L (ref 0–0.7)
EOSINOPHIL NFR BLD AUTO: 0 %
ERYTHROCYTE [DISTWIDTH] IN BLOOD BY AUTOMATED COUNT: 13.1 % (ref 10–15)
FIBRINOGEN PPP-MCNC: 510 MG/DL (ref 200–420)
GFR SERPL CREATININE-BSD FRML MDRD: 70 ML/MIN/{1.73_M2}
GLUCOSE BLDC GLUCOMTR-MCNC: 216 MG/DL (ref 70–99)
GLUCOSE BLDC GLUCOMTR-MCNC: 220 MG/DL (ref 70–99)
GLUCOSE BLDC GLUCOMTR-MCNC: 232 MG/DL (ref 70–99)
GLUCOSE BLDC GLUCOMTR-MCNC: 271 MG/DL (ref 70–99)
GLUCOSE SERPL-MCNC: 175 MG/DL (ref 70–99)
HCT VFR BLD AUTO: 43.8 % (ref 40–53)
HGB BLD-MCNC: 14.1 G/DL (ref 13.3–17.7)
IMM GRANULOCYTES # BLD: 0.2 10E9/L (ref 0–0.4)
IMM GRANULOCYTES NFR BLD: 2.1 %
INR PPP: 1.06 (ref 0.86–1.14)
LDH SERPL L TO P-CCNC: 345 U/L (ref 85–227)
LYMPHOCYTES # BLD AUTO: 1.7 10E9/L (ref 0.8–5.3)
LYMPHOCYTES NFR BLD AUTO: 17.6 %
MCH RBC QN AUTO: 27.5 PG (ref 26.5–33)
MCHC RBC AUTO-ENTMCNC: 32.2 G/DL (ref 31.5–36.5)
MCV RBC AUTO: 86 FL (ref 78–100)
MONOCYTES # BLD AUTO: 0.9 10E9/L (ref 0–1.3)
MONOCYTES NFR BLD AUTO: 9.6 %
NEUTROPHILS # BLD AUTO: 6.9 10E9/L (ref 1.6–8.3)
NEUTROPHILS NFR BLD AUTO: 70.4 %
NRBC # BLD AUTO: 0 10*3/UL
NRBC BLD AUTO-RTO: 0 /100
PLATELET # BLD AUTO: 414 10E9/L (ref 150–450)
POTASSIUM SERPL-SCNC: 4.4 MMOL/L (ref 3.4–5.3)
PROT SERPL-MCNC: 6.4 G/DL (ref 6.8–8.8)
RBC # BLD AUTO: 5.12 10E12/L (ref 4.4–5.9)
RETICS # AUTO: 37.4 10E9/L (ref 25–95)
RETICS/RBC NFR AUTO: 0.7 % (ref 0.5–2)
SODIUM SERPL-SCNC: 132 MMOL/L (ref 133–144)
WBC # BLD AUTO: 9.8 10E9/L (ref 4–11)

## 2020-10-17 PROCEDURE — 85384 FIBRINOGEN ACTIVITY: CPT | Performed by: INTERNAL MEDICINE

## 2020-10-17 PROCEDURE — 85610 PROTHROMBIN TIME: CPT | Performed by: INTERNAL MEDICINE

## 2020-10-17 PROCEDURE — 85049 AUTOMATED PLATELET COUNT: CPT | Performed by: INTERNAL MEDICINE

## 2020-10-17 PROCEDURE — 86140 C-REACTIVE PROTEIN: CPT | Performed by: INTERNAL MEDICINE

## 2020-10-17 PROCEDURE — 999N001017 HC STATISTIC GLUCOSE BY METER IP

## 2020-10-17 PROCEDURE — 85379 FIBRIN DEGRADATION QUANT: CPT | Performed by: INTERNAL MEDICINE

## 2020-10-17 PROCEDURE — 258N000003 HC RX IP 258 OP 636: Performed by: INTERNAL MEDICINE

## 2020-10-17 PROCEDURE — 250N000009 HC RX 250: Performed by: INTERNAL MEDICINE

## 2020-10-17 PROCEDURE — 85045 AUTOMATED RETICULOCYTE COUNT: CPT | Performed by: INTERNAL MEDICINE

## 2020-10-17 PROCEDURE — 36415 COLL VENOUS BLD VENIPUNCTURE: CPT | Performed by: INTERNAL MEDICINE

## 2020-10-17 PROCEDURE — 80076 HEPATIC FUNCTION PANEL: CPT | Performed by: INTERNAL MEDICINE

## 2020-10-17 PROCEDURE — 250N000012 HC RX MED GY IP 250 OP 636 PS 637: Performed by: INTERNAL MEDICINE

## 2020-10-17 PROCEDURE — 85025 COMPLETE CBC W/AUTO DIFF WBC: CPT | Performed by: INTERNAL MEDICINE

## 2020-10-17 PROCEDURE — 250N000013 HC RX MED GY IP 250 OP 250 PS 637: Performed by: INTERNAL MEDICINE

## 2020-10-17 PROCEDURE — 120N000001 HC R&B MED SURG/OB

## 2020-10-17 PROCEDURE — 99233 SBSQ HOSP IP/OBS HIGH 50: CPT | Performed by: INTERNAL MEDICINE

## 2020-10-17 PROCEDURE — 80048 BASIC METABOLIC PNL TOTAL CA: CPT | Performed by: INTERNAL MEDICINE

## 2020-10-17 PROCEDURE — 250N000011 HC RX IP 250 OP 636: Performed by: INTERNAL MEDICINE

## 2020-10-17 PROCEDURE — 83615 LACTATE (LD) (LDH) ENZYME: CPT | Performed by: INTERNAL MEDICINE

## 2020-10-17 PROCEDURE — 250N000013 HC RX MED GY IP 250 OP 250 PS 637: Performed by: HOSPITALIST

## 2020-10-17 RX ADMIN — DORZOLAMIDE HYDROCHLORIDE 1 DROP: 20 SOLUTION/ DROPS OPHTHALMIC at 22:13

## 2020-10-17 RX ADMIN — REMDESIVIR 100 MG: 100 INJECTION, POWDER, LYOPHILIZED, FOR SOLUTION INTRAVENOUS at 14:15

## 2020-10-17 RX ADMIN — ENOXAPARIN SODIUM 40 MG: 40 INJECTION SUBCUTANEOUS at 10:43

## 2020-10-17 RX ADMIN — DEXAMETHASONE SODIUM PHOSPHATE 6 MG: 4 INJECTION, SOLUTION INTRAMUSCULAR; INTRAVENOUS at 10:41

## 2020-10-17 RX ADMIN — TIMOLOL MALEATE 1 DROP: 5 SOLUTION/ DROPS OPHTHALMIC at 13:34

## 2020-10-17 RX ADMIN — INSULIN GLARGINE 70 UNITS: 100 INJECTION, SOLUTION SUBCUTANEOUS at 22:08

## 2020-10-17 RX ADMIN — SIMVASTATIN 10 MG: 10 TABLET, FILM COATED ORAL at 22:10

## 2020-10-17 RX ADMIN — TIMOLOL MALEATE 1 DROP: 5 SOLUTION/ DROPS OPHTHALMIC at 22:12

## 2020-10-17 RX ADMIN — DORZOLAMIDE HYDROCHLORIDE 1 DROP: 20 SOLUTION/ DROPS OPHTHALMIC at 13:34

## 2020-10-17 RX ADMIN — LISINOPRIL 10 MG: 10 TABLET ORAL at 10:44

## 2020-10-17 RX ADMIN — PANTOPRAZOLE SODIUM 40 MG: 40 TABLET, DELAYED RELEASE ORAL at 10:47

## 2020-10-17 ASSESSMENT — ACTIVITIES OF DAILY LIVING (ADL)
ADLS_ACUITY_SCORE: 12

## 2020-10-17 NOTE — CONSULTS
Patient's uplanned readmission rate changed to 20%. Unable to see patient as COVID +.  CTS will follow for discharge needs.    Olive Baltazar RN, BSN, PHN, CTS  Care Coordinator  [facility]  [phone]

## 2020-10-17 NOTE — PROGRESS NOTES
[facility]  Hospitalist Progress Note  Jose Block MD 10/17/20    Reason for Stay (Diagnosis): COVID19         Assessment and Plan:      Summary of Stay: Rachell Paige is a 64 year old male with past medical history of IDDM type II, HTN, HLD, anxiety who was admitted on 10/10/2020 for respiratory failure secondary to positive COVID-19 testing on 10/7.  Elevated inflammatory markers and now hypoxia requiring 1-2 L oxygen via nasal cannula.  He was started on dexamethasone IV along with Remdesevir.  Continues to require oxygen supplementation and feels quite fatigued.  Inflammatory markers improving.    Problem List/Assessment and Plan:   Acute hypoxemic respiratory failure secondary to COVID-19 infection: Tested positive for COVID-19 on 10/7, thinks he got it from his grandson is also positive.  Developed hypoxia down to the mid 80s, requiring 1-2 L O2 via nasal cannula.  Having worsening fatigue although inflammatory markers improving.  -Continue IV Remdesevir, day 4/5  -Continue IV dexamethasone 6 mg daily, day 5/10  -Wean supplemental oxygen as able    MARILYN: Mild MARILYN with creatinine 1.5 on admission, now back to baseline 1.1.    Hyponatremia: Sodium high 120s has gradually improved.  Suspect this may be related to some SIADH from his respiratory failure.  Did receive some IV fluids which are now stopped.    IDDM type II: Some increased hyperglycemia in the setting of dexamethasone.  PTA on glargine 60 units at bedtime and aspart 10-20 units twice daily with meals.  -Increase glargine to 70 units at bedtime  -Increase glargine to 15 units twice daily with meals and change to high-dose sliding scale insulin    HLD: Resume simvastatin 10 mg at bedtime.    HTN: PTA on 10 mg daily lisinopril which is now resumed.    DVT Prophylaxis: Enoxaparin (Lovenox) SQ  Code Status: Full Code  FEN: Moderate consistent carbohydrate  Discharge Dispo: Home  Estimated Disch Date / # of Days until Disch: Once able to  "wean to room air, hopefully in the next 1-3 days        Interval History (Subjective):      Still feels very short of breath with movement to the bathroom and back.  Unable to wean oxygen down below 1.5 L.  Denies any fevers or myalgias.  Having minimal nonproductive cough.  Feels quite fatigued today and sleeping a lot.                  Physical Exam:      Last Vital Signs:  BP (!) 144/73 (BP Location: Left arm)   Pulse 87   Temp 97.6  F (36.4  C) (Oral)   Resp 18   Ht 1.676 m (5' 6\")   Wt 88 kg (193 lb 14.4 oz)   SpO2 95%   BMI 31.30 kg/m        Intake/Output Summary (Last 24 hours) at 10/17/2020 1418  Last data filed at 10/16/2020 1900  Gross per 24 hour   Intake 403 ml   Output --   Net 403 ml       Constitutional: Awake, NAD, appears fatigued  Eyes: sclera white   HEENT:  MMM  Respiratory: No significant crackles or wheeze, on nasal cannula  Cardiovascular: RRR.  No murmur   GI: non-tender, not distended, bowel sounds present  Skin: no rash   Musculoskeletal/extremities:  No edema  Neurologic: A&O  Psychiatric: calm, cooperative         Medications:      All current medications were reviewed with changes reflected in problem list.         Data:      All new lab and imaging data was reviewed.   Labs:  Recent Labs   Lab 10/17/20  0803 10/16/20  0553 10/15/20  0709   * 131* 131*   POTASSIUM 4.4 4.3 4.7   CHLORIDE 102 101 100   CO2 22 23 22   ANIONGAP 8 7 9   * 161* 249*   BUN 24 25 24   CR 1.11 1.13 1.08   GFRESTIMATED 70 68 72   GFRESTBLACK 81 79 84   ALPHONSO 8.2* 8.2* 8.2*     Recent Labs   Lab 10/17/20  0803 10/16/20  0553 10/15/20  0709   WBC 9.8 7.6 6.7   HGB 14.1 13.8 13.3   HCT 43.8 41.7 40.8   MCV 86 85 86    381 328     Recent Labs   Lab 10/17/20  0803 10/16/20  0553 10/15/20  0709   CRP 8.8* 17.1* 39.0*         Imaging:   None today      Jose Block MD        "

## 2020-10-17 NOTE — PLAN OF CARE
A/O x4. VSS ex. BP: 148/82. Oxygen increased from 1 to 1.5 LPM due to saturation dropping between 86-88%. Sats up to 93 on 1.5 LPM. BLS: diminished, fine crackles LLL. BS: audible. Denies chest pain and SOB. Pt. Independent in room. Possible discharge in two days. Tele: SR. Continue POC.

## 2020-10-17 NOTE — PLAN OF CARE
VSS. AOX4. Breathing is unlabored and pt denies SOB. Independent in mobility.  Regular diet.  Pt denies pain.    O2 at 1.5LPM and pt at 94%.  O2 reduced to 1LPM and pt remains at 94%.  He states he feels better every day.    BG's 262, 292.  Pt concerned as to why they are higher than normal and staff explained that his Decadron causes an increase in Bg's.    Pt has a flat affect but is pleasant and cooperative.     Tele: SR

## 2020-10-17 NOTE — PLAN OF CARE
A&Ox4. VSS. Afebrile. Nasal cannula 1.5 L sats 94-95%. Attempted to wean down to 1 L, however sats dropped to 88%. Denies pain. Pt is on decadron and remdesivir. Up independently.

## 2020-10-18 LAB
ALBUMIN SERPL-MCNC: 2.6 G/DL (ref 3.4–5)
ALP SERPL-CCNC: 79 U/L (ref 40–150)
ALT SERPL W P-5'-P-CCNC: 52 U/L (ref 0–70)
ANION GAP SERPL CALCULATED.3IONS-SCNC: 7 MMOL/L (ref 3–14)
AST SERPL W P-5'-P-CCNC: 19 U/L (ref 0–45)
BASOPHILS # BLD AUTO: 0 10E9/L (ref 0–0.2)
BASOPHILS NFR BLD AUTO: 0.4 %
BILIRUB DIRECT SERPL-MCNC: 0.1 MG/DL (ref 0–0.2)
BILIRUB SERPL-MCNC: 0.4 MG/DL (ref 0.2–1.3)
BUN SERPL-MCNC: 31 MG/DL (ref 7–30)
CALCIUM SERPL-MCNC: 8 MG/DL (ref 8.5–10.1)
CHLORIDE SERPL-SCNC: 102 MMOL/L (ref 94–109)
CO2 SERPL-SCNC: 22 MMOL/L (ref 20–32)
CREAT SERPL-MCNC: 1.22 MG/DL (ref 0.66–1.25)
CRP SERPL-MCNC: 6.5 MG/L (ref 0–8)
D DIMER PPP FEU-MCNC: 0.6 UG/ML FEU (ref 0–0.5)
DIFFERENTIAL METHOD BLD: ABNORMAL
EOSINOPHIL # BLD AUTO: 0 10E9/L (ref 0–0.7)
EOSINOPHIL NFR BLD AUTO: 0 %
ERYTHROCYTE [DISTWIDTH] IN BLOOD BY AUTOMATED COUNT: 13.1 % (ref 10–15)
FIBRINOGEN PPP-MCNC: 433 MG/DL (ref 200–420)
GFR SERPL CREATININE-BSD FRML MDRD: 62 ML/MIN/{1.73_M2}
GLUCOSE BLDC GLUCOMTR-MCNC: 119 MG/DL (ref 70–99)
GLUCOSE BLDC GLUCOMTR-MCNC: 155 MG/DL (ref 70–99)
GLUCOSE BLDC GLUCOMTR-MCNC: 228 MG/DL (ref 70–99)
GLUCOSE BLDC GLUCOMTR-MCNC: 233 MG/DL (ref 70–99)
GLUCOSE BLDC GLUCOMTR-MCNC: 234 MG/DL (ref 70–99)
GLUCOSE BLDC GLUCOMTR-MCNC: 246 MG/DL (ref 70–99)
GLUCOSE SERPL-MCNC: 194 MG/DL (ref 70–99)
HCT VFR BLD AUTO: 42.9 % (ref 40–53)
HGB BLD-MCNC: 14.1 G/DL (ref 13.3–17.7)
IMM GRANULOCYTES # BLD: 0.5 10E9/L (ref 0–0.4)
IMM GRANULOCYTES NFR BLD: 4.6 %
INR PPP: 1.1 (ref 0.86–1.14)
LDH SERPL L TO P-CCNC: 285 U/L (ref 85–227)
LYMPHOCYTES # BLD AUTO: 1.7 10E9/L (ref 0.8–5.3)
LYMPHOCYTES NFR BLD AUTO: 16.9 %
MCH RBC QN AUTO: 27.9 PG (ref 26.5–33)
MCHC RBC AUTO-ENTMCNC: 32.9 G/DL (ref 31.5–36.5)
MCV RBC AUTO: 85 FL (ref 78–100)
MONOCYTES # BLD AUTO: 1.1 10E9/L (ref 0–1.3)
MONOCYTES NFR BLD AUTO: 11.1 %
NEUTROPHILS # BLD AUTO: 6.9 10E9/L (ref 1.6–8.3)
NEUTROPHILS NFR BLD AUTO: 67 %
NRBC # BLD AUTO: 0 10*3/UL
NRBC BLD AUTO-RTO: 0 /100
PLATELET # BLD AUTO: 424 10E9/L (ref 150–450)
POTASSIUM SERPL-SCNC: 4.2 MMOL/L (ref 3.4–5.3)
PROT SERPL-MCNC: 6.1 G/DL (ref 6.8–8.8)
RBC # BLD AUTO: 5.06 10E12/L (ref 4.4–5.9)
RETICS # AUTO: 39 10E9/L (ref 25–95)
RETICS/RBC NFR AUTO: 0.8 % (ref 0.5–2)
SODIUM SERPL-SCNC: 131 MMOL/L (ref 133–144)
WBC # BLD AUTO: 10.3 10E9/L (ref 4–11)

## 2020-10-18 PROCEDURE — 36415 COLL VENOUS BLD VENIPUNCTURE: CPT | Performed by: INTERNAL MEDICINE

## 2020-10-18 PROCEDURE — 85025 COMPLETE CBC W/AUTO DIFF WBC: CPT | Performed by: INTERNAL MEDICINE

## 2020-10-18 PROCEDURE — 250N000009 HC RX 250: Performed by: INTERNAL MEDICINE

## 2020-10-18 PROCEDURE — 250N000012 HC RX MED GY IP 250 OP 636 PS 637: Performed by: INTERNAL MEDICINE

## 2020-10-18 PROCEDURE — 99233 SBSQ HOSP IP/OBS HIGH 50: CPT | Performed by: INTERNAL MEDICINE

## 2020-10-18 PROCEDURE — 80076 HEPATIC FUNCTION PANEL: CPT | Performed by: INTERNAL MEDICINE

## 2020-10-18 PROCEDURE — 86140 C-REACTIVE PROTEIN: CPT | Performed by: INTERNAL MEDICINE

## 2020-10-18 PROCEDURE — 250N000013 HC RX MED GY IP 250 OP 250 PS 637: Performed by: INTERNAL MEDICINE

## 2020-10-18 PROCEDURE — 85610 PROTHROMBIN TIME: CPT | Performed by: INTERNAL MEDICINE

## 2020-10-18 PROCEDURE — 250N000013 HC RX MED GY IP 250 OP 250 PS 637: Performed by: HOSPITALIST

## 2020-10-18 PROCEDURE — 85045 AUTOMATED RETICULOCYTE COUNT: CPT | Performed by: INTERNAL MEDICINE

## 2020-10-18 PROCEDURE — 258N000003 HC RX IP 258 OP 636: Performed by: INTERNAL MEDICINE

## 2020-10-18 PROCEDURE — 250N000011 HC RX IP 250 OP 636: Performed by: INTERNAL MEDICINE

## 2020-10-18 PROCEDURE — 120N000001 HC R&B MED SURG/OB

## 2020-10-18 PROCEDURE — 80048 BASIC METABOLIC PNL TOTAL CA: CPT | Performed by: INTERNAL MEDICINE

## 2020-10-18 PROCEDURE — 999N001017 HC STATISTIC GLUCOSE BY METER IP

## 2020-10-18 PROCEDURE — 85384 FIBRINOGEN ACTIVITY: CPT | Performed by: INTERNAL MEDICINE

## 2020-10-18 PROCEDURE — 83615 LACTATE (LD) (LDH) ENZYME: CPT | Performed by: INTERNAL MEDICINE

## 2020-10-18 PROCEDURE — 85379 FIBRIN DEGRADATION QUANT: CPT | Performed by: INTERNAL MEDICINE

## 2020-10-18 RX ADMIN — ENOXAPARIN SODIUM 40 MG: 40 INJECTION SUBCUTANEOUS at 10:39

## 2020-10-18 RX ADMIN — TIMOLOL MALEATE 1 DROP: 5 SOLUTION/ DROPS OPHTHALMIC at 21:27

## 2020-10-18 RX ADMIN — INSULIN ASPART 15 UNITS: 100 INJECTION, SOLUTION INTRAVENOUS; SUBCUTANEOUS at 21:27

## 2020-10-18 RX ADMIN — DEXAMETHASONE SODIUM PHOSPHATE 6 MG: 4 INJECTION, SOLUTION INTRAMUSCULAR; INTRAVENOUS at 10:39

## 2020-10-18 RX ADMIN — DORZOLAMIDE HYDROCHLORIDE 1 DROP: 20 SOLUTION/ DROPS OPHTHALMIC at 21:26

## 2020-10-18 RX ADMIN — LISINOPRIL 10 MG: 10 TABLET ORAL at 10:38

## 2020-10-18 RX ADMIN — REMDESIVIR 100 MG: 100 INJECTION, POWDER, LYOPHILIZED, FOR SOLUTION INTRAVENOUS at 14:37

## 2020-10-18 RX ADMIN — INSULIN GLARGINE 80 UNITS: 100 INJECTION, SOLUTION SUBCUTANEOUS at 23:29

## 2020-10-18 RX ADMIN — TIMOLOL MALEATE 1 DROP: 5 SOLUTION/ DROPS OPHTHALMIC at 10:39

## 2020-10-18 RX ADMIN — DORZOLAMIDE HYDROCHLORIDE 1 DROP: 20 SOLUTION/ DROPS OPHTHALMIC at 10:39

## 2020-10-18 RX ADMIN — SIMVASTATIN 10 MG: 10 TABLET, FILM COATED ORAL at 21:24

## 2020-10-18 RX ADMIN — POLYETHYLENE GLYCOL 3350 17 G: 17 POWDER, FOR SOLUTION ORAL at 19:18

## 2020-10-18 ASSESSMENT — ACTIVITIES OF DAILY LIVING (ADL)
ADLS_ACUITY_SCORE: 12

## 2020-10-18 NOTE — PLAN OF CARE
A&Ox4. VSS. Nasal cannula 1.5 L sats 95%. /119. Up independently. Denies pain. Day 5/5 of remdesivir. Day 6/10 of dexamethasone. Possible discharge 1-3 days.

## 2020-10-18 NOTE — PLAN OF CARE
VSS. AOX4. Breathing is unlabored and pt denies SOB. Independent in mobility.  Regular diet.  Pt denies pain.     O2 at 1.5LPM and pt at 92-95%.      BG's 232 and 271.       Pt states he is happy with his care here.     Tele:

## 2020-10-18 NOTE — PLAN OF CARE
Pt. A/O x4. VSS on 1.5 LPM via NC. Denies chest pain and SOB at rest. Dyspnea noted when ambulating back to bed from the bathroom. Oxygen saturation dropped to 88-90% and increased to 94% at rest. BLS: diminished, BS: audible. B. Complained of feeling tired. Possible discharge 1-3 days when supplemental oxygen is no longer required. Continue POC.

## 2020-10-18 NOTE — PROGRESS NOTES
[facility]  Hospitalist Progress Note  Jose Block MD 10/18/20    Reason for Stay (Diagnosis): COVID19         Assessment and Plan:      Summary of Stay: Rachell Paige is a 64 year old male with past medical history of IDDM type II, HTN, HLD, anxiety who was admitted on 10/10/2020 for respiratory failure secondary to positive COVID-19 testing on 10/7.  Elevated inflammatory markers and now hypoxia requiring 1-2 L oxygen via nasal cannula.  He was started on dexamethasone IV along with Remdesevir.  Continues to require oxygen supplementation and feels quite fatigued.  Inflammatory markers improved.  Final day of Remdesevir today.  Adjusting insulin for hyperglycemia with dexamethasone.    Problem List/Assessment and Plan:   Acute hypoxemic respiratory failure secondary to COVID-19 infection: Tested positive for COVID-19 on 10/7, thinks he got it from his grandson is also positive.  Developed hypoxia down to the mid 80s, requiring 1-2 L O2 via nasal cannula.  Having worsening fatigue although inflammatory markers improving.  -Continue IV Remdesevir, day 5/5  -Continue IV dexamethasone 6 mg daily, day 6/10  -Wean supplemental oxygen as able  -Encouraged incentive spirometer    MARILYN: Mild MARILYN with creatinine 1.5 on admission, now back to baseline 1.1.    Hyponatremia: Sodium high 120s has gradually improved.  Suspect this may be related to some SIADH from his respiratory failure.  Did receive some IV fluids which are now stopped.    IDDM type II: Some increased hyperglycemia in the setting of dexamethasone.  PTA on glargine 60 units at bedtime and aspart 20 units twice daily with meals.  Remains hyperglycemic with dexamethasone.  -Increase glargine to 80 units at bedtime  -Increase glargine to 15 units three times daily with meals and continue high-dose sliding scale insulin    HLD: Resume simvastatin 10 mg at bedtime.    HTN: PTA on 10 mg daily lisinopril which is now resumed.    DVT Prophylaxis:  "Enoxaparin (Lovenox) SQ  Code Status: Full Code  FEN: Moderate consistent carbohydrate  Discharge Dispo: Home  Estimated Disch Date / # of Days until Disch: 2 days, if not able to wean from oxygen likely discharge on home oxygen        Interval History (Subjective):      No acute events overnight.  Remains hypoxic requiring 1.5 L oxygen.  Afebrile.  Labs improved.  Still feels fatigued, but he has been walking in his room 3 times per day.  Oxygen does drop down to mid 80s when he does this.  Minimal cough.                  Physical Exam:      Last Vital Signs:  /67 (BP Location: Left arm)   Pulse 79   Temp 98.2  F (36.8  C) (Oral)   Resp 18   Ht 1.676 m (5' 6\")   Wt 88 kg (193 lb 14.4 oz)   SpO2 95%   BMI 31.30 kg/m      Intake/Output Summary (Last 24 hours) at 10/18/2020 1159  Last data filed at 10/17/2020 2228  Gross per 24 hour   Intake 503 ml   Output --   Net 503 ml       Constitutional: Awake, NAD   Eyes: sclera white   HEENT:  MMM  Respiratory: No focal crackles or wheeze, on nasal cannula  Cardiovascular: RRR.  No murmur   GI: non-tender, not distended, bowel sounds present  Skin: no rash   Musculoskeletal/extremities:  No edema  Neurologic: A&O  Psychiatric: calm, cooperative         Medications:      All current medications were reviewed with changes reflected in problem list.         Data:      All new lab and imaging data was reviewed.   Labs:  Recent Labs   Lab 10/18/20  0617 10/17/20  0803 10/16/20  0553   * 132* 131*   POTASSIUM 4.2 4.4 4.3   CHLORIDE 102 102 101   CO2 22 22 23   ANIONGAP 7 8 7   * 175* 161*   BUN 31* 24 25   CR 1.22 1.11 1.13   GFRESTIMATED 62 70 68   GFRESTBLACK 72 81 79   ALPHONSO 8.0* 8.2* 8.2*     Recent Labs   Lab 10/18/20  0617 10/17/20  0803 10/16/20  0553   WBC 10.3 9.8 7.6   HGB 14.1 14.1 13.8   HCT 42.9 43.8 41.7   MCV 85 86 85    414 381     Recent Labs   Lab 10/18/20  0617 10/17/20  0803 10/16/20  0553   CRP 6.5 8.8* 17.1*     LDH " 285    Imaging:   None today      Jose Block MD

## 2020-10-19 LAB
ANION GAP SERPL CALCULATED.3IONS-SCNC: 9 MMOL/L (ref 3–14)
BUN SERPL-MCNC: 34 MG/DL (ref 7–30)
CALCIUM SERPL-MCNC: 8.1 MG/DL (ref 8.5–10.1)
CHLORIDE SERPL-SCNC: 102 MMOL/L (ref 94–109)
CO2 SERPL-SCNC: 20 MMOL/L (ref 20–32)
CREAT SERPL-MCNC: 1.13 MG/DL (ref 0.66–1.25)
GFR SERPL CREATININE-BSD FRML MDRD: 68 ML/MIN/{1.73_M2}
GLUCOSE BLDC GLUCOMTR-MCNC: 103 MG/DL (ref 70–99)
GLUCOSE BLDC GLUCOMTR-MCNC: 128 MG/DL (ref 70–99)
GLUCOSE BLDC GLUCOMTR-MCNC: 198 MG/DL (ref 70–99)
GLUCOSE BLDC GLUCOMTR-MCNC: 237 MG/DL (ref 70–99)
GLUCOSE BLDC GLUCOMTR-MCNC: 280 MG/DL (ref 70–99)
GLUCOSE SERPL-MCNC: 175 MG/DL (ref 70–99)
POTASSIUM SERPL-SCNC: 4.5 MMOL/L (ref 3.4–5.3)
SODIUM SERPL-SCNC: 131 MMOL/L (ref 133–144)

## 2020-10-19 PROCEDURE — 93010 ELECTROCARDIOGRAM REPORT: CPT | Performed by: INTERNAL MEDICINE

## 2020-10-19 PROCEDURE — 120N000001 HC R&B MED SURG/OB

## 2020-10-19 PROCEDURE — 80048 BASIC METABOLIC PNL TOTAL CA: CPT | Performed by: INTERNAL MEDICINE

## 2020-10-19 PROCEDURE — 250N000013 HC RX MED GY IP 250 OP 250 PS 637: Performed by: HOSPITALIST

## 2020-10-19 PROCEDURE — 999N001017 HC STATISTIC GLUCOSE BY METER IP

## 2020-10-19 PROCEDURE — 36415 COLL VENOUS BLD VENIPUNCTURE: CPT | Performed by: INTERNAL MEDICINE

## 2020-10-19 PROCEDURE — 99233 SBSQ HOSP IP/OBS HIGH 50: CPT | Performed by: INTERNAL MEDICINE

## 2020-10-19 PROCEDURE — 93005 ELECTROCARDIOGRAM TRACING: CPT

## 2020-10-19 PROCEDURE — 250N000013 HC RX MED GY IP 250 OP 250 PS 637: Performed by: INTERNAL MEDICINE

## 2020-10-19 PROCEDURE — 258N000003 HC RX IP 258 OP 636: Performed by: INTERNAL MEDICINE

## 2020-10-19 PROCEDURE — 250N000012 HC RX MED GY IP 250 OP 636 PS 637: Performed by: INTERNAL MEDICINE

## 2020-10-19 PROCEDURE — 250N000011 HC RX IP 250 OP 636: Performed by: INTERNAL MEDICINE

## 2020-10-19 PROCEDURE — 250N000009 HC RX 250: Performed by: INTERNAL MEDICINE

## 2020-10-19 RX ADMIN — INSULIN ASPART 15 UNITS: 100 INJECTION, SOLUTION INTRAVENOUS; SUBCUTANEOUS at 21:35

## 2020-10-19 RX ADMIN — PANTOPRAZOLE SODIUM 40 MG: 40 TABLET, DELAYED RELEASE ORAL at 08:34

## 2020-10-19 RX ADMIN — DEXAMETHASONE SODIUM PHOSPHATE 6 MG: 4 INJECTION, SOLUTION INTRAMUSCULAR; INTRAVENOUS at 09:52

## 2020-10-19 RX ADMIN — ENOXAPARIN SODIUM 40 MG: 40 INJECTION SUBCUTANEOUS at 09:52

## 2020-10-19 RX ADMIN — DORZOLAMIDE HYDROCHLORIDE 1 DROP: 20 SOLUTION/ DROPS OPHTHALMIC at 11:27

## 2020-10-19 RX ADMIN — DORZOLAMIDE HYDROCHLORIDE 1 DROP: 20 SOLUTION/ DROPS OPHTHALMIC at 21:40

## 2020-10-19 RX ADMIN — INSULIN ASPART 15 UNITS: 100 INJECTION, SOLUTION INTRAVENOUS; SUBCUTANEOUS at 16:57

## 2020-10-19 RX ADMIN — INSULIN GLARGINE 80 UNITS: 100 INJECTION, SOLUTION SUBCUTANEOUS at 21:37

## 2020-10-19 RX ADMIN — SIMVASTATIN 10 MG: 10 TABLET, FILM COATED ORAL at 21:40

## 2020-10-19 RX ADMIN — REMDESIVIR 100 MG: 100 INJECTION, POWDER, LYOPHILIZED, FOR SOLUTION INTRAVENOUS at 14:19

## 2020-10-19 RX ADMIN — LISINOPRIL 10 MG: 10 TABLET ORAL at 08:34

## 2020-10-19 RX ADMIN — TIMOLOL MALEATE 1 DROP: 5 SOLUTION/ DROPS OPHTHALMIC at 11:26

## 2020-10-19 RX ADMIN — TIMOLOL MALEATE 1 DROP: 5 SOLUTION/ DROPS OPHTHALMIC at 21:40

## 2020-10-19 ASSESSMENT — ACTIVITIES OF DAILY LIVING (ADL)
ADLS_ACUITY_SCORE: 11
ADLS_ACUITY_SCORE: 12
ADLS_ACUITY_SCORE: 12
ADLS_ACUITY_SCORE: 10
ADLS_ACUITY_SCORE: 12
ADLS_ACUITY_SCORE: 11

## 2020-10-19 NOTE — PLAN OF CARE
"A/O x 4, demanding and particular of cares. VSS on 1L O2 per NC per pt request (education provided by this writer and MD multiple times about importance of trying to wean off O2, stable sats on RA; pt repeatedly refused and requested he remain on 1L O2 per NC until this afternoon, now agreeable to RA). C/O intermittent chest pressure, refused interventions, EKG obtained, MD aware.  and 280, pt has not eaten his lunch yet, states, \"I will eat it in a little bit and then call you for more insulin.\" No tele. LS dim, denied SOB. PIV to left intact, infusing w/ IV remdesivir (was started prior to discontinued orders, this writer spoke w/ pharmacist who said ok to complete today's infusion). Up ad nehal in room, steady gait and denies dizziness. Refused full skin assessment. Maintained covid iso precautions. Anticipatory discharge home tomorrow, 10/20. Will continue POC.   "

## 2020-10-19 NOTE — PROGRESS NOTES
[facility]  Hospitalist Progress Note  Jose Block MD 10/19/20    Reason for Stay (Diagnosis): COVID19         Assessment and Plan:      Summary of Stay: Rachell Paige is a 64 year old male with past medical history of IDDM type II, HTN, HLD, anxiety who was admitted on 10/10/2020 for respiratory failure secondary to positive COVID-19 testing on 10/7.  Elevated inflammatory markers and now hypoxia requiring 1-2 L oxygen via nasal cannula.  He was started on dexamethasone IV along with Remdesevir.  Continues to require oxygen supplementation and feels quite fatigued.  Inflammatory markers improved.  Received 5 days of remdesivir..  Adjusting insulin for hyperglycemia with dexamethasone.  Wean oxygen ID laid off for discharge by tomorrow.    Problem List/Assessment and Plan:   Acute hypoxemic respiratory failure secondary to COVID-19 infection: Tested positive for COVID-19 on 10/7, thinks he got it from his grandson is also positive.  Developed hypoxia down to the mid 80s, requiring 1-2 L O2 via nasal cannula.  Having worsening fatigue although inflammatory markers improving.  -Finished 5-day course of IV Remdesevir 10/18  -Continue IV dexamethasone 6 mg daily, day 7/10  -Wean supplemental oxygen as able.  94% on room air, however he frequently wants the oxygen back on.  Need oxygen valuation tomorrow with exertion  -Encouraged incentive spirometer    MARILYN: Mild MARILYN with creatinine 1.5 on admission, now back to baseline 1.1.    Hyponatremia: Sodium high 120s has gradually improved.  Suspect this may be related to some SIADH from his respiratory failure.  Did receive some IV fluids which are now stopped.    IDDM type II: Some increased hyperglycemia in the setting of dexamethasone.  PTA on glargine 60 units at bedtime and aspart 20 units twice daily with meals.  Remains hyperglycemic with dexamethasone.  -Continue increased glargine at 80 units at bedtime  -Continue increased glargine at 15 units  "three times daily with meals and continue high-dose sliding scale insulin    HLD: Resume simvastatin 10 mg at bedtime.    HTN: PTA on 10 mg daily lisinopril which is now resumed.    DVT Prophylaxis: Enoxaparin (Lovenox) subcutaneous, will plan to continue prophylaxis on discharge for 30 days for COVID-19  Code Status: Full Code  FEN: Moderate consistent carbohydrate  Discharge Dispo: Home  Estimated Disch Date / # of Days until Disch: Attempt to wean completely from oxygen today.  Hopefully discharge tomorrow.  Will need oxygen evaluation if not room air with ambulation        Interval History (Subjective):      Remained on 1 L oxygen overnight.  This was removed and his oxygen remained at 95%.  He continuously was asking the nurses to put it back on despite no hypoxia.  He does feel short of breath going back and forth from the bathroom.  When he gets back he has noted some substernal chest pressure radiating across his chest.  EKG was obtained that did not show any acute changes.  This only lasts a few minutes and resolves as is his shortness of breath within a couple of minutes.  Discussed with him likely plan for discharge tomorrow and ideally we can wean him from oxygen altogether, but he is reluctant for discharge she is worried about passing on the virus to his wife at home.                  Physical Exam:      Last Vital Signs:  /57 (BP Location: Left arm)   Pulse 78   Temp 97.5  F (36.4  C) (Oral)   Resp 16   Ht 1.676 m (5' 6\")   Wt 88 kg (193 lb 14.4 oz)   SpO2 95%   BMI 31.30 kg/m      Intake/Output Summary (Last 24 hours) at 10/19/2020 1342  Last data filed at 10/19/2020 1127  Gross per 24 hour   Intake 363 ml   Output --   Net 363 ml       Constitutional: Awake, NAD   Eyes: sclera white   HEENT:  MMM  Respiratory: No focal crackles or wheeze, on nasal cannula  Cardiovascular: RRR.  No murmur   GI: non-tender, not distended, bowel sounds present  Skin: no rash   Musculoskeletal/extremities:  " No edema  Neurologic: A&O  Psychiatric: calm, cooperative, flat affect         Medications:      All current medications were reviewed with changes reflected in problem list.         Data:      All new lab and imaging data was reviewed.   Labs:  Recent Labs   Lab 10/19/20  0613 10/18/20  0617 10/17/20  0803   * 131* 132*   POTASSIUM 4.5 4.2 4.4   CHLORIDE 102 102 102   CO2 20 22 22   ANIONGAP 9 7 8   * 194* 175*   BUN 34* 31* 24   CR 1.13 1.22 1.11   GFRESTIMATED 68 62 70   GFRESTBLACK 79 72 81   ALPHONSO 8.1* 8.0* 8.2*     Imaging:   None today      Jose Block MD

## 2020-10-19 NOTE — PROVIDER NOTIFICATION
"MD paged: \"Pt c/o 7/10 bilateral anterior chest pressure. Pt states he does not want intervention, just wants MD to know. Please advise as needed, thanks\"    Addendum: MD ordered EKG and evaluated pt at bedside.   "

## 2020-10-19 NOTE — PLAN OF CARE
A/O x 4. VSS on 1 LPM NC. Denies pain/SOB. B/128. Possible discharge in two days with supplemental oxygen order if required.

## 2020-10-19 NOTE — PLAN OF CARE
VSS. AOX4. Breathing is unlabored and pt denies SOB. Independent in mobility.  Regular diet.  Pt denies pain.     O2 at 1LPM and pt at 92-96% at rest.  When he ambulates, O2 goes down to 87%; once he sits down his O2 recovers to 92-96% within 1 minute.  He is using his incentive spirometer more frequently after some encouragement.       BG's 246 and 234.       CRP 6.5    Tele: SR

## 2020-10-20 LAB
GLUCOSE BLDC GLUCOMTR-MCNC: 161 MG/DL (ref 70–99)
GLUCOSE BLDC GLUCOMTR-MCNC: 256 MG/DL (ref 70–99)
GLUCOSE BLDC GLUCOMTR-MCNC: 282 MG/DL (ref 70–99)
GLUCOSE BLDC GLUCOMTR-MCNC: 284 MG/DL (ref 70–99)
GLUCOSE BLDC GLUCOMTR-MCNC: 326 MG/DL (ref 70–99)
GLUCOSE BLDC GLUCOMTR-MCNC: 349 MG/DL (ref 70–99)
GLUCOSE BLDC GLUCOMTR-MCNC: 55 MG/DL (ref 70–99)
GLUCOSE BLDC GLUCOMTR-MCNC: 83 MG/DL (ref 70–99)
INTERPRETATION ECG - MUSE: NORMAL
PLATELET # BLD AUTO: 477 10E9/L (ref 150–450)

## 2020-10-20 PROCEDURE — 36415 COLL VENOUS BLD VENIPUNCTURE: CPT | Performed by: HOSPITALIST

## 2020-10-20 PROCEDURE — 250N000011 HC RX IP 250 OP 636: Performed by: INTERNAL MEDICINE

## 2020-10-20 PROCEDURE — 250N000013 HC RX MED GY IP 250 OP 250 PS 637: Performed by: INTERNAL MEDICINE

## 2020-10-20 PROCEDURE — 85049 AUTOMATED PLATELET COUNT: CPT | Performed by: HOSPITALIST

## 2020-10-20 PROCEDURE — 250N000013 HC RX MED GY IP 250 OP 250 PS 637: Performed by: HOSPITALIST

## 2020-10-20 PROCEDURE — 120N000001 HC R&B MED SURG/OB

## 2020-10-20 PROCEDURE — 999N001017 HC STATISTIC GLUCOSE BY METER IP

## 2020-10-20 PROCEDURE — 99233 SBSQ HOSP IP/OBS HIGH 50: CPT | Performed by: INTERNAL MEDICINE

## 2020-10-20 PROCEDURE — 250N000012 HC RX MED GY IP 250 OP 636 PS 637: Performed by: INTERNAL MEDICINE

## 2020-10-20 RX ORDER — DEXAMETHASONE 6 MG/1
6 TABLET ORAL DAILY
Qty: 2 TABLET | Refills: 0 | Status: SHIPPED | OUTPATIENT
Start: 2020-10-21 | End: 2020-10-26

## 2020-10-20 RX ADMIN — DEXAMETHASONE SODIUM PHOSPHATE 6 MG: 4 INJECTION, SOLUTION INTRAMUSCULAR; INTRAVENOUS at 09:00

## 2020-10-20 RX ADMIN — ENOXAPARIN SODIUM 40 MG: 40 INJECTION SUBCUTANEOUS at 09:01

## 2020-10-20 RX ADMIN — INSULIN ASPART 15 UNITS: 100 INJECTION, SOLUTION INTRAVENOUS; SUBCUTANEOUS at 21:05

## 2020-10-20 RX ADMIN — DEXTROSE 15 G: 15 GEL ORAL at 09:04

## 2020-10-20 RX ADMIN — DORZOLAMIDE HYDROCHLORIDE 1 DROP: 20 SOLUTION/ DROPS OPHTHALMIC at 21:05

## 2020-10-20 RX ADMIN — TIMOLOL MALEATE 1 DROP: 5 SOLUTION/ DROPS OPHTHALMIC at 14:38

## 2020-10-20 RX ADMIN — INSULIN ASPART 15 UNITS: 100 INJECTION, SOLUTION INTRAVENOUS; SUBCUTANEOUS at 14:38

## 2020-10-20 RX ADMIN — LISINOPRIL 10 MG: 10 TABLET ORAL at 08:48

## 2020-10-20 RX ADMIN — TIMOLOL MALEATE 1 DROP: 5 SOLUTION/ DROPS OPHTHALMIC at 21:03

## 2020-10-20 RX ADMIN — DEXTROSE 15 G: 15 GEL ORAL at 08:43

## 2020-10-20 RX ADMIN — SIMVASTATIN 10 MG: 10 TABLET, FILM COATED ORAL at 21:09

## 2020-10-20 RX ADMIN — INSULIN GLARGINE 60 UNITS: 100 INJECTION, SOLUTION SUBCUTANEOUS at 21:11

## 2020-10-20 RX ADMIN — POLYETHYLENE GLYCOL 3350 17 G: 17 POWDER, FOR SOLUTION ORAL at 19:12

## 2020-10-20 RX ADMIN — DORZOLAMIDE HYDROCHLORIDE 1 DROP: 20 SOLUTION/ DROPS OPHTHALMIC at 14:26

## 2020-10-20 ASSESSMENT — ACTIVITIES OF DAILY LIVING (ADL)
ADLS_ACUITY_SCORE: 10

## 2020-10-20 NOTE — PROGRESS NOTES
[facility]  Hospitalist Progress Note  Jose Block MD 10/20/20    Reason for Stay (Diagnosis): COVID19         Assessment and Plan:      Summary of Stay: Rachell Paige is a 64 year old male with past medical history of IDDM type II, HTN, HLD, anxiety who was admitted on 10/10/2020 for respiratory failure secondary to positive COVID-19 testing on 10/7.  Elevated inflammatory markers and now hypoxia requiring 1-2 L oxygen via nasal cannula.  He was started on dexamethasone IV along with Remdesevir.  Continues to require oxygen supplementation and feels quite fatigued.  Inflammatory markers improved.  Received 5 days of remdesivir.  Adjusting insulin for hyperglycemia with dexamethasone.  Lower blood sugar  this morning so decreased his glargine back to his home dose for this evening.  Now weaned to room air with oxygen saturations remaining above 90% including ambulation in the room.  Medically appropriate for discharge today.  He is over 10 days since symptom onset and positive test date of 13 days ago.  He is clinically improved and per CDC guidelines does not need further self-isolation.  Patient is currently refusing discharge, primarily due to his concerns for potentially infecting his spouse and other family members in their small apartment.  Please see interval history below for further details regarding this discussion.  Patient placement has been notified and neck steps are being taken regarding discharge.  I anticipate he will not physically discharge likely until tomorrow until all concerns have been addressed.  At this point his test is from 10/7 and he is over 10 days from symptom onset with ongoing improvement symptoms and no fever so I believe he can be changed to recovered status under the infection tab and no further airborne precautions are necessary for ongoing hospitalization, but would review with infection prevention tomorrow.    Problem List/Assessment and Plan:   Acute  hypoxemic respiratory failure secondary to COVID-19 infection: Tested positive for COVID-19 on 10/7, he thinks he got it from his grandson is also positive.  He felt unwell including cough, myalgias, shortness breath, fatigue for 2 days prior to admission.  He developed hypoxia down to the mid 80s, requiring 1-2 L O2 via nasal cannula.  Having worsening fatigue although inflammatory markers improving.  -Finished 5-day course of IV Remdesevir 10/18  -Continue IV dexamethasone 6 mg daily, day 8/10, prescribed 2 doses of oral dexamethasone 6 mg daily for discharge to complete course  -Oxygen saturation 94-95% at rest on room air.  Remains above 90% on room air ambulating in the room throughout the day today.  He was able to take a shower by himself without decompensating from a respiratory standpoint and not becoming hypoxic.  Does not need any further supplemental oxygen  -Continue incentive spirometer  -30-day Xarelto 10 mg at bedtime for DVT prophylaxis on discharge recommended in this patient and has been ordered    MARILYN: Mild MARILYN with creatinine 1.5 on admission, now back to baseline 1.1.    Hyponatremia: Sodium high 120s has gradually improved.  Suspect this may be related to some SIADH from his respiratory failure.  Did receive some IV fluids which are now stopped.    IDDM type II: Some increased hyperglycemia in the setting of dexamethasone.  PTA on glargine 60 units at bedtime and aspart 20 units twice daily with meals.  Remains hyperglycemic with dexamethasone.  -Hypoglycemic this morning so decreased glargine from 80 units back to his 60 units at bedtime tonight  -Continue increased glargine at 15 units three times daily with meals and continue high-dose sliding scale insulin.  He should resume his home insulin regimen on discharge    HLD: Resume simvastatin 10 mg at bedtime.    HTN: PTA on 10 mg daily lisinopril which is now resumed.    DVT Prophylaxis: Enoxaparin (Lovenox) subcutaneous, will plan to  continue prophylaxis with Xarelto 10 mg at bedtime on discharge for 30 days for COVID-19  Code Status: Full Code  FEN: Moderate consistent carbohydrate  Discharge Dispo: Home  Estimated Disch Date / # of Days until Disch: He is medically appropriate for discharge today, however I have not placed the final discharge order due to their significant concerns until we have given them an opportunity to speak with patient relations.  Please see interval history below for further details regarding this conversation.  I have placed his other discharge orders.        Interval History (Subjective):      The patient has been weaned to room air.  Oxygen remains above 90% including when ambulating in the guillen.  He was able to shower by himself today and complete basic tasks without needing supplemental oxygen.  He has been afebrile for 6 days.  His inflammatory markers CRP has normalized.  His blood sugar was lower today so he received dextrose and his Lantus dose was decreased for tonight.  I discussed with the patient that the plan would be for discharge home today given he is fever free for over 72 hours and has been weaned to room air including with ambulation.  I have been discussed with him throughout the week that he is nearing discharge and yesterday told him that we would be discharging today either with 1 L of oxygen if he was hypoxic or no oxygen if he remains on room air like he has now.  He is very frustrated with this and says he cannot discharge home.  I discussed with him at length regarding his concerns and primarily they are not for his health, but the health of his spouse and brother or brother-in-law that is living with them from [place].  He explains that she has multiple health problems including diabetes and he fears that she would become very ill or even die if she contracted COVID-19.  She has been tested and is negative.  I discussed with him my recommendations and then with his spouse via speaker  "phone and the brother regarding current CDC recommendations for self-isolation for 10 days from symptom onset or if asymptomatic 10 days from positive test if fever free over 72 hours and symptoms are improving.  His PCR test is positive from 10/7.  He has been hospitalized since 10/10 and fever free for over 7 days.  His symptoms are slowly improving, he has minimal cough, and he is now weaned to room air all suggestive of recovery from illness.  He meets all criteria from the CDC regarding self-isolation and his risk of passing of the virus based on these guidelines should be minimal although I did explain that I cannot guarantee 100% he cannot pass on the virus.  He and his family expressed their concerns that they have a small apartment and cannot afford for either him or the other individuals to stay in a hotel or elsewhere until they feel comfortable with him being at home.  I voiced my understanding of this, but if this cannot be done per their preference then he and they could both wear a mask and keep a distance in the home along with frequent handwashing as alternative until they felt safer at home.  He and they are refusing discharge and at this point are voicing strongly to speak with patient relations and administration regarding discharge.  I have called and left a message for patient relations and will follow-up for this request.  He is medically appropriate for discharge today, however I have not placed the final discharge order due to their significant concerns until we have given them an opportunity to speak with patient relations.                  Physical Exam:      Last Vital Signs:  /63 (BP Location: Left arm)   Pulse 97   Temp 97  F (36.1  C) (Axillary)   Resp 18   Ht 1.676 m (5' 6\")   Wt 88 kg (193 lb 14.4 oz)   SpO2 94%   BMI 31.30 kg/m      Intake/Output Summary (Last 24 hours) at 10/20/2020 1423  Last data filed at 10/20/2020 0700  Gross per 24 hour   Intake 720 ml   Output -- "   Net 720 ml       Constitutional: Awake, he does not appear in distress  Eyes: sclera white   HEENT:  MMM  Respiratory: He is currently on room air.  His respiratory rate is normal.  He does not have any focal crackles or wheeze on exam.     Cardiovascular: RRR.  No murmur   GI: non-tender, not distended, bowel sounds present  Skin: no rash   Musculoskeletal/extremities:  No edema  Neurologic: A&O  Psychiatric: Calm and appears frustrated when discussing recommendations for discharge         Medications:      All current medications were reviewed with changes reflected in problem list.         Data:      All new lab and imaging data was reviewed.   Labs:  Platelet count 477  Recent Labs   Lab 10/20/20  1052 10/20/20  0921 10/20/20  0900 10/20/20  0840 10/20/20  0050 10/19/20  2133 10/19/20  0613 10/19/20  0613 10/18/20  0617 10/18/20  0617 10/17/20  0803 10/17/20  0803 10/16/20  0553 10/16/20  0553 10/15/20  0709 10/15/20  0709 10/14/20  0652 10/14/20  0652   GLC  --   --   --   --   --   --   --  175*  --  194*  --  175*  --  161*  --  249*  --  192*   * 161* 83 55* 256* 237*   < >  --    < >  --    < >  --    < >  --    < >  --    < >  --     < > = values in this interval not displayed.       Imaging:   None today    (I spent over 30 minutes in the room with this patient discussing plan of care with him and his spouse via speaker phone)  Jose Block MD

## 2020-10-20 NOTE — PLAN OF CARE
Patient is A/O and independent in room, lung sounds clear and oxygen sats mid 90's on room air, minimal WISEMAN when ambulating to the bathroom.  Patient continues on isolation for positive covid result.  Patient is a possible discharge today.

## 2020-10-20 NOTE — PROGRESS NOTES
Care Management Follow Up Note    Length of Stay (days) 9    Patient plan of care discussed at Interdisciplinary Rounds: yes  Expected Discharge Date: 10/20/20  Concerns to be Addressed:  All concerns addressed    Anticipated Discharge Disposition:  Home  Anticipated Discharge Services:  None  Anticipated Discharge DME:  Unknown    Plan:  Sw was asked by Liseth with Patient Relations to talk with the pt regarding his discharge plan.  The pt is ready for discharge, but states he cannot go home due to him having COVID-19.    Sw spoke with the pt via telephone and he said that he is not doing well because his oxygen continues to decrease.  Sw informed him that per his last vital check, his O2 levels were at 94% on RA.  Sw told him that he does not require O2 at this time.  Sw asked him what the barriers to his discharge home are.  He said that his family.  He lives with his wife and his brother from [place] has been visiting since the pt became sick.  He said that if he goes home, his wife and brother will be infected and end up in the hospital.  He said that his wife has DM, HTN, and Fibromyalgia and his brother has DM and HTN.  He said that they only live in a two bedroom apartment.  Sw suggested that his brother stay in the living room or main area of the apartment and then he can stay in the spare bedroom isolated from his family.  Sw told him that they can all wear masks to keep each other safe as well.  He continued to tell sw that he needs a couple more days in the hospital.  Sw told him that he is ready for discharge today and that he cannot continue to stay in the hospital when there is not a medical need.  Natalia asked if he has spoken with his family about coming home and he said now because he needs a couple more days in the hospital.  Sw told him that he needs to talk with his family this evening to come up with a plan for him to discharge home tomorrow from the hospital.  The pt said that he wants  administration to call him in the morning so he can talk with them about this.    Sw left a vm updating Pt Relations.    Sw updated the physician.    Natalia updated the bedside nurse.    ALEKSANDER Buckley, [facility]  Inpatient Care Coordination  [facility]  [phone]

## 2020-10-20 NOTE — PLAN OF CARE
A&Ox4. VSS. On room air sats 94-95%. Oxygen sats drop to 86-88% with ambulation with the pt recovering quickly at rest. Pt was still eating his lunch at 16:30, covered with 15 units of novolog. Dinner consumed at 2130.  at that time. Denies pain, SOB, or chest pain. Possible discharge home tomorrow.

## 2020-10-20 NOTE — PLAN OF CARE
"A/O x 4. VSS on RA. Denied pain. BG 55, OJ given per pt request along w/ PRN 15 g PO glucose per protocol; recheck 83, 15 g PO glucose given per protocol w/ recheck 161. Lunch  (pt asked for BG check during 1400 hour stating, \"it feels low,\" check was 282, consumed late lunch). No tele, denied CP . LS dim, no SOB reported. PIV to left intact, SL . Up ad nehal in room, steady gait and denies dizziness. Showered. Maintained covid iso precautions. Adequate for discharge today but pt refusing, multiple covid discharge instructions/handouts given to pt. Will continue POC.   "

## 2020-10-21 ENCOUNTER — HOSPITAL ENCOUNTER (EMERGENCY)
Facility: CLINIC | Age: 65
Discharge: HOME | End: 2020-10-22
Attending: EMERGENCY MEDICINE | Admitting: EMERGENCY MEDICINE
Payer: COMMERCIAL

## 2020-10-21 ENCOUNTER — APPOINTMENT (OUTPATIENT)
Dept: GENERAL RADIOLOGY | Facility: CLINIC | Age: 65
DRG: 177 | End: 2020-10-21
Attending: INTERNAL MEDICINE
Payer: COMMERCIAL

## 2020-10-21 ENCOUNTER — APPOINTMENT (OUTPATIENT)
Dept: ULTRASOUND IMAGING | Facility: CLINIC | Age: 65
DRG: 177 | End: 2020-10-21
Attending: INTERNAL MEDICINE
Payer: COMMERCIAL

## 2020-10-21 VITALS
TEMPERATURE: 97.8 F | BODY MASS INDEX: 31.16 KG/M2 | WEIGHT: 193.9 LBS | HEIGHT: 66 IN | OXYGEN SATURATION: 95 % | SYSTOLIC BLOOD PRESSURE: 139 MMHG | DIASTOLIC BLOOD PRESSURE: 66 MMHG | HEART RATE: 92 BPM | RESPIRATION RATE: 16 BRPM

## 2020-10-21 DIAGNOSIS — E87.1 HYPONATREMIA: ICD-10-CM

## 2020-10-21 DIAGNOSIS — Z86.16 HISTORY OF 2019 NOVEL CORONAVIRUS DISEASE (COVID-19): ICD-10-CM

## 2020-10-21 DIAGNOSIS — R07.9 CHEST PAIN, UNSPECIFIED TYPE: ICD-10-CM

## 2020-10-21 DIAGNOSIS — R91.8 INFILTRATE OF LUNG PRESENT ON CHEST X-RAY: ICD-10-CM

## 2020-10-21 DIAGNOSIS — R00.0 TACHYCARDIA: ICD-10-CM

## 2020-10-21 LAB
GLUCOSE BLDC GLUCOMTR-MCNC: 149 MG/DL (ref 70–99)
GLUCOSE BLDC GLUCOMTR-MCNC: 200 MG/DL (ref 70–99)
GLUCOSE BLDC GLUCOMTR-MCNC: 212 MG/DL (ref 70–99)
GLUCOSE BLDC GLUCOMTR-MCNC: 214 MG/DL (ref 70–99)
GLUCOSE BLDC GLUCOMTR-MCNC: 239 MG/DL (ref 70–99)

## 2020-10-21 PROCEDURE — 85379 FIBRIN DEGRADATION QUANT: CPT | Performed by: EMERGENCY MEDICINE

## 2020-10-21 PROCEDURE — 250N000011 HC RX IP 250 OP 636: Performed by: INTERNAL MEDICINE

## 2020-10-21 PROCEDURE — 250N000013 HC RX MED GY IP 250 OP 250 PS 637: Performed by: HOSPITALIST

## 2020-10-21 PROCEDURE — 73590 X-RAY EXAM OF LOWER LEG: CPT | Mod: 50

## 2020-10-21 PROCEDURE — 99285 EMERGENCY DEPT VISIT HI MDM: CPT | Mod: 25

## 2020-10-21 PROCEDURE — 999N001017 HC STATISTIC GLUCOSE BY METER IP

## 2020-10-21 PROCEDURE — 80053 COMPREHEN METABOLIC PANEL: CPT | Performed by: EMERGENCY MEDICINE

## 2020-10-21 PROCEDURE — 84484 ASSAY OF TROPONIN QUANT: CPT | Performed by: EMERGENCY MEDICINE

## 2020-10-21 PROCEDURE — 258N000003 HC RX IP 258 OP 636: Performed by: EMERGENCY MEDICINE

## 2020-10-21 PROCEDURE — 85025 COMPLETE CBC W/AUTO DIFF WBC: CPT | Performed by: EMERGENCY MEDICINE

## 2020-10-21 PROCEDURE — 93970 EXTREMITY STUDY: CPT

## 2020-10-21 PROCEDURE — 93005 ELECTROCARDIOGRAM TRACING: CPT

## 2020-10-21 PROCEDURE — 96360 HYDRATION IV INFUSION INIT: CPT

## 2020-10-21 PROCEDURE — 86140 C-REACTIVE PROTEIN: CPT | Performed by: EMERGENCY MEDICINE

## 2020-10-21 PROCEDURE — 99239 HOSP IP/OBS DSCHRG MGMT >30: CPT | Performed by: INTERNAL MEDICINE

## 2020-10-21 RX ADMIN — DORZOLAMIDE HYDROCHLORIDE 1 DROP: 20 SOLUTION/ DROPS OPHTHALMIC at 10:06

## 2020-10-21 RX ADMIN — LISINOPRIL 10 MG: 10 TABLET ORAL at 09:48

## 2020-10-21 RX ADMIN — ENOXAPARIN SODIUM 40 MG: 40 INJECTION SUBCUTANEOUS at 09:48

## 2020-10-21 RX ADMIN — TIMOLOL MALEATE 1 DROP: 5 SOLUTION/ DROPS OPHTHALMIC at 10:05

## 2020-10-21 RX ADMIN — SODIUM CHLORIDE 1000 ML: 9 INJECTION, SOLUTION INTRAVENOUS at 23:56

## 2020-10-21 RX ADMIN — INSULIN ASPART 15 UNITS: 100 INJECTION, SOLUTION INTRAVENOUS; SUBCUTANEOUS at 14:38

## 2020-10-21 RX ADMIN — INSULIN ASPART 15 UNITS: 100 INJECTION, SOLUTION INTRAVENOUS; SUBCUTANEOUS at 09:47

## 2020-10-21 RX ADMIN — DEXAMETHASONE SODIUM PHOSPHATE 6 MG: 4 INJECTION, SOLUTION INTRAMUSCULAR; INTRAVENOUS at 09:48

## 2020-10-21 ASSESSMENT — ACTIVITIES OF DAILY LIVING (ADL)
ADLS_ACUITY_SCORE: 10

## 2020-10-21 NOTE — PROVIDER NOTIFICATION
" MD paged \"Patients results for LE US are in. No DVT reported. Patient refusing to go until MD speaks to him. Please advise. Thank you.\"  "

## 2020-10-21 NOTE — PLAN OF CARE
VSS c/o BLE pt stated he had hx blood clots and stats he has N/T comes and goes BLE at baseline, declines pain meds, md aware. BLE port xray added, see results. US BLE to be done. Pt on Lovenox. Pt COVID positive on IV Decadron. Plan for pt to possibly discharge today???

## 2020-10-21 NOTE — PLAN OF CARE
Diagnosis. COVID  History. DM, Hyperlipidemia   Vitals. VSS  Tele. SR  Respiratory. Patient on RA.  Neuro. WDL  GI/. WDL  Skin. Patient generally bruised.  LDA's. Left PIV saline locked.   Diet. Mod carb diet   Activity. Independent in room.  Plan. May discharge tomorrow if patient agrees.

## 2020-10-21 NOTE — DISCHARGE SUMMARY
[facility]  Hospitalist Discharge Summary      Date of Admission:  10/10/2020  Date of Discharge:  10/21/2020  Discharging Provider: Rgahavendra Sharma MD      Discharge Diagnoses     1. Acute hypoxic respiratory failure due to COVID 19 pneumonia.    2. Type 2 DM.      Follow-ups Needed After Discharge   Follow-up Appointments     Follow-up and recommended labs and tests       Follow up with primary care provider, Olivia Nichols, within 7-14 days   for hospital follow- up if not continuing to slowly improve at home             Unresulted Labs Ordered in the Past 30 Days of this Admission     No orders found from 9/10/2020 to 10/11/2020.          Discharge Disposition   Discharged to home  Condition at discharge: Stable      Hospital Course      Summary of Stay: Rachell Paige is a 64 year old male with past medical history of IDDM type II, HTN, HLD, anxiety who was admitted on 10/10/2020 for respiratory failure secondary to positive COVID-19 testing on 10/7.  Elevated inflammatory markers and now hypoxia requiring 1-2 L oxygen via nasal cannula.  He was started on dexamethasone IV along with Remdesevir.  Continues to require oxygen supplementation and feels quite fatigued.  Inflammatory markers improved.  Received 5 days of remdesivir.  Adjusting insulin for hyperglycemia with dexamethasone.  Lower blood sugar  this morning so decreased his glargine back to his home dose for this evening.  Now weaned to room air with oxygen saturations remaining above 90% including ambulation in the room.  Medically appropriate for discharge today.  He is over 10 days since symptom onset and positive test date of 13 days ago.  He is clinically improved and per CDC guidelines does not need further self-isolation.  Patient is currently refusing discharge, primarily due to his concerns for potentially infecting his spouse and other family members in their small apartment.  Please see interval history below for  further details regarding this discussion.  Patient placement has been notified and neck steps are being taken regarding discharge.  I anticipate he will not physically discharge likely until tomorrow until all concerns have been addressed.  At this point his test is from 10/7 and he is over 10 days from symptom onset with ongoing improvement symptoms and no fever so I believe he can be changed to recovered status under the infection tab and no further airborne precautions are necessary for ongoing hospitalization.    Acute hypoxemic respiratory failure secondary to COVID-19 infection: Tested positive for COVID-19 on 10/7, he thinks he got it from his grandson is also positive.  He felt unwell including cough, myalgias, shortness breath, fatigue for 2 days prior to admission.  He developed hypoxia down to the mid 80s, requiring 1-2 L O2 via nasal cannula.  Having worsening fatigue although inflammatory markers improving.  -Finished 5-day course of IV Remdesevir 10/18  -Continue IV dexamethasone 6 mg daily, day 8/10, prescribed 2 doses of oral dexamethasone 6 mg daily for discharge to complete course  -Oxygen saturation 94-95% at rest on room air.  Remains above 90% on room air ambulating in the room throughout the day today.  He was able to take a shower by himself without decompensating from a respiratory standpoint and not becoming hypoxic.  Does not need any further supplemental oxygen  -Continue incentive spirometer  -30-day Xarelto 10 mg at bedtime for DVT prophylaxis on discharge recommended in this patient and has been ordered     MARILYN: Mild MARILYN with creatinine 1.5 on admission, now back to baseline 1.1.     Hyponatremia: Sodium high 120s has gradually improved.  Suspect this may be related to some SIADH from his respiratory failure.  Did receive some IV fluids which are now stopped.     IDDM type II: Some increased hyperglycemia in the setting of dexamethasone.  PTA on glargine 60 units at bedtime and aspart  20 units twice daily with meals.  Remains hyperglycemic with dexamethasone.    -Continue increased glargine at 15 units three times daily with meals and continue high-dose sliding scale insulin.  He should resume his home insulin regimen on discharge     HLD: Resume simvastatin 10 mg at bedtime.     HTN: PTA on 10 mg daily lisinopril which is now resumed.        Consultations This Hospital Stay   CARE MANAGEMENT / SOCIAL WORK IP CONSULT    Code Status   Full Code    Time Spent on this Encounter   I, Raghavendra Sharma MD, personally saw the patient today and spent greater than 30 minutes discharging this patient.       Raghavendra Sharma MD  [place] [address] [facility]  [address] [place] [address]  Phone: [phone]  Fax: [phone]  ______________________________________________________________________    Physical Exam   Vital Signs: Temp: 98.1  F (36.7  C) Temp src: Oral BP: 130/60 Pulse: 95   Resp: 16 SpO2: 95 % O2 Device: None (Room air)    Weight: 193 lbs 14.4 oz    Gen - AAO x 3 in NAD.  Lungs - CTA B.  Heart - RR,S1+S2 nml, no m/g/r.  Abd - soft, NT, ND, + BS.  Ext - no edema.       Primary Care Physician   Olivia Nichols    Discharge Orders      COVID-19 GetWell Loop Referral      Reason for your hospital stay    You were hospitalized for COVID19 and were treated with dexamethasone while here and will finish an additional 2 days starting tomorrow.  This is causing some elevation in your blood sugars.  You no longer need any supplemental oxygen.  Although over 10 days have passed and your symptoms are improving you likely will have some ongoing shortness of breath, cough, and weakness for a number of days or even weeks despite being recovered from active COVID19 infection.     Follow-up and recommended labs and tests     Follow up with primary care provider, Olivia Nichols, within 7-14 days for hospital follow- up if not continuing to slowly improve at home     Discharge -  Quarantine/Isolation Instruction    Date of symptom onset:     Date of first positive test:    If you tested positive COVID-19 and show symptoms (fever, cough, body aches or trouble breathing):        Stay home and away from others (self-isolate) until:        At least 10 days have passed since your symptoms started. AND...        You've had no fever-and no medicine that reduces fever-for 3 full days (72 hours). AND...         Your other symptoms have resolved (gotten better).  If you tested positive for COVID-19 but don't show symptoms:       Stay home and away from others (self-isolate) until at least 10 days have passed since the date of your first positive COVID-19 test.     Contact provider    Contact your primary care provider if If increased trouble breathing, new arm/leg swelling, dizziness/passing out, falls, bleeding that doesn't stop, or uncontrolled pain.     Activity    Your activity upon discharge: activity as tolerated     Diet    Follow this diet upon discharge: Orders Placed This Encounter      Moderate Consistent CHO Diet       Significant Results and Procedures   Results for orders placed or performed during the hospital encounter of 10/10/20   XR Chest Port 1 View    Narrative    EXAM: XR CHEST PORT 1 VW  LOCATION: [facility]  DATE/TIME: 10/10/2020 11:19 PM    INDICATION: Chest  COMPARISON: 02/22/2020      Impression    IMPRESSION: Negative chest.       Discharge Medications   Current Discharge Medication List      START taking these medications    Details   apixaban ANTICOAGULANT (ELIQUIS) 2.5 MG tablet Take 1 tablet (2.5 mg) by mouth 2 times daily for 26 days  Qty: 52 tablet, Refills: 0    Associated Diagnoses: 2019 novel coronavirus disease (COVID-19)      dexamethasone (DECADRON) 6 MG tablet Take 1 tablet (6 mg) by mouth daily for 2 days  Qty: 2 tablet, Refills: 0    Associated Diagnoses: 2019 novel coronavirus disease (COVID-19)         CONTINUE these medications which have NOT  CHANGED    Details   aspirin 81 MG tablet Take 1 tablet (81 mg) by mouth daily  Qty: 30 tablet, Refills: OTC    Associated Diagnoses: DM type 2, goal A1C 7-8      lisinopril (ZESTRIL) 10 MG tablet Take 1 tablet (10 mg) by mouth daily  Qty: 30 tablet, Refills: 3    Associated Diagnoses: Type 2 diabetes mellitus with hemoglobin A1c goal of 7.0%-8.0% (H); HTN, goal below 140/90      alcohol swab prep pads Use to swab area of injection/pedro pablo as directed.  Qty: 120 each, Refills: 6    Associated Diagnoses: Type 2 diabetes mellitus with hemoglobin A1c goal of 7.0%-8.0% (H)      !! blood glucose (ACCU-CHEK GUIDE) test strip 1 strip by In Vitro route 4 times daily Use to test blood sugar 3 -4  times daily or as directed.  Qty: 120 each, Refills: 11    Associated Diagnoses: Type 2 diabetes mellitus with hemoglobin A1c goal of 7.0%-8.0% (H)      !! blood glucose (NO BRAND SPECIFIED) test strip Use to test blood sugar 2 times daily or as directed.  Qty: 60 strip, Refills: 4    Associated Diagnoses: Type 2 diabetes mellitus with hemoglobin A1c goal of 7.0%-8.0% (H)      !! blood glucose (NO BRAND SPECIFIED) test strip Use to test blood sugar 4 times daily or as directed. To accompany: Blood Glucose Monitor Brands: per insurance.  Qty: 150 strip, Refills: 6    Associated Diagnoses: Type 2 diabetes mellitus with hemoglobin A1c goal of 7.0%-8.0% (H)      blood glucose calibration (NO BRAND SPECIFIED) solution To accompany: Blood Glucose Monitor Brands: per insurance.  Qty: 1 Bottle, Refills: 3    Associated Diagnoses: Type 2 diabetes mellitus with hemoglobin A1c goal of 7.0%-8.0% (H)      blood glucose monitoring (NO BRAND SPECIFIED) meter device kit Use to test blood sugar 4 times daily or as directed. Preferred blood glucose meter OR supplies to accompany: Blood Glucose Monitor Brands: per insurance.  Qty: 1 kit, Refills: 0    Associated Diagnoses: Type 2 diabetes mellitus with hemoglobin A1c goal of 7.0%-8.0% (H)     "  dorzolamide (TRUSOPT) 2 % ophthalmic solution Place 1 drop into both eyes 2 times daily  Qty: 10 mL, Refills: 11    Associated Diagnoses: Primary open angle glaucoma (POAG) of both eyes, mild stage      gabapentin (NEURONTIN) 300 MG capsule Take 1 capsule (300 mg) by mouth At Bedtime  Qty: 90 capsule, Refills: 0    Associated Diagnoses: Diabetic polyneuropathy associated with type 2 diabetes mellitus (H)      insulin aspart (NOVOLOG FLEXPEN) 100 UNIT/ML injection Inject 10-20 Units Subcutaneous 2 times daily (with meals) With lunch and dinner       Insulin Glargine (LANTUS SOLOSTAR SC) Inject 60 Units Subcutaneous At Bedtime      insulin pen needle (32G X 6 MM) 32G X 6 MM miscellaneous Use 3 pen needles daily or as directed.  Qty: 300 each, Refills: 0    Associated Diagnoses: Type 2 diabetes mellitus with hemoglobin A1c goal of 7.0%-8.0% (H)      Lido-Pentaf-Tetrafl-Ultrasound (ACCUCAINE) 1 % KIT       simvastatin (ZOCOR) 10 MG tablet Take 10 mg by mouth At Bedtime      thin (NO BRAND SPECIFIED) lancets Use with lanceting device. To accompany: Blood Glucose Monitor Brands: per insurance.  Qty: 200 each, Refills: 6    Associated Diagnoses: Type 2 diabetes mellitus with hemoglobin A1c goal of 7.0%-8.0% (H)      timolol maleate (TIMOPTIC) 0.5 % ophthalmic solution Place 1 drop into both eyes 2 times daily  Qty: 1 Bottle, Refills: 11    Associated Diagnoses: Primary open angle glaucoma (POAG) of both eyes, mild stage       !! - Potential duplicate medications found. Please discuss with provider.        Allergies   Allergies   Allergen Reactions     Aleve      HA sweats     Citalopram Itching     Clopidogrel Nausea and Vomiting     Pt to restart on 11/25/15 to see again if he tolerates it or not. His sx were only GI. Not a true allergy     Naproxen Itching     About 10 yrs ago, itching all over from taking Aleve  \"get sick and really sick\"       Sulfamethoxazole-Trimethoprim      Other reaction(s): Renal Failure  Other " reaction(s): Renal Failure

## 2020-10-21 NOTE — PROGRESS NOTES
Care Management Follow Up Note    Length of Stay (days) 10    Patient plan of care discussed at Interdisciplinary Rounds: yes  Expected Discharge Date: 10/20/20  Concerns to be Addressed:  All concerns addressed    Anticipated Discharge Disposition:  Home  Anticipated Discharge Services:  None  Anticipated Discharge DME:  Unknown    Plan:  Rojelio received a call from the physician who said that the pt is discharge ready.  The pt is now complaining of leg pain, so an ultrasound was ordered.  The physician said that if the ultrasound is negative, the pt will discharge from the hospital today.    Rojelio was updated by the bedside nurse that the pt thought sw was finding him an alternative place to stay, so he doesn't have to go home.  Rojelio told the nurse that sw is not finding the pt an alternative place to stay and that per the sw note yesterday at 1555, the pt was going to talk with his family about returning to their apartment.    Rojelio spoke with the pt via telephone and he asked sw where he was going to go.  Rojelio told him that he can go home, there are shelters in the cities that he would need to call to see if there are openings, or he can pay for a hotel room.  He asked about going to [place] and rojelio told him that he does not qualify to go there and when it was offered to him in the beginning of his stay, he declined.  He said that he does not have any money to pay for a hotel and asked if the hospital would pay.  Rojelio told him that the hospital will not pay for him to stay at a hotel.  Rojelio told him that he does not require hospitalization and that he has been cleared to discharge home.  Rojelio suggested ways for him to go home and isolate from his family.  He told sw that he needs a hotel for 7 days.  Rojelio asked why 7 days and he said that's how long it will be until he is safe to go home.  Rojelio told him again that per the physician, he is safe to discharge home.  He said that he will call his family and see if they have money he can  have to pay for a hotel.      Sw updated the bedside nurse.  Sw updated the unit manager.    ALEKSANDER Buckley, [facility]  Inpatient Care Coordination  [facility]  [phone]

## 2020-10-21 NOTE — ED AVS SNAPSHOT
[facility] Emergency Dept  [address]  [facility] [address]  Phone: [phone]  Fax: [phone]                                    Rachell Paige   MRN: [id number]    Department: [facility] Emergency Dept   Date of Visit: 10/21/2020           After Visit Summary Signature Page    I have received my discharge instructions, and my questions have been answered. I have discussed any challenges I see with this plan with the nurse or doctor.    ..........................................................................................................................................  Patient/Patient Representative Signature      ..........................................................................................................................................  Patient Representative Print Name and Relationship to Patient    ..................................................               ................................................  Date                                   Time    ..........................................................................................................................................  Reviewed by Signature/Title    ...................................................              ..............................................  Date                                               Time          22EPIC Rev 08/18

## 2020-10-21 NOTE — PLAN OF CARE
A&Ox4. VSS on RA. Denies pain. Up independently in the room. . May discharge today if patient agrees. Will continue with plan of care.

## 2020-10-22 ENCOUNTER — MEDICAL CORRESPONDENCE (OUTPATIENT)
Dept: HEALTH INFORMATION MANAGEMENT | Facility: CLINIC | Age: 65
End: 2020-10-22

## 2020-10-22 ENCOUNTER — PATIENT OUTREACH (OUTPATIENT)
Dept: NURSING | Facility: CLINIC | Age: 65
End: 2020-10-22
Payer: COMMERCIAL

## 2020-10-22 ENCOUNTER — NURSE TRIAGE (OUTPATIENT)
Dept: NURSING | Facility: CLINIC | Age: 65
End: 2020-10-22

## 2020-10-22 ENCOUNTER — TELEPHONE (OUTPATIENT)
Dept: FAMILY MEDICINE | Facility: CLINIC | Age: 65
End: 2020-10-22

## 2020-10-22 ENCOUNTER — APPOINTMENT (OUTPATIENT)
Dept: GENERAL RADIOLOGY | Facility: CLINIC | Age: 65
End: 2020-10-22
Attending: EMERGENCY MEDICINE
Payer: COMMERCIAL

## 2020-10-22 VITALS
SYSTOLIC BLOOD PRESSURE: 168 MMHG | RESPIRATION RATE: 18 BRPM | DIASTOLIC BLOOD PRESSURE: 80 MMHG | OXYGEN SATURATION: 100 % | TEMPERATURE: 97.7 F | HEART RATE: 97 BPM

## 2020-10-22 DIAGNOSIS — E11.9 TYPE 2 DIABETES MELLITUS WITH HEMOGLOBIN A1C GOAL OF 7.0%-8.0% (H): Primary | ICD-10-CM

## 2020-10-22 LAB
ACANTHOCYTES BLD QL SMEAR: SLIGHT
ALBUMIN SERPL-MCNC: 2.9 G/DL (ref 3.4–5)
ALP SERPL-CCNC: 88 U/L (ref 40–150)
ALT SERPL W P-5'-P-CCNC: 32 U/L (ref 0–70)
ANION GAP SERPL CALCULATED.3IONS-SCNC: 8 MMOL/L (ref 3–14)
AST SERPL W P-5'-P-CCNC: 12 U/L (ref 0–45)
BASOPHILS # BLD AUTO: 0 10E9/L (ref 0–0.2)
BASOPHILS NFR BLD AUTO: 0 %
BILIRUB SERPL-MCNC: 0.5 MG/DL (ref 0.2–1.3)
BUN SERPL-MCNC: 33 MG/DL (ref 7–30)
CALCIUM SERPL-MCNC: 8.1 MG/DL (ref 8.5–10.1)
CHLORIDE SERPL-SCNC: 94 MMOL/L (ref 94–109)
CO2 SERPL-SCNC: 22 MMOL/L (ref 20–32)
CREAT SERPL-MCNC: 1.14 MG/DL (ref 0.66–1.25)
CRP SERPL-MCNC: <2.9 MG/L (ref 0–8)
D DIMER PPP FEU-MCNC: <0.3 UG/ML FEU (ref 0–0.5)
DACRYOCYTES BLD QL SMEAR: SLIGHT
DIFFERENTIAL METHOD BLD: ABNORMAL
EOSINOPHIL # BLD AUTO: 0 10E9/L (ref 0–0.7)
EOSINOPHIL NFR BLD AUTO: 0 %
ERYTHROCYTE [DISTWIDTH] IN BLOOD BY AUTOMATED COUNT: 13.3 % (ref 10–15)
GFR SERPL CREATININE-BSD FRML MDRD: 68 ML/MIN/{1.73_M2}
GLUCOSE SERPL-MCNC: 250 MG/DL (ref 70–99)
HCT VFR BLD AUTO: 44.3 % (ref 40–53)
HGB BLD-MCNC: 14.6 G/DL (ref 13.3–17.7)
INTERPRETATION ECG - MUSE: NORMAL
LYMPHOCYTES # BLD AUTO: 3 10E9/L (ref 0.8–5.3)
LYMPHOCYTES NFR BLD AUTO: 16 %
MCH RBC QN AUTO: 27.4 PG (ref 26.5–33)
MCHC RBC AUTO-ENTMCNC: 33 G/DL (ref 31.5–36.5)
MCV RBC AUTO: 83 FL (ref 78–100)
METAMYELOCYTES # BLD: 1.3 10E9/L
METAMYELOCYTES NFR BLD MANUAL: 7 %
MONOCYTES # BLD AUTO: 0.9 10E9/L (ref 0–1.3)
MONOCYTES NFR BLD AUTO: 5 %
NEUTROPHILS # BLD AUTO: 13.6 10E9/L (ref 1.6–8.3)
NEUTROPHILS NFR BLD AUTO: 72 %
OVALOCYTES BLD QL SMEAR: SLIGHT
PLATELET # BLD AUTO: 510 10E9/L (ref 150–450)
PLATELET # BLD EST: ABNORMAL 10*3/UL
POIKILOCYTOSIS BLD QL SMEAR: SLIGHT
POTASSIUM SERPL-SCNC: 4.5 MMOL/L (ref 3.4–5.3)
PROT SERPL-MCNC: 6.6 G/DL (ref 6.8–8.8)
RBC # BLD AUTO: 5.32 10E12/L (ref 4.4–5.9)
SODIUM SERPL-SCNC: 124 MMOL/L (ref 133–144)
TROPONIN I SERPL-MCNC: <0.015 UG/L (ref 0–0.04)
WBC # BLD AUTO: 18.9 10E9/L (ref 4–11)

## 2020-10-22 PROCEDURE — 71045 X-RAY EXAM CHEST 1 VIEW: CPT

## 2020-10-22 ASSESSMENT — ENCOUNTER SYMPTOMS
VOMITING: 0
DIARRHEA: 0
NAUSEA: 0
DYSURIA: 0
PALPITATIONS: 1

## 2020-10-22 ASSESSMENT — ACTIVITIES OF DAILY LIVING (ADL): DEPENDENT_IADLS:: INDEPENDENT

## 2020-10-22 NOTE — TELEPHONE ENCOUNTER
ED / Discharge Outreach Protocol    Patient Contact    Attempt # 1    Was call answered?  No.  Left message on voicemail with information to call FREDERIC Bravo back.    Dulce Maria WATSON RN    Discharge Orders             COVID-19 GetWell Loop Referral       Reason for your hospital stay     You were hospitalized for COVID19 and were treated with dexamethasone while here and will finish an additional 2 days starting tomorrow.  This is causing some elevation in your blood sugars.  You no longer need any supplemental oxygen.  Although over 10 days have passed and your symptoms are improving you likely will have some ongoing shortness of breath, cough, and weakness for a number of days or even weeks despite being recovered from active COVID19 infection.          Follow-up and recommended labs and tests      Follow up with primary care provider, Olivia Nichols, within 7-14 days for hospital follow- up if not continuing to slowly improve at home          Discharge - Quarantine/Isolation Instruction     Date of symptom onset:     Date of first positive test:    If you tested positive COVID-19 and show symptoms (fever, cough, body aches or trouble breathing):        Stay home and away from others (self-isolate) until:        At least 10 days have passed since your symptoms started. AND...        You've had no fever-and no medicine that reduces fever-for 3 full days (72 hours). AND...         Your other symptoms have resolved (gotten better).  If you tested positive for COVID-19 but don't show symptoms:       Stay home and away from others (self-isolate) until at least 10 days have passed since the date of your first positive COVID-19 test.          Contact provider     Contact your primary care provider if If increased trouble breathing, new arm/leg swelling, dizziness/passing out, falls, bleeding that doesn't stop, or uncontrolled pain.          Activity     Your activity upon discharge: activity as tolerated          Diet      Follow this diet upon discharge: Orders Placed This Encounter      Moderate Consistent CHO Diet

## 2020-10-22 NOTE — TELEPHONE ENCOUNTER
Diabetes Education Scheduling Outreach #1:    Call to patient to schedule. Patient declined to schedule.    Neva Muhammad  [facility]  Diabetes and Nutrition Scheduling

## 2020-10-22 NOTE — TELEPHONE ENCOUNTER
Please notify patient recommend he establishes with endocrinology for diabetes management, referral has been placed to [facility] endocrinology also strongly recommend he follow-up with diabetes educator.  Dr. Nichols

## 2020-10-22 NOTE — ED TRIAGE NOTES
"Pt discharge from hospital stay today after testing covid positive on 10/7 with resp complications. Pt states he was laying down tonight and felt his heart \"racing\", pt states HR was 112 when he checked. Pt  and febrile upon arrival. Pt also endorses bilateral chest pain 6/10. NAD. ABCs intact, GCS 15.  "

## 2020-10-22 NOTE — PLAN OF CARE
Patient adequate for discharge. VSS. On RA. IV removed. Patient received all discharge instructions and all questions were answered. Reviewed transportation options with patient, patient had brother come pick him up from hospital at time of discharge. Patient respectably brought to front door and brother came and picked him up. All belongings were sent with patient. New medications and instructions sent with patient, all questions answered.

## 2020-10-22 NOTE — ED NOTES
Bed: ED14  Expected date: 10/21/20  Expected time: 11:22 PM  Means of arrival:   Comments:  BV 65 M covid

## 2020-10-22 NOTE — PROGRESS NOTES
Clinic Care Coordination Contact    Clinic Care Coordination Contact  OUTREACH    Referral Information:  Referral Source: ED Follow-Up    Primary Diagnosis: Other (include Comment box)(COVID)    Chief Complaint   Patient presents with     Clinic Care Coordination - Post Hospital     Clinic Care Coordination - Initial        [place] Utilization: [facility]  Hospitalist Discharge Summary       Date of Admission:  10/10/2020  Date of Discharge:  10/21/2020  Discharging Provider: Raghavendra Sharma MD     Discharge Diagnoses     1. Acute hypoxic respiratory failure due to COVID 19 pneumonia.     2. Type 2 DM.  Clinic Utilization  Difficulty keeping appointments:: No  Compliance Concerns: No  No-Show Concerns: No  No PCP office visit in Past Year: No  Utilization    Last refreshed: 10/22/2020  7:45 AM: Hospital Admissions 1           Last refreshed: 10/22/2020  7:45 AM: ED Visits 6           Last refreshed: 10/22/2020  7:45 AM: No Show Count (past year) 9              Current as of: 10/22/2020  7:45 AM            Clinical Concerns:  CC SW spoke with pt regarding his recent hospital stay due to symptoms of COVID-19. Pt shared that he is doing much better, no more fever, cough, or SOB. He did explained that he continues to be weak. CC SW explained that it is unknown how long it will take to regain his strength.    Currently, pt is isolating. He is wearing a mask if ever in contact with his wife. He plans to continue isolation for 6 more days to ensure he is not contagious. His wife has tested negative for COVID.    Current Medical Concerns:  COVID    Current Behavioral Concerns: none    Education Provided to patient: CC Role   Pain  Pain (GOAL):: No  Health Maintenance Reviewed: Up to date  Clinical Pathway: None    Medication Management:  Post-discharge medication reconciliation status: Discharge medications reviewed and reconciled.  Changed medications per note/orders (see AVS).     Functional  Status:  Dependent ADLs:: Independent  Dependent IADLs:: Independent  Bed or wheelchair confined:: No  Mobility Status: Independent  Fallen 2 or more times in the past year?: No  Any fall with injury in the past year?: No    Living Situation:  Current living arrangement:: I live in a private home with spouse  Type of residence:: Private home - stairs    Lifestyle & Psychosocial Needs:  Lifestyle     Physical activity     Days per week: Not on file     Minutes per session: Not on file     Stress: Not at all     Social Needs     Financial resource strain: Not hard at all     Food insecurity     Worry: Never true     Inability: Never true     Transportation needs     Medical: No     Non-medical: No     Diet:: Regular  Inadequate nutrition (GOAL):: No  Tube Feeding: No  Inadequate activity/exercise (GOAL):: No  Significant changes in sleep pattern (GOAL): No  Transportation means:: Regular car     Anabaptist or spiritual beliefs that impact treatment:: No  Mental health DX:: No  Mental health management concern (GOAL):: No  Informal Support system:: Family, Spouse   Socioeconomic History     Marital status:      Spouse name: Not on file     Number of children: Not on file     Years of education: Not on file     Highest education level: Not on file   Relationships     Social connections     Talks on phone: Not on file     Gets together: More than three times a week     Attends Jehovah's witness service: Not on file     Active member of club or organization: Not on file     Attends meetings of clubs or organizations: Not on file     Relationship status:      Intimate partner violence     Fear of current or ex partner: Not on file     Emotionally abused: Not on file     Physically abused: Not on file     Forced sexual activity: Not on file     Tobacco Use     Smoking status: Former Smoker     Types: Cigarettes     Quit date: 1990     Years since quittin.8     Smokeless tobacco: Never Used   Substance and Sexual  Activity     Alcohol use: No     Drug use: No     Sexual activity: Yes     Partners: Female      Resources and Interventions:  Current Resources:   Community Resources: None  Supplies used at home:: None  Equipment Currently Used at Home: none   )   )    Advance Care Plan/Directive  Advanced Care Plans/Directives on file:: No    Referrals Placed: None       Future Appointments              Today Natalia, [facility] TERRY Bravo    In 1 month Hood Marr MD [facility] ERIKA Soni CLIN      Plan: At this time, pt denies outstanding need for connection or referral to resources or assistance navigating recommended follow up care. No further outreaches will be made at this time unless a new referral is made or a change in the pt's status occurs. Patient was provided with [facility] contact information and encouraged to call with any questions or concerns.    ALEKSANDER Choi, [facility]  Clinic Care Coordinator  [facility]  [phone]  [email or web address]

## 2020-10-22 NOTE — TELEPHONE ENCOUNTER
Seen at ER today for palpitations. See ER note. Discharged from hospital today after 2 wk admission for Covid illness. Asks if he can/should continue taking simvastatin prescribed by his doctor. Advised pt unless he was told by a doctor to stop the simvastatin he can and should continue taking it. He should follow up w/ his pcp on this.

## 2020-10-22 NOTE — TELEPHONE ENCOUNTER
Chief Complaint: 2019 Novel Coronavirus Disease (Covid-19),  WED 21-OCT-2020  2 / 1    [phone] (home)

## 2020-10-22 NOTE — LETTER
[address] [facility]  [address]  [place]  [address]  Tel: [phone]    October 29, 2020    Rachell Paige  [address] [facility] [address]        Dear Jazz Grecia,      I recommend you establish with endocrinology for diabetes management, referral has been placed to [facility] [phone]. Please call to schedule. I also strongly recommend you follow-up with diabetes educator- please call [phone] to schedule.     Sincerely,    Dr. Nichols/Jennifer ZAYAS RN

## 2020-10-22 NOTE — ED PROVIDER NOTES
History     Chief Complaint:  Chest Pain and Tachycardia    The history is provided by the patient and medical records.      Rachell Paige is a 64 year old male on Eliquis who presents with continued symptoms after he was discharged today after a two week hospital admission. The patient was admitted for respiratory failure secondary to a positive Covid-19 test done on 10/7. He was discharged today with Eliquis and Decadron.Today he was laying in bed when he noticed an increased heart rate and a pulse of 118. Here, he denies dysuria, vomiting, nausea, or diarrhea.      Allergies:  Aleve  Citalopram  Clopidogrel  Naproxen  Sulfamethoxazole-Trimethoprim    Medications:    Eliquis  Aspirin 81 mg  Neurontin  Zestril  Novolog  Lantus  Zocor    Past Medical History:    Anxiety  Dermatitis  Type 2 diabetes mellitus  Glaucoma  Hypertension  Hyperlipidemia  Microalbuminuria  Onychomycosis  Osteomyelitis  CKD  Covid-19  Closed displaced fracture of proximal phalanx    Past Surgical History:    Right fifth toe amputation    Family History:    Mother: Lung disease  Father: Glaucoma    Social History:  The patient was accompanied to the ED by wife.  Smoking Status: Former Smoker  Smokeless Tobacco: Never Used  Alcohol Use: Negative  Drug Use: Negative  PCP: Olivia Nichols    Marital Status:       Review of Systems   Cardiovascular: Positive for palpitations.   Gastrointestinal: Negative for diarrhea, nausea and vomiting.   Genitourinary: Negative for dysuria.   All other systems reviewed and are negative.    Physical Exam     Patient Vitals for the past 24 hrs:   BP Temp Temp src Pulse Resp SpO2   10/22/20 0100 (!) 168/80 -- -- 97 -- --   10/22/20 0045 (!) 177/80 -- -- 93 -- --   10/22/20 0030 (!) 153/74 -- -- 94 -- 100 %   10/22/20 0015 (!) 160/73 -- -- 93 -- 97 %   10/22/20 0000 (!) 158/71 -- -- 96 -- 95 %   10/21/20 2345 (!) 151/95 -- -- 101 -- 97 %   10/21/20 2336 (!) 180/94 97.7  F (36.5  C) Oral 101 18 96 %      Physical Exam  General: Alert, appears well-developed and well-nourished. Cooperative.     In mild distress  HEENT:  Head:  Atraumatic  Ears:  External ears are normal  Mouth/Throat:  Oropharynx is without erythema or exudate and mucous membranes are moist.   Eyes:   Conjunctivae normal and EOM are normal. No scleral icterus.  CV:  Tachycardia rate, regular rhythm, normal heart sounds and radial pulses are 2+ and symmetric.  No murmur.  Resp:  Breath sounds are clear bilaterally, no wheezing.       Non-labored, no retractions or accessory muscle use  GI:  Abdomen is soft, no distension, no tenderness. No rebound or guarding.  No CVA tenderness bilaterally  MS:  Normal range of motion. No edema.    Normal strength in all 4 extremities.     Back atraumatic.    No midline cervical, thoracic, or lumbar tenderness  Skin:  Warm and dry.  No rash or lesions noted.  Neuro: Alert. Normal strength.  GCS: 15  Psych:  Normal mood and affect.    Emergency Department Course     ECG:  Indication: Chest Pain and Tachycardia  Time: 2335  Vent. Rate 100 bpm. TX interval 156. QRS duration 80. QT/QTc 332/428. P-R-T axis 62 -30 57. Sinus rhythm. Left axis deviation. Abnormal ECG. No changes when compared to prior dated 10/14/2020. Read time: 1145     Imaging:  Radiology findings were communicated with the patient who voiced understanding of the findings.    XR Chest, Portable, G/E 1 view:   Cardiac enlargement. Interstitial infiltrates have developed in the mid and lower lungs which could be due to inflammation or edema. Stable vague 4 mm nodule overlapping the right posterior fifth rib. As per radiology.    Laboratory:  Laboratory findings were communicated with the patient who voiced understanding of the findings.    CBC: WBC: 18.9 (H), HGB: 14.6, PLT: 510 (H)    CMP: Glucose 250 (H), Sodium: 124 (L), Urea: 33 (H), Calcium: 8.1 (L), Albumin: 2.9 (L), Protein: 6.6 (L) o/w WNL (Creatinine: 1.14)    Glucose by meter: 239  (H)    2343 Troponin: <0.015    CRP Inflammation: <2.9    D dimer Quantitative: <0.3    Interventions:  2356 NS 1L IV    Emergency Department Course:  Past medical records, nursing notes, and vitals reviewed.    2343 I performed an exam of the patient as documented above.     EKG obtained in the ED, see results above.     IV was inserted and blood was drawn for laboratory testing, results above.    The patient was sent for a Chest X-ray while in the emergency department, results above.     0103 I rechecked the patient and discussed the results of his workup thus far.     Findings and plan explained to the Patient and spouse. Patient discharged home with instructions regarding supportive care, medications, and reasons to return. The importance of close follow-up was reviewed.    I personally reviewed the laboratory and imaging results with the Patient and spouse and answered all related questions prior to discharge.     Impression & Plan     Medical Decision Making:  Patient is a 64-year-old male who presents after return home this afternoon from a hospitalization for approximately 2 weeks due to COVID-19 with chest pain and tachycardia.  Patient had a broad work-up performed this evening due to mild tachycardia although reassuringly d-dimer undetectable & low concern for pulmonary embolism.  Additionally patient was started on Xarelto for 30-day course due to his recent diagnosis of COVID-19.  Reassuringly his blood work is unremarkable except for a nonspecific leukocytosis likely secondary to steroid use over the last several days.  Additionally patient is mildly hyponatremic here and did receive a liter of IV fluids.  I suspect there may be some mild dehydration particularly with his fatigue and weakness from hospitalization and recent COVID-19 infection.  I do feel there is an anxiety component as well as patient is quite frustrated from getting COVID-19 and having to be hospitalized recently.  Patient's chest  x-ray does show potential interstitial infiltrates within the mid and lower lungs which may be due to inflammation or edema.  This is a nonspecific findings but reassuringly he has no signs of heart failure or fluid overload at this time.  Additionally I feel his leukocytosis is secondary to steroids and he has no fever cough or concerns for pneumonia.  Would not do antibiotics at this time.  He is not hypoxic and is on a current treatment regimen for recent COVID-19.  We discussed continuing with this regimen and close outpatient follow-up with his primary care doctor for recheck in the next 1 week. Thankfully he is not hypoxic and has normal vital signs on my reassessment.  I do feel that he is safe for discharge home with plans for close outpatient follow-up with his primary care provider in the next 1 week for recheck.  Return sooner to the emergency department if he develops a change in the chest pain characterization, new symptoms, or fever.  After all questions answered return precautions understood, discharged home.    Diagnosis:    ICD-10-CM    1. History of 2019 novel coronavirus disease (COVID-19)  Z86.19 D dimer quantitative     CRP inflammation     Comprehensive metabolic panel     CBC with platelets differential     Troponin I (now)   2. Chest pain, unspecified type  R07.9    3. Tachycardia  R00.0    4. Hyponatremia  E87.1    5. Infiltrate of lung present on chest x-ray  R91.8        Disposition:  Discharged to home.    Scribe Disclosure:  I, Farhat Quiroz, am serving as a scribe at 11:35 PM on 10/21/2020 to document services personally performed by Arash Price MD based on my observations and the provider's statements to me.        Arash Price MD  10/22/20 09

## 2020-10-23 ENCOUNTER — NURSE TRIAGE (OUTPATIENT)
Dept: NURSING | Facility: CLINIC | Age: 65
End: 2020-10-23

## 2020-10-23 NOTE — TELEPHONE ENCOUNTER
Pt is calling in concerned about his BP. Pt reports BP is 159/81 right now. Pt does take BP medication, and takes it about 10 pm. Pt is concerned, and would like to make an appointment with his PCP.   Pt was in the hospital for Coronavirus, and was discharged 2 days ago. Pt was transferred to scheduling to make an appointment. Pt also asks if he should continue taking aspirin prescribed by his doctor. Advised pt unless he was told by a doctor to stop the aspirin he can and should continue taking it. Pt was advised to discuss this with his doctor. Pt was transferred to scheduling to get an appointment. Pt refused an available appointment for today, and was able to get an appointment scheduled for Wednesday. Pt is also requesting a doctor call him back today.     Please call pt at [phone].     Trip Macedo RN on 10/23/2020 at 3:26 PM      Additional Information    Negative: Sounds like a life-threatening emergency to the triager    Negative: Pregnant > 20 weeks or postpartum (< 6 weeks after delivery) and new hand or face swelling    Negative: Pregnant > 20 weeks and BP > 140/90    Negative: Systolic BP >= 160 OR Diastolic >= 100, and any cardiac or neurologic symptoms (e.g., chest pain, difficulty breathing, unsteady gait, blurred vision)    Negative: Patient sounds very sick or weak to the triager    Negative: BP Systolic BP >= 140 OR Diastolic >= 90 and postpartum (from 0 to 6 weeks after delivery)    Negative: Systolic BP >= 180 OR Diastolic >= 110, and missed most recent dose of blood pressure medication    Negative: Systolic BP >= 180 OR Diastolic >= 110    Patient wants to be seen    Protocols used: HIGH BLOOD PRESSURE-A-OH

## 2020-10-23 NOTE — TELEPHONE ENCOUNTER
"  Additional Information    Negative: Drug overdose and nurse unable to answer question    Negative: Caller requesting information not related to medicine    Negative: Caller requesting a prescription for Strep throat and has a positive culture result    Negative: Rash while taking a medication or within 3 days of stopping it    Negative: Immunization reaction suspected    Negative: [1] Asthma AND [2] having symptoms of asthma (cough, wheezing, etc)    Negative: MORE THAN A DOUBLE DOSE of a prescription or over-the-counter (OTC) drug    Negative: [1] DOUBLE DOSE (an extra dose or lesser amount) of over-the-counter (OTC) drug AND [2] any symptoms (e.g., dizziness, nausea, pain, sleepiness)    Negative: [1] DOUBLE DOSE (an extra dose or lesser amount) of prescription drug AND [2] any symptoms (e.g., dizziness, nausea, pain, sleepiness)    Negative: Took another person's prescription drug    Negative: [1] DOUBLE DOSE (an extra dose or lesser amount) of prescription drug AND [2] NO symptoms (Exception: a double dose of antibiotics)    Negative: Diabetes drug error or overdose (e.g., insulin or extra dose)    Negative: [1] Request for URGENT new prescription or refill of \"essential\" medication (i.e., likelihood of harm to patient if not taken) AND [2] triager unable to fill per unit policy    Negative: [1] Prescription not at pharmacy AND [2] was prescribed today by PCP    Negative: Pharmacy calling with prescription questions and triager unable to answer question    Negative: Caller has urgent medication question about med that PCP prescribed and triager unable to answer question    Negative: Caller has NON-URGENT medication question about med that PCP prescribed and triager unable to answer question    Negative: Caller requesting a NON-URGENT new prescription or refill and triager unable to refill per unit policy    Negative: Caller has medication question about med not prescribed by PCP and triager unable to answer " question (e.g., compatibility with other med, storage)    Negative: [1] DOUBLE DOSE (an extra dose or lesser amount) of over-the-counter (OTC) drug AND [2] NO symptoms    Negative: [1] DOUBLE DOSE (an extra dose or lesser amount) of antibiotic drug AND [2] NO symptoms    Caller has medication question only, adult not sick, and triager answers question    Protocols used: MEDICATION QUESTION CALL-A-AH

## 2020-10-24 NOTE — TELEPHONE ENCOUNTER
Advise patient can increase lisinopril to 20 mg daily. (2 of the 10 mg dose). Continue to check BP. Hold if dizzy or lightheaded

## 2020-10-26 ENCOUNTER — APPOINTMENT (OUTPATIENT)
Dept: GENERAL RADIOLOGY | Facility: CLINIC | Age: 65
DRG: 177 | End: 2020-10-26
Attending: EMERGENCY MEDICINE
Payer: COMMERCIAL

## 2020-10-26 ENCOUNTER — HOSPITAL ENCOUNTER (INPATIENT)
Facility: CLINIC | Age: 65
LOS: 3 days | Discharge: HOME | DRG: 177 | End: 2020-10-29
Attending: EMERGENCY MEDICINE | Admitting: INTERNAL MEDICINE
Payer: COMMERCIAL

## 2020-10-26 DIAGNOSIS — E11.9 TYPE 2 DIABETES MELLITUS WITH HEMOGLOBIN A1C GOAL OF 7.0%-8.0% (H): Primary | ICD-10-CM

## 2020-10-26 DIAGNOSIS — R07.9 CHEST PAIN, UNSPECIFIED TYPE: ICD-10-CM

## 2020-10-26 DIAGNOSIS — E78.5 HYPERLIPIDEMIA LDL GOAL <100: ICD-10-CM

## 2020-10-26 DIAGNOSIS — R91.8 PULMONARY NODULES: ICD-10-CM

## 2020-10-26 DIAGNOSIS — I10 HTN, GOAL BELOW 140/90: ICD-10-CM

## 2020-10-26 DIAGNOSIS — E87.1 HYPONATREMIA: ICD-10-CM

## 2020-10-26 DIAGNOSIS — U07.1 2019 NOVEL CORONAVIRUS DISEASE (COVID-19): ICD-10-CM

## 2020-10-26 DIAGNOSIS — R06.02 SHORTNESS OF BREATH: ICD-10-CM

## 2020-10-26 DIAGNOSIS — M62.81 GENERALIZED MUSCLE WEAKNESS: ICD-10-CM

## 2020-10-26 LAB
ALBUMIN SERPL-MCNC: 2.9 G/DL (ref 3.4–5)
ALBUMIN UR-MCNC: 10 MG/DL
ALP SERPL-CCNC: 94 U/L (ref 40–150)
ALT SERPL W P-5'-P-CCNC: 31 U/L (ref 0–70)
ANION GAP SERPL CALCULATED.3IONS-SCNC: 8 MMOL/L (ref 3–14)
APPEARANCE UR: CLEAR
AST SERPL W P-5'-P-CCNC: 15 U/L (ref 0–45)
BASOPHILS # BLD AUTO: 0 10E9/L (ref 0–0.2)
BASOPHILS NFR BLD AUTO: 0.1 %
BILIRUB DIRECT SERPL-MCNC: 0.3 MG/DL (ref 0–0.2)
BILIRUB SERPL-MCNC: 1.1 MG/DL (ref 0.2–1.3)
BILIRUB UR QL STRIP: NEGATIVE
BUN SERPL-MCNC: 17 MG/DL (ref 7–30)
CALCIUM SERPL-MCNC: 8.4 MG/DL (ref 8.5–10.1)
CHLORIDE SERPL-SCNC: 86 MMOL/L (ref 94–109)
CO2 SERPL-SCNC: 25 MMOL/L (ref 20–32)
COLOR UR AUTO: ABNORMAL
CREAT SERPL-MCNC: 1.02 MG/DL (ref 0.66–1.25)
D DIMER PPP FEU-MCNC: 0.4 UG/ML FEU (ref 0–0.5)
DIFFERENTIAL METHOD BLD: ABNORMAL
EOSINOPHIL # BLD AUTO: 0.1 10E9/L (ref 0–0.7)
EOSINOPHIL NFR BLD AUTO: 0.6 %
ERYTHROCYTE [DISTWIDTH] IN BLOOD BY AUTOMATED COUNT: 13.3 % (ref 10–15)
GFR SERPL CREATININE-BSD FRML MDRD: 77 ML/MIN/{1.73_M2}
GLUCOSE BLDC GLUCOMTR-MCNC: 134 MG/DL (ref 70–99)
GLUCOSE BLDC GLUCOMTR-MCNC: 164 MG/DL (ref 70–99)
GLUCOSE BLDC GLUCOMTR-MCNC: 200 MG/DL (ref 70–99)
GLUCOSE BLDC GLUCOMTR-MCNC: 204 MG/DL (ref 70–99)
GLUCOSE BLDC GLUCOMTR-MCNC: 307 MG/DL (ref 70–99)
GLUCOSE SERPL-MCNC: 148 MG/DL (ref 70–99)
GLUCOSE UR STRIP-MCNC: NEGATIVE MG/DL
HCT VFR BLD AUTO: 42.3 % (ref 40–53)
HGB BLD-MCNC: 15 G/DL (ref 13.3–17.7)
HGB UR QL STRIP: NEGATIVE
IMM GRANULOCYTES # BLD: 0.4 10E9/L (ref 0–0.4)
IMM GRANULOCYTES NFR BLD: 2 %
INTERPRETATION ECG - MUSE: NORMAL
KETONES UR STRIP-MCNC: NEGATIVE MG/DL
LACTATE BLD-SCNC: 1.8 MMOL/L (ref 0.7–2)
LEUKOCYTE ESTERASE UR QL STRIP: NEGATIVE
LIPASE SERPL-CCNC: 147 U/L (ref 73–393)
LYMPHOCYTES # BLD AUTO: 2.3 10E9/L (ref 0.8–5.3)
LYMPHOCYTES NFR BLD AUTO: 11.8 %
MCH RBC QN AUTO: 28.5 PG (ref 26.5–33)
MCHC RBC AUTO-ENTMCNC: 35.5 G/DL (ref 31.5–36.5)
MCV RBC AUTO: 80 FL (ref 78–100)
MONOCYTES # BLD AUTO: 2.1 10E9/L (ref 0–1.3)
MONOCYTES NFR BLD AUTO: 10.8 %
NEUTROPHILS # BLD AUTO: 14.7 10E9/L (ref 1.6–8.3)
NEUTROPHILS NFR BLD AUTO: 74.7 %
NITRATE UR QL: NEGATIVE
NRBC # BLD AUTO: 0 10*3/UL
NRBC BLD AUTO-RTO: 0 /100
NT-PROBNP SERPL-MCNC: 220 PG/ML (ref 0–900)
OSMOLALITY SERPL: 258 MMOL/KG (ref 280–301)
OSMOLALITY UR: 155 MMOL/KG (ref 100–1200)
PH UR STRIP: 6.5 PH (ref 5–7)
PLATELET # BLD AUTO: 419 10E9/L (ref 150–450)
POTASSIUM SERPL-SCNC: 4.6 MMOL/L (ref 3.4–5.3)
PROCALCITONIN SERPL-MCNC: 0.11 NG/ML
PROT SERPL-MCNC: 6.3 G/DL (ref 6.8–8.8)
RBC # BLD AUTO: 5.26 10E12/L (ref 4.4–5.9)
RBC #/AREA URNS AUTO: 0 /HPF (ref 0–2)
SODIUM SERPL-SCNC: 119 MMOL/L (ref 133–144)
SODIUM SERPL-SCNC: 124 MMOL/L (ref 133–144)
SODIUM UR-SCNC: 17 MMOL/L
SOURCE: ABNORMAL
SP GR UR STRIP: 1 (ref 1–1.03)
TROPONIN I SERPL-MCNC: 0.03 UG/L (ref 0–0.04)
UROBILINOGEN UR STRIP-MCNC: NORMAL MG/DL (ref 0–2)
WBC # BLD AUTO: 19.7 10E9/L (ref 4–11)
WBC #/AREA URNS AUTO: <1 /HPF (ref 0–5)

## 2020-10-26 PROCEDURE — 84484 ASSAY OF TROPONIN QUANT: CPT | Performed by: EMERGENCY MEDICINE

## 2020-10-26 PROCEDURE — 83880 ASSAY OF NATRIURETIC PEPTIDE: CPT | Performed by: EMERGENCY MEDICINE

## 2020-10-26 PROCEDURE — 999N001017 HC STATISTIC GLUCOSE BY METER IP

## 2020-10-26 PROCEDURE — 84145 PROCALCITONIN (PCT): CPT | Performed by: EMERGENCY MEDICINE

## 2020-10-26 PROCEDURE — 80048 BASIC METABOLIC PNL TOTAL CA: CPT | Performed by: EMERGENCY MEDICINE

## 2020-10-26 PROCEDURE — 83605 ASSAY OF LACTIC ACID: CPT | Performed by: PHYSICIAN ASSISTANT

## 2020-10-26 PROCEDURE — 36415 COLL VENOUS BLD VENIPUNCTURE: CPT | Performed by: PHYSICIAN ASSISTANT

## 2020-10-26 PROCEDURE — 99285 EMERGENCY DEPT VISIT HI MDM: CPT | Mod: 25

## 2020-10-26 PROCEDURE — 84295 ASSAY OF SERUM SODIUM: CPT | Performed by: PHYSICIAN ASSISTANT

## 2020-10-26 PROCEDURE — 86769 SARS-COV-2 COVID-19 ANTIBODY: CPT | Performed by: PHYSICIAN ASSISTANT

## 2020-10-26 PROCEDURE — 85025 COMPLETE CBC W/AUTO DIFF WBC: CPT | Performed by: EMERGENCY MEDICINE

## 2020-10-26 PROCEDURE — 120N000001 HC R&B MED SURG/OB

## 2020-10-26 PROCEDURE — 83690 ASSAY OF LIPASE: CPT | Performed by: EMERGENCY MEDICINE

## 2020-10-26 PROCEDURE — 250N000012 HC RX MED GY IP 250 OP 636 PS 637: Performed by: PHYSICIAN ASSISTANT

## 2020-10-26 PROCEDURE — 99223 1ST HOSP IP/OBS HIGH 75: CPT | Mod: AI | Performed by: PHYSICIAN ASSISTANT

## 2020-10-26 PROCEDURE — 250N000013 HC RX MED GY IP 250 OP 250 PS 637: Performed by: EMERGENCY MEDICINE

## 2020-10-26 PROCEDURE — 81001 URINALYSIS AUTO W/SCOPE: CPT | Performed by: EMERGENCY MEDICINE

## 2020-10-26 PROCEDURE — 250N000013 HC RX MED GY IP 250 OP 250 PS 637: Performed by: PHYSICIAN ASSISTANT

## 2020-10-26 PROCEDURE — 258N000003 HC RX IP 258 OP 636: Performed by: EMERGENCY MEDICINE

## 2020-10-26 PROCEDURE — 80076 HEPATIC FUNCTION PANEL: CPT | Performed by: EMERGENCY MEDICINE

## 2020-10-26 PROCEDURE — 71045 X-RAY EXAM CHEST 1 VIEW: CPT

## 2020-10-26 PROCEDURE — 93005 ELECTROCARDIOGRAM TRACING: CPT

## 2020-10-26 PROCEDURE — 85379 FIBRIN DEGRADATION QUANT: CPT | Performed by: EMERGENCY MEDICINE

## 2020-10-26 PROCEDURE — 258N000003 HC RX IP 258 OP 636: Performed by: PHYSICIAN ASSISTANT

## 2020-10-26 PROCEDURE — 84300 ASSAY OF URINE SODIUM: CPT | Performed by: PHYSICIAN ASSISTANT

## 2020-10-26 PROCEDURE — 83935 ASSAY OF URINE OSMOLALITY: CPT | Performed by: PHYSICIAN ASSISTANT

## 2020-10-26 PROCEDURE — 83930 ASSAY OF BLOOD OSMOLALITY: CPT | Performed by: EMERGENCY MEDICINE

## 2020-10-26 RX ORDER — LIDOCAINE 40 MG/G
CREAM TOPICAL
Status: DISCONTINUED | OUTPATIENT
Start: 2020-10-26 | End: 2020-10-29 | Stop reason: HOSPADM

## 2020-10-26 RX ORDER — LISINOPRIL 20 MG/1
20 TABLET ORAL DAILY
Status: DISCONTINUED | OUTPATIENT
Start: 2020-10-27 | End: 2020-10-29 | Stop reason: HOSPADM

## 2020-10-26 RX ORDER — DEXTROSE MONOHYDRATE 25 G/50ML
25-50 INJECTION, SOLUTION INTRAVENOUS
Status: DISCONTINUED | OUTPATIENT
Start: 2020-10-26 | End: 2020-10-29 | Stop reason: HOSPADM

## 2020-10-26 RX ORDER — POLYETHYLENE GLYCOL 3350 17 G/17G
17 POWDER, FOR SOLUTION ORAL DAILY PRN
Status: DISCONTINUED | OUTPATIENT
Start: 2020-10-26 | End: 2020-10-29 | Stop reason: HOSPADM

## 2020-10-26 RX ORDER — MORPHINE SULFATE 2 MG/ML
2 INJECTION, SOLUTION INTRAMUSCULAR; INTRAVENOUS ONCE
Status: DISCONTINUED | OUTPATIENT
Start: 2020-10-26 | End: 2020-10-26

## 2020-10-26 RX ORDER — ACETAMINOPHEN 500 MG
1000 TABLET ORAL ONCE
Status: COMPLETED | OUTPATIENT
Start: 2020-10-26 | End: 2020-10-26

## 2020-10-26 RX ORDER — GABAPENTIN 300 MG/1
300 CAPSULE ORAL AT BEDTIME
Status: DISCONTINUED | OUTPATIENT
Start: 2020-10-26 | End: 2020-10-29 | Stop reason: HOSPADM

## 2020-10-26 RX ORDER — NICOTINE POLACRILEX 4 MG
15-30 LOZENGE BUCCAL
Status: DISCONTINUED | OUTPATIENT
Start: 2020-10-26 | End: 2020-10-29 | Stop reason: HOSPADM

## 2020-10-26 RX ORDER — AMOXICILLIN 250 MG
2 CAPSULE ORAL 2 TIMES DAILY PRN
Status: DISCONTINUED | OUTPATIENT
Start: 2020-10-26 | End: 2020-10-29 | Stop reason: HOSPADM

## 2020-10-26 RX ORDER — ONDANSETRON 4 MG/1
4 TABLET, ORALLY DISINTEGRATING ORAL EVERY 6 HOURS PRN
Status: DISCONTINUED | OUTPATIENT
Start: 2020-10-26 | End: 2020-10-29 | Stop reason: HOSPADM

## 2020-10-26 RX ORDER — NALOXONE HYDROCHLORIDE 0.4 MG/ML
.1-.4 INJECTION, SOLUTION INTRAMUSCULAR; INTRAVENOUS; SUBCUTANEOUS
Status: DISCONTINUED | OUTPATIENT
Start: 2020-10-26 | End: 2020-10-29 | Stop reason: HOSPADM

## 2020-10-26 RX ORDER — ONDANSETRON 2 MG/ML
4 INJECTION INTRAMUSCULAR; INTRAVENOUS EVERY 6 HOURS PRN
Status: DISCONTINUED | OUTPATIENT
Start: 2020-10-26 | End: 2020-10-29 | Stop reason: HOSPADM

## 2020-10-26 RX ORDER — PROCHLORPERAZINE MALEATE 5 MG
10 TABLET ORAL EVERY 6 HOURS PRN
Status: DISCONTINUED | OUTPATIENT
Start: 2020-10-26 | End: 2020-10-29 | Stop reason: HOSPADM

## 2020-10-26 RX ORDER — PROCHLORPERAZINE 25 MG
25 SUPPOSITORY, RECTAL RECTAL EVERY 12 HOURS PRN
Status: DISCONTINUED | OUTPATIENT
Start: 2020-10-26 | End: 2020-10-29 | Stop reason: HOSPADM

## 2020-10-26 RX ORDER — ACETAMINOPHEN 325 MG/1
650 TABLET ORAL EVERY 4 HOURS PRN
Status: DISCONTINUED | OUTPATIENT
Start: 2020-10-26 | End: 2020-10-29 | Stop reason: HOSPADM

## 2020-10-26 RX ORDER — SIMVASTATIN 10 MG
10 TABLET ORAL AT BEDTIME
Status: DISCONTINUED | OUTPATIENT
Start: 2020-10-26 | End: 2020-10-29 | Stop reason: HOSPADM

## 2020-10-26 RX ORDER — ACETAMINOPHEN 650 MG/1
650 SUPPOSITORY RECTAL EVERY 4 HOURS PRN
Status: DISCONTINUED | OUTPATIENT
Start: 2020-10-26 | End: 2020-10-29 | Stop reason: HOSPADM

## 2020-10-26 RX ORDER — HYDRALAZINE HYDROCHLORIDE 20 MG/ML
10 INJECTION INTRAMUSCULAR; INTRAVENOUS EVERY 4 HOURS PRN
Status: DISCONTINUED | OUTPATIENT
Start: 2020-10-26 | End: 2020-10-29 | Stop reason: HOSPADM

## 2020-10-26 RX ORDER — AMOXICILLIN 250 MG
1 CAPSULE ORAL 2 TIMES DAILY PRN
Status: DISCONTINUED | OUTPATIENT
Start: 2020-10-26 | End: 2020-10-29 | Stop reason: HOSPADM

## 2020-10-26 RX ORDER — LORAZEPAM 1 MG/1
1 TABLET ORAL ONCE
Status: COMPLETED | OUTPATIENT
Start: 2020-10-26 | End: 2020-10-26

## 2020-10-26 RX ORDER — SODIUM CHLORIDE 9 MG/ML
INJECTION, SOLUTION INTRAVENOUS CONTINUOUS
Status: DISCONTINUED | OUTPATIENT
Start: 2020-10-26 | End: 2020-10-27

## 2020-10-26 RX ORDER — ONDANSETRON 2 MG/ML
4 INJECTION INTRAMUSCULAR; INTRAVENOUS EVERY 30 MIN PRN
Status: DISCONTINUED | OUTPATIENT
Start: 2020-10-26 | End: 2020-10-26

## 2020-10-26 RX ORDER — MORPHINE SULFATE 2 MG/ML
2 INJECTION, SOLUTION INTRAMUSCULAR; INTRAVENOUS
Status: DISCONTINUED | OUTPATIENT
Start: 2020-10-26 | End: 2020-10-26

## 2020-10-26 RX ADMIN — SIMVASTATIN 10 MG: 10 TABLET, FILM COATED ORAL at 20:34

## 2020-10-26 RX ADMIN — GABAPENTIN 300 MG: 300 CAPSULE ORAL at 20:34

## 2020-10-26 RX ADMIN — APIXABAN 2.5 MG: 2.5 TABLET, FILM COATED ORAL at 20:34

## 2020-10-26 RX ADMIN — Medication 1 MG: at 20:34

## 2020-10-26 RX ADMIN — INSULIN GLARGINE 60 UNITS: 100 INJECTION, SOLUTION SUBCUTANEOUS at 20:34

## 2020-10-26 RX ADMIN — ACETAMINOPHEN 1000 MG: 500 TABLET, FILM COATED ORAL at 12:22

## 2020-10-26 RX ADMIN — SODIUM CHLORIDE: 9 INJECTION, SOLUTION INTRAVENOUS at 18:03

## 2020-10-26 RX ADMIN — INSULIN ASPART 3 UNITS: 100 INJECTION, SOLUTION INTRAVENOUS; SUBCUTANEOUS at 18:45

## 2020-10-26 RX ADMIN — SODIUM CHLORIDE 1000 ML: 9 INJECTION, SOLUTION INTRAVENOUS at 12:22

## 2020-10-26 RX ADMIN — LORAZEPAM 1 MG: 1 TABLET ORAL at 12:22

## 2020-10-26 ASSESSMENT — ACTIVITIES OF DAILY LIVING (ADL)
WEAR_GLASSES_OR_BLIND: YES
DRESSING/BATHING_DIFFICULTY: NO
FALL_HISTORY_WITHIN_LAST_SIX_MONTHS: NO
DOING_ERRANDS_INDEPENDENTLY_DIFFICULTY: NO
DIFFICULTY_EATING/SWALLOWING: NO
TOILETING_ISSUES: NO
WALKING_OR_CLIMBING_STAIRS_DIFFICULTY: NO
ADLS_ACUITY_SCORE: 11
CONCENTRATING,_REMEMBERING_OR_MAKING_DECISIONS_DIFFICULTY: NO
DIFFICULTY_COMMUNICATING: NO

## 2020-10-26 NOTE — PHARMACY-ADMISSION MEDICATION HISTORY
Pharmacy Medication History  Admission medication history interview status for the 10/26/2020  admission is complete. See EPIC admission navigator for prior to admission medications       Medication history sources: Patient  Location of interview: phone  Medication history source reliability: Good  Adherence assessment: Moderate    Significant changes made to the medication list:  Increase lisinopril to 20mg daily      Additional medication history information:   Took no meds today as too sick    Medication reconciliation completed by provider prior to medication history? No    Time spent in this activity: 20min, multiple phone calls       Prior to Admission medications    Medication Sig Last Dose Taking? Auth Provider   apixaban ANTICOAGULANT (ELIQUIS) 2.5 MG tablet Take 1 tablet (2.5 mg) by mouth 2 times daily for 26 days 10/25/2020 at Unknown time Yes Raghavendra Sharma MD, MD   aspirin 81 MG tablet Take 1 tablet (81 mg) by mouth daily 10/25/2020 at Unknown time Yes Merrill Fonseca MD   dorzolamide (TRUSOPT) 2 % ophthalmic solution Place 1 drop into both eyes 2 times daily 10/25/2020 at Unknown time Yes Hood Marr MD   gabapentin (NEURONTIN) 300 MG capsule Take 1 capsule (300 mg) by mouth At Bedtime 10/25/2020 at Unknown time Yes Olivia Nichols MD   insulin aspart (NOVOLOG FLEXPEN) 100 UNIT/ML injection Inject 10-20 Units Subcutaneous 2 times daily (with meals) With lunch and dinner  10/25/2020 at Unknown time Yes Reported, Patient   Insulin Glargine (LANTUS SOLOSTAR SC) Inject 60 Units Subcutaneous At Bedtime 10/25/2020 at Unknown time Yes Unknown, Entered By History   lisinopril (ZESTRIL) 10 MG tablet Take 1 tablet (10 mg) by mouth daily  Patient taking differently: Take 20 mg by mouth daily  10/25/2020 at Unknown time Yes Pio Parson MD   simvastatin (ZOCOR) 10 MG tablet Take 10 mg by mouth At Bedtime 10/25/2020 at Unknown time Yes Reported, Patient   timolol maleate (TIMOPTIC) 0.5 %  ophthalmic solution Place 1 drop into both eyes 2 times daily 10/25/2020 at Unknown time Yes Hood Marr MD   alcohol swab prep pads Use to swab area of injection/pedro pablo as directed.   Olivia Nichols MD   blood glucose (ACCU-CHEK GUIDE) test strip 1 strip by In Vitro route 4 times daily Use to test blood sugar 3 -4  times daily or as directed.   Olivia Nichols MD   blood glucose (NO BRAND SPECIFIED) test strip Use to test blood sugar 2 times daily or as directed.   Pio Parson MD   blood glucose (NO BRAND SPECIFIED) test strip Use to test blood sugar 4 times daily or as directed. To accompany: Blood Glucose Monitor Brands: per insurance.   Olivia Nichols MD   blood glucose calibration (NO BRAND SPECIFIED) solution To accompany: Blood Glucose Monitor Brands: per insurance.   Olivia Nichols MD   blood glucose monitoring (NO BRAND SPECIFIED) meter device kit Use to test blood sugar 4 times daily or as directed. Preferred blood glucose meter OR supplies to accompany: Blood Glucose Monitor Brands: per insurance.   Olivia Nichols MD   insulin pen needle (32G X 6 MM) 32G X 6 MM miscellaneous Use 3 pen needles daily or as directed.   Olivia Nichols MD   Lido-Pentaf-Tetrafl-Ultrasound (ACCUCAINE) 1 % KIT  More than a month at Unknown time  Reported, Patient   thin (NO BRAND SPECIFIED) lancets Use with lanceting device. To accompany: Blood Glucose Monitor Brands: per insurance.   Olivia Nichols MD

## 2020-10-26 NOTE — ED NOTES
"[facility]  ED Nurse Handoff Report    ED Chief complaint: Chest Pain      ED Diagnosis:   Final diagnoses:   Hyponatremia   Chest pain, unspecified type   Shortness of breath       Code Status: Full Code    Allergies:   Allergies   Allergen Reactions     Aleve      HA sweats     Citalopram Itching     Clopidogrel Nausea and Vomiting     Pt to restart on 11/25/15 to see again if he tolerates it or not. His sx were only GI. Not a true allergy     Naproxen Itching     About 10 yrs ago, itching all over from taking Aleve  \"get sick and really sick\"       Sulfamethoxazole-Trimethoprim      Other reaction(s): Renal Failure  Other reaction(s): Renal Failure       Patient Story: Left-sided CP for several days. Recent positive covid test.    Focused Assessment:  Pt anxious and hyperventilating at times. C/o continued intermittent CP. Puts on call light frequently. Uses urinal independently. Pt to remain on covid precautions.    Treatments and/or interventions provided: Tylenol, 1mg Ativan PO, 500cc NS bolus  Patient's response to treatments and/or interventions: mild improvement of pain.    To be done/followed up on inpatient unit:  na    Does this patient have any cognitive concerns?: na    Activity level - Baseline/Home:  Independent  Activity Level - Current:   Unknown    Patient's Preferred language: English   Needed?: No    Isolation: None and COVID r/o and special precautions  Infection: Not Applicable  COVID r/o and special precautions  Patient tested for COVID 19 prior to admission: NO per ER physician  Bariatric?: No    Vital Signs:   Vitals:    10/26/20 1300 10/26/20 1304 10/26/20 1330 10/26/20 1420   BP: (!) 182/87  (!) 163/85    Pulse: 104  96 106   Resp: (!) 43  17 30   Temp:  97.8  F (36.6  C)     TempSrc:  Oral     SpO2:    99%       Cardiac Rhythm:Cardiac Rhythm: Sinus tachycardia    Was the PSS-3 completed:   Yes  What interventions are required if any?               Family " Comments: no family present  OBS brochure/video discussed/provided to patient/family: N/A              Name of person given brochure if not patient: na              Relationship to patient: na    For the majority of the shift this patient's behavior was Green.   Behavioral interventions performed were na.    ED NURSE PHONE NUMBER: [phone]

## 2020-10-26 NOTE — ED NOTES
MD notified of sepsis bpa firing, no new orders at this time. MD approved no covid swab for patient due to previous positive result for admission.

## 2020-10-26 NOTE — ED PROVIDER NOTES
History     Chief Complaint:  Chest Pain       HPI   Rachell Paige is a 64 year old male on Eliquis with a history of diabetes, hypertension, hyperlipidemia who presents with chest pain. The patient reports having left sided chest pain last night. He states that his chest pain today is worse than his chest pain that he was evaluated for on 10/21. Of note, the patient was recently tested positive for covid on 10/7 and then admitted to hospital from 10/10-10/21. No fever or cough.    Labs from 10/21/20  CBC: WBC: 18.9 (H), HGB: 14.6, PLT: 510 (H)  CMP: Glucose 250 (H), Sodium: 124 (L), Urea: 33 (H), Calcium: 8.1 (L), Albumin: 2.9 (L), Protein: 6.6 (L) o/w WNL (Creatinine: 1.14)  Glucose by meter: 239 (H)  2343    Troponin: <0.015  CRP Inflammation: <2.9  D dimer Quantitative: <0.3    Allergies:  Aleve  Citalopram  Clopidogrel  Naproxen  Sulfamethoxazole-Trimethoprim     Medications:    Aspirin 81 mg  Neurontin  Zestril  Novolog  Lantus  Zocor  Eliquis     Past Medical History:    Anxiety  Dermatitis  Type 2 diabetes mellitus  Glaucoma  Hypertension  Hyperlipidemia  Microalbuminuria  Onychomycosis  Osteomyelitis  CKD  Covid-19  Closed displaced fracture of proximal phalanx  PAD     Past Surgical History:    Right fifth toe amputation     Family History:    Mother: Lung disease  Father: Glaucoma     Social History:  The patient was accompanied to the ED by wife.  Smoking Status: Former Smoker  Smokeless Tobacco: Never Used  Alcohol Use: Negative  Drug Use: Negative  PCP: Olivia Nichols     Marital Status:       Review of Systems   Cardiovascular: Positive for chest pain.   All other systems reviewed and are negative.    Physical Exam     Patient Vitals for the past 24 hrs:   BP Temp Temp src Pulse Resp SpO2   10/26/20 1304 -- 97.8  F (36.6  C) Oral -- -- --   10/26/20 1300 (!) 182/87 -- -- 104 (!) 43 --   10/26/20 1255 -- -- -- 110 (!) 31 99 %   10/26/20 1200 (!) 155/79 -- -- 95 (!) 31 99 %   10/26/20  1135 (!) 173/89 -- -- 101 18 100 %        Physical Exam    General: Sitting up in bed  Eyes:  The pupils are equal and round    Conjunctivae and sclerae are normal  ENT:    Wearing a mask  Neck:  Normal range of motion  CV:  Tachycardic rate, regular rhythm     Skin warm and well perfused     Mild tenderness on mid chest  Resp:  Mild tachypnea    No cough heard  GI:  Abdomen is soft, there is no rigidity    No distension    No rebound tenderness     No abdominal tenderness  MS:  Normal muscular tone  Skin:  No rash or acute skin lesions noted  Neuro:   Awake, alert.      Speech is normal and fluent.    Face is symmetric.     Moves all extremities equally  Psych: Normal affect.  Appropriate interactions.     Emergency Department Course   ECG:  ECG taken at 1142  Normal sinus rhythm  Cannot rule out anterior infarct, age undetermined   Abnormal ECG  Rate 98 bpm. DE interval 154 ms. QRS duration 78 ms. QT/QTc 336/428 ms. P-R-T axes 62 -23 55.   No significant change when compared to EKG dated 10/21/20.     Imaging:  Radiology findings were communicated with the patient who voiced understanding of the findings.    XR Chest Port 1 View  Preliminary Result  IMPRESSION: Portable chest. Heart size remains enlarged. Scattered  interstitial lung changes throughout the right lung and left lower  lung appears stable. No new infiltrate or consolidation. Nodular  density right upper lung not definitely identified on current study.  No evidence of pneumothorax or pleural effusions.  Reading per radiology     Laboratory:  Laboratory findings were communicated with the patient who voiced understanding of the findings.    UA with microscopic: protein albumin 10 (A), o/w WNL     Glucose by meter (1354): 134 (H)     Glucose by meter (1155): 164 (H)    CBC:  WBC 19.7 (H), HGB 15.0, , o/w WNL     BMP: Glucose 148 (H),  (LL), chloride 86 (L), calcium 8.4 (L), o/w WNL (Creatinine: 1.02)     Troponin(1139): 0.031    Lipase:  147    D dimer quantitative: 0.4        Hepatic panel: bilirubin 0.3 (H), albumin 2.9 (L), protein total 6.3 (L), o/w wnl     BNP: 220     Osmolality: 258 (L)    Sodium random urine: 17     Procalcitonin: 0.11    Interventions:  1222 0.9% sodium chloride BOLUS 1000 mL IV   1222 Ativan 1 mg oral   1222 Tylenol 1000 mg oral     Emergency Department Course:  Past medical records, nursing notes, and vitals reviewed.    1139 I performed an exam of the patient as documented above.    IV was inserted and blood was drawn for laboratory testing, results above.     The patient was sent for imaging while in the emergency department, results above.       1145 I spoke with Dr. Gonzalez of the Hospitalist service from [facility] regarding patient's presentation, findings, and plan of care.       Spoke to ID Dr. Bradshaw    1420 Patient rechecked and updated.       Findings and plan explained to the Patient who consents to admission. Discussed the patient with Dr. Gonzalez, who will admit the patient to an inpatient bed for further monitoring, evaluation, and treatment.      Impression & Plan   Covid-19  Rachell Paige was evaluated during a global COVID-19 pandemic, which necessitated consideration that the patient might be at risk for infection with the SARS-CoV-2 virus that causes COVID-19.   Applicable protocols for evaluation were followed during the patient's care.   COVID-19 was considered as part of the patient's evaluation. The plan for testing is:  No indication for testing today    Medical Decision Making:  Rachell Paige is a 64 year old male who presents to the emergency department today with chest pain. Patient with Recent covid admission. Not hypoxic in ED but is mildly tachypneic. Chest xray showed persistent lung findings compared to xray on 10/21. He has had no fever so bacterial pneumonia seems unlikely. Troponin wnl. Sodium low at 119. D-dimer wnl and is on anticoagulation so doubt PE. Will admit to hospital given  the hyponatremia. WIll continue on covid precautions given severe illness and just now on day 20. Spoke to hospitalist for admission.     Discharge Diagnosis:    ICD-10-CM    1. Hyponatremia  E87.1 UA with Microscopic     Nt probnp inpatient     Nt probnp inpatient     Osmolality     Osmolality     Glucose by meter     Glucose by meter     Procalcitonin     Procalcitonin     CANCELED: Osmolality     CANCELED: Nt probnp inpatient (BNP)     CANCELED: Procalcitonin   2. Chest pain, unspecified type  R07.9    3. Shortness of breath  R06.02        Disposition:  Admitted.    Scribe Disclosure:  IsIrael, am serving as a scribe at 11:38 AM on 10/26/2020 to document services personally performed by Maryam Rendon MD based on my observations and the provider's statements to me.      10/26/2020   Maryam Rendon MD Goertz, Maria Kristine, MD  10/26/20 [id number]

## 2020-10-26 NOTE — H&P
[facility]    History and Physical - Hospitalist Service       Date of Admission:  10/26/2020    Assessment & Plan   Rachell Paige is a 64 year old male with a past medical history of insulin-dependent diabetes mellitus type 2 and recent hospitalization from 10/10-10/21, 2020 for acute hypoxic respiratory failure secondary to COVID-19 pneumonia who presented to the emergency department for evaluation of worsening shortness of breath, fever, and weakness.  Patient is admitted under inpatient status.    Chest pain, atypical  Shortness of breath  Recent COVID-19 infection  As detailed in HPI, diagnosed with COVID-19 via PCR swab obtained 10/7. Developed acute hypoxic respiratory failure requiring supplemental oxygen. Pt is s/p treatment with 10-day course of dexamethasone, 5-day course of remedesivir. Returning with bilateral chest pain, suspected pleuritic in nature related to recent COVID-19 infection with reported ongoing fever to 105 and chills at home. Pt is not hypoxic or febrile on admission, with low-grade tachycardia in 100s.  - COVID precautions, treat as symptomatic PUI with high suspicion  - ID consulted, greatly appreciate assistance  - Symptomatic treatment with IVF, PRN antiemetics/antipyretics, supplemental oxygen  - Continue PTA Eliquis for DVT prophylaxis    Hyponatremia  Na 124 at time of recent discharge, has since downtrended to 119. Initially felt to have possible SIADH component given COVID-19 infection. Pt reports on admission lack of appetite for preceding 5 days, question hypovolemic component.  - NS @ 100cc/h on admission  - Repeat sodium values q6h    Diabetes Mellitus Type II, insulin-dependent  PTA regimen consists of Lantus 60u at bedtime, Novolog 10-20u twice daily with lunch/dinner.  - Continue PTA lantus  - High ssi  - Mod CHO diet    Hypertension  Hyperlipidemia  - PTA lisinopril, statin  - PRN hydralazine for SBP > 180     Diet:   Regular  DVT Prophylaxis:  Bel Michel Catheter: not present  Code Status:   Full Code    Visit/Communication Style   PHONE communication was used to talk with Rachell due to the COVID pandemic.  I did not personally see this patient.  Rachell consented to the use of phone communication: yes  Time spent speaking with the patient: 11-20 minutes                Disposition Plan   Expected discharge: 2 - 3 days, recommended to prior living arrangement pending clinical course.  Entered: Flex Lebron PA-C 10/26/2020, 4:18 PM     The patient's care was discussed with the Attending Physician, Dr. Gonzalez, Bedside Nurse and Patient.    Flex Lebron PA-C  [facility]  Contact information available via [facility] Paging/Directory      ______________________________________________________________________    Chief Complaint   SOB, weakness    History is obtained from the patient, EMR    History of Present Illness   Rachell Paige is a 64 year old male with a past medical history of insulin-dependent diabetes mellitus type 2 and recent hospitalization from 10/10-10/21, 2020 for acute hypoxic respiratory failure secondary to COVID-19 pneumonia who presented to the emergency department for evaluation of worsening shortness of breath, fever, and weakness.    Patient with recent admission on the dates above at [facility] due to respiratory failure related to acute COVID-19 pneumonia.  Initial COVID-19 swab was positive on 10/7/2020.  Patient during that admission was treated with a 10-day course of dexamethasone as well as remdesivir for total of 5 days.  Patient was able to wean from supplemental oxygen and maintain saturations above 90% both at rest and with ambulation.  He was discharged home with a 30-day prescription for Xarelto for ongoing DVT prophylaxis.    Since returning home, patient reports that he has had progressive worsening of symptoms.  He describes ongoing bilateral chest pain described as  "\"somebody punching my lungs.\"  He was initially evaluated for this complaint on day of discharge in the emergency department on 10/21/2020.  Work-up at that time was consistent with recent COVID-19 infection, chest pain was felt to be pleuritic in nature and patient was discharged home in stable condition with instructions for ongoing supportive cares.  Patient reports ongoing, persistent chest discomfort with increasing work of breathing, shortness of breath, and dyspnea on exertion.  He also describes generalized weakness and at times inability to ambulate secondary to difficulty breathing and weakness.  He also reports ongoing fever at home to maximum of 104-105 degrees, intermittently responsive to Tylenol.  Intermittent nonproductive cough. Also describes generalized whole body fever with sweating and loss of appetite.  States he has been unable to tolerate a large amount of oral intake since discharge secondary to anorexia.    Patient returned to the emergency department due to ongoing and worsening chest pain, which he reported as being worse than at time of discharge.  Is again described as bilateral chest pain with accompanying \"heart pain.\"  No aggravating or alleviating factors noted.  Patient also describes ongoing difficulty breathing with sensation of chest pounding upon attempting to lie flat.  He has been unable to sleep over the preceding 2 to 3 days secondary to increased respiratory effort.  On arrival in the emergency department, patient was noted to be tachycardic with heart rates in the low 100s.  Remainder of vital signs were unremarkable with notable normal oxygen saturations and temperature of 97.8.  Work-up included CMP which was significant for a sodium of 119, otherwise unremarkable.  Troponin level was drawn and was 0.031.  CBC revealed ongoing leukocytosis with white blood cell count of 19.7, otherwise unremarkable.  Procalcitonin was 0.11.  D-dimer was nonelevated.  Repeat chest x-ray " was performed and indicated stable scattered interstitial lung changes throughout bilateral lungs without new infiltrate or consolidation.  Given ongoing hyponatremia and patient reporting worsening symptoms, request was made to admit under inpatient status for ongoing cares.    Review of Systems    The 10 point Review of Systems is negative other than noted in the HPI or here.     Past Medical History    I have reviewed this patient's medical history and updated it with pertinent information if needed.   Past Medical History:   Diagnosis Date     Anxiety 2015     Dermatitis 2015     DM type 2, goal A1C 7-8 2015     Does not have health insurance 2015     Financial problems 2015     Glaucoma (increased eye pressure)      HTN, goal below 140/90 2015     Hyperlipidemia LDL goal <100 2015     Microalbuminuria 2015     Onychomycosis 2015     Rash 2015       Past Surgical History   I have reviewed this patient's surgical history and updated it with pertinent information if needed.  Past Surgical History:   Procedure Laterality Date     AMPUTATE TOE(S) Right 2018    Procedure: Right fifth toe amputation;  Surgeon: Clark Lora DPM;  Location:  OR       Social History   I have reviewed this patient's social history and updated it with pertinent information if needed.  Social History     Tobacco Use     Smoking status: Former Smoker     Types: Cigarettes     Quit date:      Years since quittin.8     Smokeless tobacco: Never Used   Substance Use Topics     Alcohol use: No     Drug use: No       Family History   I have reviewed this patient's family history and updated it with pertinent information if needed.  Family History   Problem Relation Age of Onset     Diabetes Other      LUNG DISEASE Mother      Glaucoma Father      Cerebrovascular Disease Son      Diabetes Son      Diabetes Daughter        Prior to Admission Medications   Prior to Admission  Medications   Prescriptions Last Dose Informant Patient Reported? Taking?   Insulin Glargine (LANTUS SOLOSTAR SC) 10/25/2020 at Unknown time  Yes Yes   Sig: Inject 60 Units Subcutaneous At Bedtime   Lido-Pentaf-Tetrafl-Ultrasound (ACCUCAINE) 1 % KIT More than a month at Unknown time  Yes No   alcohol swab prep pads   No No   Sig: Use to swab area of injection/pedro pablo as directed.   apixaban ANTICOAGULANT (ELIQUIS) 2.5 MG tablet 10/25/2020 at Unknown time  No Yes   Sig: Take 1 tablet (2.5 mg) by mouth 2 times daily for 26 days   aspirin 81 MG tablet 10/25/2020 at Unknown time  No Yes   Sig: Take 1 tablet (81 mg) by mouth daily   blood glucose (ACCU-CHEK GUIDE) test strip   No No   Si strip by In Vitro route 4 times daily Use to test blood sugar 3 -4  times daily or as directed.   blood glucose (NO BRAND SPECIFIED) test strip   No No   Sig: Use to test blood sugar 4 times daily or as directed. To accompany: Blood Glucose Monitor Brands: per insurance.   blood glucose (NO BRAND SPECIFIED) test strip   No No   Sig: Use to test blood sugar 2 times daily or as directed.   blood glucose calibration (NO BRAND SPECIFIED) solution   No No   Sig: To accompany: Blood Glucose Monitor Brands: per insurance.   blood glucose monitoring (NO BRAND SPECIFIED) meter device kit   No No   Sig: Use to test blood sugar 4 times daily or as directed. Preferred blood glucose meter OR supplies to accompany: Blood Glucose Monitor Brands: per insurance.   dorzolamide (TRUSOPT) 2 % ophthalmic solution 10/25/2020 at Unknown time  No Yes   Sig: Place 1 drop into both eyes 2 times daily   gabapentin (NEURONTIN) 300 MG capsule 10/25/2020 at Unknown time  No Yes   Sig: Take 1 capsule (300 mg) by mouth At Bedtime   insulin aspart (NOVOLOG FLEXPEN) 100 UNIT/ML injection 10/25/2020 at Unknown time  Yes Yes   Sig: Inject 10-20 Units Subcutaneous 2 times daily (with meals) With lunch and dinner    insulin pen needle (32G X 6 MM) 32G X 6 MM miscellaneous   " No No   Sig: Use 3 pen needles daily or as directed.   lisinopril (ZESTRIL) 10 MG tablet 10/25/2020 at Unknown time  No Yes   Sig: Take 1 tablet (10 mg) by mouth daily   Patient taking differently: Take 20 mg by mouth daily    simvastatin (ZOCOR) 10 MG tablet 10/25/2020 at Unknown time  Yes Yes   Sig: Take 10 mg by mouth At Bedtime   thin (NO BRAND SPECIFIED) lancets   No No   Sig: Use with lanceting device. To accompany: Blood Glucose Monitor Brands: per insurance.   timolol maleate (TIMOPTIC) 0.5 % ophthalmic solution 10/25/2020 at Unknown time  No Yes   Sig: Place 1 drop into both eyes 2 times daily      Facility-Administered Medications: None     Allergies   Allergies   Allergen Reactions     Aleve      HA sweats     Citalopram Itching     Clopidogrel Nausea and Vomiting     Pt to restart on 11/25/15 to see again if he tolerates it or not. His sx were only GI. Not a true allergy     Naproxen Itching     About 10 yrs ago, itching all over from taking Aleve  \"get sick and really sick\"       Sulfamethoxazole-Trimethoprim      Other reaction(s): Renal Failure  Other reaction(s): Renal Failure       Physical Exam   Vital Signs: Temp: 97.8  F (36.6  C) Temp src: Oral BP: (!) 165/90 Pulse: 105   Resp: 18 SpO2: 99 % O2 Device: None (Room air)    Weight: 0 lbs 0 oz    Refer to ED provider physical exam as patient was evaluated via virtual visit due to global pandemic.    Data   Data reviewed today: I reviewed all medications, new labs and imaging results over the last 24 hours. I personally reviewed the chest x-ray image(s) showing scattered interstitial changes.    Recent Labs   Lab 10/26/20  1139 10/21/20  2343 10/20/20  0733   WBC 19.7* 18.9*  --    HGB 15.0 14.6  --    MCV 80 83  --     510* 477*   * 124*  --    POTASSIUM 4.6 4.5  --    CHLORIDE 86* 94  --    CO2 25 22  --    BUN 17 33*  --    CR 1.02 1.14  --    ANIONGAP 8 8  --    ALPHONSO 8.4* 8.1*  --    * 250*  --    ALBUMIN 2.9* 2.9*  --  "   PROTTOTAL 6.3* 6.6*  --    BILITOTAL 1.1 0.5  --    ALKPHOS 94 88  --    ALT 31 32  --    AST 15 12  --    LIPASE 147  --   --    TROPI 0.031 <0.015  --      Recent Results (from the past 24 hour(s))   XR Chest Port 1 View    Narrative    CHEST PORTABLE ONE VIEW   10/26/2020 12:50 PM     HISTORY: Chest pain.    COMPARISON: Chest x-ray 10/22/2020.      Impression    IMPRESSION: Portable chest. Heart size remains enlarged. Scattered  interstitial lung changes throughout the right lung and left lower  lung appears stable. No new infiltrate or consolidation. Nodular  density right upper lung not definitely identified on current study.  No evidence of pneumothorax or pleural effusions.    EMIGDIO CARIAS MD

## 2020-10-26 NOTE — TELEPHONE ENCOUNTER
Called pt- no answer, left detailed VM and to call back if he has further questions. Has OV 10/28    Ward DANIEL RN

## 2020-10-26 NOTE — PROGRESS NOTES
RECEIVING UNIT ED HANDOFF REVIEW    ED Nurse Handoff Report was reviewed by: Loly Garvin RN on October 26, 2020 at 5:15 PM

## 2020-10-26 NOTE — ED NOTES
DATE:  10/26/2020   TIME OF RECEIPT FROM LAB:  1234  LAB TEST:  sodium  LAB VALUE:  119  RESULTS GIVEN WITH READ-BACK TO (PROVIDER):  Dr. Rendon  TIME LAB VALUE REPORTED TO PROVIDER:   4668

## 2020-10-27 ENCOUNTER — APPOINTMENT (OUTPATIENT)
Dept: CT IMAGING | Facility: CLINIC | Age: 65
DRG: 177 | End: 2020-10-27
Attending: INTERNAL MEDICINE
Payer: COMMERCIAL

## 2020-10-27 ENCOUNTER — APPOINTMENT (OUTPATIENT)
Dept: CARDIOLOGY | Facility: CLINIC | Age: 65
DRG: 177 | End: 2020-10-27
Attending: HOSPITALIST
Payer: COMMERCIAL

## 2020-10-27 LAB
ANION GAP SERPL CALCULATED.3IONS-SCNC: 7 MMOL/L (ref 3–14)
BASOPHILS # BLD AUTO: 0 10E9/L (ref 0–0.2)
BASOPHILS NFR BLD AUTO: 0.1 %
BUN SERPL-MCNC: 16 MG/DL (ref 7–30)
CALCIUM SERPL-MCNC: 7.5 MG/DL (ref 8.5–10.1)
CHLORIDE SERPL-SCNC: 96 MMOL/L (ref 94–109)
CK SERPL-CCNC: 33 U/L (ref 30–300)
CO2 SERPL-SCNC: 23 MMOL/L (ref 20–32)
COVID-19 SPIKE RBD ABY TITER: NORMAL
COVID-19 SPIKE RBD ABY: POSITIVE
CREAT SERPL-MCNC: 1.06 MG/DL (ref 0.66–1.25)
CRP SERPL-MCNC: 6.9 MG/L (ref 0–8)
DIFFERENTIAL METHOD BLD: ABNORMAL
EOSINOPHIL # BLD AUTO: 0.1 10E9/L (ref 0–0.7)
EOSINOPHIL NFR BLD AUTO: 0.9 %
ERYTHROCYTE [DISTWIDTH] IN BLOOD BY AUTOMATED COUNT: 13.8 % (ref 10–15)
GFR SERPL CREATININE-BSD FRML MDRD: 74 ML/MIN/{1.73_M2}
GLUCOSE BLDC GLUCOMTR-MCNC: 115 MG/DL (ref 70–99)
GLUCOSE BLDC GLUCOMTR-MCNC: 128 MG/DL (ref 70–99)
GLUCOSE BLDC GLUCOMTR-MCNC: 138 MG/DL (ref 70–99)
GLUCOSE BLDC GLUCOMTR-MCNC: 158 MG/DL (ref 70–99)
GLUCOSE BLDC GLUCOMTR-MCNC: 162 MG/DL (ref 70–99)
GLUCOSE BLDC GLUCOMTR-MCNC: 177 MG/DL (ref 70–99)
GLUCOSE BLDC GLUCOMTR-MCNC: 65 MG/DL (ref 70–99)
GLUCOSE BLDC GLUCOMTR-MCNC: 86 MG/DL (ref 70–99)
GLUCOSE BLDC GLUCOMTR-MCNC: 89 MG/DL (ref 70–99)
GLUCOSE SERPL-MCNC: 140 MG/DL (ref 70–99)
HCT VFR BLD AUTO: 37 % (ref 40–53)
HGB BLD-MCNC: 12.7 G/DL (ref 13.3–17.7)
IMM GRANULOCYTES # BLD: 0.2 10E9/L (ref 0–0.4)
IMM GRANULOCYTES NFR BLD: 1.5 %
LYMPHOCYTES # BLD AUTO: 1.8 10E9/L (ref 0.8–5.3)
LYMPHOCYTES NFR BLD AUTO: 14.7 %
MAGNESIUM SERPL-MCNC: 1.9 MG/DL (ref 1.6–2.3)
MCH RBC QN AUTO: 28.3 PG (ref 26.5–33)
MCHC RBC AUTO-ENTMCNC: 34.3 G/DL (ref 31.5–36.5)
MCV RBC AUTO: 82 FL (ref 78–100)
MONOCYTES # BLD AUTO: 1.8 10E9/L (ref 0–1.3)
MONOCYTES NFR BLD AUTO: 14.7 %
NEUTROPHILS # BLD AUTO: 8.5 10E9/L (ref 1.6–8.3)
NEUTROPHILS NFR BLD AUTO: 68.1 %
NRBC # BLD AUTO: 0 10*3/UL
NRBC BLD AUTO-RTO: 0 /100
PLATELET # BLD AUTO: 362 10E9/L (ref 150–450)
POTASSIUM SERPL-SCNC: 4.3 MMOL/L (ref 3.4–5.3)
PROCALCITONIN SERPL-MCNC: 0.07 NG/ML
RBC # BLD AUTO: 4.49 10E12/L (ref 4.4–5.9)
SODIUM SERPL-SCNC: 126 MMOL/L (ref 133–144)
TROPONIN I SERPL-MCNC: 0.04 UG/L (ref 0–0.04)
WBC # BLD AUTO: 12.4 10E9/L (ref 4–11)

## 2020-10-27 PROCEDURE — 83735 ASSAY OF MAGNESIUM: CPT | Performed by: PHYSICIAN ASSISTANT

## 2020-10-27 PROCEDURE — 71250 CT THORAX DX C-: CPT

## 2020-10-27 PROCEDURE — 36415 COLL VENOUS BLD VENIPUNCTURE: CPT | Performed by: INTERNAL MEDICINE

## 2020-10-27 PROCEDURE — 120N000001 HC R&B MED SURG/OB

## 2020-10-27 PROCEDURE — 99233 SBSQ HOSP IP/OBS HIGH 50: CPT | Performed by: HOSPITALIST

## 2020-10-27 PROCEDURE — 250N000013 HC RX MED GY IP 250 OP 250 PS 637: Performed by: HOSPITALIST

## 2020-10-27 PROCEDURE — 93325 DOPPLER ECHO COLOR FLOW MAPG: CPT

## 2020-10-27 PROCEDURE — 85025 COMPLETE CBC W/AUTO DIFF WBC: CPT | Performed by: PHYSICIAN ASSISTANT

## 2020-10-27 PROCEDURE — 250N000013 HC RX MED GY IP 250 OP 250 PS 637: Performed by: PHYSICIAN ASSISTANT

## 2020-10-27 PROCEDURE — 82550 ASSAY OF CK (CPK): CPT | Performed by: PHYSICIAN ASSISTANT

## 2020-10-27 PROCEDURE — 250N000012 HC RX MED GY IP 250 OP 636 PS 637: Performed by: PHYSICIAN ASSISTANT

## 2020-10-27 PROCEDURE — 255N000002 HC RX 255 OP 636: Performed by: HOSPITALIST

## 2020-10-27 PROCEDURE — 999N001017 HC STATISTIC GLUCOSE BY METER IP

## 2020-10-27 PROCEDURE — 93325 DOPPLER ECHO COLOR FLOW MAPG: CPT | Mod: 26 | Performed by: INTERNAL MEDICINE

## 2020-10-27 PROCEDURE — 258N000003 HC RX IP 258 OP 636: Performed by: PHYSICIAN ASSISTANT

## 2020-10-27 PROCEDURE — 86140 C-REACTIVE PROTEIN: CPT | Performed by: PHYSICIAN ASSISTANT

## 2020-10-27 PROCEDURE — 84145 PROCALCITONIN (PCT): CPT | Performed by: PHYSICIAN ASSISTANT

## 2020-10-27 PROCEDURE — 87040 BLOOD CULTURE FOR BACTERIA: CPT | Performed by: INTERNAL MEDICINE

## 2020-10-27 PROCEDURE — 250N000009 HC RX 250: Performed by: HOSPITALIST

## 2020-10-27 PROCEDURE — 36415 COLL VENOUS BLD VENIPUNCTURE: CPT | Performed by: PHYSICIAN ASSISTANT

## 2020-10-27 PROCEDURE — 80048 BASIC METABOLIC PNL TOTAL CA: CPT | Performed by: PHYSICIAN ASSISTANT

## 2020-10-27 PROCEDURE — 93321 DOPPLER ECHO F-UP/LMTD STD: CPT | Mod: 26 | Performed by: INTERNAL MEDICINE

## 2020-10-27 PROCEDURE — 84484 ASSAY OF TROPONIN QUANT: CPT | Performed by: PHYSICIAN ASSISTANT

## 2020-10-27 PROCEDURE — 93308 TTE F-UP OR LMTD: CPT | Mod: 26 | Performed by: INTERNAL MEDICINE

## 2020-10-27 RX ORDER — IOPAMIDOL 755 MG/ML
80 INJECTION, SOLUTION INTRAVASCULAR ONCE
Status: DISCONTINUED | OUTPATIENT
Start: 2020-10-27 | End: 2020-10-29 | Stop reason: HOSPADM

## 2020-10-27 RX ORDER — TIMOLOL MALEATE 5 MG/ML
1 SOLUTION/ DROPS OPHTHALMIC 2 TIMES DAILY
Status: DISCONTINUED | OUTPATIENT
Start: 2020-10-27 | End: 2020-10-29 | Stop reason: HOSPADM

## 2020-10-27 RX ORDER — DORZOLAMIDE HCL 20 MG/ML
1 SOLUTION/ DROPS OPHTHALMIC 2 TIMES DAILY
Status: DISCONTINUED | OUTPATIENT
Start: 2020-10-27 | End: 2020-10-29 | Stop reason: HOSPADM

## 2020-10-27 RX ADMIN — GABAPENTIN 300 MG: 300 CAPSULE ORAL at 21:41

## 2020-10-27 RX ADMIN — DORZOLAMIDE HYDROCHLORIDE 1 DROP: 20 SOLUTION/ DROPS OPHTHALMIC at 14:03

## 2020-10-27 RX ADMIN — SODIUM CHLORIDE: 9 INJECTION, SOLUTION INTRAVENOUS at 03:32

## 2020-10-27 RX ADMIN — APIXABAN 2.5 MG: 2.5 TABLET, FILM COATED ORAL at 21:41

## 2020-10-27 RX ADMIN — INSULIN GLARGINE 60 UNITS: 100 INJECTION, SOLUTION SUBCUTANEOUS at 21:42

## 2020-10-27 RX ADMIN — TIMOLOL MALEATE 1 DROP: 5 SOLUTION/ DROPS OPHTHALMIC at 21:42

## 2020-10-27 RX ADMIN — TIMOLOL MALEATE 1 DROP: 5 SOLUTION/ DROPS OPHTHALMIC at 14:03

## 2020-10-27 RX ADMIN — POLYETHYLENE GLYCOL 3350 17 G: 17 POWDER, FOR SOLUTION ORAL at 17:13

## 2020-10-27 RX ADMIN — INSULIN ASPART 2 UNITS: 100 INJECTION, SOLUTION INTRAVENOUS; SUBCUTANEOUS at 18:37

## 2020-10-27 RX ADMIN — LISINOPRIL 20 MG: 20 TABLET ORAL at 08:50

## 2020-10-27 RX ADMIN — HUMAN ALBUMIN MICROSPHERES AND PERFLUTREN 3 ML: 10; .22 INJECTION, SOLUTION INTRAVENOUS at 14:06

## 2020-10-27 RX ADMIN — APIXABAN 2.5 MG: 2.5 TABLET, FILM COATED ORAL at 08:50

## 2020-10-27 RX ADMIN — INSULIN ASPART 1 UNITS: 100 INJECTION, SOLUTION INTRAVENOUS; SUBCUTANEOUS at 08:45

## 2020-10-27 RX ADMIN — DEXTROSE 15 G: 15 GEL ORAL at 06:42

## 2020-10-27 RX ADMIN — SODIUM CHLORIDE: 9 INJECTION, SOLUTION INTRAVENOUS at 13:04

## 2020-10-27 RX ADMIN — DORZOLAMIDE HYDROCHLORIDE 1 DROP: 20 SOLUTION/ DROPS OPHTHALMIC at 21:42

## 2020-10-27 RX ADMIN — SIMVASTATIN 10 MG: 10 TABLET, FILM COATED ORAL at 21:41

## 2020-10-27 ASSESSMENT — ACTIVITIES OF DAILY LIVING (ADL)
ADLS_ACUITY_SCORE: 11
ADLS_ACUITY_SCORE: 14
ADLS_ACUITY_SCORE: 13
ADLS_ACUITY_SCORE: 13
ADLS_ACUITY_SCORE: 14
ADLS_ACUITY_SCORE: 14

## 2020-10-27 NOTE — PROGRESS NOTES
"[facility]  Hospitalist Progress Note   10/27/2020          Assessment and Plan:       Rachell Paige is a 64 year old male with medical history of insulin-dependent diabetes mellitus type 2 and recent hospitalization COVID-19 pneumonia admitted from [facility] ED for shortness of breath and weakness.    Dyspnea on exertion.  Covid pneumonia.  Presented with shortness of breath, chest discomfort, fever and weakness, poor appetite.  Initial COVID-19 swab was positive on 10/7/2020. Admitted at [facility] from 10/10-10/21/ 2020 for acute hypoxic respiratory failure secondary to COVID-19 pneumonia,  treated with a 10-day course of dexamethasone as well as remdesivir for total of 5 days.    Discharged home with a 30-day prescription for Xarelto for ongoing DVT prophylaxis.  White blood cell count of 19.7. D-dimer was nonelevated.  CK 33, CRP 6.9, lactic acid 1.8.  Pro calcitonin 0.07.  Chest x-ray was performed and indicated stable scattered interstitial lung changes throughout bilateral lungs without new infiltrate or consolidation.    CT chest Diffuse patchy and groundglass opacities throughout the lungs  compatible with COVID pneumonia. Influenza pneumonia and organizing  pneumonia as can be seen in the setting of drug toxicity and  connective tissue disease cancer causing a similar imaging pattern.  Continue special precautions overnight.  On Eliquis for pharmacological DVT prophylaxis per protocol.  Symptomatic treatment with PRN antiemetics/antipyretics, supplemental oxygen  Follow  blood cultures, sputum cultures.  Infectious disease following.  Appreciate recommendation.    Atypical chest discomfort.  He describes ongoing bilateral chest pain described as \"somebody punching my lungs  EKG with sinus rhythm, heart rate 98, no significant change when compared to previous EKG.  Troponin 0.031 > 0.035   Echocardiogram for evaluation of heart function.  Already on Eliquis, to continue.    Hyponatremia " likely from poor oral intake, SIADH competent.  Sodium 124 at the time of recent discharge.  At time of admission 119.  Serum osmolality 258, urine osmolality 155.  UA with specific gravity of 1.004.  Negative for infection.  Has been receiving IV fluids at 100 mL/h since admission, sodium this morning improved to 126.  Discontinue IV fluids, recheck sodium levels every 12 hours.  Recheck sodium levels a.m.  Aromas salt in diet.    Mild anemia likely competent of dilutional.  Hemoglobin dropped from 15-12.7 this morning.  No active bleeding.  Competent of dilutional.  Recheck hemoglobin level a.m.    Insulin-dependent diabetes mellitus.  Hemoglobin A1c 7.4.  Continue PTA insulin Lantus 60 units at bedtime.  PTA insulin NovoLog 10 to 20 units twice daily with lunch and dinner hold.  High intensity sliding scale ordered.  Moderate carbohydrate controlled diet.  Blood sugars and optimize regimen.    Peripheral arterial disease  PTA Aspirin on hold while on Eliquis.  Continue PTA simvastatin.    Hypertension.  Continue PTA statin lisinopril.  As needed IV hydralazine for systolic blood pressure greater than 180.    Hyperlipidemia.  Continue PTA simvastatin.    Incidental CT finding.  Few noncalcified solid pulmonary nodules are stable since 8/15/2019  measuring up to 9 mm in the lingula. Recommend a follow-up CT in 6-12  months to assess stability.    Physical deconditioning in the setting of Covid illness.  Patient admits to having generalized weakness, having Covid.  Lives at home with family.  PT, OT evaluation prior to discharge.    Diabetic neuropathy.  History of transmetatarsal amputation toes of left.  S/P right fifth toe resection on 12/31/18 for osteomyelitis.  Continue PTA gabapentin.    History of glaucoma.  Continue PTA dorzolamide and timolol eyedrops.    Orders Placed This Encounter      Moderate Consistent CHO Diet      DVT Prophylaxis: On Eliquis.  Code Status: Full Code  Disposition: Expected  discharge in 2 days pending clinical improvement.    Discussed with patient, bedside RN  Total time greater than 35 minutes out of which greater than 65% of the time was spent with direct patient care.  Raina Campbell MD        Interval History:      Patient lying in bed.  Complains of generalized weakness.  Complains of poor appetite.  Complaining of shortness of breath with minimal ambulation.  Complains of chest discomfort, feeling somebody punched him in his chest.  Complaining of tingling and numbness in his legs.  No nausea, no vomiting.  Afebrile since admission.  No headache or dizziness.  Able to ambulate to the bathroom.         Physical Exam:        Physical Exam   Temp:  [97.5  F (36.4  C)-98.6  F (37  C)] 98.1  F (36.7  C)  Pulse:  [] 105  Resp:  [11-43] 20  BP: (145-182)/(79-99) 168/84  SpO2:  [97 %-100 %] 97 %    Intake/Output Summary (Last 24 hours) at 10/27/2020 0953  Last data filed at 10/27/2020 0705  Gross per 24 hour   Intake 1430 ml   Output --   Net 1430 ml       Admission    Current      PHYSICAL EXAM  GENERAL: Patient is in no distress. Alert and oriented.  HEART: Regular rate and rhythm. S1S2. No murmurs  LUNGS: Bilateral decreased breath sounds.  No wheezing.  Respirations unlabored  ABDOMEN: Soft, no abdominal tenderness, bowel sounds heard   NEURO: Moving all extremities.  EXTREMITIES: No pedal edema. Toes amputation noted.  SKIN: Warm, dry.   PSYCHIATRY Cooperative       Medications:          apixaban ANTICOAGULANT  2.5 mg Oral BID     gabapentin  300 mg Oral At Bedtime     insulin aspart  1-10 Units Subcutaneous TID AC     insulin aspart  1-7 Units Subcutaneous At Bedtime     insulin glargine  60 Units Subcutaneous At Bedtime     lisinopril  20 mg Oral Daily     simvastatin  10 mg Oral At Bedtime     sodium chloride (PF)  3 mL Intracatheter Q8H     acetaminophen, acetaminophen, glucose **OR** dextrose **OR** glucagon, hydrALAZINE, lidocaine 4%, lidocaine (buffered or not  buffered), melatonin, naloxone, ondansetron **OR** ondansetron, - MEDICATION INSTRUCTIONS -, polyethylene glycol, prochlorperazine **OR** prochlorperazine **OR** prochlorperazine, senna-docusate **OR** senna-docusate, sodium chloride (PF)         Data:      All new lab and imaging data was reviewed.

## 2020-10-27 NOTE — PLAN OF CARE
"A&O x 4. Patient c/o intermittent bilateral CP, he describes as \"lung pain\". Offered tylenol, but he is declining pain medication at this time. VSS, on RA besides known HTN. Up with SBA - calls appropriately. Tele: ST (100s-110s). NS running at 100. Na recheck in AM.  at bedtime, pt declined sliding scale insulin. Pt states he tends to drop really low during the night. Continue to Monitor.     "

## 2020-10-27 NOTE — PLAN OF CARE
"Summary: Chest pain/Re-covering from COVID  DATE & TIME: 10/27/2020 7-3 pm   Cognitive Concerns/ Orientation : A & O x4. Anxious. Many social stressors.   BEHAVIOR & AGGRESSION TOOL COLOR: Green   CIWA SCORE: N/A  ABNL VS/O2: VSS on RA, except tachycardia at times (HR 100s-110s) and HTN.   MOBILITY: SBA/IND  PAIN MANAGMENT: C/O chest/pleuritic/\"lung pressure\", declined intervention.     DIET: Mod carb   BOWEL/BLADDER: Continent, up to bathroom. Needs Miralax, passed to next shift.   ABNL LAB/BG: Sodium 126, WBC 12.4, and Hgb 12.7.   DRAIN/DEVICES: PIV infusing NS @ 100 mL/hr.   TELEMETRY RHYTHM: Sinus tachycardia.  SKIN: WDL. Partial L foot amputee, WDL. R pinky toe amputee, WDL. Legs blotchy in color. Intact.   TESTS/PROCEDURES: CT of chest completed and ECHO cardiogram completed.   D/C DAY/GOALS/PLACE: Pending   OTHER IMPORTANT INFO: Recent admit @ [facility] 10/10-10/21 for positive COVID, reswabbed at admission, results pending. LS clear, SOB. ID following. Special precautions in place.    "

## 2020-10-27 NOTE — CONSULTS
[facility]    Infectious Disease Consultation     Date of Admission:  10/26/2020  Date of Consult (When I saw the patient): 10/27/20    Assessment & Plan   Rachell Paige is a 64 year old male who was admitted on 10/26/2020.     Impression:  1. 64 y.o male with with diabetes, HTN.   2. Recent admission for acute hypoxic resp failure for COVID- 19, treated with dexamethasone and Remdesivir.   3. Admitted this occasion with chest pain, chest pressure, generalized weakness, subjective fevers.   4. WBC elevated on admission at 19.7, self corrected without antibiotics to 12.4 today.   5. Procal is essentially negative   6. Look relatively well, not on oxygen, no distress.       Recommendations:   Get CT chest   Get blood cultures, sputum cultures.   Watch and follow closely.       Prince Bradshaw MD    Reason for Consult   Reason for consult: I was asked to evaluate this patient for chest pain. Recent positive covid.     Primary Care Physician   Olivia Nichols    Chief Complaint   Chest pain     History is obtained from the patient and medical records    History of Present Illness   Rachell Paige is a 64 year old male with a past medical history of insulin-dependent diabetes mellitus type 2 and recent hospitalization from 10/10-10/21, 2020 for acute hypoxic respiratory failure secondary to COVID-19 pneumonia who presented to the emergency department for evaluation of worsening shortness of breath, fever, and weakness.      Past Medical History   I have reviewed this patient's medical history and updated it with pertinent information if needed.   Past Medical History:   Diagnosis Date     Anxiety 2/23/2015     Dermatitis 4/18/2015     DM type 2, goal A1C 7-8 2/23/2015     Does not have health insurance 4/18/2015     Financial problems 2/23/2015     Glaucoma (increased eye pressure)      HTN, goal below 140/90 2/23/2015     Hyperlipidemia LDL goal <100 2/23/2015     Microalbuminuria 4/18/2015      Onychomycosis 2015     Rash 2015       Past Surgical History   I have reviewed this patient's surgical history and updated it with pertinent information if needed.  Past Surgical History:   Procedure Laterality Date     AMPUTATE TOE(S) Right 2018    Procedure: Right fifth toe amputation;  Surgeon: Clark Lora DPM;  Location:  OR       Prior to Admission Medications   Prior to Admission Medications   Prescriptions Last Dose Informant Patient Reported? Taking?   Insulin Glargine (LANTUS SOLOSTAR SC) 10/25/2020 at Unknown time  Yes Yes   Sig: Inject 60 Units Subcutaneous At Bedtime   Lido-Pentaf-Tetrafl-Ultrasound (ACCUCAINE) 1 % KIT More than a month at Unknown time  Yes No   alcohol swab prep pads   No No   Sig: Use to swab area of injection/pedro pablo as directed.   apixaban ANTICOAGULANT (ELIQUIS) 2.5 MG tablet 10/25/2020 at Unknown time  No Yes   Sig: Take 1 tablet (2.5 mg) by mouth 2 times daily for 26 days   aspirin 81 MG tablet 10/25/2020 at Unknown time  No Yes   Sig: Take 1 tablet (81 mg) by mouth daily   blood glucose (ACCU-CHEK GUIDE) test strip   No No   Si strip by In Vitro route 4 times daily Use to test blood sugar 3 -4  times daily or as directed.   blood glucose (NO BRAND SPECIFIED) test strip   No No   Sig: Use to test blood sugar 4 times daily or as directed. To accompany: Blood Glucose Monitor Brands: per insurance.   blood glucose (NO BRAND SPECIFIED) test strip   No No   Sig: Use to test blood sugar 2 times daily or as directed.   blood glucose calibration (NO BRAND SPECIFIED) solution   No No   Sig: To accompany: Blood Glucose Monitor Brands: per insurance.   blood glucose monitoring (NO BRAND SPECIFIED) meter device kit   No No   Sig: Use to test blood sugar 4 times daily or as directed. Preferred blood glucose meter OR supplies to accompany: Blood Glucose Monitor Brands: per insurance.   dorzolamide (TRUSOPT) 2 % ophthalmic solution 10/25/2020 at Unknown time  No Yes  "  Sig: Place 1 drop into both eyes 2 times daily   gabapentin (NEURONTIN) 300 MG capsule 10/25/2020 at Unknown time  No Yes   Sig: Take 1 capsule (300 mg) by mouth At Bedtime   insulin aspart (NOVOLOG FLEXPEN) 100 UNIT/ML injection 10/25/2020 at Unknown time  Yes Yes   Sig: Inject 10-20 Units Subcutaneous 2 times daily (with meals) With lunch and dinner    insulin pen needle (32G X 6 MM) 32G X 6 MM miscellaneous   No No   Sig: Use 3 pen needles daily or as directed.   lisinopril (ZESTRIL) 10 MG tablet 10/25/2020 at Unknown time  No Yes   Sig: Take 1 tablet (10 mg) by mouth daily   Patient taking differently: Take 20 mg by mouth daily    simvastatin (ZOCOR) 10 MG tablet 10/25/2020 at Unknown time  Yes Yes   Sig: Take 10 mg by mouth At Bedtime   thin (NO BRAND SPECIFIED) lancets   No No   Sig: Use with lanceting device. To accompany: Blood Glucose Monitor Brands: per insurance.   timolol maleate (TIMOPTIC) 0.5 % ophthalmic solution 10/25/2020 at Unknown time  No Yes   Sig: Place 1 drop into both eyes 2 times daily      Facility-Administered Medications: None     Allergies   Allergies   Allergen Reactions     Aleve      HA sweats     Citalopram Itching     Clopidogrel Nausea and Vomiting     Pt to restart on 11/25/15 to see again if he tolerates it or not. His sx were only GI. Not a true allergy     Naproxen Itching     About 10 yrs ago, itching all over from taking Aleve  \"get sick and really sick\"       Sulfamethoxazole-Trimethoprim      Other reaction(s): Renal Failure  Other reaction(s): Renal Failure       Immunization History   Immunization History   Administered Date(s) Administered     Influenza (IIV3) PF 11/01/2000, 11/17/2003, 09/14/2009, 10/12/2015     Influenza Vaccine IM 18-49 Yrs, RIV3 10/12/2016     Influenza Vaccine IM > 6 months Valent IIV4 10/12/2015, 10/11/2017, 11/14/2018, 10/17/2019     Pneumococcal 23 valent 09/25/2003, 04/28/2011, 02/23/2015     TDAP Vaccine (Adacel) 01/27/2010, 05/07/2014     Td " (Adult), Adsorbed 09/07/2000, 05/24/2002       Social History   I have reviewed this patient's social history and updated it with pertinent information if needed. Rachell Paige  reports that he quit smoking about 30 years ago. His smoking use included cigarettes. He has never used smokeless tobacco. He reports that he does not drink alcohol or use drugs.    Family History   I have reviewed this patient's family history and updated it with pertinent information if needed.   Family History   Problem Relation Age of Onset     Diabetes Other      LUNG DISEASE Mother      Glaucoma Father      Cerebrovascular Disease Son      Diabetes Son      Diabetes Daughter        Review of Systems   The 10 point Review of Systems is negative other than noted in the HPI or here.     Physical Exam   Temp: 98.1  F (36.7  C) Temp src: Oral BP: (!) 168/84 Pulse: 105   Resp: 20 SpO2: 97 % O2 Device: None (Room air)    Vital Signs with Ranges  Temp:  [97.5  F (36.4  C)-98.6  F (37  C)] 98.1  F (36.7  C)  Pulse:  [] 105  Resp:  [11-43] 20  BP: (145-182)/(79-99) 168/84  SpO2:  [97 %-100 %] 97 %  0 lbs 0 oz  There is no height or weight on file to calculate BMI.    GENERAL APPEARANCE:  Awake   EYES: Eyes grossly normal to inspection, PERRL and conjunctivae and sclerae normal  HENT: ear canals and TM's normal and nose and mouth without ulcers or lesions  NECK: no adenopathy, no asymmetry, masses, or scars and thyroid normal to palpation  RESP: diminished breath sounds   CV: regular rates and rhythm, normal S1 S2, no S3 or S4 and no murmur, click or rub  LYMPHATICS: normal ant/post cervical and supraclavicular nodes  ABDOMEN: soft, nontender, without hepatosplenomegaly or masses and bowel sounds normal  MS: extremities normal- no gross deformities noted  SKIN: no suspicious lesions or rashes      Data   Lab Results   Component Value Date    WBC 19.7 (H) 10/26/2020    HGB 15.0 10/26/2020    HCT 42.3 10/26/2020     10/26/2020    NA  124 (L) 10/26/2020    POTASSIUM 4.6 10/26/2020    CHLORIDE 86 (L) 10/26/2020    CO2 25 10/26/2020    BUN 17 10/26/2020    CR 1.02 10/26/2020     (H) 10/26/2020    SED 10 11/29/2019    DD 0.4 10/26/2020    NTBNPI 220 10/26/2020    TROPONIN 0.00 09/11/2013    TROPI 0.031 10/26/2020    AST 15 10/26/2020    ALT 31 10/26/2020    ALKPHOS 94 10/26/2020    BILITOTAL 1.1 10/26/2020    INR 1.10 10/18/2020     No results for input(s): CULT in the last 168 hours.  Recent Labs   Lab Test 05/28/19  1825 12/31/18  1846 04/06/17  1606 09/22/15  2358 09/22/15  2345   CULT No beta hemolytic Streptococcus Group A isolated No anaerobes isolated  Since this specimen was not transported in the proper anaerobic transport media, the   absence of anaerobes in this culture does not rule out the presence of anaerobes in this   specimen.    Moderate growth  Staphylococcus lugdunensis  *  Light growth  Coagulase negative Staphylococcus  Susceptibility testing not routinely done  *  These bacteria are part of normal skin nina, but on occasion, may be true pathogens.    Clinical correlation must be applied to interpreting this microbiology result.  * No Beta Streptococcus isolated No growth No growth       Amount of time performed on this consult: 45 minutes. This includes face to face assessment and care coordination with the primary team.

## 2020-10-27 NOTE — PLAN OF CARE
"Cognitive Concerns/ Orientation : A&O x4  BEHAVIOR & AGGRESSION TOOL COLOR: green  CIWA SCORE: n/a  ABNL VS/O2: VSS on RA, except tachycardia (HR 100s-110s). O2 sats high 90s on RA.   MOBILITY: SBA  PAIN MANAGMENT: c/o chest/pleuritic/\"lung\" pain, declined intervention    DIET: mod carb diet   BOWEL/BLADDER: continent, up to bathroom   ABNL LAB/BG: Na 124, recheck in am. , 158; pt requested BG check at 0630, was 65, treated with 15g glucose gel and juice x3, resolved to 78, given 2 more juices, will recheck again, advised oncoming nurse to access.   DRAIN/DEVICES: PIV, IVF infusing   TELEMETRY RHYTHM: Sinus tach  SKIN: WDL  TESTS/PROCEDURES: n/a  D/C DAY/GOALS/PLACE: 2-3 days   OTHER IMPORTANT INFO: recent admit for positive COVID, reswabbed at admit, results pending. LS clear, SOB. ID consulted.   "

## 2020-10-28 LAB
ANION GAP SERPL CALCULATED.3IONS-SCNC: 8 MMOL/L (ref 3–14)
BUN SERPL-MCNC: 21 MG/DL (ref 7–30)
CALCIUM SERPL-MCNC: 8.1 MG/DL (ref 8.5–10.1)
CHLORIDE SERPL-SCNC: 99 MMOL/L (ref 94–109)
CO2 SERPL-SCNC: 21 MMOL/L (ref 20–32)
CREAT SERPL-MCNC: 1.05 MG/DL (ref 0.66–1.25)
GFR SERPL CREATININE-BSD FRML MDRD: 75 ML/MIN/{1.73_M2}
GLUCOSE BLDC GLUCOMTR-MCNC: 143 MG/DL (ref 70–99)
GLUCOSE BLDC GLUCOMTR-MCNC: 143 MG/DL (ref 70–99)
GLUCOSE BLDC GLUCOMTR-MCNC: 148 MG/DL (ref 70–99)
GLUCOSE BLDC GLUCOMTR-MCNC: 183 MG/DL (ref 70–99)
GLUCOSE BLDC GLUCOMTR-MCNC: 221 MG/DL (ref 70–99)
GLUCOSE BLDC GLUCOMTR-MCNC: 225 MG/DL (ref 70–99)
GLUCOSE SERPL-MCNC: 92 MG/DL (ref 70–99)
HGB BLD-MCNC: 12.3 G/DL (ref 13.3–17.7)
POTASSIUM SERPL-SCNC: 4.6 MMOL/L (ref 3.4–5.3)
SODIUM SERPL-SCNC: 128 MMOL/L (ref 133–144)
TROPONIN I SERPL-MCNC: 0.03 UG/L (ref 0–0.04)
TROPONIN I SERPL-MCNC: NORMAL UG/L (ref 0–0.04)
WBC # BLD AUTO: 11.4 10E9/L (ref 4–11)

## 2020-10-28 PROCEDURE — 250N000012 HC RX MED GY IP 250 OP 636 PS 637: Performed by: PHYSICIAN ASSISTANT

## 2020-10-28 PROCEDURE — 80048 BASIC METABOLIC PNL TOTAL CA: CPT | Performed by: HOSPITALIST

## 2020-10-28 PROCEDURE — 250N000013 HC RX MED GY IP 250 OP 250 PS 637: Performed by: PHYSICIAN ASSISTANT

## 2020-10-28 PROCEDURE — 99232 SBSQ HOSP IP/OBS MODERATE 35: CPT | Performed by: HOSPITALIST

## 2020-10-28 PROCEDURE — 120N000001 HC R&B MED SURG/OB

## 2020-10-28 PROCEDURE — 85018 HEMOGLOBIN: CPT | Performed by: HOSPITALIST

## 2020-10-28 PROCEDURE — 250N000013 HC RX MED GY IP 250 OP 250 PS 637: Performed by: HOSPITALIST

## 2020-10-28 PROCEDURE — 36415 COLL VENOUS BLD VENIPUNCTURE: CPT | Performed by: HOSPITALIST

## 2020-10-28 PROCEDURE — 999N001017 HC STATISTIC GLUCOSE BY METER IP

## 2020-10-28 PROCEDURE — 84484 ASSAY OF TROPONIN QUANT: CPT | Performed by: HOSPITALIST

## 2020-10-28 PROCEDURE — 85048 AUTOMATED LEUKOCYTE COUNT: CPT | Performed by: HOSPITALIST

## 2020-10-28 RX ORDER — BISACODYL 10 MG
10 SUPPOSITORY, RECTAL RECTAL DAILY PRN
Status: DISCONTINUED | OUTPATIENT
Start: 2020-10-28 | End: 2020-10-29 | Stop reason: HOSPADM

## 2020-10-28 RX ORDER — DOCUSATE SODIUM 100 MG/1
100 CAPSULE, LIQUID FILLED ORAL 2 TIMES DAILY
Status: DISCONTINUED | OUTPATIENT
Start: 2020-10-28 | End: 2020-10-29 | Stop reason: HOSPADM

## 2020-10-28 RX ADMIN — LISINOPRIL 20 MG: 20 TABLET ORAL at 09:10

## 2020-10-28 RX ADMIN — Medication 1 MG: at 23:13

## 2020-10-28 RX ADMIN — Medication 1 MG: at 00:11

## 2020-10-28 RX ADMIN — DORZOLAMIDE HYDROCHLORIDE 1 DROP: 20 SOLUTION/ DROPS OPHTHALMIC at 20:47

## 2020-10-28 RX ADMIN — BISACODYL 10 MG: 10 SUPPOSITORY RECTAL at 16:49

## 2020-10-28 RX ADMIN — INSULIN ASPART 1 UNITS: 100 INJECTION, SOLUTION INTRAVENOUS; SUBCUTANEOUS at 09:11

## 2020-10-28 RX ADMIN — APIXABAN 2.5 MG: 2.5 TABLET, FILM COATED ORAL at 20:48

## 2020-10-28 RX ADMIN — APIXABAN 2.5 MG: 2.5 TABLET, FILM COATED ORAL at 09:10

## 2020-10-28 RX ADMIN — INSULIN ASPART 2 UNITS: 100 INJECTION, SOLUTION INTRAVENOUS; SUBCUTANEOUS at 13:52

## 2020-10-28 RX ADMIN — TIMOLOL MALEATE 1 DROP: 5 SOLUTION/ DROPS OPHTHALMIC at 09:10

## 2020-10-28 RX ADMIN — SIMVASTATIN 10 MG: 10 TABLET, FILM COATED ORAL at 21:41

## 2020-10-28 RX ADMIN — DORZOLAMIDE HYDROCHLORIDE 1 DROP: 20 SOLUTION/ DROPS OPHTHALMIC at 09:10

## 2020-10-28 RX ADMIN — TIMOLOL MALEATE 1 DROP: 5 SOLUTION/ DROPS OPHTHALMIC at 20:51

## 2020-10-28 RX ADMIN — INSULIN GLARGINE 60 UNITS: 100 INJECTION, SOLUTION SUBCUTANEOUS at 21:42

## 2020-10-28 RX ADMIN — INSULIN ASPART 4 UNITS: 100 INJECTION, SOLUTION INTRAVENOUS; SUBCUTANEOUS at 17:42

## 2020-10-28 RX ADMIN — GABAPENTIN 300 MG: 300 CAPSULE ORAL at 21:41

## 2020-10-28 ASSESSMENT — ACTIVITIES OF DAILY LIVING (ADL)
ADLS_ACUITY_SCORE: 13
ADLS_ACUITY_SCORE: 13
ADLS_ACUITY_SCORE: 14
ADLS_ACUITY_SCORE: 13
ADLS_ACUITY_SCORE: 14
ADLS_ACUITY_SCORE: 14

## 2020-10-28 NOTE — CONSULTS
Care Management Assessment and Discharge Consult    General Information  Assessment completed with:: Patient,    Type of CM/SW Visit: Offer D/C Planning  Primary Care Provider verified and updated as needed?: Yes  Readmission Within the Last 30 Days: previous discharge plan unsuccessful  Return Category: Progression of disease  Reason for Consult: care coordination/care conference, discharge planning, psychosocial concerns  Advance Care Planning: Advance Care Planning Reviewed: no concerns identified          Communication Assessment  Patient's communication style: spoken language (English or Bilingual)  Hearing Difficulty or Deaf: no Wear Glasses or Blind: yes    Cognitive  Cognitive/Neuro/Behavioral: WDL                      Living Environment:   People in home: other relative(s), spouse     Current living Arrangements: house          Family/Social Support:  Care provided by: self  Provides care for:    Marital Status:   Who is your support system?: Wife     Description of Support System: Supportive  Description of Support System: Supportive  Adequate family and caregiver support        Description of Support System: Supportive  Support Assessment: Adequate family and caregiver support    Current Resources:   Skilled Home Care Services:    Community Resources:    Equipment currently used at home: none  Supplies currently used at home:      Employment:  Employment Status: retired        Financial/Environmental Concerns: No concerns identified          Lifestyle & Psychosocial Needs:  Lifestyle     Physical activity     Days per week: Not on file     Minutes per session: Not on file     Stress: Not at all     Social Needs     Financial resource strain: Not hard at all     Food insecurity     Worry: Never true     Inability: Never true     Transportation needs     Medical: No     Non-medical: No     Socioeconomic History     Marital status:      Spouse name: Not on file     Number of children: Not on  file     Years of education: Not on file     Highest education level: Not on file   Relationships     Social connections     Talks on phone: Not on file     Gets together: More than three times a week     Attends Mu-ism service: Not on file     Active member of club or organization: Not on file     Attends meetings of clubs or organizations: Not on file     Relationship status:      Intimate partner violence     Fear of current or ex partner: Not on file     Emotionally abused: Not on file     Physically abused: Not on file     Forced sexual activity: Not on file     Tobacco Use     Smoking status: Former Smoker     Types: Cigarettes     Quit date:      Years since quittin.8     Smokeless tobacco: Never Used   Substance and Sexual Activity     Alcohol use: No     Drug use: No     Sexual activity: Yes     Partners: Female       Functional Status:  Prior to admission patient needed assistance: Meal preparation, Laundry/Housekeeping   Assesssment of Functional Status: Not at baseline with ADL Functioning    Mental Health Status:  WDL         Values/Beliefs:  Spiritual, Cultural Beliefs, Jehovah's witness Practices, Values that affect care: no                 Discharge Planning:  Expected Discharge Date:       Concerns to be Addressed: adjustment to diagnosis/illness, care coordination/care conferences, coping/stress(Patient stating this episode of illness has caused some anxiety in him. Patient does not want any medications for anxiety. He would prefer using alternate methods  for stress relief.)       Anticipated Discharge Disposition:    Anticipated Discharge Services:    Anticipated Discharge DME:      Patient/family educated on Medicare website which has current facility and service quality ratings:    Referrals Placed by CM/SW:    Education Provided on the Discharge Plan:    Patient/Family in Agreement with the Plan:       Disposition Comments:  Discussed discharge plans with patient. Patient stating he  lives with spouse who is supportive,recent illness has made him feel weak and would prefer having home RN PT and OT at discharge.     Selected Continued Care - Admitted Since 10/26/2020    No services have been selected for the patient.       Taty Branch RN  Inpatient Care Coordinator  [facility]  #[phone]

## 2020-10-28 NOTE — PROGRESS NOTES
[facility]    Infectious Disease Progress Note    Date of Service (when I saw the patient): 10/28/2020     Assessment & Plan   Rachell Paige is a 64 year old male who was admitted on 10/26/2020.     Impression:  1. 64 y.o male with with diabetes, HTN.   2. Recent admission for acute hypoxic resp failure for COVID- 19, treated with dexamethasone and Remdesivir.   3. Admitted this occasion with chest pain, chest pressure, generalized weakness, subjective fevers.   4. WBC elevated on admission at 19.7, self corrected without antibiotics to 12.4 today.   5. Procal is essentially negative   6. Look relatively well, not on oxygen, no distress.         Recommendations:   CT with covid pneumonia, already treated with remdesivir and dexa last hospitalization, supportive treatment as in place.   WBC improving   Not requiring oxygen   No positive micro   procal negative   Follow off antibiotics.           Prince Bradshaw MD    Interval History   Afebrile   No new complaints   No positive cultures   Labs as listed below CT scan as noted     Physical Exam   Temp: 98.2  F (36.8  C) Temp src: Oral BP: 137/43 Pulse: 93   Resp: 20 SpO2: 96 % O2 Device: None (Room air)    There were no vitals filed for this visit.  Vital Signs with Ranges  Temp:  [98  F (36.7  C)-98.2  F (36.8  C)] 98.2  F (36.8  C)  Pulse:  [] 93  Resp:  [18-20] 20  BP: (134-137)/(43-69) 137/43  SpO2:  [96 %-98 %] 96 %    Constitutional: Awake, alert, cooperative, no apparent distress  Lungs: Clear to auscultation bilaterally, no crackles or wheezing  Cardiovascular: Regular rate and rhythm, normal S1 and S2, and no murmur noted  Abdomen: Normal bowel sounds, soft, non-distended, non-tender  Skin: No rashes, no cyanosis, no edema  Other:    Medications     - MEDICATION INSTRUCTIONS -         apixaban ANTICOAGULANT  2.5 mg Oral BID     dorzolamide  1 drop Both Eyes BID     gabapentin  300 mg Oral At Bedtime     insulin aspart  1-10  Units Subcutaneous TID AC     insulin aspart  1-7 Units Subcutaneous At Bedtime     insulin glargine  60 Units Subcutaneous At Bedtime     iopamidol  80 mL Intravenous Once     lisinopril  20 mg Oral Daily     simvastatin  10 mg Oral At Bedtime     sodium chloride (PF)  3 mL Intracatheter Q8H     sodium chloride 0.9 %  70 mL Intravenous Once     timolol maleate  1 drop Both Eyes BID       Data   All microbiology laboratory data reviewed.  Recent Labs   Lab Test 10/27/20  0941 10/26/20  1139 10/21/20  2343   WBC 12.4* 19.7* 18.9*   HGB 12.7* 15.0 14.6   HCT 37.0* 42.3 44.3   MCV 82 80 83    419 510*     Recent Labs   Lab Test 10/27/20  0941 10/26/20  1139 10/21/20  2343   CR 1.06 1.02 1.14     Recent Labs   Lab Test 11/29/19  1811   SED 10     Recent Labs   Lab Test 10/27/20  1258 05/28/19  1825 12/31/18  1846 04/06/17  1606 09/22/15  2358 09/22/15  2345   CULT No growth after 18 hours  No growth after 18 hours No beta hemolytic Streptococcus Group A isolated No anaerobes isolated  Since this specimen was not transported in the proper anaerobic transport media, the   absence of anaerobes in this culture does not rule out the presence of anaerobes in this   specimen.    Moderate growth  Staphylococcus lugdunensis  *  Light growth  Coagulase negative Staphylococcus  Susceptibility testing not routinely done  *  These bacteria are part of normal skin nina, but on occasion, may be true pathogens.    Clinical correlation must be applied to interpreting this microbiology result.  * No Beta Streptococcus isolated No growth No growth       Attestation:  Total time on the floor involved in the patient's care: 35 minutes. Total time spent in counseling/care coordination: >50%

## 2020-10-28 NOTE — PROGRESS NOTES
"[facility]  Hospitalist Progress Note   10/28/2020          Assessment and Plan:       Rachell Paige is a 64 year old male with medical history of insulin-dependent diabetes mellitus type 2 and recent hospitalization COVID-19 pneumonia admitted from [facility] ED for shortness of breath and weakness.    Dyspnea on exertion.  Covid pneumonia.  Presented with shortness of breath, chest discomfort, fever and weakness, poor appetite.  Initial COVID-19 swab was positive on 10/7/2020. Admitted at [facility] from 10/10-10/21/ 2020 for acute hypoxic respiratory failure secondary to COVID-19 pneumonia,  treated with a 10-day course of dexamethasone as well as remdesivir for total of 5 days.    Discharged home with a 30-day prescription for Xarelto for ongoing DVT prophylaxis.  White blood cell count of 19.7. D-dimer was nonelevated.  CK 33, CRP 6.9, lactic acid 1.8.  Pro calcitonin 0.07.  Blood Cultures no growth to date.  Chest x-ray stable scattered interstitial lung changes throughout bilateral lungs without new infiltrate or consolidation.    CT chest Diffuse patchy and groundglass opacities throughout the lungs compatible with COVID pneumonia. Influenza pneumonia and organizing pneumonia as can be seen in the setting of drug toxicity and connective tissue disease cancer causing a similar imaging pattern.  On Eliquis for pharmacological DVT prophylaxis per protocol.  Symptomatic treatment with PRN antiemetics/antipyretics, supplemental oxygen  Follow  blood cultures  Infectious disease following.  Appreciate recommendation.  Special precautions discontinued per infection control.  Will need repeat CT imaging of chest in 6 to 8 weeks.    Atypical chest discomfort likely from Covid pneumonia.  He describes ongoing bilateral chest pain described as \"somebody punching my lungs  EKG with sinus rhythm, heart rate 98, no significant change when compared to previous EKG.  Troponin 0.031 > 0.035 >0.028  Echo " Technically difficult, suboptimal study. The left ventricle is normal in size. Proximal septal thickening is noted.  Left ventricle systolic function normal, EF 60 to 65%.  No regional wall motion abnormalities.  No thrombus seen in left ventricle. No pericardial effusion.  On Eliquis as above, to continue.  PTA aspirin on hold while on Eliquis.    Hyponatremia likely from poor oral intake, SIADH competent.  Sodium 124 at the time of recent discharge.  At time of admission 119.  Serum osmolality 258, urine osmolality 155.  UA with specific gravity of 1.004.  Negative for infection.  Has received IV fluids earlier this admission.  Sodium improved to 128.  Recheck sodium levels a.m.  [place] salt in diet.    Mild anemia likely competent of dilutional.  Hemoglobin dropped from 15 >12.3 this morning.  No active bleeding.  Competent of dilutional. Recheck hemoglobin level a.m.    Insulin-dependent diabetes mellitus.  Hemoglobin A1c 7.4.  Continue PTA insulin Lantus 60 units at bedtime.  PTA insulin NovoLog 10 to 20 units twice daily with lunch and dinner hold.  High intensity sliding scale ordered.  Moderate carbohydrate controlled diet.  Blood sugars and optimize regimen.    Peripheral arterial disease  PTA Aspirin on hold while on Eliquis.  Continue PTA simvastatin.    Hypertension.  Continue PTA lisinopril.  As needed IV hydralazine for systolic blood pressure greater than 180.    Hyperlipidemia.  Continue PTA simvastatin.    Incidental CT finding.  Few noncalcified solid pulmonary nodules are stable since 8/15/2019  measuring up to 9 mm in the lingula. Recommend a follow-up CT in 6-12  months to assess stability.  Discussed with patient.    Physical deconditioning in the setting of Covid illness.  Patient admits to having generalized weakness, having Covid.  Lives at home with family.  PT, OT evaluation prior to discharge.    Diabetic neuropathy.  History of transmetatarsal amputation toes of left.  S/P right fifth  toe resection on 12/31/18 for osteomyelitis.  Continue PTA gabapentin.    History of glaucoma.  Continue PTA dorzolamide and timolol eyedrops.    Constipation.  Encourage mobility, high-fiber diet.  As needed MiraLAX.  Scheduled docusate ordered.  Dulcolax suppository as needed.    Orders Placed This Encounter      Moderate Consistent CHO Diet      DVT Prophylaxis: On Eliquis.  Code Status: Full Code  Disposition: Expected discharge likely tomorrow pending clinical improvement.    Discussed with patient, bedside RN    Raina Campbell MD        Interval History:      Patient lying in bed.  Complains of generalized weakness.  Complains of poor appetite.  Complaining of shortness of breath with minimal ambulation.  Admits improvement in chest discomfort.  Tingling and numbness in his legs chronic   No nausea, no vomiting.  Afebrile since admission.  No headache or dizziness.  Able to ambulate to the bathroom.         Physical Exam:        Physical Exam   Temp:  [98  F (36.7  C)-98.2  F (36.8  C)] 98.2  F (36.8  C)  Pulse:  [] 93  Resp:  [18-20] 20  BP: (134-137)/(43-69) 137/43  SpO2:  [96 %-98 %] 96 %    Intake/Output Summary (Last 24 hours) at 10/27/2020 0953  Last data filed at 10/27/2020 0705  Gross per 24 hour   Intake 1430 ml   Output --   Net 1430 ml     PHYSICAL EXAM  GENERAL: Patient is in no distress. Alert and oriented.  LUNGS: Bilateral decreased breath sounds.  No wheezing.  Respirations unlabored  NEURO: Moving all extremities.  EXTREMITIES: No pedal edema. Toes amputation noted.  SKIN: Warm, dry.   PSYCHIATRY Cooperative       Medications:          apixaban ANTICOAGULANT  2.5 mg Oral BID     dorzolamide  1 drop Both Eyes BID     gabapentin  300 mg Oral At Bedtime     insulin aspart  1-10 Units Subcutaneous TID AC     insulin aspart  1-7 Units Subcutaneous At Bedtime     insulin glargine  60 Units Subcutaneous At Bedtime     iopamidol  80 mL Intravenous Once     lisinopril  20 mg Oral Daily      simvastatin  10 mg Oral At Bedtime     sodium chloride (PF)  3 mL Intracatheter Q8H     sodium chloride 0.9 %  70 mL Intravenous Once     timolol maleate  1 drop Both Eyes BID     acetaminophen, acetaminophen, glucose **OR** dextrose **OR** glucagon, hydrALAZINE, lidocaine 4%, lidocaine (buffered or not buffered), melatonin, naloxone, ondansetron **OR** ondansetron, - MEDICATION INSTRUCTIONS -, polyethylene glycol, prochlorperazine **OR** prochlorperazine **OR** prochlorperazine, senna-docusate **OR** senna-docusate, sodium chloride (PF)         Data:      All new lab and imaging data was reviewed.

## 2020-10-28 NOTE — PLAN OF CARE
Summary: Chest pain/Re-covering from COVID  DATE & TIME: 10/28/20 4058-4647  Cognitive Concerns/ Orientation : A&O x4. Anxious. Many social stressors.   BEHAVIOR & AGGRESSION TOOL COLOR: Green   ABNL VS/O2: VSS except tachy at times (HR 100s-110s) on RA.  MOBILITY: IND in room  PAIN MANAGMENT: Denies pain, reports chest pressure at times  DIET: Mod carb  BOWEL/BLADDER: Continent, up to bathroom. No BM reported this shift.  ABNL LAB/BG: .  DRAIN/DEVICES: PIV SL  TELEMETRY RHYTHM: NSR  SKIN: WDL. Partial L foot amputee, WDL. R pinky toe amputee, WDL. BLE blotchy.  TESTS/PROCEDURES: None.  D/C DAY/GOALS/PLACE: Discharge pending improvement.  OTHER IMPORTANT INFO: Recent admit @ [facility] for positive COVID, reswabbed at admit, results pending. LS clear, WISEMAN. ID following. Special precautions in place.

## 2020-10-28 NOTE — PLAN OF CARE
Summary: Chest pain/Re-covering from COVID  DATE & TIME: 10/28/20 7298-9700  Cognitive Concerns/ Orientation : A&O x4. Anxious. Many social stressors.   BEHAVIOR & AGGRESSION TOOL COLOR: Green   ABNL VS/O2: VSS except tachy at times (HR 100s-110s) on RA.  MOBILITY: IND in room  PAIN MANAGMENT: Denies pain, reports chest pressure at times  DIET: Mod carb  BOWEL/BLADDER: Continent, up to bathroom..  ABNL LAB/BG: ,183., 225 , Wbc 11.4, trops negative.  DRAIN/DEVICES: PIV SL  TELEMETRY RHYTHM: NSR to ST.  SKIN: WDL. Partial L foot amputee,. R pinky toe amputee,  BLE blotchy.  TESTS/PROCEDURES: None.  D/C DAY/GOALS/PLACE: possible discharge tomorrow.  OTHER IMPORTANT INFO: Discontinued  special precautions, LS clear, WISEMAN. ID following, c/o of constipation, given  suppository with results.

## 2020-10-29 VITALS
TEMPERATURE: 98.1 F | RESPIRATION RATE: 18 BRPM | SYSTOLIC BLOOD PRESSURE: 117 MMHG | OXYGEN SATURATION: 96 % | DIASTOLIC BLOOD PRESSURE: 64 MMHG | HEART RATE: 100 BPM

## 2020-10-29 LAB
ANION GAP SERPL CALCULATED.3IONS-SCNC: 7 MMOL/L (ref 3–14)
BUN SERPL-MCNC: 22 MG/DL (ref 7–30)
CALCIUM SERPL-MCNC: 8.6 MG/DL (ref 8.5–10.1)
CHLORIDE SERPL-SCNC: 102 MMOL/L (ref 94–109)
CO2 SERPL-SCNC: 24 MMOL/L (ref 20–32)
CREAT SERPL-MCNC: 1.17 MG/DL (ref 0.66–1.25)
GFR SERPL CREATININE-BSD FRML MDRD: 66 ML/MIN/{1.73_M2}
GLUCOSE BLDC GLUCOMTR-MCNC: 127 MG/DL (ref 70–99)
GLUCOSE BLDC GLUCOMTR-MCNC: 134 MG/DL (ref 70–99)
GLUCOSE BLDC GLUCOMTR-MCNC: 137 MG/DL (ref 70–99)
GLUCOSE BLDC GLUCOMTR-MCNC: 188 MG/DL (ref 70–99)
GLUCOSE BLDC GLUCOMTR-MCNC: 76 MG/DL (ref 70–99)
GLUCOSE SERPL-MCNC: 66 MG/DL (ref 70–99)
HGB BLD-MCNC: 12.9 G/DL (ref 13.3–17.7)
POTASSIUM SERPL-SCNC: 4.5 MMOL/L (ref 3.4–5.3)
SODIUM SERPL-SCNC: 133 MMOL/L (ref 133–144)
WBC # BLD AUTO: 10.8 10E9/L (ref 4–11)

## 2020-10-29 PROCEDURE — 999N001017 HC STATISTIC GLUCOSE BY METER IP

## 2020-10-29 PROCEDURE — 80048 BASIC METABOLIC PNL TOTAL CA: CPT | Performed by: STUDENT IN AN ORGANIZED HEALTH CARE EDUCATION/TRAINING PROGRAM

## 2020-10-29 PROCEDURE — 250N000013 HC RX MED GY IP 250 OP 250 PS 637: Performed by: PHYSICIAN ASSISTANT

## 2020-10-29 PROCEDURE — 999N000147 HC STATISTIC PT IP EVAL DEFER: Performed by: PHYSICAL THERAPIST

## 2020-10-29 PROCEDURE — 85018 HEMOGLOBIN: CPT | Performed by: HOSPITALIST

## 2020-10-29 PROCEDURE — 99239 HOSP IP/OBS DSCHRG MGMT >30: CPT | Performed by: STUDENT IN AN ORGANIZED HEALTH CARE EDUCATION/TRAINING PROGRAM

## 2020-10-29 PROCEDURE — 85048 AUTOMATED LEUKOCYTE COUNT: CPT | Performed by: HOSPITALIST

## 2020-10-29 PROCEDURE — 36415 COLL VENOUS BLD VENIPUNCTURE: CPT | Performed by: HOSPITALIST

## 2020-10-29 PROCEDURE — 36415 COLL VENOUS BLD VENIPUNCTURE: CPT | Performed by: STUDENT IN AN ORGANIZED HEALTH CARE EDUCATION/TRAINING PROGRAM

## 2020-10-29 PROCEDURE — 250N000013 HC RX MED GY IP 250 OP 250 PS 637: Performed by: HOSPITALIST

## 2020-10-29 RX ORDER — INSULIN ASPART 100 [IU]/ML
1-10 INJECTION, SOLUTION INTRAVENOUS; SUBCUTANEOUS 2 TIMES DAILY WITH MEALS
Qty: 3 ML | Refills: 0 | Status: SHIPPED | OUTPATIENT
Start: 2020-10-29 | End: 2020-11-27

## 2020-10-29 RX ORDER — SIMVASTATIN 10 MG
10 TABLET ORAL AT BEDTIME
Qty: 30 TABLET | Refills: 0 | Status: SHIPPED | OUTPATIENT
Start: 2020-10-29 | End: 2020-11-06

## 2020-10-29 RX ORDER — INSULIN ASPART 100 [IU]/ML
1-10 INJECTION, SOLUTION INTRAVENOUS; SUBCUTANEOUS 2 TIMES DAILY WITH MEALS
Start: 2020-10-29 | End: 2020-10-29

## 2020-10-29 RX ADMIN — DORZOLAMIDE HYDROCHLORIDE 1 DROP: 20 SOLUTION/ DROPS OPHTHALMIC at 08:25

## 2020-10-29 RX ADMIN — TIMOLOL MALEATE 1 DROP: 5 SOLUTION/ DROPS OPHTHALMIC at 08:26

## 2020-10-29 RX ADMIN — APIXABAN 2.5 MG: 2.5 TABLET, FILM COATED ORAL at 08:25

## 2020-10-29 RX ADMIN — LISINOPRIL 20 MG: 20 TABLET ORAL at 08:25

## 2020-10-29 RX ADMIN — INSULIN ASPART 2 UNITS: 100 INJECTION, SOLUTION INTRAVENOUS; SUBCUTANEOUS at 11:37

## 2020-10-29 ASSESSMENT — ACTIVITIES OF DAILY LIVING (ADL)
ADLS_ACUITY_SCORE: 14
ADLS_ACUITY_SCORE: 14
ADLS_ACUITY_SCORE: 13
ADLS_ACUITY_SCORE: 14

## 2020-10-29 NOTE — PLAN OF CARE
Cognitive Concerns/ Orientation : A&O x4. Anxious. Many social stressors.   BEHAVIOR & AGGRESSION TOOL COLOR: Green   ABNL VS/O2: VSS except tachy at times (HR 100s-110s) on RA.  MOBILITY: IND in room  PAIN MANAGMENT: Denies pain, reports chest pressure at times  DIET: Mod carb  BOWEL/BLADDER: Continent, up to bathroom..  ABNL LAB/BG: -134-127 , Wbc 11.4, trops negative.  DRAIN/DEVICES: PIV SL  TELEMETRY RHYTHM: NSR  SKIN: WDL. Partial L foot amputee,. R pinky toe amputee,  BLE blotchy.  TESTS/PROCEDURES: None.  D/C DAY/GOALS/PLACE: Discharge pending improvement.  OTHER IMPORTANT INFO: Discontinued  special precautions,. LENIN carpio, BOWEN.

## 2020-10-29 NOTE — DISCHARGE SUMMARY
[facility]    Discharge Summary  Hospitalist    Date of Admission:  10/26/2020  Date of Discharge:  10/29/2020  1:55 PM  Discharging Provider: Joaquina Palacio DO  Date of Service (when I saw the patient): 10/29/20    Discharge Diagnoses   As below    History of Present Illness   Rachell Paige is a 64 year old male with medical history of insulin-dependent diabetes mellitus type 2 and recent hospitalization COVID-19 pneumonia admitted from [facility] ED for shortness of breath and weakness after recent diagnosis with COVID pneumonia. Patient given symptomatic tx for his complaints and demonstrated improvement. He need frequent encouragement and reinforcement that the progression of his illness is unknown however he is recovered from the acute respiratory component. His symptoms will likely slowly continue to resolve.     Dyspnea on exertion.  Covid pneumonia.  Presented with shortness of breath, chest discomfort, fever and weakness, poor appetite after previous admission and discharge for covid PNA    Previous admission initial COVID-19 swab was positive on 10/7/2020. Admitted at [facility] from 10/10-10/21/ 2020 for acute hypoxic respiratory failure secondary to COVID-19 pneumonia,  treated with a 10-day course of dexamethasone as well as remdesivir for total of 5 days.    Discharged home with a 30-day prescription for Xarelto for ongoing DVT prophylaxis.    This admission patient given Symptomatic treatment with PRN antiemetics/antipyretics, supplemental oxygen. All results returned negative and symptoms likely related to ongoing resolution of COVID PNA.  Will need repeat CT imaging of chest in 6 to 8 weeks.     Atypical chest discomfort likely from Covid pneumonia.  EKG with sinus rhythm, heart rate 98, no significant change when compared to previous EKG.  Troponin 0.031 > 0.035 >0.028  Echo Technically difficult, suboptimal study. The left ventricle is normal in size. Proximal septal  thickening is noted.  Left ventricle systolic function normal, EF 60 to 65%.  No regional wall motion abnormalities.  No thrombus seen in left ventricle. No pericardial effusion.  On Eliquis as above, to continue.  PTA aspirin on hold while on Eliquis.  -resolved on discharge     Hyponatremia likely from poor oral intake, SIADH competent.  Sodium 124 at the time of recent discharge.  At time of admission 119.  Serum osmolality 258, urine osmolality 155.  UA with specific gravity of 1.004.  Negative for infection.  Has received IV fluids earlier this admission.  Sodium improved to 128.  Improved with IV fluids     Mild anemia likely competent of dilutional.  Hemoglobin dropped from 15 >12.3 this morning.  No active bleeding.  Competent of dilutional. Recheck hemoglobin level a.m.     Insulin-dependent diabetes mellitus.  Hemoglobin A1c 7.4.  Continue PTA insulin Lantus 60 units at bedtime.  PTA insulin NovoLog 10 to 20 units twice daily with lunch and dinner hold.  High intensity sliding scale ordered.  Moderate carbohydrate controlled diet.  Insulin dose decreased to 50units nightly and 1-10 units with meals for hypoglycemia this admission  Follow up with PCP     Peripheral arterial disease  PTA Aspirin on hold while on Eliquis.  Continue PTA simvastatin.     Incidental CT finding.  Few noncalcified solid pulmonary nodules are stable since 8/15/2019  measuring up to 9 mm in the lingula. Recommend a follow-up CT in 6-12  months to assess stability.  Discussed with patient.     Physical deconditioning in the setting of Covid illness.  Patient admits to having generalized weakness, having Covid.  Lives at home with family.  PT, OT evaluation prior to discharge.     Joaquina Palacio,     Significant Results and Procedures   As above    Pending Results   Unresulted Labs Ordered in the Past 30 Days of this Admission     Date and Time Order Name Status Description    10/27/2020 1223 Blood culture Preliminary      10/27/2020 1223 Blood culture Preliminary           Code Status   Full Code       Primary Care Physician   Olivia Nichols        Discharge Disposition   Discharged to home  Condition at discharge: Stable    Consultations This Hospital Stay   INFECTIOUS DISEASES IP CONSULT  CARE MANAGEMENT / SOCIAL WORK IP CONSULT  PHYSICAL THERAPY ADULT IP CONSULT  OCCUPATIONAL THERAPY ADULT IP CONSULT    Time Spent on this Encounter   IJoaquina DO, personally saw the patient today and spent greater than 30 minutes discharging this patient.    Discharge Orders      CARE COORDINATION REFERRAL      Physical Therapy Referral      Occupational Therapy Referral      Reason for your hospital stay    You presented after a COVID infection with SOA. You were found to be stable and will discharge home     Follow-up and recommended labs and tests     Follow up with primary care provider, Olivia Nichols, within 7 days for hospital follow- up.  No follow up labs or test are needed.     Activity    Your activity upon discharge: activity as tolerated     Diet    Follow this diet upon discharge: Orders Placed This Encounter      Moderate Consistent CHO Diet     Discharge Medications   Discharge Medication List as of 10/29/2020 12:43 PM      CONTINUE these medications which have CHANGED    Details   insulin aspart (NOVOLOG FLEXPEN) 100 UNIT/ML pen Inject 1-10 Units Subcutaneous 2 times daily (with meals) With lunch and dinner, Disp-3 mL, R-0, E-Prescribe      insulin glargine (LANTUS SOLOSTAR) 100 UNIT/ML pen Inject 50 Units Subcutaneous At Bedtime, Disp-3 mL, R-0, E-PrescribeIf Lantus is not covered by insurance, may substitute Basaglar at same dose and frequency.        simvastatin (ZOCOR) 10 MG tablet Take 1 tablet (10 mg) by mouth At Bedtime, Disp-30 tablet, R-0, E-Prescribe         CONTINUE these medications which have NOT CHANGED    Details   alcohol swab prep pads Use to swab area of injection/pedro pablo as directed.Disp-120  each,[id number]      apixaban ANTICOAGULANT (ELIQUIS) 2.5 MG tablet Take 1 tablet (2.5 mg) by mouth 2 times daily for 26 days, Disp-52 tablet, R-0, Local Print      aspirin 81 MG tablet Take 1 tablet (81 mg) by mouth daily, Disp-30 tablet, R-OTC, Fax      !! blood glucose (ACCU-CHEK GUIDE) test strip 1 strip by In Vitro route 4 times daily Use to test blood sugar 3 -4  times daily or as directed., Disp-120 each,R-11, E-Prescribe      !! blood glucose (NO BRAND SPECIFIED) test strip Use to test blood sugar 2 times daily or as directed., Disp-60 strip,R-4, E-Prescribe      !! blood glucose (NO BRAND SPECIFIED) test strip Use to test blood sugar 4 times daily or as directed. To accompany: Blood Glucose Monitor Brands: per insurance., Disp-150 strip,R-6, E-Prescribe      blood glucose calibration (NO BRAND SPECIFIED) solution To accompany: Blood Glucose Monitor Brands: per insurance., Disp-1 Bottle,R-3, E-Prescribe      blood glucose monitoring (NO BRAND SPECIFIED) meter device kit Use to test blood sugar 4 times daily or as directed. Preferred blood glucose meter OR supplies to accompany: Blood Glucose Monitor Brands: per insurance.Disp-1 kit,[id number]      dorzolamide (TRUSOPT) 2 % ophthalmic solution Place 1 drop into both eyes 2 times daily, Disp-10 mL,R-11, E-Prescribe      gabapentin (NEURONTIN) 300 MG capsule Take 1 capsule (300 mg) by mouth At Bedtime, Disp-90 capsule,R-0, E-Prescribe      insulin pen needle (32G X 6 MM) 32G X 6 MM miscellaneous Use 3 pen needles daily or as directed.Disp-300 each,[id number]      Lido-Pentaf-Tetrafl-Ultrasound (ACCUCAINE) 1 % KIT Historical      lisinopril (ZESTRIL) 10 MG tablet Take 1 tablet (10 mg) by mouth daily, Disp-30 tablet,R-3, E-Prescribe      thin (NO BRAND SPECIFIED) lancets Use with lanceting device. To accompany: Blood Glucose Monitor Brands: per insurance., Disp-200 each,R-6, E-Prescribe      timolol maleate (TIMOPTIC) 0.5 % ophthalmic solution Place 1  "drop into both eyes 2 times daily, Disp-1 Bottle,R-11, E-Prescribe       !! - Potential duplicate medications found. Please discuss with provider.        Allergies   Allergies   Allergen Reactions     Aleve      HA sweats     Citalopram Itching     Clopidogrel Nausea and Vomiting     Pt to restart on 11/25/15 to see again if he tolerates it or not. His sx were only GI. Not a true allergy     Naproxen Itching     About 10 yrs ago, itching all over from taking Aleve  \"get sick and really sick\"       Sulfamethoxazole-Trimethoprim      Other reaction(s): Renal Failure  Other reaction(s): Renal Failure     Data   Most Recent 3 CBC's:  Recent Labs   Lab Test 10/29/20  0828 10/28/20  0939 10/27/20  0941 10/26/20  1139 10/21/20  2343   WBC 10.8 11.4* 12.4* 19.7* 18.9*   HGB 12.9* 12.3* 12.7* 15.0 14.6   MCV  --   --  82 80 83   PLT  --   --  362 419 510*      Most Recent 3 BMP's:  Recent Labs   Lab Test 10/29/20  0828 10/28/20  0939 10/27/20  0941    128* 126*   POTASSIUM 4.5 4.6 4.3   CHLORIDE 102 99 96   CO2 24 21 23   BUN 22 21 16   CR 1.17 1.05 1.06   ANIONGAP 7 8 7   ALPHONSO 8.6 8.1* 7.5*   GLC 66* 92 140*     Most Recent 2 LFT's:  Recent Labs   Lab Test 10/26/20  1139 10/21/20  2343   AST 15 12   ALT 31 32   ALKPHOS 94 88   BILITOTAL 1.1 0.5     Most Recent INR's and Anticoagulation Dosing History:  Anticoagulation Dose History     Recent Dosing and Labs Latest Ref Rng & Units 10/12/2020 10/13/2020 10/14/2020 10/15/2020 10/16/2020 10/17/2020 10/18/2020    INR 0.86 - 1.14 0.94 0.95 0.94 0.90 0.98 1.06 1.10        Most Recent 3 Troponin's:  Recent Labs   Lab Test 10/28/20  0939 10/27/20  0941 10/26/20  1139 09/11/13  0523 09/11/13  0523 09/11/13  0324   TROPI 0.028  Canceled, Test credited 0.035 0.031   < >  --   --    TROPONIN  --   --   --   --  0.00 0.01    < > = values in this interval not displayed.     Most Recent Cholesterol Panel:  Recent Labs   Lab Test 02/23/15  1500   CHOL 198      HDL 58   TRIG 119 "     Most Recent 6 Bacteria Isolates From Any Culture (See EPIC Reports for Culture Details):  Recent Labs   Lab Test 10/27/20  1258 05/28/19  1825 12/31/18  1846 04/06/17  1606 09/22/15  2358 09/22/15  2345   CULT No growth after 2 days  No growth after 2 days No beta hemolytic Streptococcus Group A isolated No anaerobes isolated  Since this specimen was not transported in the proper anaerobic transport media, the   absence of anaerobes in this culture does not rule out the presence of anaerobes in this   specimen.    Moderate growth  Staphylococcus lugdunensis  *  Light growth  Coagulase negative Staphylococcus  Susceptibility testing not routinely done  *  These bacteria are part of normal skin nina, but on occasion, may be true pathogens.    Clinical correlation must be applied to interpreting this microbiology result.  * No Beta Streptococcus isolated No growth No growth     Most Recent TSH, T4 and A1c Labs:  Recent Labs   Lab Test 07/28/20  1616 02/23/15  1500 02/23/15  1500   TSH  --   --  1.60   A1C 7.4*   < >  --     < > = values in this interval not displayed.

## 2020-10-29 NOTE — PLAN OF CARE
"PT: Orders received. Chart reviewed and discussed with care team.  PT not indicated due to pt mobilizing IND within pt room; reports single level home with no stairs or equipment needs.  Pt reports no concerns and declines mobilizing as part of screen with PT; states he can \"walk fine\". Declines handout for HEP. No skilled PT or OT needs identified.  Will complete orders.  "

## 2020-10-29 NOTE — TELEPHONE ENCOUNTER
Letter sent to Pt's home address with referral information/telephone numbers    Jennifer ZAYAS RN

## 2020-10-29 NOTE — PROGRESS NOTES
Discharge    Patient discharged to home  With familly  Care plan notedone  Listed belongings gathered and returned to patient. Yes  Care Plan and Patient education resolved: yes  Prescriptions if needed, hard copies sent with patient  Yes  Home and hospital acquired medications returned to patient: yes  Medication Bin checked and emptied on discharge yes  Follow up appointment made for patient: yes

## 2020-10-29 NOTE — PLAN OF CARE
Give discharge instructions, belongings, prescriptions and discharged to home .   Vss and patient was comfortable at he time of discharge.

## 2020-10-29 NOTE — PROGRESS NOTES
Infectious Disease Progress Note    Date of Service (when I saw the patient): 10/29/2020     Assessment & Plan   Rachell Paige is a 64 year old male who was admitted on 10/26/2020.     Impression:  1. 64 y.o male with with diabetes, HTN.   2. Recent admission for acute hypoxic resp failure for COVID- 19, treated with dexamethasone and Remdesivir.   3. Admitted this occasion with chest pain, chest pressure, generalized weakness, subjective fevers.   4. WBC elevated on admission at 19.7, self corrected without antibiotics to 12.4 today.   5. Procal is essentially negative   6. Look relatively well, not on oxygen, no distress.         Recommendations:   CT with covid pneumonia, already treated with remdesivir and dexa last hospitalization, supportive treatment as in place.   WBC improving   Not requiring oxygen   No positive micro   procal negative   Keep off antibiotics.           Prince Bradshaw MD    Interval History   Afebrile   No new complaints   No positive cultures   Labs as listed below CT scan as noted     Physical Exam   Temp: 98.1  F (36.7  C) Temp src: Oral BP: 117/64 Pulse: 100   Resp: 18 SpO2: 96 % O2 Device: None (Room air)    There were no vitals filed for this visit.  Vital Signs with Ranges  Temp:  [98  F (36.7  C)-98.1  F (36.7  C)] 98.1  F (36.7  C)  Pulse:  [] 100  Resp:  [18-20] 18  BP: (117-160)/(64-77) 117/64  SpO2:  [96 %-98 %] 96 %    Constitutional: Awake, alert, cooperative, no apparent distress  Lungs: Clear to auscultation bilaterally, no crackles or wheezing  Cardiovascular: Regular rate and rhythm, normal S1 and S2, and no murmur noted  Abdomen: Normal bowel sounds, soft, non-distended, non-tender  Skin: No rashes, no cyanosis, no edema  Other:    Medications     - MEDICATION INSTRUCTIONS -         apixaban ANTICOAGULANT  2.5 mg Oral BID     docusate sodium  100 mg Oral BID     dorzolamide  1 drop Both Eyes BID     gabapentin  300 mg Oral At  Bedtime     insulin aspart  1-10 Units Subcutaneous TID AC     insulin aspart  1-7 Units Subcutaneous At Bedtime     insulin glargine  60 Units Subcutaneous At Bedtime     iopamidol  80 mL Intravenous Once     lisinopril  20 mg Oral Daily     simvastatin  10 mg Oral At Bedtime     sodium chloride (PF)  3 mL Intracatheter Q8H     sodium chloride 0.9 %  70 mL Intravenous Once     timolol maleate  1 drop Both Eyes BID       Data   All microbiology laboratory data reviewed.  Recent Labs   Lab Test 10/29/20  0828 10/28/20  0939 10/27/20  0941 10/26/20  1139 10/21/20  2343   WBC 10.8 11.4* 12.4* 19.7* 18.9*   HGB 12.9* 12.3* 12.7* 15.0 14.6   HCT  --   --  37.0* 42.3 44.3   MCV  --   --  82 80 83   PLT  --   --  362 419 510*     Recent Labs   Lab Test 10/29/20  0828 10/28/20  0939 10/27/20  0941   CR 1.17 1.05 1.06     Recent Labs   Lab Test 11/29/19  1811   SED 10     Recent Labs   Lab Test 10/27/20  1258 05/28/19  1825 12/31/18  1846 04/06/17  1606 09/22/15  2358 09/22/15  2345   CULT No growth after 2 days  No growth after 2 days No beta hemolytic Streptococcus Group A isolated No anaerobes isolated  Since this specimen was not transported in the proper anaerobic transport media, the   absence of anaerobes in this culture does not rule out the presence of anaerobes in this   specimen.    Moderate growth  Staphylococcus lugdunensis  *  Light growth  Coagulase negative Staphylococcus  Susceptibility testing not routinely done  *  These bacteria are part of normal skin nina, but on occasion, may be true pathogens.    Clinical correlation must be applied to interpreting this microbiology result.  * No Beta Streptococcus isolated No growth No growth       Attestation:  Total time on the floor involved in the patient's care: 35 minutes. Total time spent in counseling/care coordination: >50%

## 2020-10-30 ENCOUNTER — TELEPHONE (OUTPATIENT)
Dept: FAMILY MEDICINE | Facility: CLINIC | Age: 65
End: 2020-10-30

## 2020-10-30 ENCOUNTER — PATIENT OUTREACH (OUTPATIENT)
Dept: CARE COORDINATION | Facility: CLINIC | Age: 65
End: 2020-10-30

## 2020-10-30 NOTE — PROGRESS NOTES
Clinic Care Coordination Contact  [facility]/Voicemail       Clinical Data: Care Coordinator Outreach    [facility]     Discharge Summary  Hospitalist     Date of Admission:  10/26/2020  Date of Discharge:  10/29/2020  1:55 PM  Discharging Provider: Joaquina Palacio DO  Date of Service (when I saw the patient): 10/29/20      Discharge Diagnoses    Dyspnea on exertion.  Covid pneumonia.    Atypical chest discomfort likely from Covid pneumonia.    Hyponatremia likely from poor oral intake, SIADH competent.    Outreach attempted x 1.  Left message on patient's voicemail with call back information and requested return call.    Plan: Care Coordinator will try to reach patient again in 1-2 business days.    ALEKSANDER Choi, [facility]  Clinic Care Coordinator  [facility] [place]  [facility]  [phone]  [email or web address]

## 2020-10-30 NOTE — TELEPHONE ENCOUNTER
Pt is followed by Care Coordination    He has hospital f/u appt scheduled with PCP 11/6/20    Jennifer ZAYAS RN

## 2020-10-30 NOTE — TELEPHONE ENCOUNTER
Chief Complaint: Hyponatremia, Type 2 Diabetes Mellitus With Hemoglobin A1c Goal Of 7.0%-8.0% (H),  THU 29-OCT-2020  3 / 2    [phone] ([place])

## 2020-11-01 ENCOUNTER — HOSPITAL ENCOUNTER (EMERGENCY)
Facility: CLINIC | Age: 65
Discharge: HOME | End: 2020-11-01
Attending: EMERGENCY MEDICINE | Admitting: EMERGENCY MEDICINE
Payer: COMMERCIAL

## 2020-11-01 VITALS
TEMPERATURE: 97.9 F | OXYGEN SATURATION: 99 % | DIASTOLIC BLOOD PRESSURE: 79 MMHG | HEART RATE: 92 BPM | RESPIRATION RATE: 24 BRPM | SYSTOLIC BLOOD PRESSURE: 155 MMHG | BODY MASS INDEX: 31.44 KG/M2 | WEIGHT: 194.8 LBS

## 2020-11-01 DIAGNOSIS — E87.1 HYPONATREMIA: ICD-10-CM

## 2020-11-01 DIAGNOSIS — R00.2 PALPITATIONS: ICD-10-CM

## 2020-11-01 DIAGNOSIS — R68.83 CHILLS: ICD-10-CM

## 2020-11-01 LAB
ANION GAP SERPL CALCULATED.3IONS-SCNC: 6 MMOL/L (ref 3–14)
BASOPHILS # BLD AUTO: 0 10E9/L (ref 0–0.2)
BASOPHILS NFR BLD AUTO: 0.1 %
BUN SERPL-MCNC: 9 MG/DL (ref 7–30)
CALCIUM SERPL-MCNC: 7.9 MG/DL (ref 8.5–10.1)
CHLORIDE SERPL-SCNC: 96 MMOL/L (ref 94–109)
CO2 SERPL-SCNC: 25 MMOL/L (ref 20–32)
CREAT SERPL-MCNC: 0.9 MG/DL (ref 0.66–1.25)
DIFFERENTIAL METHOD BLD: ABNORMAL
EOSINOPHIL # BLD AUTO: 0.2 10E9/L (ref 0–0.7)
EOSINOPHIL NFR BLD AUTO: 2.3 %
ERYTHROCYTE [DISTWIDTH] IN BLOOD BY AUTOMATED COUNT: 13.3 % (ref 10–15)
GFR SERPL CREATININE-BSD FRML MDRD: 90 ML/MIN/{1.73_M2}
GLUCOSE BLDC GLUCOMTR-MCNC: 155 MG/DL (ref 70–99)
GLUCOSE SERPL-MCNC: 160 MG/DL (ref 70–99)
HCT VFR BLD AUTO: 34.8 % (ref 40–53)
HGB BLD-MCNC: 11.6 G/DL (ref 13.3–17.7)
IMM GRANULOCYTES # BLD: 0.1 10E9/L (ref 0–0.4)
IMM GRANULOCYTES NFR BLD: 1.3 %
LYMPHOCYTES # BLD AUTO: 1.8 10E9/L (ref 0.8–5.3)
LYMPHOCYTES NFR BLD AUTO: 19.2 %
MCH RBC QN AUTO: 28.2 PG (ref 26.5–33)
MCHC RBC AUTO-ENTMCNC: 33.3 G/DL (ref 31.5–36.5)
MCV RBC AUTO: 85 FL (ref 78–100)
MONOCYTES # BLD AUTO: 0.9 10E9/L (ref 0–1.3)
MONOCYTES NFR BLD AUTO: 10.1 %
NEUTROPHILS # BLD AUTO: 6.2 10E9/L (ref 1.6–8.3)
NEUTROPHILS NFR BLD AUTO: 67 %
NRBC # BLD AUTO: 0 10*3/UL
NRBC BLD AUTO-RTO: 0 /100
PLATELET # BLD AUTO: 273 10E9/L (ref 150–450)
POTASSIUM SERPL-SCNC: 4.3 MMOL/L (ref 3.4–5.3)
RBC # BLD AUTO: 4.12 10E12/L (ref 4.4–5.9)
SODIUM SERPL-SCNC: 127 MMOL/L (ref 133–144)
TSH SERPL DL<=0.005 MIU/L-ACNC: 1.44 MU/L (ref 0.4–4)
WBC # BLD AUTO: 9.2 10E9/L (ref 4–11)

## 2020-11-01 PROCEDURE — 85025 COMPLETE CBC W/AUTO DIFF WBC: CPT | Performed by: EMERGENCY MEDICINE

## 2020-11-01 PROCEDURE — 258N000003 HC RX IP 258 OP 636: Performed by: EMERGENCY MEDICINE

## 2020-11-01 PROCEDURE — 99284 EMERGENCY DEPT VISIT MOD MDM: CPT | Mod: 25

## 2020-11-01 PROCEDURE — 96360 HYDRATION IV INFUSION INIT: CPT

## 2020-11-01 PROCEDURE — 84443 ASSAY THYROID STIM HORMONE: CPT | Performed by: EMERGENCY MEDICINE

## 2020-11-01 PROCEDURE — 93005 ELECTROCARDIOGRAM TRACING: CPT

## 2020-11-01 PROCEDURE — 999N001017 HC STATISTIC GLUCOSE BY METER IP

## 2020-11-01 PROCEDURE — 80048 BASIC METABOLIC PNL TOTAL CA: CPT | Performed by: EMERGENCY MEDICINE

## 2020-11-01 RX ADMIN — SODIUM CHLORIDE 1000 ML: 9 INJECTION, SOLUTION INTRAVENOUS at 11:07

## 2020-11-01 ASSESSMENT — ENCOUNTER SYMPTOMS
NAUSEA: 0
CHILLS: 1
VOMITING: 0
ARTHRALGIAS: 0
MYALGIAS: 0
PALPITATIONS: 1
ABDOMINAL PAIN: 0
SHORTNESS OF BREATH: 0
FEVER: 0
DIARRHEA: 0

## 2020-11-01 NOTE — ED AVS SNAPSHOT
[facility] Emergency Dept  [address]  [facility] [address]  Phone: [phone]  Fax: [phone]                                    Rachell Paige   MRN: [id number]    Department: [facility] Emergency Dept   Date of Visit: 11/1/2020           After Visit Summary Signature Page    I have received my discharge instructions, and my questions have been answered. I have discussed any challenges I see with this plan with the nurse or doctor.    ..........................................................................................................................................  Patient/Patient Representative Signature      ..........................................................................................................................................  Patient Representative Print Name and Relationship to Patient    ..................................................               ................................................  Date                                   Time    ..........................................................................................................................................  Reviewed by Signature/Title    ...................................................              ..............................................  Date                                               Time          22EPIC Rev 08/18

## 2020-11-01 NOTE — ED PROVIDER NOTES
History   Chief Complaint:  Chills    HPI   Rachell Paige is a 65 year old male with history of COVID-19 infection, type 2 diabetes, anxiety, hypertension, and hyperlipidemia who presents for evaluation of palpitation and chills at home.  He said he noted when he woke up that he felt chilled.  He took his temperature and it was 68 on his thermometer.  He noted that time as well that his heart rate was rapid.  He denies shortness of breath or chest pain.  He denies fever.  Denies body aches.  Denies abdominal pain, nausea, vomiting or diarrhea.  He notes otherwise he has no symptoms.  He notes he had Covid last month but he was told his illness is no longer infectious and notes all of his symptoms had resolved.  He notes his wife is at home with Covid right now.    Allergies:  Aleve  Citalopram  Clopidogrel  Iodine  Naproxen  Sulfamethoxazole-Trimethoprim    Medications:   Eliquis  Aspirin 81 mg  Neurontin  Novolog  Lantus  Zestril  Zocor  Ambien   Prinivil     Past Medical History:    Anxiety   Type 2 diabetes  Glaucoma  Hypertension  Hyperlipidemia  Stage 3 chronic kidney disease  Osteomyelitis  COVID-19 infection  Peripheral vascular disease   Obesity   Erectile dysfunction    Past Surgical History:    Amputate toe(s), transmetatarsal - right  Amputate lesser toe, left    Family History:    Mother - Lung disease, Diabetes  Father - Glaucoma     Social History:  The patient was unaccompanied to the ED.  Smoking Status: Former Smoker 1990  Smokeless Tobacco: Never Used  Alcohol Use: No  Drug Use: No  PCP: Olivia Nichols   Marital Status:      Review of Systems   Constitutional: Positive for chills. Negative for fever.   Respiratory: Negative for shortness of breath.    Cardiovascular: Positive for palpitations. Negative for chest pain.   Gastrointestinal: Negative for abdominal pain, diarrhea, nausea and vomiting.   Musculoskeletal: Negative for arthralgias and myalgias.   All other systems reviewed and  are negative.    Physical Exam     Patient Vitals for the past 24 hrs:   BP Temp Temp src Pulse Resp SpO2 Weight   11/01/20 1140 -- -- -- -- -- -- 88.4 kg (194 lb 12.8 oz)   11/01/20 1130 (!) 169/82 -- -- 90 19 99 % --   11/01/20 1115 (!) 164/83 -- -- 88 21 99 % --   11/01/20 1100 -- -- -- 86 20 99 % --   11/01/20 1045 -- -- -- 98 21 98 % --   11/01/20 1030 -- -- -- 87 29 99 % --   11/01/20 1015 -- -- -- 87 20 100 % --   11/01/20 1000 -- -- -- 89 23 100 % --   11/01/20 0945 (!) 161/78 -- -- 92 (!) 34 100 % --   11/01/20 0921 (!) 152/78 97.9  F (36.6  C) Oral 92 18 100 % --     Physical Exam  General: Adult male sitting upright  Eyes: PERRL, Conjunctive within normal limits  ENT: Moist mucous membranes, oropharynx clear.   Neck: No palpable thyromegaly.  CV: Normal S1S2, no murmur, rub or gallop. Regular rate and rhythm  Resp: Clear to auscultation bilaterally, no wheezes, rales or rhonchi. Normal respiratory effort.  GI: Abdomen is soft, nontender and nondistended. No palpable masses. No rebound or guarding.  MSK: No edema. Nontender. Normal active range of motion.  Skin: Warm and dry. No rashes or lesions or ecchymoses on visible skin.  Neuro: Alert and oriented. Responds appropriately to all questions and commands. No focal findings appreciated. Normal muscle tone.  Psych: Normal mood and affect. Pleasant.  Emergency Department Course     ECG:  ECG taken at 0945, ECG read at 0948 by Silvia Burnette MD  Normal sinus rhythm  Normal ECG  Rate 91 bpm. AK interval 168. QRS duration 86. QT/QTc 342/420. P-R-T axes 58 -19 34.     Laboratory:  Laboratory findings were communicated with the patient who voiced understanding of the findings.    CBC: HGB 11.6 (L) o/w WNL (WBC 9.2, )  BMP:  (L), Glucose 160 (H), Calcium 7.9 (L) o/w WNL (Creatinine 0.90)   Glucose by Meter (Collected 0937): 155 (H)   TSH with free T4 reflex: 1.44     Interventions:  1107 0.9% NaCl bolus 1000 mL IV     Emergency Department  Course:  Past medical records, nursing notes, and vitals reviewed.  EKG obtained in the ED, see results above.    IV was inserted and blood was drawn for laboratory testing, results above.     (2683)   I performed an exam of the patient as documented above. History obtained from patient.     (1542)   I rechecked the patient and discussed results and plan of care. He denies any new concerns. He feels comfortable with plan.    Findings and plan explained to the Patient. Patient discharged home with instructions regarding supportive care, medications, and reasons to return. The importance of close follow-up was reviewed. I personally reviewed the laboratory results with the Patient and answered all related questions prior to discharge.     Impression & Plan   Covid-19  Rachell Paige was evaluated during a global COVID-19 pandemic, which necessitated consideration that the patient might be at risk for infection with the SARS-CoV-2 virus that causes COVID-19.   Applicable protocols for evaluation were followed during the patient's care.   COVID-19 was considered as part of the patient's evaluation. The plan for testing is:  a test was obtained at a previous visit and reviewed & considered today.     Medical Decision Making:  Rachell Paige is a 66 y/o male within recent admission last month for COVID-19 and a history of diabetes who presents emergency department concerns for palpitations and chills.  He was in normal sinus rhythm on assessment here.  There was no dysrhythmia noted on telemetry while in the ED.  He had no electrolyte abnormalities or concerning findings aside from low sodium.  This is been a problem for him in the past thought secondary to perhaps SIADH or poor oral intake.  Etiology is not clear here today.  Medication induced is also considered.  He is given IV normal saline as this will help correct the low sodium will need further assessment as an outpatient.  With regards the chills, he is not  febrile.  He has no focus or symptoms of infection.  He is overall nontoxic-appearing with no leukocytosis or other concerning laboratory abnormalities aside from that stated above.  There is no indication for admission.  He is aware of the need to follow-up closely with his primary care provider within 3 days.  He should return to me to emergency department worsening.  All question answered prior to discharge.    Diagnosis:    ICD-10-CM    1. Chills  R68.83    2. Palpitations  R00.2    3. Hyponatremia  E87.1      Disposition:  Discharged to home.    Scribe Disclosure:  Scar SHNI, am serving as a scribe at 9:27 AM on 11/1/2020 to document services personally performed by Silvia Burnette MD based on my observations and the provider's statements to me.  November 1, 2020   [facility] EMERGENCY DEPT        Silvia Burnette MD  11/01/20 8256

## 2020-11-01 NOTE — ED NOTES
Patient ambulated in hallway with pulse ox. Patient oxygen saturations where between 97-99%. Gait steady no complaints of dizziness. MD made aware.

## 2020-11-01 NOTE — ED TRIAGE NOTES
Patient presents to the ED reporting chills. States awoke with chills this morning and when checked temperature at home it was 68 degrees. Patient also reports a racing heart sensation this morning. States tested positive for COVID 24 days ago.

## 2020-11-02 LAB
BACTERIA SPEC CULT: NO GROWTH
BACTERIA SPEC CULT: NO GROWTH
INTERPRETATION ECG - MUSE: NORMAL
SPECIMEN SOURCE: NORMAL
SPECIMEN SOURCE: NORMAL

## 2020-11-03 ENCOUNTER — PATIENT OUTREACH (OUTPATIENT)
Dept: NURSING | Facility: CLINIC | Age: 65
End: 2020-11-03
Payer: COMMERCIAL

## 2020-11-03 ASSESSMENT — ACTIVITIES OF DAILY LIVING (ADL): DEPENDENT_IADLS:: INDEPENDENT

## 2020-11-03 NOTE — PROGRESS NOTES
Clinic Care Coordination Contact    Clinic Care Coordination Contact  OUTREACH    Referral Information:  Referral Source: ED Follow-Up    Primary Diagnosis: Other (include Comment box)(COVID)    Chief Complaint   Patient presents with     Clinic Care Coordination - Initial     Clinic Care Coordination - Post Hospital      [place] Utilization: 5 ED visits in the past 30 days  Clinic Utilization  Difficulty keeping appointments:: No  Compliance Concerns: No  No-Show Concerns: No  No PCP office visit in Past Year: No  Utilization    Last refreshed: 11/3/2020  9:43 AM: Hospital Admissions 2           Last refreshed: 11/3/2020  9:43 AM: ED Visits 8           Last refreshed: 11/3/2020  9:43 AM: No Show Count (past year) 9              Current as of: 11/3/2020  9:43 AM            Clinical Concerns:  CC EVELYN spoke with pt regarding his recent ED visit. He explained that he is feeling much better. He denied symptoms of SOB, heart palpitations, fever, or body aches. He did endorse a small rash on chest that began in the hospital. He showed it to a nurse while there. He plans to show this to Dr. Nichols at his appointment on 11/6.    Pt shared that his wife has tested positive for COVID. She is currently isolating and is feeling well. Pt inquired about how long she needs to isolate due to lack of symptoms. CC EVELYN encouraged her to isolate for the entire 14 days due to being contagious even when no symptoms are experienced.    Current Medical Concerns:  none    Current Behavioral Concerns: none    Education Provided to patient:  CC role   Pain  Pain (GOAL):: No  Health Maintenance Reviewed:    Clinical Pathway: None    Medication Management:  Post-discharge medication reconciliation status: Discharge medications reviewed and reconciled.  Continue medications without change.       Functional Status:  Dependent ADLs:: Independent  Dependent IADLs:: Independent  Bed or wheelchair confined:: No  Mobility Status: Independent  Fallen 2  or more times in the past year?: No  Any fall with injury in the past year?: No    Living Situation:  Current living arrangement:: I live in a private home with spouse  Type of residence:: Private home - stairs    Lifestyle & Psychosocial Needs:  Lifestyle     Physical activity     Days per week: Not on file     Minutes per session: Not on file     Stress: Not at all     Social Needs     Financial resource strain: Not hard at all     Food insecurity     Worry: Never true     Inability: Never true     Transportation needs     Medical: No     Non-medical: No     Diet:: Regular  Inadequate nutrition (GOAL):: No  Tube Feeding: No  Inadequate activity/exercise (GOAL):: No  Significant changes in sleep pattern (GOAL): No  Transportation means:: Regular car     Restorationism or spiritual beliefs that impact treatment:: No  Mental health DX:: No  Mental health management concern (GOAL):: No  Informal Support system:: Family, Spouse   Socioeconomic History     Marital status:      Spouse name: Not on file     Number of children: Not on file     Years of education: Not on file     Highest education level: Not on file   Relationships     Social connections     Talks on phone: Not on file     Gets together: More than three times a week     Attends Yarsanism service: Not on file     Active member of club or organization: Not on file     Attends meetings of clubs or organizations: Not on file     Relationship status:      Intimate partner violence     Fear of current or ex partner: Not on file     Emotionally abused: Not on file     Physically abused: Not on file     Forced sexual activity: Not on file     Tobacco Use     Smoking status: Former Smoker     Types: Cigarettes     Quit date:      Years since quittin.8     Smokeless tobacco: Never Used   Substance and Sexual Activity     Alcohol use: No     Drug use: No     Sexual activity: Yes     Partners: Female        Resources and Interventions:  Current  Resources:    Community Resources: None  Supplies used at home:: None  Equipment Currently Used at Home: none    Advance Care Plan/Directive  Advanced Care Plans/Directives on file:: No    Referrals Placed: None     Patient/Caregiver understanding: Pt reports understanding and denies any additional questions or concerns at this times. SW CC engaged in AIDET communication during encounter.     Future Appointments              In 3 days Olivia Nichols MD [facility]    In 1 month Hood Marr MD [facility]        Plan: At this time, pt denies outstanding need for connection or referral to resources or assistance navigating recommended follow up care. No further outreaches will be made at this time unless a new referral is made or a change in the pt's status occurs. Patient was provided with CC SW contact information and encouraged to call with any questions or concerns.    ALEKSANDER Choi, [facility]  Clinic Care Coordinator  [facility]  [phone]  [email or web address]

## 2020-11-03 NOTE — LETTER
[facility] CARE COORDINATION  [address] [place] [address]    November 3, 2020    Rachell Paige  [address]  [place] [address]      Dear Rachell,    I am a clinic care coordinator who works with Olivia Nichols MD at [facility]. I wanted to thank you for spending the time to talk with me.  Below is a description of clinic care coordination and how I can further assist you.      The clinic care coordination team is made up of a registered nurse,  and community health worker who understand the health care system. The goal of clinic care coordination is to help you manage your health and improve access to the health care system in the most efficient manner. The team can assist you in meeting your health care goals by providing education, coordinating services, strengthening the communication among your providers and supporting you with any resource needs.    Please feel free to contact me at [phone] with any questions or concerns. We are focused on providing you with the highest-quality healthcare experience possible and that all starts with you.     Sincerely,     ALEKSANDER Choi, [facility]  Clinic Care Coordinator  [facility]  [phone]  [email or web address]

## 2020-11-05 ENCOUNTER — NURSE TRIAGE (OUTPATIENT)
Dept: NURSING | Facility: CLINIC | Age: 65
End: 2020-11-05

## 2020-11-06 ENCOUNTER — OFFICE VISIT (OUTPATIENT)
Dept: FAMILY MEDICINE | Facility: CLINIC | Age: 65
End: 2020-11-06
Payer: COMMERCIAL

## 2020-11-06 ENCOUNTER — HOSPITAL ENCOUNTER (EMERGENCY)
Facility: CLINIC | Age: 65
Discharge: HOME | End: 2020-11-06
Attending: EMERGENCY MEDICINE | Admitting: EMERGENCY MEDICINE
Payer: COMMERCIAL

## 2020-11-06 VITALS
DIASTOLIC BLOOD PRESSURE: 79 MMHG | BODY MASS INDEX: 29.41 KG/M2 | HEIGHT: 66 IN | HEART RATE: 98 BPM | OXYGEN SATURATION: 96 % | WEIGHT: 183 LBS | SYSTOLIC BLOOD PRESSURE: 149 MMHG | TEMPERATURE: 97.6 F

## 2020-11-06 VITALS
OXYGEN SATURATION: 99 % | HEART RATE: 94 BPM | DIASTOLIC BLOOD PRESSURE: 80 MMHG | BODY MASS INDEX: 29.89 KG/M2 | HEIGHT: 66 IN | SYSTOLIC BLOOD PRESSURE: 161 MMHG | RESPIRATION RATE: 24 BRPM | TEMPERATURE: 97 F | WEIGHT: 186 LBS

## 2020-11-06 DIAGNOSIS — K21.00 GASTROESOPHAGEAL REFLUX DISEASE WITH ESOPHAGITIS WITHOUT HEMORRHAGE: ICD-10-CM

## 2020-11-06 DIAGNOSIS — Z11.59 NEED FOR HEPATITIS C SCREENING TEST: ICD-10-CM

## 2020-11-06 DIAGNOSIS — R07.9 CHEST PAIN, UNSPECIFIED TYPE: Primary | ICD-10-CM

## 2020-11-06 DIAGNOSIS — J44.9 CHRONIC OBSTRUCTIVE PULMONARY DISEASE, UNSPECIFIED COPD TYPE (H): ICD-10-CM

## 2020-11-06 DIAGNOSIS — I10 HTN, GOAL BELOW 140/90: ICD-10-CM

## 2020-11-06 DIAGNOSIS — E78.5 HYPERLIPIDEMIA LDL GOAL <100: ICD-10-CM

## 2020-11-06 DIAGNOSIS — Z11.4 SCREENING FOR HIV (HUMAN IMMUNODEFICIENCY VIRUS): ICD-10-CM

## 2020-11-06 DIAGNOSIS — Z12.11 COLON CANCER SCREENING: ICD-10-CM

## 2020-11-06 DIAGNOSIS — R10.13 EPIGASTRIC PAIN: ICD-10-CM

## 2020-11-06 DIAGNOSIS — E11.9 TYPE 2 DIABETES MELLITUS WITH HEMOGLOBIN A1C GOAL OF 7.0%-8.0% (H): ICD-10-CM

## 2020-11-06 DIAGNOSIS — Z89.421 S/P AMPUTATION OF LESSER TOE, RIGHT (H): ICD-10-CM

## 2020-11-06 PROBLEM — E11.621 DIABETIC ULCER OF TOE OF RIGHT FOOT ASSOCIATED WITH TYPE 2 DIABETES MELLITUS, WITH FAT LAYER EXPOSED (H): Status: ACTIVE | Noted: 2020-11-06

## 2020-11-06 PROBLEM — L97.512 DIABETIC ULCER OF TOE OF RIGHT FOOT ASSOCIATED WITH TYPE 2 DIABETES MELLITUS, WITH FAT LAYER EXPOSED (H): Status: ACTIVE | Noted: 2020-11-06

## 2020-11-06 PROBLEM — M86.9 OSTEOMYELITIS (H): Status: RESOLVED | Noted: 2018-12-27 | Resolved: 2020-11-06

## 2020-11-06 LAB
ALBUMIN SERPL-MCNC: 2.9 G/DL (ref 3.4–5)
ALBUMIN SERPL-MCNC: 3 G/DL (ref 3.4–5)
ALP SERPL-CCNC: 103 U/L (ref 40–150)
ALP SERPL-CCNC: 99 U/L (ref 40–150)
ALT SERPL W P-5'-P-CCNC: 31 U/L (ref 0–70)
ALT SERPL W P-5'-P-CCNC: 32 U/L (ref 0–70)
ANION GAP SERPL CALCULATED.3IONS-SCNC: 4 MMOL/L (ref 3–14)
ANION GAP SERPL CALCULATED.3IONS-SCNC: 6 MMOL/L (ref 3–14)
AST SERPL W P-5'-P-CCNC: 11 U/L (ref 0–45)
AST SERPL W P-5'-P-CCNC: 14 U/L (ref 0–45)
BASOPHILS # BLD AUTO: 0 10E9/L (ref 0–0.2)
BASOPHILS NFR BLD AUTO: 0.3 %
BILIRUB SERPL-MCNC: 0.4 MG/DL (ref 0.2–1.3)
BILIRUB SERPL-MCNC: 0.5 MG/DL (ref 0.2–1.3)
BUN SERPL-MCNC: 10 MG/DL (ref 7–30)
BUN SERPL-MCNC: 9 MG/DL (ref 7–30)
CALCIUM SERPL-MCNC: 8.7 MG/DL (ref 8.5–10.1)
CALCIUM SERPL-MCNC: 8.9 MG/DL (ref 8.5–10.1)
CHLORIDE SERPL-SCNC: 102 MMOL/L (ref 94–109)
CHLORIDE SERPL-SCNC: 104 MMOL/L (ref 94–109)
CHOLEST SERPL-MCNC: 136 MG/DL
CO2 SERPL-SCNC: 23 MMOL/L (ref 20–32)
CO2 SERPL-SCNC: 27 MMOL/L (ref 20–32)
CREAT SERPL-MCNC: 0.89 MG/DL (ref 0.66–1.25)
CREAT SERPL-MCNC: 1.01 MG/DL (ref 0.66–1.25)
D DIMER PPP FEU-MCNC: 0.4 UG/ML FEU (ref 0–0.5)
DIFFERENTIAL METHOD BLD: ABNORMAL
EOSINOPHIL # BLD AUTO: 0.2 10E9/L (ref 0–0.7)
EOSINOPHIL NFR BLD AUTO: 2.5 %
ERYTHROCYTE [DISTWIDTH] IN BLOOD BY AUTOMATED COUNT: 14 % (ref 10–15)
GFR SERPL CREATININE-BSD FRML MDRD: 78 ML/MIN/{1.73_M2}
GFR SERPL CREATININE-BSD FRML MDRD: >90 ML/MIN/{1.73_M2}
GLUCOSE BLDC GLUCOMTR-MCNC: 130 MG/DL (ref 70–99)
GLUCOSE SERPL-MCNC: 120 MG/DL (ref 70–99)
GLUCOSE SERPL-MCNC: 199 MG/DL (ref 70–99)
HCT VFR BLD AUTO: 36.4 % (ref 40–53)
HDLC SERPL-MCNC: 52 MG/DL
HGB BLD-MCNC: 12.2 G/DL (ref 13.3–17.7)
IMM GRANULOCYTES # BLD: 0 10E9/L (ref 0–0.4)
IMM GRANULOCYTES NFR BLD: 0.5 %
INTERPRETATION ECG - MUSE: NORMAL
LDLC SERPL CALC-MCNC: 64 MG/DL
LYMPHOCYTES # BLD AUTO: 1.8 10E9/L (ref 0.8–5.3)
LYMPHOCYTES NFR BLD AUTO: 23.1 %
MCH RBC QN AUTO: 28.4 PG (ref 26.5–33)
MCHC RBC AUTO-ENTMCNC: 33.5 G/DL (ref 31.5–36.5)
MCV RBC AUTO: 85 FL (ref 78–100)
MONOCYTES # BLD AUTO: 0.5 10E9/L (ref 0–1.3)
MONOCYTES NFR BLD AUTO: 5.8 %
NEUTROPHILS # BLD AUTO: 5.4 10E9/L (ref 1.6–8.3)
NEUTROPHILS NFR BLD AUTO: 67.8 %
NONHDLC SERPL-MCNC: 84 MG/DL
NRBC # BLD AUTO: 0 10*3/UL
NRBC BLD AUTO-RTO: 0 /100
PLATELET # BLD AUTO: 308 10E9/L (ref 150–450)
POTASSIUM SERPL-SCNC: 3.7 MMOL/L (ref 3.4–5.3)
POTASSIUM SERPL-SCNC: 4.2 MMOL/L (ref 3.4–5.3)
PROT SERPL-MCNC: 6.8 G/DL (ref 6.8–8.8)
PROT SERPL-MCNC: 7 G/DL (ref 6.8–8.8)
RBC # BLD AUTO: 4.3 10E12/L (ref 4.4–5.9)
SODIUM SERPL-SCNC: 131 MMOL/L (ref 133–144)
SODIUM SERPL-SCNC: 135 MMOL/L (ref 133–144)
TRIGL SERPL-MCNC: 100 MG/DL
TROPONIN I SERPL-MCNC: 0.02 UG/L (ref 0–0.04)
TROPONIN I SERPL-MCNC: <0.015 UG/L (ref 0–0.04)
WBC # BLD AUTO: 7.9 10E9/L (ref 4–11)

## 2020-11-06 PROCEDURE — 250N000009 HC RX 250: Performed by: EMERGENCY MEDICINE

## 2020-11-06 PROCEDURE — 80053 COMPREHEN METABOLIC PANEL: CPT | Performed by: EMERGENCY MEDICINE

## 2020-11-06 PROCEDURE — 99284 EMERGENCY DEPT VISIT MOD MDM: CPT

## 2020-11-06 PROCEDURE — 36415 COLL VENOUS BLD VENIPUNCTURE: CPT | Performed by: INTERNAL MEDICINE

## 2020-11-06 PROCEDURE — 99215 OFFICE O/P EST HI 40 MIN: CPT | Performed by: INTERNAL MEDICINE

## 2020-11-06 PROCEDURE — 250N000013 HC RX MED GY IP 250 OP 250 PS 637: Performed by: EMERGENCY MEDICINE

## 2020-11-06 PROCEDURE — 80053 COMPREHEN METABOLIC PANEL: CPT | Performed by: INTERNAL MEDICINE

## 2020-11-06 PROCEDURE — 85025 COMPLETE CBC W/AUTO DIFF WBC: CPT | Performed by: INTERNAL MEDICINE

## 2020-11-06 PROCEDURE — 80061 LIPID PANEL: CPT | Performed by: INTERNAL MEDICINE

## 2020-11-06 PROCEDURE — 84484 ASSAY OF TROPONIN QUANT: CPT | Performed by: EMERGENCY MEDICINE

## 2020-11-06 PROCEDURE — 84484 ASSAY OF TROPONIN QUANT: CPT | Performed by: INTERNAL MEDICINE

## 2020-11-06 PROCEDURE — 999N001017 HC STATISTIC GLUCOSE BY METER IP

## 2020-11-06 PROCEDURE — 93005 ELECTROCARDIOGRAM TRACING: CPT

## 2020-11-06 PROCEDURE — 85379 FIBRIN DEGRADATION QUANT: CPT | Performed by: INTERNAL MEDICINE

## 2020-11-06 RX ORDER — SIMVASTATIN 10 MG
10 TABLET ORAL AT BEDTIME
Qty: 90 TABLET | Refills: 1 | Status: SHIPPED | OUTPATIENT
Start: 2020-11-06 | End: 2022-02-09

## 2020-11-06 RX ORDER — ESOMEPRAZOLE MAGNESIUM 40 MG/1
40 CAPSULE, DELAYED RELEASE ORAL
Qty: 90 CAPSULE | Refills: 0 | Status: SHIPPED | OUTPATIENT
Start: 2020-11-06 | End: 2022-02-09

## 2020-11-06 RX ORDER — LISINOPRIL 10 MG/1
10 TABLET ORAL DAILY
Qty: 90 TABLET | Refills: 1 | Status: SHIPPED | OUTPATIENT
Start: 2020-11-06 | End: 2021-01-11

## 2020-11-06 RX ADMIN — LIDOCAINE HYDROCHLORIDE 30 ML: 20 SOLUTION ORAL; TOPICAL at 17:26

## 2020-11-06 ASSESSMENT — MIFFLIN-ST. JEOR
SCORE: 1576.44
SCORE: 1562.83

## 2020-11-06 ASSESSMENT — PATIENT HEALTH QUESTIONNAIRE - PHQ9: SUM OF ALL RESPONSES TO PHQ QUESTIONS 1-9: 14

## 2020-11-06 ASSESSMENT — ENCOUNTER SYMPTOMS
FEVER: 0
CHILLS: 0
COUGH: 0
SHORTNESS OF BREATH: 0

## 2020-11-06 NOTE — ED TRIAGE NOTES
Chest pressure for 2 days, patient states he was hospitalized one month ago for twelve days with COVID-19.

## 2020-11-06 NOTE — ED PROVIDER NOTES
History     Chief Complaint:  Chest Pain      HPI   Rachell Paige is a 65 year old male with a history of DM, HTN, and CKD who presents with chest pain.  The patient reports burning chest pain for the past 2 days.  He denies any shortness of breath, cough, fever, or chills.  He does sometimes feel as if food does not go down like it should when swallowing.  He has never had an endoscopy and has not been on PPI or other treatment for reflux.  He was at clinic today and his doctor did want him to start Nexium.  He was hospitalized due to complications of COVID 10/10 - 10/21/2020.  He has been doing well from a respiratory standpoint since then and has not has any low oxygen readings at home.  His wife tested positive for COVID in the past week.    Cardiac/PE/DVT Risk Factors:  The patient has a history of hypertension, hyperlipidemia and diabetes and is a former smoker.  He reports no family history of heart disease.  He denies any personal or familial history of PE, DVT or clotting disorder.  He reports no recent travel or surgery but he was hospitalized recently.  He is not on hormone therapy and has no known malignancy.     Labs drawn at clinic at 1416:  CBC: RBC 4.3 (L), HGB 12.2 (L), HCT 36.4 (L), otherwise WNL (WBC 7.9, )   CMP:  (L), Glucose 199 (H), Albumin 3 (L), otherwise WNL (Creatinine 0.89)   D-Dimer: 0.4  Troponin I: <0.015     Allergies:  Aleve  Citalopram  Plavix  Iodine   Bactrim     Medications:    Eliquis  Aspirin  Nexium  Gabapentin  Novolog  Lantus  Lisinopril   Simvastatin     Past Medical History:    Diabetes mellitus   Hypertension  Hyperlipidemia  Microalbuminuria   Chronic kidney disease   Glaucoma   Anxiety   Osteomyelitis     Past Surgical History:    Toe amputation, right 5th 2018    Family History:    Diabetes - son, daughter  Glaucoma - father   Lung disease - mother     Social History:  Presents to the ED alone  Tobacco Use: Former smoker, quit 1990  Alcohol Use: No  "alcohol use.   PCP: Olivia Nichols      Review of Systems   Constitutional: Negative for chills and fever.   Respiratory: Negative for cough and shortness of breath.    Cardiovascular: Positive for chest pain.   10 point review of systems performed and is negative except as above and in HPI.     Physical Exam   First Vitals:  BP: (!) 151/76  Pulse: 61  Temp: 97  F (36.1  C)  Resp: 18  Height: 167.6 cm (5' 6\")  Weight: 84.4 kg (186 lb)  SpO2: 95 %      Physical Exam  General: Resting on the gurney, appears comfortable  Head:  The scalp, face, and head appear normal  Mouth/Throat: Mucus membranes are moist  CV:  Regular rate    Normal S1 and S2  No pathological murmur   Resp:  Breath sounds clear and equal bilaterally    Non-labored, no retractions or accessory muscle use    No coarseness    No wheezing   GI:  Abdomen is soft, no rigidity    Slight epigastric tenderness to palpation.  MS:  Normal motor assessment of all extremities.    Good capillary refill noted.  Skin:  No rash or lesions noted.  Neuro: Speech is normal and fluent. No apparent deficit.  Psych:  Awake. Alert.  Normal affect.      Appropriate interactions.     Emergency Department Course   ECG:  @ 1416  Indication: chest pain  Vent. Rate 93 bpm. SD interval 158 ms. QRS duration 74 ms. QT/QTc 330/410 ms. P-R-T axis 54 -23 32.   Normal sinus rhythm.  Normal ECG.    Read by Dr. Lobato.     Laboratory:  CMP: Glucose 120 (H), Albumin 2.9 (L), otherwise WNL (Creatinine 1.01)   Troponin I: 0.016  (1935) Glucose my meter: 130 (H)     Interventions:  (1726) GI Cocktail (Maalox/Mylanta and viscous Lidocaine), 30 mL suspension, PO      Emergency Department Course:  Nursing notes and vitals reviewed.  I performed an exam of the patient as documented above.     EKG was done, interpretation as above.  The patient was placed on continuous pulse oximetry and cardiac monitoring.     Blood was drawn from the patient. This was sent for laboratory testing, findings " above.      Findings and plan explained to the patient.  He was feeling improved after GI cocktail.    Patient discharged home with instructions regarding supportive care, medications, and reasons to return. The importance of close follow-up was reviewed.      Impression & Plan      Medical Decision Making:  Rachell Paige is a 65 year old male who presents with chest pain.  The work up in the Emergency Department is negative.  I considered a broad differential diagnosis in this patient including life-threatening etiologies such as acute coronary syndrome, myocardial infarction, pulmonary embolism, acute aortic dissection, myocarditis, pericarditis,  amongst others.  Other causes considered for this patient included pneumonia, pneumothorax, chest wall source, pericarditis, pleurisy, esophageal spasm, etc.  No serious etiology for the chest pain were detected today during this visit.  Gastritis/GERD or esophageal etioloogy is a distinct possibility especially considering his globus sensation and relief with GI cocktail.  I have recommended he take his antacid as prescribed and follow up with GI.  Referral information given. Close follow up with primary care is indicated should the pain continue, as further work up may be performed; this was made clear to the patient, who understands.     Diagnosis:    ICD-10-CM    1. Epigastric pain  R10.13        Disposition:  Discharged to home.       I, Yuridia Glass, am serving as a scribe on 11/6/2020 at 4:54 PM to personally document services performed by Joyce Lobato MD based on my observations and the provider's statements to me.     Yuridia Glass  11/6/2020   [facility] EMERGENCY DEPT       Joyce Lobato MD  11/06/20 1827

## 2020-11-06 NOTE — PROGRESS NOTES
Subjective     Rachell Paige is a 65 year old male who presents to clinic today for the following health issues:    [facility]           Hospital Follow-up Visit:    Hospital/Nursing Home/IP Rehab Facility: [facility]  Date of Admission: 10/26/2020  Date of Discharge: 10/29/2020  ER VISIT 11-1-20 Chills, Palpitations, hyponatremia   Reason(s) for Admission:    Shortness of breath  Hyponatremia   Chest pain, unspecified type      Was your hospitalization related to COVID-19? No , is recovered 10-26-20 antibodies  Problems taking medications regularly: States medications were not dispensed to him since discharge from the hospital, so has not been taking his lisinopril  Medication changes since discharge: None  Problems adhering to non-medication therapy:  None    Summary of hospitalization:  [facility] discharge summary reviewed  Diagnostic Tests/Treatments reviewed.  Follow up needed: yes  Other Healthcare Providers Involved in Patient s Care:         Specialist appointment - cardiology, MTM, [place]  Update since discharge: improved.  Additional issues: Chest burning sensation and some chest tightness ongoing for the last 3 days   Post Discharge Medication Reconciliation: discharge medications reconciled, continue medications without change.  Plan of care communicated with patient            ED/UC Followup:    Facility:  [facility]  Date of visit: 11/01/2020  Reason for visit: chills, palpitation, hyponatremia   Current Status: STABLE       Patient presented for follow-up, he was in the ER on the first , he has been complaining since his last visit of chest burning sensation , some tightness in bilateral upper chest; burning is constant.  He is not known to have GERD in the past and he is not taking any antiacid pills.  He has not been taking his blood pressure medicine, he states that since discharge from the ER the medicines were not dispensed for him; it was not clear  the reason of him not able to get  "his medications.  Patient never called this provider for clarification of his medications.  Patient denies any pleuritic chest pain.  Denies any cough or shortness of breath.  His pulse ox is ranging 95 to 98% on room air.  No leg edema.  No GI or  symptoms.  He had hyponatremia while in the hospital and that has been slowly getting better, he denies of any mental status changes or lethargy.  Not having any fevers or chills.    Review of Systems   Constitutional, HEENT, cardiovascular, pulmonary, GI, , musculoskeletal, neuro, skin, endocrine and psych systems are negative, except as otherwise noted.      Objective    BP (!) 149/79 (BP Location: Right arm, Patient Position: Chair, Cuff Size: Adult Regular)   Pulse 98   Temp 97.6  F (36.4  C) (Temporal)   Ht 1.676 m (5' 6\")   Wt 83 kg (183 lb)   SpO2 96%   BMI 29.54 kg/m    Body mass index is 29.54 kg/m .  Physical Exam   GENERAL: healthy, alert and no distress  EYES: Eyes grossly normal to inspection, PERRL and conjunctivae and sclerae normal  NECK: no adenopathy, no asymmetry, masses, or scars and thyroid normal to palpation  RESP: lungs clear to auscultation - no rales, rhonchi or wheezes  CV: regular rate and rhythm, normal S1 S2, no S3 or S4, no murmur, click or rub, no peripheral edema and peripheral pulses strong  ABDOMEN: soft, nontender, no hepatosplenomegaly, no masses and bowel sounds normal  MS: no gross musculoskeletal defects noted, no edema  SKIN: no suspicious lesions or rashes  Diabetic foot exam: intact monofilament test, no calluses, skin fissures or ulceration, wound healed at the site of 5th toe amputation.  NEURO: Normal strength and tone, mentation intact and speech normal  PSYCH: mentation appears normal, affect normal/bright          Assessment & Plan   Problem List Items Addressed This Visit        Nervous and Auditory    Chest pain, unspecified type - Primary    Relevant Orders    EKG 12-lead complete w/read - Clinics (Completed) "    CBC with platelets and differential (Completed)    D dimer, quantitative (Completed)    Comprehensive metabolic panel (BMP + Alb, Alk Phos, ALT, AST, Total. Bili, TP) (Completed)    Troponin I (Completed)    EKG 12-lead complete w/read - Clinics (Completed)       Respiratory    COPD (chronic obstructive pulmonary disease) (H)       Endocrine    Type 2 diabetes mellitus with hemoglobin A1c goal of 7.0%-8.0% (H)    Relevant Medications    lisinopril (ZESTRIL) 10 MG tablet    Other Relevant Orders    Comprehensive metabolic panel (BMP + Alb, Alk Phos, ALT, AST, Total. Bili, TP) (Completed)    Hemoglobin A1c    Hyperlipidemia LDL goal <100    Relevant Medications    simvastatin (ZOCOR) 10 MG tablet    Other Relevant Orders    Lipid panel reflex to direct LDL Fasting (Completed)       Circulatory    HTN, goal below 140/90    Relevant Medications    lisinopril (ZESTRIL) 10 MG tablet    Other Relevant Orders    CBC with platelets and differential (Completed)    Comprehensive metabolic panel (BMP + Alb, Alk Phos, ALT, AST, Total. Bili, TP) (Completed)       Other    S/P amputation of lesser toe, right (H)      Other Visit Diagnoses     Screening for HIV (human immunodeficiency virus)        Need for hepatitis C screening test        Colon cancer screening        Relevant Orders    Fecal colorectal cancer screen (FIT)    Gastroesophageal reflux disease with esophagitis without hemorrhage        Relevant Medications    esomeprazole (NEXIUM) 40 MG DR capsule         Advised patient because of ongoing chest symptoms it would be better if he goes to the ER get that evaluated immediately ,including EKG cardiac enzymes and ACS rule out as well as might try some GI cocktail to see if his symptoms get better, probably is GERD related.  Nexium was prescribed, refill given for  Zocor and lisinopril ;he states he takes 10 mg of lisinopril daily.  His blood pressure is elevated in the clinic because he has not been taking his blood  pressure medicines.  Advised patient to follow-up with MTM and diabetic educator and he is in agreement with such.    Depression Screening Follow Up    PHQ 11/6/2020   PHQ-9 Total Score 14   Q9: Thoughts of better off dead/self-harm past 2 weeks Not at all     Last PHQ-9 11/6/2020   1.  Little interest or pleasure in doing things 3   2.  Feeling down, depressed, or hopeless 3   3.  Trouble falling or staying asleep, or sleeping too much 3   4.  Feeling tired or having little energy 3   5.  Poor appetite or overeating 1   6.  Feeling bad about yourself 1   7.  Trouble concentrating 0   8.  Moving slowly or restless 0   Q9: Thoughts of better off dead/self-harm past 2 weeks 0   PHQ-9 Total Score 14   Difficulty at work, home, or with people Very difficult              See Patient Instructions  No follow-ups on file.    Olivia Nichols MD  [facility]

## 2020-11-06 NOTE — TELEPHONE ENCOUNTER
TriageCall:    Pt called asking if oxygen monitor readings of 95-97% were OK, and if HR up to 96 were normal.    Pt says he was randomly checking his HR and O2 sat.  Sounding very anxious that these were abnormal    Reassured patient that those oxygen levels are acceptable, and that HR can go up after walking or even anxiety can increase it. Advised pt to check it after sleeping, and if still had concerns, call his clinic.    Magda Delatorre RN

## 2020-11-06 NOTE — LETTER
November 9, 2020      Rachell Paige  [address] [facility] [address]        Dear ,    We are writing to inform you of your test results.    Rachell reviewed your labs,   Troponin that is cardiac enzyme negative   Cholesterol panel all numbers are at goal, much improvement, continue same dose of simvastatin, continue with lifestyle changes low-fat low-cholesterol diet, brisk walking 30 minutes five times a week according to American Heart Association guidelines, and weight loss as tolerated   Chemistry shows low sodium at 131, normal potassium, normal kidney function test, normal calcium level, low albumin, encouraged diet rich in protein, normal liver enzymes ALT and AST.   D-dimer test for blood clotting is negative which is reassuring   CBC shows normal white blood cell count, hemoglobin hematocrit are stable; there is slight anemia hemoglobin 12.2 hematocrit of 36.4, normal platelet count with normal differential   CBC is reassuring there is no underlying systemic infection.   Dr. Nichols     Resulted Orders   CBC with platelets and differential   Result Value Ref Range    WBC 7.9 4.0 - 11.0 10e9/L    RBC Count 4.30 (L) 4.4 - 5.9 10e12/L    Hemoglobin 12.2 (L) 13.3 - 17.7 g/dL    Hematocrit 36.4 (L) 40.0 - 53.0 %    MCV 85 78 - 100 fl    MCH 28.4 26.5 - 33.0 pg    MCHC 33.5 31.5 - 36.5 g/dL    RDW 14.0 10.0 - 15.0 %    Platelet Count 308 150 - 450 10e9/L    Diff Method Automated Method     % Neutrophils 67.8 %    % Lymphocytes 23.1 %    % Monocytes 5.8 %    % Eosinophils 2.5 %    % Basophils 0.3 %    % Immature Granulocytes 0.5 %    Nucleated RBCs 0 0 /100    Absolute Neutrophil 5.4 1.6 - 8.3 10e9/L    Absolute Lymphocytes 1.8 0.8 - 5.3 10e9/L    Absolute Monocytes 0.5 0.0 - 1.3 10e9/L    Absolute Eosinophils 0.2 0.0 - 0.7 10e9/L    Absolute Basophils 0.0 0.0 - 0.2 10e9/L    Abs Immature Granulocytes 0.0 0 - 0.4 10e9/L    Absolute Nucleated RBC 0.0    D dimer, quantitative   Result Value Ref  Range    D Dimer 0.4 0.0 - 0.50 ug/ml FEU      Comment:      This D-dimer assay is intended for use in conjunction with a clinical pretest   probability assessment model to exclude pulmonary embolism (PE) and deep   venous thrombosis (DVT) in outpatients suspected of PE or DVT. The cut-off   value is 0.5 ug/mL FEU.     Comprehensive metabolic panel (BMP + Alb, Alk Phos, ALT, AST, Total. Bili, TP)   Result Value Ref Range    Sodium 131 (L) 133 - 144 mmol/L    Potassium 4.2 3.4 - 5.3 mmol/L    Chloride 102 94 - 109 mmol/L    Carbon Dioxide 23 20 - 32 mmol/L    Anion Gap 6 3 - 14 mmol/L    Glucose 199 (H) 70 - 99 mg/dL    Urea Nitrogen 10 7 - 30 mg/dL    Creatinine 0.89 0.66 - 1.25 mg/dL    GFR Estimate >90 >60 mL/min/[1.73_m2]      Comment:      Non  GFR Calc  Starting 12/18/2018, serum creatinine based estimated GFR (eGFR) will be   calculated using the Chronic Kidney Disease Epidemiology Collaboration   (CKD-EPI) equation.      GFR Estimate If Black >90 >60 mL/min/[1.73_m2]      Comment:       GFR Calc  Starting 12/18/2018, serum creatinine based estimated GFR (eGFR) will be   calculated using the Chronic Kidney Disease Epidemiology Collaboration   (CKD-EPI) equation.      Calcium 8.7 8.5 - 10.1 mg/dL    Bilirubin Total 0.5 0.2 - 1.3 mg/dL    Albumin 3.0 (L) 3.4 - 5.0 g/dL    Protein Total 7.0 6.8 - 8.8 g/dL    Alkaline Phosphatase 103 40 - 150 U/L    ALT 32 0 - 70 U/L    AST 14 0 - 45 U/L   Lipid panel reflex to direct LDL Fasting   Result Value Ref Range    Cholesterol 136 <200 mg/dL    Triglycerides 100 <150 mg/dL    HDL Cholesterol 52 >39 mg/dL    LDL Cholesterol Calculated 64 <100 mg/dL      Comment:      Desirable:       <100 mg/dl    Non HDL Cholesterol 84 <130 mg/dL   Troponin I   Result Value Ref Range    Troponin I ES <0.015 0.000 - 0.045 ug/L      Comment:      The 99th percentile for upper reference range is 0.045 ug/L.  Troponin values   in the range of 0.045 - 0.120 ug/L  may be associated with risks of adverse   clinical events.         If you have any questions or concerns, please call the clinic at the number listed above.       Sincerely,        Olivia Nichols MD

## 2020-11-06 NOTE — ED AVS SNAPSHOT
[facility] Emergency Dept  [address] [address]  Phone: [phone]  Fax: [phone]                                    Rachell Paige   MRN: [id number]    Department: [facility] Emergency Dept   Date of Visit: 11/6/2020           After Visit Summary Signature Page    I have received my discharge instructions, and my questions have been answered. I have discussed any challenges I see with this plan with the nurse or doctor.    ..........................................................................................................................................  Patient/Patient Representative Signature      ..........................................................................................................................................  Patient Representative Print Name and Relationship to Patient    ..................................................               ................................................  Date                                   Time    ..........................................................................................................................................  Reviewed by Signature/Title    ...................................................              ..............................................  Date                                               Time          22EPIC Rev 08/18

## 2020-11-07 NOTE — RESULT ENCOUNTER NOTE
Please notify patient of the following labs:  Rachell reviewed your labs,  Troponin that is cardiac enzyme negative  Cholesterol panel all numbers are at goal, much improvement, continue same dose of simvastatin, continue with lifestyle changes low-fat low-cholesterol diet, brisk walking 30 minutes five times a week according to American Heart Association guidelines, and weight loss as tolerated  Chemistry shows low sodium at 131, normal potassium, normal kidney function test, normal calcium level, low albumin, encouraged diet rich in protein, normal liver enzymes ALT and AST.  D-dimer test for blood clotting is negative which is reassuring  CBC shows normal white blood cell count, hemoglobin hematocrit are stable; there is slight anemia hemoglobin 12.2 hematocrit of 36.4, normal platelet count with normal differential  CBC is reassuring there is no underlying systemic infection.  Dr. Nichols

## 2020-11-10 ENCOUNTER — PATIENT OUTREACH (OUTPATIENT)
Dept: NURSING | Facility: CLINIC | Age: 65
End: 2020-11-10
Payer: COMMERCIAL

## 2020-11-10 ASSESSMENT — ACTIVITIES OF DAILY LIVING (ADL): DEPENDENT_IADLS:: INDEPENDENT

## 2020-11-10 NOTE — PROGRESS NOTES
Clinic Care Coordination Contact    Clinic Care Coordination Contact  OUTREACH    Referral Information:  Referral Source: ED Follow-Up    Primary Diagnosis: Other (include Comment box)(COVID)    Chief Complaint   Patient presents with     Clinic Care Coordination - Initial     Clinic Care Coordination - Post Hospital        [place] Utilization: 6 ED and hospital stays in the past 90 days due to COVID complications  Clinic Utilization  Difficulty keeping appointments:: No  Compliance Concerns: No  No-Show Concerns: No  No PCP office visit in Past Year: No  Utilization    Last refreshed: 11/9/2020  2:54 PM: Hospital Admissions 2           Last refreshed: 11/9/2020  2:54 PM: ED Visits 9           Last refreshed: 11/9/2020  1:23 PM: No Show Count (past year) 9              Current as of: 11/9/2020  1:23 PM            Clinical Concerns:  WILNER RIVAS spoke with pt regarding his recent ED visit. He shared that he is feeling much better. Nexium was discussed. He shared that he has taken it one time and it was helpful but he worries about the high dosage and potential damage to his kidneys. He plans to discuss lowering the dosage to 20mg with care team.    Referrals discussed. WILNER RIVAS explained the the diabetic educator had outreached to him to make an appointment while he was most ill from COVID. WILNER RIVAS encouraged pt to make that appointment.    Pt shared that his wife has now hit her 14 days of quarantine from COVID. She experienced very mild symptoms and has no symptoms today. Pt worries about getting COVID again and has asked everyone in the house to wear masks. WILNER RIVAS explained the importance of his home becoming a pod isolated together and that would reduce the need for masking with those in the home.    Current Medical Concerns:  diabeties    Current Behavioral Concerns: none    Education Provided to patient: none   Pain  Pain (GOAL):: No  Health Maintenance Reviewed: Up to date  Clinical Pathway: None    Medication  Management:  Post-discharge medication reconciliation status: Discharge medications reviewed and reconciled.  Continue medications without change.    Functional Status:  Dependent ADLs:: Independent  Dependent IADLs:: Independent  Bed or wheelchair confined:: No  Mobility Status: Independent  Fallen 2 or more times in the past year?: No  Any fall with injury in the past year?: No    Living Situation:  Current living arrangement:: I live in a private home with spouse  Type of residence:: Private home - stairs    Lifestyle & Psychosocial Needs:  Lifestyle     Physical activity     Days per week: Not on file     Minutes per session: Not on file     Stress: Not at all     Social Needs     Financial resource strain: Not hard at all     Food insecurity     Worry: Never true     Inability: Never true     Transportation needs     Medical: No     Non-medical: No     Diet:: Regular  Inadequate nutrition (GOAL):: No  Tube Feeding: No  Inadequate activity/exercise (GOAL):: No  Significant changes in sleep pattern (GOAL): No  Transportation means:: Regular car     Congregational or spiritual beliefs that impact treatment:: No  Mental health DX:: No  Mental health management concern (GOAL):: No  Chemical Dependency Status: No Current Concerns  Informal Support system:: Family, Spouse   Socioeconomic History     Marital status:      Spouse name: Not on file     Number of children: Not on file     Years of education: Not on file     Highest education level: Not on file   Relationships     Social connections     Talks on phone: Not on file     Gets together: More than three times a week     Attends Denominational service: Not on file     Active member of club or organization: Not on file     Attends meetings of clubs or organizations: Not on file     Relationship status:      Intimate partner violence     Fear of current or ex partner: Not on file     Emotionally abused: Not on file     Physically abused: Not on file     Forced  sexual activity: Not on file     Tobacco Use     Smoking status: Former Smoker     Types: Cigarettes     Quit date:      Years since quittin.8     Smokeless tobacco: Never Used   Substance and Sexual Activity     Alcohol use: No     Drug use: No     Sexual activity: Yes     Partners: Female      Resources and Interventions:  Current Resources:    Community Resources: None  Supplies used at home:: None  Equipment Currently Used at Home: none  Employment Status: retired)   )    Advance Care Plan/Directive  Advanced Care Plans/Directives on file:: No    Referrals Placed: None     Patient/Caregiver understanding: Pt reports understanding and denies any additional questions or concerns at this times. SW CC engaged in AIDET communication during encounter.       Future Appointments              In 1 month Hood Marr MD [facility]        Plan: At this time, pt denies outstanding need for connection or referral to resources or assistance navigating recommended follow up care. No further outreaches will be made at this time unless a new referral is made or a change in the pt's status occurs. Patient was provided with CC SW contact information and encouraged to call with any questions or concerns.    ALEKSANDER Choi, [facility]  Clinic Care Coordinator  [facility]  [phone]  [email or web address]

## 2020-11-12 ENCOUNTER — PATIENT OUTREACH (OUTPATIENT)
Dept: NURSING | Facility: CLINIC | Age: 65
End: 2020-11-12
Payer: COMMERCIAL

## 2020-11-12 NOTE — PROGRESS NOTES
Clinic Care Coordination Contact    Follow Up Progress Note      Assessment: CC EVELYN received call from pt requesting information from recent labs, potassium and sodium levels. CC SW reported findings from 11/6 lab draw. Pt requested further information in regards to swelling. CC SW explained this information is unknown to CC SW and encouraged to contact clinic to discuss. Pt plans to outreach to his specialist tomorrow to discuss further.    Pt reported no concerns and is feeling well.    Plan: At this time, pt denies outstanding need for connection or referral to resources or assistance navigating recommended follow up care. No further outreaches will be made at this time unless a new referral is made or a change in the pt's status occurs. Patient was provided with CC SW contact information and encouraged to call with any questions or concerns.    ALEKSANDER Choi, EVELYN  Clinic Care Coordinator  [facility]  [phone]  [email or web address]

## 2020-11-27 DIAGNOSIS — E11.9 TYPE 2 DIABETES MELLITUS WITH HEMOGLOBIN A1C GOAL OF 7.0%-8.0% (H): ICD-10-CM

## 2020-11-27 RX ORDER — INSULIN ASPART 100 [IU]/ML
1-10 INJECTION, SOLUTION INTRAVENOUS; SUBCUTANEOUS 2 TIMES DAILY WITH MEALS
Qty: 3 ML | Refills: 0 | Status: SHIPPED | OUTPATIENT
Start: 2020-11-27 | End: 2020-12-23

## 2020-11-27 NOTE — TELEPHONE ENCOUNTER
Reason for Call:  Medication or medication refill:    Do you use a [facility]?  Name of the pharmacy and phone number for the current request:     [facility] [place] [address] [address]        Name of the medication requested:   insulin aspart (NOVOLOG FLEXPEN) 100 UNIT/ML pen 3 mL   &  basaglar    Other request: He is completely out of both medications    Can we leave a detailed message on this number? YES    Phone number patient can be reached at: Cell number on file:    Telephone Information:   Mobile [phone]       Best Time: any    Call taken on 11/27/2020 at 11:05 AM by Chantale Calvo

## 2020-11-29 ENCOUNTER — TELEPHONE (OUTPATIENT)
Dept: FAMILY MEDICINE | Facility: CLINIC | Age: 65
End: 2020-11-29

## 2020-11-29 NOTE — TELEPHONE ENCOUNTER
Please check if patient has established with diabetic educator and endocrinology which I strongly recommend.  Thank you for follow-up

## 2020-11-30 DIAGNOSIS — E11.9 TYPE 2 DIABETES MELLITUS WITH HEMOGLOBIN A1C GOAL OF 7.0%-8.0% (H): ICD-10-CM

## 2020-12-01 ENCOUNTER — OFFICE VISIT (OUTPATIENT)
Dept: FAMILY MEDICINE | Facility: CLINIC | Age: 65
End: 2020-12-01
Payer: COMMERCIAL

## 2020-12-01 VITALS
HEIGHT: 66 IN | OXYGEN SATURATION: 96 % | BODY MASS INDEX: 29.89 KG/M2 | TEMPERATURE: 98.9 F | WEIGHT: 186 LBS | DIASTOLIC BLOOD PRESSURE: 65 MMHG | HEART RATE: 96 BPM | SYSTOLIC BLOOD PRESSURE: 124 MMHG

## 2020-12-01 DIAGNOSIS — Z23 NEED FOR PROPHYLACTIC VACCINATION AND INOCULATION AGAINST INFLUENZA: ICD-10-CM

## 2020-12-01 DIAGNOSIS — R91.8 PULMONARY NODULES: ICD-10-CM

## 2020-12-01 DIAGNOSIS — M79.662 PAIN OF LEFT LOWER LEG: ICD-10-CM

## 2020-12-01 DIAGNOSIS — E11.9 TYPE 2 DIABETES MELLITUS WITH HEMOGLOBIN A1C GOAL OF 7.0%-8.0% (H): Primary | ICD-10-CM

## 2020-12-01 DIAGNOSIS — E11.41 DIABETIC MONONEUROPATHY ASSOCIATED WITH TYPE 2 DIABETES MELLITUS (H): ICD-10-CM

## 2020-12-01 DIAGNOSIS — E78.5 HYPERLIPIDEMIA LDL GOAL <100: ICD-10-CM

## 2020-12-01 DIAGNOSIS — Z78.9 MEDICALLY COMPLEX PATIENT: ICD-10-CM

## 2020-12-01 DIAGNOSIS — D64.9 ANEMIA, UNSPECIFIED TYPE: ICD-10-CM

## 2020-12-01 DIAGNOSIS — E11.9 COMPREHENSIVE DIABETIC FOOT EXAMINATION, TYPE 2 DM, ENCOUNTER FOR (H): ICD-10-CM

## 2020-12-01 DIAGNOSIS — I10 HTN, GOAL BELOW 140/90: ICD-10-CM

## 2020-12-01 PROBLEM — E11.621 DIABETIC ULCER OF TOE OF RIGHT FOOT ASSOCIATED WITH TYPE 2 DIABETES MELLITUS, WITH FAT LAYER EXPOSED (H): Status: RESOLVED | Noted: 2020-11-06 | Resolved: 2020-12-01

## 2020-12-01 PROBLEM — L97.512 DIABETIC ULCER OF TOE OF RIGHT FOOT ASSOCIATED WITH TYPE 2 DIABETES MELLITUS, WITH FAT LAYER EXPOSED (H): Status: RESOLVED | Noted: 2020-11-06 | Resolved: 2020-12-01

## 2020-12-01 LAB — D DIMER PPP FEU-MCNC: <0.3 UG/ML FEU (ref 0–0.5)

## 2020-12-01 PROCEDURE — 82728 ASSAY OF FERRITIN: CPT | Performed by: INTERNAL MEDICINE

## 2020-12-01 PROCEDURE — 83550 IRON BINDING TEST: CPT | Performed by: INTERNAL MEDICINE

## 2020-12-01 PROCEDURE — 90682 RIV4 VACC RECOMBINANT DNA IM: CPT | Performed by: INTERNAL MEDICINE

## 2020-12-01 PROCEDURE — 36415 COLL VENOUS BLD VENIPUNCTURE: CPT | Performed by: INTERNAL MEDICINE

## 2020-12-01 PROCEDURE — 90471 IMMUNIZATION ADMIN: CPT | Performed by: INTERNAL MEDICINE

## 2020-12-01 PROCEDURE — 83540 ASSAY OF IRON: CPT | Performed by: INTERNAL MEDICINE

## 2020-12-01 PROCEDURE — 99214 OFFICE O/P EST MOD 30 MIN: CPT | Mod: 25 | Performed by: INTERNAL MEDICINE

## 2020-12-01 PROCEDURE — 80053 COMPREHEN METABOLIC PANEL: CPT | Performed by: INTERNAL MEDICINE

## 2020-12-01 PROCEDURE — 85379 FIBRIN DEGRADATION QUANT: CPT | Performed by: INTERNAL MEDICINE

## 2020-12-01 PROCEDURE — 82550 ASSAY OF CK (CPK): CPT | Performed by: INTERNAL MEDICINE

## 2020-12-01 ASSESSMENT — MIFFLIN-ST. JEOR: SCORE: 1576.44

## 2020-12-01 NOTE — LETTER
December 4, 2020      Rachell ANTONETTE Paige  [address] [facility] [address] [address]        Dear ,    We are writing to inform you of your test results.    Rachell- reviewed your labs ,   Normal iron level and stores. Normal muscle enzyme, called CK,   Normal electrolytes and kidney function, though slight decrease in filtration rate called GFR, please keep well hydrated.   calcium, protein/albumin and liver enzymes are normal.   Blood sugar elevated at 256.   D-dimer, test for blood clot is negative, which is reassuring.   Any further questions please let us know, please schedule with diabetic educator as advised   Dr Nichols     Resulted Orders   D dimer, quantitative   Result Value Ref Range    D Dimer <0.3 0.0 - 0.50 ug/ml FEU      Comment:      This D-dimer assay is intended for use in conjunction with a clinical pretest   probability assessment model to exclude pulmonary embolism (PE) and deep   venous thrombosis (DVT) in outpatients suspected of PE or DVT. The cut-off   value is 0.5 ug/mL FEU.     CK total   Result Value Ref Range    CK Total 64 30 - 300 U/L   Comprehensive metabolic panel (BMP + Alb, Alk Phos, ALT, AST, Total. Bili, TP)   Result Value Ref Range    Sodium 135 133 - 144 mmol/L    Potassium 4.4 3.4 - 5.3 mmol/L    Chloride 104 94 - 109 mmol/L    Carbon Dioxide 26 20 - 32 mmol/L    Anion Gap 5 3 - 14 mmol/L    Glucose 256 (H) 70 - 99 mg/dL    Urea Nitrogen 12 7 - 30 mg/dL    Creatinine 1.15 0.66 - 1.25 mg/dL    GFR Estimate 67 >60 mL/min/[1.73_m2]      Comment:      Non  GFR Calc  Starting 12/18/2018, serum creatinine based estimated GFR (eGFR) will be   calculated using the Chronic Kidney Disease Epidemiology Collaboration   (CKD-EPI) equation.      GFR Estimate If Black 77 >60 mL/min/[1.73_m2]      Comment:       GFR Calc  Starting 12/18/2018, serum creatinine based estimated GFR (eGFR) will be   calculated using the Chronic Kidney Disease Epidemiology  Collaboration   (CKD-EPI) equation.      Calcium 8.8 8.5 - 10.1 mg/dL    Bilirubin Total 0.6 0.2 - 1.3 mg/dL    Albumin 3.5 3.4 - 5.0 g/dL    Protein Total 7.0 6.8 - 8.8 g/dL    Alkaline Phosphatase 110 40 - 150 U/L    ALT 23 0 - 70 U/L    AST 15 0 - 45 U/L   Ferritin   Result Value Ref Range    Ferritin 236 26 - 388 ng/mL   Iron and iron binding capacity   Result Value Ref Range    Iron 85 35 - 180 ug/dL    Iron Binding Cap 268 240 - 430 ug/dL    Iron Saturation Index 32 15 - 46 %       If you have any questions or concerns, please call the clinic at the number listed above.       Sincerely,      Olivia Nichols MD

## 2020-12-01 NOTE — NURSING NOTE
After visit Dr. Nichols talked with Eun about getting pt in with Dr. Bear.  Video visit scheduled 1- Pt said Video visit fine.

## 2020-12-01 NOTE — PROGRESS NOTES
Subjective     Rachell Paige is a 65 year old male who presents to clinic today for the following health issues:    HPI      Chief Complaint   Patient presents with     Leg Pain     left knee pain when pushes on inner thigh.       Refill Request     is out of Novolog but we sent in 11-27-20 to [facility].  Pt has not arranged appt with [facility].  I printed the Nov referral to give him with phone #.      Health Maintenance     FIT test ordered in Nov but pt never got kit.  Flu vac -- discuss if to get,      Imm/Inj     Flu Shot     Patient presenting for follow-up, he is concerned with the left lower extremity pain; describes pain localized to lower medial aspect of his left thigh, radiates down to his knee area.  He describes whenever he stretches or coughs he feels pain there.  Denies any swelling of the leg.  He follows with vascular medicine.  On months ago he had ultrasound of lower extremity that showed no DVT.  Patient has not yet established with diabetes educator and endocrinology.  Patient denies chest pain, shortness of breath, or difficulty breathing.  He was diagnosed with COVID-19 without any sequelae.  He has been doing a lot of walking.  Denies any other systemic complaints.  His blood pressure seems better controlled, he is compliant with taking his medications.        Echo: Interpretation Summary     Technically difficult, suboptimal study. The left ventricle is normal in size.  Proximal septal thickening is noted. Left ventricular systolic function is  normal. The visual ejection fraction is estimated at 60-65%. No regional wall  motion abnormalities noted. There is no thrombus seen in the left ventricle.  The right ventricle is normal size. The right ventricular systolic function is  normal.  Trace mitral and tricuspid regurgitation.  No pericardial effusion.  In comparison to the previous report dated 04/21/2017, the findings are    Review of Systems   Constitutional, HEENT,  "cardiovascular, pulmonary, gi and gu systems are negative, except as otherwise noted.      Objective    /65 (BP Location: Right arm, Patient Position: Chair, Cuff Size: Adult Regular)   Pulse 96   Temp 98.9  F (37.2  C) (Temporal)   Ht 1.676 m (5' 6\")   Wt 84.4 kg (186 lb)   SpO2 96%   BMI 30.02 kg/m    Body mass index is 30.02 kg/m .  Physical Exam   GENERAL: healthy, alert and no distress  EYES: Eyes grossly normal to inspection, PERRL and conjunctivae and sclerae normal    NECK: no adenopathy, no asymmetry, masses, or scars and thyroid normal to palpation  RESP: lungs clear to auscultation - no rales, rhonchi or wheezes  CV: regular rate and rhythm, normal S1 S2, no S3 or S4, no murmur, click or rub, no peripheral edema and peripheral pulses strong  ABDOMEN: soft, nontender, no hepatosplenomegaly, no masses and bowel sounds normal  MS: no gross musculoskeletal defects noted, no edema  SKIN: no suspicious lesions or rashes  NEURO: Normal strength and tone, mentation intact and speech normal  PSYCH: mentation appears normal, affect normal/bright    Results for orders placed or performed in visit on 12/01/20 (from the past 24 hour(s))   D dimer, quantitative   Result Value Ref Range    D Dimer <0.3 0.0 - 0.50 ug/ml FEU           Assessment & Plan   Problem List Items Addressed This Visit        Endocrine    Type 2 diabetes mellitus with hemoglobin A1c goal of 7.0%-8.0% (H) - Primary    Relevant Orders    ENDOCRINOLOGY ADULT REFERRAL    AMBULATORY ADULT DIABETES EDUCATOR REFERRAL    Orthopedic & Spine  Referral    Hyperlipidemia LDL goal <100    Relevant Orders    Home Blood Pressure Monitor Order for DME - ONLY FOR DME       Circulatory    HTN, goal below 140/90    Relevant Orders    Comprehensive metabolic panel (BMP + Alb, Alk Phos, ALT, AST, Total. Bili, TP) (Completed)      Other Visit Diagnoses     Pain of left lower leg        Relevant Orders    D dimer, quantitative (Completed)    CK " total (Completed)    Anemia, unspecified type        Relevant Orders    Ferritin (Completed)    Iron and iron binding capacity (Completed)    Diabetic mononeuropathy associated with type 2 diabetes mellitus (H)        Relevant Orders    Orthopedic & Spine  Referral    Comprehensive diabetic foot examination, type 2 DM, encounter for (H)        Relevant Orders    Orthopedic & Spine  Referral    Medically complex patient        Relevant Orders    MED THERAPY MANAGE REFERRAL    Need for prophylactic vaccination and inoculation against influenza        Relevant Orders    INFLUENZA QUAD, RECOMBINANT, P-FREE (RIV4) (FLUBLOCK) [97169] (Completed)    Pulmonary nodules        Few noncalcified solid pulmonary nodules are stable since 8/15/2019  measuring up to 9 mm in the lingula.  Repeat CT in 6 to 12 months         Advised that doubt this is DVT, will check D-dimer and CK level; probable needs some stretching exercise to his left lower extremity.  Referral placed again to diabetic educator and endocrinology as well as MTM; patient in agreement.  We will check/repeat some labs occluding kidney tests, electrolytes, iron level due to history of anemia.  Patient would like to see podiatry; referral has been placed.  Patient describes some burning sensation of his feet secondary to neuropathy.  Cardiology did advise him to do Lexiscan nuclear stress test which patient has not pursued yet.  Will advise cardiology with echocardiogram results and follow-up with patient for nuclear cardiac stress test.         MEDICATIONS:  Continue current medications without change  CONSULTATION/REFERRAL to diabetic educator, endocrinology, MTM, podiatry  Work on weight loss  Regular exercise  See Patient Instructions  Return in about 2 months (around 2/1/2021), or if symptoms worsen or fail to improve, for BP Recheck, diabetes.    Olivia Nichols MD  [facility]

## 2020-12-02 LAB
ALBUMIN SERPL-MCNC: 3.5 G/DL (ref 3.4–5)
ALP SERPL-CCNC: 110 U/L (ref 40–150)
ALT SERPL W P-5'-P-CCNC: 23 U/L (ref 0–70)
ANION GAP SERPL CALCULATED.3IONS-SCNC: 5 MMOL/L (ref 3–14)
AST SERPL W P-5'-P-CCNC: 15 U/L (ref 0–45)
BILIRUB SERPL-MCNC: 0.6 MG/DL (ref 0.2–1.3)
BUN SERPL-MCNC: 12 MG/DL (ref 7–30)
CALCIUM SERPL-MCNC: 8.8 MG/DL (ref 8.5–10.1)
CHLORIDE SERPL-SCNC: 104 MMOL/L (ref 94–109)
CK SERPL-CCNC: 64 U/L (ref 30–300)
CO2 SERPL-SCNC: 26 MMOL/L (ref 20–32)
CREAT SERPL-MCNC: 1.15 MG/DL (ref 0.66–1.25)
FERRITIN SERPL-MCNC: 236 NG/ML (ref 26–388)
GFR SERPL CREATININE-BSD FRML MDRD: 67 ML/MIN/{1.73_M2}
GLUCOSE SERPL-MCNC: 256 MG/DL (ref 70–99)
IRON SATN MFR SERPL: 32 % (ref 15–46)
IRON SERPL-MCNC: 85 UG/DL (ref 35–180)
POTASSIUM SERPL-SCNC: 4.4 MMOL/L (ref 3.4–5.3)
PROT SERPL-MCNC: 7 G/DL (ref 6.8–8.8)
SODIUM SERPL-SCNC: 135 MMOL/L (ref 133–144)
TIBC SERPL-MCNC: 268 UG/DL (ref 240–430)

## 2020-12-04 ENCOUNTER — TELEPHONE (OUTPATIENT)
Dept: FAMILY MEDICINE | Facility: CLINIC | Age: 65
End: 2020-12-04

## 2020-12-04 ENCOUNTER — TELEPHONE (OUTPATIENT)
Dept: CARDIOLOGY | Facility: CLINIC | Age: 65
End: 2020-12-04

## 2020-12-04 DIAGNOSIS — I10 HTN (HYPERTENSION): ICD-10-CM

## 2020-12-04 DIAGNOSIS — E11.9 DIABETES MELLITUS (H): Primary | ICD-10-CM

## 2020-12-04 DIAGNOSIS — I73.9 PVD (PERIPHERAL VASCULAR DISEASE) (H): ICD-10-CM

## 2020-12-04 DIAGNOSIS — I10 HTN, GOAL BELOW 140/90: ICD-10-CM

## 2020-12-04 NOTE — TELEPHONE ENCOUNTER
Left message on answering machine for patient to call back.    Copy of lab results and letter with advice from PCP mailed to patient today.     Trice CARLIN RN,BSN

## 2020-12-04 NOTE — TELEPHONE ENCOUNTER
Patient states unable to get thru to clinic on the phone.  Came into clinic for lab results.   Writer reviewed all results and advise from Dr Nichols with patient.     Trice CARLIN RN,BSN

## 2020-12-04 NOTE — TELEPHONE ENCOUNTER
"   ----- Message -----  From: Olivia Nichols MD  Sent: 12/1/2020  11:47 PM CST  To: Janes Pereyra MD, #  Subject: Cardiac work-up for mutual patient               Calos Dr. Pereyra, the patient had not undergone the Lexiscan nuclear stress test but he had an echo done the results are below. Please if you can have your team contact patient to arrange for the cardiac stress test as you advised in your last note.     Echocardiogram    Interpretation Summary     Technically difficult, suboptimal study. The left ventricle is normal in size. Proximal septal thickening is noted. Left ventricular systolic function is normal. The visual ejection fraction is estimated at 60-65%. No regional wall motion abnormalities noted. There is no thrombus seen in the left ventricle. The right ventricle is normal size. The right ventricular systolic function is  normal. Trace mitral and tricuspid regurgitation. No pericardial effusion. In comparison to the previous report dated 04/21/2017, the findings are similar.     Thank you Olivia Nichols      Message from Janes Pereyra MD sent at 12/4/2020 11:32 AM CST    Regarding: FW: Cardiac work-up for mutual patient  It looks like I didn't place the order for the nuc scan I suggested to him.  Would you please make sure this gets ordered and done?  He has severe neuropathy so he'll need a Lexiscan. Thanks      Order placed for Lexiscan.   Scheduling department will contact patient.    JALIL Funez RN  --------------------------------------------------------------    Addendum     tried to reach patient twice by phone and sent a letter. No response from patient.  Will f/u.     JALIL Funez RN December 15, 2020 (14:45)    ---------------------------------------------------------------------------------------------     Addendum    \"Final attempt\" made to contact patient. Left patient a voicemail message to call [phone] to schedule. Call back number provided. "     Armin FRANCOIS, BSN  December 30, 2020 (2064)     -----------------------------------------------------------------------------------------------------------    Addendum     Patient returned my call. Patient was advised to call our scheduling department to set up his nuclear stress test. Patient stated he tried calling the number, but was put on hold. I explained to patient that he may need to hold, due to staffing ( due to COVID).     Armin FRANCOIS, BSN  December 30, 2020 ([id number])

## 2020-12-04 NOTE — TELEPHONE ENCOUNTER
----- Message from Olivia Nichols MD sent at 12/4/2020  7:17 AM CST -----  Rachell- reviewed your labs ,   Normal iron level and stores. Normal muscle enzyme, called CK,   Normal electrolytes and kidney function, though slight decrease in filtration rate called GFR, please keep well hydrated.  calcium, protein/albumin and liver enzymes are normal.   Blood sugar elevated at 256.  D-dimer, test for blood clot is negative, which is reassuring.  Any further questions please let us know, please schedule with diabetic educator as advised  Dr Nicohls

## 2020-12-23 DIAGNOSIS — E11.9 TYPE 2 DIABETES MELLITUS WITH HEMOGLOBIN A1C GOAL OF 7.0%-8.0% (H): ICD-10-CM

## 2020-12-23 RX ORDER — INSULIN ASPART 100 [IU]/ML
1-10 INJECTION, SOLUTION INTRAVENOUS; SUBCUTANEOUS 2 TIMES DAILY WITH MEALS
Qty: 3 ML | Refills: 0 | Status: SHIPPED | OUTPATIENT
Start: 2020-12-23 | End: 2021-02-10

## 2020-12-23 NOTE — TELEPHONE ENCOUNTER
Reason for Call:  Medication or medication refill: Medication Refill    Do you use a [facility]?  Name of the pharmacy and phone number for the current request:  [facility] [place]     Name of the medication requested: Lantus 100 Unit and Novolog    Other request: N/a    Can we leave a detailed message on this number? YES    Phone number patient can be reached at: Home number on file [phone] (home)    Best Time: Anytime    Call taken on 12/23/2020 at 12:59 PM by Samia Bo

## 2021-01-11 ENCOUNTER — OFFICE VISIT (OUTPATIENT)
Dept: OTHER | Facility: CLINIC | Age: 66
End: 2021-01-11
Attending: INTERNAL MEDICINE
Payer: COMMERCIAL

## 2021-01-11 VITALS
HEIGHT: 66 IN | RESPIRATION RATE: 16 BRPM | WEIGHT: 191 LBS | DIASTOLIC BLOOD PRESSURE: 70 MMHG | OXYGEN SATURATION: 99 % | SYSTOLIC BLOOD PRESSURE: 140 MMHG | HEART RATE: 86 BPM | BODY MASS INDEX: 30.7 KG/M2

## 2021-01-11 DIAGNOSIS — E11.9 TYPE 2 DIABETES MELLITUS WITH HEMOGLOBIN A1C GOAL OF 7.0%-8.0% (H): ICD-10-CM

## 2021-01-11 DIAGNOSIS — N18.30 STAGE 3 CHRONIC KIDNEY DISEASE, UNSPECIFIED WHETHER STAGE 3A OR 3B CKD (H): ICD-10-CM

## 2021-01-11 DIAGNOSIS — I10 HTN, GOAL BELOW 140/90: ICD-10-CM

## 2021-01-11 DIAGNOSIS — I73.9 CLAUDICATION (H): ICD-10-CM

## 2021-01-11 DIAGNOSIS — J41.0 SIMPLE CHRONIC BRONCHITIS (H): Primary | ICD-10-CM

## 2021-01-11 DIAGNOSIS — I73.9 PAD (PERIPHERAL ARTERY DISEASE) (H): ICD-10-CM

## 2021-01-11 PROCEDURE — G0463 HOSPITAL OUTPT CLINIC VISIT: HCPCS

## 2021-01-11 PROCEDURE — 99214 OFFICE O/P EST MOD 30 MIN: CPT | Performed by: INTERNAL MEDICINE

## 2021-01-11 RX ORDER — LISINOPRIL 20 MG/1
20 TABLET ORAL DAILY
Qty: 30 TABLET | Refills: 11 | Status: SHIPPED | OUTPATIENT
Start: 2021-01-11 | End: 2021-02-10

## 2021-01-11 ASSESSMENT — MIFFLIN-ST. JEOR: SCORE: 1599.12

## 2021-01-11 NOTE — PROGRESS NOTES
Vascular Medicine Progress Note     Rachell Paige is a 65 year old male who was seen here today for follow-up on his blood pressure blood pressure today was 155/78 not to target    Interval History   Patient is doing well with no significant complaints apart from left leg pain patient denies any history of nocturnal pain and no history of skin color or temperature changes    Blood pressure is not to goal      Physical Exam       BP: (!) 140/70 Pulse: 86   Resp: 16 SpO2: 99 %      Vitals:    01/11/21 1407   Weight: 86.6 kg (191 lb)     Vital Signs with Ranges  Pulse:  [86] 86  Resp:  [16] 16  BP: (140-152)/(70-79) 140/70  SpO2:  [99 %] 99 %  [unfilled]    Constitutional: awake, alert, cooperative, no apparent distress, and appears stated age  Eyes: Lids and lashes normal, pupils equal, round and reactive to light, extra ocular muscles intact, sclera clear, conjunctiva normal  ENT: normocepalic, without obvious abnormality, oropharynx pink and moist  Hematologic / Lymphatic: no lymphadenopathy  Respiratory: No increased work of breathing, good air exchange, clear to auscultation bilaterally, no crackles or wheezing  Cardiovascular: regular rate and rhythm, normal S1 and S2 and no murmur noted  GI: Normal bowel sounds, soft, non-distended, non-tender  Skin: no redness, warmth, or swelling, no rashes and no lesions  Musculoskeletal: There is no redness, warmth, or swelling of the joints.  Full range of motion noted.  Motor strength is 5 out of 5 all extremities bilaterally.  Tone is normal.  Neurologic: Awake, alert, oriented to name, place and time.  Cranial nerves II-XII are grossly intact.  Motor is 5 out of 5 bilaterally.    Neuropsychiatric:  Normal affect, memory, insight.  Pulses: Palpable. No carotid bruits appreciated.     Medications         Data   No results found for this or any previous visit (from the past 24 hour(s)).    Assessment & Plan   (J41.0) Simple chronic bronchitis (H)  (primary  encounter diagnosis)  Comment: Chest nodules  Plan: CT Chest w Contrast        In 6 months    (I73.9) Claudication (H)  Comment: Stable  Plan: US PIYUSH Doppler with Exercise Bilateral, US         Lower Extremity Arterial Duplex Bilateral,         Follow-Up with Vascular Medicine        Continue with vascular risk factors modifications    (I73.9) PAD (peripheral artery disease) (H)  Comment: Leg pain left leg pain  Plan: lisinopril (ZESTRIL) 20 MG tablet, US PIYUSH         Doppler with Exercise Bilateral, US Lower         Extremity Arterial Duplex Bilateral, Follow-Up         with Vascular Medicine                (E11.9) Type 2 diabetes mellitus with hemoglobin A1c goal of 7.0%-8.0% (H)  Comment: Target A1c is 7%  Plan: lisinopril (ZESTRIL) 20 MG tablet            (I10) HTN, goal below 140/90  Comment: Blood pressure is not to goal went ahead and increase his lisinopril to 20 mg daily  Plan: lisinopril (ZESTRIL) 20 MG tablet, Follow-Up         with Vascular Medicine                Summary: Follow-up with the patient and 6 months with CT scan of the chest to follow-up on the chest nodules    Follow-up with the patient with phone interview for his blood pressure    Pio Parson MD

## 2021-01-11 NOTE — PROGRESS NOTES
"Rachell Paige is a 65 year old male who presents for:  Chief Complaint   Patient presents with     RECHECK     CTA & Bilat Carotid done 12/28/20 - follow up to *[facility] 12/18/20        Vitals:    Vitals:    01/11/21 1407 01/11/21 1409   BP: (!) 146/79 (!) 146/73   BP Location: Right arm Left arm   Patient Position: Chair Chair   Cuff Size: Adult Regular Adult Regular   Pulse: 86    Resp: 16    SpO2: 99%    Weight: 191 lb (86.6 kg)    Height: 5' 6\" (1.676 m)        BMI:  Estimated body mass index is 30.83 kg/m  as calculated from the following:    Height as of this encounter: 5' 6\" (1.676 m).    Weight as of this encounter: 191 lb (86.6 kg).    Pain Score:  Data Unavailable        Yovana Sanchez CMA    "

## 2021-01-12 NOTE — TELEPHONE ENCOUNTER
"Patient scheduled his stress test on 01-07-21, and was a \"no show.\"    Armin RN, BSN     [facility] ~ [place], [place]  January 12, 2021 ([id number])   "

## 2021-01-15 NOTE — TELEPHONE ENCOUNTER
Dr. Pereyra recommends patient to f/u in 2 months, order placed.        Armin RN, BSN     [facility] ~ [place], [place]  January 15, 2021 ([id number])

## 2021-01-18 DIAGNOSIS — E11.9 TYPE 2 DIABETES MELLITUS WITH HEMOGLOBIN A1C GOAL OF 7.0%-8.0% (H): ICD-10-CM

## 2021-01-23 ENCOUNTER — OFFICE VISIT (OUTPATIENT)
Dept: URGENT CARE | Facility: URGENT CARE | Age: 66
End: 2021-01-23
Payer: COMMERCIAL

## 2021-01-23 VITALS
WEIGHT: 191 LBS | HEART RATE: 93 BPM | OXYGEN SATURATION: 99 % | BODY MASS INDEX: 30.83 KG/M2 | DIASTOLIC BLOOD PRESSURE: 56 MMHG | SYSTOLIC BLOOD PRESSURE: 120 MMHG | TEMPERATURE: 97.6 F

## 2021-01-23 DIAGNOSIS — K04.7 DENTAL ABSCESS: Primary | ICD-10-CM

## 2021-01-23 PROCEDURE — 99213 OFFICE O/P EST LOW 20 MIN: CPT | Performed by: FAMILY MEDICINE

## 2021-01-23 NOTE — PATIENT INSTRUCTIONS
Take full course of antibiotic for dental infection  Okay for tylenol for pain    Follow up with dental clinic      Patient Education     Dental Abscess with Facial Cellulitis     A dental abscess is an infection at the base of a tooth. It means a pocket of fluid (pus) has formed at the tip of a tooth root in your jawbone. If the infection isn t treated, more serious infections may spread to the face (facial cellulitis). This makes your face swell. Facial cellulitis is an infection of the skin and underlying soft tissues. This is a very serious condition. Once the infection and swelling starts, it can spread quickly.  A dental abscess often starts with a crack or cavity in a tooth. The pain is often made worse by having hot or cold drinks, or biting on hard foods. The pain may spread from the tooth to your ear, or to the area of your jaw on the same side.  Home care  Follow these tips when caring for yourself at home:    Don't have hot and cold foods and drinks. Your tooth may be sensitive to changes in temperature. Don t chew on the side of the infected tooth.    If your tooth is chipped or cracked, or if there is a large open cavity, put clove oil right on the tooth to ease pain. You can buy clove oil at pharmacies. Some pharmacies carry an over-the-counter toothache kit. This has a paste that you can put on the exposed tooth to make it less sensitive.    Put a cold pack on your jaw over the sore area. This can help reduce pain.    You may use over-the-counter medicine to ease pain, unless another medicine was prescribed. Talk with your provider before using acetaminophen or ibuprofen if you have chronic liver or kidney disease. Also talk with your provider if you ve had a stomach ulcer or GI (gastrointestinal) bleeding.    An antibiotic will be prescribed. Take it exactly as directed. Don t miss any doses.  Follow-up care  Follow up with your dentist or an oral surgeon, as advised. Severe cases of cellulitis must  be checked again in 24 hours. Once a tooth infection occurs, it will be a problem until the infection is drained. This is done through surgery or a root canal. Or you may need to have your tooth pulled.  Call 911  Call 911 if any of these occur:    Swelling spreads to the upper half of your face or neck    Your eyelids start to swell shut    Abnormal drowsiness    Headache or a stiff neck    Weakness or fainting    Trouble swallowing or breathing  When to get medical advice  Call your healthcare provider right away if any of these occur:    Pain gets worse or spreads to your neck    Fever of 100.4 F (38 C) or higher, or as directed by your provider  Birgit last reviewed this educational content on 12/1/2019 2000-2020 [employer]. [address] [place] [address], [place] [address]. All rights reserved. This information is not intended as a substitute for professional medical care. Always follow your healthcare professional's instructions.

## 2021-01-23 NOTE — PROGRESS NOTES
SUBJECTIVE:  Chief Complaint   Patient presents with     Urgent Care     Oral Swelling     left side jaw pain swollen gum, headache and pressure - began last night      Rachell Paige is a 65 year old male who presents with a chief complaint of left swollen gum, facial pain and headache.    Has underlying poor dentition, per patient is due to diabetes.  Developed more gum swelling yesterday and increase in pain.  Noted more swelling on left side of face and getting headache now.  Unable to eat due to pain.     Past Medical History:   Diagnosis Date     Anxiety 2/23/2015     Dermatitis 4/18/2015     DM type 2, goal A1C 7-8 2/23/2015     Does not have health insurance 4/18/2015     Financial problems 2/23/2015     Glaucoma (increased eye pressure)      HTN, goal below 140/90 2/23/2015     Hyperlipidemia LDL goal <100 2/23/2015     Microalbuminuria 4/18/2015     Onychomycosis 4/18/2015     Rash 2/23/2015     Current Outpatient Medications   Medication Sig Dispense Refill     alcohol swab prep pads Use to swab area of injection/pedro pablo as directed. 120 each 6     aspirin 81 MG tablet Take 1 tablet (81 mg) by mouth daily 30 tablet OTC     blood glucose (ACCU-CHEK GUIDE) test strip 1 strip by In Vitro route 4 times daily Use to test blood sugar 3 -4  times daily or as directed. 120 each 11     blood glucose (NO BRAND SPECIFIED) test strip Use to test blood sugar 2 times daily or as directed. 60 strip 4     blood glucose (NO BRAND SPECIFIED) test strip Use to test blood sugar 4 times daily or as directed. To accompany: Blood Glucose Monitor Brands: per insurance. 150 strip 6     blood glucose calibration (NO BRAND SPECIFIED) solution To accompany: Blood Glucose Monitor Brands: per insurance. 1 Bottle 3     blood glucose monitoring (NO BRAND SPECIFIED) meter device kit Use to test blood sugar 4 times daily or as directed. Preferred blood glucose meter OR supplies to accompany: Blood Glucose Monitor Brands: per insurance. 1  kit 0     dorzolamide (TRUSOPT) 2 % ophthalmic solution Place 1 drop into both eyes 2 times daily 10 mL 11     esomeprazole (NEXIUM) 40 MG DR capsule Take 1 capsule (40 mg) by mouth every morning (before breakfast) Take 30-60 minutes before eating. 90 capsule 0     gabapentin (NEURONTIN) 300 MG capsule Take 1 capsule (300 mg) by mouth At Bedtime 90 capsule 0     insulin aspart (NOVOLOG FLEXPEN) 100 UNIT/ML pen Inject 1-10 Units Subcutaneous 2 times daily (with meals) With lunch and dinner 3 mL 0     insulin glargine (LANTUS SOLOSTAR) 100 UNIT/ML pen Inject 50 Units Subcutaneous At Bedtime 15 mL 0     insulin pen needle (32G X 6 MM) 32G X 6 MM miscellaneous Use 3 pen needles daily or as directed. 300 each 0     Lido-Pentaf-Tetrafl-Ultrasound (ACCUCAINE) 1 % KIT        lisinopril (ZESTRIL) 20 MG tablet Take 1 tablet (20 mg) by mouth daily 30 tablet 11     simvastatin (ZOCOR) 10 MG tablet Take 1 tablet (10 mg) by mouth At Bedtime 90 tablet 1     thin (NO BRAND SPECIFIED) lancets Use with lanceting device. To accompany: Blood Glucose Monitor Brands: per insurance. 200 each 6     timolol maleate (TIMOPTIC) 0.5 % ophthalmic solution Place 1 drop into both eyes 2 times daily 1 Bottle 11     Social History     Tobacco Use     Smoking status: Former Smoker     Types: Cigarettes     Quit date:      Years since quittin.0     Smokeless tobacco: Never Used   Substance Use Topics     Alcohol use: No       ROS:  Review of systems negative except as stated above.    EXAM:   /56 (BP Location: Right arm)   Pulse 93   Temp 97.6  F (36.4  C) (Tympanic)   Wt 86.6 kg (191 lb)   SpO2 99%   BMI 30.83 kg/m    GENERAL APPEARANCE: healthy, alert and no distress  MOUTH: poor dentition, multiple missing teeth.  Left upper molar with swollen gum and tenderness.  Left cheek with mild swelling  PSYCH: alert, affect bright      ASSESSMENT/PLAN:  (K04.7) Dental abscess  (primary encounter diagnosis)  Comment: left  Plan:  amoxicillin-clavulanate (AUGMENTIN) 875-125 MG         tablet            Reviewed treatment for mouth infection, has underlying poor dentition and diabetes.  RX Augmentin given for treatment of infection and recommend to follow up with dental clinic for further evaluation.  Okay for tylenol for discomfort    Follow up with dental clinic in 1-2 weeks    Jesse Jack MD  January 23, 2021 2:23 PM

## 2021-02-06 DIAGNOSIS — E11.9 TYPE 2 DIABETES MELLITUS WITH HEMOGLOBIN A1C GOAL OF 7.0%-8.0% (H): ICD-10-CM

## 2021-02-07 RX ORDER — PEN NEEDLE, DIABETIC 32 GX 1/4"
NEEDLE, DISPOSABLE MISCELLANEOUS
Qty: 300 EACH | Refills: 3 | Status: SHIPPED | OUTPATIENT
Start: 2021-02-07 | End: 2022-03-01

## 2021-02-09 ENCOUNTER — HOSPITAL ENCOUNTER (OUTPATIENT)
Dept: ULTRASOUND IMAGING | Facility: CLINIC | Age: 66
Discharge: HOME | End: 2021-02-09
Attending: INTERNAL MEDICINE | Admitting: INTERNAL MEDICINE
Payer: COMMERCIAL

## 2021-02-09 DIAGNOSIS — I73.9 CLAUDICATION (H): ICD-10-CM

## 2021-02-09 DIAGNOSIS — I73.9 PAD (PERIPHERAL ARTERY DISEASE) (H): ICD-10-CM

## 2021-02-09 PROCEDURE — 93925 LOWER EXTREMITY STUDY: CPT | Mod: 26 | Performed by: SURGERY

## 2021-02-09 PROCEDURE — 93925 LOWER EXTREMITY STUDY: CPT

## 2021-02-10 ENCOUNTER — OFFICE VISIT (OUTPATIENT)
Dept: FAMILY MEDICINE | Facility: CLINIC | Age: 66
End: 2021-02-10
Payer: COMMERCIAL

## 2021-02-10 VITALS
OXYGEN SATURATION: 97 % | HEART RATE: 93 BPM | DIASTOLIC BLOOD PRESSURE: 63 MMHG | TEMPERATURE: 98.4 F | WEIGHT: 188 LBS | HEIGHT: 66 IN | BODY MASS INDEX: 30.22 KG/M2 | SYSTOLIC BLOOD PRESSURE: 116 MMHG

## 2021-02-10 DIAGNOSIS — Z89.421 S/P AMPUTATION OF LESSER TOE, RIGHT (H): ICD-10-CM

## 2021-02-10 DIAGNOSIS — E78.5 HYPERLIPIDEMIA LDL GOAL <100: ICD-10-CM

## 2021-02-10 DIAGNOSIS — E11.9 TYPE 2 DIABETES MELLITUS WITH HEMOGLOBIN A1C GOAL OF 7.0%-8.0% (H): Primary | ICD-10-CM

## 2021-02-10 DIAGNOSIS — I73.9 PAD (PERIPHERAL ARTERY DISEASE) (H): ICD-10-CM

## 2021-02-10 DIAGNOSIS — G47.00 INSOMNIA, UNSPECIFIED TYPE: ICD-10-CM

## 2021-02-10 DIAGNOSIS — E11.42 DIABETIC POLYNEUROPATHY ASSOCIATED WITH TYPE 2 DIABETES MELLITUS (H): ICD-10-CM

## 2021-02-10 DIAGNOSIS — I10 HTN, GOAL BELOW 140/90: ICD-10-CM

## 2021-02-10 DIAGNOSIS — Z78.9 MEDICALLY COMPLEX PATIENT: ICD-10-CM

## 2021-02-10 DIAGNOSIS — Z12.11 COLON CANCER SCREENING: ICD-10-CM

## 2021-02-10 PROCEDURE — 99214 OFFICE O/P EST MOD 30 MIN: CPT | Performed by: INTERNAL MEDICINE

## 2021-02-10 PROCEDURE — 80053 COMPREHEN METABOLIC PANEL: CPT | Performed by: INTERNAL MEDICINE

## 2021-02-10 PROCEDURE — 80061 LIPID PANEL: CPT | Performed by: INTERNAL MEDICINE

## 2021-02-10 PROCEDURE — 36415 COLL VENOUS BLD VENIPUNCTURE: CPT | Performed by: INTERNAL MEDICINE

## 2021-02-10 RX ORDER — LISINOPRIL 20 MG/1
20 TABLET ORAL DAILY
Qty: 30 TABLET | Refills: 11 | Status: SHIPPED | OUTPATIENT
Start: 2021-02-10

## 2021-02-10 RX ORDER — ZOLPIDEM TARTRATE 5 MG/1
5 TABLET ORAL
Qty: 12 TABLET | Refills: 0 | Status: SHIPPED | OUTPATIENT
Start: 2021-02-10 | End: 2022-10-07

## 2021-02-10 RX ORDER — INSULIN ASPART 100 [IU]/ML
1-10 INJECTION, SOLUTION INTRAVENOUS; SUBCUTANEOUS 2 TIMES DAILY WITH MEALS
Qty: 3 ML | Refills: 0 | Status: SHIPPED | OUTPATIENT
Start: 2021-02-10 | End: 2021-05-01

## 2021-02-10 RX ORDER — GABAPENTIN 300 MG/1
300 CAPSULE ORAL AT BEDTIME
Qty: 90 CAPSULE | Refills: 0 | Status: SHIPPED | OUTPATIENT
Start: 2021-02-10 | End: 2021-05-21

## 2021-02-10 ASSESSMENT — MIFFLIN-ST. JEOR: SCORE: 1585.51

## 2021-02-10 NOTE — LETTER
February 15, 2021      Rachell Paige  [address] [facility] [address]        Dear ,    We are writing to inform you of your test results.    Chemistry shows normal electrolytes, normal kidney function test, normal calcium level, normal total protein and normal liver enzymes.     Lipid panel shows LDL 78, HDL 44 and triglycerides of 57, slightly worsened from 3 months ago, please continue with lifestyle changes, low-fat low-cholesterol low carbs diet.  I recommend you increase simvastatin dose to 20 mg instead of 10 mg dose.  You can take 2 of the 10 mg tablets of the simvastatin together once daily at bedtime.  We will send a new prescription for 20 mg dose; take at bedtime.   Chemistry shows normal electrolytes, normal kidney function test, normal calcium level, normal total protein albumin, and normal liver enzymes which is reassuring     Dr. Nichols    Resulted Orders   Comprehensive metabolic panel (BMP + Alb, Alk Phos, ALT, AST, Total. Bili, TP)   Result Value Ref Range    Sodium 134 133 - 144 mmol/L    Potassium 4.7 3.4 - 5.3 mmol/L    Chloride 103 94 - 109 mmol/L    Carbon Dioxide 24 20 - 32 mmol/L    Anion Gap 7 3 - 14 mmol/L    Glucose 273 (H) 70 - 99 mg/dL    Urea Nitrogen 21 7 - 30 mg/dL    Creatinine 1.15 0.66 - 1.25 mg/dL    GFR Estimate 67 >60 mL/min/[1.73_m2]      Comment:      Non  GFR Calc  Starting 12/18/2018, serum creatinine based estimated GFR (eGFR) will be   calculated using the Chronic Kidney Disease Epidemiology Collaboration   (CKD-EPI) equation.      GFR Estimate If Black 77 >60 mL/min/[1.73_m2]      Comment:       GFR Calc  Starting 12/18/2018, serum creatinine based estimated GFR (eGFR) will be   calculated using the Chronic Kidney Disease Epidemiology Collaboration   (CKD-EPI) equation.      Calcium 9.3 8.5 - 10.1 mg/dL    Bilirubin Total 0.4 0.2 - 1.3 mg/dL    Albumin 3.5 3.4 - 5.0 g/dL    Protein Total 6.8 6.8 - 8.8 g/dL    Alkaline  Phosphatase 92 40 - 150 U/L    ALT 22 0 - 70 U/L    AST 13 0 - 45 U/L       If you have any questions or concerns, please call the clinic at the number listed above.       Sincerely,      Olivia Nichols MD

## 2021-02-10 NOTE — PROGRESS NOTES
Assessment & Plan   Problem List Items Addressed This Visit        Endocrine    Type 2 diabetes mellitus with hemoglobin A1c goal of 7.0%-8.0% (H) - Primary    Relevant Medications    insulin aspart (NOVOLOG FLEXPEN) 100 UNIT/ML pen    insulin glargine (LANTUS SOLOSTAR) 100 UNIT/ML pen    lisinopril (ZESTRIL) 20 MG tablet    Other Relevant Orders    ENDOCRINOLOGY ADULT REFERRAL    MED THERAPY MANAGE REFERRAL    Comprehensive metabolic panel (BMP + Alb, Alk Phos, ALT, AST, Total. Bili, TP) (Completed)    Hyperlipidemia LDL goal <100    Relevant Orders    Lipid panel reflex to direct LDL Fasting (Completed)       Circulatory    HTN, goal below 140/90    Relevant Medications    lisinopril (ZESTRIL) 20 MG tablet    Other Relevant Orders    MED THERAPY MANAGE REFERRAL       Other    S/P amputation of lesser toe, right (H)      Other Visit Diagnoses     Diabetic polyneuropathy associated with type 2 diabetes mellitus (H)        Relevant Medications    insulin aspart (NOVOLOG FLEXPEN) 100 UNIT/ML pen    insulin glargine (LANTUS SOLOSTAR) 100 UNIT/ML pen    gabapentin (NEURONTIN) 300 MG capsule    zolpidem (AMBIEN) 5 MG tablet    PAD (peripheral artery disease) (H)        Relevant Medications    lisinopril (ZESTRIL) 20 MG tablet    Other Relevant Orders    MED THERAPY MANAGE REFERRAL    Colon cancer screening        Relevant Orders    Fecal colorectal cancer screen FIT    Medically complex patient        Relevant Orders    MED THERAPY MANAGE REFERRAL    Insomnia, unspecified type        Relevant Medications    zolpidem (AMBIEN) 5 MG tablet         Follow up with cardiology , due for cradiac stress test, denies currently any chest pain,or chest tightness, under stress, son in ICU   Refer to medical therapy management.  Referral to diabetes and diabetic educator.  Do fit test.  We did discuss suppertime refills will give only short term refills, discussed potential addictive and dependency on zolpidem.  We have other  "options/medication for sleep problem.  Discussed side effects of medication, continue on Lantus and NovoLog as well as Neurontin.           BMI:   Estimated body mass index is 30.34 kg/m  as calculated from the following:    Height as of this encounter: 1.676 m (5' 6\").    Weight as of this encounter: 85.3 kg (188 lb).   Weight management plan: Discussed healthy diet and exercise guidelines    MEDICATIONS:  Continue current medications without change  CONSULTATION/REFERRAL to Jeanne RODRIGUEZ  Work on weight loss  Regular exercise  See Patient Instructions    No follow-ups on file.    Olivia Nichols MD  [facility] SAIRA Lovett is a 65 year old who presents for the following health issues   HPI         Review of Systems   Constitutional, HEENT, cardiovascular, pulmonary, gi and gu systems are negative, except as otherwise noted.      Objective    /63 (BP Location: Right arm, Patient Position: Chair, Cuff Size: Adult Regular)   Pulse 93   Temp 98.4  F (36.9  C) (Temporal)   Ht 1.676 m (5' 6\")   Wt 85.3 kg (188 lb)   SpO2 97%   BMI 30.34 kg/m    Body mass index is 30.34 kg/m .  Physical Exam   GENERAL: healthy, alert and no distress  EYES: Eyes grossly normal to inspection, PERRL and conjunctivae and sclerae normal  RESP: lungs clear to auscultation - no rales, rhonchi or wheezes  CV: regular rate and rhythm, normal S1 S2, no S3 or S4, no murmur, click or rub, no peripheral edema and peripheral pulses strong  ABDOMEN: soft, nontender, no hepatosplenomegaly, no masses and bowel sounds normal  MS: no gross musculoskeletal defects noted, no edema  SKIN: no suspicious lesions or rashes  NEURO: Normal strength and tone, mentation intact and speech normal  PSYCH: mentation appears normal, affect normal/bright    Office Visit on 12/01/2020   Component Date Value Ref Range Status     D Dimer 12/01/2020 <0.3  0.0 - 0.50 ug/ml FEU Final    Comment: This D-dimer assay is intended for use in " conjunction with a clinical pretest   probability assessment model to exclude pulmonary embolism (PE) and deep   venous thrombosis (DVT) in outpatients suspected of PE or DVT. The cut-off   value is 0.5 ug/mL FEU.       CK Total 12/01/2020 64  30 - 300 U/L Final     Sodium 12/01/2020 135  133 - 144 mmol/L Final     Potassium 12/01/2020 4.4  3.4 - 5.3 mmol/L Final     Chloride 12/01/2020 104  94 - 109 mmol/L Final     Carbon Dioxide 12/01/2020 26  20 - 32 mmol/L Final     Anion Gap 12/01/2020 5  3 - 14 mmol/L Final     Glucose 12/01/2020 256* 70 - 99 mg/dL Final     Urea Nitrogen 12/01/2020 12  7 - 30 mg/dL Final     Creatinine 12/01/2020 1.15  0.66 - 1.25 mg/dL Final     GFR Estimate 12/01/2020 67  >60 mL/min/[1.73_m2] Final    Comment: Non  GFR Calc  Starting 12/18/2018, serum creatinine based estimated GFR (eGFR) will be   calculated using the Chronic Kidney Disease Epidemiology Collaboration   (CKD-EPI) equation.       GFR Estimate If Black 12/01/2020 77  >60 mL/min/[1.73_m2] Final    Comment:  GFR Calc  Starting 12/18/2018, serum creatinine based estimated GFR (eGFR) will be   calculated using the Chronic Kidney Disease Epidemiology Collaboration   (CKD-EPI) equation.       Calcium 12/01/2020 8.8  8.5 - 10.1 mg/dL Final     Bilirubin Total 12/01/2020 0.6  0.2 - 1.3 mg/dL Final     Albumin 12/01/2020 3.5  3.4 - 5.0 g/dL Final     Protein Total 12/01/2020 7.0  6.8 - 8.8 g/dL Final     Alkaline Phosphatase 12/01/2020 110  40 - 150 U/L Final     ALT 12/01/2020 23  0 - 70 U/L Final     AST 12/01/2020 15  0 - 45 U/L Final     Ferritin 12/01/2020 236  26 - 388 ng/mL Final     Iron 12/01/2020 85  35 - 180 ug/dL Final     Iron Binding Cap 12/01/2020 268  240 - 430 ug/dL Final     Iron Saturation Index 12/01/2020 32  15 - 46 % Final

## 2021-02-11 LAB
ALBUMIN SERPL-MCNC: 3.5 G/DL (ref 3.4–5)
ALP SERPL-CCNC: 92 U/L (ref 40–150)
ALT SERPL W P-5'-P-CCNC: 22 U/L (ref 0–70)
ANION GAP SERPL CALCULATED.3IONS-SCNC: 7 MMOL/L (ref 3–14)
AST SERPL W P-5'-P-CCNC: 13 U/L (ref 0–45)
BILIRUB SERPL-MCNC: 0.4 MG/DL (ref 0.2–1.3)
BUN SERPL-MCNC: 21 MG/DL (ref 7–30)
CALCIUM SERPL-MCNC: 9.3 MG/DL (ref 8.5–10.1)
CHLORIDE SERPL-SCNC: 103 MMOL/L (ref 94–109)
CO2 SERPL-SCNC: 24 MMOL/L (ref 20–32)
CREAT SERPL-MCNC: 1.15 MG/DL (ref 0.66–1.25)
GFR SERPL CREATININE-BSD FRML MDRD: 67 ML/MIN/{1.73_M2}
GLUCOSE SERPL-MCNC: 273 MG/DL (ref 70–99)
POTASSIUM SERPL-SCNC: 4.7 MMOL/L (ref 3.4–5.3)
PROT SERPL-MCNC: 6.8 G/DL (ref 6.8–8.8)
SODIUM SERPL-SCNC: 134 MMOL/L (ref 133–144)

## 2021-02-12 LAB
CHOLEST SERPL-MCNC: 156 MG/DL
HDLC SERPL-MCNC: 44 MG/DL
LDLC SERPL CALC-MCNC: 81 MG/DL
NONHDLC SERPL-MCNC: 112 MG/DL
TRIGL SERPL-MCNC: 157 MG/DL

## 2021-02-12 NOTE — RESULT ENCOUNTER NOTE
Rachell,  Reviewed some of your labs resulted,  Chemistry shows normal electrolytes, normal kidney function test, normal calcium level, normal total protein and normal liver enzymes.  Dr. Nichols

## 2021-02-13 DIAGNOSIS — E78.5 HYPERLIPIDEMIA LDL GOAL <100: Primary | ICD-10-CM

## 2021-02-13 RX ORDER — SIMVASTATIN 20 MG
20 TABLET ORAL AT BEDTIME
Qty: 90 TABLET | Refills: 3 | Status: SHIPPED | OUTPATIENT
Start: 2021-02-13 | End: 2021-05-01

## 2021-02-13 NOTE — RESULT ENCOUNTER NOTE
Please notify patient of the following lab results  Asimamed;reviewed the rest of your labs, lipid panel shows LDL 78, HDL 44 and triglycerides of 57, slightly worsened from 3 months ago, please continue with lifestyle changes, low-fat low-cholesterol low carbs diet.  I recommend you increase simvastatin dose to 20 mg instead of 10 mg dose.  You can take 2 of the 10 mg tablets of the simvastatin together once daily at bedtime.  We will send a new prescription for 20 mg dose; take at bedtime.  Chemistry shows normal electrolytes, normal kidney function test, normal calcium level, normal total protein albumin, and normal liver enzymes which is reassuring  Any further questions please let us know  Dr. Nichols

## 2021-02-15 ENCOUNTER — TELEPHONE (OUTPATIENT)
Dept: FAMILY MEDICINE | Facility: CLINIC | Age: 66
End: 2021-02-15

## 2021-02-15 NOTE — TELEPHONE ENCOUNTER
----- Message from Olivia Nichols MD sent at 2/13/2021 12:27 PM CST -----  Please notify patient of the following lab results  Rachell;reviewed the rest of your labs, lipid panel shows LDL 78, HDL 44 and triglycerides of 57, slightly worsened from 3 months ago, please continue with lifestyle changes, low-fat low-cholesterol low carbs diet.  I recommend you increase simvastatin dose to 20 mg instead of 10 mg dose.  You can take 2 of the 10 mg tablets of the simvastatin together once daily at bedtime.  We will send a new prescription for 20 mg dose; take at bedtime.  Chemistry shows normal electrolytes, normal kidney function test, normal calcium level, normal total protein albumin, and normal liver enzymes which is reassuring  Any further questions please let us know  Dr. Magdalena Nichols, Olivia Dash MD  [facility] Sruthi Lovett,   Reviewed some of your labs resulted,   Chemistry shows normal electrolytes, normal kidney function test, normal calcium level, normal total protein and normal liver enzymes.   Dr. Nichols

## 2021-02-15 NOTE — TELEPHONE ENCOUNTER
Pt called back and relayed message. He would like an OV with Dr. Nichols to discuss medication changes. Scheduled this Friday and he would like A1c at that time as well    Ward DANIEL RN

## 2021-03-01 NOTE — TELEPHONE ENCOUNTER
MEDICAL RECORDS REQUEST   [facility]  Urology Clinic  [address] [place] [address]  PHONE: [phone]  Fax: [phone]        FUTURE VISIT INFORMATION                                                   Rachell Paige, : 1955 scheduled for future visit at [facility]    APPOINTMENT INFORMATION:    Date: 2021    Provider:  Federico    Reason for Visit/Diagnosis: ED issues    REFERRAL INFORMATION:    Referring provider:  Self    Specialty: N/A    Referring providers clinic:  N/A    Clinic contact number:  N/A    RECORDS REQUESTED FOR VISIT                                                     NOTES  STATUS/DETAILS   OFFICE NOTE from referring provider  no   OFFICE NOTE from other specialist  no   DISCHARGE SUMMARY from hospital  no   DISCHARGE REPORT from the ER  no   OPERATIVE REPORT  no   MEDICATION LIST  no     PRE-VISIT CHECKLIST      Record collection complete Yes   Appointment appropriately scheduled           (right time/right provider) Yes   MyChart activation If no, please explain: In process    Questionnaire complete If no, please explain: In process      Completed by: Rosy Esqueda

## 2021-03-18 DIAGNOSIS — E11.9 TYPE 2 DIABETES MELLITUS WITH HEMOGLOBIN A1C GOAL OF 7.0%-8.0% (H): ICD-10-CM

## 2021-03-19 ENCOUNTER — TELEPHONE (OUTPATIENT)
Dept: FAMILY MEDICINE | Facility: CLINIC | Age: 66
End: 2021-03-19

## 2021-03-19 DIAGNOSIS — E11.9 TYPE 2 DIABETES MELLITUS WITH HEMOGLOBIN A1C GOAL OF 7.0%-8.0% (H): Primary | ICD-10-CM

## 2021-03-19 NOTE — TELEPHONE ENCOUNTER
Please check with patient if he has scheduled with endocrinology and diabetes educator as advised, repeat HBA1C test, order placed, please clarify dose of lantus he is taking, is he checking his Blood sugar? thank you for follow up

## 2021-03-19 NOTE — TELEPHONE ENCOUNTER
Lantus Solostar 100 unit/ml pen    Summary: Inject 50 Units Subcutaneous At Bedtime, Disp-15 mL, R-0, E-Prescribe  If Lantus is not covered by insurance, may substitute Basaglar at same dose and frequency.      Dose, Route, Frequency: 50 Units, Subcutaneous, AT BEDTIME  Start: 2/10/2021  Ord/Sold: 2/10/2021

## 2021-03-19 NOTE — TELEPHONE ENCOUNTER
Please check with patient if he has scheduled with endocrinology and diabetes educator as advised, repeat HBA1C test, thank you

## 2021-03-19 NOTE — TELEPHONE ENCOUNTER
Called out to patient.   He is taking Lantus 50 U at night.   Checks his Blood sugar twice a day states they are running 120-180 always under 200. Made appointment Tuesday for A1C.    He is on the waiting list for both Endocrinology and diabetic.    Kristen Abrams RN  [facility]

## 2021-03-19 NOTE — TELEPHONE ENCOUNTER
Routing refill request to provider for review/approval because:  Labs not current:  A1C          Lab Results   Component Value Date    A1C 7.4 07/28/2020    A1C 7.3 12/26/2018    A1C 9.1 04/17/2015    A1C 9.8 02/23/2015    A1C 9.3 05/07/2014     Please refill as appropriate.  Kristen Abrams RN  [facility]

## 2021-03-20 DIAGNOSIS — E11.9 TYPE 2 DIABETES MELLITUS WITH HEMOGLOBIN A1C GOAL OF 7.0%-8.0% (H): Primary | ICD-10-CM

## 2021-03-20 NOTE — TELEPHONE ENCOUNTER
Please Gretchen can we check with diabetic educator department if they could reschedule patient, he was  No show back in January but was not rescheduled.. There are two  referrals placed. Thank you for follow up

## 2021-03-29 DIAGNOSIS — I73.9 PVD (PERIPHERAL VASCULAR DISEASE) (H): ICD-10-CM

## 2021-03-29 DIAGNOSIS — I10 HTN (HYPERTENSION): Primary | ICD-10-CM

## 2021-03-29 NOTE — PROGRESS NOTES
Pt no-showed to lexiscan 3/29/21, re-entered order for lexiscan.   Scheduling will call pt and r/s lexiscan and follow-up visit w/ Dr. Pereyra.   Sami RN, BSN  03/29/21 2:51 PM

## 2021-04-06 ENCOUNTER — PRE VISIT (OUTPATIENT)
Dept: UROLOGY | Facility: CLINIC | Age: 66
End: 2021-04-06

## 2021-04-06 NOTE — TELEPHONE ENCOUNTER
Reason for visit: Erectile dysfunction consult     Relevant information: N/A    Records/imaging/labs/orders: in EPIC    Pt called: no     At Rooming: normal

## 2021-04-12 ENCOUNTER — TELEPHONE (OUTPATIENT)
Dept: OTHER | Facility: CLINIC | Age: 66
End: 2021-04-12

## 2021-04-12 NOTE — TELEPHONE ENCOUNTER
Patient states his symptoms are getting worst, he is having pain and burning sensation in feet. He is requesting for appt and for nurse to call.     Informed patient will send message for nurse to call back.       Laura ABDALLA

## 2021-04-13 NOTE — TELEPHONE ENCOUNTER
LOV 1/11/21 with Dr. Parson- Patient needs to be scheduled for the PIYUSH ultrasound this week and follow up with Dr. Parson on Monday April 19 at 1050 am (I held this time on Dr. Parson schedule)     Dipti GAITANN, RN    [facility]  [facility]ascular [facility]  Office: [phone]  Fax: [phone]

## 2021-04-14 ENCOUNTER — PRE VISIT (OUTPATIENT)
Dept: UROLOGY | Facility: CLINIC | Age: 66
End: 2021-04-14

## 2021-04-14 NOTE — TELEPHONE ENCOUNTER
Patient called and LVM on my line to schedule the below.  I returned Rachell's call and he again did not answer and I LVM stating we need to get him scheduled for US and visit with Dr. Parson as mentioned below. I advised him to present to the ER for severe pain or if he feels he cannot feel pulses or his lower extremities are cold to the touch.    Routing back to scheduling to attempt to call Rachell again until the below is scheduled. It is very important to get the PIYUSH US ASAP due to his history of atherosclerotic disease and symptoms.     Dipti GAITANN, RN    [facility]  [facility]  Office: [phone]  Fax: [phone]

## 2021-04-16 ENCOUNTER — TELEPHONE (OUTPATIENT)
Dept: FAMILY MEDICINE | Facility: CLINIC | Age: 66
End: 2021-04-16

## 2021-04-16 NOTE — TELEPHONE ENCOUNTER
Diabetes Education Scheduling Outreach #1:    Call to patient to schedule. No answer/busy.    Plan for 2nd outreach attempt within 1 week.    [facility] OnCall  Diabetes and Nutrition Scheduling

## 2021-04-19 NOTE — TELEPHONE ENCOUNTER
Diabetes Education Scheduling Outreach #2:    Call to patient to schedule. Left message with phone number to call to schedule.        Bee Jones  [facility]  Diabetes and Nutrition Scheduling

## 2021-05-07 NOTE — TELEPHONE ENCOUNTER
Patient no showed for his PIYUSH US on 4/19/21. Please call patient and re-schedule US and follow up with Dr. Parson. His appointment on 5/11/21 needs to be cancelled.       Dipti GAITANN, RN    [facility]  Vascular [facility]  Office: [phone]  Fax: [phone]

## 2021-05-10 NOTE — TELEPHONE ENCOUNTER
LM for patient to call us back to reschedule the PIYUSH US and 05/11/21 follow up appointment with Dr Parson.

## 2021-05-10 NOTE — TELEPHONE ENCOUNTER
Called patient again and LM to call us back to reschedule the PIYUSH US and 05/11/21 follow up appointment with Dr Parson. Patient's 05/11/21 appointment has been cancelled.

## 2021-05-11 ENCOUNTER — PATIENT OUTREACH (OUTPATIENT)
Dept: EDUCATION SERVICES | Facility: CLINIC | Age: 66
End: 2021-05-11
Payer: COMMERCIAL

## 2021-05-11 DIAGNOSIS — E11.9 TYPE 2 DIABETES MELLITUS WITH HEMOGLOBIN A1C GOAL OF 7.0%-8.0% (H): ICD-10-CM

## 2021-05-11 PROCEDURE — 98968 PH1 ASSMT&MGMT NQHP 21-30: CPT

## 2021-05-11 NOTE — PROGRESS NOTES
"Diabetes Self-Management Education & Support  Type of Service: Telephone Visit    How would patient like to obtain AVS? Mail a copy      Presents for: Individual review    SUBJECTIVE/OBJECTIVE:  Presents for: Individual review  Accompanied by: Self  Diabetes education in the past 24mo: No  Focus of Visit: Healthy Eating, Reducing Risks  Diabetes type: Type 2  Diabetes management related comments/concerns: Needs help with what to eat for his kidneys. Just buried his son who  from diabetes and kidney failure.  Cultural Influences/Ethnic Background:  American      Diabetes Symptoms & Complications:     Complications assessed today?: Yes  Nephropathy: Yes    Patient Problem List and Family Medical History reviewed for relevant medical history, current medical status, and diabetes risk factors.    Vitals:  There were no vitals taken for this visit.  Estimated body mass index is 30.34 kg/m  as calculated from the following:    Height as of 2/10/21: 1.676 m (5' 6\").    Weight as of 2/10/21: 85.3 kg (188 lb).   Last 3 BP:   BP Readings from Last 3 Encounters:   02/10/21 116/63   21 120/56   21 (!) 140/70       History   Smoking Status     Former Smoker     Types: Cigarettes     Quit date:    Smokeless Tobacco     Never Used       Labs:  Lab Results   Component Value Date    A1C 7.4 2020     Lab Results   Component Value Date     02/10/2021     Lab Results   Component Value Date    LDL 81 02/10/2021     HDL Cholesterol   Date Value Ref Range Status   02/10/2021 44 >39 mg/dL Final   ]  GFR Estimate   Date Value Ref Range Status   02/10/2021 67 >60 mL/min/[1.73_m2] Final     Comment:     Non  GFR Calc  Starting 2018, serum creatinine based estimated GFR (eGFR) will be   calculated using the Chronic Kidney Disease Epidemiology Collaboration   (CKD-EPI) equation.       GFR Estimate If Black   Date Value Ref Range Status   02/10/2021 77 >60 mL/min/[1.73_m2] Final     Comment: "      GFR Calc  Starting 12/18/2018, serum creatinine based estimated GFR (eGFR) will be   calculated using the Chronic Kidney Disease Epidemiology Collaboration   (CKD-EPI) equation.       Lab Results   Component Value Date    CR 1.15 02/10/2021     No results found for: MICROALBUMIN    Healthy Eating:  Healthy Eating Assessed Today: Yes  Cultural/Rastafarian diet restrictions?: Yes(no pork)  Other: Patient is retired. does not always sleep well so wakes up around noon. recently  from his wife but now moved back in with her. He states she cooks pasta and rice, goats milk. He does juicing with fruit and vegetables but not sure what to eat to keep his kidneys healthy. Drinks water. Was told many years ago to avoid too much sodium, potassium, phosphorus and calcium.  Beverages: Water  Has patient met with a dietitian in the past?: No(would like to meet with a dietitian)    Being Active:  Being Active Assessed Today: No    Monitoring:  Monitoring Assessed Today: Yes  Times checking blood sugar at home (number): 3  Times checking blood sugar at home (per): Day  Blood glucose trend: Fluctuating    Reports his BG numbers 120, 190, 180, 175    Taking Medications:  Diabetes Medication(s)     Insulin       insulin aspart (NOVOLOG FLEXPEN) 100 UNIT/ML pen    Inject 1-10 Units Subcutaneous 2 times daily (with meals) With lunch and dinner     insulin glargine (LANTUS SOLOSTAR) 100 UNIT/ML pen    Inject 50 Units Subcutaneous At Bedtime          Taking Medication Assessed Today: Yes  Current Treatments: Insulin Injections    Problem Solving:  Problem Solving Assessed Today: No      Reducing Risks:  Reducing Risks Assessed Today: Yes  CAD Risks: Diabetes Mellitus, Male sex    Healthy Coping:  Healthy Coping Assessed Today: Yes  Emotional response to diabetes: Ready to learn, Concern for health and well-being  Informal Support system:: Family  Stage of change: PREPARATION (Decided to change - considering  how)  Support resources: Offerings in Clinic Communities  Patient Activation Measure Survey Score:  No flowsheet data found.    Diabetes knowledge and skills assessment:   Patient is knowledgeable in diabetes management concepts related to: Monitoring and Taking Medication    Patient needs further education on the following diabetes management concepts: Healthy Eating and Reducing Risks    Based on learning assessment above, most appropriate setting for further diabetes education would be: Individual setting.      INTERVENTIONS:    Education provided today on:  AADE Self-Care Behaviors:  Healthy Eating: suggested frozen vegetables and salads with oil and vinegar dressing; states his wife does not cook vegetables  Reducing Risks: A1C - goals, relating to blood glucose levels, how often to check and explained most important thing to protect kidneys is get A1c to 7-8 range  Healthy Coping: recognize feelings about diabetes and losing his son    Opportunities for ongoing education and support in diabetes-self management were discussed.    Pt verbalized understanding of concepts discussed and recommendations provided today.       Education Materials Provided:  Eating Right for Kidney Health      ASSESSMENT:  Patient was not aware of call appointment today. Needs diabetes education review and explanation of how to eat healthy for his kidneys. Needs in clinic visit.   Most recent A1c at [facility] was 8.3    Patient's most recent   Lab Results   Component Value Date    A1C 7.4 07/28/2020     meeting goal of <8.0       PLAN  Will mail patient kidney handout and have him scheduled to meet with diabetes nutrition educator at [employer] for further teaching on meal plan and diabetes.     Danni Garcia RDN, LUDIN, [facility]    Time Spent: 23 minutes  Encounter Type: Individual    Any diabetes medication dose changes were made via the CDE Protocol and Collaborative Practice Agreement with the patient's primary care provider. A copy of this  encounter was shared with the provider.

## 2021-05-18 DIAGNOSIS — E11.9 TYPE 2 DIABETES MELLITUS WITH HEMOGLOBIN A1C GOAL OF 7.0%-8.0% (H): ICD-10-CM

## 2021-05-18 RX ORDER — BLOOD SUGAR DIAGNOSTIC
STRIP MISCELLANEOUS
Qty: 100 STRIP | Refills: 1 | Status: SHIPPED | OUTPATIENT
Start: 2021-05-18

## 2021-05-18 NOTE — TELEPHONE ENCOUNTER
blood glucose (NO BRAND SPECIFIED) test strip  Last Written Prescription Date:  9/15/20  Last Fill Quantity: 60,  # refills: 4     Last office visit: 1/11/2021     Future Office Visit:   Next 5 appointments (look out 90 days)    May 25, 2021 10:45 AM  Return Visit with Janes Pereyra MD  [facility] ([facility] ) [address] [address] [address] [phone]           Blood glucose control managed by Dr. Nichols.  Routing request.  Keyla Olvera RN BSN  [facility]  [phone]

## 2021-05-18 NOTE — TELEPHONE ENCOUNTER
Called patient again and LM to call us back to reschedule the PIYUSH US and 05/11/21 follow up appointment with Dr Parson.    This is our 3rd and final attempt to schedule these appointments.

## 2021-05-21 DIAGNOSIS — E11.42 DIABETIC POLYNEUROPATHY ASSOCIATED WITH TYPE 2 DIABETES MELLITUS (H): ICD-10-CM

## 2021-05-21 RX ORDER — GABAPENTIN 300 MG/1
300 CAPSULE ORAL AT BEDTIME
Qty: 90 CAPSULE | Refills: 0 | Status: SHIPPED | OUTPATIENT
Start: 2021-05-21 | End: 2021-08-03

## 2021-06-04 ENCOUNTER — ANCILLARY PROCEDURE (OUTPATIENT)
Dept: GENERAL RADIOLOGY | Facility: CLINIC | Age: 66
End: 2021-06-04
Attending: NURSE PRACTITIONER
Payer: COMMERCIAL

## 2021-06-04 ENCOUNTER — OFFICE VISIT (OUTPATIENT)
Dept: URGENT CARE | Facility: URGENT CARE | Age: 66
End: 2021-06-04
Payer: COMMERCIAL

## 2021-06-04 VITALS
HEART RATE: 83 BPM | RESPIRATION RATE: 18 BRPM | HEIGHT: 66 IN | BODY MASS INDEX: 30.7 KG/M2 | DIASTOLIC BLOOD PRESSURE: 78 MMHG | OXYGEN SATURATION: 100 % | WEIGHT: 191 LBS | SYSTOLIC BLOOD PRESSURE: 124 MMHG | TEMPERATURE: 97.9 F

## 2021-06-04 DIAGNOSIS — S89.92XA INJURY OF LEFT LOWER EXTREMITY, INITIAL ENCOUNTER: ICD-10-CM

## 2021-06-04 DIAGNOSIS — I10 HYPERTENSION, UNSPECIFIED TYPE: Primary | ICD-10-CM

## 2021-06-04 PROCEDURE — 73562 X-RAY EXAM OF KNEE 3: CPT | Mod: TC | Performed by: RADIOLOGY

## 2021-06-04 PROCEDURE — 73610 X-RAY EXAM OF ANKLE: CPT | Mod: TC | Performed by: RADIOLOGY

## 2021-06-04 PROCEDURE — 99214 OFFICE O/P EST MOD 30 MIN: CPT | Performed by: NURSE PRACTITIONER

## 2021-06-04 ASSESSMENT — ENCOUNTER SYMPTOMS
WEAKNESS: 0
JOINT SWELLING: 1
MYALGIAS: 1
CHEST TIGHTNESS: 0
VOMITING: 0
ABDOMINAL PAIN: 0
APPETITE CHANGE: 0
ADENOPATHY: 0
ACTIVITY CHANGE: 0
PALPITATIONS: 0
CHILLS: 0
FATIGUE: 0
WHEEZING: 0
PARESTHESIAS: 0
SHORTNESS OF BREATH: 0
DIZZINESS: 0
ARTHRALGIAS: 1
LIGHT-HEADEDNESS: 0
NAUSEA: 0
FEVER: 0
COLOR CHANGE: 1
WOUND: 1
SLEEP DISTURBANCE: 0
DIAPHORESIS: 0

## 2021-06-04 ASSESSMENT — MIFFLIN-ST. JEOR: SCORE: 1599.12

## 2021-06-04 NOTE — PROGRESS NOTES
Chief Complaint   Patient presents with     Urgent Care     Knee Injury     left knee and left lower leg pain, fell over a chair lastnight     SUBJECTIVE:  Rachell Paige is a 65 year old male who presents to the clinic today with a left leg injury. He fell over a metal chair last night, severe tibial tuberosity and lateral malleolus pain, bruising, abrasions, edema. He has normal ROM. Baseline neuropathy, foot amputation. He declines hitting his head last night. Pt also requesting for home blood pressure cuff order to monitor his BP for hypertension. No cardiac concerns related to this.    Past Medical History:   Diagnosis Date     Anxiety 2/23/2015     Dermatitis 4/18/2015     DM type 2, goal A1C 7-8 2/23/2015     Does not have health insurance 4/18/2015     Financial problems 2/23/2015     Glaucoma (increased eye pressure)      HTN, goal below 140/90 2/23/2015     Hyperlipidemia LDL goal <100 2/23/2015     Microalbuminuria 4/18/2015     Onychomycosis 4/18/2015     Rash 2/23/2015     ACCU-CHEK GUIDE test strip, USE TO TEST BLOOD SUGAR TWO TIMES A DAY OR AS DIRECTED  alcohol swab prep pads, Use to swab area of injection/pedro pablo as directed.  aspirin 81 MG tablet, Take 1 tablet (81 mg) by mouth daily  BD PEN NEEDLE MICRO U/F 32G X 6 MM miscellaneous, USE 3 PEN NEEDLES DAILY OR AS DIRECTED. (Patient not taking: Reported on 2/10/2021)  blood glucose (ACCU-CHEK GUIDE) test strip, 1 strip by In Vitro route 4 times daily Use to test blood sugar 3 -4  times daily or as directed.  blood glucose (NO BRAND SPECIFIED) test strip, Use to test blood sugar 4 times daily or as directed. To accompany: Blood Glucose Monitor Brands: per insurance. (Patient not taking: Reported on 2/10/2021)  blood glucose calibration (NO BRAND SPECIFIED) solution, To accompany: Blood Glucose Monitor Brands: per insurance.  blood glucose monitoring (NO BRAND SPECIFIED) meter device kit, Use to test blood sugar 4 times daily or as directed. Preferred  "blood glucose meter OR supplies to accompany: Blood Glucose Monitor Brands: per insurance.  dorzolamide (TRUSOPT) 2 % ophthalmic solution, Place 1 drop into both eyes 2 times daily  esomeprazole (NEXIUM) 40 MG DR capsule, Take 1 capsule (40 mg) by mouth every morning (before breakfast) Take 30-60 minutes before eating.  gabapentin (NEURONTIN) 300 MG capsule, Take 1 capsule (300 mg) by mouth At Bedtime  insulin aspart (NOVOLOG FLEXPEN) 100 UNIT/ML pen, Inject 1-10 Units Subcutaneous 2 times daily (with meals) With lunch and dinner  insulin glargine (LANTUS SOLOSTAR) 100 UNIT/ML pen, Inject 50 Units Subcutaneous At Bedtime  insulin pen needle (31G X 8 MM) 31G X 8 MM miscellaneous, Use 3 pen needles daily or as directed.  Lido-Pentaf-Tetrafl-Ultrasound (ACCUCAINE) 1 % KIT,   lisinopril (ZESTRIL) 20 MG tablet, Take 1 tablet (20 mg) by mouth daily  simvastatin (ZOCOR) 10 MG tablet, Take 1 tablet (10 mg) by mouth At Bedtime  simvastatin (ZOCOR) 20 MG tablet, Take 1 tablet (20 mg) by mouth At Bedtime  thin (NO BRAND SPECIFIED) lancets, Use with lanceting device. To accompany: Blood Glucose Monitor Brands: per insurance.  timolol maleate (TIMOPTIC) 0.5 % ophthalmic solution, Place 1 drop into both eyes 2 times daily  zolpidem (AMBIEN) 5 MG tablet, Take 1 tablet (5 mg) by mouth nightly as needed for sleep    No current facility-administered medications on file prior to visit.     Social History     Tobacco Use     Smoking status: Former Smoker     Types: Cigarettes     Quit date:      Years since quittin.4     Smokeless tobacco: Never Used   Substance Use Topics     Alcohol use: No     Allergies   Allergen Reactions     Aleve      HA sweats     Citalopram Itching     Clopidogrel Nausea and Vomiting     Pt to restart on 11/25/15 to see again if he tolerates it or not. His sx were only GI. Not a true allergy     Iodine      Naproxen Itching     About 10 yrs ago, itching all over from taking Aleve  \"get sick and really " "sick\"       Sulfamethoxazole-Trimethoprim      Other reaction(s): Renal Failure       Review of Systems   Constitutional: Negative for activity change, appetite change, chills, diaphoresis, fatigue and fever.   Respiratory: Negative for chest tightness, shortness of breath and wheezing.    Cardiovascular: Negative for palpitations.   Gastrointestinal: Negative for abdominal pain, nausea and vomiting.   Musculoskeletal: Positive for arthralgias, gait problem, joint swelling and myalgias.   Skin: Positive for color change and wound. Negative for pallor and rash.   Neurological: Negative for dizziness, weakness, light-headedness and paresthesias.   Hematological: Negative for adenopathy.   Psychiatric/Behavioral: Negative for sleep disturbance.     EXAM:   /78   Pulse 83   Temp 97.9  F (36.6  C) (Oral)   Resp 18   Ht 1.676 m (5' 6\")   Wt 86.6 kg (191 lb)   SpO2 100%   BMI 30.83 kg/m      Physical Exam  Vitals signs reviewed.   Constitutional:       General: He is not in acute distress.     Appearance: Normal appearance. He is not ill-appearing, toxic-appearing or diaphoretic.   HENT:      Head: Normocephalic and atraumatic.      Nose: Nose normal.      Mouth/Throat:      Mouth: Mucous membranes are moist.      Pharynx: Oropharynx is clear.   Eyes:      Extraocular Movements: Extraocular movements intact.      Conjunctiva/sclera: Conjunctivae normal.      Pupils: Pupils are equal, round, and reactive to light.   Neck:      Musculoskeletal: Normal range of motion and neck supple.   Cardiovascular:      Rate and Rhythm: Normal rate.   Pulmonary:      Effort: Pulmonary effort is normal.   Abdominal:      Tenderness: There is no right CVA tenderness or left CVA tenderness.   Musculoskeletal: Normal range of motion.         General: Swelling, tenderness, deformity and signs of injury present.      Left lower leg: Edema present.   Skin:     General: Skin is warm and dry.      Findings: Bruising and erythema " present.      Comments: Left tibial tuberosity and lateral malleolus point tenderness, moderate abrasions, erythema, edema, bruising over shin.   Neurological:      General: No focal deficit present.      Mental Status: He is alert and oriented to person, place, and time.   Psychiatric:         Mood and Affect: Mood normal.         Behavior: Behavior normal.       Xray done in clinic read by me as negative for fracture.  Results for orders placed or performed in visit on 06/04/21   XR Knee Left 3 Views     Status: None    Narrative    XR KNEE LEFT 3 VIEWS  6/4/2021 6:57 PM     HISTORY: fell over metal chair last night, severe tibial tuberosity  and lateral malleolus pain, bruising, abrasions, edema, baseline foot  amputation; Injury of left lower extremity, initial encounter    COMPARISON: None.      Impression    IMPRESSION:  No fractures are identified. No joint space narrowing.  Normal patellar alignment. No knee joint effusion. Atheromatous  calcification.     ANIKA UMAÑA MD   Results for orders placed or performed in visit on 06/04/21   XR Ankle Left G/E 3 Views     Status: None    Narrative    XR ANKLE LEFT G/E 3 VIEWS 6/4/2021 6:57 PM     HISTORY: fell over metal chair last night, severe tibial tuberosity  and lateral malleolus pain, bruising, abrasions, edema, baseline foot  amputation; Injury of left lower extremity, initial encounter    COMPARISON: None.      Impression    IMPRESSION: Osteopenia. No fractures are identified. The ankle mortise  is intact. The talar dome is unremarkable. Transmetatarsal amputation.  Atheromatous calcification.     ANIKA UMAÑA MD     ASSESSMENT:    ICD-10-CM    1. Hypertension, unspecified type  I10 Home Blood Pressure Monitor Order for DME - ONLY FOR DME   2. Injury of left lower extremity, initial encounter  S89.92XA XR Ankle Left G/E 3 Views     XR Knee Left 3 Views     PLAN:  Patient Instructions   Xray done in clinic - no fracture identified  Soft tissue  contusion  Rest, ice, compression, elevate  Rotate tylenol and ibuprofen  Epsom salt warm soapy water daily and bacitracin zinc topically  Reevaluation if signs of infection - pus, redness, warmth, fever, nausea, red streaking      Follow up with primary care provider with any problems, questions or concerns or if symptoms worsen or fail to improve. Patient agreed to plan and verbalized understanding.    Kristie Ghosh, [facility]

## 2021-06-05 NOTE — PATIENT INSTRUCTIONS
Xray done in clinic - no fracture identified  Soft tissue contusion  Rest, ice, compression, elevate  Rotate tylenol and ibuprofen  Epsom salt warm soapy water daily and bacitracin zinc topically  Reevaluation if signs of infection - pus, redness, warmth, fever, nausea, red streaking

## 2021-06-07 ENCOUNTER — OFFICE VISIT (OUTPATIENT)
Dept: URGENT CARE | Facility: URGENT CARE | Age: 66
End: 2021-06-07
Payer: COMMERCIAL

## 2021-06-07 VITALS
SYSTOLIC BLOOD PRESSURE: 132 MMHG | DIASTOLIC BLOOD PRESSURE: 70 MMHG | TEMPERATURE: 97.3 F | OXYGEN SATURATION: 98 % | BODY MASS INDEX: 31.64 KG/M2 | HEART RATE: 94 BPM | WEIGHT: 196 LBS

## 2021-06-07 DIAGNOSIS — L08.9 INFECTED ABRASION OF LEFT LOWER EXTREMITY, INITIAL ENCOUNTER: Primary | ICD-10-CM

## 2021-06-07 DIAGNOSIS — S80.812A INFECTED ABRASION OF LEFT LOWER EXTREMITY, INITIAL ENCOUNTER: Primary | ICD-10-CM

## 2021-06-07 DIAGNOSIS — E11.9 TYPE 2 DIABETES MELLITUS WITHOUT COMPLICATION, WITHOUT LONG-TERM CURRENT USE OF INSULIN (H): ICD-10-CM

## 2021-06-07 PROCEDURE — 99214 OFFICE O/P EST MOD 30 MIN: CPT | Performed by: PHYSICIAN ASSISTANT

## 2021-06-07 NOTE — PROGRESS NOTES
SUBJECTIVE:  Chief Complaint   Patient presents with     Urgent Care     Musculoskeletal Problem     left foot pain and possible infection since last week. Pt was seen at [facility].      Rachell Paige is a 65 year old male presents with a chief complaint of left lower leg concern for infection.  1 week ago fell into piece of furniture and sustained abrasion.  Area now red and warm and concerned that infected for the past few days.  No fevers noted, denies chills, nausea and otherwise feel well The patient complained of mild pain  and has not had decreased ROM and is able to walk and perform normal activities without pain    He treated it initially with normal cleaning and topical OTC med. This is the first time this type of injury has occurred to this patient. Patient is type 2 diabetic on insulin with last A1C 8.3     Past Medical History:   Diagnosis Date     Anxiety 2/23/2015     Dermatitis 4/18/2015     DM type 2, goal A1C 7-8 2/23/2015     Does not have health insurance 4/18/2015     Financial problems 2/23/2015     Glaucoma (increased eye pressure)      HTN, goal below 140/90 2/23/2015     Hyperlipidemia LDL goal <100 2/23/2015     Microalbuminuria 4/18/2015     Onychomycosis 4/18/2015     Rash 2/23/2015     Current Outpatient Medications   Medication Sig Dispense Refill     ACCU-CHEK GUIDE test strip USE TO TEST BLOOD SUGAR TWO TIMES A DAY OR AS DIRECTED 100 strip 1     alcohol swab prep pads Use to swab area of injection/pedro pablo as directed. 120 each 6     aspirin 81 MG tablet Take 1 tablet (81 mg) by mouth daily 30 tablet OTC     BD PEN NEEDLE MICRO U/F 32G X 6 MM miscellaneous USE 3 PEN NEEDLES DAILY OR AS DIRECTED. 300 each 3     blood glucose (ACCU-CHEK GUIDE) test strip 1 strip by In Vitro route 4 times daily Use to test blood sugar 3 -4  times daily or as directed. 120 each 11     blood glucose (NO BRAND SPECIFIED) test strip Use to test blood sugar 4 times daily or as directed. To accompany: Blood  Glucose Monitor Brands: per insurance. 150 strip 6     blood glucose calibration (NO BRAND SPECIFIED) solution To accompany: Blood Glucose Monitor Brands: per insurance. 1 Bottle 3     blood glucose monitoring (NO BRAND SPECIFIED) meter device kit Use to test blood sugar 4 times daily or as directed. Preferred blood glucose meter OR supplies to accompany: Blood Glucose Monitor Brands: per insurance. 1 kit 0     dorzolamide (TRUSOPT) 2 % ophthalmic solution Place 1 drop into both eyes 2 times daily 10 mL 11     esomeprazole (NEXIUM) 40 MG DR capsule Take 1 capsule (40 mg) by mouth every morning (before breakfast) Take 30-60 minutes before eating. 90 capsule 0     gabapentin (NEURONTIN) 300 MG capsule Take 1 capsule (300 mg) by mouth At Bedtime 90 capsule 0     insulin aspart (NOVOLOG FLEXPEN) 100 UNIT/ML pen Inject 1-10 Units Subcutaneous 2 times daily (with meals) With lunch and dinner 3 mL 0     insulin glargine (LANTUS SOLOSTAR) 100 UNIT/ML pen Inject 50 Units Subcutaneous At Bedtime 15 mL 0     insulin pen needle (31G X 8 MM) 31G X 8 MM miscellaneous Use 3 pen needles daily or as directed. 300 each 3     Lido-Pentaf-Tetrafl-Ultrasound (ACCUCAINE) 1 % KIT        lisinopril (ZESTRIL) 20 MG tablet Take 1 tablet (20 mg) by mouth daily 30 tablet 11     simvastatin (ZOCOR) 10 MG tablet Take 1 tablet (10 mg) by mouth At Bedtime 90 tablet 1     simvastatin (ZOCOR) 20 MG tablet Take 1 tablet (20 mg) by mouth At Bedtime 90 tablet 0     thin (NO BRAND SPECIFIED) lancets Use with lanceting device. To accompany: Blood Glucose Monitor Brands: per insurance. 200 each 6     timolol maleate (TIMOPTIC) 0.5 % ophthalmic solution Place 1 drop into both eyes 2 times daily 1 Bottle 11     zolpidem (AMBIEN) 5 MG tablet Take 1 tablet (5 mg) by mouth nightly as needed for sleep 12 tablet 0     Social History     Tobacco Use     Smoking status: Former Smoker     Types: Cigarettes     Quit date:      Years since quittin.4      Smokeless tobacco: Never Used   Substance Use Topics     Alcohol use: No       ROS:  Review of systems negative except as stated above.    EXAM:   /70   Pulse 94   Temp 97.3  F (36.3  C) (Tympanic)   Wt 88.9 kg (196 lb)   SpO2 98%   BMI 31.64 kg/m    Gen: healthy,alert,no distress  Extremity: left lower leg over shin with superficial abrasion that has 1 inch area of surrounding erythema and scabbing over injured area that is slightly tender and warm to the touch.  No signs of FB noted  No purulent drainage noted and no evidence for abscess.   There is not compromise to the distal circulation.  Pulses are +2 and CRT is brisk  GENERAL APPEARANCE: healthy, alert and no distress  EXTREMITIES: peripheral pulses normal  NEURO: Normal strength and tone, sensory exam grossly normal, mentation intact and speech normal    X-RAY was not done.    assessment/plan:  (S80.812A,  L08.9) Infected abrasion of left lower extremity, initial encounter  (primary encounter diagnosis)  Comment:   Plan: amoxicillin-clavulanate (AUGMENTIN) 875-125 MG         tablet          Localized infection on left lower anterior leg secondary to abrasion.  No signs of abscess or systemic illness.  Patient is diabetic and slow healing to be expected.  Wound was cleaned and bandaged and wound cares discussed.  Start on Augmentin as directed and signs of spreading infection reviewed and to Follow-up with PCP with any concerns.  Monitor for fevers and BS.  OTC med if needed for pain  Patient notes understanding and agrees with above plan    (E11.9) Type 2 diabetes mellitus without complication, without long-term current use of insulin (H)  Comment:   Plan: continue to monitor

## 2021-06-10 ENCOUNTER — HOSPITAL ENCOUNTER (EMERGENCY)
Facility: CLINIC | Age: 66
Discharge: HOME | End: 2021-06-10
Attending: EMERGENCY MEDICINE | Admitting: EMERGENCY MEDICINE
Payer: COMMERCIAL

## 2021-06-10 VITALS
HEART RATE: 89 BPM | TEMPERATURE: 98 F | RESPIRATION RATE: 18 BRPM | DIASTOLIC BLOOD PRESSURE: 71 MMHG | OXYGEN SATURATION: 98 % | SYSTOLIC BLOOD PRESSURE: 156 MMHG

## 2021-06-10 DIAGNOSIS — S90.512D ABRASION OF LEFT ANKLE, SUBSEQUENT ENCOUNTER: ICD-10-CM

## 2021-06-10 LAB — GLUCOSE BLDC GLUCOMTR-MCNC: 138 MG/DL (ref 70–99)

## 2021-06-10 PROCEDURE — 99282 EMERGENCY DEPT VISIT SF MDM: CPT

## 2021-06-10 PROCEDURE — 999N001017 HC STATISTIC GLUCOSE BY METER IP

## 2021-06-10 ASSESSMENT — ENCOUNTER SYMPTOMS
FEVER: 0
MYALGIAS: 0
WEAKNESS: 0
NUMBNESS: 0
WOUND: 1
ARTHRALGIAS: 0
JOINT SWELLING: 0
CHILLS: 0

## 2021-06-10 NOTE — DISCHARGE INSTRUCTIONS
You can get an antibiotic ointment that contains zinc, this is available over-the-counter.  This will add a little extra support to your wound healing.    Prep the area gently, he did not want it so tight that it squeezes, keep the area clean and dry between dressing changes.  Cover it when there is any possibility of it coming to contact with clothing, bedding, etc.  I expect this to continue to improve over the next week.  Continue to take your antibiotics until they are finished.    As discussed, signs that this is worsening would be increased drainage, thick yellow drainage, spreading warmth or redness, fevers, chills, sweats, body aches.  If any of these, you should be seen by either your primary care doctor or provider quickly.

## 2021-06-10 NOTE — ED PROVIDER NOTES
History   Chief Complaint:  Wound Check    HPI  Rachell Paige is a 65 year old male with sig hx diabetes who presents with patient fell 1 week ago today and sustained an abrasion against furniture of his left shin.  Reported increased warmth and swelling up through 2 days ago at which time he consulted his primary care doctor.  He received antibiotic ointment, amoxicillin clavulanate 10-day course which she has been taking as directed.  He reports no significant drainage, no spreading warmth, no joint pain, no bone pain, no muscle pain; reports swelling after tightly wrapping reviewed leg however this resolved after unwrapping the limb.  States that he is concerned that it remains pink and that it is not healing well, as he has diabetes he is very anxious about developing a blood clot or some kind of infection.  He presents himself for evaluation.    Allergies:  Aleve  Citalopram  Clopidogrel  Iodine  Naproxen  Sulfamethoxazole-Trimethoprim    Medications:    Baby aspirin   Lisinopril   Gabapentin   Simvastatin   Insulin (Lantus & Novolog)  Ambien     Past Medical History:    Anxiety   Type II diabetes   Glaucoma   Hypertension   Hyperlipidemia   Past Medical History:   Diagnosis Date     Anxiety 2/23/2015     Dermatitis 4/18/2015     DM type 2, goal A1C 7-8 2/23/2015     Does not have health insurance 4/18/2015     Financial problems 2/23/2015     Glaucoma (increased eye pressure)      HTN, goal below 140/90 2/23/2015     Hyperlipidemia LDL goal <100 2/23/2015     Microalbuminuria 4/18/2015     Onychomycosis 4/18/2015     Rash 2/23/2015       Patient Active Problem List   Diagnosis     Type 2 diabetes mellitus with hemoglobin A1c goal of 7.0%-8.0% (H)     HTN, goal below 140/90     Hyperlipidemia LDL goal <100     Financial problems     Anxiety     Rash     Microalbuminuria     Onychomycosis     Dermatitis     Does not have health insurance     Atypical chest pain     CKD (chronic kidney disease) stage 3, GFR  30-59 ml/min     Epigastric pain     Generalized muscle weakness     2019 novel coronavirus disease (COVID-19)     Shortness of breath     Hyponatremia     Chest pain, unspecified type     COPD (chronic obstructive pulmonary disease) (H)     S/P amputation of lesser toe, right (H)       Past Surgical History:    Past Surgical History:   Procedure Laterality Date     AMPUTATE TOE(S) Right 12/31/2018    Procedure: Right fifth toe amputation;  Surgeon: Clark Lora DPM;  Location:  OR        Family History:    Family History   Problem Relation Age of Onset     Diabetes Other      LUNG DISEASE Mother      Glaucoma Father      Cerebrovascular Disease Son      Diabetes Son      Diabetes Daughter         Social History:  Marital Status:   Presents with self    Review of Systems   Constitutional: Negative for chills and fever.   Cardiovascular: Negative for leg swelling.   Musculoskeletal: Negative for arthralgias, gait problem, joint swelling and myalgias.   Skin: Positive for wound (L shin).   Neurological: Negative for weakness and numbness.   All other systems reviewed and are negative.    Physical Exam     Patient Vitals for the past 24 hrs:   BP Temp Temp src Pulse Resp SpO2   06/10/21 1629 (!) 159/72 98  F (36.7  C) Temporal 89 18 100 %        Physical Exam  Vitals signs and nursing note reviewed.   Constitutional:       Appearance: Normal appearance.   Musculoskeletal:         General: No swelling or tenderness.      Right lower leg: No edema.      Left lower leg: No edema.   Skin:     General: Skin is warm and dry.      Capillary Refill: Capillary refill takes less than 2 seconds.      Comments: L anterior shin abrasion, well healing with pink tissue. No drainage, no fluctuance. No erythema >0.5cm from wound, no streaking no warmth.    Neurological:      Mental Status: He is alert.      Sensory: No sensory deficit.      Motor: No weakness.   Psychiatric:         Mood and Affect: Mood normal.          Behavior: Behavior normal.       Emergency Department Course       Laboratory:  Glucose POC: 138    Emergency Department Course:    Reviewed:  I reviewed the patient's nursing notes, vitals, past medical records.     Assessments:  1700: I performed an exam of the patient, as documented above. History obtained and plan for ED work up discussed as well.  On my exam wound is very reassuring, appears to be healing well, has pink well-healing tissue present.  We discussed that the patient's wounds look reassuring, we discussed systemic signs that he would be worsening and should be reevaluated, we discussed signs of the wound would be worsening to watch for.  Patient expressed understanding, will receive wound care and then will be discharged home.    Interventions:  Wound cares per nursing    Disposition:  The patient was discharged to home.     Impression & Plan      Medical Decision Making:   Rachell Paige is a 65 year old male with type 2 diabetes who presents with a left shin wound.  The wound appears to be well-healing, there is fresh pink tissue encroaching from the edges, there is no fluctuance, erythema, underlying tenderness, edema suggestive of a spreading infection or abscess, the bones are nontender there is no tracking or drainage suggestive of an osteomyelitis.  Given patient's diabetes we did check a blood glucose which was 138.  Patient will be discharged home, advised to complete his course of antibiotics, we discussed red flag symptoms for which to present himself for work-up including spreading redness from the wound, deeper aching of the wound, edema and swelling of the wound, drainage order pus from the wound, systemic symptoms like fevers, chills, nausea, sweats, body aches.  Patient expressed understanding and will follow up as indicated.    Critical Care time was 0 minutes for this patient excluding procedures.    Diagnosis:     ICD-10-CM    1. Abrasion of left ankle, subsequent encounter   S90.512D        Discharge Medications:  New Prescriptions    No medications on file        6/10/2021  EMERGENCY DEPARTMENT     Bella Post MD  Resident  06/10/21 1730

## 2021-06-10 NOTE — ED TRIAGE NOTES
Fell last Thursday and home and got an abrasion to left shin. Was seen and urgent care and started on antibiotics 2 days ago. States the wound continues to swell and feels like its not healing. Pt reports he is diabetic

## 2021-06-10 NOTE — ED PROVIDER NOTES
Emergency Department Resident Supervision Note  6/10/2021  5:06 PM      I evaluated this patient in conjunction with Bella Post MD. Please see her note for details.     Briefly, the patient presented for evaluation of a leg wound. He reports falling into a piece of furniture one week ago at which time he sustained an abrasion to his left shin. He noted this to be increasingly red and warm, so he presented to his PCP 2 days ago at which time he was started on Augmentin. He has been taking this faithfully but feels the wound has not been healing appropriately, thus prompting presentation to the ED for a recheck. Here, he reports the wound only. He denies fever, chills, nausea, or other systemic symptoms.     Abrasion to left shin, one week ago   Was red, was not feeling well - went to UC 2 days ago, started on amoxicillin     Felt it was not healing - came in today     No systemic symptoms       On my exam,   General:  No respiratory distress    Cardiovascular: Good cap refill.    Respiratory: Breathing non labored.     Musculoskeletal: No tenderness. No bony deformity.     Skin: Left anterior shin abrasion.     Neurologic: non focal      Psychiatric: Appropriate     Results:  Laboratory:    Glucose by meter (Collected at 1733): 138 (H)    ED course:  1706: Dr. Post discussed her evaluation and exam findings with me.   1708: I performed an exam of the patient, as documented above.     My impression is a wound to the leg with quite healthy appearing tissues.  I did not consider likely a fracture or foreign body from the historical description.  We discussed wound healing the wound was dressed and he was discharged to close outpatient follow-up.  There are no systemic symptoms to suggest infection infection however symptoms did not return for discussed.  He will continue his amoxicillin started urgent care as an outpatient.        Diagnosis    ICD-10-CM    1. Abrasion of left ankle, subsequent encounter   S90.512D Glucose by meter     Glucose by meter         Alden Spivey MD    Scribe Disclosure:  I, Paz Vargas, am serving as a scribe on 6/10/2021 at 5:09 PM to personally document services performed by Alden Spivey MD based on my observations and the provider's statements to me.         Alden Spivey MD  06/10/21 2174

## 2021-06-21 ENCOUNTER — TELEPHONE (OUTPATIENT)
Dept: OTHER | Facility: CLINIC | Age: 66
End: 2021-06-21

## 2021-06-21 NOTE — TELEPHONE ENCOUNTER
[facility]    Who is the name of the provider?:  Renetta      What is the location you see this provider at?: [place]    Reason for call:  Returning missed call from 6/18.    Can we leave a detailed message on this number?  YES

## 2021-06-21 NOTE — TELEPHONE ENCOUNTER
There is no documentation in patients chart a call was placed to patient on 6/18/21.   Patient is due for CT of chest in July 2021- routing to scheduling to contact and schedule.     Dipti JIMENES, RN    [facility]  Office: [phone]  Fax: [phone]

## 2021-06-24 NOTE — TELEPHONE ENCOUNTER
Patient scheduled     8/2/2021 US and Appt      Patient stated he is allergic to CT dye and decline to make CT appt. Routing to nurse to review.       Laura ABDALLA

## 2021-06-24 NOTE — TELEPHONE ENCOUNTER
LM for patient to call us back to schedule his US Carotid, US PIYUSH, CT and in person visit w/GHE.    This is our 2nd and final attempt to schedule these appointments.

## 2021-06-24 NOTE — TELEPHONE ENCOUNTER
Patient needs a PIYUSH US in addition to carotid US as mentioned below in Tania's note. If patient is declining CT that is fine.     Dipti JIMENES, RN    [facility]  Office: [phone]  Fax: [phone]

## 2021-06-25 DIAGNOSIS — E11.9 TYPE 2 DIABETES MELLITUS WITH HEMOGLOBIN A1C GOAL OF 7.0%-8.0% (H): ICD-10-CM

## 2021-06-25 RX ORDER — LANCETS
EACH MISCELLANEOUS
Qty: 200 EACH | Refills: 6 | Status: SHIPPED | OUTPATIENT
Start: 2021-06-25

## 2021-07-06 ENCOUNTER — ANCILLARY PROCEDURE (OUTPATIENT)
Dept: GENERAL RADIOLOGY | Facility: CLINIC | Age: 66
End: 2021-07-06
Attending: PHYSICIAN ASSISTANT
Payer: COMMERCIAL

## 2021-07-06 ENCOUNTER — OFFICE VISIT (OUTPATIENT)
Dept: URGENT CARE | Facility: URGENT CARE | Age: 66
End: 2021-07-06
Payer: COMMERCIAL

## 2021-07-06 VITALS
BODY MASS INDEX: 31.15 KG/M2 | SYSTOLIC BLOOD PRESSURE: 124 MMHG | WEIGHT: 193 LBS | OXYGEN SATURATION: 99 % | TEMPERATURE: 97.8 F | HEART RATE: 80 BPM | DIASTOLIC BLOOD PRESSURE: 66 MMHG

## 2021-07-06 DIAGNOSIS — S80.812D ABRASION OF ANTERIOR LEFT LOWER LEG, SUBSEQUENT ENCOUNTER: ICD-10-CM

## 2021-07-06 DIAGNOSIS — E11.9 TYPE 2 DIABETES MELLITUS WITH HEMOGLOBIN A1C GOAL OF 7.0%-8.0% (H): ICD-10-CM

## 2021-07-06 DIAGNOSIS — M89.8X6 PAIN IN LEFT TIBIA: ICD-10-CM

## 2021-07-06 DIAGNOSIS — L03.116 CELLULITIS OF LEFT LOWER EXTREMITY: Primary | ICD-10-CM

## 2021-07-06 LAB
BASOPHILS # BLD AUTO: 0 10E9/L (ref 0–0.2)
BASOPHILS NFR BLD AUTO: 0.1 %
DIFFERENTIAL METHOD BLD: NORMAL
EOSINOPHIL # BLD AUTO: 0.3 10E9/L (ref 0–0.7)
EOSINOPHIL NFR BLD AUTO: 3 %
ERYTHROCYTE [DISTWIDTH] IN BLOOD BY AUTOMATED COUNT: 13.3 % (ref 10–15)
HCT VFR BLD AUTO: 41 % (ref 40–53)
HGB BLD-MCNC: 13.4 G/DL (ref 13.3–17.7)
LYMPHOCYTES # BLD AUTO: 3.1 10E9/L (ref 0.8–5.3)
LYMPHOCYTES NFR BLD AUTO: 37.5 %
MCH RBC QN AUTO: 28.5 PG (ref 26.5–33)
MCHC RBC AUTO-ENTMCNC: 32.7 G/DL (ref 31.5–36.5)
MCV RBC AUTO: 87 FL (ref 78–100)
MONOCYTES # BLD AUTO: 0.7 10E9/L (ref 0–1.3)
MONOCYTES NFR BLD AUTO: 8.7 %
NEUTROPHILS # BLD AUTO: 4.2 10E9/L (ref 1.6–8.3)
NEUTROPHILS NFR BLD AUTO: 50.7 %
PLATELET # BLD AUTO: 324 10E9/L (ref 150–450)
RBC # BLD AUTO: 4.71 10E12/L (ref 4.4–5.9)
WBC # BLD AUTO: 8.4 10E9/L (ref 4–11)

## 2021-07-06 PROCEDURE — 73590 X-RAY EXAM OF LOWER LEG: CPT | Mod: LT | Performed by: RADIOLOGY

## 2021-07-06 PROCEDURE — 99214 OFFICE O/P EST MOD 30 MIN: CPT | Performed by: PHYSICIAN ASSISTANT

## 2021-07-06 PROCEDURE — 36415 COLL VENOUS BLD VENIPUNCTURE: CPT | Performed by: PHYSICIAN ASSISTANT

## 2021-07-06 PROCEDURE — 85025 COMPLETE CBC W/AUTO DIFF WBC: CPT | Performed by: PHYSICIAN ASSISTANT

## 2021-07-06 RX ORDER — CEPHALEXIN 500 MG/1
500 CAPSULE ORAL 2 TIMES DAILY
Qty: 14 CAPSULE | Refills: 0 | Status: SHIPPED | OUTPATIENT
Start: 2021-07-06 | End: 2021-07-13

## 2021-07-06 NOTE — PROGRESS NOTES
"ASSESSMENT/PLAN:    (L03.116) Cellulitis of left lower extremity  (primary encounter diagnosis)    MDM: Very pleasant 65 year old male, with a past medical history that includes diabetes (please see below for full past medical history), with persistent pain, non-healing abrasion (with recent loss of scab/re-opening one area) and patient concern for fracture. I obtained CBC and X-Ray today to assess for possible fracture or osteomyelitis. Thankfully, there are no findings on x-ray or CBC to increase my suspicion for either at this time. He does have an area of localized cellulitis on anterior shin and what appears to be fever slow healing to non-healing abrasions after one month.     Plan: cephALEXin (KEFLEX) 500 MG capsule      (S80.492D) Abrasion of anterior left lower leg, subsequent encounter    1. Keflex antibiotic   2. Wound was cleaned with Hibiclens   3. Bacitracin/adaptic/non-restrictive tensogrip to keep covered (without further irritating or peeling off more skin)   4. I have advised he see primary care in 2 days for a recheck. At this point, I have advised he should follow closely with PCP until wound is fully healed.   5. Directed to watch home blood sugars carefully during infection/can run high or low.   6. Signs and symptoms that warrant immediate urgent and emergent medical follow-up are reviewed   7.  In addition to the above, cellulitis \"red flag\" signs and symptoms are reviewed with patient both verbally and by way of printed educational material for home review.  Patientt verbalizes understanding of and agrees to the above plan.       Plan: CBC with platelets differential, XR Tibia &         Fibula Left 2 Views      (E11.9) Type 2 diabetes mellitus with hemoglobin A1c goal of 7.0%-8.0% (H)  Plan: CBC with platelets differential, XR Tibia &         Fibula Left 2 Views    (M89.8X6) Pain in left tibia  Plan: CBC with platelets differential, XR Tibia &         Fibula Left 2 " "Views      -----------------------------------------------------------------------------------------------------------------    Rachell Paige is a 65 year old male, with a past medical history that includes diabetes (please see below for full past medical history), presenting to urgent care today with stated concerns about possible broken shin bone (he is requesting x-ray) and concerns about another infection of skin (has had a very slow healing wound left shin)       HPI: Patient states his initial injury was approximately 1 months ago. He reportedly fell quite hard onto a piece of furniture and scraped \"all the skin off my leg\". Patient states he had one course of antibiotic for infected abrasion approximately 1 month ago. Redness improved some with antibiotic and skin was reportedly starting to heal. However, he accidentally removed the scab and opened the wounds over shin again. Now surrounding skin is getting red again. No pus drainage. No active bleeding.          ROS:   CONSTITUTIONAL: Denies any fever or chills or weakness   CARDIAC: Denies any acute chest pain or shortness of breath   RESP: Denies any acute cough, chest pain or shortness of breath   GI: Denies any N/V/D. No abdominal pain.  SKIN: positive as per HPI. Denies rash or lesions elsewhere   NEURO: Denies any HA, neck stiffness or lethargy.   RHEUM: Denies any red, warm or swollen joints  MUS/SKEL: Positive as per above left shin pain. Patient is able to bear weight and do all activities of daily living, but continues to have personal concern for fracture   ENDOCRINE: States home blood sugars have been in good range. Denies any prior history of non-healing wounds.           Past Medical History:   Diagnosis Date     Anxiety 2/23/2015     Dermatitis 4/18/2015     DM type 2, goal A1C 7-8 2/23/2015     Does not have health insurance 4/18/2015     Financial problems 2/23/2015     Glaucoma (increased eye pressure)      HTN, goal below 140/90 " 2/23/2015     Hyperlipidemia LDL goal <100 2/23/2015     Microalbuminuria 4/18/2015     Onychomycosis 4/18/2015     Rash 2/23/2015     Patient Active Problem List   Diagnosis     Type 2 diabetes mellitus with hemoglobin A1c goal of 7.0%-8.0% (H)     HTN, goal below 140/90     Hyperlipidemia LDL goal <100     Financial problems     Anxiety     Rash     Microalbuminuria     Onychomycosis     Dermatitis     Does not have health insurance     Atypical chest pain     CKD (chronic kidney disease) stage 3, GFR 30-59 ml/min     Epigastric pain     Generalized muscle weakness     2019 novel coronavirus disease (COVID-19)     Shortness of breath     Hyponatremia     Chest pain, unspecified type     COPD (chronic obstructive pulmonary disease) (H)     S/P amputation of lesser toe, right (H)     Current Outpatient Medications   Medication     ACCU-CHEK GUIDE test strip     alcohol swab prep pads     aspirin 81 MG tablet     BD PEN NEEDLE MICRO U/F 32G X 6 MM miscellaneous     blood glucose (ACCU-CHEK GUIDE) test strip     blood glucose (NO BRAND SPECIFIED) test strip     blood glucose calibration (NO BRAND SPECIFIED) solution     blood glucose monitoring (NO BRAND SPECIFIED) meter device kit     dorzolamide (TRUSOPT) 2 % ophthalmic solution     esomeprazole (NEXIUM) 40 MG DR capsule     gabapentin (NEURONTIN) 300 MG capsule     insulin aspart (NOVOLOG FLEXPEN) 100 UNIT/ML pen     insulin glargine (LANTUS SOLOSTAR) 100 UNIT/ML pen     insulin pen needle (31G X 8 MM) 31G X 8 MM miscellaneous     Lido-Pentaf-Tetrafl-Ultrasound (ACCUCAINE) 1 % KIT     lisinopril (ZESTRIL) 20 MG tablet     simvastatin (ZOCOR) 10 MG tablet     simvastatin (ZOCOR) 20 MG tablet     thin (NO BRAND SPECIFIED) lancets     timolol maleate (TIMOPTIC) 0.5 % ophthalmic solution     zolpidem (AMBIEN) 5 MG tablet     No current facility-administered medications for this visit.      Allergies   Allergen Reactions     Aleve      HA sweats     Citalopram Itching  "    Clopidogrel Nausea and Vomiting     Pt to restart on 11/25/15 to see again if he tolerates it or not. His sx were only GI. Not a true allergy     Iodine      Naproxen Itching     About 10 yrs ago, itching all over from taking Aleve  \"get sick and really sick\"       Sulfamethoxazole-Trimethoprim      Other reaction(s): Renal Failure           OBJECTIVE:  /66   Pulse 80   Temp 97.8  F (36.6  C)   Wt 87.5 kg (193 lb)   SpO2 99%   BMI 31.15 kg/m      General appearance: alert and no apparent distress  LEFT LOWER LEG: Positive for generally thin skin over anterior tibia. Positive for superficial abrasions over mid-1/3 of tibia that look to be in various states of healing. There is one half-dollar sized area (mid tibia) of erythema (surrounding an area of open skin/patient states scab came off several days ago). No purulent drainage. No active bleeding. No palpable abscesses. No necrotic tissue. FROM left lower leg. Gait is mildly antalgic   Neck is supple, FROM with no adenopathy  CARDIAC:NORMAL - regular rate and rhythm without murmur.  RESP: Normal - CTA without rales, rhonchi, or wheezing.  ABDOMEN: Abdomen soft, non-tender. BS normal. No masses, organomegaly    LEFT TIBIA and FIBULA 2 VIEW XRAY obtained to assess for possible fracture or osteomyelitis. Pain and non-healing wound after injury left anterior shin.     I reviewed my impression (No acute fracture. No acute findings), along with actual x-ray images, with patient during his office visit today.  Radiologist over-read pending. Patient will need to be called if there area any acute findings on radiologist over-read.       "

## 2021-07-06 NOTE — PATIENT INSTRUCTIONS
Patient Education     Cellulitis  Cellulitis is an infection of the deep layers of skin. A break in the skin, such as a cut or scratch, can let bacteria under the skin. If the bacteria get to deep layers of the skin, it can be serious. If not treated, cellulitis can get into the bloodstream and lymph nodes. The infection can then spread throughout the body. This causes serious illness.   Cellulitis causes the affected skin to become red, swollen, warm, and sore. The reddened areas have a visible border. An open sore may leak fluid (pus). You may have a fever, chills, and pain.   Cellulitis is treated with antibiotics taken for 7 to 10 days. An open sore may be cleaned and covered with cool wet gauze. Symptoms should get better 1 to 2 days after treatment is started. Make sure to take all the antibiotics for the full number of days until they are gone. Keep taking the medicine even if your symptoms go away.   Home care  Follow these tips:    Limit the use of the part of your body with cellulitis.     If the infection is on your leg, keep your leg raised while sitting. This helps reduce swelling.    Take all of the antibiotic medicine exactly as directed until it is gone. Don't miss any doses, especially during the first 7 days. Don t stop taking the medicine when your symptoms get better.    Keep the affected area clean and dry.    Wash your hands with soap and clean, running water before and after touching your skin. Anyone else who touches your skin should also wash his or her hands. Don't share towels.  Follow-up care  Follow up with your healthcare provider, or as advised. If your infection doesn't go away on the first antibiotic, your healthcare provider will prescribe a different one.   When to seek medical advice  Call your healthcare provider right away if any of these occur:    Red areas that spread    Swelling or pain that gets worse    Fluid leaking from the skin (pus)    Fever higher of 100.4  F (38.0   C) or higher after 2 days on antibiotics  Birgit last reviewed this educational content on 8/1/2019 2000-2021 [employer]. All rights reserved. This information is not intended as a substitute for professional medical care. Always follow your healthcare professional's instructions.

## 2021-07-30 DIAGNOSIS — E78.5 HYPERLIPIDEMIA LDL GOAL <100: ICD-10-CM

## 2021-07-30 RX ORDER — SIMVASTATIN 20 MG
20 TABLET ORAL AT BEDTIME
Qty: 90 TABLET | Refills: 0 | Status: SHIPPED | OUTPATIENT
Start: 2021-07-30 | End: 2021-11-26

## 2021-08-02 DIAGNOSIS — E11.42 DIABETIC POLYNEUROPATHY ASSOCIATED WITH TYPE 2 DIABETES MELLITUS (H): ICD-10-CM

## 2021-08-03 RX ORDER — GABAPENTIN 300 MG/1
300 CAPSULE ORAL AT BEDTIME
Qty: 90 CAPSULE | Refills: 0 | Status: SHIPPED | OUTPATIENT
Start: 2021-08-03

## 2021-08-03 NOTE — TELEPHONE ENCOUNTER
gabapentin (NEURONTIN) 300 MG capsule 90 capsule 0 5/21/2021  No   Sig - Route: Take 1 capsule (300 mg) by mouth At Bedtime - Oral   Sent to pharmacy as: Gabapentin 300 MG Oral Capsule (NEURONTIN)   Class: E-Prescribe   Order: [id number]   E-Prescribing Status: Receipt confirmed by pharmacy (5/21/2021  1:08 PM CDT)     Last OV 2/10/21     Routing refill request to provider for review/approval because:  Drug not on the [facility] refill protocol     Neva CHAVIS RN

## 2021-09-16 DIAGNOSIS — H40.1131 PRIMARY OPEN ANGLE GLAUCOMA (POAG) OF BOTH EYES, MILD STAGE: ICD-10-CM

## 2021-09-17 RX ORDER — DORZOLAMIDE HCL 20 MG/ML
1 SOLUTION/ DROPS OPHTHALMIC 2 TIMES DAILY
Qty: 10 ML | Refills: 1 | Status: SHIPPED | OUTPATIENT
Start: 2021-09-17 | End: 2022-02-28

## 2021-09-17 RX ORDER — TIMOLOL MALEATE 5 MG/ML
1 SOLUTION/ DROPS OPHTHALMIC 2 TIMES DAILY
Qty: 5 ML | Refills: 1 | Status: SHIPPED | OUTPATIENT
Start: 2021-09-17 | End: 2022-02-28

## 2021-09-17 NOTE — TELEPHONE ENCOUNTER
Patient needs to have follow up appt with myself or at the [facility] before further refills will be authorized.

## 2021-09-20 NOTE — TELEPHONE ENCOUNTER
"ACCU-CHEK GUIDE test strip 100 strip 1 5/18/2021  No   Sig: USE TO TEST BLOOD SUGAR TWO TIMES A DAY OR AS DIRECTED   Sent to pharmacy as: Accu-Chek Guide In Vitro Strip (blood glucose)     Patient has been under care of Dr Amairani Seth @ [facility].  LOV 8-4-2021 for diabetes care.  Patient had that provider fill test strips, see 9- encounter.      Patient has upcoming appointment with Dr Nichols - unclear if patient is continuing care with [facility].  Appointment note is \"many issues\"    Next 5 appointments (look out 90 days)    Sep 27, 2021  4:00 PM  SHORT with Olivia Nichols MD  [facility] [address], [address]  [place] [address]  [phone]        Monique Dias, RT (R)    "

## 2021-09-21 NOTE — TELEPHONE ENCOUNTER
Called [phone] twice    Left message for pt to schedule appointment with Dr. Marr or Dr. Etienne for further refills and courtesy Rx was sent.    Vishal Bolivar RN 11:23 AM 09/21/21

## 2021-09-28 ENCOUNTER — APPOINTMENT (OUTPATIENT)
Dept: GENERAL RADIOLOGY | Facility: CLINIC | Age: 66
End: 2021-09-28
Attending: EMERGENCY MEDICINE
Payer: COMMERCIAL

## 2021-09-28 ENCOUNTER — HOSPITAL ENCOUNTER (OUTPATIENT)
Facility: CLINIC | Age: 66
Setting detail: OBSERVATION
Discharge: HOME | End: 2021-09-29
Attending: EMERGENCY MEDICINE | Admitting: INTERNAL MEDICINE
Payer: COMMERCIAL

## 2021-09-28 DIAGNOSIS — R53.83 OTHER FATIGUE: ICD-10-CM

## 2021-09-28 DIAGNOSIS — R06.09 DYSPNEA ON EXERTION: ICD-10-CM

## 2021-09-28 LAB
ANION GAP SERPL CALCULATED.3IONS-SCNC: 6 MMOL/L (ref 3–14)
BASOPHILS # BLD AUTO: 0 10E3/UL (ref 0–0.2)
BASOPHILS NFR BLD AUTO: 1 %
BUN SERPL-MCNC: 19 MG/DL (ref 7–30)
CALCIUM SERPL-MCNC: 9 MG/DL (ref 8.5–10.1)
CHLORIDE BLD-SCNC: 105 MMOL/L (ref 94–109)
CO2 SERPL-SCNC: 25 MMOL/L (ref 20–32)
CREAT SERPL-MCNC: 1.28 MG/DL (ref 0.66–1.25)
EOSINOPHIL # BLD AUTO: 0.3 10E3/UL (ref 0–0.7)
EOSINOPHIL NFR BLD AUTO: 4 %
ERYTHROCYTE [DISTWIDTH] IN BLOOD BY AUTOMATED COUNT: 13.8 % (ref 10–15)
GFR SERPL CREATININE-BSD FRML MDRD: 59 ML/MIN/1.73M2
GLUCOSE BLD-MCNC: 95 MG/DL (ref 70–99)
GLUCOSE BLDC GLUCOMTR-MCNC: 134 MG/DL (ref 70–99)
GLUCOSE BLDC GLUCOMTR-MCNC: 58 MG/DL (ref 70–99)
HCT VFR BLD AUTO: 43.7 % (ref 40–53)
HGB BLD-MCNC: 14 G/DL (ref 13.3–17.7)
IMM GRANULOCYTES # BLD: 0.1 10E3/UL
IMM GRANULOCYTES NFR BLD: 1 %
LYMPHOCYTES # BLD AUTO: 2.6 10E3/UL (ref 0.8–5.3)
LYMPHOCYTES NFR BLD AUTO: 34 %
MCH RBC QN AUTO: 28.5 PG (ref 26.5–33)
MCHC RBC AUTO-ENTMCNC: 32 G/DL (ref 31.5–36.5)
MCV RBC AUTO: 89 FL (ref 78–100)
MONOCYTES # BLD AUTO: 0.6 10E3/UL (ref 0–1.3)
MONOCYTES NFR BLD AUTO: 8 %
NEUTROPHILS # BLD AUTO: 4.2 10E3/UL (ref 1.6–8.3)
NEUTROPHILS NFR BLD AUTO: 52 %
NRBC # BLD AUTO: 0 10E3/UL
NRBC BLD AUTO-RTO: 0 /100
PLATELET # BLD AUTO: 327 10E3/UL (ref 150–450)
POTASSIUM BLD-SCNC: 4.4 MMOL/L (ref 3.4–5.3)
RBC # BLD AUTO: 4.91 10E6/UL (ref 4.4–5.9)
SARS-COV-2 RNA RESP QL NAA+PROBE: NEGATIVE
SODIUM SERPL-SCNC: 136 MMOL/L (ref 133–144)
TROPONIN I SERPL-MCNC: 0.02 UG/L (ref 0–0.04)
WBC # BLD AUTO: 7.8 10E3/UL (ref 4–11)

## 2021-09-28 PROCEDURE — C9803 HOPD COVID-19 SPEC COLLECT: HCPCS

## 2021-09-28 PROCEDURE — 99220 PR INITIAL OBSERVATION CARE,LEVEL III: CPT | Performed by: INTERNAL MEDICINE

## 2021-09-28 PROCEDURE — 250N000013 HC RX MED GY IP 250 OP 250 PS 637: Performed by: EMERGENCY MEDICINE

## 2021-09-28 PROCEDURE — G0378 HOSPITAL OBSERVATION PER HR: HCPCS

## 2021-09-28 PROCEDURE — 93005 ELECTROCARDIOGRAM TRACING: CPT

## 2021-09-28 PROCEDURE — 71046 X-RAY EXAM CHEST 2 VIEWS: CPT

## 2021-09-28 PROCEDURE — 82374 ASSAY BLOOD CARBON DIOXIDE: CPT | Performed by: EMERGENCY MEDICINE

## 2021-09-28 PROCEDURE — 84484 ASSAY OF TROPONIN QUANT: CPT | Performed by: EMERGENCY MEDICINE

## 2021-09-28 PROCEDURE — 99285 EMERGENCY DEPT VISIT HI MDM: CPT | Mod: 25

## 2021-09-28 PROCEDURE — 87635 SARS-COV-2 COVID-19 AMP PRB: CPT | Performed by: EMERGENCY MEDICINE

## 2021-09-28 PROCEDURE — 85025 COMPLETE CBC W/AUTO DIFF WBC: CPT | Performed by: EMERGENCY MEDICINE

## 2021-09-28 PROCEDURE — 83036 HEMOGLOBIN GLYCOSYLATED A1C: CPT | Performed by: INTERNAL MEDICINE

## 2021-09-28 PROCEDURE — 36415 COLL VENOUS BLD VENIPUNCTURE: CPT | Performed by: EMERGENCY MEDICINE

## 2021-09-28 RX ORDER — SIMVASTATIN 20 MG
20 TABLET ORAL AT BEDTIME
Status: DISCONTINUED | OUTPATIENT
Start: 2021-09-29 | End: 2021-09-29 | Stop reason: HOSPADM

## 2021-09-28 RX ORDER — LISINOPRIL 20 MG/1
20 TABLET ORAL DAILY
Status: DISCONTINUED | OUTPATIENT
Start: 2021-09-29 | End: 2021-09-29 | Stop reason: HOSPADM

## 2021-09-28 RX ORDER — ESOMEPRAZOLE MAGNESIUM 40 MG/1
40 CAPSULE, DELAYED RELEASE ORAL
Status: DISCONTINUED | OUTPATIENT
Start: 2021-09-29 | End: 2021-09-29

## 2021-09-28 RX ORDER — ONDANSETRON 2 MG/ML
4 INJECTION INTRAMUSCULAR; INTRAVENOUS EVERY 6 HOURS PRN
Status: DISCONTINUED | OUTPATIENT
Start: 2021-09-28 | End: 2021-09-29 | Stop reason: HOSPADM

## 2021-09-28 RX ORDER — ZOLPIDEM TARTRATE 5 MG/1
5 TABLET ORAL
Status: DISCONTINUED | OUTPATIENT
Start: 2021-09-28 | End: 2021-09-29 | Stop reason: HOSPADM

## 2021-09-28 RX ORDER — NICOTINE POLACRILEX 4 MG
15-30 LOZENGE BUCCAL
Status: DISCONTINUED | OUTPATIENT
Start: 2021-09-28 | End: 2021-09-29 | Stop reason: HOSPADM

## 2021-09-28 RX ORDER — DEXTROSE MONOHYDRATE 25 G/50ML
25-50 INJECTION, SOLUTION INTRAVENOUS
Status: DISCONTINUED | OUTPATIENT
Start: 2021-09-28 | End: 2021-09-29 | Stop reason: HOSPADM

## 2021-09-28 RX ORDER — ONDANSETRON 4 MG/1
4 TABLET, ORALLY DISINTEGRATING ORAL EVERY 6 HOURS PRN
Status: DISCONTINUED | OUTPATIENT
Start: 2021-09-28 | End: 2021-09-29 | Stop reason: HOSPADM

## 2021-09-28 RX ORDER — DORZOLAMIDE HCL 20 MG/ML
1 SOLUTION/ DROPS OPHTHALMIC 2 TIMES DAILY
Status: DISCONTINUED | OUTPATIENT
Start: 2021-09-29 | End: 2021-09-29 | Stop reason: HOSPADM

## 2021-09-28 RX ORDER — TIMOLOL MALEATE 5 MG/ML
1 SOLUTION/ DROPS OPHTHALMIC 2 TIMES DAILY
Status: DISCONTINUED | OUTPATIENT
Start: 2021-09-29 | End: 2021-09-29 | Stop reason: HOSPADM

## 2021-09-28 RX ORDER — ASPIRIN 81 MG/1
324 TABLET, CHEWABLE ORAL ONCE
Status: DISCONTINUED | OUTPATIENT
Start: 2021-09-28 | End: 2021-09-29

## 2021-09-28 RX ORDER — ASPIRIN 81 MG/1
81 TABLET ORAL DAILY
Status: DISCONTINUED | OUTPATIENT
Start: 2021-09-29 | End: 2021-09-29 | Stop reason: HOSPADM

## 2021-09-28 RX ORDER — GABAPENTIN 300 MG/1
300 CAPSULE ORAL AT BEDTIME
Status: DISCONTINUED | OUTPATIENT
Start: 2021-09-29 | End: 2021-09-29 | Stop reason: HOSPADM

## 2021-09-28 ASSESSMENT — ENCOUNTER SYMPTOMS
SHORTNESS OF BREATH: 1
VOMITING: 0
WEAKNESS: 1
FATIGUE: 1
COUGH: 1
NAUSEA: 1

## 2021-09-28 NOTE — ED TRIAGE NOTES
Pt presents to ED with chest pressure. Started about 3 days ago. Feels weak and fatigued. Wife sick in the hospital with bronchitis.

## 2021-09-29 ENCOUNTER — APPOINTMENT (OUTPATIENT)
Dept: CARDIOLOGY | Facility: CLINIC | Age: 66
End: 2021-09-29
Attending: INTERNAL MEDICINE
Payer: COMMERCIAL

## 2021-09-29 VITALS
TEMPERATURE: 98.6 F | RESPIRATION RATE: 18 BRPM | OXYGEN SATURATION: 98 % | SYSTOLIC BLOOD PRESSURE: 155 MMHG | DIASTOLIC BLOOD PRESSURE: 73 MMHG | HEART RATE: 94 BPM | HEIGHT: 66 IN | BODY MASS INDEX: 31.15 KG/M2

## 2021-09-29 LAB
ATRIAL RATE - MUSE: 89 BPM
D DIMER PPP FEU-MCNC: 0.35 UG/ML FEU (ref 0–0.5)
DIASTOLIC BLOOD PRESSURE - MUSE: NORMAL MMHG
GLUCOSE BLDC GLUCOMTR-MCNC: 100 MG/DL (ref 70–99)
GLUCOSE BLDC GLUCOMTR-MCNC: 111 MG/DL (ref 70–99)
GLUCOSE BLDC GLUCOMTR-MCNC: 169 MG/DL (ref 70–99)
GLUCOSE BLDC GLUCOMTR-MCNC: 241 MG/DL (ref 70–99)
HBA1C MFR BLD: 7.5 % (ref 0–5.6)
INTERPRETATION ECG - MUSE: NORMAL
LVEF ECHO: NORMAL
P AXIS - MUSE: 66 DEGREES
PR INTERVAL - MUSE: 162 MS
QRS DURATION - MUSE: 84 MS
QT - MUSE: 334 MS
QTC - MUSE: 406 MS
R AXIS - MUSE: -17 DEGREES
SYSTOLIC BLOOD PRESSURE - MUSE: NORMAL MMHG
T AXIS - MUSE: 54 DEGREES
VENTRICULAR RATE- MUSE: 89 BPM

## 2021-09-29 PROCEDURE — 96372 THER/PROPH/DIAG INJ SC/IM: CPT | Performed by: INTERNAL MEDICINE

## 2021-09-29 PROCEDURE — 99217 PR OBSERVATION CARE DISCHARGE: CPT | Performed by: HOSPITALIST

## 2021-09-29 PROCEDURE — G0378 HOSPITAL OBSERVATION PER HR: HCPCS

## 2021-09-29 PROCEDURE — 85379 FIBRIN DEGRADATION QUANT: CPT | Performed by: INTERNAL MEDICINE

## 2021-09-29 PROCEDURE — 36415 COLL VENOUS BLD VENIPUNCTURE: CPT | Performed by: INTERNAL MEDICINE

## 2021-09-29 PROCEDURE — 93306 TTE W/DOPPLER COMPLETE: CPT

## 2021-09-29 PROCEDURE — 250N000013 HC RX MED GY IP 250 OP 250 PS 637: Performed by: INTERNAL MEDICINE

## 2021-09-29 PROCEDURE — 250N000012 HC RX MED GY IP 250 OP 636 PS 637: Performed by: INTERNAL MEDICINE

## 2021-09-29 PROCEDURE — 93306 TTE W/DOPPLER COMPLETE: CPT | Mod: 26 | Performed by: INTERNAL MEDICINE

## 2021-09-29 RX ADMIN — INSULIN ASPART 1 UNITS: 100 INJECTION, SOLUTION INTRAVENOUS; SUBCUTANEOUS at 00:58

## 2021-09-29 RX ADMIN — INSULIN GLARGINE 30 UNITS: 100 INJECTION, SOLUTION SUBCUTANEOUS at 00:57

## 2021-09-29 RX ADMIN — INSULIN ASPART 3 UNITS: 100 INJECTION, SOLUTION INTRAVENOUS; SUBCUTANEOUS at 04:30

## 2021-09-29 RX ADMIN — DORZOLAMIDE HCL 1 DROP: 20 SOLUTION/ DROPS OPHTHALMIC at 01:12

## 2021-09-29 RX ADMIN — ASPIRIN 81 MG: 81 TABLET ORAL at 08:36

## 2021-09-29 RX ADMIN — GABAPENTIN 300 MG: 300 CAPSULE ORAL at 01:11

## 2021-09-29 RX ADMIN — SIMVASTATIN 20 MG: 20 TABLET, FILM COATED ORAL at 01:11

## 2021-09-29 RX ADMIN — TIMOLOL MALEATE 1 DROP: 5 SOLUTION/ DROPS OPHTHALMIC at 01:12

## 2021-09-29 NOTE — PLAN OF CARE
"PRIMARY DIAGNOSIS: CHEST PAIN  OUTPATIENT/OBSERVATION GOALS TO BE MET BEFORE DISCHARGE:  1. Ruled out acute coronary syndrome (negative or stable Troponin):  Yes, trop neg x3  2. Pain Status: Improved but still requiring IV narcotics.  3. Appropriate provocative testing performed: Plan for Echo this AM.   - Stress Test Procedure: Regular  - Interpretation of cardiac rhythm per telemetry tech: SR HR 80's.    4. Cleared by Consultants (if applicable):N/A  5. Return to near baseline physical activity: Yes  Discharge Planner Nurse   Safe discharge environment identified: Yes  Barriers to discharge: Yes       Entered by: Flex Díaz 09/29/2021 11:10 AM  Denies chest pain, pressure, dizziness, lightheadedness or shortness of breath. Reports he has congestions and thinks he got sick from his wife who was at a party. Per pt, wife currently has bronchitis. Pt up Ind in room.     BP (!) 140/70 (BP Location: Right arm)   Pulse 89   Temp 98.5  F (36.9  C) (Oral)   Resp 16   Ht 1.676 m (5' 6\")   SpO2 96%   BMI 31.15 kg/m      Please review provider order for any additional goals.   Nurse to notify provider when observation goals have been met and patient is ready for discharge.  "

## 2021-09-29 NOTE — PLAN OF CARE
"Per lab Pt. Refusing lab - stat d-dimer ordered. Writer attempted to speak with Pt. And explain importance of d-dimer. Pt. Reporting \"why does it matter I will be gone in the morning\". Provider paged.   "

## 2021-09-29 NOTE — ED NOTES
"Placed call light on stating \"can someone check my blood sugar?  I feel like it's getting low.\"  A/O x 4.  BS taken=58.  Orange juice provided and drank.  MD notified.   "

## 2021-09-29 NOTE — PROGRESS NOTES
Patient's After Visit Summary was reviewed with patient.  Patient verbalized understanding of After Visit Summary, recommended follow up and was given an opportunity to ask questions.   Discharge medications sent home with patient/family: Not applicable   Discharged with other:Patient    OBSERVATION patient END time: 1211

## 2021-09-29 NOTE — ED NOTES
Patient requesting to be discharged instead of being admitted. Provider made aware. Patient is reluctant to stay but will stay in the hospital.

## 2021-09-29 NOTE — PROGRESS NOTES
Melquiades Sotomayor    Discussed with Dr. Wilson who admitted the patient earlier today.  He recommended checking D-dimer to rule out PE given his dyspnea.  I did order stat D-dimer but patient refuses this currently despite nursing explaining reasoning for this.  Will change this to AM draw if he will allow this.    Camille Carrero MD

## 2021-09-29 NOTE — PLAN OF CARE
"PRIMARY DIAGNOSIS: CHEST PAIN  OUTPATIENT/OBSERVATION GOALS TO BE MET BEFORE DISCHARGE:  1. Ruled out acute coronary syndrome (negative or stable Troponin):  Yes, trop neg x1, 0.017.  2. Pain Status: Improved but still requiring IV narcotics.  3. Appropriate provocative testing performed: Plan for Echo this AM.   - Stress Test Procedure: Regular  - Interpretation of cardiac rhythm per telemetry tech: SR HR 80's.    4. Cleared by Consultants (if applicable):N/A  5. Return to near baseline physical activity: Yes  Discharge Planner Nurse   Safe discharge environment identified: Yes  Barriers to discharge: Yes       Entered by: Marleny Baker 09/29/2021 5:41 AM   Pt. A&Ox4, Pt. Reports he is very frustrated with hospitalization and plan of care as Pt. Reports he has never had anything wrong with his heart in the past. Refused stat d-dimer, Pt. Frequently questioning cares. Pt. Rights explained, Pt. In agreement to stay the night and follow through with cardiac work up but insisting to discharge today. Pt. Denies pain. Hypetertensive throughout night, -170's-Lisinipril 20 mg scheduuled for AM. Pt. Up independnetly in room. PIV SL. Turkey lunch box at admit to unit, Pt. Has been NPO except ice chips and meds shortly after arrival. Pt. Denies any chest pain/pressure, continuing on tele SR HR 80's. Home eye drops brought in and re labeled. Monitoring BS Q 4 hours as NPO. BS at [phone] units of novolog administered. Plan for echo in AM, NPO incase stress test is needed. Nursing to continue to monitor and provide supportive cares.     BP (!) 157/82 (BP Location: Right arm)   Pulse 93   Temp 98.4  F (36.9  C) (Oral)   Resp 18   Ht 1.676 m (5' 6\")   SpO2 97%   BMI 31.15 kg/m      Please review provider order for any additional goals.   Nurse to notify provider when observation goals have been met and patient is ready for discharge.  "

## 2021-09-29 NOTE — ED NOTES
"[facility]  ED Nurse Handoff Report    Rachell Paige is a 65 year old male   ED Chief complaint: Fatigue  . ED Diagnosis:   Final diagnoses:   None     Allergies:   Allergies   Allergen Reactions     Aleve      HA sweats     Citalopram Itching     Clopidogrel Nausea and Vomiting     Pt to restart on 11/25/15 to see again if he tolerates it or not. His sx were only GI. Not a true allergy     Iodine      Naproxen Itching     About 10 yrs ago, itching all over from taking Aleve  \"get sick and really sick\"       Sulfamethoxazole-Trimethoprim      Other reaction(s): Renal Failure         Code Status: Full Code  Activity level - Baseline/Home:  Independent. Activity Level - Current:   Independent. Lift room needed: No. Bariatric: No   Needed: No   Isolation: No. Infection: Not Applicable.     Vital Signs:   Vitals:    09/28/21 1544 09/28/21 1840 09/28/21 1915 09/28/21 1945   BP: (!) 159/82 (!) 176/108 (!) 151/83 (!) 160/81   Pulse: 91 87 82    Resp: 18  22    Temp: 98  F (36.7  C)      TempSrc: Temporal      SpO2: 100% 98% 98% 99%       Cardiac Rhythm:  ,      Pain level:    Patient confused: No. Patient Falls Risk: No.   Elimination Status: Has voided   Patient Report - Initial Complaint: Fatigue. Focused Assessment: Rachell Paige is a 65 year old male with history of diabetes, and high blood pressure who presents with fatigue. The patient reports that his wife was recently diagnosed with bronchitis and has been breathing on him. He has been feeling weak and nauseated recently. He denies fever, vomiting, and blood in stool. He notes he has developed a slight cough. He points out that he typically goes to the gym 3 times a weak but at his last visit to the gym 3 days ago he got increasingly tired. He notes he was unable to stay on the treadmill due to shortness of breath. Patient denies a history of heart attack.     Review of Systems   Constitutional: Positive for fatigue.   Respiratory: " Positive for cough and shortness of breath (with exertion).    Gastrointestinal: Positive for nausea. Negative for vomiting.   Neurological: Positive for weakness.   All other systems reviewed and are negative.      Tests Performed: labs, chest x-ray, covid swab. Abnormal Results:   Labs Ordered and Resulted from Time of ED Arrival Up to the Time of Departure from the ED   BASIC METABOLIC PANEL - Abnormal; Notable for the following components:       Result Value    Creatinine 1.28 (*)     GFR Estimate 59 (*)     All other components within normal limits   CBC WITH PLATELETS AND DIFFERENTIAL - Abnormal; Notable for the following components:    Absolute Immature Granulocytes 0.1 (*)     All other components within normal limits   GLUCOSE BY METER - Abnormal; Notable for the following components:    GLUCOSE BY METER POCT 58 (*)     All other components within normal limits   GLUCOSE BY METER - Abnormal; Notable for the following components:    GLUCOSE BY METER POCT 134 (*)     All other components within normal limits   TROPONIN I - Normal   COVID-19 VIRUS (CORONAVIRUS) BY PCR - Normal    Narrative:     Testing was performed using the wendy  SARS-CoV-2 & Influenza A/B Assay on the wendy  Kena  System.  This test should be ordered for the detection of SARS-COV-2 in individuals who meet SARS-CoV-2 clinical and/or epidemiological criteria. Test performance is unknown in asymptomatic patients.  This test is for in vitro diagnostic use under the FDA EUA for laboratories certified under CLIA to perform moderate and/or high complexity testing. This test has not been FDA cleared or approved.  A negative test does not rule out the presence of PCR inhibitors in the specimen or target RNA in concentration below the limit of detection for the assay. The possibility of a false negative should be considered if the patient's recent exposure or clinical presentation suggests COVID-19.  [facility] are certified  under the Clinical Laboratory Improvement Amendments of 1988 (CLIA-88) as qualified to perform moderate and/or high complexity laboratory testing.   GLUCOSE MONITOR NURSING POCT   GLUCOSE MONITOR NURSING POCT   CBC WITH PLATELETS & DIFFERENTIAL    Narrative:     The following orders were created for panel order CBC + differential.  Procedure                               Abnormality         Status                     ---------                               -----------         ------                     CBC with platelets and d...[[id number]]  Abnormal            Final result                 Please view results for these tests on the individual orders.     Chest XR,  PA & LAT   Final Result   IMPRESSION: No significant interval change. Cardiac enlargement. Pulmonary vascularity is within normal limits. Lungs clear. No pleural effusions.      .   Treatments provided: see emar  Family Comments: none  OBS brochure/video discussed/provided to patient:  Yes  ED Medications:   Medications   aspirin (ASA) chewable tablet 324 mg (324 mg Oral Not Given 9/28/21 2141)     Drips infusing:  No  For the majority of the shift, the patient's behavior Green. Interventions performed were none.    Sepsis treatment initiated: No     Patient tested for COVID 19 prior to admission: YES    ED Nurse Name/Phone Number: Cruz Yoon RN,   10:00 PM    RECEIVING UNIT ED HANDOFF REVIEW    Above ED Nurse Handoff Report was reviewed: Yes  Reviewed by: Marleny Baker RN on September 28, 2021 at 11:26 PM

## 2021-09-29 NOTE — H&P
Admitted: 09/28/2021    CHIEF COMPLAINT:  Fatigue, generalized weakness.    HISTORY OF PRESENT ILLNESS:  Rachell Paige is a 65-year-old gentleman with past medical history significant for diabetes mellitus 2 insulin-dependent, COVID-19 infection in 2020, hypertension, hyperlipidemia, peripheral arterial disease status post left transmetatarsal amputation, who presents to the Emergency Room today with vague symptoms of generalized weakness and fatigue.  The patient stated that he was concerned, as his wife was diagnosed with bronchitis, and also he started to have intermittent coughing with productive sputum, no fever or chills, but he felt nauseated.  The patient also stated he is weak and easily tired these days.  Normally, he goes to gym 3 times a week, but the last 3 days he was not able to go to the gym because of increasing weakness.  He denied any chest pain, no palpitation at rest, but had shortness of breath when he was on treadmill at the gym 4 days ago.  No previous history of coronary artery disease.  The patient had stress test in 2017 which was normal.  He had echocardiogram in 2020.  At that time, it showed normal left ventricular function and ejection fraction.    PAST MEDICAL HISTORY:    1.  Diabetes mellitus type 2.  2.  Hypertension.  3.  Glaucoma.  4.  Hyperlipidemia.  5.  Microalbuminuria.  6.  COPD.  7.  Right transmetatarsal amputation.  8.  Hyponatremia.  9.  COVID-19 infection.  10.  Chronic kidney disease stage III.  11.  Dermatitis.    PAST SURGICAL HISTORY:    Past Surgical History:   Procedure Laterality Date     AMPUTATE TOE(S) Right 12/31/2018    Procedure: Right fifth toe amputation;  Surgeon: Clark Lora DPM;  Location:  OR     FAMILY HISTORY:  Significant for Lung disease, CVA, diabetes and glaucoma.    SOCIAL HISTORY:  The patient is in the Emergency Room alone.  He is .  His wife is also admitted to the hospital. He does not drink alcohol.    HOME MEDICATIONS:     Prior to Admission Medications   Prescriptions Last Dose Informant Patient Reported? Taking?   ACCU-CHEK GUIDE test strip   No No   Sig: USE TO TEST BLOOD SUGAR TWO TIMES A DAY OR AS DIRECTED   BD PEN NEEDLE MICRO U/F 32G X 6 MM miscellaneous   No No   Sig: USE 3 PEN NEEDLES DAILY OR AS DIRECTED.   Lido-Pentaf-Tetrafl-Ultrasound (ACCUCAINE) 1 % KIT   Yes No   alcohol swab prep pads   No No   Sig: Use to swab area of injection/pedro pablo as directed.   aspirin 81 MG tablet   No No   Sig: Take 1 tablet (81 mg) by mouth daily   blood glucose (ACCU-CHEK GUIDE) test strip   No No   Si strip by In Vitro route 4 times daily Use to test blood sugar 3 -4  times daily or as directed.   blood glucose (NO BRAND SPECIFIED) test strip   No No   Sig: Use to test blood sugar 4 times daily or as directed. To accompany: Blood Glucose Monitor Brands: per insurance.   blood glucose calibration (NO BRAND SPECIFIED) solution   No No   Sig: To accompany: Blood Glucose Monitor Brands: per insurance.   blood glucose monitoring (NO BRAND SPECIFIED) meter device kit   No No   Sig: Use to test blood sugar 4 times daily or as directed. Preferred blood glucose meter OR supplies to accompany: Blood Glucose Monitor Brands: per insurance.   dorzolamide (TRUSOPT) 2 % ophthalmic solution   No No   Sig: Place 1 drop into both eyes 2 times daily   esomeprazole (NEXIUM) 40 MG DR capsule   No No   Sig: Take 1 capsule (40 mg) by mouth every morning (before breakfast) Take 30-60 minutes before eating.   gabapentin (NEURONTIN) 300 MG capsule   No No   Sig: TAKE 1 CAPSULE (300 MG) BY MOUTH AT BEDTIME   insulin aspart (NOVOLOG FLEXPEN) 100 UNIT/ML pen   No No   Sig: Inject 1-10 Units Subcutaneous 2 times daily (with meals) With lunch and dinner   insulin glargine (LANTUS SOLOSTAR) 100 UNIT/ML pen   No No   Sig: Inject 50 Units Subcutaneous At Bedtime   insulin pen needle (31G X 8 MM) 31G X 8 MM miscellaneous   No No   Sig: Use 3 pen needles daily or as  "directed.   lisinopril (ZESTRIL) 20 MG tablet   No No   Sig: Take 1 tablet (20 mg) by mouth daily   simvastatin (ZOCOR) 10 MG tablet   No No   Sig: Take 1 tablet (10 mg) by mouth At Bedtime   simvastatin (ZOCOR) 20 MG tablet   No No   Sig: Take 1 tablet (20 mg) by mouth At Bedtime   thin (NO BRAND SPECIFIED) lancets   No No   Sig: Use with lanceting device. To accompany: Blood Glucose Monitor Brands: per insurance.   timolol maleate (TIMOPTIC) 0.5 % ophthalmic solution   No No   Sig: Place 1 drop into both eyes 2 times daily   zolpidem (AMBIEN) 5 MG tablet   No No   Sig: Take 1 tablet (5 mg) by mouth nightly as needed for sleep      Facility-Administered Medications: None     ALLERGIES:    Allergies   Allergen Reactions     Aleve      HA sweats     Citalopram Itching     Clopidogrel Nausea and Vomiting     Pt to restart on 11/25/15 to see again if he tolerates it or not. His sx were only GI. Not a true allergy     Iodine      Naproxen Itching     About 10 yrs ago, itching all over from taking Aleve  \"get sick and really sick\"       Sulfamethoxazole-Trimethoprim      Other reaction(s): Renal Failure       REVIEW OF SYSTEMS: Reviewed 10 points, all are negative except those mentioned in the history of present illness.     PHYSICAL EXAMINATION:    GENERAL:  The patient is awake, alert, oriented, not in any form of distress.  VITAL SIGNS:  Blood pressure 141/83, pulse rate 82, temperature 98, oxygen saturation 97% on room air.  HEENT:  Pink and anicteric.  Extraocular muscle movement intact.  NECK:  Supple.  No JVD, no thyromegaly.  CHEST:  Good air entry bilaterally.  No wheezing, crackles or rales.  CARDIOVASCULAR SYSTEM:  S1 and S2 well heard.  No gallop or murmur.  ABDOMEN:  Obese, soft, nontender, nondistended.  Positive bowel sounds.  EXTREMITIES:  Left transmetatarsal amputation. Noted a scar on the anterior shin area of the bilateral foot.  Peripheral pulses are feeble.  No ulceration noted.  PSYCHIATRIC:  " Normal mood and affect, pleasant, keeps eye contact, responds to question appropriately.    DIAGNOSTIC TESTS OF INTEREST:  Electrolytes are essentially normal.  BUN 19, creatinine 1.28.  Baseline creatinine was 1.15.  Glucose 134; it was 58 briefly earlier.  WBC 7.8, hemoglobin 14, platelets 325.  CBC normal.  Chest x-ray done today showed no significant infiltrate or cardiac enlargement and pulmonary vasculature within normal limits.  Clear lungs.  The patient had echocardiogram a year ago, which showed EF of 60-65%, no regional wall motion abnormalities seen.  He also had nuclear stress test in 2017, which was normal.    ASSESSMENT AND PLAN:  Rachell Paige is a 68-year-old gentleman with history of diabetes, peripheral neuropathy, peripheral arterial disease, hypertension, hyperlipidemia, anxiety, who presents to the Emergency Room with fatigue, generalized weakness and being admitted to the hospital for chest pain equivalent and further workup due to underlying multiple risk factors.    1.  Exertional dyspnea:  The patient stated he continued to have exertional dyspnea, and worried after his wife was diagnosed with bronchitis and is in the hospital.  His first set of troponin is negative.  Previous echocardiogram done a year ago was normal.  At this point, we will admit him, monitor him on telemetry, trend his troponin and get baseline echocardiogram. The patient stated he wants to be discharged tomorrow.  He has some commitments and appointments.  At this point, my discussion with him is to have telemonitoring overnight, get echocardiogram in the morning, and if everything is well, the patient can be discharged.  If echocardiogram is abnormal, he can have further workup as an outpatient as well.  2.  Diabetes mellitus type 2, insulin-dependent with episode of hypoglycemia in the hospital prior to admission:  The patient is on Lantus 50 units at bedtime and also on NovoLog sliding scale insulin.  I will decrease  his Lantus to 25 units, as he will be n.p.o. after midnight in case further workup or stress test is needed.  He will be on moderate low-carbohydrate diet and sliding scale insulin.  3.  Hypertension:  The patient will be on [id number] hydralazine, lisinopril.  I discussed with the patient at length the plan of care.  All his questions and concerns addressed and he showed understanding.     CODE STATUS:  The patient is full code.    Richar Pyle MD        D: 2021   T: 2021   MT: ORI    Name:     JOSELINE HERNANDEZ  MRN:      [id number]        Account:     [id number]   :      1955           Admitted:    2021       Document: [id number]

## 2021-09-29 NOTE — ED PROVIDER NOTES
History   Chief Complaint:  Fatigue     The history is provided by the patient.      Rachell Paige is a 65 year old male with history of diabetes, PAD, and hypertension who presents with fatigue.  Patient reports he has noted increased weakness, particularly over the past 3 days.  He typically exercises at the gym, and 3 days ago, noted significant fatigue and dyspnea on exertion while on the treadmill.  His wife was recently diagnosed with bronchitis, and is hospitalized.  He questions if he may have a similar illness.  He notes an occasional cough, though not nearly as severe as hers.  He has been vaccinated for Covid.  Denies any other known Covid exposures.  He denies fevers, chills, emesis, or hematochezia.  He does have a history of diabetes, as well as peripheral arterial disease.  He denies any previous coronary intervention or stent placement.  He has not noted overt chest pain.    Review of Systems   Constitutional: Positive for fatigue.   Respiratory: Positive for cough and shortness of breath (with exertion).    Gastrointestinal: Positive for nausea. Negative for vomiting.   Neurological: Positive for weakness.   All other systems reviewed and are negative.    Allergies:  Aleve  Citalopram  Clopidogrel  Iodine  Naproxen  Sulfamethoxazole-Trimethoprim    Medications:  aspirin   esomeprazole  gabapentin  insulin aspart  insulin glargine   lisinopril   simvastatin   zolpidem    Past Medical History:    Anxiety  Dermatitis  DM type 2  Glaucoma  HTN  Hyperlipidemia  Microalbuminuria  Onychomycosis  Rash  COPD  Amputation of lesser toe, right  shortness of breath  Hyponatremia  Chest pain  Epigastric pain  Generalized muscle weakness  COVID-19  CKD  Onychomycosis  Dermatitis     Past Surgical History:    Amputate toes right foot    Family History:    Lung disease  Glaucoma  Cerebrovascular disease  Diabetes    Social History:  Patient presents alone.  Patient is .     Physical Exam     Patient  Vitals for the past 24 hrs:   BP Temp Temp src Pulse Resp SpO2   09/28/21 2050 -- -- -- -- -- 97 %   09/28/21 2040 -- -- -- -- -- 97 %   09/28/21 2030 (!) 141/83 -- -- 82 -- 98 %   09/28/21 2000 (!) 143/83 -- -- 89 -- --   09/28/21 1950 (!) 160/81 -- -- 82 -- 97 %   09/28/21 1945 (!) 160/81 -- -- -- -- 99 %   09/28/21 1940 -- -- -- -- -- (!) 82 %   09/28/21 1920 -- -- -- 88 14 98 %   09/28/21 1915 (!) 151/83 -- -- 82 22 98 %   09/28/21 1910 -- -- -- 84 28 99 %   09/28/21 1900 -- -- -- 82 24 96 %   09/28/21 1840 (!) 176/108 -- -- 87 -- 98 %   09/28/21 1544 (!) 159/82 98  F (36.7  C) Temporal 91 18 100 %       Physical Exam  General:              Well-nourished              Speaking in full sentences  Eyes:              Conjunctiva without injection or scleral icterus  ENT:              Moist mucous membranes              Nares patent              Pinnae normal  Neck:              Full ROM              No stiffness appreciated  Resp:              Lungs CTAB              No crackles, wheezing or audible rubs              Good air movement  CV:                    Normal rate, regular rhythm              S1 and S2 present              No murmur, gallop or rub  GI:              BS present              Abdomen soft without distention              Non-tender to light and deep palpation              No guarding or rebound tenderness  Skin:              Warm, dry, well perfused              No rashes or open wounds on exposed skin  MSK:              Moves all extremities              No focal deformities or swelling  Neuro:              Alert              Answers questions appropriately              Moves all extremities equally              Gait stable  Psych:              Normal affect, normal mood    Emergency Department Course   ECG  ECG taken at 1633, ECG read at 1907  Normal sinus rhythm.  Normal ECG.   Rate 89 bpm. VA interval 162 ms. QRS duration 84 ms. QT/QTc 334/406 ms. P-R-T axes 66 -17 54.     Imaging:  Chest XR,   PA & LAT  No significant interval change. Cardiac enlargement. Pulmonary vascularity is within normal limits. Lungs clear. No pleural effusions.  As per radiology.     Laboratory:  CBC: WBC 7.8, HGB 14.0,   BMP: Creatinine 1.28 (H), GFR 59 (L) o/w WNL   Symptomatic COVID-19 Virus (Coronavirus) by PCR Nasopharyngeal: Negative  Troponin I (1814): 0.017  Glucose by meter (1945): 58 (L)    Emergency Department Course:    Reviewed:  I reviewed nursing notes, vitals, past medical history and care everywhere    Assessments:  1907 I obtained history and examined the patient as noted above.   2107 I rechecked the patient and explained findings.   2240 I rechecked the patient.    Consults:   2200 I consulted with Dr. Pyle, hospitalist, regarding the patient's history and presentation.      Disposition:  The patient was admitted to the hospital under the care of Dr. Pyle.       Impression & Plan     Medical Decision Making:  Rachell Paige is a pleasant 65-year-old male with a complex PMH significant for DM type II, HTN, HLD, COPD, PAD, CKD, who presents to the emergency department for evaluation of fatigue.  VS on presentation are notable for elevated BP, though otherwise are unremarkable.  Patient presents with profound fatigue as well as dyspnea on exertion over the preceding 3 days.  This does raise concern for underlying cardiac etiologies, particularly given his extensive risk factor profile.  His EKG on presentation demonstrates sinus rhythm without findings of acute ischemia and initial troponin returned detectable, yet normal at 0.017.  I did recommend aspirin, though he reports he took a baby aspirin earlier today and declines additional aspirin therapy.  He is currently without chest pain.  His labs reveal unremarkable CBC, creatinine of 1.28, and negative Covid testing.  Chest x-ray negative for pneumothorax, pneumonia, or other acute abnormality.  He questions if this could be related to underlying  infectious etiologies, which is on the differential, particularly given his wife's illness.  Covid testing did return negative.  In light of his above symptoms, risk factor profile, will plan for hospital observation for further monitoring and consideration of provocative study.  Case discussed with hospitalist, who has graciously accepted the patient for admission.    Covid-19  Rachell Paige was evaluated during a global COVID-19 pandemic, which necessitated consideration that the patient might be at risk for infection with the SARS-CoV-2 virus that causes COVID-19.   Applicable protocols for evaluation were followed during the patient's care.   COVID-19 was considered as part of the patient's evaluation. The plan for testing is:  a test was obtained during this visit.     Diagnosis:    ICD-10-CM    1. Other fatigue  R53.83    2. Dyspnea on exertion  R06.00        Scribe Disclosure:  ALEIDA, Maeve Reeves, am serving as a scribe at 7:05 PM on 9/28/2021 to document services personally performed by Merrill Wilson MD based on my observations and the provider's statements to me.            Merrill Wilson MD  09/29/21 0211

## 2021-09-29 NOTE — PLAN OF CARE
ROOM # 221    Living Situation (if not independent, order SW consult): Home with spouse.   Facility name: N/A  : Spouse Anna    Activity level at baseline: Independent   Activity level on admit: Independent       Patient registered to observation; given Patient Bill of Rights; given the opportunity to ask questions about observation status and their plan of care.  Patient has been oriented to the observation room, bathroom and call light is in place.    Discussed discharge goals and expectations with patient/family.

## 2021-09-29 NOTE — DISCHARGE SUMMARY
[facility]  Hospitalist Discharge Summary      Date of Admission:  9/28/2021  Date of Discharge:  9/29/2021  Discharging Provider: Shad Waters MD      Discharge Diagnoses   Shortness of breath and fatigue likely due to viral URI    Follow-ups Needed After Discharge   Follow-up Appointments     Follow-up and recommended labs and tests       Follow up with primary care provider, Olivia Nichols, within 7 days for   hospital follow- up.  No follow up labs or test are needed.             Unresulted Labs Ordered in the Past 30 Days of this Admission     No orders found for last 31 day(s).      These results will be followed up by NA    Discharge Disposition   Discharged to home  Condition at discharge: Stable      Hospital Course   Rachell Paige is a 65-year-old gentleman with past medical history significant for diabetes mellitus 2 insulin-dependent, COVID-19 infection in 2020, hypertension, hyperlipidemia, peripheral arterial disease status post left transmetatarsal amputation, who presents to the Emergency Room today with vague symptoms of generalized weakness and fatigue.  The patient stated that he was concerned, as his wife was diagnosed with bronchitis, and also he started to have intermittent coughing with productive sputum, no fever or chills, but he felt nauseated.  The patient also stated he is weak and easily tired these days.  Normally, he goes to gym 3 times a week, but the last 3 days he was not able to go to the gym because of increasing weakness.  He denied any chest pain, no palpitation at rest, but had shortness of breath when he was on treadmill at the gym 4 days ago.  No previous history of coronary artery disease.  The patient had last stress nuclear echocardiogram, which was normal in 2017.  He had echocardiogram in 2020.  At that time, it showed normal left ventricular function and ejection fraction.    Patient today is feeling better.  He states he still has some  "congestion.  He states that her symptoms were from a \"bronchitis \".  his wife is hospitalized here with RSV and pneumonia.  He states he has been visiting her in her hospital room and likely caught it from her.  He states he was at the gym yesterday and just felt a little tired and more short of breath so he came to the hospital.  He states it is just because of the bronchitis.  He never had any chest pain.  He would like to discharge home.  An echocardiogram was done here in the hospital and did not show any concerning acute abnormalities.  I indicated to the patient that if he needed something for his congestion he should go to the pharmacy and purchase something over-the-counter.    Consultations This Hospital Stay   None    Code Status   Full Code    Time Spent on this Encounter   I, Shad Waters MD, personally saw the patient today and spent greater than 30 minutes discharging this patient.       Shad Waters MD  [facility] OBSERVATION DEPT  [address] [place] [address]  Phone: [phone]  ______________________________________________________________________    Physical Exam   Vital Signs: Temp: 98.5  F (36.9  C) Temp src: Oral BP: (!) 140/70 Pulse: 89   Resp: 16 SpO2: 96 % O2 Device: None (Room air)    Weight: 0 lbs 0 oz  Constitutional: awake, alert, cooperative, no apparent distress, and appears stated age  Eyes: Lids and lashes normal, pupils equal, round and reactive to light, extra ocular muscles intact, sclera clear, conjunctiva normal  ENT: Normocephalic, without obvious abnormality, atraumatic, sinuses nontender on palpation, external ears without lesions, oral pharynx with moist mucous membranes, tonsils without erythema or exudates, gums normal and good dentition.  Respiratory: No increased work of breathing, good air exchange, clear to auscultation bilaterally, no crackles or wheezing  Cardiovascular: Normal apical impulse, regular rate and rhythm, normal S1 " and S2, no S3 or S4, and no murmur noted  GI: No scars, normal bowel sounds, soft, non-distended, non-tender, no masses palpated, no hepatosplenomegally  Skin: no bruising or bleeding       Primary Care Physician   Olivia Nichols    Discharge Orders      Reason for your hospital stay    Shortness of breath and fatigue likely due to viral URI     Follow-up and recommended labs and tests     Follow up with primary care provider, Olivia Nichols, within 7 days for hospital follow- up.  No follow up labs or test are needed.     Activity    Your activity upon discharge: activity as tolerated     Diet    Follow this diet upon discharge: Orders Placed This Encounter      Regular Diet Adult       Significant Results and Procedures   Most Recent 3 CBC's:Recent Labs   Lab Test 09/28/21  1814 07/06/21  1653 11/06/20  1416   WBC 7.8 8.4 7.9   HGB 14.0 13.4 12.2*   MCV 89 87 85    324 308     Most Recent 3 BMP's:Recent Labs   Lab Test 09/29/21  1032 09/29/21  0753 09/29/21  0403 09/28/21  1945 09/28/21  1814 02/10/21  1527 12/01/20  1448 12/01/20  1448   NA  --   --   --   --  136 134  --  135   POTASSIUM  --   --   --   --  4.4 4.7  --  4.4   CHLORIDE  --   --   --   --  105 103  --  104   CO2  --   --   --   --  25 24  --  26   BUN  --   --   --   --  19 21  --  12   CR  --   --   --   --  1.28* 1.15  --  1.15   ANIONGAP  --   --   --   --  6 7  --  5   ALPHONSO  --   --   --   --  9.0 9.3  --  8.8   * 111* 241*   < > 95 273*   < > 256*    < > = values in this interval not displayed.     Most Recent 2 LFT's:Recent Labs   Lab Test 02/10/21  1527 12/01/20  1448   AST 13 15   ALT 22 23   ALKPHOS 92 110   BILITOTAL 0.4 0.6   ,   Results for orders placed or performed during the hospital encounter of 09/28/21   Chest XR,  PA & LAT    Narrative    EXAM: XR CHEST 2 VW  LOCATION: [facility]  DATE/TIME: 9/28/2021 7:36 PM    INDICATION: Cough. Weakness.  COMPARISON: 10/26/2020.      Impression     IMPRESSION: No significant interval change. Cardiac enlargement. Pulmonary vascularity is within normal limits. Lungs clear. No pleural effusions.   Echocardiogram Complete     Value    LVEF  50-55%    Narrative    [id number]  [id number]  [id number]  [id number]^[email or web address]^JESUS     [facility]  Echocardiography Laboratory  [address] [place] [address]     Name: JOSELINE HERNANDEZ  MRN: [id number]  : 1955  Study Date: 2021 07:26 AM  Age: 65 yrs  Gender: Male  Patient Location: [facility]  Reason For Study: SOB  Ordering Physician: KHADAR CRAIG  Referring Physician: Olivia Nichols  Performed By: Chelsea Boyd RDCS     BSA: 2.0 m2  Height: 66 in  Weight: 193 lb  HR: 89  BP: 175/87 mmHg  ______________________________________________________________________________  Procedure  Complete Portable Echo Adult.  ______________________________________________________________________________  Interpretation Summary     Left ventricular systolic function is low normal.  The visual ejection fraction is 50-55%.  Regional wall motion abnormalities cannot be excluded due to limited  visualization.  The study was technically difficult.  ______________________________________________________________________________  Left Ventricle  The left ventricle is normal in size. There is concentric remodeling present.  Diastolic Doppler findings (E/E' ratio and/or other parameters) suggest left  ventricular filling pressures are indeterminate. Left ventricular systolic  function is low normal. The visual ejection fraction is 50-55%. Regional wall  motion abnormalities cannot be excluded due to limited visualization.     Right Ventricle  The right ventricle is normal size. The right ventricular systolic function is  normal.     Atria  The left atrium is not well visualized. Normal left atrial size. Right atrium  not well visualized.     Mitral Valve  There is trace mitral regurgitation.      Tricuspid Valve  There is trace tricuspid regurgitation. Right ventricular systolic pressure  could not be approximated due to inadequate tricuspid regurgitation.     Aortic Valve  There is mild trileaflet aortic sclerosis. No aortic stenosis is present.     Pulmonic Valve  The pulmonic valve is not well visualized.     Vessels  The aortic root is normal size.     Pericardium  Trivial pericardial effusion.     Rhythm  Sinus rhythm was noted.  ______________________________________________________________________________  MMode/2D Measurements & Calculations  IVSd: 1.2 cm     LVIDd: 4.0 cm  LVIDs: 3.0 cm  LVPWd: 1.1 cm  FS: 25.1 %  LV mass(C)d: 152.0 grams  LV mass(C)dI: 77.2 grams/m2  Ao root diam: 3.2 cm  LA dimension: 3.5 cm  asc Aorta Diam: 3.1 cm  LA/Ao: 1.1  LVOT diam: 2.0 cm  LVOT area: 3.2 cm2  LA Volume (BP): 49.0 ml  LA Volume Index (BP): 24.9 ml/m2  RWT: 0.55     Doppler Measurements & Calculations  MV E max didier: 44.6 cm/sec  MV A max didier: 56.9 cm/sec  MV E/A: 0.78  MV dec time: 0.14 sec  Ao V2 max: 167.7 cm/sec  Ao max P.0 mmHg  Ao V2 mean: 131.7 cm/sec  Ao mean P.5 mmHg  Ao V2 VTI: 34.8 cm  BRENTON(I,D): 1.8 cm2  BRENTON(V,D): 2.1 cm2  LV V1 max P.7 mmHg  LV V1 max: 108.7 cm/sec  LV V1 VTI: 19.3 cm  SV(LVOT): 62.1 ml  SI(LVOT): 31.5 ml/m2  PA acc time: 0.09 sec  AV Didier Ratio (DI): 0.65  BRENTON Index (cm2/m2): 0.91  E/E' av.1  Lateral E/e': 9.1  Medial E/e': 9.1     ______________________________________________________________________________  Report approved by: Rubén Huertas 2021 09:15 AM               Discharge Medications   Current Discharge Medication List      CONTINUE these medications which have NOT CHANGED    Details   !! ACCU-CHEK GUIDE test strip USE TO TEST BLOOD SUGAR TWO TIMES A DAY OR AS DIRECTED  Qty: 100 strip, Refills: 1    Associated Diagnoses: Type 2 diabetes mellitus with hemoglobin A1c goal of 7.0%-8.0% (H)      alcohol swab prep pads Use to swab area of  injection/pedro pablo as directed.  Qty: 120 each, Refills: 6    Associated Diagnoses: Type 2 diabetes mellitus with hemoglobin A1c goal of 7.0%-8.0% (H)      aspirin 81 MG tablet Take 1 tablet (81 mg) by mouth daily  Qty: 30 tablet, Refills: OTC    Associated Diagnoses: DM type 2, goal A1C 7-8      !! BD PEN NEEDLE MICRO U/F 32G X 6 MM miscellaneous USE 3 PEN NEEDLES DAILY OR AS DIRECTED.  Qty: 300 each, Refills: 3    Associated Diagnoses: Type 2 diabetes mellitus with hemoglobin A1c goal of 7.0%-8.0% (H)      !! blood glucose (ACCU-CHEK GUIDE) test strip 1 strip by In Vitro route 4 times daily Use to test blood sugar 3 -4  times daily or as directed.  Qty: 120 each, Refills: 11    Associated Diagnoses: Type 2 diabetes mellitus with hemoglobin A1c goal of 7.0%-8.0% (H)      !! blood glucose (NO BRAND SPECIFIED) test strip Use to test blood sugar 4 times daily or as directed. To accompany: Blood Glucose Monitor Brands: per insurance.  Qty: 150 strip, Refills: 6    Associated Diagnoses: Type 2 diabetes mellitus with hemoglobin A1c goal of 7.0%-8.0% (H)      blood glucose calibration (NO BRAND SPECIFIED) solution To accompany: Blood Glucose Monitor Brands: per insurance.  Qty: 1 Bottle, Refills: 3    Associated Diagnoses: Type 2 diabetes mellitus with hemoglobin A1c goal of 7.0%-8.0% (H)      blood glucose monitoring (NO BRAND SPECIFIED) meter device kit Use to test blood sugar 4 times daily or as directed. Preferred blood glucose meter OR supplies to accompany: Blood Glucose Monitor Brands: per insurance.  Qty: 1 kit, Refills: 0    Associated Diagnoses: Type 2 diabetes mellitus with hemoglobin A1c goal of 7.0%-8.0% (H)      dorzolamide (TRUSOPT) 2 % ophthalmic solution Place 1 drop into both eyes 2 times daily  Qty: 10 mL, Refills: 1    Associated Diagnoses: Primary open angle glaucoma (POAG) of both eyes, mild stage      esomeprazole (NEXIUM) 40 MG DR capsule Take 1 capsule (40 mg) by mouth every morning (before  breakfast) Take 30-60 minutes before eating.  Qty: 90 capsule, Refills: 0    Associated Diagnoses: Gastroesophageal reflux disease with esophagitis without hemorrhage      gabapentin (NEURONTIN) 300 MG capsule TAKE 1 CAPSULE (300 MG) BY MOUTH AT BEDTIME  Qty: 90 capsule, Refills: 0    Associated Diagnoses: Diabetic polyneuropathy associated with type 2 diabetes mellitus (H)      insulin aspart (NOVOLOG FLEXPEN) 100 UNIT/ML pen Inject 1-10 Units Subcutaneous 2 times daily (with meals) With lunch and dinner  Qty: 3 mL, Refills: 0    Comments: Patient established with new provider  Amairani Seth, CRISELDA    [address] [facility] [address]    [place] [address]    [phone] (Fax)   Associated Diagnoses: Type 2 diabetes mellitus with hemoglobin A1c goal of 7.0%-8.0% (H)      insulin glargine (LANTUS SOLOSTAR) 100 UNIT/ML pen Inject 50 Units Subcutaneous At Bedtime  Qty: 15 mL, Refills: 0    Comments: Patient established with new provider  Amairani Seth, CRISELDA    [address] [facility] [address]    [place] [address]    [phone] (Fax)   Associated Diagnoses: Type 2 diabetes mellitus with hemoglobin A1c goal of 7.0%-8.0% (H)      !! insulin pen needle (31G X 8 MM) 31G X 8 MM miscellaneous Use 3 pen needles daily or as directed.  Qty: 300 each, Refills: 3    Comments: Size correction.  Associated Diagnoses: Type 2 diabetes mellitus with hemoglobin A1c goal of 7.0%-8.0% (H)      Lido-Pentaf-Tetrafl-Ultrasound (ACCUCAINE) 1 % KIT       lisinopril (ZESTRIL) 20 MG tablet Take 1 tablet (20 mg) by mouth daily  Qty: 30 tablet, Refills: 11    Associated Diagnoses: Type 2 diabetes mellitus with hemoglobin A1c goal of 7.0%-8.0% (H); HTN, goal below 140/90; PAD (peripheral artery disease) (H)      !! simvastatin (ZOCOR) 10 MG tablet Take 1 tablet (10 mg) by mouth At Bedtime  Qty: 90 tablet, Refills: 1    Associated Diagnoses: Hyperlipidemia LDL goal <100      !! simvastatin (ZOCOR) 20 MG tablet Take  "1 tablet (20 mg) by mouth At Bedtime  Qty: 90 tablet, Refills: 0    Associated Diagnoses: Hyperlipidemia LDL goal <100      thin (NO BRAND SPECIFIED) lancets Use with lanceting device. To accompany: Blood Glucose Monitor Brands: per insurance.  Qty: 200 each, Refills: 6    Associated Diagnoses: Type 2 diabetes mellitus with hemoglobin A1c goal of 7.0%-8.0% (H)      timolol maleate (TIMOPTIC) 0.5 % ophthalmic solution Place 1 drop into both eyes 2 times daily  Qty: 5 mL, Refills: 1    Associated Diagnoses: Primary open angle glaucoma (POAG) of both eyes, mild stage      zolpidem (AMBIEN) 5 MG tablet Take 1 tablet (5 mg) by mouth nightly as needed for sleep  Qty: 12 tablet, Refills: 0    Associated Diagnoses: Insomnia, unspecified type       !! - Potential duplicate medications found. Please discuss with provider.        Allergies   Allergies   Allergen Reactions     Aleve      HA sweats     Citalopram Itching     Clopidogrel Nausea and Vomiting     Pt to restart on 11/25/15 to see again if he tolerates it or not. His sx were only GI. Not a true allergy     Iodine      Naproxen Itching     About 10 yrs ago, itching all over from taking Aleve  \"get sick and really sick\"       Sulfamethoxazole-Trimethoprim      Other reaction(s): Renal Failure       "

## 2021-09-30 ENCOUNTER — PATIENT OUTREACH (OUTPATIENT)
Dept: CARE COORDINATION | Facility: CLINIC | Age: 66
End: 2021-09-30

## 2021-09-30 DIAGNOSIS — Z71.89 OTHER SPECIFIED COUNSELING: ICD-10-CM

## 2021-09-30 NOTE — PROGRESS NOTES
"Clinic Care Coordination Contact  [facility]: Post-Discharge Note  SITUATION                                                      Admission:    Admission Date: 09/28/21   Reason for Admission: Shortness of breath and fatigue likely due to viral URI  Discharge:   Discharge Date: 09/29/21  Discharge Diagnosis: Shortness of breath and fatigue likely due to viral URI    BACKGROUND                                                      65-year-old gentleman with past medical history significant for diabetes mellitus 2 insulin-dependent, COVID-19 infection in 2020, hypertension, hyperlipidemia, peripheral arterial disease status post left transmetatarsal amputation, who presents to the Emergency Room today with vague symptoms of generalized weakness and fatigue.  The patient stated that he was concerned, as his wife was diagnosed with bronchitis, and also he started to have intermittent coughing with productive sputum, no fever or chills, but he felt nauseated.  The patient also stated he is weak and easily tired these days.  Normally, he goes to gym 3 times a week, but the last 3 days he was not able to go to the gym because of increasing weakness.  He denied any chest pain, no palpitation at rest, but had shortness of breath when he was on treadmill at the gym 4 days ago.  No previous history of coronary artery disease.  The patient had last stress nuclear echocardiogram, which was normal in 2017.  He had echocardiogram in 2020.  At that time, it showed normal left ventricular function and ejection fraction.     Patient today is feeling better.  He states he still has some congestion.  He states that her symptoms were from a \"bronchitis \".  his wife is hospitalized here with RSV and pneumonia.  He states he has been visiting her in her hospital room and likely caught it from her.  He states he was at the gym yesterday and just felt a little tired and more short of breath so he came to the hospital.  He states it is " "just because of the bronchitis.  He never had any chest pain.  He would like to discharge home.  An echocardiogram was done here in the hospital and did not show any concerning acute abnormalities.  I indicated to the patient that if he needed something for his congestion he should go to the pharmacy and purchase something over-the-counter.             ASSESSMENT           Discharge Assessment  How are you doing now that you are home?: still have the Flu  How are your symptoms? (Red Flag symptoms escalate to triage hotline per guidelines): Unchanged  Do you feel your condition is stable enough to be safe at home until your provider visit?: Yes  Does the patient have their discharge instructions? : Yes  Does the patient have questions regarding their discharge instructions? : Yes (see comment) (Patient upset that he did not get a \" Heart test\")  Were you started on any new medications or were there changes to any of your previous medications? : No  Does the patient have all of their medications?: Yes  Do you have questions regarding any of your medications? : No  Discharge follow-up appointment scheduled within 14 calendar days? : Yes  Discharge Follow Up Appointment Date: 10/06/21  Discharge Follow Up Appointment Scheduled with?: Primary Care Provider    Post-op ([facility] CTA Only)  If the patient had a surgery or procedure, do they have any questions for a nurse?: No    Post-op (Clinicians Only)  Fever: No  Chills: No  Eating & Drinking: eating and drinking without complaints/concerns  PO Intake:  (ADA)  Bowel Function: constipation  Date of last BM: 09/30/21  Urinary Status: voiding without complaint/concerns        PLAN                                                      Outpatient Plan:Follow up with primary care provider, Olivia Nichols, within 7 days for   hospital follow- up.  No follow up labs or test are needed.            Future Appointments   Date Time Provider Department Center   10/6/2021  2:30 PM Magdal[place], " Olivia Dash MD [facility]         For any urgent concerns, please contact our 24 hour nurse triage line: [phone] ([phone])         Patient upset with his stay at the hosptal. CTA gave patient the Patient Relations number.    Junie Bennett MA

## 2021-10-01 ENCOUNTER — APPOINTMENT (OUTPATIENT)
Dept: GENERAL RADIOLOGY | Facility: CLINIC | Age: 66
End: 2021-10-01
Attending: EMERGENCY MEDICINE
Payer: MEDICARE

## 2021-10-01 ENCOUNTER — HOSPITAL ENCOUNTER (EMERGENCY)
Facility: CLINIC | Age: 66
Discharge: HOME | End: 2021-10-01
Attending: EMERGENCY MEDICINE | Admitting: EMERGENCY MEDICINE
Payer: MEDICARE

## 2021-10-01 VITALS
OXYGEN SATURATION: 96 % | RESPIRATION RATE: 18 BRPM | DIASTOLIC BLOOD PRESSURE: 66 MMHG | HEART RATE: 93 BPM | WEIGHT: 198 LBS | TEMPERATURE: 97.8 F | BODY MASS INDEX: 31.96 KG/M2 | SYSTOLIC BLOOD PRESSURE: 130 MMHG

## 2021-10-01 DIAGNOSIS — J06.9 UPPER RESPIRATORY TRACT INFECTION, UNSPECIFIED TYPE: ICD-10-CM

## 2021-10-01 LAB
ALBUMIN SERPL-MCNC: 3.6 G/DL (ref 3.4–5)
ALP SERPL-CCNC: 97 U/L (ref 40–150)
ALT SERPL W P-5'-P-CCNC: 29 U/L (ref 0–70)
ANION GAP SERPL CALCULATED.3IONS-SCNC: 5 MMOL/L (ref 3–14)
AST SERPL W P-5'-P-CCNC: 14 U/L (ref 0–45)
BASOPHILS # BLD AUTO: 0 10E3/UL (ref 0–0.2)
BASOPHILS NFR BLD AUTO: 1 %
BILIRUB SERPL-MCNC: 0.3 MG/DL (ref 0.2–1.3)
BUN SERPL-MCNC: 18 MG/DL (ref 7–30)
CALCIUM SERPL-MCNC: 8.6 MG/DL (ref 8.5–10.1)
CHLORIDE BLD-SCNC: 107 MMOL/L (ref 94–109)
CO2 SERPL-SCNC: 23 MMOL/L (ref 20–32)
CREAT SERPL-MCNC: 1.33 MG/DL (ref 0.66–1.25)
EOSINOPHIL # BLD AUTO: 0.2 10E3/UL (ref 0–0.7)
EOSINOPHIL NFR BLD AUTO: 3 %
ERYTHROCYTE [DISTWIDTH] IN BLOOD BY AUTOMATED COUNT: 13.5 % (ref 10–15)
GFR SERPL CREATININE-BSD FRML MDRD: 56 ML/MIN/1.73M2
GLUCOSE BLD-MCNC: 94 MG/DL (ref 70–99)
GLUCOSE BLDC GLUCOMTR-MCNC: 60 MG/DL (ref 70–99)
HCT VFR BLD AUTO: 41.3 % (ref 40–53)
HGB BLD-MCNC: 13.5 G/DL (ref 13.3–17.7)
IMM GRANULOCYTES # BLD: 0.1 10E3/UL
IMM GRANULOCYTES NFR BLD: 1 %
LYMPHOCYTES # BLD AUTO: 1.8 10E3/UL (ref 0.8–5.3)
LYMPHOCYTES NFR BLD AUTO: 24 %
MCH RBC QN AUTO: 27.9 PG (ref 26.5–33)
MCHC RBC AUTO-ENTMCNC: 32.7 G/DL (ref 31.5–36.5)
MCV RBC AUTO: 85 FL (ref 78–100)
MONOCYTES # BLD AUTO: 1 10E3/UL (ref 0–1.3)
MONOCYTES NFR BLD AUTO: 13 %
NEUTROPHILS # BLD AUTO: 4.4 10E3/UL (ref 1.6–8.3)
NEUTROPHILS NFR BLD AUTO: 58 %
NRBC # BLD AUTO: 0 10E3/UL
NRBC BLD AUTO-RTO: 0 /100
PLATELET # BLD AUTO: 304 10E3/UL (ref 150–450)
POTASSIUM BLD-SCNC: 4.4 MMOL/L (ref 3.4–5.3)
PROT SERPL-MCNC: 7.4 G/DL (ref 6.8–8.8)
RBC # BLD AUTO: 4.84 10E6/UL (ref 4.4–5.9)
SODIUM SERPL-SCNC: 135 MMOL/L (ref 133–144)
WBC # BLD AUTO: 7.5 10E3/UL (ref 4–11)

## 2021-10-01 PROCEDURE — 85025 COMPLETE CBC W/AUTO DIFF WBC: CPT | Performed by: EMERGENCY MEDICINE

## 2021-10-01 PROCEDURE — C9803 HOPD COVID-19 SPEC COLLECT: HCPCS

## 2021-10-01 PROCEDURE — U0005 INFEC AGEN DETEC AMPLI PROBE: HCPCS | Performed by: EMERGENCY MEDICINE

## 2021-10-01 PROCEDURE — 36415 COLL VENOUS BLD VENIPUNCTURE: CPT | Performed by: EMERGENCY MEDICINE

## 2021-10-01 PROCEDURE — 82040 ASSAY OF SERUM ALBUMIN: CPT | Performed by: EMERGENCY MEDICINE

## 2021-10-01 PROCEDURE — 71046 X-RAY EXAM CHEST 2 VIEWS: CPT

## 2021-10-01 PROCEDURE — 99284 EMERGENCY DEPT VISIT MOD MDM: CPT | Mod: 25

## 2021-10-01 RX ORDER — BENZONATATE 200 MG/1
200 CAPSULE ORAL 3 TIMES DAILY PRN
Qty: 30 CAPSULE | Refills: 0 | Status: SHIPPED | OUTPATIENT
Start: 2021-10-01 | End: 2022-02-09

## 2021-10-01 RX ORDER — CODEINE PHOSPHATE AND GUAIFENESIN 10; 100 MG/5ML; MG/5ML
1-2 SOLUTION ORAL EVERY 4 HOURS PRN
Qty: 120 ML | Refills: 0 | Status: SHIPPED | OUTPATIENT
Start: 2021-10-01 | End: 2022-02-09

## 2021-10-02 LAB — SARS-COV-2 RNA RESP QL NAA+PROBE: NEGATIVE

## 2021-10-02 ASSESSMENT — ENCOUNTER SYMPTOMS
TROUBLE SWALLOWING: 0
SHORTNESS OF BREATH: 0
FEVER: 0
SORE THROAT: 1
COUGH: 1

## 2021-10-02 NOTE — ED PROVIDER NOTES
"  History   Chief Complaint:  Cough       The history is provided by the patient.      Rachell Paige is a 65 year old male with history of type 2 diabetes, COPD, COVID-19 who presents with cough. The patient reports that he has had a worsening cough since last night. He states that his throat is irritated while coughing. He also reports a burning sensation in his chest and that his throat \"looks like somebody has cut it with a knife.\" He expresses concern that he got pneumonia from his wife, who was recently hospitalized. He also reports that he was informed that he had a viral respiratory infection when he last presented to the emergency department less than a week ago. The patient has been able to eat and drink today. He states that he recently tested negative for Covid-19, although he is not vaccinated for it. He reports no fever or shortness of breath. It does not hurt to swallow.    Review of Systems   Constitutional: Negative for fever.   HENT: Positive for sore throat. Negative for trouble swallowing.    Respiratory: Positive for cough. Negative for shortness of breath.    Cardiovascular: Positive for chest pain.   All other systems reviewed and are negative.      Allergies:  Aleve  Citalopram  Clopidogrel  Iodine  Naproxen  Sulfamethoxazole-Trimethoprim    Medications:  Benzonatate  Robitussin  Aspirin  Nexium  Gabapentin  Insulin aspart  Insulin glargine  Lisinopril  Simvastatin  Ambien     Past Medical History:    Anxiety  Dermatitis  Type 2 diabetes  Diabetic polyneuropathy  Diabetic foot ulcer  Glaucoma  Hypertension  Hyperlipidemia  Microalbuminuria  Onychomycosis  Dyspnea on exertion  COPD  Hyponatremia  COVID-19  Stage 3 chronic kidney disease  Peripheral vascular disease  Osteomyelitis  Depression  Gangrene of toe  Necrotic toes  Lumbago  Overweight    Past Surgical History:    Right fifth toe amputation  Left second toe amputation    Family History:    Lung disease  Glaucoma  Diabetes    Social " History:  Arrives to the emergency department alone  Is     Physical Exam     Patient Vitals for the past 24 hrs:   BP Temp Temp src Pulse Resp SpO2 Weight   10/01/21 2239 130/66 -- -- 93 -- -- --   10/01/21 2000 -- -- -- -- -- 96 % --   10/01/21 1605 (!) 171/79 97.8  F (36.6  C) Temporal 101 18 98 % 89.8 kg (198 lb)       Physical Exam  Constitutional: Middle aged [place]ian male, no respiratory distress, rare cough.  HENT: No signs of trauma. Oropharynx clear, no redness, no discharge. Trachea midline and nontender.  Eyes: EOM are normal. Pupils are equal, round, and reactive to light.   Neck: Normal range of motion. No JVD present. No cervical adenopathy.  Cardiovascular: Regular rhythm.  Exam reveals no gallop and no friction rub.    No murmur heard.  Pulmonary/Chest: Bilateral breath sounds normal. No wheezes, rhonchi or rales.  Abdominal: Soft. No tenderness. No rebound or guarding.   Musculoskeletal: No edema. No tenderness.   Lymphadenopathy: No lymphadenopathy.   Neurological: Alert and oriented to person, place, and time. Normal strength. Coordination normal.   Skin: Skin is warm and dry. No rash noted. No erythema.       Emergency Department Course     Imaging:  Chest XR, PA & LAT   PA and lateral views of the chest were obtained. Mildly enlarged cardiac silhouette. No suspicious focal pulmonary opacities. No significant pleural effusion or pneumothorax.  Reading per radiology.     Laboratory:    CBC: WBC 7.5, HGB 13.5,      CMP: Creatinine 1.33, GFR estimate 56 o/w WNL    Symptomatic Influenza A/B & SARS-CoV2 (COVID19) Virus PCR Multiplex: Negative    Glucose by meter (Collected 2128): 60     Emergency Department Course:    Reviewed:  I reviewed nursing notes, vitals, past medical history and care everywhere    Assessments:  2125 I obtained history and examined the patient as noted above.   2231 I rechecked the patient and explained findings.     Disposition:  The patient was discharged to  home.       Impression & Plan     Medical Decision Making:  Rachell Paige is a 65 year old male who was concerned because his wife was in the hospital and she ended up getting pneumonia. Patient notes that after his evaluation, he was told he had a viral respiratory infection. Patient states it is getting worse. He is not having significant production. He is not feverish. He is not having low oxygen demands or chest pain. His exam here is relatively unremarkable, other than an occasional forced cough. There is no wheezing, no rales, no rhonchi. Patient was worked up with labs by nursing. I obtained a chest X-ray, which again shows no infiltrate. A repeat Covid was done. Given the fact this was negative a week ago, and he has no O2 needs or fever, it is unlikely to be positive at this time. It is still pending. Patient will be discharged home with Tessalon Perles and Robitussin STEPHANIE cough medicine and should follow-up with his primary care. Symptoms were worse on re-check in the ED. I do not think this represents a cardiac complaint, pulmonary embolism, pneumonia, or pericarditis.    Covid-19  Rachell Paige was evaluated during a global COVID-19 pandemic, which necessitated consideration that the patient might be at risk for infection with the SARS-CoV-2 virus that causes COVID-19.   Applicable protocols for evaluation were followed during the patient's care.   COVID-19 was considered as part of the patient's evaluation. The plan for testing is:  a test was obtained during this visit.    Diagnosis:    ICD-10-CM    1. Upper respiratory tract infection, unspecified type  J06.9        Discharge Medications:  Discharge Medication List as of 10/1/2021 10:36 PM      START taking these medications    Details   benzonatate (TESSALON) 200 MG capsule Take 1 capsule (200 mg) by mouth 3 times daily as needed for cough, Disp-30 capsule, R-0, Local Print      guaiFENesin-codeine (ROBITUSSIN AC) 100-10 MG/5ML solution Take 5-10  mLs by mouth every 4 hours as needed for cough, Disp-120 mL, R-0, Local Print             Scribe Disclosure:  I, Antonio Giron, am serving as a scribe at 9:15 PM on 10/1/2021 to document services personally performed by Wagner Dickerson MD based on my observations and the provider's statements to me.              Wagner Dickerson MD  10/02/21 4499

## 2021-10-11 ENCOUNTER — TELEPHONE (OUTPATIENT)
Dept: OTHER | Facility: CLINIC | Age: 66
End: 2021-10-11

## 2021-10-11 NOTE — TELEPHONE ENCOUNTER
Last encounter with patient was on 6/24/21      Patient has not completed imaging as advised.   I returned call to [facility] and he did not answer- no option for voicemail.     Dipti JIMENES, RN    [facility]  Vascular [facility]  Office: [phone]  Fax: [phone]

## 2021-10-11 NOTE — TELEPHONE ENCOUNTER
Rachell called wanted to speak with nurse CO.     He states he missed appointments for US the week of 10/4/2021.  Per chart review, shows missed US on 9/2021.    He would like to discuss with nurse CO.     Please call Rachell at [phone].    Jessica MEHTA  Routing to nurse CO

## 2021-10-12 NOTE — TELEPHONE ENCOUNTER
I returned call to Rachell.   He asked to schedule his ultrasounds.   I discussed I do not schedule the ultrasounds and I will route back to  to call him and schedule.    Dipti JIMENES, RN    [facility]  Office: [phone]  Fax: [phone]

## 2021-10-12 NOTE — TELEPHONE ENCOUNTER
Scheduled as follows:    10/27/2021   11/15/2021 In person with Dr. Parson    Patient stated he only wants the US.    Jessica MEHTA

## 2021-10-12 NOTE — TELEPHONE ENCOUNTER
Rachell called stating that he received a call from nurse CO.     I asked if I could schedule for US and he said he wanted to speak with nurse CO first.     Jessica MEHTA   Routing to nurse CO

## 2021-10-27 ENCOUNTER — HOSPITAL ENCOUNTER (OUTPATIENT)
Dept: ULTRASOUND IMAGING | Facility: CLINIC | Age: 66
End: 2021-10-27
Attending: INTERNAL MEDICINE
Payer: MEDICARE

## 2021-10-27 DIAGNOSIS — I73.9 PAD (PERIPHERAL ARTERY DISEASE) (H): ICD-10-CM

## 2021-10-27 DIAGNOSIS — I73.9 CLAUDICATION (H): ICD-10-CM

## 2021-10-27 DIAGNOSIS — I10 HTN, GOAL BELOW 140/90: ICD-10-CM

## 2021-10-27 DIAGNOSIS — E11.9 TYPE 2 DIABETES MELLITUS WITH HEMOGLOBIN A1C GOAL OF 7.0%-8.0% (H): ICD-10-CM

## 2021-10-27 PROCEDURE — 93924 LWR XTR VASC STDY BILAT: CPT

## 2021-10-27 PROCEDURE — 93880 EXTRACRANIAL BILAT STUDY: CPT

## 2021-11-08 ENCOUNTER — TELEPHONE (OUTPATIENT)
Dept: OTHER | Facility: CLINIC | Age: 66
End: 2021-11-08
Payer: MEDICARE

## 2021-11-08 NOTE — TELEPHONE ENCOUNTER
I called Rachell and ADALI and explained that he needs to see Dr. Parson to review his results.  If he is concerned it would be advised to keep his original appointment. I asked he call back and schedule his appointment sooner than 11/29/21 if he has immediate concerns.     Dipti JIMENES, RN    [facility]  Vascular [facility]  Office: [phone]  Fax: [phone]

## 2021-11-08 NOTE — TELEPHONE ENCOUNTER
Called pt to reschedule appt from 11/15.   He agreed to see Dr. Parson on 11/29 but is anxious about US results.       Please call

## 2021-11-19 DIAGNOSIS — E11.9 TYPE 2 DIABETES MELLITUS WITH HEMOGLOBIN A1C GOAL OF 7.0%-8.0% (H): ICD-10-CM

## 2021-11-22 RX ORDER — BLOOD SUGAR DIAGNOSTIC
1 STRIP MISCELLANEOUS 4 TIMES DAILY
Qty: 400 STRIP | Refills: 0 | Status: SHIPPED | OUTPATIENT
Start: 2021-11-22

## 2021-11-26 ENCOUNTER — TELEPHONE (OUTPATIENT)
Dept: OTHER | Facility: CLINIC | Age: 66
End: 2021-11-26
Payer: MEDICARE

## 2021-11-26 DIAGNOSIS — E78.5 HYPERLIPIDEMIA LDL GOAL <100: ICD-10-CM

## 2021-11-26 RX ORDER — SIMVASTATIN 20 MG
20 TABLET ORAL AT BEDTIME
Qty: 90 TABLET | Refills: 1 | Status: SHIPPED | OUTPATIENT
Start: 2021-11-26 | End: 2022-01-31 | Stop reason: ALTCHOICE

## 2021-11-26 NOTE — TELEPHONE ENCOUNTER
I returned call to patient and verified he has appointment Monday with Dr. Parson.    Dipti JIMENES, RN    [facility]  Vascular [facility]  Office: [phone]  Fax: [phone]         06-Aug-2017

## 2021-11-27 NOTE — TELEPHONE ENCOUNTER
Patient has been NO show x 3,  patient will need follow up visit for any future refills of medications. Thank you

## 2021-11-29 NOTE — TELEPHONE ENCOUNTER
Patient Contact    Attempt # 1    Was call answered?  No.  Left message on voicemail with information to call back.    Neva CHAVIS, Triage RN  [facility]

## 2021-11-30 NOTE — TELEPHONE ENCOUNTER
Next 5 appointments (look out 90 days)    Jan 19, 2022  3:00 PM  (Arrive by 2:40 PM)  Office Visit with Olivia Nichols MD  [facility] ([facility] ) [address] [facility] [address], [address]  [place] [facility] [address] [phone]        Called patient and discussed   Had him write down appt date and time     Live Pimentel RN  [facility]

## 2021-12-22 ENCOUNTER — TELEPHONE (OUTPATIENT)
Dept: CARDIOLOGY | Facility: CLINIC | Age: 66
End: 2021-12-22
Payer: MEDICARE

## 2021-12-22 ENCOUNTER — NURSE TRIAGE (OUTPATIENT)
Dept: FAMILY MEDICINE | Facility: CLINIC | Age: 66
End: 2021-12-22
Payer: MEDICARE

## 2021-12-22 DIAGNOSIS — I73.9 PVD (PERIPHERAL VASCULAR DISEASE) (H): Primary | ICD-10-CM

## 2021-12-22 DIAGNOSIS — I10 HTN, GOAL BELOW 140/90: ICD-10-CM

## 2021-12-22 DIAGNOSIS — E78.5 HYPERLIPIDEMIA LDL GOAL <100: ICD-10-CM

## 2021-12-22 DIAGNOSIS — R06.09 DYSPNEA ON EXERTION: ICD-10-CM

## 2021-12-22 NOTE — TELEPHONE ENCOUNTER
Received call from [facility] need new DX codes for lexiscan  Patient has cancelled and no showed lexiscan several times.  Will enter new order.

## 2021-12-22 NOTE — TELEPHONE ENCOUNTER
"Pt called c/o higher blood sugar readings and neuropathy pains   Legs and feet burning   Sugar readings have been high - sometimes 240, sometimes 300, 280   No symptoms with the higher readings other than neuropathy pains   Right large toe is swollen no other swelling, no redness. Tight and shiny     Sugar today: 180 -\"never ate anything\"   Used to be with waking was 130-140. Sometimes 120     Denies weakness or other symptoms with the higher sugar readings other than neuropathy pains. Pt would like to be seen ASAP, but okay with waiting for visit tomorrow. Did advise PCP is out of the office for several weeks and he is okay with seeing another provider in the clinic tomorrow. Scheduled pt      Appointments in Next Year    Dec 23, 2021  3:30 PM  (Arrive by 3:10 PM)  Provider Visit with Vishal Diaz MD  [facility] ([facility] ) [phone]   Jan 19, 2022  3:00 PM  (Arrive by 2:40 PM)  Provider Visit with Olivia Nichols MD  [facility] ([facility] ) [phone]   Mar 01, 2022  3:15 PM  Return Visit with Forrest Joshua MD  [facility] ([facility] ) [phone]        Neva CHAVIS Triage RN  [facility]       Reason for Disposition    Patient wants to be seen    Additional Information    Negative: Unconscious or difficult to awaken    Negative: Acting confused (e.g., disoriented, slurred speech)    Negative: Very weak (can't stand)    Negative: Sounds like a life-threatening emergency to the triager    Negative: Vomiting and signs of dehydration (e.g., very dry mouth, lightheaded, dark urine)    Negative: Blood glucose > 240 mg/dL (13.3 mmol/L) and rapid breathing    Negative: Blood glucose > 500 mg/dL (27.8 mmol/L)    Negative: Blood glucose > 240 mg/dL (13.3 mmol/L) AND urine ketones moderate-large (or more than " 1+)    Negative: Blood glucose > 240 mg/dL (13.3 mmol/L) and blood ketones > 1.4 mmol/L    Negative: Blood glucose > 240 mg/dL (13.3 mmol/L) AND vomiting AND unable to check for ketones (in blood or urine)    Negative: Vomiting lasting > 4 hours    Negative: Patient sounds very sick or weak to the triager    Negative: Fever > 100.4 F (38.0 C)    Negative: Caller has URGENT medication or insulin pump question and triager unable to answer question    Negative: Blood glucose > 400 mg/dL (22.2 mmol/L)    Negative: Blood glucose > 300 mg/dL (16.7 mmol/L) AND two or more times in a row    Negative: Urine ketones moderate - large (or blood ketones > 1.4 mmol/L)    Negative: Symptoms of high blood sugar (e.g., frequent urination, weak, weight loss) and not able to test blood glucose    Negative: New-onset diabetes suspected (e.g., frequent urination, weak, weight loss)    Protocols used: DIABETES - HIGH BLOOD SUGAR-A-OH

## 2021-12-23 ENCOUNTER — OFFICE VISIT (OUTPATIENT)
Dept: FAMILY MEDICINE | Facility: CLINIC | Age: 66
End: 2021-12-23
Payer: MEDICARE

## 2021-12-23 ENCOUNTER — ANCILLARY PROCEDURE (OUTPATIENT)
Dept: GENERAL RADIOLOGY | Facility: CLINIC | Age: 66
End: 2021-12-23
Attending: INTERNAL MEDICINE
Payer: MEDICARE

## 2021-12-23 ENCOUNTER — TELEPHONE (OUTPATIENT)
Dept: VASCULAR SURGERY | Facility: CLINIC | Age: 66
End: 2021-12-23

## 2021-12-23 VITALS
RESPIRATION RATE: 17 BRPM | WEIGHT: 195 LBS | TEMPERATURE: 98 F | DIASTOLIC BLOOD PRESSURE: 72 MMHG | OXYGEN SATURATION: 97 % | HEART RATE: 89 BPM | HEIGHT: 66 IN | SYSTOLIC BLOOD PRESSURE: 135 MMHG | BODY MASS INDEX: 31.34 KG/M2

## 2021-12-23 DIAGNOSIS — E11.9 TYPE 2 DIABETES MELLITUS WITH HEMOGLOBIN A1C GOAL OF 7.0%-8.0% (H): Primary | ICD-10-CM

## 2021-12-23 DIAGNOSIS — R74.8 ABNORMAL LEVELS OF OTHER SERUM ENZYMES: ICD-10-CM

## 2021-12-23 DIAGNOSIS — G62.89 OTHER POLYNEUROPATHY: ICD-10-CM

## 2021-12-23 DIAGNOSIS — Z89.421 S/P AMPUTATION OF LESSER TOE, RIGHT (H): ICD-10-CM

## 2021-12-23 LAB
BASOPHILS # BLD AUTO: 0 10E3/UL (ref 0–0.2)
BASOPHILS NFR BLD AUTO: 0 %
EOSINOPHIL # BLD AUTO: 0.2 10E3/UL (ref 0–0.7)
EOSINOPHIL NFR BLD AUTO: 3 %
ERYTHROCYTE [DISTWIDTH] IN BLOOD BY AUTOMATED COUNT: 14.8 % (ref 10–15)
HBA1C MFR BLD: 8.1 % (ref 0–5.6)
HCT VFR BLD AUTO: 47.8 % (ref 40–53)
HGB BLD-MCNC: 15.7 G/DL (ref 13.3–17.7)
LYMPHOCYTES # BLD AUTO: 2 10E3/UL (ref 0.8–5.3)
LYMPHOCYTES NFR BLD AUTO: 30 %
MCH RBC QN AUTO: 28.8 PG (ref 26.5–33)
MCHC RBC AUTO-ENTMCNC: 32.8 G/DL (ref 31.5–36.5)
MCV RBC AUTO: 88 FL (ref 78–100)
MONOCYTES # BLD AUTO: 0.4 10E3/UL (ref 0–1.3)
MONOCYTES NFR BLD AUTO: 7 %
NEUTROPHILS # BLD AUTO: 4.1 10E3/UL (ref 1.6–8.3)
NEUTROPHILS NFR BLD AUTO: 60 %
PLATELET # BLD AUTO: 244 10E3/UL (ref 150–450)
RBC # BLD AUTO: 5.46 10E6/UL (ref 4.4–5.9)
WBC # BLD AUTO: 6.7 10E3/UL (ref 4–11)

## 2021-12-23 PROCEDURE — 36415 COLL VENOUS BLD VENIPUNCTURE: CPT | Performed by: INTERNAL MEDICINE

## 2021-12-23 PROCEDURE — 85025 COMPLETE CBC W/AUTO DIFF WBC: CPT | Performed by: INTERNAL MEDICINE

## 2021-12-23 PROCEDURE — 99214 OFFICE O/P EST MOD 30 MIN: CPT | Performed by: INTERNAL MEDICINE

## 2021-12-23 PROCEDURE — 82746 ASSAY OF FOLIC ACID SERUM: CPT | Performed by: INTERNAL MEDICINE

## 2021-12-23 PROCEDURE — 80053 COMPREHEN METABOLIC PANEL: CPT | Performed by: INTERNAL MEDICINE

## 2021-12-23 PROCEDURE — 73630 X-RAY EXAM OF FOOT: CPT | Mod: LT | Performed by: INTERNAL MEDICINE

## 2021-12-23 PROCEDURE — 83921 ORGANIC ACID SINGLE QUANT: CPT | Performed by: INTERNAL MEDICINE

## 2021-12-23 PROCEDURE — 83036 HEMOGLOBIN GLYCOSYLATED A1C: CPT | Performed by: INTERNAL MEDICINE

## 2021-12-23 ASSESSMENT — MIFFLIN-ST. JEOR: SCORE: 1612.26

## 2021-12-23 ASSESSMENT — PATIENT HEALTH QUESTIONNAIRE - PHQ9: SUM OF ALL RESPONSES TO PHQ QUESTIONS 1-9: 15

## 2021-12-23 NOTE — PROGRESS NOTES
"  Assessment & Plan     Type 2 diabetes mellitus with hemoglobin A1c goal of 7.0%-8.0% (H)  With concerns of neuropathic discomfort his hemoglobin A1c needs to be assessed.  Electrolytes also should be assessed today as should his CBC.    - Hemoglobin A1c; Future  - Comprehensive metabolic panel (BMP + Alb, Alk Phos, ALT, AST, Total. Bili, TP); Future  - CBC with platelets and differential; Future  - Hemoglobin A1c  - Comprehensive metabolic panel (BMP + Alb, Alk Phos, ALT, AST, Total. Bili, TP)  - CBC with platelets and differential    S/P amputation of lesser toe, right (H)  Patient has had left partial foot amputation as well as toe amputation of the right.  He is having pain.  Has a history of osteomyelitis.  X-ray of his bilateral feet will be obtained.  Will refer this patient to orthopedics given his persistent discomfort.    - Orthopedic  Referral; Future  - XR Foot Bilateral G/E 3 Views; Future    Other polyneuropathy  Mostly related to diabetes.  Like to assess exacerbated his such as folate deficiency, copper deficiency, B12 deficiency.    Patient requests a referral to neurology.  I recommend in the interim that he increase his Neurontin up to 300 mg twice daily and continue to increase to relieve neuropathic discomfort.  Patient also has peripheral artery disease of note which may be related to component of aching pain that he notes with exercise.  No indication of ulceration or rest pain.      - Hemoglobin A1c; Future  - Folate; Future  - Copper level; Future  - Methylmalonic Acid; Future  - Adult Neurology Referral; Future  - Orthopedic  Referral; Future  - XR Foot Bilateral G/E 3 Views; Future  - Hemoglobin A1c  - Folate  - Copper level  - Methylmalonic Acid    Abnormal levels of other serum enzymes     - Copper level; Future  - Copper level       BMI:   Estimated body mass index is 31.47 kg/m  as calculated from the following:    Height as of this encounter: 1.676 m (5' 6\").    " "Weight as of this encounter: 88.5 kg (195 lb).       Depression Screening Follow Up    PHQ 12/23/2021   PHQ-9 Total Score 15   Q9: Thoughts of better off dead/self-harm past 2 weeks Not at all         Follow Up Actions Taken       See Patient Instructions    No follow-ups on file.    Vishal Diaz MD  [facility] SAIRA Lovett is a 66 year old who presents for the following health issues     HPI patient presents primarily in the setting of lower extremity discomfort.  Patient has a history of peripheral neuropathy and peripheral artery disease.  He was seen by vascular surgeon in June who felt that the majority of his discomfort related to neuropathy.  The patient currently is taking Neurontin 300 mg in the.  Abdominals are worse later in the day.  Occasionally the symptomatology he has noted after exertion.  The pain is described as a burning shooting discomfort.  Occasionally there is a dull or aching component as well.    He denies chest pain or shortness of breath.  He is cognizant of the importance of his blood sugar control.  His hemoglobin A1c has increased from 7.5-8.1 recently.    The patient has been limited somewhat regards to activity related to his symptoms          Review of Systems   Constitutional, HEENT, cardiovascular, pulmonary, gi and gu systems are negative, except as otherwise noted.      Objective    /72 (BP Location: Right arm, Cuff Size: Adult Large)   Pulse 89   Temp 98  F (36.7  C) (Temporal)   Resp 17   Ht 1.676 m (5' 6\")   Wt 88.5 kg (195 lb)   SpO2 97%   BMI 31.47 kg/m    Body mass index is 31.47 kg/m .  Physical Exam   Alert patient in no apparent distress  Heart regular rate and rhythm  Clear  Lower extremities no edema noted  Sensorium diminished  Midfoot amputation of left toe amputation of the right.  X-ray reveals no evidence of osteomyelitis.  Significant calcium noted at the vascular level.    Admission on 10/01/2021, Discharged on " 10/01/2021   Component Date Value Ref Range Status     Sodium 10/01/2021 135  133 - 144 mmol/L Final     Potassium 10/01/2021 4.4  3.4 - 5.3 mmol/L Final     Chloride 10/01/2021 107  94 - 109 mmol/L Final     Carbon Dioxide (CO2) 10/01/2021 23  20 - 32 mmol/L Final     Anion Gap 10/01/2021 5  3 - 14 mmol/L Final     Urea Nitrogen 10/01/2021 18  7 - 30 mg/dL Final     Creatinine 10/01/2021 1.33* 0.66 - 1.25 mg/dL Final     Calcium 10/01/2021 8.6  8.5 - 10.1 mg/dL Final     Glucose 10/01/2021 94  70 - 99 mg/dL Final     Alkaline Phosphatase 10/01/2021 97  40 - 150 U/L Final     AST 10/01/2021 14  0 - 45 U/L Final     ALT 10/01/2021 29  0 - 70 U/L Final     Protein Total 10/01/2021 7.4  6.8 - 8.8 g/dL Final     Albumin 10/01/2021 3.6  3.4 - 5.0 g/dL Final     Bilirubin Total 10/01/2021 0.3  0.2 - 1.3 mg/dL Final     GFR Estimate 10/01/2021 56* >60 mL/min/1.73m2 Final    As of July 11, 2021, eGFR is calculated by the CKD-EPI creatinine equation, without race adjustment. eGFR can be influenced by muscle mass, exercise, and diet. The reported eGFR is an estimation only and is only applicable if the renal function is stable.     SARS CoV2 PCR 10/01/2021 Negative  Negative Final    NEGATIVE: SARS-CoV-2 (COVID-19) RNA not detected, presumed negative.     WBC Count 10/01/2021 7.5  4.0 - 11.0 10e3/uL Final     RBC Count 10/01/2021 4.84  4.40 - 5.90 10e6/uL Final     Hemoglobin 10/01/2021 13.5  13.3 - 17.7 g/dL Final     Hematocrit 10/01/2021 41.3  40.0 - 53.0 % Final     MCV 10/01/2021 85  78 - 100 fL Final     MCH 10/01/2021 27.9  26.5 - 33.0 pg Final     MCHC 10/01/2021 32.7  31.5 - 36.5 g/dL Final     RDW 10/01/2021 13.5  10.0 - 15.0 % Final     Platelet Count 10/01/2021 304  150 - 450 10e3/uL Final     % Neutrophils 10/01/2021 58  % Final     % Lymphocytes 10/01/2021 24  % Final     % Monocytes 10/01/2021 13  % Final     % Eosinophils 10/01/2021 3  % Final     % Basophils 10/01/2021 1  % Final     % Immature Granulocytes  10/01/2021 1  % Final     NRBCs per 100 WBC 10/01/2021 0  <1 /100 Final     Absolute Neutrophils 10/01/2021 4.4  1.6 - 8.3 10e3/uL Final     Absolute Lymphocytes 10/01/2021 1.8  0.8 - 5.3 10e3/uL Final     Absolute Monocytes 10/01/2021 1.0  0.0 - 1.3 10e3/uL Final     Absolute Eosinophils 10/01/2021 0.2  0.0 - 0.7 10e3/uL Final     Absolute Basophils 10/01/2021 0.0  0.0 - 0.2 10e3/uL Final     Absolute Immature Granulocytes 10/01/2021 0.1* <=0.0 10e3/uL Final     Absolute NRBCs 10/01/2021 0.0  10e3/uL Final     GLUCOSE BY METER POCT 10/01/2021 60* 70 - 99 mg/dL Final

## 2021-12-23 NOTE — PATIENT INSTRUCTIONS
Rodriguez;  I am concerned about the worsening of your neuropathy.  I would like to evaluate a couple of lab tests to see if there is anything exacerbating typical diabetic neuropathy.    In the meanwhile, I recommend you increase your gabapentin to 300 mg twice daily.    I have referred you to podiatry and neurology per your request.    We will obtain an x-ray of your feet today to make certain there are no underlying bone abnormalities.    Regarding your diabetes I recommend you take your Lantus when you awaken.  This will reduce your chances of any low blood sugars in the evening.    Best regards  Vishal

## 2021-12-23 NOTE — TELEPHONE ENCOUNTER
[facility] Call Center    Phone Message    May a detailed message be left on voicemail: yes, email    Reason for Call: Symptoms or Concerns     If patient has red-flag symptoms, warm transfer to triage line    Current symptom or concern:   Pt notes Circulation problem, states that his feet and legs are bothering him. Pt states he has lots of pain.  Pt thinks something is wrong    Pt states that Dr Parson told him to call for an appt right away if something was wrong. Pt thinks something is very wrong.    Symptoms have been present for:    4 week(s)    Has patient previously been seen for this? Yes    By Dr Parson    Date: ongoing    Are there any new or worsening symptoms? Yes: as above      Action Taken: Message routed to:  [facility] Vasc Surg    Travel Screening: Not Applicable  Please return call to Pt regarding symptoms. Pt thinks something is very wrong and needs an appt ASAP

## 2021-12-24 LAB — FOLATE SERPL-MCNC: 39.6 NG/ML

## 2021-12-25 LAB
ALBUMIN SERPL-MCNC: 4 G/DL (ref 3.4–5)
ALP SERPL-CCNC: 95 U/L (ref 40–150)
ALT SERPL W P-5'-P-CCNC: 24 U/L (ref 0–70)
ANION GAP SERPL CALCULATED.3IONS-SCNC: 4 MMOL/L (ref 3–14)
AST SERPL W P-5'-P-CCNC: 12 U/L (ref 0–45)
BILIRUB SERPL-MCNC: 0.4 MG/DL (ref 0.2–1.3)
BUN SERPL-MCNC: 21 MG/DL (ref 7–30)
CALCIUM SERPL-MCNC: 9 MG/DL (ref 8.5–10.1)
CHLORIDE BLD-SCNC: 105 MMOL/L (ref 94–109)
CO2 SERPL-SCNC: 25 MMOL/L (ref 20–32)
CREAT SERPL-MCNC: 1.27 MG/DL (ref 0.66–1.25)
GFR SERPL CREATININE-BSD FRML MDRD: 63 ML/MIN/1.73M2
GLUCOSE BLD-MCNC: 247 MG/DL (ref 70–99)
POTASSIUM BLD-SCNC: 5.2 MMOL/L (ref 3.4–5.3)
PROT SERPL-MCNC: 7.1 G/DL (ref 6.8–8.8)
SODIUM SERPL-SCNC: 134 MMOL/L (ref 133–144)

## 2021-12-29 LAB — METHYLMALONATE SERPL-SCNC: 0.22 UMOL/L (ref 0–0.4)

## 2021-12-30 ENCOUNTER — TELEPHONE (OUTPATIENT)
Dept: PODIATRY | Facility: CLINIC | Age: 66
End: 2021-12-30
Payer: MEDICARE

## 2021-12-30 NOTE — TELEPHONE ENCOUNTER
Left message on 12/29 and 12/30 to lorraine aLeft message on 12/29 and 12/30 to lorraine appointment.

## 2022-01-26 ENCOUNTER — OFFICE VISIT (OUTPATIENT)
Dept: PODIATRY | Facility: CLINIC | Age: 67
End: 2022-01-26
Payer: MEDICARE

## 2022-01-26 ENCOUNTER — TELEPHONE (OUTPATIENT)
Dept: OTHER | Facility: CLINIC | Age: 67
End: 2022-01-26

## 2022-01-26 VITALS
BODY MASS INDEX: 31.98 KG/M2 | HEIGHT: 66 IN | DIASTOLIC BLOOD PRESSURE: 74 MMHG | SYSTOLIC BLOOD PRESSURE: 130 MMHG | WEIGHT: 199 LBS

## 2022-01-26 DIAGNOSIS — L97.512 SKIN ULCER OF FOURTH TOE OF RIGHT FOOT WITH FAT LAYER EXPOSED (H): ICD-10-CM

## 2022-01-26 DIAGNOSIS — E11.42 DIABETIC POLYNEUROPATHY ASSOCIATED WITH TYPE 2 DIABETES MELLITUS (H): ICD-10-CM

## 2022-01-26 DIAGNOSIS — Z89.421 S/P AMPUTATION OF LESSER TOE, RIGHT (H): ICD-10-CM

## 2022-01-26 DIAGNOSIS — I73.9 PAD (PERIPHERAL ARTERY DISEASE) (H): Primary | ICD-10-CM

## 2022-01-26 DIAGNOSIS — Z89.432 PARTIAL NONTRAUMATIC AMPUTATION OF LEFT FOOT (H): ICD-10-CM

## 2022-01-26 PROCEDURE — 99213 OFFICE O/P EST LOW 20 MIN: CPT | Performed by: PODIATRIST

## 2022-01-26 RX ORDER — SILVER SULFADIAZINE 10 MG/G
CREAM TOPICAL DAILY
Qty: 25 G | Refills: 1 | Status: SHIPPED | OUTPATIENT
Start: 2022-01-26 | End: 2022-02-09

## 2022-01-26 ASSESSMENT — MIFFLIN-ST. JEOR: SCORE: 1630.41

## 2022-01-26 NOTE — TELEPHONE ENCOUNTER
"I called Rachell. He was offered appt with Dr. Parson Friday- he declined. He is scheduled Monday for follow up for Dr. Parson to assess. See podiatry notes from today.     \"Reviewed patient's chart in epic.  Had a long discussion with patient.  Explained that I am not a vascular doctor and I cannot help him with his blood flow issues.  He needs to follow-up with vascular.  We did discuss his ulcer on the bottom of his fourth toe.  Currently there are no signs of infection.  We did put in an order for Silvadene cream for him to apply to the ulcer daily with a Band-Aid.  We will have him follow-up in 3 weeks for reassessment.\"     Dipti JIMENES RN    [facility]  Vascular [facility]  Office: [phone]  Fax: [phone]             Dipti JIMENES RN    [facility]  Vascular [facility]  Office: [phone]  Fax: [phone]      "

## 2022-01-26 NOTE — TELEPHONE ENCOUNTER
"[facility]     Who is the name of the provider? Blank    What is the location you see this provider at?    [facility]    Reason for call:  pt has an ulcer on his 4th digit on his right foot. Was seen by podiatry today. Pt is worried about \"infection\". Pt would like to see Dr. Blank GAMEZ    : Rachell    Phone number to call: [phone]    Additional Notes: Ok to LDM     "

## 2022-01-26 NOTE — PROGRESS NOTES
PATIENT HISTORY:  Dr. Diaz requested I see this patient for their foot issue.  Rachell Paige is a 66 year old male who presents to clinic for bilateral leg pain.  Notes that both of his legs are painful.  He is worried about an infection.  Has seen vascular in the past and notes that he does not have good blood flow.  Patient denies fever, nausea, vomiting.  Notes pain is worse at night when his legs are up.  Wondering what can be done for them.    Review of Systems:  Patient denies fever, chills, rash, wound, stiffness, limping,weakness, heart burn, blood in stool, chest pain with activity, calf pain when walking, shortness of breath with activity, chronic cough, easy bleeding/bruising, swelling of ankles, excessive thirst, fatigue, depression, anxiety.  Patient admits to numbness.     PAST MEDICAL HISTORY:   Past Medical History:   Diagnosis Date     Anxiety 2/23/2015     Dermatitis 4/18/2015     DM type 2, goal A1C 7-8 2/23/2015     Does not have health insurance 4/18/2015     Financial problems 2/23/2015     Glaucoma (increased eye pressure)      HTN, goal below 140/90 2/23/2015     Hyperlipidemia LDL goal <100 2/23/2015     Microalbuminuria 4/18/2015     Onychomycosis 4/18/2015     Rash 2/23/2015        PAST SURGICAL HISTORY:   Past Surgical History:   Procedure Laterality Date     AMPUTATE TOE(S) Right 12/31/2018    Procedure: Right fifth toe amputation;  Surgeon: Clark Lora DPM;  Location:  OR        MEDICATIONS:   Current Outpatient Medications:      ACCU-CHEK GUIDE test strip, USE TO TEST BLOOD SUGAR TWO TIMES A DAY OR AS DIRECTED, Disp: 100 strip, Rfl: 1     alcohol swab prep pads, Use to swab area of injection/pedro pablo as directed., Disp: 120 each, Rfl: 6     aspirin 81 MG tablet, Take 1 tablet (81 mg) by mouth daily, Disp: 30 tablet, Rfl: OTC     BD PEN NEEDLE MICRO U/F 32G X 6 MM miscellaneous, USE 3 PEN NEEDLES DAILY OR AS DIRECTED., Disp: 300 each, Rfl: 3     benzonatate (TESSALON) 200  MG capsule, Take 1 capsule (200 mg) by mouth 3 times daily as needed for cough, Disp: 30 capsule, Rfl: 0     blood glucose (ACCU-CHEK GUIDE) test strip, 1 strip by In Vitro route 4 times daily Use to test blood sugar 3 -4  times daily or as directed., Disp: 400 strip, Rfl: 0     blood glucose (NO BRAND SPECIFIED) test strip, Use to test blood sugar 4 times daily or as directed. To accompany: Blood Glucose Monitor Brands: per insurance., Disp: 150 strip, Rfl: 6     blood glucose calibration (NO BRAND SPECIFIED) solution, To accompany: Blood Glucose Monitor Brands: per insurance., Disp: 1 Bottle, Rfl: 3     blood glucose monitoring (NO BRAND SPECIFIED) meter device kit, Use to test blood sugar 4 times daily or as directed. Preferred blood glucose meter OR supplies to accompany: Blood Glucose Monitor Brands: per insurance., Disp: 1 kit, Rfl: 0     dorzolamide (TRUSOPT) 2 % ophthalmic solution, Place 1 drop into both eyes 2 times daily, Disp: 10 mL, Rfl: 1     esomeprazole (NEXIUM) 40 MG DR capsule, Take 1 capsule (40 mg) by mouth every morning (before breakfast) Take 30-60 minutes before eating., Disp: 90 capsule, Rfl: 0     gabapentin (NEURONTIN) 300 MG capsule, TAKE 1 CAPSULE (300 MG) BY MOUTH AT BEDTIME, Disp: 90 capsule, Rfl: 0     guaiFENesin-codeine (ROBITUSSIN AC) 100-10 MG/5ML solution, Take 5-10 mLs by mouth every 4 hours as needed for cough, Disp: 120 mL, Rfl: 0     insulin aspart (NOVOLOG FLEXPEN) 100 UNIT/ML pen, Inject 1-10 Units Subcutaneous 2 times daily (with meals) With lunch and dinner, Disp: 3 mL, Rfl: 0     insulin glargine (LANTUS SOLOSTAR) 100 UNIT/ML pen, Inject 50 Units Subcutaneous At Bedtime, Disp: 15 mL, Rfl: 0     insulin pen needle (31G X 8 MM) 31G X 8 MM miscellaneous, Use 3 pen needles daily or as directed., Disp: 300 each, Rfl: 3     Lido-Pentaf-Tetrafl-Ultrasound (ACCUCAINE) 1 % KIT, , Disp: , Rfl:      lisinopril (ZESTRIL) 20 MG tablet, Take 1 tablet (20 mg) by mouth daily, Disp: 30  "tablet, Rfl: 11     simvastatin (ZOCOR) 10 MG tablet, Take 1 tablet (10 mg) by mouth At Bedtime, Disp: 90 tablet, Rfl: 1     simvastatin (ZOCOR) 20 MG tablet, Take 1 tablet (20 mg) by mouth At Bedtime, Disp: 90 tablet, Rfl: 1     thin (NO BRAND SPECIFIED) lancets, Use with lanceting device. To accompany: Blood Glucose Monitor Brands: per insurance., Disp: 200 each, Rfl: 6     timolol maleate (TIMOPTIC) 0.5 % ophthalmic solution, Place 1 drop into both eyes 2 times daily, Disp: 5 mL, Rfl: 1     zolpidem (AMBIEN) 5 MG tablet, Take 1 tablet (5 mg) by mouth nightly as needed for sleep, Disp: 12 tablet, Rfl: 0     ALLERGIES:    Allergies   Allergen Reactions     Aleve      HA sweats     Citalopram Itching     Clopidogrel Nausea and Vomiting     Pt to restart on 11/25/15 to see again if he tolerates it or not. His sx were only GI. Not a true allergy     Iodine      Naproxen Itching     About 10 yrs ago, itching all over from taking Aleve  \"get sick and really sick\"       Sulfamethoxazole-Trimethoprim      Other reaction(s): Renal Failure          SOCIAL HISTORY:   Social History     Socioeconomic History     Marital status:      Spouse name: Not on file     Number of children: Not on file     Years of education: Not on file     Highest education level: Not on file   Occupational History     Not on file   Tobacco Use     Smoking status: Former Smoker     Types: Cigarettes     Quit date:      Years since quittin.0     Smokeless tobacco: Never Used   Substance and Sexual Activity     Alcohol use: No     Drug use: No     Sexual activity: Yes     Partners: Female   Other Topics Concern     Parent/sibling w/ CABG, MI or angioplasty before 65F 55M? Not Asked   Social History Narrative    Former  ([facility], [place])          Social Determinants of Health     Financial Resource Strain: Low Risk      Difficulty of Paying Living Expenses: Not hard at all   Food Insecurity: No Food Insecurity " "    Worried About Running Out of Food in the Last Year: Never true     Ran Out of Food in the Last Year: Never true   Transportation Needs: No Transportation Needs     Lack of Transportation (Medical): No     Lack of Transportation (Non-Medical): No   Physical Activity: Not on file   Stress: Not on file   Social Connections: Not on file   Intimate Partner Violence: Not on file   Housing Stability: Not on file        FAMILY HISTORY:   Family History   Problem Relation Age of Onset     Diabetes Other      LUNG DISEASE Mother      Glaucoma Father      Cerebrovascular Disease Son      Diabetes Son      Diabetes Daughter         EXAM:Vitals: /74   Ht 1.676 m (5' 6\")   Wt 90.3 kg (199 lb)   BMI 32.12 kg/m    BMI= Body mass index is 32.12 kg/m .    A1C: 8.1 (12/2021)    General appearance: Patient is alert and fully cooperative with history & exam.  No sign of distress is noted during the visit.     Psychiatric: Affect is pleasant & appropriate.  Patient appears motivated to improve health.     Respiratory: Breathing is regular & unlabored while sitting.     HEENT: Hearing is intact to spoken word.  Speech is clear.  No gross evidence of visual impairment that would impact ambulation.     Dermatologic: Full-thickness ulceration to the plantar aspect of the right fourth toe.  This measures approximately 1 cm x 0.3 cm x 0.1 cm.  Base of the wound is granular.  No redness or signs of acute infection noted.     Vascular: DP & PT pulses are not palpable bilaterally.  No significant edema or varicosities noted.  CFT and skin temperature is normal to both lower extremities.     Neurologic: Lower extremity sensation is intact to light touch.  No evidence of weakness or contracture in the lower extremities.  No evidence of neuropathy.     Musculoskeletal: Patient is ambulatory without assistive device or brace.  No gross ankle deformity noted.  No foot or ankle joint effusion is noted.     ASSESSMENT:    PAD (peripheral " artery disease) (H)  S/P amputation of lesser toe, right (H)  Diabetic polyneuropathy associated with type 2 diabetes mellitus (H)  Skin ulcer of fourth toe of right foot with fat layer exposed (H)  Partial nontraumatic amputation of left foot (H)       Medical Decision Making/Plan:  Reviewed patient's chart in epic.  Had a long discussion with patient.  Explained that I am not a vascular doctor and I cannot help him with his blood flow issues.  He needs to follow-up with vascular.  We did discuss his ulcer on the bottom of his fourth toe.  Currently there are no signs of infection.  We did put in an order for Silvadene cream for him to apply to the ulcer daily with a Band-Aid.  We will have him follow-up in 3 weeks for reassessment.    Patient risk factor: Patient is at high risk for infection.        Kim Mckeon DPM, Podiatry/Foot and Ankle Surgery

## 2022-01-26 NOTE — PATIENT INSTRUCTIONS
Thank you for choosing [facility] Podiatry / Foot & Ankle Surgery!    DR. TABARES'S CLINIC:  [facility]   [id number]   [place] [address]   [phone]  [phone]      SCHEDULE SURGERY: [phone]   BILLING QUESTIONS: [phone]   APPOINTMENTS: [phone]   RADIOLOGY: [phone]   CONSUMER PRICE LINE: [phone]      Follow up: 3 weeks    Next steps: see vascular for leg pain, silvadine cream sent to pharmacy    FOOT ULCER (WOUND) EDUCATION  Ulceration ofthe foot involves a break or hole in the skin. Skin is our best protection against infection. Skin is quite durable, however, the underlying tissues are fragile. For this reason, the wound is likely to deepen rapidly. Deep wounds usually get infected and require amputation. Prompt healing is therefore essential to avoid limb loss.     Foot ulcers do not heal without intervention. Walking on the foot and living your normal life is not typically compatible with healing the sore. Successful healing will require several months of significant alteration of your daily activities.   Ulcer complications frequently develop. This primarily includes infection of skin, which then spreads deep into your joints, bones and tendons. Spreading infection may travel up your leg and into other parts of your body. Deep infection is usually treated with amputation ofpart ofyour foot or your leg. Signs of infection include fever, chills, nausea, vomiting, erratic blood sugars, local redness, pus, strong odor and localized warmth. Signs of infection should be taken seriously. Prompt evaluation in the clinic or hospital emergency room is required.   Ulcer treatment requires debridement or surgical removal of devitalized tissue. Your doctor will trim away callused, moistened, unhealthy tissue from the wound surface and margin. This helps to clean the wound and allows proper inspection. Debridement also stimulates healing even though the  wound originally appears larger. Expect some bleeding with each debridement. You will be given instruction regarding wound bandaging. This often includes ointment and gauze. Avoid tape directly on the skin. Hand washing is essential since most infections will come from your fingertips. Ulcer care requires a no touch technique. Your fingers should not touch the margin or base of the wound.    HELPFUL HEALING TIPS:  1. Debridement: Getting rid of bad tissue makes way for good tissue to promote healing  2. Addressing Foot Deformities: Hammertoes and bunions can cause increased pressure  3. Pressure Reduction: If pressure remains to the wound, it won't heal  4. Good Pulses: If bloodflow is not getting to the foot, the ulcer will not heal  5. Good Nutrition: If you are not getting proper nutrition your body can't heal.Protein! At least 90g a day.  Supplements are a good way to help get this, such as Juan C, Glucerna, Ensure. Also taking 5000units of Vitamin D a day.   6. Infection Control: Keep the ulcer clean with wound cleanser. DO NOT SOAK IT!  7. Moisture Control: Keep edema down and make sure that drainage is getting pulled away from the ulcer    IMPORTANCE OF DEBRIDEMENT    Reduces bioburden to help control or reduce infection. Even if an ulcer is not  infected,  the bacterial bioburden causes increased local inflammation.    Allows more accurate visualization of the wound base and edges, which allows for more precise staging.    Removes necrotic/non-viable tissue, which impedes wound healing, causes protein loss and can be a nidus for infection.    Stimulates new circulation (angiogenesis) and allows adequate oxygen delivery to the wound.    Removes undermining and tunneling, and may help reduce abscess formation.    Releases healing growth factors at the edge of the wound.    Prepares the wound bed by leaving only tissues that are capable of regenerating.    DIABETES AND YOUR FEET  Diabetes can result in several  "problems in the feet including ulcers (open sores) and amputations. Two of the most important reasons why people develop foot problems when they have diabetes is : 1. Neuropathy (loss of feeling)  2. Vascular disease (loss or decrease of blood flow).    Neuropathy is a term used to describe a loss of nerve function.  Patients with diabetes are at risk of developing neuropathy if their sugars continue to run high and are above the normal value. One theory for neuropathy is that the \"extra\" sugar in the body enters the nerves and is broken down. These by-products build up in the nerve causing it to swell and impairing nerve function. Often times, this can be prevented by controlling your sugars, dieting and exercise.    When a person develops neuropathy, they usually begin to feel numbness or tingling in their feet and sometime in their legs.  Other symptoms may include painful burning or hot feet, tingling or feeling like insects or ants are crawling on your feet or legs.  If the diabetes is sever and the sugars run high for long periods of time, neuropathy can also occur in the hands.    Vascular disease  is a term used to describe a loss or decrease in circulation (blood flow). There is a problem in getting blood and oxygen to areas that need it. Similar to neuropathy, sugars can build up in the walls of the arteries (blood vessels) and cause them to become swollen, thickened and hardened. This decreases the amount of blood that can go to an area that needs it. Though this is common in the legs of diabetic patients, it can also affect other arteries (blood vessels) in the body such as in the heart and eyes.    In the legs, vascular disease usually results in cramping. Patients who develop leg cramps after walking the same distance every time (i.e. One block, half a mile, ect.) need to let their doctors know so that their circulation may be checked. Cramps causing severe pain in the feet and/or legs while sleeping " "and the cramps go away when you stand or hang your legs off the side of the bed, may also be a sign of poor blood circulation.  Occasional cramping in cold weather or on rare occasions with activity may not be due to poor circulation, but you should inform your doctor.    PREVENTION OF THESE DISEASES  The key to prevention is good blood sugar control. Poor blood sugar control is a big reason many of these problems start. Physical activity (exercise) is a very good way to help decrease your blood sugars. Exercise can lower your blood sugar, blood pressure, and cholesterol. It also reduces your risk for heart disease and stroke, relieves stress, and strengthens your heart, muscles and bones.  In addition, regular activity helps insulin work better, improves your blood circulation, and keeps your joints flexible. If you're trying to lose weight, a combination of exercise and wise food choices can help you reach your target weight and maintain it.      PAIN MANAGEMENT (**Please speak with your primary doctor about any medications**)  1.Blood Sugar Control - Most important  2. Medications such as:  Amytriptylline, duloxetine, gabapentin, lyrica, tramadol (talk with your primary care doctor about this).     NUTRITION:  Nutrition is also important to help with healing. If your body does not have what it needs, it can't heal.   1. Increasing your protein intake is important.  With wounds you need 60-90gm of protein a day to help with healing. Over the counter protein shakes such as Juan C, Glucerna, Ensure, ect... can help to supplement your daily protein intake.   2. It is also important to take Vitamins to help with healing.  Vitamins such as B12, B6 and Vitamin D3 are important for healing. These can be gotten over the counter at pharmacies or at stores like [employer] or the [employer].    I can also prescribe a dietary supplement called \"Rheumate\" that has a lot of essential vitamins in one capsule.  This may not be " covered by insurance though.     FOOT CARE RECOMMENDATIONS   1. Wash your feet with lukewarm water and a mild soap and then dry them thoroughly, especially between the toes.     2. Examine your feet daily looking for cuts, corns, blisters, cracks, ect, especially after wearing new shoes. Make sure to look between your toes. If you cannot see the bottom of your feet, set a mirror on the floor and hold your foot over it, or ask a spouse, friend or family member to examine your feet for you. Contact your doctor immediately if new problems are noted or if sores are not healing.     3. Immediately apply moisturizer to the tops and bottoms of your feet, avoiding areas between the toes. Hand lotion (Intesive Care, Liliane, Eucerin, Neutrogena, Curel, ect) is sufficient unless your doctor prescribes a medicated lotion. Apply sunscreen to your feet when going swimming outside.     4. Use clean comfortable shoes, wear white socks (if you have any bleeding or drainage, you will see it on white socks). Socks should not have thick seams or cut off the circulation around the leg. Break in new shoes slowly and rotate with older shoes until broken in. Check the inside of your shoes with your hand to look for areas of irritation or objects that may have fallen into your shoes.       5. Keep slippers by the side of your bed for use during the night.     6.  Shoes should be fitted by a professional and should not cause areas of irritation.  Check your feet regularly when wearing a new pair of shoes and replace them as needed.     7.  Talk to your doctor about proper exercise. Exercise and stretching stimulate blood flow to your feet and maintain proper glucose levels.     8.  Monitor your blood glucose level as instructed by your doctor. Notify your doctor immediately if your blood sugar is abnormally high or low.    9. Cut your nails straight across, but then gently round any sharp edges with a cardboard nail file. If you have  neuropathy, peripheral vascular disease or cannot see that well to trim your own toenails contact [employer] ([phone]) or [employer] ([phone]).      THINGS TO AVOID DOING   1.  Do not soak your feet if you have an open sore. Use only lukewarm water and always check the temperature with your hand as hot water can easily burn your feet.       2.  Never use a hot water bottle or heating pad on your feet. Also do not apply cold compresses to your feet. With decreased sensation, you could burn or freeze your feet.       3.  Do not apply any of these to your feet:    -  Over the counter medicine for corns or warts    -  Harsh chemicals like boric acid    -  Do not self-treat corns, cuts, blisters or infections. Always consult your doctor.       4.  Do not wear sandals, slippers or walk barefoot, especially on hot sand or concrete or other harsh surfaces.     5.  If you smoke, stop!!!

## 2022-01-26 NOTE — LETTER
1/26/2022         RE: Rachell Paige  [id number] [facility] [address]        Dear Colleague,    Thank you for referring your patient, Rachell Paige, to the [facility]. Please see a copy of my visit note below.    PATIENT HISTORY:  Dr. Diaz requested I see this patient for their foot issue.  Rachell Paige is a 66 year old male who presents to clinic for bilateral leg pain.  Notes that both of his legs are painful.  He is worried about an infection.  Has seen vascular in the past and notes that he does not have good blood flow.  Patient denies fever, nausea, vomiting.  Notes pain is worse at night when his legs are up.  Wondering what can be done for them.    Review of Systems:  Patient denies fever, chills, rash, wound, stiffness, limping,weakness, heart burn, blood in stool, chest pain with activity, calf pain when walking, shortness of breath with activity, chronic cough, easy bleeding/bruising, swelling of ankles, excessive thirst, fatigue, depression, anxiety.  Patient admits to numbness.     PAST MEDICAL HISTORY:   Past Medical History:   Diagnosis Date     Anxiety 2/23/2015     Dermatitis 4/18/2015     DM type 2, goal A1C 7-8 2/23/2015     Does not have health insurance 4/18/2015     Financial problems 2/23/2015     Glaucoma (increased eye pressure)      HTN, goal below 140/90 2/23/2015     Hyperlipidemia LDL goal <100 2/23/2015     Microalbuminuria 4/18/2015     Onychomycosis 4/18/2015     Rash 2/23/2015        PAST SURGICAL HISTORY:   Past Surgical History:   Procedure Laterality Date     AMPUTATE TOE(S) Right 12/31/2018    Procedure: Right fifth toe amputation;  Surgeon: Clrak Lora DPM;  Location:  OR        MEDICATIONS:   Current Outpatient Medications:      ACCU-CHEK GUIDE test strip, USE TO TEST BLOOD SUGAR TWO TIMES A DAY OR AS DIRECTED, Disp: 100 strip, Rfl: 1     alcohol swab prep pads, Use to swab area of injection/pedro pablo as directed.,  Disp: 120 each, Rfl: 6     aspirin 81 MG tablet, Take 1 tablet (81 mg) by mouth daily, Disp: 30 tablet, Rfl: OTC     BD PEN NEEDLE MICRO U/F 32G X 6 MM miscellaneous, USE 3 PEN NEEDLES DAILY OR AS DIRECTED., Disp: 300 each, Rfl: 3     benzonatate (TESSALON) 200 MG capsule, Take 1 capsule (200 mg) by mouth 3 times daily as needed for cough, Disp: 30 capsule, Rfl: 0     blood glucose (ACCU-CHEK GUIDE) test strip, 1 strip by In Vitro route 4 times daily Use to test blood sugar 3 -4  times daily or as directed., Disp: 400 strip, Rfl: 0     blood glucose (NO BRAND SPECIFIED) test strip, Use to test blood sugar 4 times daily or as directed. To accompany: Blood Glucose Monitor Brands: per insurance., Disp: 150 strip, Rfl: 6     blood glucose calibration (NO BRAND SPECIFIED) solution, To accompany: Blood Glucose Monitor Brands: per insurance., Disp: 1 Bottle, Rfl: 3     blood glucose monitoring (NO BRAND SPECIFIED) meter device kit, Use to test blood sugar 4 times daily or as directed. Preferred blood glucose meter OR supplies to accompany: Blood Glucose Monitor Brands: per insurance., Disp: 1 kit, Rfl: 0     dorzolamide (TRUSOPT) 2 % ophthalmic solution, Place 1 drop into both eyes 2 times daily, Disp: 10 mL, Rfl: 1     esomeprazole (NEXIUM) 40 MG DR capsule, Take 1 capsule (40 mg) by mouth every morning (before breakfast) Take 30-60 minutes before eating., Disp: 90 capsule, Rfl: 0     gabapentin (NEURONTIN) 300 MG capsule, TAKE 1 CAPSULE (300 MG) BY MOUTH AT BEDTIME, Disp: 90 capsule, Rfl: 0     guaiFENesin-codeine (ROBITUSSIN AC) 100-10 MG/5ML solution, Take 5-10 mLs by mouth every 4 hours as needed for cough, Disp: 120 mL, Rfl: 0     insulin aspart (NOVOLOG FLEXPEN) 100 UNIT/ML pen, Inject 1-10 Units Subcutaneous 2 times daily (with meals) With lunch and dinner, Disp: 3 mL, Rfl: 0     insulin glargine (LANTUS SOLOSTAR) 100 UNIT/ML pen, Inject 50 Units Subcutaneous At Bedtime, Disp: 15 mL, Rfl: 0     insulin pen needle  "(31G X 8 MM) 31G X 8 MM miscellaneous, Use 3 pen needles daily or as directed., Disp: 300 each, Rfl: 3     Lido-Pentaf-Tetrafl-Ultrasound (ACCUCAINE) 1 % KIT, , Disp: , Rfl:      lisinopril (ZESTRIL) 20 MG tablet, Take 1 tablet (20 mg) by mouth daily, Disp: 30 tablet, Rfl: 11     simvastatin (ZOCOR) 10 MG tablet, Take 1 tablet (10 mg) by mouth At Bedtime, Disp: 90 tablet, Rfl: 1     simvastatin (ZOCOR) 20 MG tablet, Take 1 tablet (20 mg) by mouth At Bedtime, Disp: 90 tablet, Rfl: 1     thin (NO BRAND SPECIFIED) lancets, Use with lanceting device. To accompany: Blood Glucose Monitor Brands: per insurance., Disp: 200 each, Rfl: 6     timolol maleate (TIMOPTIC) 0.5 % ophthalmic solution, Place 1 drop into both eyes 2 times daily, Disp: 5 mL, Rfl: 1     zolpidem (AMBIEN) 5 MG tablet, Take 1 tablet (5 mg) by mouth nightly as needed for sleep, Disp: 12 tablet, Rfl: 0     ALLERGIES:    Allergies   Allergen Reactions     Aleve      HA sweats     Citalopram Itching     Clopidogrel Nausea and Vomiting     Pt to restart on 11/25/15 to see again if he tolerates it or not. His sx were only GI. Not a true allergy     Iodine      Naproxen Itching     About 10 yrs ago, itching all over from taking Aleve  \"get sick and really sick\"       Sulfamethoxazole-Trimethoprim      Other reaction(s): Renal Failure          SOCIAL HISTORY:   Social History     Socioeconomic History     Marital status:      Spouse name: Not on file     Number of children: Not on file     Years of education: Not on file     Highest education level: Not on file   Occupational History     Not on file   Tobacco Use     Smoking status: Former Smoker     Types: Cigarettes     Quit date:      Years since quittin.0     Smokeless tobacco: Never Used   Substance and Sexual Activity     Alcohol use: No     Drug use: No     Sexual activity: Yes     Partners: Female   Other Topics Concern     Parent/sibling w/ CABG, MI or angioplasty before 65F 55M? Not " "Asked   Social History Narrative    Former  ([employer], [place])          Social Determinants of Health     Financial Resource Strain: Low Risk      Difficulty of Paying Living Expenses: Not hard at all   Food Insecurity: No Food Insecurity     Worried About Running Out of Food in the Last Year: Never true     Ran Out of Food in the Last Year: Never true   Transportation Needs: No Transportation Needs     Lack of Transportation (Medical): No     Lack of Transportation (Non-Medical): No   Physical Activity: Not on file   Stress: Not on file   Social Connections: Not on file   Intimate Partner Violence: Not on file   Housing Stability: Not on file        FAMILY HISTORY:   Family History   Problem Relation Age of Onset     Diabetes Other      LUNG DISEASE Mother      Glaucoma Father      Cerebrovascular Disease Son      Diabetes Son      Diabetes Daughter         EXAM:Vitals: /74   Ht 1.676 m (5' 6\")   Wt 90.3 kg (199 lb)   BMI 32.12 kg/m    BMI= Body mass index is 32.12 kg/m .    A1C: 8.1 (12/2021)    General appearance: Patient is alert and fully cooperative with history & exam.  No sign of distress is noted during the visit.     Psychiatric: Affect is pleasant & appropriate.  Patient appears motivated to improve health.     Respiratory: Breathing is regular & unlabored while sitting.     HEENT: Hearing is intact to spoken word.  Speech is clear.  No gross evidence of visual impairment that would impact ambulation.     Dermatologic: Full-thickness ulceration to the plantar aspect of the right fourth toe.  This measures approximately 1 cm x 0.3 cm x 0.1 cm.  Base of the wound is granular.  No redness or signs of acute infection noted.     Vascular: DP & PT pulses are not palpable bilaterally.  No significant edema or varicosities noted.  CFT and skin temperature is normal to both lower extremities.     Neurologic: Lower extremity sensation is intact to light touch.  No evidence of " weakness or contracture in the lower extremities.  No evidence of neuropathy.     Musculoskeletal: Patient is ambulatory without assistive device or brace.  No gross ankle deformity noted.  No foot or ankle joint effusion is noted.     ASSESSMENT:    PAD (peripheral artery disease) (H)  S/P amputation of lesser toe, right (H)  Diabetic polyneuropathy associated with type 2 diabetes mellitus (H)  Skin ulcer of fourth toe of right foot with fat layer exposed (H)  Partial nontraumatic amputation of left foot (H)       Medical Decision Making/Plan:  Reviewed patient's chart in epic.  Had a long discussion with patient.  Explained that I am not a vascular doctor and I cannot help him with his blood flow issues.  He needs to follow-up with vascular.  We did discuss his ulcer on the bottom of his fourth toe.  Currently there are no signs of infection.  We did put in an order for Silvadene cream for him to apply to the ulcer daily with a Band-Aid.  We will have him follow-up in 3 weeks for reassessment.    Patient risk factor: Patient is at high risk for infection.        Kim Mckeon DPM, Podiatry/Foot and Ankle Surgery        Again, thank you for allowing me to participate in the care of your patient.        Sincerely,        Kim Mckeon DPM, Podiatry/Foot and Ankle Surgery     (4) no limitation

## 2022-01-31 ENCOUNTER — OFFICE VISIT (OUTPATIENT)
Dept: OTHER | Facility: CLINIC | Age: 67
End: 2022-01-31
Attending: INTERNAL MEDICINE
Payer: MEDICARE

## 2022-01-31 ENCOUNTER — TELEPHONE (OUTPATIENT)
Dept: OTHER | Facility: CLINIC | Age: 67
End: 2022-01-31

## 2022-01-31 VITALS
OXYGEN SATURATION: 100 % | BODY MASS INDEX: 32.28 KG/M2 | WEIGHT: 200 LBS | SYSTOLIC BLOOD PRESSURE: 141 MMHG | RESPIRATION RATE: 16 BRPM | HEART RATE: 90 BPM | DIASTOLIC BLOOD PRESSURE: 68 MMHG

## 2022-01-31 DIAGNOSIS — L98.491 ISCHEMIC ULCER, LIMITED TO BREAKDOWN OF SKIN (H): ICD-10-CM

## 2022-01-31 DIAGNOSIS — I73.9 PAD (PERIPHERAL ARTERY DISEASE) (H): ICD-10-CM

## 2022-01-31 DIAGNOSIS — I70.229 CRITICAL ISCHEMIA OF LOWER EXTREMITY (H): ICD-10-CM

## 2022-01-31 DIAGNOSIS — E78.5 HYPERLIPIDEMIA LDL GOAL <100: Primary | ICD-10-CM

## 2022-01-31 DIAGNOSIS — I75.021 ATHEROEMBOLISM OF RIGHT LOWER EXTREMITY (H): ICD-10-CM

## 2022-01-31 PROCEDURE — 99214 OFFICE O/P EST MOD 30 MIN: CPT | Performed by: INTERNAL MEDICINE

## 2022-01-31 PROCEDURE — G0463 HOSPITAL OUTPT CLINIC VISIT: HCPCS

## 2022-01-31 RX ORDER — ROSUVASTATIN CALCIUM 20 MG/1
20 TABLET, COATED ORAL DAILY
Qty: 30 TABLET | Refills: 3 | Status: SHIPPED | OUTPATIENT
Start: 2022-01-31 | End: 2022-02-09

## 2022-01-31 NOTE — PROGRESS NOTES
[facility]  Pio Parson MD - Vascular Medicine Progress Note    LOCATION: [facility]    Rachell Paige  Medical Record #:  [id number]  YOB: 1955  Age:  66 year old     Date of Service: 1/31/2022     PRIMARY CARE PROVIDER: Olivia Nichols    REASON FOR VISIT:   follow up  for right lower extremity PAD    IMPRESSION: Patient is here for follow-up on bilateral lower extremity PAD getting worse on the right side, patient did step on glass and he injured the fourth toe the wound is nonhealing, patient had TBI's/PIYUSH which indicated critically low TBI on the right toe (0.2) which is critical and not sufficient for wound healing also patient was found to have right SFA stenosis which is a new finding when compared to previous study  Patient denies any history of nocturnal pain yet he has history of claudication with shorter claudication time and distance    RECOMMENDATION:  1-optimize medical treatment, with risk factors modifications will stop simvastatin and start the patient on Crestor 20 mg p.o. nightly in addition his A1c is not to goal at 7.9 aiming at less than 7%  2-we will schedule the patient for angiogram both lower extremities    If patients imaging is normal patient should follow up in 3 months    HPI: Rachell Paige is a 66 year old male who was seen today for for follow-up on PAD, patient has a new wound affecting his right fourth toe with a TBI of 0.2 and decreasing claudication distance with increasing severity of pain      PAST MEDICAL HISTORY  Past Medical History:   Diagnosis Date     Anxiety 2/23/2015     Dermatitis 4/18/2015     DM type 2, goal A1C 7-8 2/23/2015     Does not have health insurance 4/18/2015     Financial problems 2/23/2015     Glaucoma (increased eye pressure)      HTN, goal below 140/90 2/23/2015     Hyperlipidemia LDL goal <100 2/23/2015     Microalbuminuria 4/18/2015     Onychomycosis 4/18/2015     Rash 2/23/2015        PAST SURGICAL HISTORY:  Past Surgical History:   Procedure Laterality Date     AMPUTATE TOE(S) Right 12/31/2018    Procedure: Right fifth toe amputation;  Surgeon: Clark Lora DPM;  Location: [facility] OR         CURRENT MEDICATIONS  ACCU-CHEK GUIDE test strip, USE TO TEST BLOOD SUGAR TWO TIMES A DAY OR AS DIRECTED  alcohol swab prep pads, Use to swab area of injection/pedro pablo as directed.  aspirin 81 MG tablet, Take 1 tablet (81 mg) by mouth daily  BD PEN NEEDLE MICRO U/F 32G X 6 MM miscellaneous, USE 3 PEN NEEDLES DAILY OR AS DIRECTED.  benzonatate (TESSALON) 200 MG capsule, Take 1 capsule (200 mg) by mouth 3 times daily as needed for cough  blood glucose (ACCU-CHEK GUIDE) test strip, 1 strip by In Vitro route 4 times daily Use to test blood sugar 3 -4  times daily or as directed.  blood glucose (NO BRAND SPECIFIED) test strip, Use to test blood sugar 4 times daily or as directed. To accompany: Blood Glucose Monitor Brands: per insurance.  blood glucose calibration (NO BRAND SPECIFIED) solution, To accompany: Blood Glucose Monitor Brands: per insurance.  blood glucose monitoring (NO BRAND SPECIFIED) meter device kit, Use to test blood sugar 4 times daily or as directed. Preferred blood glucose meter OR supplies to accompany: Blood Glucose Monitor Brands: per insurance.  dorzolamide (TRUSOPT) 2 % ophthalmic solution, Place 1 drop into both eyes 2 times daily  esomeprazole (NEXIUM) 40 MG DR capsule, Take 1 capsule (40 mg) by mouth every morning (before breakfast) Take 30-60 minutes before eating.  gabapentin (NEURONTIN) 300 MG capsule, TAKE 1 CAPSULE (300 MG) BY MOUTH AT BEDTIME  guaiFENesin-codeine (ROBITUSSIN AC) 100-10 MG/5ML solution, Take 5-10 mLs by mouth every 4 hours as needed for cough  insulin aspart (NOVOLOG FLEXPEN) 100 UNIT/ML pen, Inject 1-10 Units Subcutaneous 2 times daily (with meals) With lunch and dinner  insulin glargine (LANTUS SOLOSTAR) 100 UNIT/ML pen, Inject 50 Units Subcutaneous At  "Bedtime  insulin pen needle (31G X 8 MM) 31G X 8 MM miscellaneous, Use 3 pen needles daily or as directed.  Lido-Pentaf-Tetrafl-Ultrasound (ACCUCAINE) 1 % KIT,   lisinopril (ZESTRIL) 20 MG tablet, Take 1 tablet (20 mg) by mouth daily  silver sulfADIAZINE (SILVADENE) 1 % external cream, Apply topically daily  simvastatin (ZOCOR) 10 MG tablet, Take 1 tablet (10 mg) by mouth At Bedtime  thin (NO BRAND SPECIFIED) lancets, Use with lanceting device. To accompany: Blood Glucose Monitor Brands: per insurance.  timolol maleate (TIMOPTIC) 0.5 % ophthalmic solution, Place 1 drop into both eyes 2 times daily  zolpidem (AMBIEN) 5 MG tablet, Take 1 tablet (5 mg) by mouth nightly as needed for sleep    No current facility-administered medications on file prior to visit.      ALLERGIES     Allergies   Allergen Reactions     Aleve      HA sweats     Citalopram Itching     Clopidogrel Nausea and Vomiting     Pt to restart on 11/25/15 to see again if he tolerates it or not. His sx were only GI. Not a true allergy     Iodine      Naproxen Itching     About 10 yrs ago, itching all over from taking Aleve  \"get sick and really sick\"       Sulfamethoxazole-Trimethoprim      Other reaction(s): Renal Failure         FAMILY HISTORY  Family History   Problem Relation Age of Onset     Diabetes Other      LUNG DISEASE Mother      Glaucoma Father      Cerebrovascular Disease Son      Diabetes Son      Diabetes Daughter        SOCIAL HISTORY  Social History     Socioeconomic History     Marital status:      Spouse name: Not on file     Number of children: Not on file     Years of education: Not on file     Highest education level: Not on file   Occupational History     Not on file   Tobacco Use     Smoking status: Former Smoker     Types: Cigarettes     Quit date:      Years since quittin.1     Smokeless tobacco: Never Used   Substance and Sexual Activity     Alcohol use: No     Drug use: No     Sexual activity: Yes     Partners: " Female   Other Topics Concern     Parent/sibling w/ CABG, MI or angioplasty before 65F 55M? Not Asked   Social History Narrative    Former  ([place], [place])          Social Determinants of Health     Financial Resource Strain: Low Risk      Difficulty of Paying Living Expenses: Not hard at all   Food Insecurity: No Food Insecurity     Worried About Running Out of Food in the Last Year: Never true     Ran Out of Food in the Last Year: Never true   Transportation Needs: No Transportation Needs     Lack of Transportation (Medical): No     Lack of Transportation (Non-Medical): No   Physical Activity: Not on file   Stress: Not on file   Social Connections: Not on file   Intimate Partner Violence: Not on file   Housing Stability: Not on file       ROS:   Complete ROS negative other than what is stated in HPI.     EXAM:  BP (!) 141/68   Pulse 90   Resp 16   Wt 200 lb (90.7 kg)   SpO2 100%   BMI 32.28 kg/m    In general, the patient is a pleasant male in no apparent distress.    HEENT: NC/AT.  PERRLA.  EOMI.  Sclerae white, not injected.    Neck: No adenopathy.  Carotids +2/2 bilaterally without bruits.   Heart: RRR. Normal S1, S2 splits physiologically. No murmur, rub, click, or gallop.   Lungs: CTA.  No ronchi, wheezes, rales.    Abdomen: Soft, nontender, nondistended.   Extremities: No clubbing, cyanosis, or edema.  Fourth toe nonhealing  wound, no signs of infection.     Labs:  LIPID RESULTS:  Lab Results   Component Value Date    CHOL 156 02/10/2021    HDL 44 02/10/2021    LDL 81 02/10/2021    TRIG 157 (H) 02/10/2021    CHOLHDLRATIO 3.4 02/23/2015       LIVER ENZYME RESULTS:  Lab Results   Component Value Date    AST 12 12/23/2021    AST 13 02/10/2021    ALT 24 12/23/2021    ALT 22 02/10/2021       CBC RESULTS:  Lab Results   Component Value Date    WBC 6.7 12/23/2021    WBC 8.4 07/06/2021    RBC 5.46 12/23/2021    RBC 4.71 07/06/2021    HGB 15.7 12/23/2021    HGB 13.4 07/06/2021     HCT 47.8 12/23/2021    HCT 41.0 07/06/2021    MCV 88 12/23/2021    MCV 87 07/06/2021    MCH 28.8 12/23/2021    MCH 28.5 07/06/2021    MCHC 32.8 12/23/2021    MCHC 32.7 07/06/2021    RDW 14.8 12/23/2021    RDW 13.3 07/06/2021     12/23/2021     07/06/2021       BMP RESULTS:  Lab Results   Component Value Date     12/23/2021     02/10/2021    POTASSIUM 5.2 12/23/2021    POTASSIUM 4.7 02/10/2021    CHLORIDE 105 12/23/2021    CHLORIDE 103 02/10/2021    CO2 25 12/23/2021    CO2 24 02/10/2021    ANIONGAP 4 12/23/2021    ANIONGAP 7 02/10/2021     (H) 12/23/2021     (H) 02/10/2021    BUN 21 12/23/2021    BUN 21 02/10/2021    CR 1.27 (H) 12/23/2021    CR 1.15 02/10/2021    GFRESTIMATED 63 12/23/2021    GFRESTIMATED 67 02/10/2021    GFRESTBLACK 77 02/10/2021    ALPHONSO 9.0 12/23/2021    ALPHONSO 9.3 02/10/2021        A1C RESULTS:  Lab Results   Component Value Date    A1C 8.1 (H) 12/23/2021    A1C 7.4 (H) 07/28/2020       THYROID RESULTS:  Lab Results   Component Value Date    TSH 1.44 11/01/2020         DIAGNOSTIC STUDIES:     Images:  No results found.    I personally reviewed the images report and it was as follows  IMPRESSION:    1. Left ankle/brachial index is noncompressible.    2. Right ankle/brachial index is noncompressible.    3. Severe toe-brachial index on the right of 0.20. Unable to obtain TBI on left.    4. Multiphasic flow bilaterally other than monophasic left DP.     COMPARISON:   Compared to prior study 05/21/2021, Right P SFA stenosis was not detected.     FINDINGS:   Non compressible arteries bilaterally. TBI was obtained.       Pio Parson MD        35 minutes spent on the date of the encounter doing chart review, history and exam, documentation, and further activities as noted above.

## 2022-01-31 NOTE — PATIENT INSTRUCTIONS
Rachell Paige,    Your visit to [facility] for your surgery or procedure is coming soon and we look forward to seeing you! This friendly reminder and pre-procedure checklist will help to ensure your procedure/surgery goes smoothly and meets your expectations. At [facility], our goal is to provide you with a great patient experience and to deliver genuine, professional care to every patient.     Please complete all the steps in advance of your visit. If you have any questions about the items listed below, please give our office a call. We can be reached at [phone].      PLEASE DO NOT STOP YOUR ASPIRIN OR PLAVIX UNLESS SPECIFICALLY DIRECTED BY THE VASCULAR SURGEON TO STOP!  - In most cases Vascular surgeons want you to continue these. This is different from most NON vascular surgeries and may not be well known by your Primary Care Provider      IF YOU NEED TO RESCHEDULE OR CANCEL YOUR PROCEDURE FOR ANY REASON PLEASE CALL THE CLINIC AS SOON AS POSSIBLE -[phone].    Before the procedure  Prepare for the peripheral angiography as follows:     [x] A Pre-op physical within 30 days of the procedure is required. You will need to set up an appointment with your primary care provider.      Do not eat or drink anything after midnight before your procedure. If your provider says to take your normal medicines, swallow them with only small sips of water.    Tell your healthcare provider about all medicines you take and any allergies you may have.    Arrange for a family member or friend to drive you home.    PLEASE BE SURE TO ADDRESS WITH YOUR PCP WHAT TO DO WITH YOUR INSULIN AND METFORMIN PRIOR TO YOUR ANGIOGRAM    Peripheral Angiography    Peripheral angiography is an outpatient procedure that makes a  map  of the vessels (arteries) in your lower body, legs, and arms, using X-ray and dye.This map can show where blood flow may be blocked.    An angiogram is commonly performed under  sedation with the use of local anesthesia.      The procedure usually starts with a needle put into the femoral (groin) artery. From one treatment site, areas all over the body can be treated.  After access is established, catheters (thin tubes) and wires are threaded through the arterial system to a specific area of interest or throughout the entire body.  As a contrast agent (iodine dye) is injected, X-ray images are taken to let your vascular surgeon view the flow of the dye and identify blockages. The surgeon can then choose the best mode of therapy for you - whether during or following the angiogram. This decision depends on your symptoms and the severity and characteristics of the blockages.  Two common therapies that can be provided during the angiogram are balloon angioplasty and stent placement.                 Angioplasty can be used to open arterial blockages. Guided by X-ray, your vascular surgeon navigates through the blockage with a wire and introduces a special device equipped with an inflatable balloon. After positioning the balloon device across the blocked portion of the artery, the vascular surgeon inflates the balloon to expand the artery and compress the blockage. The balloon is then deflated and removed while keeping the wire in place across the area that has been treated. Next, contrast dye is injected to assess the result. Treatment is considered a success if blood flow is improved and less than 30% of the blockage remains. If the vessel is still considerably narrowed, placing a stent may be the next step.      Stents are used to prop open an artery at the site of a narrowing. Stents are generally placed after balloon angioplasty when there is residual narrowing or insufficient blood flow in a treated vessel. Stents are considered a permanent implant and cannot be used if you have a metal allergy. Stents that are used in the leg are constructed of a nickel-titanium alloy (Nitinol), a  memory-shaped metal. This alloy has a predetermined size and shape at body temperature and expands to this size and shape after being introduced through a catheter. These stents resist kinking and are flexible so that damage from activities that involve your legs is minimized.    If surgery is felt to be a better option, your vascular surgeon will obtain any additional X-ray images needed to plan a surgical bypass of the blocked vessel/s and will then conclude the angiogram.    During the procedure    You may get medicine through an IV (intravenous) line to relax you. You re given an injection to numb the insertion site. Then, a tiny skin cut (incision) is made near an artery in your groin.    Your provider inserts a thin tube (catheter) through the incision. He or she then threads the catheter into an artery while looking at a video monitor.    Contrast  dye  is injected into the catheter to confirm position. You may feel warmth or pressure in your legs and back. You lie still as X-rays are taken. The catheter is then taken out.    After the procedure  You ll be taken to a recovery area. A healthcare provider will apply pressure to the site for about 10 minutes. Your healthcare provider will tell you how long to lie down and keep the insertion site still. Your healthcare provider will discuss the results with you soon after the procedure.    Back at home  On the day you get home, don t drive, don t exercise, avoid walking and taking stairs, and avoid bending and lifting. Your healthcare provider may give you other care instructions.    Call your healthcare provider  Call your healthcare provider right away if:    You notice a lump or bleeding at the insertion site    You feel pain at the insertion site    You become lightheaded or dizzy    You have leg pain or numbness    You do not urinate in 8 hours      Angiogram Procedure Discharge Instructions:     1. If you received sedation for your procedure: Do not drive  or operate heavy machinery for the rest of the day.     2. Avoid strenuous activity for 72 hours (3 days):                        - Do not lift greater than 10 pounds.                        - Excessive exercise                        - Straining                        - Return to your normal activities as you tolerate after the 3 days restriction     3. Avoid tub baths, Jacuzzis, hot tubs and pools for 72 hours (3 days) or until puncture site is will healed.     4. You may shower beginning tomorrow. Do not scrub puncture site(s) until well healed, pat dry.     5. You can expect to return to work 1-2 days after your procedure - depending on the nature of your profession.     6. It is normal to have some tenderness and minimal swelling at puncture site. A small area of discoloration may be present. Tenderness typically subsides in 24-48 hours. A small knot may also be present at puncture site for 6-8 weeks, this can be a normal part of the healing process.     After the angiogram, if you:      1. Experience any bleeding or active swelling from puncture site: Lie down, firmly apply pressure to puncture site and CALL 9-1-1     2. Fever greater than 101 degrees Fahrenheit.     3. Redness, swelling, warmth to touch, or purulent (yellow/green/foul smelling) drainage from the puncture site.     4. Increasing pain, tenderness or swelling at puncture site OR of arm/leg near puncture site.     5. Feeling weak or faint.     6. Change in color, temperature, or sensation of arm/leg where puncture was made.     Call us with any other questions or concerns after your procedure: [phone].    You will need to have an ultrasound 2-3 weeks after your angiogram and should be scheduled at the time of your follow up appt.  Further follow up will be based on ultrasound results. Typical follow up is every 3 months for the first year, then every 6 months to one year thereafter.       All invasive procedures can have complications. While  the risk of an angiogram is low it is not zero. The most common complications are related to the arterial access site.      Risks/ Complications     Bruising is Common  You will likely have bruising (ecchymosis) where the artery was entered.      Pain and Bleeding  Less commonly, patients experience pain and bleeding that may include blood collecting under the skin (hematoma).      Blockage and Leakage   In rare cases, the access artery can become blocked. Infrequently, patients experience persistent leakage of blood where the artery was entered, which can result in the formation of a pseudoaneurysm--a blood-filled sac--that may require further treatment.    Other complications related to an angiogram include:   Allergic reaction to the iodine contrast dye, which can lead to the development of kidney failure.  Very rarely during balloon angioplasty and/or stent placement, part of the arterial blockage can break off (embolism) and travel to more distant arteries. This can worsen blood flow.        Notify our office right away, if you have any changes in your health status, or if you develop a cold, flu, diarrhea, infection, fever or sore throat before your scheduled surgery date. We can be reached at [phone].  Monday-Friday 8 am-4:30 pm if you have any questions.   Thank you for choosing [facility]  If you have any questions or need to reschedule your appointment, please call [phone]

## 2022-01-31 NOTE — NURSING NOTE
[facility]        Patient is here for a  follow up  to discuss Peripheral artery disease (PAD). Symptoms include non-healing wound on right foot.     Pt is currently taking Aspirin.    BP (!) 141/68   Pulse 90   Resp 16   Wt 200 lb (90.7 kg)   SpO2 100%   BMI 32.28 kg/m      The provider has been notified that the patient has concerns of pain both lower extremities and new wound to right foot..     Questions patient would like addressed today are: N/A.    Refills are needed: N/A    Has homecare services and agency name:  Milagros Wooten RN

## 2022-01-31 NOTE — TELEPHONE ENCOUNTER
Dr. Parson ordering bilateral LE angiogram with possible intervention.   Pre-op education completed. Routing to IR nurse to schedule.    Patient Education    Procedure: bilateral LE angiogram   Diagnosis: PAD, non-healing wound  Anticoagulation Instruction: continue ASA  Pre-Operative Physical Exam: You need to have a pre-op physical exam within 30 days of your procedure. Your procedure may be cancelled if you do not have a current History and Physical. Call your PCP's office to schedule.  Allergies:  Updated in Epic  Bowel Prep: NA  NPO per protocol.   Post Procedure Education: [facility] patient post-procedure fact sheet reviewed with patient.    COVID-19 instructions for isolation and pre testing reviewed with pt.    Learner(s):patient  Method: Listening  Barriers to Learning:No Barrier   Outcome: Patient did verbalize understanding of above education.    Dipti GAITANN, RN    [facility]  [facility]  Office: [phone]  Fax: [phone]

## 2022-02-01 ENCOUNTER — MEDICAL CORRESPONDENCE (OUTPATIENT)
Dept: HEALTH INFORMATION MANAGEMENT | Facility: CLINIC | Age: 67
End: 2022-02-01
Payer: MEDICARE

## 2022-02-01 DIAGNOSIS — I73.9 PAD (PERIPHERAL ARTERY DISEASE) (H): Primary | ICD-10-CM

## 2022-02-01 RX ORDER — HEPARIN SODIUM 200 [USP'U]/100ML
1 INJECTION, SOLUTION INTRAVENOUS CONTINUOUS PRN
Status: CANCELLED | OUTPATIENT
Start: 2022-02-01

## 2022-02-01 NOTE — TELEPHONE ENCOUNTER
Contacted patient:  [facility].  Check in at 10:00 am  Need pre-op with PCP and COVID test within 4 days. ( number given to patient to schedule)   Must have a .  Instructed patient to consult with PCP regarding insulin hold.  NPO after 7:30 am the morning of procedure.  Instructed to contact me if questions.    rTi Cronni RN  IR nurse clinician  [phone]

## 2022-02-04 ENCOUNTER — OFFICE VISIT (OUTPATIENT)
Dept: URGENT CARE | Facility: URGENT CARE | Age: 67
End: 2022-02-04
Payer: MEDICARE

## 2022-02-04 ENCOUNTER — ANCILLARY PROCEDURE (OUTPATIENT)
Dept: GENERAL RADIOLOGY | Facility: CLINIC | Age: 67
End: 2022-02-04
Attending: PHYSICIAN ASSISTANT
Payer: MEDICARE

## 2022-02-04 VITALS
DIASTOLIC BLOOD PRESSURE: 64 MMHG | SYSTOLIC BLOOD PRESSURE: 136 MMHG | WEIGHT: 200 LBS | BODY MASS INDEX: 32.28 KG/M2 | TEMPERATURE: 98.7 F | OXYGEN SATURATION: 98 %

## 2022-02-04 DIAGNOSIS — M79.674 PAIN OF TOE OF RIGHT FOOT: ICD-10-CM

## 2022-02-04 DIAGNOSIS — M79.674 PAIN OF TOE OF RIGHT FOOT: Primary | ICD-10-CM

## 2022-02-04 PROCEDURE — 73660 X-RAY EXAM OF TOE(S): CPT | Mod: RT | Performed by: RADIOLOGY

## 2022-02-04 PROCEDURE — 99214 OFFICE O/P EST MOD 30 MIN: CPT | Performed by: PHYSICIAN ASSISTANT

## 2022-02-05 NOTE — PROGRESS NOTES
Assessment & Plan     1. Pain of toe of right foot  A very small fissure is noted at MTP joint on plantar aspect.  He has been previously treated with doxycycline and silver sulfadiazine.  Repeat x-ray was taken today, no evidence of osteomyelitis.  MRI more sensitive to detect osteomyelitis although given his symptoms, physical exam and well-appearing state, I have very low suspicion for osteomyelitis.  I recommend that he continue with the silver-cream, could use Aquaphor Vaseline if he is not seeing great improvement with that.  Discussed emergent visit if development of increased redness, or swelling or fever.  Follow-up with podiatry in 1 week if symptoms not improving as expected.  - XR Toe Right G/E 2 Views; Future        Return in about 5 days (around 2/9/2022), or if symptoms worsen or fail to improve, for podiatry.    Diagnosis and treatment plan was reviewed with patient and/or family.   We went over any labs or imaging. Discussed worsening symptoms or little to no relief despite treatment plan to follow-up with PCP or return to clinic.  Patient verbalizes understanding. All questions were addressed and answered.     Randa Desir PA-C  [facility]    CHIEF COMPLAINT:   Chief Complaint   Patient presents with     Urgent Care     Laceration     recheck  seen 1/27/22 [facility]//   toe is still uncomfortable//  sharp stabbing pain was told to have re-xray      Subjective     Rachell is a 66 year old male who presents to clinic today for evaluation of right fourth toe problem.  Patient is not sure how he developed this wound, may have cut it on tray on the floor.  Has seen podiatry, and prescribed silver sulfadiazine cream to use.  Also was prescribed doxycycline for which she took for 5 days.  Continues to have some pain along the area of his cracking foot.  He has had several toes amputated secondary to osteomyelitis, and is concerned about infection.  Has a history of  neuropathy, and does not note any increased numbness or tingling.  He denies having fevers, toe swelling or discharge from the area.    Past Medical History:   Diagnosis Date     Anxiety 2015     Dermatitis 2015     DM type 2, goal A1C 7-8 2015     Does not have health insurance 2015     Financial problems 2015     Glaucoma (increased eye pressure)      HTN, goal below 140/90 2015     Hyperlipidemia LDL goal <100 2015     Microalbuminuria 2015     Onychomycosis 2015     Rash 2015     Past Surgical History:   Procedure Laterality Date     AMPUTATE TOE(S) Right 2018    Procedure: Right fifth toe amputation;  Surgeon: Clark Lora DPM;  Location:  OR     Social History     Tobacco Use     Smoking status: Former Smoker     Types: Cigarettes     Quit date:      Years since quittin.1     Smokeless tobacco: Never Used   Substance Use Topics     Alcohol use: No     Current Outpatient Medications   Medication     ACCU-CHEK GUIDE test strip     alcohol swab prep pads     aspirin 81 MG tablet     BD PEN NEEDLE MICRO U/F 32G X 6 MM miscellaneous     benzonatate (TESSALON) 200 MG capsule     blood glucose (ACCU-CHEK GUIDE) test strip     blood glucose (NO BRAND SPECIFIED) test strip     blood glucose calibration (NO BRAND SPECIFIED) solution     blood glucose monitoring (NO BRAND SPECIFIED) meter device kit     dorzolamide (TRUSOPT) 2 % ophthalmic solution     esomeprazole (NEXIUM) 40 MG DR capsule     gabapentin (NEURONTIN) 300 MG capsule     guaiFENesin-codeine (ROBITUSSIN AC) 100-10 MG/5ML solution     insulin aspart (NOVOLOG FLEXPEN) 100 UNIT/ML pen     insulin glargine (LANTUS SOLOSTAR) 100 UNIT/ML pen     insulin pen needle (31G X 8 MM) 31G X 8 MM miscellaneous     Lido-Pentaf-Tetrafl-Ultrasound (ACCUCAINE) 1 % KIT     lisinopril (ZESTRIL) 20 MG tablet     rosuvastatin (CRESTOR) 20 MG tablet     silver sulfADIAZINE (SILVADENE) 1 % external cream      "simvastatin (ZOCOR) 10 MG tablet     thin (NO BRAND SPECIFIED) lancets     timolol maleate (TIMOPTIC) 0.5 % ophthalmic solution     zolpidem (AMBIEN) 5 MG tablet     No current facility-administered medications for this visit.     Allergies   Allergen Reactions     Aleve      HA sweats     Citalopram Itching     Clopidogrel Nausea and Vomiting     Pt to restart on 11/25/15 to see again if he tolerates it or not. His sx were only GI. Not a true allergy     Iodine      Naproxen Itching     About 10 yrs ago, itching all over from taking Aleve  \"get sick and really sick\"       Sulfamethoxazole-Trimethoprim      Other reaction(s): Renal Failure         10 point ROS of systems were all negative except for pertinent positives noted in my HPI.      Exam:   /64 (BP Location: Left arm, Patient Position: Chair, Cuff Size: Adult Regular)   Temp 98.7  F (37.1  C) (Tympanic)   Wt 90.7 kg (200 lb)   SpO2 98%   BMI 32.28 kg/m    Gen: healthy,alert,no distress  Extremity: right 4th toe at MTP joint has fissure in the crease. NO surrounding erythema or swelling. No weeping or drainage. NO fluctuance, induration or crepitus.  There is not compromise to the distal circulation.  Pulses are +2 and CRT is brisk  GENERAL APPEARANCE: healthy, alert and no distress  EXTREMITIES: peripheral pulses normal  SKIN: no suspicious lesions or rashes  NEURO: Normal strength and tone, sensory exam grossly normal, mentation intact and speech normal    Results for orders placed or performed in visit on 02/04/22   XR Toe Right G/E 2 Views     Status: None    Narrative    EXAM: XR TOE RIGHT G/E 2 VIEWS  LOCATION: [facility]  DATE/TIME: 2/4/2022 7:19 PM    INDICATION: Pain. Evaluate for osteomyelitis.  COMPARISON: Radiographs from 12/23/2021.      Impression    IMPRESSION: Fifth toe resection again noted. Extensive arterial calcifications again noted. Deformity in the great toe again noted. No new findings. No radiographic " evidence of osteomyelitis.

## 2022-02-05 NOTE — PATIENT INSTRUCTIONS
Xr looks OK per my read, I will call you if the radiologist comes back with a different reading  Use silver sulfadiazine cream from your podiatrist, or use aquaphor on the area  Return to the clinic if you have increased redness or swelling or develop a fever

## 2022-02-07 ENCOUNTER — LAB (OUTPATIENT)
Dept: LAB | Facility: CLINIC | Age: 67
End: 2022-02-07
Attending: RADIOLOGY
Payer: MEDICARE

## 2022-02-07 ENCOUNTER — TELEPHONE (OUTPATIENT)
Dept: FAMILY MEDICINE | Facility: CLINIC | Age: 67
End: 2022-02-07

## 2022-02-07 DIAGNOSIS — I73.9 PAD (PERIPHERAL ARTERY DISEASE) (H): ICD-10-CM

## 2022-02-07 PROCEDURE — U0003 INFECTIOUS AGENT DETECTION BY NUCLEIC ACID (DNA OR RNA); SEVERE ACUTE RESPIRATORY SYNDROME CORONAVIRUS 2 (SARS-COV-2) (CORONAVIRUS DISEASE [COVID-19]), AMPLIFIED PROBE TECHNIQUE, MAKING USE OF HIGH THROUGHPUT TECHNOLOGIES AS DESCRIBED BY CMS-2020-01-R: HCPCS

## 2022-02-07 NOTE — TELEPHONE ENCOUNTER
Pt called he needs new shoes for diabetes     [facility] / Phone: [phone]    Pt was supposed to  shoes today     Maybe was faxed a week ago but he is not certain     He cannot  shoes as they did not receive form back     Please advise did you receive a form from [facility]     Pt wants a call back, requesting Live Mederos RN  [facility]

## 2022-02-08 ENCOUNTER — MEDICAL CORRESPONDENCE (OUTPATIENT)
Dept: HEALTH INFORMATION MANAGEMENT | Facility: CLINIC | Age: 67
End: 2022-02-08
Payer: MEDICARE

## 2022-02-08 LAB — SARS-COV-2 RNA RESP QL NAA+PROBE: NEGATIVE

## 2022-02-08 NOTE — TELEPHONE ENCOUNTER
TC: Can you please call patient to schedule OV with provider for diabetic followup.     Abilio Rhoades RN  [facility]

## 2022-02-08 NOTE — TELEPHONE ENCOUNTER
Form received on 2/7/22 for Dr. Diaz.  Pt had OV on 12/23/21 with Dr. Diaz for Diabetes follow up.  Dr. Diaz completed form. Form faxed back to  [facility]

## 2022-02-08 NOTE — TELEPHONE ENCOUNTER
I recall filling such FORM for patient if received, also patient last seen in 2/2021, has Been 4 X NO SHOWS.    Patient will need follow up for diabetes care for us to certify anything related to his diabetes care and that he is following a management plan for such., and this is part of the Form certification.

## 2022-02-09 ENCOUNTER — OFFICE VISIT (OUTPATIENT)
Dept: FAMILY MEDICINE | Facility: CLINIC | Age: 67
End: 2022-02-09
Payer: MEDICARE

## 2022-02-09 VITALS
DIASTOLIC BLOOD PRESSURE: 71 MMHG | SYSTOLIC BLOOD PRESSURE: 134 MMHG | WEIGHT: 197.3 LBS | RESPIRATION RATE: 16 BRPM | BODY MASS INDEX: 31.71 KG/M2 | TEMPERATURE: 98.1 F | OXYGEN SATURATION: 98 % | HEART RATE: 88 BPM | HEIGHT: 66 IN

## 2022-02-09 DIAGNOSIS — G62.89 OTHER SPECIFIED POLYNEUROPATHIES: ICD-10-CM

## 2022-02-09 DIAGNOSIS — Z91.89 AT RISK FOR CORONARY ARTERY DISEASE: ICD-10-CM

## 2022-02-09 DIAGNOSIS — I73.9 PAD (PERIPHERAL ARTERY DISEASE) (H): ICD-10-CM

## 2022-02-09 DIAGNOSIS — Z01.818 PREOP GENERAL PHYSICAL EXAM: Primary | ICD-10-CM

## 2022-02-09 DIAGNOSIS — E78.5 HYPERLIPIDEMIA LDL GOAL <100: ICD-10-CM

## 2022-02-09 DIAGNOSIS — R74.8 ABNORMAL LEVELS OF OTHER SERUM ENZYMES: ICD-10-CM

## 2022-02-09 DIAGNOSIS — Z11.59 NEED FOR HEPATITIS C SCREENING TEST: ICD-10-CM

## 2022-02-09 PROBLEM — N18.30 CKD (CHRONIC KIDNEY DISEASE) STAGE 3, GFR 30-59 ML/MIN (H): Status: RESOLVED | Noted: 2020-09-01 | Resolved: 2022-02-09

## 2022-02-09 PROBLEM — J44.9 COPD (CHRONIC OBSTRUCTIVE PULMONARY DISEASE) (H): Status: RESOLVED | Noted: 2020-11-06 | Resolved: 2022-02-09

## 2022-02-09 PROCEDURE — 36415 COLL VENOUS BLD VENIPUNCTURE: CPT | Performed by: INTERNAL MEDICINE

## 2022-02-09 PROCEDURE — 99215 OFFICE O/P EST HI 40 MIN: CPT | Performed by: INTERNAL MEDICINE

## 2022-02-09 PROCEDURE — 80048 BASIC METABOLIC PNL TOTAL CA: CPT | Performed by: INTERNAL MEDICINE

## 2022-02-09 PROCEDURE — 80061 LIPID PANEL: CPT | Performed by: INTERNAL MEDICINE

## 2022-02-09 PROCEDURE — 86803 HEPATITIS C AB TEST: CPT | Performed by: INTERNAL MEDICINE

## 2022-02-09 PROCEDURE — 93000 ELECTROCARDIOGRAM COMPLETE: CPT | Performed by: INTERNAL MEDICINE

## 2022-02-09 RX ORDER — ROSUVASTATIN CALCIUM 10 MG/1
10 TABLET, COATED ORAL DAILY
Qty: 90 TABLET | Refills: 3 | Status: SHIPPED | OUTPATIENT
Start: 2022-02-09 | End: 2023-01-31

## 2022-02-09 ASSESSMENT — MIFFLIN-ST. JEOR: SCORE: 1622.7

## 2022-02-09 ASSESSMENT — PAIN SCALES - GENERAL: PAINLEVEL: SEVERE PAIN (6)

## 2022-02-09 NOTE — PROGRESS NOTES
[facility]  [address] [place] [address], [address]  [facility] [address]  Phone: [phone]  Primary Provider: Olivia Nichols  Pre-op Performing Provider: PATTIE VELÁZQUEZ      PREOPERATIVE EVALUATION:  Today's date: 2/9/2022    Rachell Paige is a 66 year old male who presents for a preoperative evaluation.    Surgical Information:  Surgery/Procedure: Peripheral Angiography  Surgery Location: [facility]  Surgeon: Dr. Knight  Surgery Date: 2-  Time of Surgery: TBD  Where patient plans to recover: At home with family  Fax number for surgical facility: Note does not need to be faxed, will be available electronically in Epic.    Type of Anesthesia Anticipated: would anticipate minimal/local    Assessment & Plan     The proposed surgical procedure is considered LOW risk.    Preop general physical exam  Patient is planning angiogram with potential stent placement.  Ok to proceed.      Reduce Lantus by 50% evening prior to procedure and no log the morning of procedure  - EKG 12-lead complete w/read - Clinics    PAD (peripheral artery disease) (H)  Urged continued use of statin and ASA.  DM optimization is also gusman in controlling the PAD  - EKG 12-lead complete w/read - Clinics    At risk for coronary artery disease    EKG today is reassuring. It is unchanged prior to September 2021.  His echo from last fall was also reviewed.    I believe he is fine to proceed with the low risk vertebral angiogram that's scheduled for Friday, however he really should have further cardiac work-up. His age, hypertension, hyperlipidemia and diabetes are all risk factors. It does look like his schedule stress test, but didn't show up for it. We did refer him again today and stressed the importance of follow thru.      - Basic metabolic panel  (Ca, Cl, CO2, Creat, Gluc, K, Na, BUN)  - Adult Cardiology Eval Referral  - Basic metabolic panel  (Ca, Cl, CO2, Creat, Gluc, K, Na, BUN)    Need for hepatitis C  screening test    - Hepatitis C Screen Reflex to HCV RNA Quant and Genotype  - Hepatitis C Screen Reflex to HCV RNA Quant and Genotype    Hyperlipidemia LDL goal <100  Felt sleepless with 20mg, trial 10mg.  Rx sent.    - rosuvastatin (CRESTOR) 10 MG tablet  Dispense: 90 tablet; Refill: 3         Risks and Recommendations:  The patient has the following additional risks and recommendations for perioperative complications:  Diabetes:  - Patient is on insulin therapy; diabetic NPO guidelines provided and discussed.    Medication Instructions:  see AVS    RECOMMENDATION:  APPROVAL GIVEN to proceed with proposed procedure, without further diagnostic evaluation.    52 minutes spent on the date of the encounter doing chart review, review of outside records, interpretation of tests, patient visit and documentation         Subjective     HPI related to upcoming procedure:   Patient is new to me, established in clinic.  He has insulin dependant diabetes, h/o toe amputations, PAD  Has an extremity wound that has been slow to heal.  W/u showed significantly abnormal PIYUSH.      Patient is concerned, some of which we attempted to address today.    He notes his statin was changed to rosuvastatin.  He states he has been feeling jittery and unable to sleep and therefore stopped a couple days ago.    He feels that his diabetes is under reasonable control at present.  Recent blood sugars he says are in the low 100s.  His last A1c from December is reviewed.    Regarding his peripheral arterial disease, this has been a known issue for a number of years.  Previous chart review shows he did have peripheral angiography through an outside system with [facility]    We note an allergy to iodine but he has tolerated contrast without issues.    With regards to his exercise tolerance, this is limited by his prior amputations.  He denies chest pain palpitations orthopnea or exertional dyspnea.    He did have an echo done last September which is  reviewed.  EF was 55%.  Echo quality was suboptimal.    Preop Questions 2/9/2022   1. Have you ever had a heart attack or stroke? No   2. Have you ever had surgery on your heart or blood vessels, such as a stent placement, a coronary artery bypass, or surgery on an artery in your head, neck, heart, or legs? YES -    3. Do you have chest pain with activity? No   4. Do you have a history of  heart failure? No   5. Do you currently have a cold, bronchitis or symptoms of other infection? No   6. Do you have a cough, shortness of breath, or wheezing? No   7. Do you or anyone in your family have previous history of blood clots? No   8. Do you or does anyone in your family have a serious bleeding problem such as prolonged bleeding following surgeries or cuts? No   9. Have you ever had problems with anemia or been told to take iron pills? No   10. Have you had any abnormal blood loss such as black, tarry or bloody stools? No   11. Have you ever had a blood transfusion? No   12. Are you willing to have a blood transfusion if it is medically needed before, during, or after your surgery? Yes   13. Have you or any of your relatives ever had problems with anesthesia? No   14. Do you have sleep apnea, excessive snoring or daytime drowsiness? No   15. Do you have any artifical heart valves or other implanted medical devices like a pacemaker, defibrillator, or continuous glucose monitor? No   16. Do you have artificial joints? No   17. Are you allergic to latex? No       Health Care Directive:  Patient does not have a Health Care Directive or Living Will: Discussed advance care planning with patient; however, patient declined at this time.    Preoperative Review of :   reviewed - no record of controlled substances prescribed.          Review of Systems  CONSTITUTIONAL: NEGATIVE for fever, chills, change in weight  INTEGUMENTARY/SKIN: NEGATIVE for worrisome rashes, moles or lesions  EYES: NEGATIVE for vision changes or  irritation  ENT/MOUTH: NEGATIVE for ear, mouth and throat problems  RESP: NEGATIVE for significant cough or SOB  CV: NEGATIVE for chest pain, palpitations or peripheral edema  GI: NEGATIVE for nausea, abdominal pain, heartburn, or change in bowel habits  : NEGATIVE for frequency, dysuria, or hematuria  MUSCULOSKELETAL: NEGATIVE for significant arthralgias or myalgia  NEURO: NEGATIVE for weakness, dizziness or paresthesias  ENDOCRINE: NEGATIVE for temperature intolerance, skin/hair changes  HEME: NEGATIVE for bleeding problems  PSYCHIATRIC: NEGATIVE for changes in mood or affect    Patient Active Problem List    Diagnosis Date Noted     PAD (peripheral artery disease) (H) 01/26/2022     Priority: Medium     Dyspnea on exertion 09/28/2021     Priority: Medium     Other fatigue 09/28/2021     Priority: Medium     S/P amputation of lesser toe, right (H) 11/06/2020     Priority: Medium     Shortness of breath 10/26/2020     Priority: Medium     Hyponatremia 10/26/2020     Priority: Medium     Chest pain, unspecified type 10/26/2020     Priority: Medium     Epigastric pain 10/11/2020     Priority: Medium     Generalized muscle weakness 10/11/2020     Priority: Medium     2019 novel coronavirus disease (COVID-19) 10/11/2020     Priority: Medium     Atypical chest pain 04/21/2017     Priority: Medium     Microalbuminuria 04/18/2015     Priority: Medium     Onychomycosis 04/18/2015     Priority: Medium     Dermatitis 04/18/2015     Priority: Medium     Does not have health insurance 04/18/2015     Priority: Medium     Type 2 diabetes mellitus with hemoglobin A1c goal of 7.0%-8.0% (H) 02/23/2015     Priority: Medium     HTN, goal below 140/90 02/23/2015     Priority: Medium     Hyperlipidemia LDL goal <100 02/23/2015     Priority: Medium     Financial problems 02/23/2015     Priority: Medium     Anxiety 02/23/2015     Priority: Medium     Rash 02/23/2015     Priority: Medium      Past Medical History:   Diagnosis Date      Anxiety 2/23/2015     Dermatitis 4/18/2015     DM type 2, goal A1C 7-8 2/23/2015     Does not have health insurance 4/18/2015     Financial problems 2/23/2015     Glaucoma (increased eye pressure)      HTN, goal below 140/90 2/23/2015     Hyperlipidemia LDL goal <100 2/23/2015     Microalbuminuria 4/18/2015     Onychomycosis 4/18/2015     Rash 2/23/2015     Past Surgical History:   Procedure Laterality Date     AMPUTATE TOE(S) Right 12/31/2018    Procedure: Right fifth toe amputation;  Surgeon: Clark Lora DPM;  Location: [facility] OR     Current Outpatient Medications   Medication Sig Dispense Refill     ACCU-CHEK GUIDE test strip USE TO TEST BLOOD SUGAR TWO TIMES A DAY OR AS DIRECTED 100 strip 1     alcohol swab prep pads Use to swab area of injection/pedro pablo as directed. 120 each 6     aspirin 81 MG tablet Take 1 tablet (81 mg) by mouth daily 30 tablet OTC     BD PEN NEEDLE MICRO U/F 32G X 6 MM miscellaneous USE 3 PEN NEEDLES DAILY OR AS DIRECTED. 300 each 3     blood glucose (ACCU-CHEK GUIDE) test strip 1 strip by In Vitro route 4 times daily Use to test blood sugar 3 -4  times daily or as directed. 400 strip 0     blood glucose (NO BRAND SPECIFIED) test strip Use to test blood sugar 4 times daily or as directed. To accompany: Blood Glucose Monitor Brands: per insurance. 150 strip 6     blood glucose calibration (NO BRAND SPECIFIED) solution To accompany: Blood Glucose Monitor Brands: per insurance. 1 Bottle 3     blood glucose monitoring (NO BRAND SPECIFIED) meter device kit Use to test blood sugar 4 times daily or as directed. Preferred blood glucose meter OR supplies to accompany: Blood Glucose Monitor Brands: per insurance. 1 kit 0     dorzolamide (TRUSOPT) 2 % ophthalmic solution Place 1 drop into both eyes 2 times daily 10 mL 1     gabapentin (NEURONTIN) 300 MG capsule TAKE 1 CAPSULE (300 MG) BY MOUTH AT BEDTIME 90 capsule 0     insulin aspart (NOVOLOG FLEXPEN) 100 UNIT/ML pen Inject 1-10 Units  "Subcutaneous 2 times daily (with meals) With lunch and dinner 3 mL 0     insulin glargine (LANTUS SOLOSTAR) 100 UNIT/ML pen Inject 50 Units Subcutaneous At Bedtime 15 mL 0     insulin pen needle (31G X 8 MM) 31G X 8 MM miscellaneous Use 3 pen needles daily or as directed. 300 each 3     lisinopril (ZESTRIL) 20 MG tablet Take 1 tablet (20 mg) by mouth daily 30 tablet 11     rosuvastatin (CRESTOR) 10 MG tablet Take 1 tablet (10 mg) by mouth daily 90 tablet 3     thin (NO BRAND SPECIFIED) lancets Use with lanceting device. To accompany: Blood Glucose Monitor Brands: per insurance. 200 each 6     timolol maleate (TIMOPTIC) 0.5 % ophthalmic solution Place 1 drop into both eyes 2 times daily 5 mL 1     zolpidem (AMBIEN) 5 MG tablet Take 1 tablet (5 mg) by mouth nightly as needed for sleep 12 tablet 0       Allergies   Allergen Reactions     Aleve      HA sweats     Citalopram Itching     Clopidogrel Nausea and Vomiting     Pt to restart on 11/25/15 to see again if he tolerates it or not. His sx were only GI. Not a true allergy     Iodine      Naproxen Itching     About 10 yrs ago, itching all over from taking Aleve  \"get sick and really sick\"       Sulfamethoxazole-Trimethoprim      Other reaction(s): Renal Failure          Social History     Tobacco Use     Smoking status: Former Smoker     Types: Cigarettes     Quit date:      Years since quittin.1     Smokeless tobacco: Never Used   Substance Use Topics     Alcohol use: No       History   Drug Use No         Objective     /71 (BP Location: Right arm, Patient Position: Sitting, Cuff Size: Adult Large)   Pulse 88   Temp 98.1  F (36.7  C) (Tympanic)   Resp 16   Ht 1.676 m (5' 6\")   Wt 89.5 kg (197 lb 4.8 oz)   SpO2 98%   BMI 31.85 kg/m      Physical Exam    GENERAL APPEARANCE: healthy, alert and no distress     EYES: EOMI,  PERRL     HENT: ear canals and TM's normal and nose and mouth without ulcers or lesions     NECK: no adenopathy, no asymmetry, " masses, or scars and thyroid normal to palpation     RESP: lungs clear to auscultation - no rales, rhonchi or wheezes     CV: regular rates and rhythm, normal S1 S2, no S3 or S4 and no murmur, click or rub     ABDOMEN:  soft, nontender, no HSM or masses and bowel sounds normal     MS: extremities normal- no gross deformities noted, no evidence of inflammation in joints, FROM in all extremities.     SKIN: no suspicious lesions or rashes     NEURO: Normal strength and tone, sensory exam grossly normal, mentation intact and speech normal     PSYCH: mentation appears normal. and affect normal/bright     LYMPHATICS: No cervical adenopathy    Recent Labs   Lab Test 12/23/21  1643 10/01/21  2008 09/28/21  1814 10/19/20  0613 10/18/20  0617 10/17/20  0803   HGB 15.7 13.5 14.0   < > 14.1 14.1    304 327   < > 424 414   INR  --   --   --   --  1.10 1.06    135 136   < > 131* 132*   POTASSIUM 5.2 4.4 4.4   < > 4.2 4.4   CR 1.27* 1.33* 1.28*   < > 1.22 1.11   A1C 8.1*  --  7.5*  --   --   --     < > = values in this interval not displayed.        Diagnostics:  Labs pending at this time.  Results will be reviewed when available.   EKG: appears normal, NSR, normal axis, normal intervals, no acute ST/T changes c/w ischemia, no LVH by voltage criteria, unchanged from previous tracings    Revised Cardiac Risk Index (RCRI):  The patient has the following serious cardiovascular risks for perioperative complications:   - Diabetes Mellitus (on Insulin) = 1 point     RCRI Interpretation: 1 point: Class II (low risk - 0.9% complication rate)           Signed Electronically by: Brad Boyce MD  Copy of this evaluation report is provided to requesting physician.

## 2022-02-09 NOTE — PATIENT INSTRUCTIONS
The EKG is similar to prior.  This is reassuring.    LABS today.    LANTUS insulin:  Reduce to 25 units the evening before the procedure (night of 2/10)    NOVOLOG: don't take any on Friday morning.    You can take your blood pressure medications.      FOLLOWUP:  Given the complexity of your medical issues I highly recommend seeing your PCP every 3 months.    Additionally, we have placed another referral to Cardiology.

## 2022-02-10 LAB
ANION GAP SERPL CALCULATED.3IONS-SCNC: 4 MMOL/L (ref 3–14)
BUN SERPL-MCNC: 22 MG/DL (ref 7–30)
CALCIUM SERPL-MCNC: 8.8 MG/DL (ref 8.5–10.1)
CHLORIDE BLD-SCNC: 103 MMOL/L (ref 94–109)
CHOLEST SERPL-MCNC: 123 MG/DL
CO2 SERPL-SCNC: 26 MMOL/L (ref 20–32)
CREAT SERPL-MCNC: 1.45 MG/DL (ref 0.66–1.25)
FASTING STATUS PATIENT QL REPORTED: ABNORMAL
GFR SERPL CREATININE-BSD FRML MDRD: 53 ML/MIN/1.73M2
GLUCOSE BLD-MCNC: 268 MG/DL (ref 70–99)
HCV AB SERPL QL IA: NONREACTIVE
HDLC SERPL-MCNC: 42 MG/DL
LDLC SERPL CALC-MCNC: 47 MG/DL
NONHDLC SERPL-MCNC: 81 MG/DL
POTASSIUM BLD-SCNC: 4.6 MMOL/L (ref 3.4–5.3)
SODIUM SERPL-SCNC: 133 MMOL/L (ref 133–144)
TRIGL SERPL-MCNC: 172 MG/DL

## 2022-02-11 ENCOUNTER — TELEPHONE (OUTPATIENT)
Dept: OTHER | Facility: CLINIC | Age: 67
End: 2022-02-11
Payer: MEDICARE

## 2022-02-11 NOTE — TELEPHONE ENCOUNTER
Pt called to reschedule cancelled angiogram from this morning with Dr. Knight but is wanting to reschedule now. Please contact the patient.

## 2022-02-14 DIAGNOSIS — I73.9 PAD (PERIPHERAL ARTERY DISEASE) (H): Primary | ICD-10-CM

## 2022-02-14 NOTE — TELEPHONE ENCOUNTER
Contacted patient to reschedule angiogram with Dr. Knight.  Scheduled for 2/28: check in at 9 am at [facility].  Patient aware needs COVID test within 4 days, NPO for 4 hours, needs a .  Instructions mailed to patient.  Tri Cronin RN  IR nurse clinician  [phone]

## 2022-02-17 ENCOUNTER — HOSPITAL ENCOUNTER (EMERGENCY)
Facility: CLINIC | Age: 67
Discharge: HOME | End: 2022-02-17
Attending: EMERGENCY MEDICINE | Admitting: EMERGENCY MEDICINE
Payer: MEDICARE

## 2022-02-17 VITALS
DIASTOLIC BLOOD PRESSURE: 75 MMHG | RESPIRATION RATE: 16 BRPM | OXYGEN SATURATION: 100 % | SYSTOLIC BLOOD PRESSURE: 143 MMHG | TEMPERATURE: 99 F | HEART RATE: 84 BPM

## 2022-02-17 DIAGNOSIS — S91.111A LACERATION OF RIGHT GREAT TOE WITHOUT FOREIGN BODY PRESENT OR DAMAGE TO NAIL, INITIAL ENCOUNTER: ICD-10-CM

## 2022-02-17 PROCEDURE — 250N000013 HC RX MED GY IP 250 OP 250 PS 637: Performed by: EMERGENCY MEDICINE

## 2022-02-17 PROCEDURE — 12001 RPR S/N/AX/GEN/TRNK 2.5CM/<: CPT

## 2022-02-17 PROCEDURE — 99283 EMERGENCY DEPT VISIT LOW MDM: CPT

## 2022-02-17 RX ORDER — CEPHALEXIN 500 MG/1
500 CAPSULE ORAL ONCE
Status: COMPLETED | OUTPATIENT
Start: 2022-02-17 | End: 2022-02-17

## 2022-02-17 RX ORDER — CEPHALEXIN 500 MG/1
500 CAPSULE ORAL 4 TIMES DAILY
Qty: 12 CAPSULE | Refills: 0 | Status: SHIPPED | OUTPATIENT
Start: 2022-02-17 | End: 2022-02-20

## 2022-02-17 RX ADMIN — CEPHALEXIN 500 MG: 500 CAPSULE ORAL at 22:54

## 2022-02-18 NOTE — ED TRIAGE NOTES
40 mins pTA pt cut right big toe with toe nail clipper and states it would not stop bleeding. Bleeding controlled in triage. Hx DM.

## 2022-02-18 NOTE — ED PROVIDER NOTES
"[facility] Emergency Medicine Note:    History     Chief Complaint:  Laceration      HPI: Rachell Paige is a 66 year old male who presents for evaluation of laceration to his toe. He reports that he was cutting his toenails just prior to arrival. He cut his toe instead. This started bleeding and he was unable to get the bleeding to stop prompting him to come to the emergency department. He states he is otherwise been feeling well with no complaints. He has known peripheral artery disease and states that he is awaiting treatment for this.    Allergies:  Allergies   Allergen Reactions     Aleve      HA sweats     Citalopram Itching     Clopidogrel Nausea and Vomiting     Pt to restart on 11/25/15 to see again if he tolerates it or not. His sx were only GI. Not a true allergy     Iodine      Naproxen Itching     About 10 yrs ago, itching all over from taking Aleve  \"get sick and really sick\"       Sulfamethoxazole-Trimethoprim      Other reaction(s): Renal Failure         Medications:    ACCU-CHEK GUIDE test strip  alcohol swab prep pads  aspirin 81 MG tablet  BD PEN NEEDLE MICRO U/F 32G X 6 MM miscellaneous  blood glucose (ACCU-CHEK GUIDE) test strip  blood glucose (NO BRAND SPECIFIED) test strip  blood glucose calibration (NO BRAND SPECIFIED) solution  blood glucose monitoring (NO BRAND SPECIFIED) meter device kit  dorzolamide (TRUSOPT) 2 % ophthalmic solution  gabapentin (NEURONTIN) 300 MG capsule  insulin aspart (NOVOLOG FLEXPEN) 100 UNIT/ML pen  insulin glargine (LANTUS SOLOSTAR) 100 UNIT/ML pen  insulin pen needle (31G X 8 MM) 31G X 8 MM miscellaneous  lisinopril (ZESTRIL) 20 MG tablet  rosuvastatin (CRESTOR) 10 MG tablet  thin (NO BRAND SPECIFIED) lancets  timolol maleate (TIMOPTIC) 0.5 % ophthalmic solution  zolpidem (AMBIEN) 5 MG tablet        Problem List:    Patient Active Problem List    Diagnosis Date Noted     PAD (peripheral artery disease) (H) 01/26/2022     Priority: Medium     Dyspnea on " exertion 09/28/2021     Priority: Medium     Other fatigue 09/28/2021     Priority: Medium     S/P amputation of lesser toe, right (H) 11/06/2020     Priority: Medium     Shortness of breath 10/26/2020     Priority: Medium     Hyponatremia 10/26/2020     Priority: Medium     Chest pain, unspecified type 10/26/2020     Priority: Medium     Epigastric pain 10/11/2020     Priority: Medium     Generalized muscle weakness 10/11/2020     Priority: Medium     2019 novel coronavirus disease (COVID-19) 10/11/2020     Priority: Medium     Atypical chest pain 04/21/2017     Priority: Medium     Microalbuminuria 04/18/2015     Priority: Medium     Onychomycosis 04/18/2015     Priority: Medium     Dermatitis 04/18/2015     Priority: Medium     Does not have health insurance 04/18/2015     Priority: Medium     Type 2 diabetes mellitus with hemoglobin A1c goal of 7.0%-8.0% (H) 02/23/2015     Priority: Medium     HTN, goal below 140/90 02/23/2015     Priority: Medium     Hyperlipidemia LDL goal <100 02/23/2015     Priority: Medium     Financial problems 02/23/2015     Priority: Medium     Anxiety 02/23/2015     Priority: Medium     Rash 02/23/2015     Priority: Medium       Past Medical History:    Past Medical History:   Diagnosis Date     Anxiety 02/23/2015     Dermatitis 04/18/2015     DM type 2, goal A1C 7-8 02/23/2015     Financial problems 02/23/2015     Glaucoma (increased eye pressure)      HTN, goal below 140/90 02/23/2015     Hyperlipidemia LDL goal <100 02/23/2015     Microalbuminuria 04/18/2015     Onychomycosis 04/18/2015     Rash 02/23/2015       Past Surgical History:    Past Surgical History:   Procedure Laterality Date     AMPUTATE TOE(S) Right 12/31/2018    Procedure: Right fifth toe amputation;  Surgeon: Clark Lora DPM;  Location: [facility] OR       Family History:    Family History   Problem Relation Age of Onset     Diabetes Other      LUNG DISEASE Mother      Glaucoma Father      Cerebrovascular Disease Son       Diabetes Son      Diabetes Daughter        Social History:  Social History     Tobacco Use     Smoking status: Former Smoker     Types: Cigarettes     Quit date:      Years since quittin.1     Smokeless tobacco: Never Used   Substance Use Topics     Alcohol use: No     Drug use: No       Review of Systems  See HPI, a 10 point review of systems was performed and is otherwise negative except as noted in HPI.     Physical Exam   Vital signs:  Patient Vitals for the past 24 hrs:   BP Temp Temp src Pulse Resp SpO2   22 2129 (!) 182/87 99  F (37.2  C) Temporal 84 16 100 %       Physical Exam:  Nursing note and vitals reviewed.    Constitutional: Pleasant and well groomed.          HENT:    Eyes: Conjunctivae normal are normal. No scleral icterus.   Cardiovascular: Peripheral pulse with normal rate, regular rhythm. Intact distal pulses.   Pulmonary/Chest: Effort normal    Musculoskeletal: Right foot-status post amputation of the fifth ray. Healing wound at the base of the plantar surface of the fourth toe. There is a small V-shaped laceration at the distal great toe. At this time he does have hemostasis.  Foot is warm with normal color.  Neurological:Alert. Coordination normal.   Skin: Skin is warm and dry.   Psychiatric: Normal mood and affect.       Emergency Department Course   Interventions:  Keflex 500 mg PO    Emergency Department Course:  I performed the above history and physical examination.   The wound was irrigated.  There is no further hemostasis.  It was closed with tissue adhesive.  Band-Aid was placed.  I explained the plan for treatment, follow up and indications for return to the ED.     Impression & Plan      CMS Diagnoses: none       Medical Decision Making:  Rachell Paige is a 66 year old male known peripheral artery disease who states he is planned for a bypass later this month.  He has no new symptoms with the foot with the exception of the laceration while cutting his toe.  The  laceration was irrigated with no further hemostasis.  There is no indication for suturing at this time but I did address with tissue adhesive.  He understands the importance of watching very carefully for signs of infection.  I did place him on a short course of prophylactic antibiotic.    Critical Care time:  none    Diagnosis:    ICD-10-CM    1. Laceration of right great toe without foreign body present or damage to nail, initial encounter  S91.111A        Disposition:  discharged to home    Discharge Medications:  New Prescriptions    CEPHALEXIN (KEFLEX) 500 MG CAPSULE    Take 1 capsule (500 mg) by mouth 4 times daily for 3 days          Kathy Rendon MD  02/17/22 1954

## 2022-02-18 NOTE — DISCHARGE INSTRUCTIONS
Keep wound clean and covered.  Limit activity for the next 24 hours.  Dressing change and examined the wound daily.  No antibiotic ointments for the first 2 to 3 days.  Take antibiotic as prescribed.    Follow-up with podiatry next week if you have any concerns of the wound is not healing properly.  Return to the emergency department with new or worsening symptoms especially if you have concerns about the wound becoming infected as discussed.

## 2022-02-21 ENCOUNTER — TELEPHONE (OUTPATIENT)
Dept: INTERVENTIONAL RADIOLOGY/VASCULAR | Facility: CLINIC | Age: 67
End: 2022-02-21
Payer: MEDICARE

## 2022-02-21 NOTE — TELEPHONE ENCOUNTER
Pre-call completed on 2/28. NPO at 0500. Will arrive at 0900 for a 1030 case. COVID test order and number left. Blood Thinners : none. Pt will have a . No further questions or concerns.     ALESSANDRO Bernstein

## 2022-02-24 RX ORDER — FENTANYL CITRATE 50 UG/ML
25-50 INJECTION, SOLUTION INTRAMUSCULAR; INTRAVENOUS EVERY 5 MIN PRN
Status: CANCELLED | OUTPATIENT
Start: 2022-02-24

## 2022-02-24 RX ORDER — NALOXONE HYDROCHLORIDE 0.4 MG/ML
0.4 INJECTION, SOLUTION INTRAMUSCULAR; INTRAVENOUS; SUBCUTANEOUS
Status: CANCELLED | OUTPATIENT
Start: 2022-02-24

## 2022-02-24 RX ORDER — FLUMAZENIL 0.1 MG/ML
0.2 INJECTION, SOLUTION INTRAVENOUS
Status: CANCELLED | OUTPATIENT
Start: 2022-02-24

## 2022-02-24 RX ORDER — NALOXONE HYDROCHLORIDE 0.4 MG/ML
0.2 INJECTION, SOLUTION INTRAMUSCULAR; INTRAVENOUS; SUBCUTANEOUS
Status: CANCELLED | OUTPATIENT
Start: 2022-02-24

## 2022-02-24 NOTE — PROGRESS NOTES
CARDIOLOGY VISIT    REASON FOR VISIT: Hypertension, risk factor management    SUBJECTIVE:  66-year-old male seen for hypertension.  He has diabetes type 2, PVD with toe amputations, peripheral neuropathy, dyslipidemia.    Nuclear stress 2017 showed normal perfusion.    Echo September 2021 showed EF 50 to 55%, no valve disease.    Exercise ABIs October 2021 showed right 0.85, post exercise 0.92, left 0.85, post exercise noncompressible.  Carotid ultrasound showed less than 50% right and 50 to 69% left ICA disease.    Lately he has been feeling okay.  He is somewhat limited in his walking due to his peripheral arterial disease.  He is scheduled for a peripheral angiogram next week.    He denies any chest pain, but does have some dyspnea with exertion.  Blood pressure tends to run 120-130.  He has a lot of stressors in his life, his wife recently had a stroke.  He has also lost 2 sons, 1 to a gunshot, and 1 to a stroke in the past few years.    MEDICATIONS:  Current Outpatient Medications   Medication     ACCU-CHEK GUIDE test strip     alcohol swab prep pads     aspirin 81 MG tablet     BD PEN NEEDLE MICRO U/F 32G X 6 MM miscellaneous     blood glucose (ACCU-CHEK GUIDE) test strip     blood glucose (NO BRAND SPECIFIED) test strip     blood glucose calibration (NO BRAND SPECIFIED) solution     blood glucose monitoring (NO BRAND SPECIFIED) meter device kit     dorzolamide (TRUSOPT) 2 % ophthalmic solution     gabapentin (NEURONTIN) 300 MG capsule     insulin aspart (NOVOLOG FLEXPEN) 100 UNIT/ML pen     insulin glargine (LANTUS SOLOSTAR) 100 UNIT/ML pen     insulin pen needle (31G X 8 MM) 31G X 8 MM miscellaneous     lisinopril (ZESTRIL) 20 MG tablet     rosuvastatin (CRESTOR) 10 MG tablet     thin (NO BRAND SPECIFIED) lancets     timolol maleate (TIMOPTIC) 0.5 % ophthalmic solution     zolpidem (AMBIEN) 5 MG tablet     No current facility-administered medications for this visit.       ALLERGIES:  Allergies   Allergen  "Reactions     Aleve      HA sweats     Citalopram Itching     Clopidogrel Nausea and Vomiting     Pt to restart on 11/25/15 to see again if he tolerates it or not. His sx were only GI. Not a true allergy     Iodine      Naproxen Itching     About 10 yrs ago, itching all over from taking Aleve  \"get sick and really sick\"       Sulfamethoxazole-Trimethoprim      Other reaction(s): Renal Failure         REVIEW OF SYSTEMS:  Constitutional:  No weight loss, fever, chills, weakness or fatigue.  HEENT:  Eyes:  No visual loss, blurred vision, double vision or yellow sclerae. No hearing loss, sneezing, congestion, runny nose or sore throat.  Skin:  No rash or itching.  Cardiovascular: per HPI  Respiratory: per HPI  GI:  No anorexia, nausea, vomiting or diarrhea. No abdominal pain or blood.  :  No dysurea, hematuria  Neurologic:  No headache, dizziness, syncope, paralysis, ataxia, numbness or tingling in the extremities. No change in bowel or bladder control.  Musculoskeletal:  No muscle, back pain, joint pain or stiffness.  Hematologic:  No anemia, bleeding or bruising.  Lymphatics:  No enlarged nodes. No history of splenectomy.  Psychiatric:  No history of depression or anxiety.  Endocrine:  No reports of sweating, cold or heat intolerance. No polyuria or polydipsia.  Allergies:  No history of asthma, hives, eczema or rhinitis.    PHYSICAL EXAM:  /68 (BP Location: Right arm, Patient Position: Sitting, Cuff Size: Adult Large)   Pulse 85   Ht 1.676 m (5' 6\")   Wt 87.8 kg (193 lb 8 oz)   SpO2 99%   BMI 31.23 kg/m      Constitutional: awake, alert, no distress  Eyes: PERRL, sclera nonicteric  ENT: trachea midline  Respiratory: Lungs clear  Cardiovascular: Regular rate and rhythm, no murmurs  GI: nondistended, nontender, bowel sounds present  Lymph/Hematologic: no lymphadenopathy  Skin: dry, no rash  Musculoskeletal: good muscle tone, strength 5/5 in upper and lower extremities  Neurologic: no focal " deficits  Neuropsychiatric: appropriate affact    DATA:  Lab:  Recent Labs   Lab Test 02/09/22  1533 02/10/21  1459 11/06/20  1416 02/23/15  1500 05/07/14  1340   CHOL 123 156   < > 198 198   HDL 42 44   < > 58 40*   LDL 47 81   < > 116 129   TRIG 172* 157*   < > 119 142   CHOLHDLRATIO  --   --   --  3.4 4.9    < > = values in this interval not displayed.   February 9, 2022: Potassium 4.6, creatinine 1.5    EKG, February 9, 2022: Sinus rhythm, rate 92    ASSESSMENT:  66-year-old male seen for cardiac risk factor management.  Blood pressure and lipids are well controlled.  He has no classic anginal symptoms, but does have some dyspnea.  His functional capacity is quite limited by his peripheral arterial disease.  Therefore a Lexiscan nuclear stress will be done.  This is been ordered multiple times in the past few years, but he has never followed through with it.  He has concerns about his heart and seems quite willing to do the test now.    Nuclear stress can be done after his peripheral angiogram.    RECOMMENDATIONS:  1.  Dyspnea with exertion  -Lexiscan nuclear stress    2.  Hypertension  -Blood pressure controlled on lisinopril    3.  Dyslipidemia  -Lipids at goal, continue rosuvastatin    Follow-up in 1 year with NICHOLAS.    Forrest Joshua MD  Cardiology - [facility]  Pager:  [phone]  Text Page  March 1, 2022

## 2022-02-25 ENCOUNTER — LAB (OUTPATIENT)
Dept: LAB | Facility: CLINIC | Age: 67
End: 2022-02-25
Attending: RADIOLOGY
Payer: MEDICARE

## 2022-02-25 ENCOUNTER — APPOINTMENT (OUTPATIENT)
Dept: LAB | Facility: CLINIC | Age: 67
End: 2022-02-25
Payer: MEDICARE

## 2022-02-25 DIAGNOSIS — I73.9 PAD (PERIPHERAL ARTERY DISEASE) (H): ICD-10-CM

## 2022-02-25 PROCEDURE — U0005 INFEC AGEN DETEC AMPLI PROBE: HCPCS

## 2022-02-26 LAB — SARS-COV-2 RNA RESP QL NAA+PROBE: NEGATIVE

## 2022-02-28 ENCOUNTER — HOSPITAL ENCOUNTER (OUTPATIENT)
Facility: CLINIC | Age: 67
Discharge: HOME | End: 2022-02-28
Attending: RADIOLOGY | Admitting: RADIOLOGY
Payer: MEDICARE

## 2022-02-28 VITALS
HEART RATE: 88 BPM | HEIGHT: 66 IN | OXYGEN SATURATION: 100 % | SYSTOLIC BLOOD PRESSURE: 137 MMHG | WEIGHT: 194.5 LBS | RESPIRATION RATE: 16 BRPM | TEMPERATURE: 98.1 F | DIASTOLIC BLOOD PRESSURE: 73 MMHG | BODY MASS INDEX: 31.26 KG/M2

## 2022-02-28 DIAGNOSIS — H40.1131 PRIMARY OPEN ANGLE GLAUCOMA (POAG) OF BOTH EYES, MILD STAGE: ICD-10-CM

## 2022-02-28 DIAGNOSIS — I73.9 PAD (PERIPHERAL ARTERY DISEASE) (H): Primary | ICD-10-CM

## 2022-02-28 DIAGNOSIS — Z11.59 ENCOUNTER FOR SCREENING FOR OTHER VIRAL DISEASES: Primary | ICD-10-CM

## 2022-02-28 LAB
ALBUMIN SERPL-MCNC: 3.6 G/DL (ref 3.4–5)
ALP SERPL-CCNC: 100 U/L (ref 40–150)
ALT SERPL W P-5'-P-CCNC: 23 U/L (ref 0–70)
ANION GAP SERPL CALCULATED.3IONS-SCNC: 6 MMOL/L (ref 3–14)
AST SERPL W P-5'-P-CCNC: 13 U/L (ref 0–45)
BILIRUB DIRECT SERPL-MCNC: <0.1 MG/DL (ref 0–0.2)
BILIRUB SERPL-MCNC: 0.3 MG/DL (ref 0.2–1.3)
BUN SERPL-MCNC: 24 MG/DL (ref 7–30)
CALCIUM SERPL-MCNC: 8.9 MG/DL (ref 8.5–10.1)
CHLORIDE BLD-SCNC: 108 MMOL/L (ref 94–109)
CHOLEST SERPL-MCNC: 117 MG/DL
CO2 SERPL-SCNC: 24 MMOL/L (ref 20–32)
CREAT SERPL-MCNC: 1.28 MG/DL (ref 0.66–1.25)
ERYTHROCYTE [DISTWIDTH] IN BLOOD BY AUTOMATED COUNT: 13.2 % (ref 10–15)
FASTING STATUS PATIENT QL REPORTED: YES
GFR SERPL CREATININE-BSD FRML MDRD: 62 ML/MIN/1.73M2
GLUCOSE BLD-MCNC: 187 MG/DL (ref 70–99)
HBA1C MFR BLD: 8.1 % (ref 0–5.6)
HCT VFR BLD AUTO: 41.7 % (ref 40–53)
HDLC SERPL-MCNC: 47 MG/DL
HGB BLD-MCNC: 13.2 G/DL (ref 13.3–17.7)
HOLD SPECIMEN: NORMAL
LDLC SERPL CALC-MCNC: 54 MG/DL
MCH RBC QN AUTO: 28.1 PG (ref 26.5–33)
MCHC RBC AUTO-ENTMCNC: 31.7 G/DL (ref 31.5–36.5)
MCV RBC AUTO: 89 FL (ref 78–100)
NONHDLC SERPL-MCNC: 70 MG/DL
PLATELET # BLD AUTO: 332 10E3/UL (ref 150–450)
POTASSIUM BLD-SCNC: 4.1 MMOL/L (ref 3.4–5.3)
PROT SERPL-MCNC: 7.3 G/DL (ref 6.8–8.8)
RBC # BLD AUTO: 4.69 10E6/UL (ref 4.4–5.9)
SODIUM SERPL-SCNC: 138 MMOL/L (ref 133–144)
TRIGL SERPL-MCNC: 79 MG/DL
WBC # BLD AUTO: 8.2 10E3/UL (ref 4–11)

## 2022-02-28 PROCEDURE — 80061 LIPID PANEL: CPT | Performed by: RADIOLOGY

## 2022-02-28 PROCEDURE — 85027 COMPLETE CBC AUTOMATED: CPT | Performed by: RADIOLOGY

## 2022-02-28 PROCEDURE — 82248 BILIRUBIN DIRECT: CPT | Performed by: RADIOLOGY

## 2022-02-28 PROCEDURE — 36415 COLL VENOUS BLD VENIPUNCTURE: CPT | Performed by: RADIOLOGY

## 2022-02-28 PROCEDURE — 80053 COMPREHEN METABOLIC PANEL: CPT | Performed by: RADIOLOGY

## 2022-02-28 PROCEDURE — 83036 HEMOGLOBIN GLYCOSYLATED A1C: CPT | Performed by: RADIOLOGY

## 2022-02-28 PROCEDURE — 999N000012 HC STATISTIC ANGIOGRAM, STENT, VERTEBRO PLASTY

## 2022-02-28 RX ORDER — LIDOCAINE 40 MG/G
CREAM TOPICAL
Status: DISCONTINUED | OUTPATIENT
Start: 2022-02-28 | End: 2022-02-28 | Stop reason: HOSPADM

## 2022-02-28 RX ORDER — TIMOLOL MALEATE 5 MG/ML
1 SOLUTION/ DROPS OPHTHALMIC 2 TIMES DAILY
Qty: 5 ML | Refills: 0 | Status: SHIPPED | OUTPATIENT
Start: 2022-02-28

## 2022-02-28 RX ORDER — SODIUM CHLORIDE 9 MG/ML
INJECTION, SOLUTION INTRAVENOUS CONTINUOUS
Status: DISCONTINUED | OUTPATIENT
Start: 2022-02-28 | End: 2022-02-28 | Stop reason: HOSPADM

## 2022-02-28 RX ORDER — DORZOLAMIDE HCL 20 MG/ML
1 SOLUTION/ DROPS OPHTHALMIC 2 TIMES DAILY
Qty: 10 ML | Refills: 0 | Status: SHIPPED | OUTPATIENT
Start: 2022-02-28

## 2022-02-28 NOTE — DISCHARGE SUMMARY
Lower extremity angiogram cancelled by MD re: patient not having a ride home, and patient not having an adult at home post procedure. Overnight stay excluded due to non-emergent nature of disease process and patient condition    Scheduling RN will call patient pre Physician Assistant

## 2022-03-01 ENCOUNTER — OFFICE VISIT (OUTPATIENT)
Dept: CARDIOLOGY | Facility: CLINIC | Age: 67
End: 2022-03-01
Payer: MEDICARE

## 2022-03-01 ENCOUNTER — TELEPHONE (OUTPATIENT)
Dept: OTHER | Facility: CLINIC | Age: 67
End: 2022-03-01

## 2022-03-01 VITALS
DIASTOLIC BLOOD PRESSURE: 68 MMHG | WEIGHT: 193.5 LBS | SYSTOLIC BLOOD PRESSURE: 138 MMHG | BODY MASS INDEX: 31.1 KG/M2 | OXYGEN SATURATION: 99 % | HEIGHT: 66 IN | HEART RATE: 85 BPM

## 2022-03-01 DIAGNOSIS — I73.9 PAD (PERIPHERAL ARTERY DISEASE) (H): Primary | ICD-10-CM

## 2022-03-01 DIAGNOSIS — I10 BENIGN ESSENTIAL HYPERTENSION: ICD-10-CM

## 2022-03-01 DIAGNOSIS — R06.09 DYSPNEA ON EXERTION: ICD-10-CM

## 2022-03-01 DIAGNOSIS — Z91.89 AT RISK FOR CORONARY ARTERY DISEASE: ICD-10-CM

## 2022-03-01 PROCEDURE — 99214 OFFICE O/P EST MOD 30 MIN: CPT | Performed by: INTERNAL MEDICINE

## 2022-03-01 NOTE — LETTER
3/1/2022    Olivia Nichols MD  [address]  [facility] [address]    RE: Rachell Paige       Dear Colleague,     I had the pleasure of seeing Asimitzel ANTONETTE Paige in the [facility].  CARDIOLOGY VISIT    REASON FOR VISIT: Hypertension, risk factor management    SUBJECTIVE:  66-year-old male seen for hypertension.  He has diabetes type 2, PVD with toe amputations, peripheral neuropathy, dyslipidemia.    Nuclear stress 2017 showed normal perfusion.    Echo September 2021 showed EF 50 to 55%, no valve disease.    Exercise ABIs October 2021 showed right 0.85, post exercise 0.92, left 0.85, post exercise noncompressible.  Carotid ultrasound showed less than 50% right and 50 to 69% left ICA disease.    Lately he has been feeling okay.  He is somewhat limited in his walking due to his peripheral arterial disease.  He is scheduled for a peripheral angiogram next week.    He denies any chest pain, but does have some dyspnea with exertion.  Blood pressure tends to run 120-130.  He has a lot of stressors in his life, his wife recently had a stroke.  He has also lost 2 sons, 1 to a gunshot, and 1 to a stroke in the past few years.    MEDICATIONS:  Current Outpatient Medications   Medication     ACCU-CHEK GUIDE test strip     alcohol swab prep pads     aspirin 81 MG tablet     BD PEN NEEDLE MICRO U/F 32G X 6 MM miscellaneous     blood glucose (ACCU-CHEK GUIDE) test strip     blood glucose (NO BRAND SPECIFIED) test strip     blood glucose calibration (NO BRAND SPECIFIED) solution     blood glucose monitoring (NO BRAND SPECIFIED) meter device kit     dorzolamide (TRUSOPT) 2 % ophthalmic solution     gabapentin (NEURONTIN) 300 MG capsule     insulin aspart (NOVOLOG FLEXPEN) 100 UNIT/ML pen     insulin glargine (LANTUS SOLOSTAR) 100 UNIT/ML pen     insulin pen needle (31G X 8 MM) 31G X 8 MM miscellaneous     lisinopril (ZESTRIL) 20 MG tablet     rosuvastatin (CRESTOR) 10 MG tablet     thin (NO BRAND  "SPECIFIED) lancets     timolol maleate (TIMOPTIC) 0.5 % ophthalmic solution     zolpidem (AMBIEN) 5 MG tablet     No current facility-administered medications for this visit.       ALLERGIES:  Allergies   Allergen Reactions     Aleve      HA sweats     Citalopram Itching     Clopidogrel Nausea and Vomiting     Pt to restart on 11/25/15 to see again if he tolerates it or not. His sx were only GI. Not a true allergy     Iodine      Naproxen Itching     About 10 yrs ago, itching all over from taking Aleve  \"get sick and really sick\"       Sulfamethoxazole-Trimethoprim      Other reaction(s): Renal Failure         REVIEW OF SYSTEMS:  Constitutional:  No weight loss, fever, chills, weakness or fatigue.  HEENT:  Eyes:  No visual loss, blurred vision, double vision or yellow sclerae. No hearing loss, sneezing, congestion, runny nose or sore throat.  Skin:  No rash or itching.  Cardiovascular: per HPI  Respiratory: per HPI  GI:  No anorexia, nausea, vomiting or diarrhea. No abdominal pain or blood.  :  No dysurea, hematuria  Neurologic:  No headache, dizziness, syncope, paralysis, ataxia, numbness or tingling in the extremities. No change in bowel or bladder control.  Musculoskeletal:  No muscle, back pain, joint pain or stiffness.  Hematologic:  No anemia, bleeding or bruising.  Lymphatics:  No enlarged nodes. No history of splenectomy.  Psychiatric:  No history of depression or anxiety.  Endocrine:  No reports of sweating, cold or heat intolerance. No polyuria or polydipsia.  Allergies:  No history of asthma, hives, eczema or rhinitis.    PHYSICAL EXAM:  /68 (BP Location: Right arm, Patient Position: Sitting, Cuff Size: Adult Large)   Pulse 85   Ht 1.676 m (5' 6\")   Wt 87.8 kg (193 lb 8 oz)   SpO2 99%   BMI 31.23 kg/m      Constitutional: awake, alert, no distress  Eyes: PERRL, sclera nonicteric  ENT: trachea midline  Respiratory: Lungs clear  Cardiovascular: Regular rate and rhythm, no murmurs  GI: " nondistended, nontender, bowel sounds present  Lymph/Hematologic: no lymphadenopathy  Skin: dry, no rash  Musculoskeletal: good muscle tone, strength 5/5 in upper and lower extremities  Neurologic: no focal deficits  Neuropsychiatric: appropriate affact    DATA:  Lab:  Recent Labs   Lab Test 02/09/22  1533 02/10/21  1459 11/06/20  1416 02/23/15  1500 05/07/14  1340   CHOL 123 156   < > 198 198   HDL 42 44   < > 58 40*   LDL 47 81   < > 116 129   TRIG 172* 157*   < > 119 142   CHOLHDLRATIO  --   --   --  3.4 4.9    < > = values in this interval not displayed.   February 9, 2022: Potassium 4.6, creatinine 1.5    EKG, February 9, 2022: Sinus rhythm, rate 92    ASSESSMENT:  66-year-old male seen for cardiac risk factor management.  Blood pressure and lipids are well controlled.  He has no classic anginal symptoms, but does have some dyspnea.  His functional capacity is quite limited by his peripheral arterial disease.  Therefore a Lexiscan nuclear stress will be done.  This is been ordered multiple times in the past few years, but he has never followed through with it.  He has concerns about his heart and seems quite willing to do the test now.    Nuclear stress can be done after his peripheral angiogram.    RECOMMENDATIONS:  1.  Dyspnea with exertion  -Lexiscan nuclear stress    2.  Hypertension  -Blood pressure controlled on lisinopril    3.  Dyslipidemia  -Lipids at goal, continue rosuvastatin    Follow-up in 1 year with NICHOLAS.    Forrest Joshua MD  Cardiology - [facility]  Pager:  [phone]  Text Page  March 1, 2022    Thank you for allowing me to participate in the care of your patient.      Sincerely,     Forrest Joshua MD     [facility]  cc:   Brad Boyce MD  [address] [address]         DISPLAY PLAN FREE TEXT

## 2022-03-01 NOTE — PATIENT INSTRUCTIONS
Medication induced nuclear stress test  Will schedule a medication induced nuclear stress test.  This is a test where we inject an IV medication that increased the blood flow to the heart, which is a substitute for exercise.  Before and after this step, a different IV medication is administered that measures the blood flow to the heart.  This is assessed by laying in a special scanner.  If there are any areas of decreased blood flow or any abnormal findings on the stress test, usually a coronary angiogram is recommended as the next step at a later date.  This is a procedure where a catheter is inserted up to the heart and we can determine if there are any severe blockages.    Your test will be done at [facility].  It will take several hours to complete the entire protocol.

## 2022-03-01 NOTE — TELEPHONE ENCOUNTER
I called Rachell and he is requesting in-person visit with Dr. Parson to discuss several questions he has prior to his scheduled angiogram on 3/10/22. He is scheduled for Monday 3/7/22 with Dr. Parson.    Dipti GAITANN, RN    [facility]  [facility]  Office: [phone]  Fax: [phone]

## 2022-03-01 NOTE — TELEPHONE ENCOUNTER
Patient had some questions about overall vascular health and wanted to talk to Dr. Parson specifically prior to surgery on 3/10. He mentioned some complications with a recently scheduled surgery, specifically surgeons not being ready for his procedure due to an injury. Phone call is ok. Please let me know if ok to schedule.    Spencer Ware I   [address] [address] [phone]

## 2022-03-04 ENCOUNTER — TELEPHONE (OUTPATIENT)
Dept: INTERVENTIONAL RADIOLOGY/VASCULAR | Facility: CLINIC | Age: 67
End: 2022-03-04
Payer: MEDICARE

## 2022-03-04 NOTE — TELEPHONE ENCOUNTER
Pre-procedural telephone message left on voicemail    Confirmed procedure, procedure date and time, arrival time, location, NPO status and need for a designated  home.    COVID test ordered; however, not scheduled. Scheduling phone number provided. Patient instructed to schedule COVID test within four days of procedure date. Encouraged to refrain from scheduling test the day prior to procedure date if possible due to turn around times.

## 2022-03-05 ENCOUNTER — OFFICE VISIT (OUTPATIENT)
Dept: URGENT CARE | Facility: URGENT CARE | Age: 67
End: 2022-03-05
Payer: MEDICARE

## 2022-03-05 ENCOUNTER — ANCILLARY PROCEDURE (OUTPATIENT)
Dept: GENERAL RADIOLOGY | Facility: CLINIC | Age: 67
End: 2022-03-05
Attending: PHYSICIAN ASSISTANT
Payer: MEDICARE

## 2022-03-05 VITALS
SYSTOLIC BLOOD PRESSURE: 127 MMHG | BODY MASS INDEX: 30.88 KG/M2 | TEMPERATURE: 97.3 F | HEART RATE: 84 BPM | OXYGEN SATURATION: 97 % | DIASTOLIC BLOOD PRESSURE: 66 MMHG | WEIGHT: 191.3 LBS

## 2022-03-05 DIAGNOSIS — S99.921A INJURY OF RIGHT FOOT, INITIAL ENCOUNTER: Primary | ICD-10-CM

## 2022-03-05 DIAGNOSIS — L08.9 INFECTION OF TOE: ICD-10-CM

## 2022-03-05 DIAGNOSIS — S99.921A INJURY OF RIGHT FOOT, INITIAL ENCOUNTER: ICD-10-CM

## 2022-03-05 PROCEDURE — 99214 OFFICE O/P EST MOD 30 MIN: CPT | Performed by: PHYSICIAN ASSISTANT

## 2022-03-05 PROCEDURE — 73630 X-RAY EXAM OF FOOT: CPT | Mod: RT | Performed by: RADIOLOGY

## 2022-03-05 RX ORDER — CEPHALEXIN 500 MG/1
500 CAPSULE ORAL 3 TIMES DAILY
Qty: 21 CAPSULE | Refills: 0 | Status: SHIPPED | OUTPATIENT
Start: 2022-03-05 | End: 2022-03-12

## 2022-03-05 NOTE — PROGRESS NOTES
SUBJECTIVE:  Rachell Paige is a 66 year old male who comes in with concerns for infection on his right foot  States that 1 month ago a tray and recliner hit toe.  Area remains tender and some redness   He is very worried that could be in his bone and would like  to have an x-ray.  No fevers have been noted.  No spreading redness.  No other sx present.  Had all 5 toes removed on left side many years ago due to infection of DM complication  No fevers  He  His otherwise in normal state of health    Past Medical History:   Diagnosis Date     Anxiety 02/23/2015     Dermatitis 04/18/2015     DM type 2, goal A1C 7-8 02/23/2015     Financial problems 02/23/2015     Glaucoma (increased eye pressure)      HTN, goal below 140/90 02/23/2015     Hyperlipidemia LDL goal <100 02/23/2015     Microalbuminuria 04/18/2015     Onychomycosis 04/18/2015     Rash 02/23/2015     Current Outpatient Medications   Medication     aspirin 81 MG tablet     blood glucose (ACCU-CHEK GUIDE) test strip     blood glucose calibration (NO BRAND SPECIFIED) solution     blood glucose monitoring (NO BRAND SPECIFIED) meter device kit     dorzolamide (TRUSOPT) 2 % ophthalmic solution     gabapentin (NEURONTIN) 300 MG capsule     insulin aspart (NOVOLOG FLEXPEN) 100 UNIT/ML pen     insulin glargine (LANTUS SOLOSTAR) 100 UNIT/ML pen     lisinopril (ZESTRIL) 20 MG tablet     rosuvastatin (CRESTOR) 10 MG tablet     thin (NO BRAND SPECIFIED) lancets     timolol maleate (TIMOPTIC) 0.5 % ophthalmic solution     zolpidem (AMBIEN) 5 MG tablet     ACCU-CHEK GUIDE test strip     alcohol swab prep pads     cilostazol (PLETAL) 50 MG tablet     insulin pen needle (ULTICARE SHORT) 31G X 8 MM miscellaneous     No current facility-administered medications for this visit.     Social History     Socioeconomic History     Marital status:      Spouse name: Not on file     Number of children: Not on file     Years of education: Not on file     Highest education level:  Not on file   Occupational History     Not on file   Tobacco Use     Smoking status: Former Smoker     Types: Cigarettes     Quit date:      Years since quittin.2     Smokeless tobacco: Never Used   Substance and Sexual Activity     Alcohol use: No     Drug use: No     Sexual activity: Yes     Partners: Female   Other Topics Concern     Parent/sibling w/ CABG, MI or angioplasty before 65F 55M? Not Asked   Social History Narrative    Former  ([employer] [place])          Social Determinants of Health     Financial Resource Strain: Low Risk      Difficulty of Paying Living Expenses: Not hard at all   Food Insecurity: No Food Insecurity     Worried About Running Out of Food in the Last Year: Never true     Ran Out of Food in the Last Year: Never true   Transportation Needs: No Transportation Needs     Lack of Transportation (Medical): No     Lack of Transportation (Non-Medical): No   Physical Activity: Not on file   Stress: Not on file   Social Connections: Not on file   Intimate Partner Violence: Not on file   Housing Stability: Not on file     ROS negative other than stated above    Exam:  GENERAL APPEARANCE: healthy, alert and no distress  RESP: lungs clear to auscultation - no rales, rhonchi or wheezes  CV: regular rates and rhythm, normal S1 S2, no S3 or S4 and no murmur, click or rub -  MS: extremities normal- no gross deformities noted, no evidence of inflammation in joints, FROM in all extremities.  SKIN: mild redness underside of 3rd and 4th toe.  Skin intact   No open sores or ulcers   No drainage  Foot normal in appearance   5th toe amputated   NEURO: Normal strength and tone, sensory exam grossly normal, mentation intact and speech normal    Results for orders placed or performed in visit on 22   XR Foot Right G/E 3 Views     Status: None    Narrative    EXAM: XR FOOT RIGHT G/E 3 VIEWS  LOCATION: [facility]  DATE/TIME: 3/5/2022 5:39  PM    INDICATION: Pain after injury.  COMPARISON: 12/23/2021.      Impression    IMPRESSION: Amputation of the small toe. There is angulation change at the proximal phalanx of the great toe but this is unchanged from the previous examination and unlikely to represent an acute fracture. Superimposed degenerative change at the first   MTP joint. No radiographic evidence for osteomyelitis. Right foot otherwise negative.       \  assessment/plan:  (S99.921A) Injury of right foot, initial encounter  (primary encounter diagnosis)  Comment:   Plan: XR Foot Right G/E 3 Views          No signs of osteomyelitis noted   Stable changes and no acute findings     (L08.9) Infection of toe  Comment:   Plan: cephALEXin (KEFLEX) 500 MG capsule         Med as directed and hygiene discussed  Signs of spreading infection discussed  Advised to Follow-up with PCP in 1 week

## 2022-03-06 ENCOUNTER — HOSPITAL ENCOUNTER (EMERGENCY)
Facility: CLINIC | Age: 67
Discharge: HOME | End: 2022-03-06
Attending: EMERGENCY MEDICINE | Admitting: EMERGENCY MEDICINE
Payer: MEDICARE

## 2022-03-06 VITALS
DIASTOLIC BLOOD PRESSURE: 82 MMHG | TEMPERATURE: 98.7 F | RESPIRATION RATE: 18 BRPM | HEART RATE: 81 BPM | SYSTOLIC BLOOD PRESSURE: 157 MMHG | OXYGEN SATURATION: 98 %

## 2022-03-06 DIAGNOSIS — T14.8XXD DELAYED WOUND HEALING: ICD-10-CM

## 2022-03-06 DIAGNOSIS — E11.9 TYPE 2 DIABETES MELLITUS WITH HEMOGLOBIN A1C GOAL OF 7.0%-8.0% (H): ICD-10-CM

## 2022-03-06 DIAGNOSIS — I73.9 PAD (PERIPHERAL ARTERY DISEASE) (H): ICD-10-CM

## 2022-03-06 LAB
ANION GAP SERPL CALCULATED.3IONS-SCNC: 4 MMOL/L (ref 3–14)
BASOPHILS # BLD AUTO: 0 10E3/UL (ref 0–0.2)
BASOPHILS NFR BLD AUTO: 1 %
BUN SERPL-MCNC: 24 MG/DL (ref 7–30)
CALCIUM SERPL-MCNC: 8.8 MG/DL (ref 8.5–10.1)
CHLORIDE BLD-SCNC: 105 MMOL/L (ref 94–109)
CO2 SERPL-SCNC: 25 MMOL/L (ref 20–32)
CREAT SERPL-MCNC: 1.45 MG/DL (ref 0.66–1.25)
CRP SERPL-MCNC: 4.9 MG/L (ref 0–8)
EOSINOPHIL # BLD AUTO: 0.2 10E3/UL (ref 0–0.7)
EOSINOPHIL NFR BLD AUTO: 2 %
ERYTHROCYTE [DISTWIDTH] IN BLOOD BY AUTOMATED COUNT: 13.2 % (ref 10–15)
ERYTHROCYTE [SEDIMENTATION RATE] IN BLOOD BY WESTERGREN METHOD: 9 MM/HR (ref 0–20)
GFR SERPL CREATININE-BSD FRML MDRD: 53 ML/MIN/1.73M2
GLUCOSE BLD-MCNC: 287 MG/DL (ref 70–99)
HCT VFR BLD AUTO: 41.7 % (ref 40–53)
HGB BLD-MCNC: 13.2 G/DL (ref 13.3–17.7)
HOLD SPECIMEN: NORMAL
HOLD SPECIMEN: NORMAL
IMM GRANULOCYTES # BLD: 0.1 10E3/UL
IMM GRANULOCYTES NFR BLD: 1 %
LYMPHOCYTES # BLD AUTO: 2.4 10E3/UL (ref 0.8–5.3)
LYMPHOCYTES NFR BLD AUTO: 30 %
MCH RBC QN AUTO: 28.5 PG (ref 26.5–33)
MCHC RBC AUTO-ENTMCNC: 31.7 G/DL (ref 31.5–36.5)
MCV RBC AUTO: 90 FL (ref 78–100)
MONOCYTES # BLD AUTO: 0.6 10E3/UL (ref 0–1.3)
MONOCYTES NFR BLD AUTO: 7 %
NEUTROPHILS # BLD AUTO: 4.8 10E3/UL (ref 1.6–8.3)
NEUTROPHILS NFR BLD AUTO: 59 %
NRBC # BLD AUTO: 0 10E3/UL
NRBC BLD AUTO-RTO: 0 /100
PLATELET # BLD AUTO: 316 10E3/UL (ref 150–450)
POTASSIUM BLD-SCNC: 4.7 MMOL/L (ref 3.4–5.3)
RBC # BLD AUTO: 4.63 10E6/UL (ref 4.4–5.9)
SODIUM SERPL-SCNC: 134 MMOL/L (ref 133–144)
WBC # BLD AUTO: 8 10E3/UL (ref 4–11)

## 2022-03-06 PROCEDURE — 36415 COLL VENOUS BLD VENIPUNCTURE: CPT | Performed by: EMERGENCY MEDICINE

## 2022-03-06 PROCEDURE — 80048 BASIC METABOLIC PNL TOTAL CA: CPT | Performed by: EMERGENCY MEDICINE

## 2022-03-06 PROCEDURE — 85652 RBC SED RATE AUTOMATED: CPT | Performed by: EMERGENCY MEDICINE

## 2022-03-06 PROCEDURE — 99283 EMERGENCY DEPT VISIT LOW MDM: CPT

## 2022-03-06 PROCEDURE — 85025 COMPLETE CBC W/AUTO DIFF WBC: CPT | Performed by: EMERGENCY MEDICINE

## 2022-03-06 PROCEDURE — 86140 C-REACTIVE PROTEIN: CPT | Performed by: EMERGENCY MEDICINE

## 2022-03-06 ASSESSMENT — ENCOUNTER SYMPTOMS
WOUND: 1
CHILLS: 0
VOMITING: 0
NAUSEA: 0
FEVER: 0

## 2022-03-06 NOTE — ED TRIAGE NOTES
Patient has a hx of diabetes, patient reports a wound on his right big toe from cutting his toenails and has a would on his left leg from hitting his leg on a car door. Patient states both wounds are 1 week old and are not healing. Alert and Orientated X4

## 2022-03-06 NOTE — ED PROVIDER NOTES
History   Chief Complaint:  Wound Check       The history is provided by the patient.      Rachell Paige is a 66 year old male with history of PAD, type 2 diabetes, hypertension, hyperlipidemia, and CKD stage 3 who presents for a wound check. The patient reports wounds on his lower left leg and right big toe and states his wounds began bleeding last night. He reports concern for infection of his wounds. He states his leg injury was due to hitting his leg on a car door. The patient states he couldn't sleep last night due to hot and cold sensation in his right big toe. He reports using an antibiotic ointment on his wounds. The patient states that he has never been referred to wound care before. He denies experiencing any fever, chills, nausea, or vomiting.     Review of Systems   Constitutional: Negative for chills and fever.   Gastrointestinal: Negative for nausea and vomiting.   Skin: Positive for wound (l leg, r toes).   All other systems reviewed and are negative.    Allergies:  Aleve  Citalopram  Clopidogrel  Iodine  Naproxen  Sulfamethoxazole-Trimethoprim    Medications:  aspirin 81 MG tablet  cephALEXin (KEFLEX) 500 MG capsule  gabapentin (NEURONTIN) 300 MG capsule  insulin aspart (NOVOLOG FLEXPEN) 100 UNIT/ML pen  insulin glargine (LANTUS SOLOSTAR) 100 UNIT/ML pen  lisinopril (ZESTRIL) 20 MG tablet  rosuvastatin (CRESTOR) 10 MG tablet  zolpidem (AMBIEN) 5 MG tablet  simvastatin (ZOCOR) 20 mg tablet      Past Medical History:     Anxiety   Dermatitis     Glaucoma (increased eye pressure)   Hypertension, goal below 140/90   Hyperlipidemia LDL goal <100   Microalbuminuria   Onychomycosis   Type 2 diabetes mellitus with hemoglobin A1c goal of 7.0%-8.0% (H)  2019 novel coronavirus disease (COVID-19)  Hyponatremia  Dyspnea on exertion  PAD (peripheral artery disease)  Depression  COPD  CKD stage 3   Osteomyelitis  Peripheral gangrene   Diabetic ulcer  Lumbago   Tinea pedis    Past Surgical History:    Right  fifth toe amputation      Family History:    Mother: lung disease  Father: glaucoma     Social History:  The patient presents to the emergency department alone.   The patient arrived to the emergency department via car.     Physical Exam     Patient Vitals for the past 24 hrs:   BP Temp Temp src Pulse Resp SpO2   03/06/22 1822 (!) 157/82 -- -- 81 -- 98 %   03/06/22 1625 (!) 153/77 98.7  F (37.1  C) Oral 86 18 96 %       Physical Exam  General: Patient is alert, awake and interactive when I enter the room  Head: The scalp, face, and head appear normal  Eyes: Conjunctivae are normal  ENT: The nose is normal, Pinnae are normal, External acoustic canals are normal  Neck: Trachea midline  CV: Pulses are normal.   Resp: No respiratory distress   Musc: Normal muscular tone, moving all extremities.  Skin: abrasion to the left shin with mild blistering, no surrounding erythema, fluctuance, or warmth. Area of concern on the tip of the right second toe, appears to be healing wound the size of a pencil eraser  without surrounding erythema, edema or warmth. No fluctuance   Neuro: Speech is normal and fluent. Face is symmetric.   Psych: Normal affect.  Appropriate interactions.    Emergency Department Course      Laboratory:  Labs Ordered and Resulted from Time of ED Arrival to Time of ED Departure   BASIC METABOLIC PANEL - Abnormal       Result Value    Sodium 134      Potassium 4.7      Chloride 105      Carbon Dioxide (CO2) 25      Anion Gap 4      Urea Nitrogen 24      Creatinine 1.45 (*)     Calcium 8.8      Glucose 287 (*)     GFR Estimate 53 (*)    CBC WITH PLATELETS AND DIFFERENTIAL - Abnormal    WBC Count 8.0      RBC Count 4.63      Hemoglobin 13.2 (*)     Hematocrit 41.7      MCV 90      MCH 28.5      MCHC 31.7      RDW 13.2      Platelet Count 316      % Neutrophils 59      % Lymphocytes 30      % Monocytes 7      % Eosinophils 2      % Basophils 1      % Immature Granulocytes 1      NRBCs per 100 WBC 0      Absolute  Neutrophils 4.8      Absolute Lymphocytes 2.4      Absolute Monocytes 0.6      Absolute Eosinophils 0.2      Absolute Basophils 0.0      Absolute Immature Granulocytes 0.1      Absolute NRBCs 0.0     CRP INFLAMMATION - Normal    CRP Inflammation 4.9     ERYTHROCYTE SEDIMENTATION RATE AUTO - Normal    Erythrocyte Sedimentation Rate 9          Emergency Department Course:      Reviewed:  I reviewed nursing notes, vitals, past medical history, Care Everywhere and [facility]    Assessments:  1655 I obtained history and examined the patient as noted above.   1756 I rechecked the patient and explained findings.     Disposition:  The patient was discharged to home.     Impression & Plan     Medical Decision Making:  Patient is a 66-year-old gentleman with peripheral arterial disease and type 2 diabetes who presents emergency department with poorly healing wound on the left shin and right second toe.  Fortunately there is no clinical evidence of infection.  Blood work is reassuring.  Patient can follow-up with wound clinic and primary care physician for further assistance with wound healing.    Diagnosis:    ICD-10-CM    1. Superficial delayed healing wound of the left shin  T14.8XXD Wound Care Referral   2. Type 2 diabetes mellitus with hemoglobin A1c goal of 7.0%-8.0% (H)  E11.9 Wound Care Referral   3. PAD (peripheral artery disease) (H)  I73.9 Wound Care Referral         Scribe Disclosure:  I, Mila Belle, am serving as a scribe at 4:55 PM on 3/6/2022 to document services personally performed by Alden Camargo based on my observations and the provider's statements to me.          Alden Camargo MD  03/06/22 8614

## 2022-03-07 ENCOUNTER — TELEPHONE (OUTPATIENT)
Dept: WOUND CARE | Facility: CLINIC | Age: 67
End: 2022-03-07

## 2022-03-07 ENCOUNTER — LAB (OUTPATIENT)
Dept: LAB | Facility: CLINIC | Age: 67
End: 2022-03-07
Attending: INTERNAL MEDICINE
Payer: MEDICARE

## 2022-03-07 ENCOUNTER — OFFICE VISIT (OUTPATIENT)
Dept: OTHER | Facility: CLINIC | Age: 67
End: 2022-03-07
Attending: INTERNAL MEDICINE
Payer: MEDICARE

## 2022-03-07 VITALS
HEART RATE: 97 BPM | BODY MASS INDEX: 31.99 KG/M2 | TEMPERATURE: 97.7 F | WEIGHT: 198.2 LBS | DIASTOLIC BLOOD PRESSURE: 79 MMHG | OXYGEN SATURATION: 97 % | SYSTOLIC BLOOD PRESSURE: 150 MMHG

## 2022-03-07 DIAGNOSIS — Z11.59 ENCOUNTER FOR SCREENING FOR OTHER VIRAL DISEASES: ICD-10-CM

## 2022-03-07 DIAGNOSIS — I73.9 PAD (PERIPHERAL ARTERY DISEASE) (H): Primary | ICD-10-CM

## 2022-03-07 PROCEDURE — U0005 INFEC AGEN DETEC AMPLI PROBE: HCPCS

## 2022-03-07 PROCEDURE — G0463 HOSPITAL OUTPT CLINIC VISIT: HCPCS

## 2022-03-07 NOTE — PROGRESS NOTES
[facility]  Pio Parson MD - Vascular Medicine Progress Note    LOCATION: [facility]    Rachell Paige  Medical Record #:  [id number]  YOB: 1955  Age:  66 year old     Date of Service: 3/7/2022     PRIMARY CARE PROVIDER: Olivia Nichols    REASON FOR VISIT:   follow up  for prior to his scheduled angiogram    IMPRESSION: Lower extremity angiogram on March 10    RECOMMENDATION: Proceed and go ahead with the angiogram we will await the results    If patients imaging is normal patient should follow up as needed    HPI: Rachell Paige is a 66 year old male who was seen today for prior to his angiogram which is scheduled next Thursday, March 10      PAST MEDICAL HISTORY  Past Medical History:   Diagnosis Date     Anxiety 02/23/2015     Dermatitis 04/18/2015     DM type 2, goal A1C 7-8 02/23/2015     Financial problems 02/23/2015     Glaucoma (increased eye pressure)      HTN, goal below 140/90 02/23/2015     Hyperlipidemia LDL goal <100 02/23/2015     Microalbuminuria 04/18/2015     Onychomycosis 04/18/2015     Rash 02/23/2015       PAST SURGICAL HISTORY:  Past Surgical History:   Procedure Laterality Date     AMPUTATE TOE(S) Right 12/31/2018    Procedure: Right fifth toe amputation;  Surgeon: Clark Lora DPM;  Location: [facility] OR         CURRENT MEDICATIONS  ACCU-CHEK GUIDE test strip, USE TO TEST BLOOD SUGAR TWO TIMES A DAY OR AS DIRECTED  alcohol swab prep pads, Use to swab area of injection/pedro pablo as directed.  aspirin 81 MG tablet, Take 1 tablet (81 mg) by mouth daily  blood glucose (ACCU-CHEK GUIDE) test strip, 1 strip by In Vitro route 4 times daily Use to test blood sugar 3 -4  times daily or as directed.  blood glucose calibration (NO BRAND SPECIFIED) solution, To accompany: Blood Glucose Monitor Brands: per insurance.  blood glucose monitoring (NO BRAND SPECIFIED) meter device kit, Use to test blood sugar 4 times daily or as directed.  "Preferred blood glucose meter OR supplies to accompany: Blood Glucose Monitor Brands: per insurance.  cephALEXin (KEFLEX) 500 MG capsule, Take 1 capsule (500 mg) by mouth 3 times daily for 7 days  dorzolamide (TRUSOPT) 2 % ophthalmic solution, Place 1 drop into both eyes 2 times daily  gabapentin (NEURONTIN) 300 MG capsule, TAKE 1 CAPSULE (300 MG) BY MOUTH AT BEDTIME  insulin aspart (NOVOLOG FLEXPEN) 100 UNIT/ML pen, Inject 1-10 Units Subcutaneous 2 times daily (with meals) With lunch and dinner  insulin glargine (LANTUS SOLOSTAR) 100 UNIT/ML pen, Inject 50 Units Subcutaneous At Bedtime  insulin pen needle (31G X 8 MM) 31G X 8 MM miscellaneous, Use 3 pen needles daily or as directed.  lisinopril (ZESTRIL) 20 MG tablet, Take 1 tablet (20 mg) by mouth daily  rosuvastatin (CRESTOR) 10 MG tablet, Take 1 tablet (10 mg) by mouth daily  thin (NO BRAND SPECIFIED) lancets, Use with lanceting device. To accompany: Blood Glucose Monitor Brands: per insurance.  timolol maleate (TIMOPTIC) 0.5 % ophthalmic solution, Place 1 drop into both eyes 2 times daily  zolpidem (AMBIEN) 5 MG tablet, Take 1 tablet (5 mg) by mouth nightly as needed for sleep    No current facility-administered medications on file prior to visit.      ALLERGIES     Allergies   Allergen Reactions     Aleve      HA sweats     Citalopram Itching     Clopidogrel Nausea and Vomiting     Pt to restart on 11/25/15 to see again if he tolerates it or not. His sx were only GI. Not a true allergy     Iodine      Naproxen Itching     About 10 yrs ago, itching all over from taking Aleve  \"get sick and really sick\"       Sulfamethoxazole-Trimethoprim      Other reaction(s): Renal Failure         FAMILY HISTORY  Family History   Problem Relation Age of Onset     Diabetes Other      LUNG DISEASE Mother      Glaucoma Father      Cerebrovascular Disease Son      Diabetes Son      Diabetes Daughter        SOCIAL HISTORY  Social History     Socioeconomic History     Marital " status:      Spouse name: Not on file     Number of children: Not on file     Years of education: Not on file     Highest education level: Not on file   Occupational History     Not on file   Tobacco Use     Smoking status: Former Smoker     Types: Cigarettes     Quit date:      Years since quittin.2     Smokeless tobacco: Never Used   Substance and Sexual Activity     Alcohol use: No     Drug use: No     Sexual activity: Yes     Partners: Female   Other Topics Concern     Parent/sibling w/ CABG, MI or angioplasty before 65F 55M? Not Asked   Social History Narrative    Former  ([employer], [employer])          Social Determinants of Health     Financial Resource Strain: Low Risk      Difficulty of Paying Living Expenses: Not hard at all   Food Insecurity: No Food Insecurity     Worried About Running Out of Food in the Last Year: Never true     Ran Out of Food in the Last Year: Never true   Transportation Needs: No Transportation Needs     Lack of Transportation (Medical): No     Lack of Transportation (Non-Medical): No   Physical Activity: Not on file   Stress: Not on file   Social Connections: Not on file   Intimate Partner Violence: Not on file   Housing Stability: Not on file       ROS:   Complete ROS negative other than what is stated in HPI.     EXAM:  BP (!) 150/79 (BP Location: Right arm, Patient Position: Chair, Cuff Size: Adult Regular)   Pulse 97   Temp 97.7  F (36.5  C) (Temporal)   Wt 198 lb 3.2 oz (89.9 kg)   SpO2 97%   BMI 31.99 kg/m    In general, the patient is a pleasant male in no apparent distress.    HEENT: NC/AT.  PERRLA.  EOMI.  Sclerae white, not injected.    Neck: No adenopathy.  Carotids +2/2 bilaterally without bruits.   Heart: RRR. Normal S1, S2 splits physiologically. No murmur, rub, click, or gallop.   Lungs: CTA.  No ronchi, wheezes, rales.    Abdomen: Soft, nontender, nondistended.   Extremities: No clubbing, cyanosis, or edema.   wound  anterior part of the shin of the left lower extremity, healing.     Labs:  LIPID RESULTS:  Lab Results   Component Value Date    CHOL 117 02/28/2022    CHOL 156 02/10/2021    HDL 47 02/28/2022    HDL 44 02/10/2021    LDL 54 02/28/2022    LDL 81 02/10/2021    TRIG 79 02/28/2022    TRIG 157 (H) 02/10/2021    CHOLHDLRATIO 3.4 02/23/2015       LIVER ENZYME RESULTS:  Lab Results   Component Value Date    AST 13 02/28/2022    AST 13 02/10/2021    ALT 23 02/28/2022    ALT 22 02/10/2021       CBC RESULTS:  Lab Results   Component Value Date    WBC 8.0 03/06/2022    WBC 8.4 07/06/2021    RBC 4.63 03/06/2022    RBC 4.71 07/06/2021    HGB 13.2 (L) 03/06/2022    HGB 13.4 07/06/2021    HCT 41.7 03/06/2022    HCT 41.0 07/06/2021    MCV 90 03/06/2022    MCV 87 07/06/2021    MCH 28.5 03/06/2022    MCH 28.5 07/06/2021    MCHC 31.7 03/06/2022    MCHC 32.7 07/06/2021    RDW 13.2 03/06/2022    RDW 13.3 07/06/2021     03/06/2022     07/06/2021       BMP RESULTS:  Lab Results   Component Value Date     03/06/2022     02/10/2021    POTASSIUM 4.7 03/06/2022    POTASSIUM 4.7 02/10/2021    CHLORIDE 105 03/06/2022    CHLORIDE 103 02/10/2021    CO2 25 03/06/2022    CO2 24 02/10/2021    ANIONGAP 4 03/06/2022    ANIONGAP 7 02/10/2021     (H) 03/06/2022     (H) 02/10/2021    BUN 24 03/06/2022    BUN 21 02/10/2021    CR 1.45 (H) 03/06/2022    CR 1.15 02/10/2021    GFRESTIMATED 53 (L) 03/06/2022    GFRESTIMATED 67 02/10/2021    GFRESTBLACK 77 02/10/2021    ALPHONSO 8.8 03/06/2022    ALPHONSO 9.3 02/10/2021        A1C RESULTS:  Lab Results   Component Value Date    A1C 8.1 (H) 02/28/2022    A1C 7.4 (H) 07/28/2020       THYROID RESULTS:  Lab Results   Component Value Date    TSH 1.44 11/01/2020         DIAGNOSTIC STUDIES:     Images:  XR Foot Right G/E 3 Views    Result Date: 3/5/2022  EXAM: XR FOOT RIGHT G/E 3 VIEWS LOCATION: [facility] DATE/TIME: 3/5/2022 5:39 PM INDICATION: Pain after injury.  COMPARISON: 12/23/2021.     IMPRESSION: Amputation of the small toe. There is angulation change at the proximal phalanx of the great toe but this is unchanged from the previous examination and unlikely to represent an acute fracture. Superimposed degenerative change at the first MTP joint. No radiographic evidence for osteomyelitis. Right foot otherwise negative.      .      Pio Parson MD        10 minutes spent on the date of the encounter doing chart review, history and exam, documentation, and further activities as noted above.

## 2022-03-07 NOTE — TELEPHONE ENCOUNTER
Consult received via workqueue from provider Dr. Camargo for ulcer of the shin and foot.    Schedule with Dr. Ramos or Dr. Asencio at [facility] [place] at [facility].    Elver Baker RN

## 2022-03-07 NOTE — ED NOTES
Patient asked how to keep his wounds clean, patient advised to wash them with soap and water and to keep them clean, apply antibacterial ointment as needed

## 2022-03-07 NOTE — PROGRESS NOTES
[facility]        Patient is here for a  follow up.     Pt is currently taking Aspirin, Statin and Antibiotics.    BP (!) 150/79 (BP Location: Right arm, Patient Position: Chair, Cuff Size: Adult Regular)   Pulse 97   Temp 97.7  F (36.5  C) (Temporal)   Wt 198 lb 3.2 oz (89.9 kg)   SpO2 97%   BMI 31.99 kg/m      The provider has been notified that the patient has no concerns.     Questions patient would like addressed today are: N/A.    Refills are needed: N/A    Has homecare services and agency name:  Milagros Santos MA

## 2022-03-08 LAB — SARS-COV-2 RNA RESP QL NAA+PROBE: NEGATIVE

## 2022-03-09 ENCOUNTER — TELEPHONE (OUTPATIENT)
Dept: WOUND CARE | Facility: CLINIC | Age: 67
End: 2022-03-09
Payer: MEDICARE

## 2022-03-09 NOTE — TELEPHONE ENCOUNTER
Patient wanted afternoon appt so scheduled with Dr. Ramos on 4/26/22 but wants a sooner appointment.  Please call him at .   He is going to try [facility] to see if he can get in sooner.

## 2022-03-10 ENCOUNTER — TELEPHONE (OUTPATIENT)
Dept: OTHER | Facility: CLINIC | Age: 67
End: 2022-03-10

## 2022-03-10 ENCOUNTER — HOSPITAL ENCOUNTER (OUTPATIENT)
Dept: INTERVENTIONAL RADIOLOGY/VASCULAR | Facility: CLINIC | Age: 67
Discharge: HOME | End: 2022-03-10
Attending: INTERNAL MEDICINE | Admitting: RADIOLOGY
Payer: MEDICARE

## 2022-03-10 ENCOUNTER — HOSPITAL ENCOUNTER (OUTPATIENT)
Facility: CLINIC | Age: 67
Discharge: HOME | End: 2022-03-10
Attending: RADIOLOGY | Admitting: RADIOLOGY
Payer: MEDICARE

## 2022-03-10 VITALS
RESPIRATION RATE: 16 BRPM | BODY MASS INDEX: 30.91 KG/M2 | TEMPERATURE: 97.8 F | OXYGEN SATURATION: 99 % | WEIGHT: 192.3 LBS | SYSTOLIC BLOOD PRESSURE: 170 MMHG | HEART RATE: 85 BPM | HEIGHT: 66 IN | DIASTOLIC BLOOD PRESSURE: 76 MMHG

## 2022-03-10 LAB — ACT BLD: 233 SECONDS (ref 74–150)

## 2022-03-10 PROCEDURE — 250N000009 HC RX 250: Performed by: NURSE PRACTITIONER

## 2022-03-10 PROCEDURE — 250N000011 HC RX IP 250 OP 636: Performed by: NURSE PRACTITIONER

## 2022-03-10 PROCEDURE — 272N000116 HC CATH CR1

## 2022-03-10 PROCEDURE — C1725 CATH, TRANSLUMIN NON-LASER: HCPCS

## 2022-03-10 PROCEDURE — 272N000196 HC ACCESSORY CR5

## 2022-03-10 PROCEDURE — 36246 INS CATH ABD/L-EXT ART 2ND: CPT

## 2022-03-10 PROCEDURE — 76937 US GUIDE VASCULAR ACCESS: CPT

## 2022-03-10 PROCEDURE — 272N000571 HC SHEATH CR8

## 2022-03-10 PROCEDURE — C1769 GUIDE WIRE: HCPCS

## 2022-03-10 PROCEDURE — 272N000566 HC SHEATH CR3

## 2022-03-10 PROCEDURE — 36247 INS CATH ABD/L-EXT ART 3RD: CPT

## 2022-03-10 PROCEDURE — 258N000003 HC RX IP 258 OP 636: Performed by: NURSE PRACTITIONER

## 2022-03-10 PROCEDURE — C1760 CLOSURE DEV, VASC: HCPCS

## 2022-03-10 PROCEDURE — 272N000302 HC DEVICE INFLATION CR5

## 2022-03-10 PROCEDURE — 255N000002 HC RX 255 OP 636: Performed by: RADIOLOGY

## 2022-03-10 PROCEDURE — 85347 COAGULATION TIME ACTIVATED: CPT

## 2022-03-10 PROCEDURE — 999N000012 HC STATISTIC ANGIOGRAM, STENT, VERTEBRO PLASTY

## 2022-03-10 PROCEDURE — 75774 ARTERY X-RAY EACH VESSEL: CPT

## 2022-03-10 PROCEDURE — 272N000124 HC CATH CR11

## 2022-03-10 PROCEDURE — 250N000011 HC RX IP 250 OP 636: Performed by: RADIOLOGY

## 2022-03-10 RX ORDER — DIPHENHYDRAMINE HYDROCHLORIDE 50 MG/ML
25 INJECTION INTRAMUSCULAR; INTRAVENOUS EVERY 6 HOURS PRN
Status: DISCONTINUED | OUTPATIENT
Start: 2022-03-10 | End: 2022-03-11 | Stop reason: HOSPADM

## 2022-03-10 RX ORDER — IODIXANOL 320 MG/ML
150 INJECTION, SOLUTION INTRAVASCULAR ONCE
Status: COMPLETED | OUTPATIENT
Start: 2022-03-10 | End: 2022-03-10

## 2022-03-10 RX ORDER — NALOXONE HYDROCHLORIDE 0.4 MG/ML
0.4 INJECTION, SOLUTION INTRAMUSCULAR; INTRAVENOUS; SUBCUTANEOUS
Status: DISCONTINUED | OUTPATIENT
Start: 2022-03-10 | End: 2022-03-11 | Stop reason: HOSPADM

## 2022-03-10 RX ORDER — ACETAMINOPHEN 325 MG/1
650 TABLET ORAL
Status: DISCONTINUED | OUTPATIENT
Start: 2022-03-10 | End: 2022-03-11 | Stop reason: HOSPADM

## 2022-03-10 RX ORDER — HEPARIN SODIUM 1000 [USP'U]/ML
1000 INJECTION, SOLUTION INTRAVENOUS; SUBCUTANEOUS ONCE
Status: COMPLETED | OUTPATIENT
Start: 2022-03-10 | End: 2022-03-10

## 2022-03-10 RX ORDER — NITROGLYCERIN 5 MG/ML
400 VIAL (ML) INTRAVENOUS ONCE
Status: COMPLETED | OUTPATIENT
Start: 2022-03-10 | End: 2022-03-10

## 2022-03-10 RX ORDER — HEPARIN SODIUM 200 [USP'U]/100ML
1 INJECTION, SOLUTION INTRAVENOUS CONTINUOUS PRN
Status: DISCONTINUED | OUTPATIENT
Start: 2022-03-10 | End: 2022-03-11 | Stop reason: HOSPADM

## 2022-03-10 RX ORDER — NALOXONE HYDROCHLORIDE 0.4 MG/ML
0.2 INJECTION, SOLUTION INTRAMUSCULAR; INTRAVENOUS; SUBCUTANEOUS
Status: DISCONTINUED | OUTPATIENT
Start: 2022-03-10 | End: 2022-03-11 | Stop reason: HOSPADM

## 2022-03-10 RX ORDER — HEPARIN SODIUM 1000 [USP'U]/ML
4000 INJECTION, SOLUTION INTRAVENOUS; SUBCUTANEOUS ONCE
Status: COMPLETED | OUTPATIENT
Start: 2022-03-10 | End: 2022-03-10

## 2022-03-10 RX ORDER — FLUMAZENIL 0.1 MG/ML
0.2 INJECTION, SOLUTION INTRAVENOUS
Status: DISCONTINUED | OUTPATIENT
Start: 2022-03-10 | End: 2022-03-11 | Stop reason: HOSPADM

## 2022-03-10 RX ORDER — FENTANYL CITRATE 50 UG/ML
25-50 INJECTION, SOLUTION INTRAMUSCULAR; INTRAVENOUS EVERY 5 MIN PRN
Status: DISCONTINUED | OUTPATIENT
Start: 2022-03-10 | End: 2022-03-11 | Stop reason: HOSPADM

## 2022-03-10 RX ORDER — HEPARIN SODIUM 200 [USP'U]/100ML
1 INJECTION, SOLUTION INTRAVENOUS CONTINUOUS PRN
Status: CANCELLED | OUTPATIENT
Start: 2022-03-10

## 2022-03-10 RX ORDER — SODIUM CHLORIDE 9 MG/ML
INJECTION, SOLUTION INTRAVENOUS CONTINUOUS
Status: DISCONTINUED | OUTPATIENT
Start: 2022-03-10 | End: 2022-03-11 | Stop reason: HOSPADM

## 2022-03-10 RX ADMIN — FENTANYL CITRATE 25 MCG: 50 INJECTION, SOLUTION INTRAMUSCULAR; INTRAVENOUS at 11:39

## 2022-03-10 RX ADMIN — LIDOCAINE HYDROCHLORIDE 10 ML: 10 INJECTION, SOLUTION INFILTRATION; PERINEURAL at 12:38

## 2022-03-10 RX ADMIN — HEPARIN SODIUM 4000 UNITS: 1000 INJECTION INTRAVENOUS; SUBCUTANEOUS at 12:07

## 2022-03-10 RX ADMIN — HEPARIN SODIUM 1000 UNITS: 1000 INJECTION INTRAVENOUS; SUBCUTANEOUS at 12:21

## 2022-03-10 RX ADMIN — NITROGLYCERIN 400 MCG: 10 INJECTION INTRAVENOUS at 12:22

## 2022-03-10 RX ADMIN — MIDAZOLAM HYDROCHLORIDE 0.5 MG: 1 INJECTION, SOLUTION INTRAMUSCULAR; INTRAVENOUS at 11:51

## 2022-03-10 RX ADMIN — SODIUM CHLORIDE: 9 INJECTION, SOLUTION INTRAVENOUS at 10:16

## 2022-03-10 RX ADMIN — FENTANYL CITRATE 25 MCG: 50 INJECTION, SOLUTION INTRAMUSCULAR; INTRAVENOUS at 11:51

## 2022-03-10 RX ADMIN — IODIXANOL 93 ML: 320 INJECTION, SOLUTION INTRAVASCULAR at 12:46

## 2022-03-10 RX ADMIN — MIDAZOLAM HYDROCHLORIDE 0.5 MG: 1 INJECTION, SOLUTION INTRAMUSCULAR; INTRAVENOUS at 11:39

## 2022-03-10 RX ADMIN — DIPHENHYDRAMINE HYDROCHLORIDE 25 MG: 50 INJECTION, SOLUTION INTRAMUSCULAR; INTRAVENOUS at 10:34

## 2022-03-10 RX ADMIN — HEPARIN SODIUM 1 BAG: 200 INJECTION, SOLUTION INTRAVENOUS at 11:42

## 2022-03-10 NOTE — PRE-PROCEDURE
GENERAL PRE-PROCEDURE:   Procedure:  Bilateral leg angiogram with focus on the right with possible angioplasty and/or stent placement with intravenous moderate sedation   Date/Time:  3/10/2022 10:00 AM    Written consent obtained?: Yes    Risks and benefits: Risks, benefits and alternatives were discussed    Consent given by:  Patient  Patient states understanding of procedure being performed: Yes    Patient's understanding of procedure matches consent: Yes    Procedure consent matches procedure scheduled: Yes    Expected level of sedation:  Moderate  Appropriately NPO:  Yes  ASA Class:  3  Mallampati  :  Grade 1- soft palate, uvula, tonsillar pillars, and posterior pharyngeal wall visible  Lungs:  Lungs clear with good breath sounds bilaterally  Heart:  Normal heart sounds and rate  History & Physical reviewed:  History and physical reviewed and no updates needed  Statement of review:  I have reviewed the lab findings, diagnostic data, medications, and the plan for sedation

## 2022-03-10 NOTE — PROGRESS NOTES
Interventional Radiology Intra-procedural Nursing Note    Patient Name: Rachell Paige  Medical Record Number: [id number]  Today's Date: March 10, 2022    Start Time: 1139  End of procedure time: 1243  Procedure: bilateral lower extremity angiogram unable to cross occlusion in the right anterior tibial artery and posterior tibial artery.  Report given to: rn  Time pt departs:  1254      Other Notes:   1125 to IR1.  Consent obtained and signed.  Patient received Benadryl 25 mg IV in caresuites.  Very drowsy.  Patient prepped with chloraprep and draped [facility]  Dr. Arango notified regarding Creatinine 1.45 on 3/6/2022  1140 6 FR left femoral artery sheath.  1210 patient doing well, no signs of contrast reaction at this time.  1230   Patient resting comfortably.  1243 left femoral angioseal.  LOT [id number]  Debrief 1245  Patient tolerated procedure well  Gauze and tegaderm to site  Debrief done        Claire Cronin RN

## 2022-03-10 NOTE — TELEPHONE ENCOUNTER
Patient had surgery today and was not yet discharged when calling.    Patient is requesting a post OP follow up with Dr. Parson, as soon as possible. I didn't see any specific requests and wanted to verify any specific instructions before scheduling a follow up.    Spencer Ware I   [address] [id number] [phone]

## 2022-03-10 NOTE — PROGRESS NOTES
Care Suites Discharge Nursing Note    Patient Information  Name: Rachell Paige  Age: 66 year old    Discharge Education:  Discharge instructions reviewed: Yes  Additional education/resources provided: YES- Dr. Arango and Lilian here to answer multiple questions.  Patient/patient representative verbalizes understanding: Yes  Patient discharging on new medications: No  Medication education completed: N/A    Discharge Plans:   Discharge location: home  Discharge ride contacted: Yes  Approximate discharge time: 1800 planning for ride pickup    Discharge Criteria:  Discharge criteria met and vital signs stable: Yes    Patient Belongs:  Patient belongings returned to patient: Yes    Jazzy Hua RN

## 2022-03-10 NOTE — PROGRESS NOTES
Care Suites Post Procedure Note    Patient Information  Name: Rachell Paige  Age: 66 year old    Post Procedure  Time patient returned to Care Suites: 1300  Concerns/abnormal assessment: none at present  If abnormal assessment, provider notified: N/A  Plan/Other: johs Burris RN

## 2022-03-10 NOTE — TELEPHONE ENCOUNTER
Patient needs to be scheduled for post-angio appointment with Dr. Parson in person in the next week or 2.     Dipti GAITANN, RN    [facility]  Office: [phone]  Fax: [phone]

## 2022-03-10 NOTE — DISCHARGE INSTRUCTIONS
Peripheral Angiogram Discharge Instructions - Femoral     After you go home:      Have an adult stay with you until tomorrow.    Drink extra fluids for 2 days.    You may resume your normal diet.    No smoking       For 24 hours - due to the sedation you received:    Relax and take it easy.    Do NOT make any important or legal decisions.    Do NOT drive or operate machines at home or at work.    Do NOT drink alcohol.    Care of Groin Puncture Site:      For the first 24 hrs - check the puncture site every 1-2 hours while awake.    For 2 days, when you cough, sneeze, laugh or move your bowels, hold your hand over the puncture site and press firmly.    Remove the bandaid after 24 hours. If there is minor oozing, apply another bandaid and remove it after 12 hours.    It is normal to have a small bruise or pea size lump at the site.    You may shower tomorrow.  Do NOT take a bath, or use a hot tub or pool for at least 3 days. Do NOT scrub the site. Do not use lotion or powder near the puncture site.     Activity:            For 2 days:    No stooping or squatting    Do NOT do any heavy activity such as exercise, lifting, or straining.     No housework, yard work or any activity that make you sweat    Do NOT lift more than 10 pounds    Bleeding:      If you start bleeding from the site in your groin, lie down flat and press firmly on/above the site for 10 minutes.     Once bleeding stops, lay flat for 2 hours.     Call the [facility] as soon as you can.       Call 911 right away if you have heavy bleeding or bleeding that does not stop.      Medicines:      If you are on Metformin (Glucophage) and your GFR (kidney function level) is >30, you may continue taking your Metformin.    If you are on Metformin (Glucophage) and your GFR (kidney function level) is <30, do not restart the Metformin for 48 hours after your procedure. Check with your primary care giver before restarting the Metformin to see if you need  to have blood drawn to recheck your kidney function (GFR).    If you are taking an antiplatelet medication such as Plavix, do not stop taking it until you talk to your provider.       Take your medications, including blood thinners, unless your provider tells you not to.      If you take Coumadin (Warfarin), have your INR checked by your provider in  3-5 days. Call your clinic to schedule this.    If you have stopped any medicines, check with your provider about when to restart them.        Follow Up Appointments:      Follow up with [facility] as directed.    Call the clinic if:      You have increased pain or a large or growing hard lump around the site.    The site is red, swollen, hot or tender.    Blood or fluid is draining from the site.    You have chills or a fever greater than 101 F (38 C).    Your leg feels numb, cool or changes color.    You have hives, a rash or unusual itching.    New pain in the back or belly that you cannot control with Tylenol.    Any questions or concerns.    Other Instructions:      If you received a stent - carry your stent card with you at all times.      If you have questions or your original symptoms do not improve, call:         [facility] @ [phone]

## 2022-03-10 NOTE — TELEPHONE ENCOUNTER
Future Appointments   Date Time Provider Department Center   3/16/2022  3:10 PM Pio Parson MD [facility]   3/22/2022  1:00 PM [email or web address] [facility]   3/22/2022  1:45 PM [id number] [facility]   3/22/2022  2:15 PM RESRM3 [facility]   3/22/2022  3:15 PM [facility]1 [facility]   4/26/2022  2:20 PM Ba Ramos MD [facility]

## 2022-03-10 NOTE — PROGRESS NOTES
PATIENT/VISITOR WELLNESS SCREENING    Step 1 Patient Screening    1. In the last month, have you been in contact with someone who was confirmed or suspected to have Coronavirus/COVID-19? No    2. Do you have the following symptoms?  Fever/Chills? No   Cough? No   Shortness of breath? No   New loss of taste or smell? No  Sore throat? No  Muscle or body aches? No  Headaches? No  Fatigue? No  Vomiting or diarrhea? No      Step 3 Refer to logic grid below for actions    NO SYMPTOM(S)    ACTIONS:  1. Standard rooming process  2. Provider to assess per normal protocol  3. Implement precautions as needed and per guidelines     POSITIVE SYMPTOM(S)  If positive for ANY of the following symptoms: fever, cough, shortness of breath, rash    ACTION:  1. Continue to have the patient wear a mask   2. Room patient as soon as possible  3. Don appropriate PPE when entering room  4. Provider evaluation  Care Suites Admission Nursing Note    Patient Information  Name: Rachell Paige  Age: 66 year old  Reason for admission: L/E angio  Care Suites arrival time: 0900        Patient Admission/Assessment   Pre-procedure assessment complete: Yes  If abnormal assessment/labs, provider notified: N/A  NPO: Yes  Medications held per instructions/orders: Yes  Consent: deferred  If applicable, pregnancy test status: deferred  Patient oriented to room: Yes  Education/questions answered: Yes      Discharge Planning  Discharge name/phone number:    Overnight post sedation caregiver:Anna (spouse)  Discharge location: [place]    Zoie Wagoner RN

## 2022-03-13 ENCOUNTER — HOSPITAL ENCOUNTER (EMERGENCY)
Facility: CLINIC | Age: 67
Discharge: HOME | End: 2022-03-13
Attending: EMERGENCY MEDICINE | Admitting: EMERGENCY MEDICINE
Payer: MEDICARE

## 2022-03-13 ENCOUNTER — APPOINTMENT (OUTPATIENT)
Dept: GENERAL RADIOLOGY | Facility: CLINIC | Age: 67
End: 2022-03-13
Attending: EMERGENCY MEDICINE
Payer: MEDICARE

## 2022-03-13 VITALS
TEMPERATURE: 97.8 F | OXYGEN SATURATION: 100 % | WEIGHT: 194.67 LBS | SYSTOLIC BLOOD PRESSURE: 144 MMHG | DIASTOLIC BLOOD PRESSURE: 67 MMHG | RESPIRATION RATE: 20 BRPM | BODY MASS INDEX: 31.42 KG/M2 | HEART RATE: 76 BPM

## 2022-03-13 DIAGNOSIS — M79.662 PAIN OF LEFT LOWER LEG: ICD-10-CM

## 2022-03-13 DIAGNOSIS — L97.929 ULCER OF LOWER LIMB, LEFT, WITH UNSPECIFIED SEVERITY (H): ICD-10-CM

## 2022-03-13 LAB
ANION GAP SERPL CALCULATED.3IONS-SCNC: 2 MMOL/L (ref 3–14)
BASOPHILS # BLD AUTO: 0 10E3/UL (ref 0–0.2)
BASOPHILS NFR BLD AUTO: 0 %
BUN SERPL-MCNC: 16 MG/DL (ref 7–30)
CALCIUM SERPL-MCNC: 8.9 MG/DL (ref 8.5–10.1)
CHLORIDE BLD-SCNC: 107 MMOL/L (ref 94–109)
CO2 SERPL-SCNC: 25 MMOL/L (ref 20–32)
CREAT SERPL-MCNC: 1.25 MG/DL (ref 0.66–1.25)
CRP SERPL-MCNC: 14.5 MG/L (ref 0–8)
EOSINOPHIL # BLD AUTO: 0.2 10E3/UL (ref 0–0.7)
EOSINOPHIL NFR BLD AUTO: 3 %
ERYTHROCYTE [DISTWIDTH] IN BLOOD BY AUTOMATED COUNT: 13.2 % (ref 10–15)
GFR SERPL CREATININE-BSD FRML MDRD: 64 ML/MIN/1.73M2
GLUCOSE BLD-MCNC: 218 MG/DL (ref 70–99)
HCT VFR BLD AUTO: 40.1 % (ref 40–53)
HGB BLD-MCNC: 12.7 G/DL (ref 13.3–17.7)
IMM GRANULOCYTES # BLD: 0 10E3/UL
IMM GRANULOCYTES NFR BLD: 1 %
LYMPHOCYTES # BLD AUTO: 2.1 10E3/UL (ref 0.8–5.3)
LYMPHOCYTES NFR BLD AUTO: 26 %
MCH RBC QN AUTO: 28 PG (ref 26.5–33)
MCHC RBC AUTO-ENTMCNC: 31.7 G/DL (ref 31.5–36.5)
MCV RBC AUTO: 89 FL (ref 78–100)
MONOCYTES # BLD AUTO: 0.6 10E3/UL (ref 0–1.3)
MONOCYTES NFR BLD AUTO: 8 %
NEUTROPHILS # BLD AUTO: 5.1 10E3/UL (ref 1.6–8.3)
NEUTROPHILS NFR BLD AUTO: 62 %
NRBC # BLD AUTO: 0 10E3/UL
NRBC BLD AUTO-RTO: 0 /100
PLATELET # BLD AUTO: 299 10E3/UL (ref 150–450)
POTASSIUM BLD-SCNC: 5.2 MMOL/L (ref 3.4–5.3)
RBC # BLD AUTO: 4.53 10E6/UL (ref 4.4–5.9)
SODIUM SERPL-SCNC: 134 MMOL/L (ref 133–144)
WBC # BLD AUTO: 8.2 10E3/UL (ref 4–11)

## 2022-03-13 PROCEDURE — 99284 EMERGENCY DEPT VISIT MOD MDM: CPT

## 2022-03-13 PROCEDURE — 85025 COMPLETE CBC W/AUTO DIFF WBC: CPT | Performed by: EMERGENCY MEDICINE

## 2022-03-13 PROCEDURE — 36415 COLL VENOUS BLD VENIPUNCTURE: CPT | Performed by: EMERGENCY MEDICINE

## 2022-03-13 PROCEDURE — 73590 X-RAY EXAM OF LOWER LEG: CPT | Mod: 50

## 2022-03-13 PROCEDURE — 80048 BASIC METABOLIC PNL TOTAL CA: CPT | Performed by: EMERGENCY MEDICINE

## 2022-03-13 PROCEDURE — 86140 C-REACTIVE PROTEIN: CPT | Performed by: EMERGENCY MEDICINE

## 2022-03-13 RX ORDER — CEPHALEXIN 500 MG/1
500 CAPSULE ORAL 4 TIMES DAILY
Qty: 28 CAPSULE | Refills: 0 | Status: SHIPPED | OUTPATIENT
Start: 2022-03-13 | End: 2022-03-20

## 2022-03-13 ASSESSMENT — ENCOUNTER SYMPTOMS
WOUND: 1
CHILLS: 0
FEVER: 0

## 2022-03-13 NOTE — ED PROVIDER NOTES
History   Chief Complaint:  Wound Check     The history is provided by the patient.      Rachell Paige is a 66 year old male with history of diabetes, CKD stage III, and hypertension who presents for a wound check. The patient reports having ulcers to his lower left leg for four weeks. These wounds have been bleeding and more painful for a few days. No new redness or purulent drainage. No recent fever or chills.      Review of Systems   Constitutional: Negative for chills and fever.   Musculoskeletal:        Positive for left lower leg pain   Skin: Positive for wound.   All other systems reviewed and are negative.    Allergies:  Aleve  Citalopram  Clopidogrel  Iodine  Naproxen  Sulfamethoxazole-Trimethoprim    Medications:  Zocor  Aspirin  Neurontin  Insulin aspart  Insulin glargine  Zestril  Crestor   Ambien    Past Medical History:     Anxiety  Diabetes  Hypertension  Hyperlipidemia   PAD    Anemia   PVD  COPD  Depression   CKD stage III    Past Surgical History:    Right fifth toe amputation      Family History:    Lung disease, mother   Diabetes, mother    Social History:  Presents to ED alone. Former smoker (quit 1998). No alcohol use.     Physical Exam     Patient Vitals for the past 24 hrs:   BP Temp Temp src Pulse Resp SpO2 Weight   03/13/22 1730 139/67 -- -- 80 -- 99 % --   03/13/22 1729 (!) 142/74 -- -- 80 -- 100 % --   03/13/22 1627 (!) 148/76 97.8  F (36.6  C) Oral 82 20 98 % 88.3 kg (194 lb 10.7 oz)       Physical Exam  General: Adult male sitting upright  Eyes: PERRL, Conjunctive within normal limits  ENT: Moist mucous membranes, oropharynx clear.   CV: Normal S1S2, no murmur, rub or gallop. Regular rate and rhythm.  Palpable DP pulse left foot.  Resp:  Normal respiratory effort.  MSK: No edema.  Tender over the left lower anterior leg surrounding and just distal to an approximately 2 cm subcutaneous ulceration.  No crepitus, no palpable fluctuant mass, no discharge.   Normal active range of  motion.  Amputation of the distal aspect of the left foot.  Skin: Warm and dry.  Ulcerated skin of the left lower leg anteriorly with a deeper 2 cm subcutaneous ulceration with perfused well-appearing tissue, no purulence.  No spreading erythema.    Neuro: Alert and oriented. Responds appropriately to all questions and commands. No focal findings appreciated. Normal muscle tone.  Psych: Normal mood and affect. Pleasant.    Emergency Department Course     Imaging:  XR Tibia & Fibula Bilateral 2 Views   Final Result   IMPRESSION:   1.  No fracture or joint malalignment.   2.  No soft tissue gas or radiodense foreign body.   3.  Atherosclerotic calcification.        Report per radiology    Laboratory:  Labs Ordered and Resulted from Time of ED Arrival to Time of ED Departure   BASIC METABOLIC PANEL - Abnormal       Result Value    Sodium 134      Potassium 5.2      Chloride 107      Carbon Dioxide (CO2) 25      Anion Gap 2 (*)     Urea Nitrogen 16      Creatinine 1.25      Calcium 8.9      Glucose 218 (*)     GFR Estimate 64     CRP INFLAMMATION - Abnormal    CRP Inflammation 14.5 (*)    CBC WITH PLATELETS AND DIFFERENTIAL - Abnormal    WBC Count 8.2      RBC Count 4.53      Hemoglobin 12.7 (*)     Hematocrit 40.1      MCV 89      MCH 28.0      MCHC 31.7      RDW 13.2      Platelet Count 299      % Neutrophils 62      % Lymphocytes 26      % Monocytes 8      % Eosinophils 3      % Basophils 0      % Immature Granulocytes 1      NRBCs per 100 WBC 0      Absolute Neutrophils 5.1      Absolute Lymphocytes 2.1      Absolute Monocytes 0.6      Absolute Eosinophils 0.2      Absolute Basophils 0.0      Absolute Immature Granulocytes 0.0      Absolute NRBCs 0.0        Emergency Department Course:     Reviewed:  I reviewed nursing notes, vitals, past medical history and Care Everywhere.    Assessments:  1650 I obtained history and examined the patient as noted above.   1816 I rechecked the patient and explained findings.      Disposition:  The patient was discharged to home.     Impression & Plan     Medical Decision Making:  Rachell Paige is a 66-year-old diabetic with a history of diabetic foot infections and a partial amputation of his left foot who presents emergency department with concerns for increasing ulcerations of his left anterior lower leg and new pain noted over the more distal ulceration.  He is concerned about infection.  There is some mild soft tissue swelling and pain surrounding the wound but no spreading erythema.  No crepitus.  Unlikely osteomyelitis.  He has mild a elevated CRP but no fever or leukocytosis.  X-ray did not show any worrisome pathology.  Given the concerns for possible evolving infection and is high risk status he is discharged home with Keflex 4 times daily for the next 14 days.  Recommended follow-up in 2 to 3 days for wound recheck with his primary care physician and attempt to get into the wound clinic earlier than next month.  He will return to the emergency department should he develop any systemic symptoms, fever, increasing pain, spreading redness, purulent discharge, all discussed with him prior to discharge.  He felt comfortable this plan.  All questions were answered.    Diagnosis:    ICD-10-CM    1. Ulcer of lower limb, left, with unspecified severity (H)  L97.929    2. Pain of left lower leg  M79.662      Discharge Medications:  New Prescriptions    CEPHALEXIN (KEFLEX) 500 MG CAPSULE    Take 1 capsule (500 mg) by mouth 4 times daily for 7 days      Scribe Disclosure:  Laura SHIN, am serving as a scribe at 4:43 PM on 3/13/2022 to document services personally performed by Silvia Burnette MD based on my observations and the provider's statements to me.      Silvia Burnette MD  03/13/22 1914

## 2022-03-13 NOTE — ED TRIAGE NOTES
Pt arrives to the ED due to having wound on left lower leg along with on his right big toe and right pinky toe. States that the wound on the left leg has been present for past 2 wks and that the wounds on the right have been there for past month. Pt is a type 2 diabetic, has neuropathy. States he was unable to get into wound clinic for a month. Denies fevers.

## 2022-03-16 ENCOUNTER — OFFICE VISIT (OUTPATIENT)
Dept: OTHER | Facility: CLINIC | Age: 67
End: 2022-03-16
Attending: INTERNAL MEDICINE
Payer: MEDICARE

## 2022-03-16 VITALS
BODY MASS INDEX: 31.12 KG/M2 | DIASTOLIC BLOOD PRESSURE: 70 MMHG | OXYGEN SATURATION: 99 % | WEIGHT: 192.8 LBS | SYSTOLIC BLOOD PRESSURE: 122 MMHG | HEART RATE: 105 BPM

## 2022-03-16 DIAGNOSIS — I73.9 PAD (PERIPHERAL ARTERY DISEASE) (H): Primary | ICD-10-CM

## 2022-03-16 PROCEDURE — G0463 HOSPITAL OUTPT CLINIC VISIT: HCPCS

## 2022-03-16 PROCEDURE — 99213 OFFICE O/P EST LOW 20 MIN: CPT | Performed by: INTERNAL MEDICINE

## 2022-03-16 RX ORDER — CILOSTAZOL 50 MG/1
50 TABLET ORAL 2 TIMES DAILY
Qty: 30 TABLET | Refills: 3 | Status: SHIPPED | OUTPATIENT
Start: 2022-03-16 | End: 2022-10-07

## 2022-03-16 NOTE — PROGRESS NOTES
[facility]        Patient is here for a  follow up.      Pt is currently taking Aspirin and Statin.    /70 (BP Location: Right arm, Patient Position: Chair, Cuff Size: Adult Regular)   Pulse 105   Wt 192 lb 12.8 oz (87.5 kg)   SpO2 99%   BMI 31.12 kg/m      The provider has been notified that the patient has no concerns.     Questions patient would like addressed today are: N/A.    Refills are needed: N/A    Has homecare services and agency name:  Milagros Santos MA

## 2022-03-16 NOTE — PROGRESS NOTES
[facility]  Pio Parson MD - Vascular Medicine Progress Note    LOCATION: [facility]    Rachell Paige  Medical Record #:  [id number]  YOB: 1955  Age:  66 year old     Date of Service: 3/16/2022     PRIMARY CARE PROVIDER: Olivia Nichols    REASON FOR VISIT:   follow up  for IR procedure    IMPRESSION:-IR procedure was not successful, patient is still symptomatic with claudication and nonhealing wound    RECOMMENDATION:  1-we referred the patient to vascular surgery service  2-vein mapping      If patients imaging is normal patient should follow up as needed    HPI: Rachell Paige is a 66 year old male who was seen today for status post IR procedure which was not successful in establishing good flow patient has nonhealing wound affecting the left lower extremity anterior shin      PAST MEDICAL HISTORY  Past Medical History:   Diagnosis Date     Anxiety 02/23/2015     Dermatitis 04/18/2015     DM type 2, goal A1C 7-8 02/23/2015     Financial problems 02/23/2015     Glaucoma (increased eye pressure)      HTN, goal below 140/90 02/23/2015     Hyperlipidemia LDL goal <100 02/23/2015     Microalbuminuria 04/18/2015     Onychomycosis 04/18/2015     Rash 02/23/2015       PAST SURGICAL HISTORY:  Past Surgical History:   Procedure Laterality Date     AMPUTATE TOE(S) Right 12/31/2018    Procedure: Right fifth toe amputation;  Surgeon: Clark Lora DPM;  Location: [facility] OR     IR LOWER EXTREMITY ANGIOGRAM BILATERAL  3/10/2022         CURRENT MEDICATIONS  ACCU-CHEK GUIDE test strip, USE TO TEST BLOOD SUGAR TWO TIMES A DAY OR AS DIRECTED  alcohol swab prep pads, Use to swab area of injection/pedro pablo as directed.  aspirin 81 MG tablet, Take 1 tablet (81 mg) by mouth daily  blood glucose (ACCU-CHEK GUIDE) test strip, 1 strip by In Vitro route 4 times daily Use to test blood sugar 3 -4  times daily or as directed.  blood glucose calibration (NO BRAND SPECIFIED)  "solution, To accompany: Blood Glucose Monitor Brands: per insurance.  blood glucose monitoring (NO BRAND SPECIFIED) meter device kit, Use to test blood sugar 4 times daily or as directed. Preferred blood glucose meter OR supplies to accompany: Blood Glucose Monitor Brands: per insurance.  cephALEXin (KEFLEX) 500 MG capsule, Take 1 capsule (500 mg) by mouth 4 times daily for 7 days  dorzolamide (TRUSOPT) 2 % ophthalmic solution, Place 1 drop into both eyes 2 times daily  gabapentin (NEURONTIN) 300 MG capsule, TAKE 1 CAPSULE (300 MG) BY MOUTH AT BEDTIME  insulin aspart (NOVOLOG FLEXPEN) 100 UNIT/ML pen, Inject 1-10 Units Subcutaneous 2 times daily (with meals) With lunch and dinner  insulin glargine (LANTUS SOLOSTAR) 100 UNIT/ML pen, Inject 50 Units Subcutaneous At Bedtime  insulin pen needle (31G X 8 MM) 31G X 8 MM miscellaneous, Use 3 pen needles daily or as directed.  lisinopril (ZESTRIL) 20 MG tablet, Take 1 tablet (20 mg) by mouth daily  rosuvastatin (CRESTOR) 10 MG tablet, Take 1 tablet (10 mg) by mouth daily  thin (NO BRAND SPECIFIED) lancets, Use with lanceting device. To accompany: Blood Glucose Monitor Brands: per insurance.  timolol maleate (TIMOPTIC) 0.5 % ophthalmic solution, Place 1 drop into both eyes 2 times daily  zolpidem (AMBIEN) 5 MG tablet, Take 1 tablet (5 mg) by mouth nightly as needed for sleep    No current facility-administered medications on file prior to visit.      ALLERGIES     Allergies   Allergen Reactions     Aleve      HA sweats     Citalopram Itching     Clopidogrel Nausea and Vomiting     Pt to restart on 11/25/15 to see again if he tolerates it or not. His sx were only GI. Not a true allergy     Iodine      Naproxen Itching     About 10 yrs ago, itching all over from taking Aleve  \"get sick and really sick\"       Sulfamethoxazole-Trimethoprim      Other reaction(s): Renal Failure         FAMILY HISTORY  Family History   Problem Relation Age of Onset     Diabetes Other      LUNG " DISEASE Mother      Glaucoma Father      Cerebrovascular Disease Son      Diabetes Son      Diabetes Daughter        SOCIAL HISTORY  Social History     Socioeconomic History     Marital status:      Spouse name: Not on file     Number of children: Not on file     Years of education: Not on file     Highest education level: Not on file   Occupational History     Not on file   Tobacco Use     Smoking status: Former Smoker     Types: Cigarettes     Quit date:      Years since quittin.2     Smokeless tobacco: Never Used   Substance and Sexual Activity     Alcohol use: No     Drug use: No     Sexual activity: Yes     Partners: Female   Other Topics Concern     Parent/sibling w/ CABG, MI or angioplasty before 65F 55M? Not Asked   Social History Narrative    Former  ([employer], [employer])          Social Determinants of Health     Financial Resource Strain: Low Risk      Difficulty of Paying Living Expenses: Not hard at all   Food Insecurity: No Food Insecurity     Worried About Running Out of Food in the Last Year: Never true     Ran Out of Food in the Last Year: Never true   Transportation Needs: No Transportation Needs     Lack of Transportation (Medical): No     Lack of Transportation (Non-Medical): No   Physical Activity: Not on file   Stress: Not on file   Social Connections: Not on file   Intimate Partner Violence: Not on file   Housing Stability: Not on file       ROS:   Complete ROS negative other than what is stated in HPI.     EXAM:  /70 (BP Location: Right arm, Patient Position: Chair, Cuff Size: Adult Regular)   Pulse 105   Wt 192 lb 12.8 oz (87.5 kg)   SpO2 99%   BMI 31.12 kg/m    In general, the patient is a pleasant male in no apparent distress.    HEENT: NC/AT.  PERRLA.  EOMI.  Sclerae white, not injected.    Neck: No adenopathy.  Carotids +2/2 bilaterally without bruits.   Heart: RRR. Normal S1, S2 splits physiologically. No murmur, rub, click, or gallop.    Lungs: CTA.  No ronchi, wheezes, rales.    Abdomen: Soft, nontender, nondistended.   Extremities: No clubbing, cyanosis, or edema.  Left anterior nonhealing shin wound.     Labs:  LIPID RESULTS:  Lab Results   Component Value Date    CHOL 117 02/28/2022    CHOL 156 02/10/2021    HDL 47 02/28/2022    HDL 44 02/10/2021    LDL 54 02/28/2022    LDL 81 02/10/2021    TRIG 79 02/28/2022    TRIG 157 (H) 02/10/2021    CHOLHDLRATIO 3.4 02/23/2015       LIVER ENZYME RESULTS:  Lab Results   Component Value Date    AST 13 02/28/2022    AST 13 02/10/2021    ALT 23 02/28/2022    ALT 22 02/10/2021       CBC RESULTS:  Lab Results   Component Value Date    WBC 8.2 03/13/2022    WBC 8.4 07/06/2021    RBC 4.53 03/13/2022    RBC 4.71 07/06/2021    HGB 12.7 (L) 03/13/2022    HGB 13.4 07/06/2021    HCT 40.1 03/13/2022    HCT 41.0 07/06/2021    MCV 89 03/13/2022    MCV 87 07/06/2021    MCH 28.0 03/13/2022    MCH 28.5 07/06/2021    MCHC 31.7 03/13/2022    MCHC 32.7 07/06/2021    RDW 13.2 03/13/2022    RDW 13.3 07/06/2021     03/13/2022     07/06/2021       BMP RESULTS:  Lab Results   Component Value Date     03/13/2022     02/10/2021    POTASSIUM 5.2 03/13/2022    POTASSIUM 4.7 02/10/2021    CHLORIDE 107 03/13/2022    CHLORIDE 103 02/10/2021    CO2 25 03/13/2022    CO2 24 02/10/2021    ANIONGAP 2 (L) 03/13/2022    ANIONGAP 7 02/10/2021     (H) 03/13/2022     (H) 02/10/2021    BUN 16 03/13/2022    BUN 21 02/10/2021    CR 1.25 03/13/2022    CR 1.15 02/10/2021    GFRESTIMATED 64 03/13/2022    GFRESTIMATED 67 02/10/2021    GFRESTBLACK 77 02/10/2021    ALPHONSO 8.9 03/13/2022    ALPHONSO 9.3 02/10/2021        A1C RESULTS:  Lab Results   Component Value Date    A1C 8.1 (H) 02/28/2022    A1C 7.4 (H) 07/28/2020       THYROID RESULTS:  Lab Results   Component Value Date    TSH 1.44 11/01/2020         DIAGNOSTIC STUDIES:     Images:  IR Lower Extremity Angiogram Bilateral    Result Date: 3/14/2022  IR LOWER EXTREMITY  ANGIOGRAM BILATERAL  3/10/2022 12:46 PM HISTORY: 66-year-old male with diabetes. Patient has nonhealing wounds in the right foot. Patient is status post a left transmetatarsal amputation for nonhealing wounds. COMPARISON: Ultrasound dated 2/9/2021 DESCRIPTION OF PROCEDURE: After obtaining informed consent, the patient was placed in a supine position on the fluoroscopy table. The left groin was prepped and draped in the usual sterile manner. 1% lidocaine was injected for local anesthesia. Ultrasound was used to evaluate and document patency of the left common femoral artery. Under sterile ultrasound guidance, access into the left common femoral artery was obtained. An image was saved for documentation. A 6 Korean vascular sheath was placed. An Omni flush catheter was positioned in the lower abdominal aorta for pelvic angiogram. The Omni Flush catheter was maneuvered into the right external iliac artery for a right lower extremity angiogram. A 5 Korean angiographic catheter was maneuvered into the right below-knee popliteal artery for tibial angiography. A left lower extremity angiogram was performed via the left femoral sheath. FINDINGS: Pelvic angiogram: No significant stenoses in the iliac arteries. Right lower extremity angiogram: Common femoral artery: No significant stenosis. Profunda femoris artery: No significant stenosis. Superficial femoral artery: No significant stenosis. Tibial arteries: Primarily single-vessel runoff via the peroneal artery. This helps to reconstitute the plantar branches of the posterior tibial artery in the foot. There is a moderate stenosis in the proximal peroneal artery. Multifocal disease with multiple severe stenoses and occlusions in the anterior tibial artery. A small caliber anterior tibial artery can be followed on the delayed images to the foot. A small caliber dorsalis pedis artery identified. Diffuse small vessel disease in the foot identified. Left lower extremity  angiogram: Common femoral artery: No significant stenosis. Profunda femoris artery: No significant stenosis. Superficial femoral artery: No significant stenosis. Tibial arteries: Primary single-vessel runoff via the peroneal artery. Long segment occlusions of the posterior tibial and anterior tibial arteries. Diffuse small vessel disease in the foot. Intervention: A 6 Tongan crossover sheath was placed. Attempts to recanalize occlusions and angioplasty stenoses in the anterior tibial artery were unsuccessful. The left femoral sheath was removed. The puncture site was closed with a 6 Tongan Angio-Seal. I determined this patient to be an appropriate candidate for the planned sedation and procedure and reassessed the patient immediately prior to sedation and procedure. Moderate intravenous conscious sedation was supervised by me. The patient was independently monitored by a registered nurse assigned to the Department of radiology using automated blood pressure, EKG and pulse oximetry. The patient tolerated the procedure well. There were no immediate postprocedure complications. The patient's vital signs were monitored by radiology nursing staff under my supervision and remained stable throughout the study. Radiation dose for this scan was reduced using automated exposure control, adjustment of the mA and/or kV according to patient size, or iterative reconstruction technique. MEDICATIONS: 1 mg Versed, 50 mg fentanyl, heparin 5000 units, nitroglycerin 400 mcg Sedation time: 57 minutes Fluoroscopy time: 11.9 minutes Total fluoroscopy dose: 152.30 mGy Air Kerma Contrast: 93 mL Visipaque     IMPRESSION: Significant infrapopliteal arterial disease with primarily single-vessel runoff via the peroneal artery bilaterally. Posterior tibial arteries and left anterior tibial artery are occluded. Diffusely diseased right anterior tibial artery with multiple proximal occlusions in significant stenoses unable to be crossed for  intervention. ZEYNEP FREEMAN MD   SYSTEM ID:  [id number]    XR Foot Right G/E 3 Views    Result Date: 3/5/2022  EXAM: XR FOOT RIGHT G/E 3 VIEWS LOCATION: [facility] DATE/TIME: 3/5/2022 5:39 PM INDICATION: Pain after injury. COMPARISON: 12/23/2021.     IMPRESSION: Amputation of the small toe. There is angulation change at the proximal phalanx of the great toe but this is unchanged from the previous examination and unlikely to represent an acute fracture. Superimposed degenerative change at the first MTP joint. No radiographic evidence for osteomyelitis. Right foot otherwise negative.    XR Tibia & Fibula Bilateral 2 Views    Result Date: 3/13/2022  EXAM: XR TIBIA and FIBULA BILATERAL2 VW LOCATION: [facility] DATE/TIME: 3/13/2022 5:12 PM INDICATION: Lower extremity pain and skin ulcerations. COMPARISON: None.     IMPRESSION: 1.  No fracture or joint malalignment. 2.  No soft tissue gas or radiodense foreign body. 3.  Atherosclerotic calcification.  .      Pio Parson MD        10 minutes spent on the date of the encounter doing chart review, history and exam, documentation, and further activities as noted above.

## 2022-03-17 ENCOUNTER — TELEPHONE (OUTPATIENT)
Dept: VASCULAR SURGERY | Facility: CLINIC | Age: 67
End: 2022-03-17
Payer: MEDICARE

## 2022-03-17 DIAGNOSIS — Z01.818 PREOP TESTING: ICD-10-CM

## 2022-03-17 DIAGNOSIS — I73.9 PAD (PERIPHERAL ARTERY DISEASE) (H): Primary | ICD-10-CM

## 2022-03-17 NOTE — TELEPHONE ENCOUNTER
Per Dr. Parson, pt to be referred to vascular surgery for nonhealing right foot wounds, will need leg bypass.    Routing to scheduling to contact patient and coordinate the following:    Bilateral lower extremity vein mapping    In clinic consult with Vascular Surgery ASAP    Appt note:  Ref by Dr. Parson for nonhealing right foot wounds in need of bypass; BLE angiogram in Epic; BLE vein mapping to be scheduled.    KANDY SotoN, RN-[facility] [place]

## 2022-03-17 NOTE — TELEPHONE ENCOUNTER
LM to schedule - possible [place] location.       Mila Gudino    [facility]   [phone]

## 2022-03-18 NOTE — TELEPHONE ENCOUNTER
Attempted to contact again. Unable to leave voicemail.       Mila Gudino    [facility] [place]   [phone]

## 2022-03-18 NOTE — TELEPHONE ENCOUNTER
Future Appointments   Date Time Provider Department Center   3/22/2022  1:00 PM [email or web address] [facility] [place] [facility]   3/22/2022  1:45 PM RHNM1 [facility] [place] [facility]   3/22/2022  2:15 PM RESRM3 [facility]CVSV [place] [facility]   3/22/2022  3:15 PM RHNM1 [facility] [place] [facility]   3/30/2022  2:30 PM SHVUS1 [facility]   4/11/2022  2:30 PM Frandy Moreno MD [facility]   4/26/2022  2:20 PM Ba Ramos MD [facility]        [place]   [address] [address] [phone]

## 2022-03-21 DIAGNOSIS — E11.9 TYPE 2 DIABETES MELLITUS WITH HEMOGLOBIN A1C GOAL OF 7.0%-8.0% (H): ICD-10-CM

## 2022-03-24 RX ORDER — METHYLPREDNISOLONE SODIUM SUCCINATE 125 MG/2ML
INJECTION, POWDER, FOR SOLUTION INTRAMUSCULAR; INTRAVENOUS
Qty: 300 EACH | Refills: 0 | Status: SHIPPED | OUTPATIENT
Start: 2022-03-24 | End: 2022-09-09

## 2022-03-24 NOTE — TELEPHONE ENCOUNTER
Routing refill request to provider for review/approval because:  Routing to PCP last filled by different provider     Isidro Manzo RN  [facility] Triage Nurse

## 2022-04-04 ENCOUNTER — TELEPHONE (OUTPATIENT)
Dept: WOUND CARE | Facility: CLINIC | Age: 67
End: 2022-04-04

## 2022-04-04 ENCOUNTER — HOSPITAL ENCOUNTER (OUTPATIENT)
Dept: GENERAL RADIOLOGY | Facility: CLINIC | Age: 67
Discharge: HOME | End: 2022-04-04
Attending: SURGERY
Payer: MEDICARE

## 2022-04-04 ENCOUNTER — HOSPITAL ENCOUNTER (OUTPATIENT)
Dept: WOUND CARE | Facility: CLINIC | Age: 67
Discharge: HOME | End: 2022-04-04
Attending: EMERGENCY MEDICINE
Payer: MEDICARE

## 2022-04-04 VITALS
DIASTOLIC BLOOD PRESSURE: 77 MMHG | WEIGHT: 193.2 LBS | RESPIRATION RATE: 18 BRPM | BODY MASS INDEX: 31.05 KG/M2 | HEART RATE: 90 BPM | SYSTOLIC BLOOD PRESSURE: 150 MMHG | TEMPERATURE: 97.2 F | HEIGHT: 66 IN

## 2022-04-04 DIAGNOSIS — L97.922 ULCER OF LEFT LOWER EXTREMITY WITH FAT LAYER EXPOSED (H): ICD-10-CM

## 2022-04-04 DIAGNOSIS — I73.9 PAD (PERIPHERAL ARTERY DISEASE) (H): ICD-10-CM

## 2022-04-04 DIAGNOSIS — E11.9 TYPE 2 DIABETES MELLITUS WITH HEMOGLOBIN A1C GOAL OF 7.0%-8.0% (H): ICD-10-CM

## 2022-04-04 DIAGNOSIS — T14.8XXD DELAYED WOUND HEALING: ICD-10-CM

## 2022-04-04 PROBLEM — E66.3 OVERWEIGHT: Status: ACTIVE | Noted: 2022-04-04

## 2022-04-04 PROBLEM — F33.9 DEPRESSION, RECURRENT (H): Status: ACTIVE | Noted: 2022-02-17

## 2022-04-04 PROBLEM — Z89.432 PARTIAL NONTRAUMATIC AMPUTATION OF LEFT FOOT (H): Status: ACTIVE | Noted: 2017-01-04

## 2022-04-04 PROCEDURE — 73590 X-RAY EXAM OF LOWER LEG: CPT | Mod: LT

## 2022-04-04 PROCEDURE — 99203 OFFICE O/P NEW LOW 30 MIN: CPT | Performed by: SURGERY

## 2022-04-04 PROCEDURE — G0463 HOSPITAL OUTPT CLINIC VISIT: HCPCS | Mod: 25

## 2022-04-04 PROCEDURE — 97602 WOUND(S) CARE NON-SELECTIVE: CPT

## 2022-04-04 PROCEDURE — 73630 X-RAY EXAM OF FOOT: CPT | Mod: RT

## 2022-04-04 RX ORDER — FOLIC ACID/MULTIVIT,IRON,MINER 0.4MG-18MG
1 TABLET ORAL
COMMUNITY
Start: 2022-03-29

## 2022-04-04 RX ORDER — ZOLPIDEM TARTRATE 10 MG/1
10 TABLET ORAL
COMMUNITY
Start: 2022-02-14

## 2022-04-04 NOTE — DISCHARGE INSTRUCTIONS
Rachell Paige      1955    Wound care recommendations to left anterior lower leg:  Cleanse with mild unscented soap and water.  Apply plurogel to wound then cover with bandaid. Put plurogel in the fridge where it becomes thinner. You will use less of the plurogel this way, extending the use of the tube, and the plurogel will be more soothing.  Change daily or every other day.    To Order more plurogel: [email or web address] or call [phone] to place an order for 0.7 oz jar or tube  Use PLUROHEALS (all caps) at checkout in the discount code section to decrease price to $55    Please call [facility] [phone] ([facility] [place], [place]) to schedule your foot and leg xray appointment if you have not heard from them in two business days.    Your next appointment is a telephone visit. Please text photos of your wounds the day before or the day of the appointment to [phone]. Please include a tape measure or ruler of some sort in your photo for reference.     VANE Ramos M.D. April 4, 2022      Call us at [phone] if you have any questions about your wounds, have redness or swelling around your wound, have a fever of 101 or greater or if you have any other problems or concerns. We answer the phone Monday through Friday 8 am to 4 pm, please leave a message as we check the voicemail frequently throughout the day.     If you had a positive experience please indicate that on your patient satisfaction survey form that [facility] will be sending you.    It was a pleasure meeting with you today.  Thank you for allowing me and my team the privilege of caring for you today.  YOU are the reason we are here, and I truly hope we provided you with the excellent service you deserve.  Please let us know if there is anything else we can do for you so that we can be sure you are leaving completely satisfied with your care experience.      If you have any billing related questions please call the  [facility] Business office at [phone]. The clinic staff does not handle billing related matters.

## 2022-04-04 NOTE — PROGRESS NOTES
Patient arrived for wound care visit. Certified Wound Care Nurse time spent evaluating patient record, completed a full evaluation and documented wound(s) & mercedes-wound skin; provided recommendation based on treatment plan. Applied dressing, reviewed discharge instructions, patient education, and discussed plan of care with appropriate medical team staff members and patient and/or family members.

## 2022-04-04 NOTE — TELEPHONE ENCOUNTER
[facility] Wound    Who is the name of the provider?:  Rachel      What is the location you see this provider at?: [place]    Reason for call:  Needs clarification of order.  Order is for right foot but looks like it should be for left foot.      Can we leave a detailed message on this number?  No Some

## 2022-04-04 NOTE — TELEPHONE ENCOUNTER
Returned call to Paz. The request for the right foot is correct. The linked problem could not be fixed as the patient has checked in for the xray.

## 2022-04-04 NOTE — PROGRESS NOTES
[facility] [place] Progress Note    Subject: Rachell Paige consultation for evaluation of right fourth toe ulceration and left intertibial margin ulceration.  20+ year history of diabetes, on insulin, history of number of arterial disease, angiogram recently reviewed, as a consult with vascular surgery.  Has had a left transmetatarsal amputation performed at [facility], that well-healed multiple years prior.  Does not utilize tobacco.  Denies history of malignancy.  Medical record reviewed.  Not on amlodipine or Norvasc.    PMH:   Past Medical History:   Diagnosis Date     Anxiety 02/23/2015     Dermatitis 04/18/2015     DM type 2, goal A1C 7-8 02/23/2015     Financial problems 02/23/2015     Glaucoma (increased eye pressure)      HTN, goal below 140/90 02/23/2015     Hyperlipidemia LDL goal <100 02/23/2015     Microalbuminuria 04/18/2015     Onychomycosis 04/18/2015     Rash 02/23/2015     Patient Active Problem List   Diagnosis     Type 2 diabetes mellitus with hemoglobin A1c goal of 7.0%-8.0% (H)     HTN, goal below 140/90     Hyperlipidemia LDL goal <100     Financial problems     Anxiety     Rash     Microalbuminuria     Onychomycosis     Dermatitis     Does not have health insurance     Atypical chest pain     Epigastric pain     Generalized muscle weakness     2019 novel coronavirus disease (COVID-19)     Shortness of breath     Hyponatremia     Chest pain, unspecified type     S/P amputation of lesser toe, right (H)     Dyspnea on exertion     Other fatigue     PAD (peripheral artery disease) (H)     Acute osteomyelitis of left foot (H)     Anemia     Depression, recurrent (H)     Diabetes mellitus type II, uncontrolled (H)     Diabetic peripheral neuropathy (H)     Dislocation of PIP joint of finger     Fracture of middle or proximal phalanx of finger     Gangrene of digit     Lumbago     Necrotic toes (H)     Overweight     Partial nontraumatic amputation of left foot (H)      Preventative health care     Pure hypercholesterolemia     Toe osteomyelitis, left (H)     Ulcer of left lower extremity with fat layer exposed (H)     Social Hx:   Social History     Socioeconomic History     Marital status:      Spouse name: Not on file     Number of children: Not on file     Years of education: Not on file     Highest education level: Not on file   Occupational History     Not on file   Tobacco Use     Smoking status: Former Smoker     Types: Cigarettes     Quit date:      Years since quittin.2     Smokeless tobacco: Never Used   Substance and Sexual Activity     Alcohol use: No     Drug use: No     Sexual activity: Yes     Partners: Female   Other Topics Concern     Parent/sibling w/ CABG, MI or angioplasty before 65F 55M? Not Asked   Social History Narrative    Former  ([facility], [place])          Social Determinants of Health     Financial Resource Strain: Low Risk      Difficulty of Paying Living Expenses: Not hard at all   Food Insecurity: No Food Insecurity     Worried About Running Out of Food in the Last Year: Never true     Ran Out of Food in the Last Year: Never true   Transportation Needs: No Transportation Needs     Lack of Transportation (Medical): No     Lack of Transportation (Non-Medical): No   Physical Activity: Not on file   Stress: Not on file   Social Connections: Not on file   Intimate Partner Violence: Not on file   Housing Stability: Not on file       Surgical Hx:   Past Surgical History:   Procedure Laterality Date     AMPUTATE TOE(S) Right 2018    Procedure: Right fifth toe amputation;  Surgeon: Clark Lora DPM;  Location: [facility] OR     IR LOWER EXTREMITY ANGIOGRAM BILATERAL  3/10/2022       Allergies:    Allergies   Allergen Reactions     Aleve      HA sweats     Citalopram Itching     Clopidogrel Nausea and Vomiting     Pt to restart on 11/25/15 to see again if he tolerates it or not. His sx were only GI. Not a true  "allergy     Iodine      Naproxen Itching     About 10 yrs ago, itching all over from taking Aleve  \"get sick and really sick\"       Sulfamethoxazole-Trimethoprim      Other reaction(s): Renal Failure         Medications:   Current Outpatient Medications   Medication     Omega-3 Fatty Acids (OMEGA-3 FISH OIL) 1200 MG CAPS     zolpidem (AMBIEN) 10 MG tablet     ACCU-CHEK GUIDE test strip     alcohol swab prep pads     aspirin 81 MG tablet     blood glucose (ACCU-CHEK GUIDE) test strip     blood glucose calibration (NO BRAND SPECIFIED) solution     blood glucose monitoring (NO BRAND SPECIFIED) meter device kit     cilostazol (PLETAL) 50 MG tablet     dorzolamide (TRUSOPT) 2 % ophthalmic solution     gabapentin (NEURONTIN) 300 MG capsule     insulin aspart (NOVOLOG FLEXPEN) 100 UNIT/ML pen     insulin glargine (LANTUS SOLOSTAR) 100 UNIT/ML pen     insulin pen needle (ULTICARE SHORT) 31G X 8 MM miscellaneous     lisinopril (ZESTRIL) 20 MG tablet     rosuvastatin (CRESTOR) 10 MG tablet     thin (NO BRAND SPECIFIED) lancets     timolol maleate (TIMOPTIC) 0.5 % ophthalmic solution     zolpidem (AMBIEN) 5 MG tablet     No current facility-administered medications for this encounter.       Labs:   Recent Labs   Lab Test 03/13/22  1707 03/06/22  1737 02/28/22  0900 10/21/20  2343 10/18/20  0617   ALBUMIN  --   --  3.6   < > 2.6*   HGB 12.7*   < > 13.2*   < > 14.1   INR  --   --   --   --  1.10   WBC 8.2   < > 8.2   < > 10.3   A1C  --   --  8.1*   < >  --    CRP 14.5*   < >  --    < > 6.5    < > = values in this interval not displayed.     Creatinine   Date Value Ref Range Status   03/13/2022 1.25 0.66 - 1.25 mg/dL Final   02/10/2021 1.15 0.66 - 1.25 mg/dL Final     GFR Estimate   Date Value Ref Range Status   03/13/2022 64 >60 mL/min/1.73m2 Final     Comment:     Effective December 21, 2021 eGFRcr in adults is calculated using the 2021 CKD-EPI creatinine equation which includes age and gender (Bindu et al., NEJM, DOI: " "10.1056/NKLHbr9682412)   02/10/2021 67 >60 mL/min/[1.73_m2] Final     Comment:     Non  GFR Calc  Starting 12/18/2018, serum creatinine based estimated GFR (eGFR) will be   calculated using the Chronic Kidney Disease Epidemiology Collaboration   (CKD-EPI) equation.       GFR Estimate If Black   Date Value Ref Range Status   02/10/2021 77 >60 mL/min/[1.73_m2] Final     Comment:      GFR Calc  Starting 12/18/2018, serum creatinine based estimated GFR (eGFR) will be   calculated using the Chronic Kidney Disease Epidemiology Collaboration   (CKD-EPI) equation.       Lab Results   Component Value Date    WBC 8.2 03/13/2022    WBC 8.4 07/06/2021     Lab Results   Component Value Date    RBC 4.53 03/13/2022    RBC 4.71 07/06/2021     Lab Results   Component Value Date    HGB 12.7 03/13/2022    HGB 13.4 07/06/2021     Lab Results   Component Value Date    HCT 40.1 03/13/2022    HCT 41.0 07/06/2021     No components found for: MCT  Lab Results   Component Value Date    MCV 89 03/13/2022    MCV 87 07/06/2021     Lab Results   Component Value Date    MCH 28.0 03/13/2022    MCH 28.5 07/06/2021     Lab Results   Component Value Date    MCHC 31.7 03/13/2022    MCHC 32.7 07/06/2021     Lab Results   Component Value Date    RDW 13.2 03/13/2022    RDW 13.3 07/06/2021     Lab Results   Component Value Date     03/13/2022     07/06/2021          Nutrition requirements were discussed with patient today.  Objective:  BP (!) 150/77 (BP Location: Right arm)   Pulse 90   Temp 97.2  F (36.2  C) (Temporal)   Resp 18   Ht 1.676 m (5' 5.98\")   Wt 87.6 kg (193 lb 3.2 oz)   BMI 31.20 kg/m    Wound (used by OP [facility] only) 04/04/22 1344 Left anterior leg (Active)   Thickness/Stage full thickness 04/04/22 1300   Base granulating 04/04/22 1300   Periwound intact 04/04/22 1300   Periwound Temperature warm 04/04/22 1300   Periwound Skin Turgor soft 04/04/22 1300   Edges open 04/04/22 1300   Length (cm) " 0.3 04/04/22 1300   Width (cm) 0.4 04/04/22 1300   Depth (cm) 0.1 04/04/22 1300   Wound (cm^2) 0.12 cm^2 04/04/22 1300   Wound Volume (cm^3) 0.01 cm^3 04/04/22 1300   Drainage Characteristics/Odor serosanguineous 04/04/22 1300   Drainage Amount moderate 04/04/22 1300   Care, Wound non-select wound debridement performed 04/04/22 1300        General:  Patient is alert and orientated, no acute distress.  Conversant    Vascular: Nonpalpable pedal pulses bilaterally, strength palpable decreased right popliteal pulses.  Ulceration left intertibial margin without bone or tendon fluid, no cellulitis, small shallow ulceration at base of right fourth toe, chronic neuropathic changes of the toes on the right foot, no bone or tendon exposure, no undermining, no history of left transmetatarsal amputation which is well-healed, no heel ulceration.        Impression: Evidence of diabetes on insulin, peripheral arterial disease, history of left transmetatarsal amputation, recent angiogram, lower extremity ulcerations      Plan: Vascular surgery is, we will dress the wounds with PluroGel application to right foot wound and left anterior tibial margin, x-ray right foot, x-ray left tibial margin, evaluate for potential osteomyelitis.  Adjunctive bio nutrients.  Patient will return to the clinic in 4 weeks time with telehealth visit.      Further instructions from your care team       Rachell Paige      1955    Wound care recommendations to left anterior lower leg:  Cleanse with mild unscented soap and water.  Apply plurogel to wound then cover with bandaid. Put plurogel in the fridge where it becomes thinner. You will use less of the plurogel this way, extending the use of the tube, and the plurogel will be more soothing.  Change daily or every other day.    To Order more plurogel: [email or web address] or call [phone] to place an order for 0.7 oz jar or tube  Use PLUROHEALS (all caps) at checkout in the discount code section  to decrease price to $55    Please call [facility] [phone] ([address] [place] [address] [place], [place]) to schedule your foot and leg xray appointment if you have not heard from them in two business days.    Your next appointment is a telephone visit. Please text photos of your wounds the day before or the day of the appointment to [phone]. Please include a tape measure or ruler of some sort in your photo for reference.     VANE Ramos M.D. April 4, 2022      Call us at [phone] if you have any questions about your wounds, have redness or swelling around your wound, have a fever of 101 or greater or if you have any other problems or concerns. We answer the phone Monday through Friday 8 am to 4 pm, please leave a message as we check the voicemail frequently throughout the day.     If you had a positive experience please indicate that on your patient satisfaction survey form that [facility] will be sending you.    It was a pleasure meeting with you today.  Thank you for allowing me and my team the privilege of caring for you today.  YOU are the reason we are here, and I truly hope we provided you with the excellent service you deserve.  Please let us know if there is anything else we can do for you so that we can be sure you are leaving completely satisfied with your care experience.      If you have any billing related questions please call the [facility] Business office at [phone]. The clinic staff does not handle billing related matters.             Ba Ramos MD on 4/4/2022 at 3:48 PM          Dictated using Dragon voice recognition software which may result in transcription errors

## 2022-04-07 DIAGNOSIS — E11.9 TYPE 2 DIABETES MELLITUS WITH HEMOGLOBIN A1C GOAL OF 7.0%-8.0% (H): ICD-10-CM

## 2022-04-07 RX ORDER — BLOOD SUGAR DIAGNOSTIC
1 STRIP MISCELLANEOUS 4 TIMES DAILY
Qty: 400 STRIP | Refills: 0 | OUTPATIENT
Start: 2022-04-07

## 2022-04-11 ENCOUNTER — OFFICE VISIT (OUTPATIENT)
Dept: OTHER | Facility: CLINIC | Age: 67
End: 2022-04-11
Attending: SURGERY
Payer: MEDICARE

## 2022-04-11 ENCOUNTER — TELEPHONE (OUTPATIENT)
Dept: OTHER | Facility: CLINIC | Age: 67
End: 2022-04-11

## 2022-04-11 VITALS — DIASTOLIC BLOOD PRESSURE: 76 MMHG | SYSTOLIC BLOOD PRESSURE: 134 MMHG | HEART RATE: 88 BPM

## 2022-04-11 DIAGNOSIS — E11.9 TYPE 2 DIABETES MELLITUS WITH HEMOGLOBIN A1C GOAL OF 7.0%-8.0% (H): ICD-10-CM

## 2022-04-11 DIAGNOSIS — I73.9 PERIPHERAL ARTERIAL DISEASE (H): Primary | ICD-10-CM

## 2022-04-11 PROCEDURE — G0463 HOSPITAL OUTPT CLINIC VISIT: HCPCS

## 2022-04-11 PROCEDURE — 99203 OFFICE O/P NEW LOW 30 MIN: CPT | Performed by: SURGERY

## 2022-04-11 RX ORDER — BLOOD SUGAR DIAGNOSTIC
STRIP MISCELLANEOUS
Qty: 100 STRIP | Refills: 1 | Status: CANCELLED | OUTPATIENT
Start: 2022-04-11

## 2022-04-11 NOTE — TELEPHONE ENCOUNTER
Patient called back following his visit with Dr. Moreno today and has questions regarding next steps with Dr Parson. He also would like get another appointment with Dr. Parson. FYI there are previous standing orders.    Spencer Ware I   [address] [address] [phone]

## 2022-04-11 NOTE — PROGRESS NOTES
[facility]        Patient is here for a consult to discuss Wound(s) on Right foot.    Pt is currently taking Aspirin and Statin.    /76 (BP Location: Right arm, Patient Position: Chair, Cuff Size: Adult Regular)   Pulse 88     The provider has been notified that the patient has no concerns.     Questions patient would like addressed today are: N/A.    Refills are needed: N/A    Has homecare services and agency name:  Milagros Santos MA

## 2022-04-11 NOTE — TELEPHONE ENCOUNTER
See 4-7-2022 encounter, Dr Nichols refused      Patient must be seen for refill    Monique Dias, RT (R)

## 2022-04-11 NOTE — PROGRESS NOTES
I had the pleasure of seeing . Rachell Paige in the vascular surgery clinic today.  This is a kind referral from Dr. Parson for consideration of any additional revascularization.  Patient tells me that sometime ago he had hit his left shin on the car door and developed a wound.  He does not attest to any symptoms pertaining to his right lower extremity.    On 10 March 2022 he underwent transright femoral aortoiliac arteriogram with right lower extremity arteriogram.  He was found to have diffuse disease of the superficial femoral artery with occlusion of anterior and posterior tibial arteries.  Proximal right peroneal artery had moderate stenosis.  Attempt was made to cross and recanalize the right anterior tibial artery but was unsuccessful.  Left lower extremity arteriogram showed mild diffuse disease in the superficial femoral artery with occlusion of the anterior and posterior tibial arteries.  Peroneal artery was patent.    Patient tells me that the wound in his left shin is actually getting better.    Past medical history is notable for diabetic neuropathy, poorly controlled diabetes, hypertension, peripheral arterial disease, previous COVID-19 infection, depression.    Past surgical history includes left transmetatarsal amputation.    On examination: He appears comfortable and is in no acute distress.  Vital signs reviewed.  HEENT: Head is atraumatic and normocephalic, mucosa pink.  Mental: Alert and oriented x4, judgment and insight are good.  Cardiac: Regular rate and rhythm, S1 plus S2 +0.  Chest: Clear to auscultation bilaterally.  Eyes: Extraocular motions are intact, sclerae are anicteric.  Abdomen: Soft, obese and nontender.  Vascular: 3+ bilateral femoral pulses.  Right dorsalis pedis and posterior tibial are monophasic.  Left dorsalis pedis and posterior tibial are monophasic.    Imaging data: Arteriogram as noted above.    Diagnosis: Bilateral lower extremity peripheral arterial disease.    Plan:  He does not currently have any wounds in either lower extremity.  No further intervention is necessary from the vascular surgical standpoint.  He can continue to follow-up with Dr. Parson for his vascular needs.

## 2022-04-12 NOTE — TELEPHONE ENCOUNTER
Patient needs to be scheduled for in-person return visit with Dr. Parson.    Appointment note: Patient requesting to see Dr. Parson for ongoing PAD management. Patient saw vascular surgery and no intervention offered. Patient continues to have pain.     Dipti JIMENES, RN    [facility]  Office: [phone]  Fax: [phone]

## 2022-04-12 NOTE — TELEPHONE ENCOUNTER
Per  4/11/22 OV with Dr. Moreno: Plan: He does not currently have any wounds in either lower extremity.  No further intervention is necessary from the vascular surgical standpoint.  He can continue to follow-up with Dr. Parson for his vascular needs.    Called patient back, left messages stating that I would discuss follow up recommendations with Dr. Parson when he returns to the clinic on 4/18/22.    Keyla Olvera RN BSN  [facility]  [phone]

## 2022-04-14 NOTE — TELEPHONE ENCOUNTER
Future Appointments   Date Time Provider Department Center   4/20/2022  4:10 PM Pio Parson MD [facility]   5/5/2022  3:20 PM Ba Ramos MD [facility]    [address] [place] [id number] [phone]

## 2022-04-14 NOTE — TELEPHONE ENCOUNTER
Call 2 of 2. Left voicemail    Spencer Ware I   [address]  [place] [address] [phone]

## 2022-04-20 ENCOUNTER — LAB (OUTPATIENT)
Dept: LAB | Facility: CLINIC | Age: 67
End: 2022-04-20
Attending: INTERNAL MEDICINE
Payer: MEDICARE

## 2022-04-20 ENCOUNTER — OFFICE VISIT (OUTPATIENT)
Dept: OTHER | Facility: CLINIC | Age: 67
End: 2022-04-20
Attending: INTERNAL MEDICINE
Payer: MEDICARE

## 2022-04-20 VITALS — DIASTOLIC BLOOD PRESSURE: 74 MMHG | SYSTOLIC BLOOD PRESSURE: 150 MMHG | OXYGEN SATURATION: 98 % | HEART RATE: 96 BPM

## 2022-04-20 DIAGNOSIS — I73.9 PAD (PERIPHERAL ARTERY DISEASE) (H): Primary | ICD-10-CM

## 2022-04-20 DIAGNOSIS — R74.8 ABNORMAL LEVELS OF OTHER SERUM ENZYMES: ICD-10-CM

## 2022-04-20 DIAGNOSIS — E11.42 DIABETIC PERIPHERAL NEUROPATHY (H): ICD-10-CM

## 2022-04-20 DIAGNOSIS — G62.89 OTHER SPECIFIED POLYNEUROPATHIES: ICD-10-CM

## 2022-04-20 LAB — HBA1C MFR BLD: 8 % (ref 0–5.6)

## 2022-04-20 PROCEDURE — 82525 ASSAY OF COPPER: CPT

## 2022-04-20 PROCEDURE — 36415 COLL VENOUS BLD VENIPUNCTURE: CPT

## 2022-04-20 PROCEDURE — 99214 OFFICE O/P EST MOD 30 MIN: CPT | Performed by: INTERNAL MEDICINE

## 2022-04-20 PROCEDURE — G0463 HOSPITAL OUTPT CLINIC VISIT: HCPCS

## 2022-04-20 PROCEDURE — 83036 HEMOGLOBIN GLYCOSYLATED A1C: CPT

## 2022-04-20 NOTE — PROGRESS NOTES
[facility]  Pio Parson MD - Vascular Medicine Progress Note    LOCATION: [facility]    Rachell Paige  Medical Record #:  [id number]  YOB: 1955  Age:  66 year old     Date of Service: 4/20/2022     PRIMARY CARE PROVIDER: Olivia Nichols    REASON FOR VISIT:   follow up  for PAD management    IMPRESSION: Patient did see vascular surgery patient has no targets for bypass so the decision was to do to be on and continue with medical management  Patient has rest pain but it is less intense than before and the left lower extremity wound almost healed 100%    RECOMMENDATION:  1-continue with current management  3-added Xarelto 2.5 mg p.o. twice daily  3- we will see the patient in 1 month from now to see and discuss and evaluate the efficacy of Xarelto 2.5 mg twice daily    If patients imaging is normal patient should follow up 4 to 6 weeks    HPI: Rachell Paige is a 66 year old male who was seen today for medical management of PAD as the patient has no target for bypass      PAST MEDICAL HISTORY  Past Medical History:   Diagnosis Date     Anxiety 02/23/2015     Dermatitis 04/18/2015     DM type 2, goal A1C 7-8 02/23/2015     Financial problems 02/23/2015     Glaucoma (increased eye pressure)      HTN, goal below 140/90 02/23/2015     Hyperlipidemia LDL goal <100 02/23/2015     Microalbuminuria 04/18/2015     Onychomycosis 04/18/2015     Rash 02/23/2015       PAST SURGICAL HISTORY:  Past Surgical History:   Procedure Laterality Date     AMPUTATE TOE(S) Right 12/31/2018    Procedure: Right fifth toe amputation;  Surgeon: Clark Lora DPM;  Location: [facility] OR     IR LOWER EXTREMITY ANGIOGRAM BILATERAL  3/10/2022         CURRENT MEDICATIONS  ACCU-CHEK GUIDE test strip, USE TO TEST BLOOD SUGAR TWO TIMES A DAY OR AS DIRECTED  alcohol swab prep pads, Use to swab area of injection/pedro pablo as directed.  aspirin 81 MG tablet, Take 1 tablet (81 mg) by mouth  daily  blood glucose (ACCU-CHEK GUIDE) test strip, 1 strip by In Vitro route 4 times daily Use to test blood sugar 3 -4  times daily or as directed.  blood glucose calibration (NO BRAND SPECIFIED) solution, To accompany: Blood Glucose Monitor Brands: per insurance.  blood glucose monitoring (NO BRAND SPECIFIED) meter device kit, Use to test blood sugar 4 times daily or as directed. Preferred blood glucose meter OR supplies to accompany: Blood Glucose Monitor Brands: per insurance.  dorzolamide (TRUSOPT) 2 % ophthalmic solution, Place 1 drop into both eyes 2 times daily  gabapentin (NEURONTIN) 300 MG capsule, TAKE 1 CAPSULE (300 MG) BY MOUTH AT BEDTIME  insulin aspart (NOVOLOG FLEXPEN) 100 UNIT/ML pen, Inject 1-10 Units Subcutaneous 2 times daily (with meals) With lunch and dinner  insulin glargine (LANTUS SOLOSTAR) 100 UNIT/ML pen, Inject 50 Units Subcutaneous At Bedtime  insulin pen needle (ULTICARE SHORT) 31G X 8 MM miscellaneous, USE 3 PEN NEEDLES DAILY OR AS DIRECTED.  lisinopril (ZESTRIL) 20 MG tablet, Take 1 tablet (20 mg) by mouth daily  Omega-3 Fatty Acids (OMEGA-3 FISH OIL) 1200 MG CAPS, Take 1 capsule by mouth  rosuvastatin (CRESTOR) 10 MG tablet, Take 1 tablet (10 mg) by mouth daily  thin (NO BRAND SPECIFIED) lancets, Use with lanceting device. To accompany: Blood Glucose Monitor Brands: per insurance.  timolol maleate (TIMOPTIC) 0.5 % ophthalmic solution, Place 1 drop into both eyes 2 times daily  zolpidem (AMBIEN) 10 MG tablet, Take 10 mg by mouth  cilostazol (PLETAL) 50 MG tablet, Take 1 tablet (50 mg) by mouth 2 times daily (Patient not taking: Reported on 4/20/2022)  zolpidem (AMBIEN) 5 MG tablet, Take 1 tablet (5 mg) by mouth nightly as needed for sleep (Patient not taking: Reported on 4/20/2022)    No current facility-administered medications on file prior to visit.      ALLERGIES     Allergies   Allergen Reactions     Cilostazol Headache     Weakness     Aleve      HA sweats     Citalopram Itching  "    Clopidogrel Nausea and Vomiting     Pt to restart on 11/25/15 to see again if he tolerates it or not. His sx were only GI. Not a true allergy     Iodine      Naproxen Itching     About 10 yrs ago, itching all over from taking Aleve  \"get sick and really sick\"       Sulfamethoxazole-Trimethoprim      Other reaction(s): Renal Failure         FAMILY HISTORY  Family History   Problem Relation Age of Onset     Diabetes Other      LUNG DISEASE Mother      Glaucoma Father      Cerebrovascular Disease Son      Diabetes Son      Diabetes Daughter        SOCIAL HISTORY  Social History     Socioeconomic History     Marital status:      Spouse name: Not on file     Number of children: Not on file     Years of education: Not on file     Highest education level: Not on file   Occupational History     Not on file   Tobacco Use     Smoking status: Former Smoker     Types: Cigarettes     Quit date:      Years since quittin.3     Smokeless tobacco: Never Used   Substance and Sexual Activity     Alcohol use: No     Drug use: No     Sexual activity: Yes     Partners: Female   Other Topics Concern     Parent/sibling w/ CABG, MI or angioplasty before 65F 55M? Not Asked   Social History Narrative    Former  ([employer] [facility], [place])          Social Determinants of Health     Financial Resource Strain: Low Risk      Difficulty of Paying Living Expenses: Not hard at all   Food Insecurity: No Food Insecurity     Worried About Running Out of Food in the Last Year: Never true     Ran Out of Food in the Last Year: Never true   Transportation Needs: No Transportation Needs     Lack of Transportation (Medical): No     Lack of Transportation (Non-Medical): No   Physical Activity: Not on file   Stress: Not on file   Social Connections: Not on file   Intimate Partner Violence: Not on file   Housing Stability: Not on file       ROS:   Complete ROS negative other than what is stated in HPI.     EXAM:  BP (!) " 150/74 (BP Location: Right arm, Patient Position: Chair, Cuff Size: Adult Regular)   Pulse 96   SpO2 98%   In general, the patient is a pleasant male in no apparent distress.    HEENT: NC/AT.  PERRLA.  EOMI.  Sclerae white, not injected.    Neck: No adenopathy.  Carotids +2/2 bilaterally without bruits.   Heart: RRR. Normal S1, S2 splits physiologically. No murmur, rub, click, or gallop.   Lungs: CTA.  No ronchi, wheezes, rales.    Abdomen: Soft, nontender, nondistended.   Extremities: No clubbing, cyanosis, or edema.  No wounds.     Labs:  LIPID RESULTS:  Lab Results   Component Value Date    CHOL 117 02/28/2022    CHOL 156 02/10/2021    HDL 47 02/28/2022    HDL 44 02/10/2021    LDL 54 02/28/2022    LDL 81 02/10/2021    TRIG 79 02/28/2022    TRIG 157 (H) 02/10/2021    CHOLHDLRATIO 3.4 02/23/2015       LIVER ENZYME RESULTS:  Lab Results   Component Value Date    AST 13 02/28/2022    AST 13 02/10/2021    ALT 23 02/28/2022    ALT 22 02/10/2021       CBC RESULTS:  Lab Results   Component Value Date    WBC 8.2 03/13/2022    WBC 8.4 07/06/2021    RBC 4.53 03/13/2022    RBC 4.71 07/06/2021    HGB 12.7 (L) 03/13/2022    HGB 13.4 07/06/2021    HCT 40.1 03/13/2022    HCT 41.0 07/06/2021    MCV 89 03/13/2022    MCV 87 07/06/2021    MCH 28.0 03/13/2022    MCH 28.5 07/06/2021    MCHC 31.7 03/13/2022    MCHC 32.7 07/06/2021    RDW 13.2 03/13/2022    RDW 13.3 07/06/2021     03/13/2022     07/06/2021       BMP RESULTS:  Lab Results   Component Value Date     03/13/2022     02/10/2021    POTASSIUM 5.2 03/13/2022    POTASSIUM 4.7 02/10/2021    CHLORIDE 107 03/13/2022    CHLORIDE 103 02/10/2021    CO2 25 03/13/2022    CO2 24 02/10/2021    ANIONGAP 2 (L) 03/13/2022    ANIONGAP 7 02/10/2021     (H) 03/13/2022     (H) 02/10/2021    BUN 16 03/13/2022    BUN 21 02/10/2021    CR 1.25 03/13/2022    CR 1.15 02/10/2021    GFRESTIMATED 64 03/13/2022    GFRESTIMATED 67 02/10/2021    GFRESTBLACK 77  02/10/2021    ALPHONSO 8.9 03/13/2022    ALPHONSO 9.3 02/10/2021        A1C RESULTS:  Lab Results   Component Value Date    A1C 8.1 (H) 02/28/2022    A1C 7.4 (H) 07/28/2020       THYROID RESULTS:  Lab Results   Component Value Date    TSH 1.44 11/01/2020         DIAGNOSTIC STUDIES:     Images:  XR Foot Right G/E 3 Views    Result Date: 4/4/2022  XR RIGHT FOOT THREE OR MORE VIEWS  4/4/2022 3:26 PM INDICATION: Ulcer of left lower extremity with fat layer exposed (H) COMPARISON: 3/5/2022     IMPRESSION: postop surgical resection of the right fifth toe with the amputation at the MTP joint, unchanged. No evidence for osteomyelitis at the remaining fifth metatarsal head. No change in the chronic angulated deformity of the right first proximal phalanx. Nothing radiographically for acute right foot osteomyelitis. 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 Arterial calcification. No appreciable soft tissue gas right foot. ALEXI ZARATE MD   SYSTEM ID:  [id number]    XR Tibia & Fibula Left 2 Views    Result Date: 4/4/2022  XR LEFT TIBIA AND FIBULA TWO VIEWS  4/4/2022 3:25 PM INDICATION: Pain. Ulcer of left lower extremity with fat layer exposed (H). COMPARISON: None available.     IMPRESSION: Anatomic alignment of left tibia and fibula. No acute displaced left tibia or fibula fracture. No focal periosteal reaction. No significant left leg soft tissue swelling. Arterial calcification. ALEXI ZARATE MD   SYSTEM ID:  [id number]      .      Pio Parson MD        20 minutes spent on the date of the encounter doing chart review, history and exam, documentation, and further activities as noted above.

## 2022-04-20 NOTE — PROGRESS NOTES
Patient is here to discuss PAD management.     BP (!) 150/74 (BP Location: Right arm, Patient Position: Chair, Cuff Size: Adult Regular)   Pulse 96   SpO2 98%     Questions patient would like addressed today are: N/A.    Refills are needed: No    Has homecare services and agency name:  [facility]

## 2022-04-21 ENCOUNTER — TELEPHONE (OUTPATIENT)
Dept: OTHER | Facility: CLINIC | Age: 67
End: 2022-04-21
Payer: MEDICARE

## 2022-04-21 NOTE — TELEPHONE ENCOUNTER
Patient needs to be scheduled for in-person follow up in 4-6 weeks with Dr. Parson.    Dipti GAITANN, RN    [facility]  Office: [phone]  Fax: [phone]

## 2022-04-21 NOTE — TELEPHONE ENCOUNTER
Pt has been scheduled with Dr. Parson in [facility] Appointments   Date Time Provider Department Center   5/5/2022  3:20 PM Ba Ramos MD [facility]   5/23/2022  3:10 PM Pio Parson MD [facility]

## 2022-04-23 LAB — COPPER SERPL-MCNC: 99.8 UG/DL

## 2022-05-18 DIAGNOSIS — E11.319 DIABETIC RETINOPATHY OF BOTH EYES (H): Primary | ICD-10-CM

## 2022-06-28 ENCOUNTER — HOSPITAL ENCOUNTER (EMERGENCY)
Facility: CLINIC | Age: 67
Discharge: HOME | End: 2022-06-28
Attending: EMERGENCY MEDICINE
Payer: MEDICARE

## 2022-06-28 ENCOUNTER — NURSE TRIAGE (OUTPATIENT)
Dept: NURSING | Facility: CLINIC | Age: 67
End: 2022-06-28

## 2022-06-28 ENCOUNTER — HOSPITAL ENCOUNTER (EMERGENCY)
Facility: CLINIC | Age: 67
Discharge: HOME | End: 2022-06-29
Attending: EMERGENCY MEDICINE
Payer: MEDICARE

## 2022-06-28 VITALS
HEIGHT: 66 IN | WEIGHT: 198 LBS | DIASTOLIC BLOOD PRESSURE: 79 MMHG | SYSTOLIC BLOOD PRESSURE: 171 MMHG | HEART RATE: 92 BPM | TEMPERATURE: 98.3 F | OXYGEN SATURATION: 99 % | RESPIRATION RATE: 20 BRPM | BODY MASS INDEX: 31.82 KG/M2

## 2022-06-28 DIAGNOSIS — R79.89 ELEVATED D-DIMER: ICD-10-CM

## 2022-06-28 DIAGNOSIS — T78.40XA ALLERGIC REACTION, INITIAL ENCOUNTER: ICD-10-CM

## 2022-06-28 DIAGNOSIS — T50.905A MEDICATION REACTION, INITIAL ENCOUNTER: ICD-10-CM

## 2022-06-28 LAB — INR PPP: 1.01 (ref 0.85–1.15)

## 2022-06-28 PROCEDURE — 84484 ASSAY OF TROPONIN QUANT: CPT | Mod: 91 | Performed by: EMERGENCY MEDICINE

## 2022-06-28 PROCEDURE — 99284 EMERGENCY DEPT VISIT MOD MDM: CPT | Mod: 25

## 2022-06-28 PROCEDURE — 85007 BL SMEAR W/DIFF WBC COUNT: CPT | Performed by: EMERGENCY MEDICINE

## 2022-06-28 PROCEDURE — 250N000012 HC RX MED GY IP 250 OP 636 PS 637: Performed by: EMERGENCY MEDICINE

## 2022-06-28 PROCEDURE — 96374 THER/PROPH/DIAG INJ IV PUSH: CPT | Mod: 59

## 2022-06-28 PROCEDURE — 80048 BASIC METABOLIC PNL TOTAL CA: CPT | Performed by: EMERGENCY MEDICINE

## 2022-06-28 PROCEDURE — 85379 FIBRIN DEGRADATION QUANT: CPT | Performed by: EMERGENCY MEDICINE

## 2022-06-28 PROCEDURE — 36415 COLL VENOUS BLD VENIPUNCTURE: CPT | Performed by: EMERGENCY MEDICINE

## 2022-06-28 PROCEDURE — 250N000013 HC RX MED GY IP 250 OP 250 PS 637: Performed by: EMERGENCY MEDICINE

## 2022-06-28 PROCEDURE — 93005 ELECTROCARDIOGRAM TRACING: CPT

## 2022-06-28 PROCEDURE — 85610 PROTHROMBIN TIME: CPT | Performed by: EMERGENCY MEDICINE

## 2022-06-28 PROCEDURE — 99283 EMERGENCY DEPT VISIT LOW MDM: CPT | Mod: 25

## 2022-06-28 PROCEDURE — 84484 ASSAY OF TROPONIN QUANT: CPT | Performed by: EMERGENCY MEDICINE

## 2022-06-28 PROCEDURE — 85027 COMPLETE CBC AUTOMATED: CPT | Performed by: EMERGENCY MEDICINE

## 2022-06-28 RX ORDER — DIPHENHYDRAMINE HCL 25 MG
25-50 CAPSULE ORAL EVERY 6 HOURS PRN
Qty: 30 CAPSULE | Refills: 0 | Status: SHIPPED | OUTPATIENT
Start: 2022-06-28 | End: 2022-06-28

## 2022-06-28 RX ORDER — DIPHENHYDRAMINE HCL 25 MG
50 CAPSULE ORAL ONCE
Status: COMPLETED | OUTPATIENT
Start: 2022-06-28 | End: 2022-06-28

## 2022-06-28 RX ORDER — PREDNISONE 20 MG/1
40 TABLET ORAL DAILY
Qty: 8 TABLET | Refills: 0 | Status: SHIPPED | OUTPATIENT
Start: 2022-06-29 | End: 2022-10-07

## 2022-06-28 RX ORDER — PREDNISONE 20 MG/1
40 TABLET ORAL DAILY
Qty: 8 TABLET | Refills: 0 | Status: SHIPPED | OUTPATIENT
Start: 2022-06-29 | End: 2022-06-28

## 2022-06-28 RX ORDER — DIPHENHYDRAMINE HCL 25 MG
25-50 CAPSULE ORAL EVERY 6 HOURS PRN
Qty: 30 CAPSULE | Refills: 0 | Status: SHIPPED | OUTPATIENT
Start: 2022-06-28 | End: 2023-03-22

## 2022-06-28 RX ORDER — PREDNISONE 20 MG/1
60 TABLET ORAL ONCE
Status: COMPLETED | OUTPATIENT
Start: 2022-06-28 | End: 2022-06-28

## 2022-06-28 RX ADMIN — PREDNISONE 60 MG: 20 TABLET ORAL at 14:22

## 2022-06-28 RX ADMIN — DIPHENHYDRAMINE HYDROCHLORIDE 50 MG: 25 CAPSULE ORAL at 14:25

## 2022-06-28 ASSESSMENT — ENCOUNTER SYMPTOMS
FEVER: 0
SHORTNESS OF BREATH: 1
LIGHT-HEADEDNESS: 1

## 2022-06-28 NOTE — ED PROVIDER NOTES
History     Chief Complaint:    Eye Problem and Rash      HPI   Rachell Paige is a 66 year old male with history of hypertension and diabetes on lisinopril and Lantus who presents for evaluation of itching and eye swelling.  Patient notes that he had rice and chicken for dinner last night, foods he has tolerated previously.  However, subsequent to the meal, he developed itching around his abdomen, chest, and back.  This morning, he woke up with swelling to the right upper eyelid as well as ongoing itching to his whole trunk and a rash to his lower abdomen that he is not able to visualize.  He denies sore throat, difficulty swallowing or breathing, nausea, vomiting, or any other concerns.  He denies any new medications, new foods, exposures, etc.    Review of Systems  Skin: + as per above  All other systems reviewed and negative      Allergies:  Cilostazol  Aleve  Citalopram  Clopidogrel  Iodine  Naproxen  Sulfamethoxazole-Trimethoprim      Medications:    ACCU-CHEK GUIDE test strip  alcohol swab prep pads  aspirin 81 MG tablet  blood glucose (ACCU-CHEK GUIDE) test strip  blood glucose calibration (NO BRAND SPECIFIED) solution  blood glucose monitoring (NO BRAND SPECIFIED) meter device kit  cilostazol (PLETAL) 50 MG tablet  dorzolamide (TRUSOPT) 2 % ophthalmic solution  gabapentin (NEURONTIN) 300 MG capsule  insulin aspart (NOVOLOG FLEXPEN) 100 UNIT/ML pen  insulin glargine (LANTUS SOLOSTAR) 100 UNIT/ML pen  insulin pen needle (ULTICARE SHORT) 31G X 8 MM miscellaneous  lisinopril (ZESTRIL) 20 MG tablet  Omega-3 Fatty Acids (OMEGA-3 FISH OIL) 1200 MG CAPS  rivaroxaban ANTICOAGULANT (XARELTO ANTICOAGULANT) 2.5 MG TABS tablet  rosuvastatin (CRESTOR) 10 MG tablet  thin (NO BRAND SPECIFIED) lancets  timolol maleate (TIMOPTIC) 0.5 % ophthalmic solution  zolpidem (AMBIEN) 10 MG tablet  zolpidem (AMBIEN) 5 MG tablet        Past Medical History:    Past Medical History:   Diagnosis Date     Anxiety 02/23/2015      "Dermatitis 04/18/2015     DM type 2, goal A1C 7-8 02/23/2015     Financial problems 02/23/2015     Glaucoma (increased eye pressure)      HTN, goal below 140/90 02/23/2015     Hyperlipidemia LDL goal <100 02/23/2015     Microalbuminuria 04/18/2015     Onychomycosis 04/18/2015     Rash 02/23/2015        Past Surgical History:    Past Surgical History:   Procedure Laterality Date     AMPUTATE TOE(S) Right 12/31/2018    Procedure: Right fifth toe amputation;  Surgeon: Clark Lora DPM;  Location: [facility] OR     IR LOWER EXTREMITY ANGIOGRAM BILATERAL  3/10/2022       Family History:    Family History   Problem Relation Age of Onset     Diabetes Other      LUNG DISEASE Mother      Glaucoma Father      Cerebrovascular Disease Son      Diabetes Son      Diabetes Daughter        Social History:  The patient presents to the ED alone    Physical Exam     Patient Vitals for the past 24 hrs:   BP Temp Temp src Pulse Resp SpO2 Height Weight   06/28/22 1338 (!) 171/79 98.3  F (36.8  C) Oral 92 20 99 % 1.676 m (5' 6\") 89.8 kg (198 lb)       Physical Exam  Constitutional: Alert, attentive  HENT:    Nose normal.    Posterior pharynx shows no edema   Normal midline uvula   No peritonsillar erythema or swelling   No sublingual induration, swelling or tenderness   No trismus   Able to extend neck without discomfort   Tolerating secretions and able to breathe supine with ease  Eyes: EOM are normal. Pupils are equal, round, and reactive to light.    Slight swelling to the right upper eyelid  CV: regular rate and rhythm; no murmurs, rubs or gallups  Chest: Effort normal and breath sounds normal.   GI:  There is no tenderness. No distension. Normal bowel sounds  MSK: Normal range of motion.   Neurological: Alert, attentive  Skin: Skin is warm and dry.   Urticarial rash to the low and mid back as well as the lower abdomen intertriginous skin, evidence of itching to the abdomen and back      Emergency Department Course     Emergency " Department Course:      Reviewed:    I reviewed nursing notes, vitals and past medical history    Assessments:   I obtained history and examined the patient as noted above.    I rechecked the patient and explained findings.     Interventions:  Medications   predniSONE (DELTASONE) tablet 60 mg (60 mg Oral Given 6/28/22 1422)   diphenhydrAMINE (BENADRYL) capsule 50 mg (50 mg Oral Given 6/28/22 1425)       Disposition:  The patient was discharged to home.     Impression & Plan      Medical Decision Making:  This is a very pleasant 66 year old male who presented with urticaria primarily to the trunk, as well as mild right upper eyelid swelling.  The precipitant could be something he ate last night but is unclear.  The prominent allergic component of the presentation and absence of oropharyngeal angioedema suggests strongly against ACEi angioedema, but I discussed this condition and signs and symptoms to look for.  There is no oropharyngeal swelling, respiratory distress, or GI symptoms to suggest anaphylaxis.  I have recommended benadryl and prednisone given the broad distribution and have started therapy in the department.  I recommended 4 more days of prednisone, benadryl prn itching/rash, and primary care follow up in 2-3 days.  I advised the patient to return for worse itching, oropharyngeal swelling, difficulty breathing, or for any other concerning symptoms.    Covid-19  Rachell Paige was evaluated during a global COVID-19 pandemic, which necessitated consideration that the patient might be at risk for infection with the SARS-CoV-2 virus that causes COVID-19.   Applicable protocols for evaluation were followed during the patient's care.   COVID-19 was considered as part of the patient's evaluation.    Diagnosis:    ICD-10-CM    1. Allergic reaction, initial encounter  T78.40XA        Discharge Medications:  New Prescriptions    DIPHENHYDRAMINE (BENADRYL) 25 MG CAPSULE    Take 1-2 capsules (25-50 mg) by mouth  every 6 hours as needed for itching or allergies    PREDNISONE (DELTASONE) 20 MG TABLET    Take 2 tablets (40 mg) by mouth daily for 4 days          Srini Oh MD  06/28/22 3058

## 2022-06-28 NOTE — ED TRIAGE NOTES
Pt presents for evaluation of right eye swelling and itching on upper eye lid and a itchy rash to pannus. Denies vision changes. Symptoms started today upon waking. Pt thinks it is shingles. Unsure if he has had chicken pox. Has not had shingles vaccine. Is on lisinopril. Denies any other symptoms.

## 2022-06-29 ENCOUNTER — PATIENT OUTREACH (OUTPATIENT)
Dept: CARE COORDINATION | Facility: CLINIC | Age: 67
End: 2022-06-29

## 2022-06-29 VITALS — HEART RATE: 93 BPM | DIASTOLIC BLOOD PRESSURE: 69 MMHG | OXYGEN SATURATION: 96 % | SYSTOLIC BLOOD PRESSURE: 121 MMHG

## 2022-06-29 DIAGNOSIS — Z71.89 OTHER SPECIFIED COUNSELING: ICD-10-CM

## 2022-06-29 LAB
ANION GAP SERPL CALCULATED.3IONS-SCNC: 9 MMOL/L (ref 3–14)
ATRIAL RATE - MUSE: 107 BPM
BASOPHILS # BLD MANUAL: 0 10E3/UL (ref 0–0.2)
BASOPHILS NFR BLD MANUAL: 0 %
BUN SERPL-MCNC: 24 MG/DL (ref 7–30)
CALCIUM SERPL-MCNC: 7.7 MG/DL (ref 8.5–10.1)
CHLORIDE BLD-SCNC: 108 MMOL/L (ref 94–109)
CO2 SERPL-SCNC: 20 MMOL/L (ref 20–32)
CREAT SERPL-MCNC: 1.19 MG/DL (ref 0.66–1.25)
D DIMER PPP FEU-MCNC: 0.7 UG/ML FEU (ref 0–0.5)
DIASTOLIC BLOOD PRESSURE - MUSE: NORMAL MMHG
EOSINOPHIL # BLD MANUAL: 0 10E3/UL (ref 0–0.7)
EOSINOPHIL NFR BLD MANUAL: 0 %
ERYTHROCYTE [DISTWIDTH] IN BLOOD BY AUTOMATED COUNT: 13.8 % (ref 10–15)
GFR SERPL CREATININE-BSD FRML MDRD: 68 ML/MIN/1.73M2
GLUCOSE BLD-MCNC: 369 MG/DL (ref 70–99)
GLUCOSE BLDC GLUCOMTR-MCNC: 229 MG/DL (ref 70–99)
GLUCOSE BLDC GLUCOMTR-MCNC: 274 MG/DL (ref 70–99)
HCT VFR BLD AUTO: 37.9 % (ref 40–53)
HGB BLD-MCNC: 11.9 G/DL (ref 13.3–17.7)
HOLD SPECIMEN: NORMAL
INTERPRETATION ECG - MUSE: NORMAL
LYMPHOCYTES # BLD MANUAL: 0.9 10E3/UL (ref 0.8–5.3)
LYMPHOCYTES NFR BLD MANUAL: 11 %
MCH RBC QN AUTO: 27.7 PG (ref 26.5–33)
MCHC RBC AUTO-ENTMCNC: 31.4 G/DL (ref 31.5–36.5)
MCV RBC AUTO: 88 FL (ref 78–100)
MONOCYTES # BLD MANUAL: 0.2 10E3/UL (ref 0–1.3)
MONOCYTES NFR BLD MANUAL: 2 %
NEUTROPHILS # BLD MANUAL: 7.1 10E3/UL (ref 1.6–8.3)
NEUTROPHILS NFR BLD MANUAL: 87 %
P AXIS - MUSE: 61 DEGREES
PLAT MORPH BLD: NORMAL
PLATELET # BLD AUTO: 294 10E3/UL (ref 150–450)
POTASSIUM BLD-SCNC: 4.1 MMOL/L (ref 3.4–5.3)
PR INTERVAL - MUSE: 188 MS
QRS DURATION - MUSE: 78 MS
QT - MUSE: 306 MS
QTC - MUSE: 408 MS
R AXIS - MUSE: -27 DEGREES
RBC # BLD AUTO: 4.3 10E6/UL (ref 4.4–5.9)
RBC MORPH BLD: NORMAL
SODIUM SERPL-SCNC: 137 MMOL/L (ref 133–144)
SYSTOLIC BLOOD PRESSURE - MUSE: NORMAL MMHG
T AXIS - MUSE: 54 DEGREES
TROPONIN I SERPL HS-MCNC: 23 NG/L
TROPONIN I SERPL HS-MCNC: 25 NG/L
VENTRICULAR RATE- MUSE: 107 BPM
WBC # BLD AUTO: 8.2 10E3/UL (ref 4–11)

## 2022-06-29 PROCEDURE — 250N000013 HC RX MED GY IP 250 OP 250 PS 637: Performed by: EMERGENCY MEDICINE

## 2022-06-29 PROCEDURE — 84484 ASSAY OF TROPONIN QUANT: CPT | Performed by: EMERGENCY MEDICINE

## 2022-06-29 PROCEDURE — 36415 COLL VENOUS BLD VENIPUNCTURE: CPT | Performed by: EMERGENCY MEDICINE

## 2022-06-29 RX ADMIN — SODIUM CHLORIDE 5 UNITS: 9 INJECTION, SOLUTION INTRAVENOUS at 00:48

## 2022-06-29 NOTE — DISCHARGE INSTRUCTIONS
Stop taking the Prednisone. Benadryl is unlikely to have caused your symptoms and can still be safely used for any rash or itching.

## 2022-06-29 NOTE — ED PROVIDER NOTES
History   Chief Complaint:  Chest Pain       HPI   Rachell Paige is a 66 year old male with history of type II diabetes, PVD, COPD, CKD and hypertension who presents with EMS for evaluation of chest pain. The patient was seen here earlier for a rash, diagnosed with urticaria, and started on Benadryl and prednisone. The patient states that his rash is currently doing better. He reports tonight around 1930 he developed lightheadedness, chest pressure and shortness of breath. He thought he noticed some change in speech to though this has resolved. He states that all of his symptoms have resolved however his heart rate is still elevated and he is worried about this. He reports that he feels his symptoms are a result of the medications he was prescribed for the urticaria and he reports he is not going to take them any more. He took 325 mg Aspirin and was given nitrostat by EMS with no change in his symptoms. The patient denies leg swelling, fever or history of heart problems. No tongue or facial swelling. No new rash. Earlier rash has resolved. Patient denies any other symptoms.    Review of Systems   Constitutional: Negative for fever.   Respiratory: Positive for shortness of breath.    Cardiovascular: Positive for chest pain (Pressure). Negative for leg swelling.   Neurological: Positive for light-headedness.   All other systems reviewed and are negative.    Allergies:  Cilostazol  Aleve  Citalopram  Clopidogrel  Iodine  Naproxen  Sulfamethoxazole-Trimethoprim    Medications:  Aspirin 81 mg  Pletal  Benadryl  Gabapentin  Lantus  Lisinopril  Crestor  Ambien      Past Medical History:     Type II diabetes  Hypertension  Hyperlipidemia   Anxiety  Rash  Microalbuminuria  Onychomycosis   Dermatitis  Covid-19  Osteomyelitis   Anemia   Necrotic toes  PAD  Diabetic peripheral neuropathy   Lumbago    COPD  CKD stage 3   Peripheral gangrene   PVD    Past Surgical History:    Right fifth toe amputation   IR lower extremity  angiogram bilateral      Family History:    Father: glaucoma     Social History:  The patient presents to the ED with his wife  PCP: Amairani Seth NP    Physical Exam     Patient Vitals for the past 24 hrs:   BP Pulse SpO2   06/29/22 0145 121/69 93 96 %   06/29/22 0130 134/71 96 97 %   06/29/22 0115 139/71 101 99 %   06/29/22 0100 (!) 145/71 99 98 %   06/29/22 0045 (!) 158/68 101 97 %   06/29/22 0030 (!) 174/85 102 99 %   06/29/22 0015 130/84 104 99 %   06/29/22 0000 (!) 168/71 106 97 %   06/28/22 2300 -- 109 99 %   06/28/22 2245 -- 111 99 %   06/28/22 2230 (!) 144/78 111 99 %       Physical Exam  General: Well appearing, nontoxic. Resting comfortably  Head:  Scalp, face, and head appear normal  Eyes:  Pupils are equal, round    Conjunctivae non-injected and sclerae white  ENT:    The external nose is normal    Pinnae are normal  Neck:  Normal range of motion    There is no rigidity noted    Trachea is in the midline  CV:  Regular rate and rhythm     Normal S1/S2, no S3/S4    No murmur or rub. Radial pulses 2+ bilaterally.  Resp:  Lungs are clear and equal bilaterally  There is no tachypnea    No increased work of breathing    No rales, wheezing, or rhonchi  GI:  Abdomen is soft, no rigidity or guarding    No distension, or mass    No tenderness or rebound tenderness   MS:  Normal muscular tone. Chest wall non tender to palpation.    Symmetric motor strength    No lower extremity edema. No calf swelling or tenderness.  Skin:  No rash or acute skin lesions noted  Neuro: Awake and alert  Speech is normal and fluent  Moves all extremities spontaneously  Psych:  Normal affect. Appropriate interactions.      Emergency Department Course   ECG  ECG taken at 2301, ECG read at 2314  Sinus tachycardia  Otherwise normal ECG   Rate 107 bpm. LA interval 188 ms. QRS duration 78 ms. QT/QTc 306/408 ms. P-R-T axes 61 -27 54.     Imaging:  NM Lung vent and perf*    (Results Pending)     Report per  radiology    Laboratory:  Labs Ordered and Resulted from Time of ED Arrival to Time of ED Departure   D DIMER QUANTITATIVE - Abnormal       Result Value    D-Dimer Quantitative 0.70 (*)    BASIC METABOLIC PANEL - Abnormal    Sodium 137      Potassium 4.1      Chloride 108      Carbon Dioxide (CO2) 20      Anion Gap 9      Urea Nitrogen 24      Creatinine 1.19      Calcium 7.7 (*)     Glucose 369 (*)     GFR Estimate 68     CBC WITH PLATELETS AND DIFFERENTIAL - Abnormal    WBC Count 8.2      RBC Count 4.30 (*)     Hemoglobin 11.9 (*)     Hematocrit 37.9 (*)     MCV 88      MCH 27.7      MCHC 31.4 (*)     RDW 13.8      Platelet Count 294     GLUCOSE BY METER - Abnormal    GLUCOSE BY METER POCT 274 (*)    GLUCOSE BY METER - Abnormal    GLUCOSE BY METER POCT 229 (*)    INR - Normal    INR 1.01     TROPONIN I - Normal    Troponin I High Sensitivity 23     TROPONIN I - Normal    Troponin I High Sensitivity 25     DIFFERENTIAL    % Neutrophils 87      % Lymphocytes 11      % Monocytes 2      % Eosinophils 0      % Basophils 0      Absolute Neutrophils 7.1      Absolute Lymphocytes 0.9      Absolute Monocytes 0.2      Absolute Eosinophils 0.0      Absolute Basophils 0.0      RBC Morphology Confirmed RBC Indices      Platelet Assessment        Value: Automated Count Confirmed. Platelet morphology is normal.        Procedures      Emergency Department Course:             Reviewed:  I reviewed nursing notes, vitals, past medical history and Care Everywhere    Assessments:  2315 I obtained history and examined the patient as noted above.   0027 I rechecked the patient to talk about his contrast dye allergy as his ddimer is elevated. He did not want to do the scan as he is worried about his kidneys and allergy. He believes all of his symptoms are from his prednisone.     Consults:      Interventions:  Medications   insulin regular 1 unit/mL injection 5 Units (5 Units Intravenous Given 6/29/22 0048)       Disposition:  The  patient was discharged to home.     Impression & Plan   Medical Decision Making:  Rachell Paige is a 66 year old male with complex past medical history as noted above who presents to the emergency department after he had an episode of lightheadedness dizziness shortness of breath and chest discomfort.  He reports that the symptoms are largely improved now although he is concerned as his heart rate is still elevated.  Patient is attributing his symptoms to the prednisone and Benadryl that he was started on earlier for an urticarial rash.  On my evaluation he is well-appearing, hemodynamically stable and afebrile.  Initial EKG reveals sinus tachycardia without evidence of ischemia or dysrhythmia.  Patient has no evidence of ongoing allergic reaction, anaphylactic reaction or urticarial rash at this time.  Symptoms are quite atypical.  They are nonexertional.  They have significantly improved since onset.  Work-up in the emergency department included 2 high-sensitivity troponins which were within normal limits and without significant change.  BMP reveals hyperglycemia with glucose of 369 likely elevated due to steroid use.  CBC without leukocytosis. No clinical symptoms to suggest infection. Age-adjusted D-dimer is just above the upper limit of acceptable.  This finding was discussed at length with patient.  Overall his clinical symptoms are not typical for an acute pulmonary embolism.  His tachycardia resolved.  His chest pain and shortness of breath have completely resolved.  I discussed with him the possibility of obtaining a CTA of the chest to definitively rule out pulmonary embolism.  Patient is very concerned about this as he has a history of possible allergy to IV contrast material as well as history of CKD stage III and he is very concerned that the contrast may further harm his kidney function.  Unfortunately VQ scan is not able to be obtained overnight.  I discussed with him that given that the symptoms  resolved completely and he has no hypoxia or persistent tachycardia I feel that it is unlikely that he has a significant pulmonary embolism at this time.  We discussed options including staying in the hospital for observation and VQ scan in the morning versus discharge to home.  Patient would prefer to discharge home and pursue obtaining a VQ scan as an outpatient.  I feel that this is reasonable as the clinical likelihood of significant pulmonary embolism is low at this time.  The patient understands that there is a possibility that he may have a pulmonary embolism despite the reassuring clinical findings discussed above.  Patient does not wish to stay in the hospital.  He should return immediately or call 911 for any worsening whatsoever.  The patient is convinced that the prednisone is the cause of his current symptoms although I discussed with him that the symptoms he is having is not typical of an allergic reaction or adverse effects due to prednisone.  Steroids can cause neuropsychiatric side effects including increased anxiety which may be playing a role.  At this time the overall emergency department evaluation is reassuring.  Patient understands the risk that without definitive testing to rule out pulmonary embolism that the still remains a possibility although I feel that this is unlikely.  Patient is acknowledging of the risks and will return immediately to the hospital for any worsening whatsoever.  VQ scan was ordered for outpatient.  Certainly there is no evidence to suggest right heart strain, cor pulmonale or large pulmonary embolism based on his ED evaluation.  I stressed the importance of very close outpatient follow-up.  The patient was agreeable with the plan of care.  Close return precautions were provided and he was discharged in stable and improved condition.    Diagnosis:    ICD-10-CM    1. Medication reaction, initial encounter  T50.905A    2. Elevated d-dimer  R79.89 NM Lung vent and perf*        Discharge Medications:  Discharge Medication List as of 6/29/2022  1:54 AM          Scribe Disclosure:  I, Timi Patricia, am serving as a scribe at 10:39 PM on 6/28/2022 to document services personally performed by Gennaro Wilson MD based on my observations and the provider's statements to me.            Gennaro Wilson MD  06/29/22 1509
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
## 2022-06-29 NOTE — TELEPHONE ENCOUNTER
Nurse Triage SBAR    Is this a 2nd Level Triage? NO    Situation: Triage call for new tachycardia and chest heaviness.     Background: Patient, izzy Lovett. Pt discharged from the ED today with a dx of an allergic reaction.      Assessment: For the past 20 minutes, pt has had chest heaviness.  He is concerned about his elevated HR of 104 bpm.  His BP is 131/70's.      Pt denies SOB, dizziness.      Protocol Recommended Disposition: Call 911.  Pt plans to monitor his symptoms over the next hour and will go to the ED if his symptoms persist.      Garima Dietrich RN  [facility] Nurse Advisor    __________________________________________________________    COVID 19 Nurse Triage Plan/Patient Instructions    Please be aware that novel coronavirus (COVID-19) may be circulating in the community. If you develop symptoms such as fever, cough, or SOB or if you have concerns about the presence of another infection including coronavirus (COVID-19), please contact your health care provider or visit [email or web address].     Disposition/Instructions    Call to EMS/911 recommended. Follow protocol based instructions.     Bring Your Own Device:  Please also bring your smart device(s) (smart phones, tablets, laptops) and their charging cables for your personal use and to communicate with your care team during your visit.    Thank you for taking steps to prevent the spread of this virus.  o Limit your contact with others.  o Wear a simple mask to cover your cough.  o Wash your hands well and often.    Resources    [facility]: About COVID-19: [email or web address]    CDC: What to Do If You're Sick: www.cdc.gov/coronavirus/2019-ncov/about/steps-when-sick.html    CDC: Ending Home Isolation: www.cdc.gov/coronavirus/2019-ncov/hcp/disposition-in-home-patients.html     CDC: Caring for Someone: www.cdc.gov/coronavirus/2019-ncov/if-you-are-sick/care-for-someone.html     [facility]: Interim Guidance for  Hospital Discharge to Home: www.health.[facility].[place]/diseases/coronavirus/hcp/hospdischarge.pdf    [facility] clinical trials (COVID-19 research studies): [email or web address]/umn-clinical-trials     Below are the COVID-19 hotlines at the [place] Department of Health ([facility]). Interpreters are available.   o For health questions: Call 194-986-8943 or 1-998.230.1948 (7 a.m. to 7 p.m.)  o For questions about schools and childcare: Call 203-264-7229 or 1-286.985.7894 (7 a.m. to 7 p.m.)         Reason for Disposition    Chest pain    [1] Chest pain lasts > 5 minutes AND [2] age > 44    Additional Information    Negative: Passed out (i.e., lost consciousness, collapsed and was not responding)    Negative: Shock suspected (e.g., cold/pale/clammy skin, too weak to stand, low BP, rapid pulse)    Negative: Difficult to awaken or acting confused (e.g., disoriented, slurred speech)    Negative: Visible sweat on face or sweat dripping down face    Negative: Unable to walk, or can only walk with assistance (e.g., requires support)    Negative: [1] Received SHOCK from implantable cardiac defibrillator AND [2] persisting symptoms (i.e., palpitations, lightheadedness)    Negative: [1] Dizziness, lightheadedness, or weakness AND [2] heart beating very rapidly (e.g., > 140 / minute)    Negative: [1] Dizziness, lightheadedness, or weakness AND [2] heart beating very slowly  (e.g., < 50 / minute)    Negative: Sounds like a life-threatening emergency to the triager    Negative: SEVERE difficulty breathing (e.g., struggling for each breath, speaks in single words)    Negative: Difficult to awaken or acting confused (e.g., disoriented, slurred speech)    Negative: Shock suspected (e.g., cold/pale/clammy skin, too weak to stand, low BP, rapid pulse)    Negative: Passed out (i.e., fainted, collapsed and was not responding)    Protocols used: HEART RATE AND HEARTBEAT [id number], CHEST PAIN-A-AH

## 2022-06-29 NOTE — ED TRIAGE NOTES
Pt here earlier today for allergic reaction, states he was at home and began having mid-sternal chest pressure, tachycardia, and SOB. Took 4 baby aspirin PTA, EMS gave 1 nitroglycerin tablet. Pt states no relief from it. ABCs intact, alert and oriented X3.

## 2022-06-29 NOTE — PROGRESS NOTES
[facility]    Background: Care Coordination referral placed from [facility] [place] [facility] discharge report for reason of patient meeting criteria for a TCM outreach call by [facility] team.    Assessment: Upon chart review, [facility] Team member will cancel/close the referral for TCM outreach due to reason below:    Patient has a follow up appointment with an appropriate provider today for hospital discharge    Plan: Care Coordination referral for TCM outreach canceled.      Junie Bennett MA  [facility] [place], [facility]

## 2022-06-29 NOTE — ED NOTES
Bed: ED37  Expected date: 6/28/22  Expected time: 10:19 PM  Means of arrival: Ambulance  Comments:  Fay 1 66 M CP

## 2022-07-01 ENCOUNTER — TELEPHONE (OUTPATIENT)
Dept: OTHER | Facility: CLINIC | Age: 67
End: 2022-07-01

## 2022-07-01 DIAGNOSIS — I73.9 PAD (PERIPHERAL ARTERY DISEASE) (H): Primary | ICD-10-CM

## 2022-07-01 NOTE — TELEPHONE ENCOUNTER
[facility]    Who is the name of the provider?:  Blank      What is the location you see this provider at?: [place]    Reason for call:  Both legs are bothering him, they are burning and when he is walking he is in pain.    Please call.    Can we leave a detailed message on this number?  YES

## 2022-07-01 NOTE — TELEPHONE ENCOUNTER
Called patient to advise he go to the emergency room for assessment as there is no availability in the clinic.    Rachell stated that it is not an emergency, and he can wait.  He said he would like to wait until Dr. Moreno returns to the clinic in a few weeks.      I explained that he should go to the emergency room if his condition worsens.  Otherwise we will contact him after the holiday to set up an appointment.    Keyla Olvera RN BSN  [facility]  [phone]

## 2022-07-05 NOTE — TELEPHONE ENCOUNTER
Attempted to contact. Phone kept ringing, unable to LM.       Mila Gudino    [facility]   [phone]

## 2022-07-05 NOTE — TELEPHONE ENCOUNTER
Attempted to contact. Phone went immediately to busy signal.       Mila Gudino    [facility]   [phone]

## 2022-07-05 NOTE — TELEPHONE ENCOUNTER
Pt hx of bilateral lower extremity PAD.   Hx of angiogram on 3/10/22 without intervention.   Past sx: left transmetatarsal amputation.     Routing to  to coordinate PIYUSH and in clinic OV with vascular surgery (Dr. Moreno) at next nearest available.     Appt note: F/u from 4/11/22. Pt hx of bilateral lower extremity PAD. Hx of angiogram on 3/10/22 without intervention. Hx of left transmetatarsal amputation. Increasing pain with ambulation and redness of lower extremities.     KANDY EllisonN, RN  [facility]  Office:  [phone] Fax: [phone]

## 2022-07-12 ENCOUNTER — HOSPITAL ENCOUNTER (OUTPATIENT)
Dept: ULTRASOUND IMAGING | Facility: CLINIC | Age: 67
Discharge: HOME | End: 2022-07-12
Attending: SURGERY | Admitting: SURGERY
Payer: MEDICARE

## 2022-07-12 DIAGNOSIS — I73.9 PAD (PERIPHERAL ARTERY DISEASE) (H): ICD-10-CM

## 2022-07-12 PROCEDURE — 93924 LWR XTR VASC STDY BILAT: CPT | Mod: 26 | Performed by: SURGERY

## 2022-07-12 PROCEDURE — 93924 LWR XTR VASC STDY BILAT: CPT

## 2022-08-17 ENCOUNTER — TELEPHONE (OUTPATIENT)
Dept: OTHER | Facility: CLINIC | Age: 67
End: 2022-08-17

## 2022-08-17 NOTE — TELEPHONE ENCOUNTER
Please contact patient and let him know that Dr. Moreno would like [facility] to see Dr. Wong or Dr. Michel rather than have his appointment with Dr. Moreno on 8/22/22.    (No further intervention is necessary from the surgical standpoint).    Keyla Olvera RN BSN  [facility]  [phone]

## 2022-08-17 NOTE — TELEPHONE ENCOUNTER
----- Message from Frandy Moreno MD sent at 8/17/2022  8:08 AM CDT -----  Regarding: RE: follow up  Yes I see your point but his disease process needs to be followed by vascular medicine.  Marco   ----- Message -----  From: Keyla Olvera, ALESSANDRO  Sent: 8/15/2022   2:43 PM CDT  To: Frandy Moreno MD  Subject: RE: follow up                                    We tried to schedule him with medicine.  His statement:    Rachell stated that it is not an emergency, and he can wait.  He said he would like to wait until Dr. Moreno returns to the clinic in a few weeks.      ----- Message -----  From: Fradny Moreno MD  Sent: 8/15/2022   2:27 PM CDT  To: Keyla Olvera RN  Subject: follow up                                        This is a vascular medicine pt who IR did interventions on. Any Reason he is being sent to me? He has non reconstructible PAD.   Marco

## 2022-08-19 NOTE — TELEPHONE ENCOUNTER
Left VM for patient advising the need to change his visit. Please advise next steps   [facility]   Clinic Follow Up Note     Patient: Alfredo Reyes Date of Service: 2020   : 1981    38 year old female      CHIEF COMPLAINT:  Medication side effect      HISTORY OF PRESENT ILLNESS     This is a 38 year old female who presents today for the following:    States that she stopped taking her BP medication because she had light headedness and did some research and felt that what she was experiencing was a side effect of her BP medication. She has not experienced light-headedness lately. It was not occurring with position changes.  She had been on hydrochlorothiazide in the past. She does report having a heavy menstrual cycle. She is not sure if she is anemic. She denies chest pain, shortness of breath. She has some lower extremity swelling and edema at times. Does not consistently follow a low sodium diet. Reports mexican food last night. She does try to watch what she eats - had lost 16#, but gained most of it back.     She thinks that she had a cold sore twice. She does not have one now.    States that when she drinks alcohol, she usually ends up needing and antibiotics and she wonders if her kidneys are okay    Over the last 2 weeks, how often have you been bothered by the following problems?          PHQ2 Score: 0  PHQ2 Score Interpretation: No further screening needed  1. Little interest or pleasure in activity?: 0  2. Feeling down, depressed, or hopeless?: 0         # Preventative health maintenance    Past medical history, surgical history, medications, allergies, family history and social history reviewed and updated in [facility].    REVIEW OF SYSTEMS       GENERAL:  See HPI  HEENT: Denies change in vision, hearing, sinus drainage, sinus pain or pressure, epistaxis, dysphagia.  Denies ear pain or pressure, sore throat.  Denies oral lesions.  CARDIOVASCULAR: see HPI  RESPIRATORY: Denies shortness of breath, cough, sputum, hemoptysis and  wheezing.  GASTROINTESTINAL: Denies nausea, vomiting, diarrhea, constipation, acid reflux, melena, hematochezia, abdominal pain and change in appetite.   GENITOURINARY: see HPI  HEMATOLOGIC/IMMUNOLOGICAL: Denies bleeding and easy bruisability, recent exposure to illness, recent antibiotic use.  ENDOCRINE/METABOLIC: Denies polyuria, polyphagia and heat/cold intolerance.  Denies burning, tingling or numbing in the legs and feet.   SKIN: Denies rash, pruritus and lesions.  Denies non-healing ulcers.    NEUROLOGIC: Denies loss of consciousness, altered mental status, seizures, sensory and motor deficits, numbness and tingling.  PSYCHIATRIC: Denies anxiety, depression, agitations and chemical dependency/abuse.    PHYSICAL EXAM     Vital Signs:   height is 5' 7\" (1.702 m) and weight is 117.2 kg. Her temporal temperature is 96.8 °F (36 °C). Her blood pressure is 138/83 and her pulse is 79. Her oxygen saturation is 96%. Body mass index is 40.46 kg/m².    General:  No acute distress, well nourished, well groomed female   HEENT:  PERRLA, no scleral icterus or conjunctival pallor, no nystagmus, or discharge.  Wearing mask in clinic  Neck:   No nuchal rigidity  Pulmonary:  Clear to auscultation anterior and posterior, no wheezing/crackles/rales, no respiratory distress, no retractions or accessory muscles used  Cardiovascular:  Regular rate and rhythm, S1/S2 present, no murmur, gallop or rub no peripheral edema, pedal pulses +2, CRT <2 seconds  Abdomen:  Positive bowel sounds, soft, nontender and not distended, no hepatosplenomegaly, no rebound/guarding; no visible surgical/non-surgical scars   Genitourinary:  No suprapubic or costovertebral angle tenderness   Musculoskeletal:  Range of motion and motor strength grossly normal in the upper extremities and in the lower extremities, no joint erythema or swelling, no spasm or deformities.  No point tenderness noted.    Skin:  Warm and dry, no  rashes/lesions/ecchymoses/jaundice.  Neurologic: Alert and oriented x3.   Psychiatric: Normal, affect appropriate, engaged in conversation.      LABS     HgbA1c No results found for: HGBA1C   LDL CALCULATED LDL (mg/dL)   Date Value   11/12/2013 95      Creatinine  Creatinine (mg/dL)   Date Value   08/19/2020 0.73   08/17/2018 0.78      Hemoglobin HGB (g/dL)   Date Value   08/19/2020 10.1 (L)   06/29/2017 11.3 (L)          ASSESSMENT AND PLAN     This is a 38 year old female who presents with:    Dizziness of unknown cause  (primary encounter diagnosis)  Comment: no red flags, may be side effect of blood pressure medication, will also evaluate for iron deficiency anemica  Plan: CBC WITH DIFFERENTIAL, COMPREHENSIVE METABOLIC         PANEL, FERRITIN          Menorrhagia with irregular cycle  Comment: will evaluate for iron deficiency anemia  Plan: FERRITIN        Discussed with patient that if labs show iron deficiency, I would recommend that she get a pelvic ultrasound to evaluate for fibroids s/t to heavy cycle    Benign essential hypertension  Comment: uncontrolled  Plan: hydrochlorothiazide (HYDRODIURIL) 12.5 MG         tablet        - started back on half the prior dose  - return to clinic in one week for BP recheck  - goal is low sodium diet, increased physical activity      # Preventative health maintenance:    Due for cervical cancer screening    The patient is to return to the clinic for follow up in 1 week for BP recheck, then with provider for cervical cancer screening    Patient's medical conditions, proposed management plan, risks, benefits and alternatives were discussed with the patient in detail. The patient understands and is agreeable.    Iris Romero NP  [facility]  8/20/2020 8:49 AM

## 2022-09-07 DIAGNOSIS — E11.9 TYPE 2 DIABETES MELLITUS WITH HEMOGLOBIN A1C GOAL OF 7.0%-8.0% (H): ICD-10-CM

## 2022-09-09 RX ORDER — METHYLPREDNISOLONE SODIUM SUCCINATE 125 MG/2ML
INJECTION, POWDER, FOR SOLUTION INTRAMUSCULAR; INTRAVENOUS
Qty: 100 EACH | Refills: 0 | Status: SHIPPED | OUTPATIENT
Start: 2022-09-09 | End: 2022-10-17

## 2022-09-09 NOTE — TELEPHONE ENCOUNTER
Routing refill request to provider for review/approval because:  Patient needs to be seen because it has been more than 1 year since last office visit.    Franci Hernandez RN

## 2022-09-26 ENCOUNTER — TELEPHONE (OUTPATIENT)
Dept: WOUND CARE | Facility: CLINIC | Age: 67
End: 2022-09-26

## 2022-09-26 NOTE — TELEPHONE ENCOUNTER
Please schedule with Dr. Ramos or Becky SALDANA at [facility] [place] for next available appointment.    Is patient a KT lift? PLEASE INQUIRE WHEN MAKING THE APPOINTMENT AND PUT IN APPOINTMENT NOTES    Routing to  Wound Healing Scheduling.

## 2022-09-26 NOTE — TELEPHONE ENCOUNTER
[facility] Wound    Who is the name of the provider?:  Rachel      What is the location you see this provider at?: [place]    Reason for call:  Requesting appointment, wound is worse.     Can we leave a detailed message on this number?  YES

## 2022-09-27 ENCOUNTER — TELEPHONE (OUTPATIENT)
Dept: OTHER | Facility: CLINIC | Age: 67
End: 2022-09-27

## 2022-09-27 NOTE — TELEPHONE ENCOUNTER
[facility]    Who is the provider?:  Tiffanie      Preferred provider location: [facility]    Person calling: Rodriguez Paige  Call back phone: [phone]       Nurse call back needed: NO          Can we leave a message?  YES        Reason for call:    Patient asking for a follow up with Dr Moreno for wound care on legs and LE pain in feet. Might need a follow up with Wound/Medicine? Please review and advise.    Outside imaging: n/a    Name / Loc of pharmacy: n/a

## 2022-09-27 NOTE — TELEPHONE ENCOUNTER
Please see previous tele encounters.   No scheduling with Dr. Moreno.    Pt needs to see vascular medicine.  This recommendation will not change.    JALIL Ellison, RN  [facility]  Office:  [phone] Fax: [phone]

## 2022-09-27 NOTE — TELEPHONE ENCOUNTER
Future Appointments   Date Time Provider Department Center   10/7/2022  4:00 PM Felisa Wong MD [facility]

## 2022-10-06 ENCOUNTER — TELEPHONE (OUTPATIENT)
Dept: OTHER | Facility: CLINIC | Age: 67
End: 2022-10-06

## 2022-10-06 NOTE — TELEPHONE ENCOUNTER
Called pt back, requested refill of Gabapentin, however this has been overseen by pts PCP and last order was from PCP.   Pt needs to call PCP for refill, pt reports he cannot get a hold of them. I offered to give phone number, pt declined. Pt then said he will ask for refill after OV appt tomorrow with vascular and all were in agreement.   KANDY EllisonN, RN  [facility]  Office:  [phone] Fax: [phone]

## 2022-10-06 NOTE — TELEPHONE ENCOUNTER
[facility]    Who is the name of the provider?:  Tiffanie    What is the location you see this provider at/preferred location?: [place]  Person calling: Self  Phone number:  [phone]     Nurse call back needed:  YES     Reason for call:   Please call - will not say what he wants, just wants to speak w nurse.     Pharmacy location:  NA  Outside Imaging: Not Applicable   Can we leave a detailed message on this number?  YES     ]

## 2022-10-07 ENCOUNTER — OFFICE VISIT (OUTPATIENT)
Dept: OTHER | Facility: CLINIC | Age: 67
End: 2022-10-07
Attending: INTERNAL MEDICINE
Payer: MEDICARE

## 2022-10-07 VITALS
WEIGHT: 194.4 LBS | HEIGHT: 66 IN | DIASTOLIC BLOOD PRESSURE: 83 MMHG | HEART RATE: 86 BPM | BODY MASS INDEX: 31.24 KG/M2 | SYSTOLIC BLOOD PRESSURE: 157 MMHG | OXYGEN SATURATION: 98 %

## 2022-10-07 DIAGNOSIS — I10 BENIGN ESSENTIAL HYPERTENSION: ICD-10-CM

## 2022-10-07 DIAGNOSIS — E78.5 HYPERLIPIDEMIA LDL GOAL <70: ICD-10-CM

## 2022-10-07 DIAGNOSIS — E11.42 DIABETIC PERIPHERAL NEUROPATHY (H): ICD-10-CM

## 2022-10-07 DIAGNOSIS — I65.22 CAROTID STENOSIS, LEFT: ICD-10-CM

## 2022-10-07 DIAGNOSIS — I73.9 PAD (PERIPHERAL ARTERY DISEASE) (H): Primary | ICD-10-CM

## 2022-10-07 PROCEDURE — 99205 OFFICE O/P NEW HI 60 MIN: CPT | Performed by: INTERNAL MEDICINE

## 2022-10-07 PROCEDURE — G0463 HOSPITAL OUTPT CLINIC VISIT: HCPCS

## 2022-10-07 NOTE — PROGRESS NOTES
[facility] INITIAL VASCULAR MEDICINE CONSULT    ( New patient Visit)       PRIMARY HEALTH CARE PROVIDER:  Olivia Nichols MD      REFERRING HEALTH CARE PROVIDER; MD Blank      REASON FOR CONSULT:   Evaluation and management of known history of PAD in a very pleasant -American male with multiple risk factors former smoker, type 2 diabetes mellitus poorly controlled, hypertension, hyperlipidemia and did not tolerate multiple medications in the past and stopped taking Xarelto, Pletal and also says not taking Crestor either?  Currently having burning sensation of the feet      HPI: Rachell Paige is a 66 year old very pleasant male with history of type 2 diabetes mellitus with neuropathy, hypertension, hyperlipidemia, PAD did not tolerate multiple medications in the past previously seen and evaluated by Dr. Moreno and also followed by Dr. Parson experiencing burning sensation of the feet for quite some time.  He has a history of left foot TMA amputation.   He is originally from [facility] and he was a  played for both [employer] with repeat T2 motion related injuries on the shin with the scarring.  He quit smoking many years ago smoked almost a pack a day for many years  He underwent PIYUSH/TBI in July 2022 results as delineated below'      He also has history of carotid disease due for carotid ultrasound denies any TIA-like symptoms or strokelike symptoms  He started taking over-the-counter blood flow medication which contains L-arginine and nitric oxide ? Etc      He Is new to me reviewed available extensive records in the epic and updated chart    On 10 March 2022 he underwent transright femoral aortoiliac arteriogram with right lower extremity arteriogram.  He was found to have diffuse disease of the superficial femoral artery with occlusion of anterior and posterior tibial arteries.  Proximal right peroneal artery had moderate stenosis.   Attempt was made to cross and recanalize the right anterior tibial artery but was unsuccessful.  Left lower extremity arteriogram showed mild diffuse disease in the superficial femoral artery with occlusion of the anterior and posterior tibial arteries.  Peroneal artery was patent.            PAST MEDICAL HISTORY  Past Medical History:   Diagnosis Date     Anxiety 02/23/2015     Dermatitis 04/18/2015     DM type 2, goal A1C 7-8 02/23/2015     Financial problems 02/23/2015     Glaucoma (increased eye pressure)      HTN, goal below 140/90 02/23/2015     Hyperlipidemia LDL goal <100 02/23/2015     Microalbuminuria 04/18/2015     Onychomycosis 04/18/2015     Rash 02/23/2015       CURRENT MEDICATIONS  ACCU-CHEK GUIDE test strip, USE TO TEST BLOOD SUGAR TWO TIMES A DAY OR AS DIRECTED  alcohol swab prep pads, Use to swab area of injection/pedro pablo as directed.  aspirin 81 MG tablet, Take 1 tablet (81 mg) by mouth daily  blood glucose (ACCU-CHEK GUIDE) test strip, 1 strip by In Vitro route 4 times daily Use to test blood sugar 3 -4  times daily or as directed.  blood glucose calibration (NO BRAND SPECIFIED) solution, To accompany: Blood Glucose Monitor Brands: per insurance.  blood glucose monitoring (NO BRAND SPECIFIED) meter device kit, Use to test blood sugar 4 times daily or as directed. Preferred blood glucose meter OR supplies to accompany: Blood Glucose Monitor Brands: per insurance.  diphenhydrAMINE (BENADRYL) 25 MG capsule, Take 1-2 capsules (25-50 mg) by mouth every 6 hours as needed for itching or allergies  dorzolamide (TRUSOPT) 2 % ophthalmic solution, Place 1 drop into both eyes 2 times daily  gabapentin (NEURONTIN) 300 MG capsule, TAKE 1 CAPSULE (300 MG) BY MOUTH AT BEDTIME  insulin aspart (NOVOLOG FLEXPEN) 100 UNIT/ML pen, Inject 1-10 Units Subcutaneous 2 times daily (with meals) With lunch and dinner  insulin glargine (LANTUS SOLOSTAR) 100 UNIT/ML pen, Inject 50 Units Subcutaneous At Bedtime  lisinopril  "(ZESTRIL) 20 MG tablet, Take 1 tablet (20 mg) by mouth daily  Omega-3 Fatty Acids (OMEGA-3 FISH OIL) 1200 MG CAPS, Take 1 capsule by mouth  rosuvastatin (CRESTOR) 10 MG tablet, Take 1 tablet (10 mg) by mouth daily  thin (NO BRAND SPECIFIED) lancets, Use with lanceting device. To accompany: Blood Glucose Monitor Brands: per insurance.  timolol maleate (TIMOPTIC) 0.5 % ophthalmic solution, Place 1 drop into both eyes 2 times daily  ULTICARE SHORT 31G X 8 MM insulin pen needle, USE 3 PEN NEEDLES DAILY OR AS DIRECTED.  zolpidem (AMBIEN) 10 MG tablet, Take 10 mg by mouth    No current facility-administered medications on file prior to visit.      PAST SURGICAL HISTORY:  Past Surgical History:   Procedure Laterality Date     AMPUTATE TOE(S) Right 12/31/2018    Procedure: Right fifth toe amputation;  Surgeon: Clark Lora DPM;  Location: [facility] OR     IR LOWER EXTREMITY ANGIOGRAM BILATERAL  3/10/2022       ALLERGIES     Allergies   Allergen Reactions     Cilostazol Headache     Weakness     Aleve      HA sweats     Citalopram Itching     Clopidogrel Nausea and Vomiting     Pt to restart on 11/25/15 to see again if he tolerates it or not. His sx were only GI. Not a true allergy     Iodine      Naproxen Itching     About 10 yrs ago, itching all over from taking Aleve  \"get sick and really sick\"       Sulfamethoxazole-Trimethoprim      Other reaction(s): Renal Failure         FAMILY HISTORY  Family History   Problem Relation Age of Onset     Diabetes Other      LUNG DISEASE Mother      Glaucoma Father      Cerebrovascular Disease Son      Diabetes Son      Diabetes Daughter        VASCULAR FAMILY HISTORY  1st order relative with atherosclerotic PAD: No  1st order relative with AAA: No  Family history of Familial Hyperlipidemia No  Family History of Hypercoagulable state:No    VASCULAR RISK FACTORS  1. Diabetes:Yes uncontrolled  2. Smoking: quit smoking some time ago.  3. HTN: uncontrolled  4.Hyperlipidemia: Yes - " uncontrolled      SOCIAL HISTORY  Social History     Socioeconomic History     Marital status:      Spouse name: Not on file     Number of children: Not on file     Years of education: Not on file     Highest education level: Not on file   Occupational History     Not on file   Tobacco Use     Smoking status: Former Smoker     Types: Cigarettes     Quit date:      Years since quittin.7     Smokeless tobacco: Never Used   Substance and Sexual Activity     Alcohol use: No     Drug use: No     Sexual activity: Yes     Partners: Female   Other Topics Concern     Parent/sibling w/ CABG, MI or angioplasty before 65F 55M? Not Asked   Social History Narrative    Former  ([employer], [employer])          Social Determinants of Health     Financial Resource Strain: Not on file   Food Insecurity: Not on file   Transportation Needs: Not on file   Physical Activity: Not on file   Stress: Not on file   Social Connections: Not on file   Intimate Partner Violence: Not on file   Housing Stability: Not on file       ROS:   General: No change in weight, sleep or appetite.  Normal energy.  No fever or chills  Eyes: Negative for vision changes or eye problems  ENT: No problems with ears, nose or throat.  No difficulty swallowing.  Resp: No coughing, wheezing or shortness of breath  CV: No chest pains or palpitations  GI: No nausea, vomiting,  heartburn, abdominal pain, diarrhea, constipation or change in bowel habits  : No urinary frequency or dysuria, bladder or kidney problems  Musculoskeletal: No significant muscle or joint pains  Neurologic: No headaches, numbness, tingling, weakness, problems with balance or coordination  Psychiatric: No problems with anxiety, depression or mental health  Heme/immune/allergy: No history of bleeding or clotting problems or anemia.  No allergies or immune system problems  Endocrine: No history of thyroid disease, diabetes or other endocrine disorders  Skin: No  "rashes,worrisome lesions or skin problems  Vascular: History of PAD and also peripheral neuropathy status post left TMA amputation  Burning sensation in the bottom of the feet  No foot ulcers leg ulcers  EXAM:  BP (!) 157/83 (BP Location: Right arm, Patient Position: Chair, Cuff Size: Adult Large)   Pulse 86   Ht 5' 6\" (1.676 m)   Wt 194 lb 6.4 oz (88.2 kg)   SpO2 98%   BMI 31.38 kg/m    In general, the patient is a pleasant male in no apparent distress.    HEENT: NC/AT.  PERRLA.  EOMI.  Sclerae white, not injected.  Nares clear.  Pharynx without erythema or exudate.  Dentition intact.    Neck: No adenopathy.  No thyromegaly. Carotids +2/2 bilaterally without bruits.  No jugular venous distension.   Heart: RRR. Normal S1, S2 splits physiologically. No murmur, rub, click, or gallop. The PMI is in the 5th ICS in the midclavicular line. There is no heave.    Lungs: CTA.  No ronchi, wheezes, rales.  No dullness to percussion.   Abdomen: Soft, nontender, nondistended. No organomegaly. No AAA.  No bruits.   Extremities: Unable to palpate DP/AT and PT pulses bilaterally  Palpable femoral pulses bilaterally  Left TMA healed well  No foot ulcers or leg ulcers  Healed scars on the shins bilaterally due to previous soccer related repetitive injuries      Labs:  LIPID RESULTS:  Lab Results   Component Value Date    CHOL 117 02/28/2022    CHOL 156 02/10/2021    HDL 47 02/28/2022    HDL 44 02/10/2021    LDL 54 02/28/2022    LDL 81 02/10/2021    TRIG 79 02/28/2022    TRIG 157 (H) 02/10/2021    CHOLHDLRATIO 3.4 02/23/2015       LIVER ENZYME RESULTS:  Lab Results   Component Value Date    AST 13 02/28/2022    AST 13 02/10/2021    ALT 23 02/28/2022    ALT 22 02/10/2021       CBC RESULTS:  Lab Results   Component Value Date    WBC 8.2 06/28/2022    WBC 8.4 07/06/2021    RBC 4.30 (L) 06/28/2022    RBC 4.71 07/06/2021    HGB 11.9 (L) 06/28/2022    HGB 13.4 07/06/2021    HCT 37.9 (L) 06/28/2022    HCT 41.0 07/06/2021    MCV 88 " 06/28/2022    MCV 87 07/06/2021    MCH 27.7 06/28/2022    MCH 28.5 07/06/2021    MCHC 31.4 (L) 06/28/2022    MCHC 32.7 07/06/2021    RDW 13.8 06/28/2022    RDW 13.3 07/06/2021     06/28/2022     07/06/2021       BMP RESULTS:  Lab Results   Component Value Date     06/28/2022     02/10/2021    POTASSIUM 4.1 06/28/2022    POTASSIUM 4.7 02/10/2021    CHLORIDE 108 06/28/2022    CHLORIDE 103 02/10/2021    CO2 20 06/28/2022    CO2 24 02/10/2021    ANIONGAP 9 06/28/2022    ANIONGAP 7 02/10/2021     (H) 06/29/2022     (H) 06/28/2022     (H) 02/10/2021    BUN 24 06/28/2022    BUN 21 02/10/2021    CR 1.19 06/28/2022    CR 1.15 02/10/2021    GFRESTIMATED 68 06/28/2022    GFRESTIMATED 67 02/10/2021    GFRESTBLACK 77 02/10/2021    ALPHONSO 7.7 (L) 06/28/2022    ALPHONSO 9.3 02/10/2021        A1C RESULTS:  Lab Results   Component Value Date    A1C 8.0 (H) 04/20/2022    A1C 7.4 (H) 07/28/2020       THYROID RESULTS:  Lab Results   Component Value Date    TSH 1.44 11/01/2020       Procedures:   US PIYUSH DOPPLER WITH EXERCISE BILATERAL  PROCEDURE DATE: 7/12/2022 3:41 PM      HISTORY:  History of bilateral lower extremity PAD.; PAD (peripheral  artery disease) (H)     COMPARISON: Prior ABIs of 10/27/21     FINDINGS:  RIGHT  Ankle (PT): Index: 0.81  Ankle (DP): Index: non-compressible  with monophasic waveforms     Digit: Index: 0.00, with moderate-amplitude waveforms after exercise;  toe pressures were not obtainable and initial toe waveforms were flat     Exercise: PYIUSH of 0.89, normal response     LEFT  Ankle (PT): Index: 0.46  Ankle (DP): Index: 0.35  with monophasic waveforms     Digit: Index: 0.00, with flat waveforms     Exercise: PIYUSH of 0.45, normal response     Mr. Paige was able to walk 5.0 minutes at 1.5 mph at a 10% incline  without symptoms.                                                                       IMPRESSION:  1.  Right PIYUSH 0.81, exercise 0.89, TBI 0.00. Moderate arterial  disease  without exercise-induced exacerbation; waveforms would suggest  arterial disease presumably at an inflow level. Toe pressures were  previously obtained in 2021, suggesting possible progression of  disease.  2.  Left PIYUSH 0.46, exercise 0.45, TBI 0.00. Moderate-severe arterial  disease without exercise-induced exacerbation.      BERNA WANG MD             Assessment and Plan:     1. PAD (peripheral artery disease) (H) bilateral severe    Multiple atherosclerotic risk factors poorly controlled former smoker poorly controlled diabetes blood pressure is not well controlled and unable to tolerate multiple medications previously prescribed and offered he stopped Xarelto and he stopped Pletal and also says he stopped taking Crestor as well and taking over-the-counter blood flow medication which contains L-arginine nitric oxide etc..  He is having burning sensation of the foot  He underwent angiogram in March 2022 subsequently seen and evaluated by  reviewed previous imaging studies and evaluation.  Unclear if he is a candidate for any revascularization  No foot ulcers or leg ulcers  Unable to palpate pulses    I will obtain PIYUSH with exercise if not TBI  Arrange arterial pump therapy  Suggested patient to take the Crestor and continue aspirin  Tight control of the diabetes and blood pressure  Walk as much as he can  After the test results and if he is agreeable we will arrange PAD exercise program    Phone visit few days after imaging studies and office visit in 6 months    - US PIYUSH Doppler with Exercise Bilateral; Future    2. Carotid stenosis, left 50-69 %  Asymptomatic optimize risk factors and arrange bilateral carotid ultrasound    - US Carotid Bilateral; Future    3. Diabetic peripheral neuropathy (H)  Recent A1c 8.0 suggested patient to work with the primary MD for tight control of the diabetes goal is less than 7    4. Hyperlipidemia LDL goal <70  When he was taking Crestor few months ago  LDL was reasonable range but he says he stopped taking suggest him to restart and repeat lipid panel in 3 to 4 months    5. Benign essential hypertension  Poorly controlled avoid NSAIDs take medications as prescribed  Monitor blood pressure at home if needed adjust the meds       60 minutes spent on the date of the encounter doing chart review, history and exam, documentation, and further activities as noted above.  He is new to me reviewed extensive imaging studies and previous evaluation in the epic and updated chart  AVS with written instructions given    Thank you for the consultation !  This note was dictated by utilizing Dragon software    Copy of this note to primary care physician and referring physician      Felisa Wong MD, FAHA, FSVM, FNLA, FACP  Vascular medicine  Clinical hypertension specialist  Clinical lipidologist

## 2022-10-07 NOTE — PATIENT INSTRUCTIONS
Take medications as prescribed    Will arrange PIYUSH and carotid US     Will try to Arrange Arterail pump therapy     Tight control of diabetes     Follow up in 6 months

## 2022-10-12 ENCOUNTER — TELEPHONE (OUTPATIENT)
Dept: OTHER | Facility: CLINIC | Age: 67
End: 2022-10-12

## 2022-10-12 NOTE — TELEPHONE ENCOUNTER
Follow-up to 10/7/22      Bilateral carotid ultrasound    PIYUSH with exercise & PPG    Telephone visit one week after imaging.

## 2022-10-13 NOTE — TELEPHONE ENCOUNTER
Future Appointments   Date Time Provider Department Center   10/18/2022  2:20 PM [facility]   [facility]   10/20/2022  3:30 PM Felisa Wong MD [facility]

## 2022-10-20 ENCOUNTER — HOSPITAL ENCOUNTER (OUTPATIENT)
Dept: ULTRASOUND IMAGING | Facility: CLINIC | Age: 67
Discharge: HOME | End: 2022-10-20
Attending: INTERNAL MEDICINE
Payer: MEDICARE

## 2022-10-20 DIAGNOSIS — I73.9 PAD (PERIPHERAL ARTERY DISEASE) (H): ICD-10-CM

## 2022-10-20 DIAGNOSIS — I65.22 CAROTID STENOSIS, LEFT: ICD-10-CM

## 2022-10-20 PROCEDURE — 93880 EXTRACRANIAL BILAT STUDY: CPT

## 2022-10-20 PROCEDURE — 93924 LWR XTR VASC STDY BILAT: CPT

## 2022-10-25 ENCOUNTER — VIRTUAL VISIT (OUTPATIENT)
Dept: OTHER | Facility: CLINIC | Age: 67
End: 2022-10-25
Attending: INTERNAL MEDICINE
Payer: MEDICARE

## 2022-10-25 DIAGNOSIS — E78.5 HYPERLIPIDEMIA LDL GOAL <70: ICD-10-CM

## 2022-10-25 DIAGNOSIS — I10 BENIGN ESSENTIAL HYPERTENSION: ICD-10-CM

## 2022-10-25 DIAGNOSIS — I73.9 PAD (PERIPHERAL ARTERY DISEASE) (H): Primary | ICD-10-CM

## 2022-10-25 DIAGNOSIS — I65.22 CAROTID STENOSIS, LEFT: ICD-10-CM

## 2022-10-25 DIAGNOSIS — E11.42 DIABETIC PERIPHERAL NEUROPATHY (H): ICD-10-CM

## 2022-10-25 PROCEDURE — 99214 OFFICE O/P EST MOD 30 MIN: CPT | Mod: 95 | Performed by: INTERNAL MEDICINE

## 2022-10-25 NOTE — PROGRESS NOTES
Rachell is a 66 year old who is being evaluated via a billable telephone visit.      What phone number would you like to be contacted at? [phone]  How would you like to obtain your AVS? Lupis New    Provider visit note:    Chief complaint:  Follow up visit  Review of imaging studies   He says BS and BP better now     History of present illness:    Rachell Paige is a 66 year old very pleasant male with history of type 2 diabetes mellitus with neuropathy, hypertension, hyperlipidemia, PAD did not tolerate multiple medications in the past previously seen and evaluated by Dr. Moreno and also followed by Dr. Parson experiencing burning sensation of the feet for quite some time.  He has a history of left foot TMA amputation.   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  He quit smoking many years ago smoked almost a pack a day for many years  He underwent PIYUSH/TBI in July 2022 results as delineated below'        He also has history of carotid disease due for carotid ultrasound denies any TIA-like symptoms or strokelike symptoms  He started taking over-the-counter blood flow medication which contains L-arginine and nitric oxide ? Etc       He Is new to me reviewed available extensive records in the epic and updated chart     On 10 March 2022 he underwent transright femoral aortoiliac arteriogram with right lower extremity arteriogram.  He was found to have diffuse disease of the superficial femoral artery with occlusion of anterior and posterior tibial arteries.  Proximal right peroneal artery had moderate stenosis.  Attempt was made to cross and recanalize the right anterior tibial artery but was unsuccessful.  Left lower extremity arteriogram showed mild diffuse disease in the superficial femoral artery with occlusion of the anterior and posterior tibial  arteries.  Peroneal artery was patent.    He underwent PIYUSH with exercise as delineated below and carotid US     Review of systems: Reviewed all 12 point review of systems as per HPI otherwise unremarkable    Physical exam:( no physical exam done this is virtual visit)    Reviewed recent laboratory tests, imaging studies in the epic and updated chart    BILATERAL CAROTID ULTRASOUND   10/20/2022 2:51 PM      HISTORY: Carotid stenosis, left     COMPARISON: None.     RIGHT CAROTID FINDINGS:  No significant plaque  Right ICA PSV:  114  cm/sec.  Right ICA EDV:  32 cm/sec.  Right ICA/CCA PSV Ratio:  0.8    These indicate less than 50% diameter stenosis of the right ICA.    Right Vertebral: Antegrade flow.   Right ECA: Antegrade flow.      LEFT CAROTID FINDINGS:  No significant plaque  Left ICA PSV:  112  cm/sec.  Left ICA EDV:  25 cm/sec.  Left ICA/CCA PSV Ratio:  0.9    These indicate less than 50% diameter stenosis of the left ICA.    Left Vertebral: Antegrade flow.   Left ECA: Antegrade flow.      Causes of Decreased Accuracy:  Vessel depth and body habitus.                                                                      IMPRESSION:    1. Less than 50% diameter stenosis of the right ICA relative to the  distal ICA diameter.   2. Less than 50% diameter stenosis of the left ICA relative to the  distal ICA diameter.      JOVANY THOMAS, DO      US PIYUSH DOPPLER WITH EXERCISE BILATERAL   10/20/2022 2:50 PM      HISTORY: With toe pressures if unable to exercise. PAD (peripheral  artery disease) (H).     COMPARISON: 7/12/2022     FINDINGS:  Right PIYUSH:   PT: 0.73.  DP: Noncompressible vessels     Left PIYUSH:   PT: 0.5.  DP: 0.42      Right Digital brachial index: 0.2.  Left Digital Brachial index: Toe amputation     Waveforms: Monophasic bilaterally     Exercise: The patient was exercised on a treadmill at 0.5 mph at a 10%  incline for 5 minutes total. Asymptomatic with exercise.     Right exercise PIYUSH: 1.02.  Left  exercise PIYUSH: 0.70                                                                      IMPRESSION:   1. Right resting PIYUSH shows moderate arterial insufficiency which  normalizes with exercise.  2. Left PIYUSH shows moderate arterial insufficiency which slightly  improves with exercise.     PIYUSH CRITERIA:  >1.4 NC  0.95-1.4 Normal  0.90 - 0.94 Mild  0.5 - 0.89 Moderate  0.2 - 0.49 Severe  <0.2 Critical     JOVANY THOMAS,           Assessment and plan:    1. PAD (peripheral artery disease) (H) bilateral  Moderate      Multiple atherosclerotic risk factors, former smoker poorly controlled diabetes blood pressure is not well controlled and unable to tolerate multiple medications previously prescribed and offered he stopped Xarelto and he stopped Pletal and also says he stopped taking Crestor as well and taking over-the-counter blood flow medication which contains L-arginine nitric oxide etc..    He underwent angiogram in March 2022 subsequently seen and evaluated by  reviewed previous imaging studies and evaluation.  Unclear if he is a candidate for any revascularization  No foot ulcers or leg ulcers  Unable to palpate pulses during last office visit     Reviewed PIYUSH as delineated above   walk as much as he can  and when he decides arrange PAD rehab   Suggested patient to take the Crestor and continue aspirin  Tight control of the diabetes and blood pressure         2. Carotid stenosis, left 50-69 % in 10/2021 now less than 50%   Asymptomatic optimize risk factors and arrange bilateral carotid ultrasound        3. Diabetic peripheral neuropathy (H)  Recent A1c 8.0 suggested patient to work with the primary MD for tight control of the diabetes goal is less than 7  Follow up with primary      4. Hyperlipidemia LDL goal <70  When he was taking Crestor few months ago LDL was reasonable range but he says he stopped taking suggest him to restart and repeat lipid panel in 3 to 4 months     5. Benign essential  hypertension  Poorly controlled during last office visit  avoid NSAIDs take medications as prescribed  Monitor blood pressure at home if needed adjust the meds         30 minutes spent on the date of the encounter doing chart review, history and, documentation, and further activities as noted above. reviewed imaging studies   AVS with written instructions given    Follow up in 6 months       This visit is being conducted as a virtual visit due to the emphasis on mitigation of the COVID-19 virus pandemic. The clinician has decided that the risk of an in-office visit outweighs the benefit for this patient.     Felisa Wong MD, FAHA, FSVM, FNLA, FACP  Vascular medicine  Clinical hypertension specialist  Clinical lipidologist

## 2022-10-25 NOTE — PATIENT INSTRUCTIONS
Your recent carotid ultrasound slightly improved compared to before    PIYUSH with exercise also improved    Continue tight control of the diabetes, blood pressure and take the cholesterol medication as prescribed and suggested    Walk as much as you can increase the walking distance and weekly basis and increase the speed    Follow-up in 6 months

## 2022-12-01 DIAGNOSIS — E11.9 TYPE 2 DIABETES MELLITUS WITH HEMOGLOBIN A1C GOAL OF 7.0%-8.0% (H): ICD-10-CM

## 2022-12-05 RX ORDER — METHYLPREDNISOLONE SODIUM SUCCINATE 125 MG/2ML
INJECTION, POWDER, FOR SOLUTION INTRAMUSCULAR; INTRAVENOUS
Qty: 100 EACH | Refills: 0 | Status: SHIPPED | OUTPATIENT
Start: 2022-12-05

## 2022-12-05 NOTE — TELEPHONE ENCOUNTER
Routing refill request to provider for review/approval because:  Patient needs to be seen because:  has not been seen over 6 months  Maranda MEHTA RN  [facility]

## 2023-01-01 NOTE — PLAN OF CARE
Summary: Chest pain/Re-covering from COVID  DATE & TIME: 10/27/2020 [id number]  Cognitive Concerns/ Orientation : A & O x4. Anxious. Many social stressors.   BEHAVIOR & AGGRESSION TOOL COLOR: Green   ABNL VS/O2: VSS on RA, except tachycardia at times (HR 100s-110s).   MOBILITY: IND in room  PAIN MANAGMENT: denies pain, reports chest pressure at times  DIET: Mod carb- ate well  BOWEL/BLADDER: Continent, up to bathroom. PRN miralax given  ABNL LAB/BG: Sodium 126, , 128  DRAIN/DEVICES: PIV SL  TELEMETRY RHYTHM: NSR- tachy at times  SKIN: WDL. Partial L foot amputee, WDL. R pinky toe amputee, WDL.   TESTS/PROCEDURES: CT of chest completed  D/C DAY/GOALS/PLACE: Pending   OTHER IMPORTANT INFO: Recent admit @ [facility] for positive COVID, reswabbed at admit, results pending. LS clear, SOB. ID following. Special precautions in place.     none of the above

## 2023-02-02 ENCOUNTER — HOSPITAL ENCOUNTER (EMERGENCY)
Facility: CLINIC | Age: 68
Discharge: HOME | End: 2023-02-02
Attending: EMERGENCY MEDICINE | Admitting: EMERGENCY MEDICINE
Payer: MEDICARE

## 2023-02-02 ENCOUNTER — APPOINTMENT (OUTPATIENT)
Dept: GENERAL RADIOLOGY | Facility: CLINIC | Age: 68
End: 2023-02-02
Attending: EMERGENCY MEDICINE
Payer: MEDICARE

## 2023-02-02 VITALS
SYSTOLIC BLOOD PRESSURE: 135 MMHG | TEMPERATURE: 97.7 F | DIASTOLIC BLOOD PRESSURE: 76 MMHG | RESPIRATION RATE: 18 BRPM | HEART RATE: 77 BPM | OXYGEN SATURATION: 89 %

## 2023-02-02 DIAGNOSIS — R07.89 CHEST PRESSURE: ICD-10-CM

## 2023-02-02 DIAGNOSIS — U07.1 INFECTION DUE TO 2019 NOVEL CORONAVIRUS: ICD-10-CM

## 2023-02-02 LAB
ANION GAP SERPL CALCULATED.3IONS-SCNC: 10 MMOL/L (ref 7–15)
BASOPHILS # BLD AUTO: 0 10E3/UL (ref 0–0.2)
BASOPHILS NFR BLD AUTO: 1 %
BUN SERPL-MCNC: 17.8 MG/DL (ref 8–23)
CALCIUM SERPL-MCNC: 9.1 MG/DL (ref 8.8–10.2)
CHLORIDE SERPL-SCNC: 103 MMOL/L (ref 98–107)
CREAT SERPL-MCNC: 1.2 MG/DL (ref 0.67–1.17)
DEPRECATED HCO3 PLAS-SCNC: 24 MMOL/L (ref 22–29)
EOSINOPHIL # BLD AUTO: 0.2 10E3/UL (ref 0–0.7)
EOSINOPHIL NFR BLD AUTO: 3 %
ERYTHROCYTE [DISTWIDTH] IN BLOOD BY AUTOMATED COUNT: 13.4 % (ref 10–15)
GFR SERPL CREATININE-BSD FRML MDRD: 67 ML/MIN/1.73M2
GLUCOSE BLDC GLUCOMTR-MCNC: 158 MG/DL (ref 70–99)
GLUCOSE BLDC GLUCOMTR-MCNC: 43 MG/DL (ref 70–99)
GLUCOSE SERPL-MCNC: 78 MG/DL (ref 70–99)
HCT VFR BLD AUTO: 47.4 % (ref 40–53)
HGB BLD-MCNC: 15.1 G/DL (ref 13.3–17.7)
IMM GRANULOCYTES # BLD: 0 10E3/UL
IMM GRANULOCYTES NFR BLD: 1 %
LYMPHOCYTES # BLD AUTO: 3.3 10E3/UL (ref 0.8–5.3)
LYMPHOCYTES NFR BLD AUTO: 51 %
MCH RBC QN AUTO: 28 PG (ref 26.5–33)
MCHC RBC AUTO-ENTMCNC: 31.9 G/DL (ref 31.5–36.5)
MCV RBC AUTO: 88 FL (ref 78–100)
MONOCYTES # BLD AUTO: 0.4 10E3/UL (ref 0–1.3)
MONOCYTES NFR BLD AUTO: 7 %
NEUTROPHILS # BLD AUTO: 2.3 10E3/UL (ref 1.6–8.3)
NEUTROPHILS NFR BLD AUTO: 37 %
NRBC # BLD AUTO: 0 10E3/UL
NRBC BLD AUTO-RTO: 0 /100
PLATELET # BLD AUTO: 351 10E3/UL (ref 150–450)
POTASSIUM SERPL-SCNC: 4.7 MMOL/L (ref 3.4–5.3)
RBC # BLD AUTO: 5.4 10E6/UL (ref 4.4–5.9)
SODIUM SERPL-SCNC: 137 MMOL/L (ref 136–145)
TROPONIN T SERPL HS-MCNC: 15 NG/L
TROPONIN T SERPL HS-MCNC: 16 NG/L
WBC # BLD AUTO: 6.2 10E3/UL (ref 4–11)

## 2023-02-02 PROCEDURE — 36415 COLL VENOUS BLD VENIPUNCTURE: CPT | Performed by: EMERGENCY MEDICINE

## 2023-02-02 PROCEDURE — 99285 EMERGENCY DEPT VISIT HI MDM: CPT | Mod: 25

## 2023-02-02 PROCEDURE — 80048 BASIC METABOLIC PNL TOTAL CA: CPT | Performed by: EMERGENCY MEDICINE

## 2023-02-02 PROCEDURE — 93005 ELECTROCARDIOGRAM TRACING: CPT

## 2023-02-02 PROCEDURE — 71046 X-RAY EXAM CHEST 2 VIEWS: CPT

## 2023-02-02 PROCEDURE — 84484 ASSAY OF TROPONIN QUANT: CPT | Performed by: EMERGENCY MEDICINE

## 2023-02-02 PROCEDURE — 85025 COMPLETE CBC W/AUTO DIFF WBC: CPT | Performed by: EMERGENCY MEDICINE

## 2023-02-02 PROCEDURE — 84484 ASSAY OF TROPONIN QUANT: CPT | Mod: 91 | Performed by: EMERGENCY MEDICINE

## 2023-02-02 PROCEDURE — 82962 GLUCOSE BLOOD TEST: CPT

## 2023-02-02 RX ORDER — BENZONATATE 200 MG/1
200 CAPSULE ORAL 3 TIMES DAILY PRN
Qty: 30 CAPSULE | Refills: 0 | Status: SHIPPED | OUTPATIENT
Start: 2023-02-02 | End: 2023-03-22

## 2023-02-02 ASSESSMENT — ENCOUNTER SYMPTOMS
VOMITING: 0
COUGH: 1
NAUSEA: 0
SHORTNESS OF BREATH: 0
FEVER: 0

## 2023-02-02 ASSESSMENT — ACTIVITIES OF DAILY LIVING (ADL)
ADLS_ACUITY_SCORE: 35
ADLS_ACUITY_SCORE: 35

## 2023-02-02 NOTE — ED PROVIDER NOTES
History     Chief Complaint:  Covid Concern       The history is provided by the patient.      Rachell Paige is a 67 year old male who presents with Covid-19 concern. The patient reports that two days ago he developed a cough. He states that he tested positive for Covid-19 and was prescribed Paxlovid. He presents to the ED today because he has experienced chest heaviness and a cough during the night and he desires a scan of his chest to look for pneumonia. He denies experiencing nausea, vomiting, fever, shortness of breath, or cough with expectorant.    Independent Historian:   None - Patient Only    Review of External Notes: reviewed nurse note from 2/1/23 about covid testing      ROS:  Review of Systems   Constitutional: Negative for fever.   Respiratory: Positive for cough. Negative for shortness of breath.    Gastrointestinal: Negative for nausea and vomiting.   All other systems reviewed and are negative.    Allergies:  Cilostazol  Aleve  Citalopram  Clopidogrel  Iodine  Naproxen  Sulfamethoxazole-Trimethoprim     Medications:  Aspirin 81 mg  Pletal  Benadryl  Gabapentin  Lantus  Lisinopril  Crestor  Ambien       Past Medical History:     Type II diabetes  Hypertension  Hyperlipidemia   Anxiety  Rash  Microalbuminuria  Onychomycosis   Dermatitis  Covid-19  Osteomyelitis   Anemia   Necrotic toes  PAD  Diabetic peripheral neuropathy   Lumbago    COPD  CKD stage 3   Peripheral gangrene   PVD     Past Surgical History:    Right fifth toe amputation   IR lower extremity angiogram bilateral       Family History:    Father: glaucoma      Social History:  The patient presents to the ED alone. He arrived via private vehicle.    Physical Exam     Patient Vitals for the past 24 hrs:   BP Temp Temp src Pulse Resp SpO2   02/02/23 1906 -- -- -- -- -- 99 %   02/02/23 1900 134/79 -- -- 77 -- 99 %   02/02/23 1830 132/75 -- -- 78 -- 98 %   02/02/23 1720 122/79 -- -- -- -- 99 %   02/02/23 1700 (!) 141/77 -- -- 73 -- --    02/02/23 1630 (!) 147/113 -- -- 86 -- 100 %   02/02/23 1615 123/74 -- -- -- -- 95 %   02/02/23 1523 (!) 152/90 97.7  F (36.5  C) Oral 81 18 100 %        Physical Exam  General: Resting on the bed.  Head: No obvious trauma to head.  Ears, Nose, Throat:  External ears normal.  Nose normal.    Eyes:  Conjunctivae clear.  Pupils are equal, round, and reactive.   Neck: Normal range of motion.  Neck supple.   CV: Regular rate and rhythm.  No murmurs.    2+ radial pulses.  Non tender chest wall.    Respiratory: Effort normal and breath sounds normal.  No wheezing or crackles.    Gastrointestinal: Soft.  No distension. There is no tenderness.  There is no rigidity, no rebound and no guarding.   Musculoskeletal: Non tender non edematous calves  Neuro: Alert. Moving all extremities appropriately.  Normal speech.    Skin: Skin is warm and dry.  No rash noted.     Emergency Department Course   ECG  ECG taken at 1803, ECG read at 1809  Normal sinus rhythm.   No change since 6/28/22  Rate 77 bpm. MA interval 172 ms. QRS duration 84 ms. QT/QTc 366/414 ms. P-R-T axes 66 -21 34.     Imaging:  XR Chest 2 Views   Final Result   IMPRESSION: Low lung volumes. Cardiomegaly. Pulmonary vascularity normal. Subsegmental atelectasis right base. No acute infiltrates or effusions.         Report per radiology    Laboratory:  Labs Ordered and Resulted from Time of ED Arrival to Time of ED Departure   BASIC METABOLIC PANEL - Abnormal       Result Value    Sodium 137      Potassium 4.7      Chloride 103      Carbon Dioxide (CO2) 24      Anion Gap 10      Urea Nitrogen 17.8      Creatinine 1.20 (*)     Calcium 9.1      Glucose 78      GFR Estimate 67     GLUCOSE BY METER - Abnormal    GLUCOSE BY METER POCT 43 (*)    GLUCOSE BY METER - Abnormal    GLUCOSE BY METER POCT 158 (*)    TROPONIN T, HIGH SENSITIVITY - Normal    Troponin T, High Sensitivity 16     TROPONIN T, HIGH SENSITIVITY - Normal    Troponin T, High Sensitivity 15     CBC WITH  PLATELETS AND DIFFERENTIAL    WBC Count 6.2      RBC Count 5.40      Hemoglobin 15.1      Hematocrit 47.4      MCV 88      MCH 28.0      MCHC 31.9      RDW 13.4      Platelet Count 351      % Neutrophils 37      % Lymphocytes 51      % Monocytes 7      % Eosinophils 3      % Basophils 1      % Immature Granulocytes 1      NRBCs per 100 WBC 0      Absolute Neutrophils 2.3      Absolute Lymphocytes 3.3      Absolute Monocytes 0.4      Absolute Eosinophils 0.2      Absolute Basophils 0.0      Absolute Immature Granulocytes 0.0      Absolute NRBCs 0.0          Procedures     Emergency Department Course & Assessments:       Interventions:  Medications - No data to display     Independent Interpretation (X-rays, CTs, rhythm strip):  I reviewed patient's chest x-ray.  Cardiomegaly seen on prior x-rays.  No obvious pneumonia, pneumothorax or effusion.    Consultations/Discussion of Management or Tests:  N/A       Social Determinants of Health affecting care:   Healthcare Access/Compliance.  Patient does not appear to comply with all of the scheduled testing that he has been provided in the past.      Assessments:   I obtained history and examined the patient as noted above.   I rechecked patient   I rechecked patient, discussed repeat troponin.      Disposition:  The patient was discharged to home.     Impression & Plan      Medical Decision Makin-year-old male presents with chest pressure in the setting of coughing and COVID-19 diagnosis.  Vital signs are reassuring.  Broad differentials pursued include not limited to pneumonia, pneumothorax, effusion, PE, ACS arrhythmia, myocarditis, pericarditis, chest wall pain, anemia, electrolyte, metabolic, renal dysfunction, etc.  CBC without leukocytosis or anemia.  BMP without acute electrolyte, metabolic or renal dysfunction.  Patient reports he took his normal diabetes meds this morning and felt that he was getting low.  His sugar was low at 43 but rebounded  nicely with p.o. intake.  Patient reports that he knows when he gets low with his sugars.  He had taken his medications without eating enough today.  Chest x-ray without acute pneumonia, pneumothorax or effusion.  Cardiomegaly noted although on review this appears to be present since 2021.  This does not appear to be acute.  I considered PE but no hypoxia, tachycardia or pleuritic chest pain.  I do not suspect PE.  EKG showed sinus rhythm, no acute ischemic changes.  No signs of arrhythmia.  Troponin x2 within normal limits.  Patient does carry cardiac risk factors but history does not appear consistent with ACS or arrhythmia.  This all started after patient was diagnosed with COVID and he had been coughing for several days.  This seems much more likely related to his COVID-19 infection.  We discussed possible observation versus outpatient management.  Given that he has a COVID-19 infection I do not think that stress testing would be appropriate at this time.  Rather if he continues to have any issues with pressure or chest pain when he has recovered from COVID-19 he can discuss stress testing with his provider.  His pain is resolved in the ER without intervention.  He is not hypoxic, not working hard to breathe, he is already on Paxlovid therapy.  I do not see any indication for hospitalization related to COVID-19.  We discussed return precautions close follow-up.  He feels comfortable plan and feels safe to discharge home.    Diagnosis:    ICD-10-CM    1. Infection due to 2019 novel coronavirus  U07.1       2. Chest pressure  R07.89            Discharge Medications:  New Prescriptions    BENZONATATE (TESSALON) 200 MG CAPSULE    Take 1 capsule (200 mg) by mouth 3 times daily as needed for cough        Scribe Disclosure:  I, Kim Day, am serving as a scribe at 4:26 PM on 2/2/2023 to document services personally performed by Jazzy Parkinson MD based on my observations and the provider's statements to  me.     2/2/2023   Jazzy Parkinson MD Bennett, Jennifer L, MD  02/02/23 1951

## 2023-02-02 NOTE — ED TRIAGE NOTES
"Pt here for breathing concerns. He was dx w/ covid 2 days ago and started on Paxlovid yesterday which he has been taking as prescribed. Endorses cough at night, but says he is sleeping well. Pt is concerned about his lungs and wants a chest x-ray \"to make sure everything is okay.\" Does have some chest pain from coughing. Pt notes in triage he is unvaccinated against covid.       "

## 2023-02-03 ENCOUNTER — TELEPHONE (OUTPATIENT)
Dept: FAMILY MEDICINE | Facility: CLINIC | Age: 68
End: 2023-02-03

## 2023-02-03 LAB
ATRIAL RATE - MUSE: 77 BPM
DIASTOLIC BLOOD PRESSURE - MUSE: NORMAL MMHG
INTERPRETATION ECG - MUSE: NORMAL
P AXIS - MUSE: 66 DEGREES
PR INTERVAL - MUSE: 172 MS
QRS DURATION - MUSE: 84 MS
QT - MUSE: 366 MS
QTC - MUSE: 414 MS
R AXIS - MUSE: -21 DEGREES
SYSTOLIC BLOOD PRESSURE - MUSE: NORMAL MMHG
T AXIS - MUSE: 34 DEGREES
VENTRICULAR RATE- MUSE: 77 BPM

## 2023-02-03 NOTE — DISCHARGE INSTRUCTIONS
Please return to the ED if you have active chest pain, shortness of breath, nausea, sweatiness, or other acute changes.  Please follow up with your PCP in the next 2-3 days.      As we discussed you need to follow-up closely with your primary care doctor to continue to manage your problems ongoing.    Discharge Instructions  Chest Pain    You have been seen today for chest pain or discomfort.  At this time, your provider has found no signs that your chest pain is due to a serious or life-threatening condition, (or you have declined more testing and/or admission to the hospital). However, sometimes there is a serious problem that does not show up right away. Your evaluation today may not be complete and you may need further testing and evaluation.     Generally, every Emergency Department visit should have a follow-up clinic visit with either a primary or a specialty clinic/provider. Please follow-up as instructed by your emergency provider today.  Return to the Emergency Department if:  Your chest pain changes, gets worse, starts to happen more often, or comes with less activity.  You are newly short of breath.  You get very weak or tired.  You pass out or faint.  You have any new symptoms, like fever, cough, numb legs, or you cough up blood.  You have anything else that worries you.    Until you follow-up with your regular provider, please do the following:  Take one aspirin daily unless you have an allergy or are told not to by your provider.  If a stress test appointment has been made, go to the appointment.  If you have questions, contact your regular provider.  Follow-up with your regular provider/clinic as directed; this is very important.    If you were given a prescription for medicine here today, be sure to read all of the information (including the package insert) that comes with your prescription.  This will include important information about the medicine, its side effects, and any warnings that you need  to know about.  The pharmacist who fills the prescription can provide more information and answer questions you may have about the medicine.  If you have questions or concerns that the pharmacist cannot address, please call or return to the Emergency Department.       Remember that you can always come back to the Emergency Department if you are not able to see your regular provider in the amount of time listed above, if you get any new symptoms, or if there is anything that worries you.        Discharge Instructions  COVID-19    COVID-19 is the disease caused by a new coronavirus. The virus spreads from person-to-person primarily by droplets when an infected person coughs or sneezes and the droplets are then breathed in by another person.    Symptoms of COVID-19  Many people have no symptoms or mild symptoms.  Symptoms usually appear within a few days, but up to 14-days, after contact with a person with COVID-19.    A mild COVID-19 illness is like a cold and can have fever, cough, sneezing, sore throat, tiredness, headache, and muscle pain.    A moderate COVID-19 illness might include shortness of breath or pneumonia on a chest x-ray.    A severe COVID-19 illness causes significant breathing problems such as low oxygen levels or more serious pneumonia.  Some patients experience loss of taste or smell which is somewhat unique to COVID-19.      Isolation and Quarantine  Testing is recommended for any person with symptoms that could be COVID-19 and often for those exposed to COVID-19. The best way to stop the spread of the virus is to avoid contact with others.    A close contact exposure is being within 6 feet of someone with COVID for 15 minutes.    Isolation refers to sick people staying away from people who are not sick.    A person in quarantine is limiting activity because they were exposed and are waiting to see if they might become sick.    If you test positive for COVID and have no symptoms, you should stay  home (isolation) for 5 full days after the day of the test. You should then wear a mask when around others for another 5 days.    If you test positive for COVID and have mild symptoms, you should stay home (isolation) for at least 5 days after your symptoms began. You can return to normal activities at that time, wearing a mask when around others, for another 5 days as long as your symptoms are improving/resolving and you have been without a fever for 24 hours (without using fever-reducing medicine).    If you test positive for COVID and have more than mild symptoms, you should stay home (isolation) for at least 10 days after your symptoms began. You can return to normal activities at that time as long as your symptoms are improving and you have been without a fever for 24 hours (without using fever-reducing medicine).  For example, if you have a fever and cough for 6 days, you need to stay home 4 more days with no fever for a total of 10 days. Or, if you have a fever and cough for 10 days, you need to stay home one more day with no fever for a total of 11 days.    If you were exposed to COVID and are not vaccinated (or it has been more than six months from your Pfizer or Moderna vaccine or two months from J&J vaccine), you should stay home (quarantine) for 5 days and then wear a mask around others for 5 additional days. A COVID test at day 5 is recommended.    If you were exposed to COVID and are vaccinated (had a booster, had two shots of Pfizer or Moderna vaccine in the last five months, or had J&J vaccine within two months), you do not need to quarantine but should wear a mask around others for 10 days and get a COVID test on day 5.    If you have symptoms but a negative test, you should stay at home until you have mild/improving symptoms and are without fever for 24 hours, using the same judgment you would for when it is safe to return to work/school from strep throat, influenza, or the common cold. If you  worsen, you should consider being re-evaluated.    If you are being tested for COVID because of symptoms and your test is pending, you should stay home until you know your test result.  More details on isolation and quarantine can be found on this website from the CDC:  https://www.cdc.gov/coronavirus/2019-ncov/your-health/quarantine-isolation.html    If I have COVID, how should I protect myself and others?    Do not go to work or school. Have a friend or relative do your shopping. Do not use public transportation (bus, train) or ridesharing (Lyft, Uber).    Separate yourself from other people in your home. As much as possible, you should stay in one room and away from other people in your home. Also, use a separate bathroom, if possible. Avoid handling pets or other animals while sick.     Wear a facemask if you need to be around other people and cover your mouth and nose with a tissue when you cough or sneeze.     Avoid sharing personal household items. You should not share dishes, drinking glasses, forks/knives/spoons, towels, or bedding with other people in your home. After using these items, they should be washed with soap and water. Clean parts of your home that are touched often (doorknobs, faucets, countertops, etc.) daily.     Wash your hands often with soap and water for at least 20 seconds or use an alcohol-based hand  containing at least 60% alcohol.     Avoid touching your face.    Treat your symptoms. You can take Acetaminophen (Tylenol) to treat body aches and fever as needed for comfort. Ibuprofen (Advil or Motrin) can be used as well if you still have symptoms after taking Tylenol. Drink fluids. Rest.    Watch for worsening symptoms such as shortness of breath/difficulty breathing or very severe weakness.    Employers/workplaces are being asked by the Centers for Disease Control (CDC) to not request notes/documentation for you to return to work or prove that you were ill. You may choose to  show your employer this paperwork. Also, repeat testing should not be required to return to work.    Exercise/Sports in rare cases, COVID could affect your heart in a way that makes exercise or participation in sports dangerous.  If you have a mild COVID illness (fever, cough, sore throat, and similar symptoms but no difficulty breathing or abnormalities of the lung): After your COVID symptoms have resolved, wait 14-days before returning to activity.  If you have more than a mild illness (meaning that you have problems with your breathing or lungs) or if you participate in competitive or strenuous activity or have a history of heart disease: Please see your primary doctor/provider prior to return to activity/competition.    COVID treatments such as antiviral and antibody medications are available. They are recommended for those patients who have a risk for developing more severe COVID illness. Importantly, the treatments must be started early in the illness (within 5-7 days, depending on which treatment). These treatments may have been considered today during your visit. If you have other questions, contact your primary doctor/clinic.     You can learn more about COVID treatments from the [facility]:  https://www.health.[facility].[place]/diseases/coronavirus/meds.html    Return to the Emergency Department if:    If you are developing worsening breathing, shortness of breath, or feel worse you should seek medical attention.  If you are uncertain, contact your health care provider/clinic. If you need emergency medical attention, call 911 and tell them you have been ill.

## 2023-02-03 NOTE — TELEPHONE ENCOUNTER
Patient Quality Outreach    Patient is due for the following:   Diabetes -  Eye Exam    Next Steps:   Schedule a office visit for Diabetic Eye Exam     Type of outreach:    Sent letter.      Questions for provider review:    None     Jonelle Marsh MA

## 2023-02-17 ENCOUNTER — TELEPHONE (OUTPATIENT)
Dept: OTHER | Facility: CLINIC | Age: 68
End: 2023-02-17
Payer: MEDICARE

## 2023-02-17 NOTE — TELEPHONE ENCOUNTER
[facility]    Who is the name of the provider?:  Dr. Wong    What is the location you see this provider at/preferred location?: [place]  Person calling / Facility: [facility]  Phone number:  [phone]   Nurse call back needed:  YES     Reason for call:   Patient states his neuropathy and circulation are worse.  Patient has more burning and pain that continues to get worse.    Pharmacy location:  N/A  Outside Imaging: Not Applicable   Can we leave a detailed message on this number?  YES

## 2023-02-20 NOTE — TELEPHONE ENCOUNTER
Please arrange for in clinic visit with Dr. Wong at next available.    Keyla Olvera RN BSN  [facility]  [phone]

## 2023-03-03 NOTE — TELEPHONE ENCOUNTER
Called patient and message states patient is not longer able to receive calls at this number.  No further attempts will be made to reach patient.

## 2023-03-22 ENCOUNTER — OFFICE VISIT (OUTPATIENT)
Dept: OTHER | Facility: CLINIC | Age: 68
End: 2023-03-22
Attending: INTERNAL MEDICINE
Payer: MEDICARE

## 2023-03-22 VITALS
HEART RATE: 91 BPM | DIASTOLIC BLOOD PRESSURE: 78 MMHG | BODY MASS INDEX: 31.18 KG/M2 | WEIGHT: 194 LBS | SYSTOLIC BLOOD PRESSURE: 153 MMHG | HEIGHT: 66 IN | OXYGEN SATURATION: 98 %

## 2023-03-22 DIAGNOSIS — E11.42 DIABETIC PERIPHERAL NEUROPATHY (H): ICD-10-CM

## 2023-03-22 DIAGNOSIS — I10 BENIGN ESSENTIAL HYPERTENSION: ICD-10-CM

## 2023-03-22 DIAGNOSIS — E78.5 HYPERLIPIDEMIA LDL GOAL <70: ICD-10-CM

## 2023-03-22 DIAGNOSIS — I65.22 CAROTID STENOSIS, LEFT: ICD-10-CM

## 2023-03-22 DIAGNOSIS — I73.9 PAD (PERIPHERAL ARTERY DISEASE) (H): Primary | ICD-10-CM

## 2023-03-22 PROCEDURE — 99215 OFFICE O/P EST HI 40 MIN: CPT | Performed by: INTERNAL MEDICINE

## 2023-03-22 PROCEDURE — G0463 HOSPITAL OUTPT CLINIC VISIT: HCPCS

## 2023-03-22 NOTE — PATIENT INSTRUCTIONS
Please monitor BP at home goal is less than 130/80 , if still elevated consider hydrochlorothiazide or chlorthalidone     Walk as much as you can    Follow up with primary     Plan for repeat PIYUSH with TBI and carotid US in October 2023 then visit     We will explore for the coverage of arterial pump through [employer]

## 2023-03-22 NOTE — PROGRESS NOTES
SUBJECTIVE:  Chief complaint:  Follow up visit  Review of imaging studies   He says BS and BP better now at home   DM with neuropathy and burning sensation      History of present illness:  Rachell Paige is a 67 year old very pleasant male with history of type 2 diabetes mellitus with neuropathy, hypertension, hyperlipidemia, PAD did not tolerate multiple medications in the past previously seen and evaluated by Dr. Moreno and also followed by Dr. Parson experiencing burning sensation of the feet for quite some time.  He has a history of left foot TMA amputation.   He is originally from [facility] and he was a  played for both [employer] and [employer] with repeat T2 motion related injuries on the shin with the scarring.  He quit smoking many years ago smoked almost a pack a day for many years  He underwent PIYUSH/TBI in oct  2022 results as delineated below'     He also has history of carotid disease due for carotid ultrasound denies any TIA-like symptoms or strokelike symptoms  He started taking over-the-counter blood flow medication which contains L-arginine and nitric oxide ? Etc     On 10 March 2022 he underwent transright femoral aortoiliac arteriogram with right lower extremity arteriogram.  He was found to have diffuse disease of the superficial femoral artery with occlusion of anterior and posterior tibial arteries.  Proximal right peroneal artery had moderate stenosis.  Attempt was made to cross and recanalize the right anterior tibial artery but was unsuccessful.  Left lower extremity arteriogram showed mild diffuse disease in the superficial femoral artery with occlusion of the anterior and posterior tibial arteries.  Peroneal artery was patent.  He has been following up with the vascular surgeon and primary care physician at the [facility] system  Recently underwent abdominal ultrasound no AAA and iliac arteries are patent  He did not tolerate Plavix and Pletal in the past  We tried to get  arterial pump for him during last visit coverage was an issue?   HISTORIES:  PROBLEM LIST:   Patient Active Problem List   Diagnosis     Type 2 diabetes mellitus with hemoglobin A1c goal of 7.0%-8.0% (H)     HTN, goal below 140/90     Hyperlipidemia LDL goal <100     Financial problems     Anxiety     Rash     Microalbuminuria     Onychomycosis     Dermatitis     Does not have health insurance     Atypical chest pain     Epigastric pain     Generalized muscle weakness     2019 novel coronavirus disease (COVID-19)     Shortness of breath     Hyponatremia     Chest pain, unspecified type     S/P amputation of lesser toe, right (H)     Dyspnea on exertion     Other fatigue     PAD (peripheral artery disease) (H)     Acute osteomyelitis of left foot (H)     Anemia     Depression, recurrent (H)     Diabetes mellitus type II, uncontrolled     Diabetic peripheral neuropathy (H)     Dislocation of PIP joint of finger     Fracture of middle or proximal phalanx of finger     Gangrene of digit     Lumbago     Necrotic toes (H)     Overweight     Partial nontraumatic amputation of left foot (H)     Preventative health care     Pure hypercholesterolemia     Toe osteomyelitis, left (H)     Ulcer of left lower extremity with fat layer exposed (H)     PAST MEDICAL HISTORY:  Past Medical History:   Diagnosis Date     Anxiety 02/23/2015     Dermatitis 04/18/2015     DM type 2, goal A1C 7-8 02/23/2015     Financial problems 02/23/2015     Glaucoma (increased eye pressure)      HTN, goal below 140/90 02/23/2015     Hyperlipidemia LDL goal <100 02/23/2015     Microalbuminuria 04/18/2015     Onychomycosis 04/18/2015     Rash 02/23/2015     PAST SURGICAL HISTORY:  Past Surgical History:   Procedure Laterality Date     AMPUTATE TOE(S) Right 12/31/2018    Procedure: Right fifth toe amputation;  Surgeon: Clark Lora DPM;  Location: [facility] OR     IR LOWER EXTREMITY ANGIOGRAM BILATERAL  3/10/2022     CURRENT MEDICATIONS:  Current  Outpatient Medications   Medication Sig Dispense Refill     ACCU-CHEK GUIDE test strip USE TO TEST BLOOD SUGAR TWO TIMES A DAY OR AS DIRECTED 100 strip 1     alcohol swab prep pads Use to swab area of injection/pedro pablo as directed. 120 each 6     aspirin 81 MG tablet Take 1 tablet (81 mg) by mouth daily 30 tablet OTC     blood glucose (ACCU-CHEK GUIDE) test strip 1 strip by In Vitro route 4 times daily Use to test blood sugar 3 -4  times daily or as directed. 400 strip 0     blood glucose calibration (NO BRAND SPECIFIED) solution To accompany: Blood Glucose Monitor Brands: per insurance. 1 Bottle 3     blood glucose monitoring (NO BRAND SPECIFIED) meter device kit Use to test blood sugar 4 times daily or as directed. Preferred blood glucose meter OR supplies to accompany: Blood Glucose Monitor Brands: per insurance. 1 kit 0     dorzolamide (TRUSOPT) 2 % ophthalmic solution Place 1 drop into both eyes 2 times daily 10 mL 0     gabapentin (NEURONTIN) 300 MG capsule TAKE 1 CAPSULE (300 MG) BY MOUTH AT BEDTIME 90 capsule 0     insulin aspart (NOVOLOG FLEXPEN) 100 UNIT/ML pen Inject 1-10 Units Subcutaneous 2 times daily (with meals) With lunch and dinner 3 mL 0     insulin glargine (LANTUS SOLOSTAR) 100 UNIT/ML pen Inject 50 Units Subcutaneous At Bedtime 15 mL 0     lisinopril (ZESTRIL) 20 MG tablet Take 1 tablet (20 mg) by mouth daily 30 tablet 11     Omega-3 Fatty Acids (OMEGA-3 FISH OIL) 1200 MG CAPS Take 1 capsule by mouth       rosuvastatin (CRESTOR) 10 MG tablet TAKE 1 TABLET (10 MG) BY MOUTH DAILY 90 tablet 0     thin (NO BRAND SPECIFIED) lancets Use with lanceting device. To accompany: Blood Glucose Monitor Brands: per insurance. 200 each 6     timolol maleate (TIMOPTIC) 0.5 % ophthalmic solution Place 1 drop into both eyes 2 times daily 5 mL 0     ULTICARE SHORT 31G X 8 MM insulin pen needle USE 3 PEN NEEDLES DAILY OR AS DIRECTED. 100 each 0     zolpidem (AMBIEN) 10 MG tablet Take 10 mg by mouth    "    ALLERGIES:  Allergies   Allergen Reactions     Cilostazol Headache     Weakness     Aleve      HA sweats     Citalopram Itching     Clopidogrel Nausea and Vomiting     Pt to restart on 11/25/15 to see again if he tolerates it or not. His sx were only GI. Not a true allergy     Iodine      Naproxen Itching     About 10 yrs ago, itching all over from taking Aleve  \"get sick and really sick\"       Sulfamethoxazole-Trimethoprim      Other reaction(s): Renal Failure       SOCIAL HISTORY:  Social History     Socioeconomic History     Marital status:      Spouse name: Not on file     Number of children: Not on file     Years of education: Not on file     Highest education level: Not on file   Occupational History     Not on file   Tobacco Use     Smoking status: Former     Types: Cigarettes     Quit date:      Years since quittin.2     Smokeless tobacco: Never   Substance and Sexual Activity     Alcohol use: No     Drug use: No     Sexual activity: Yes     Partners: Female   Other Topics Concern     Parent/sibling w/ CABG, MI or angioplasty before 65F 55M? Not Asked   Social History Narrative    Former  ([facility], [place])          Social Determinants of Health     Financial Resource Strain: Not on file   Food Insecurity: Not on file   Transportation Needs: Not on file   Physical Activity: Not on file   Stress: Not on file   Social Connections: Not on file   Intimate Partner Violence: Not on file   Housing Stability: Not on file     FAMILY HISTORY:  Family History   Problem Relation Age of Onset     Diabetes Other      LUNG DISEASE Mother      Glaucoma Father      Cerebrovascular Disease Son      Diabetes Son      Diabetes Daughter      REVIEW OF SYSTEMS:  General: No change in weight, sleep or appetite.  Normal energy.  No fever or chills  Eyes: Negative for vision changes or eye problems  ENT: No problems with ears, nose or throat.  No difficulty swallowing.  Resp: No " "coughing, wheezing or shortness of breath  CV: No chest pains or palpitations  GI: No nausea, vomiting,  heartburn, abdominal pain, diarrhea, constipation or change in bowel habits  : No urinary frequency or dysuria, bladder or kidney problems  Musculoskeletal: No significant muscle or joint pains  Neurologic: burning sensation of feet for years   Psychiatric: No problems with anxiety, depression or mental health  Heme/immune/allergy: No history of bleeding or clotting problems or anemia.  No allergies or immune system problems  Endocrine: No history of thyroid disease, diabetes or other endocrine disorders  Skin: No rashes,worrisome lesions or skin problems  Vascular: History of PAD and also peripheral neuropathy status post left TMA amputation  Burning sensation in the bottom of the feet  No foot ulcers leg ulcers  EXAM:  BP (!) 153/78 (BP Location: Right arm, Patient Position: Chair, Cuff Size: Adult Large)   Pulse 91   Ht 5' 6\" (1.676 m)   Wt 194 lb (88 kg)   SpO2 98%   BMI 31.31 kg/m    BMI: Body mass index is 31.31 kg/m .  In general, the patient is a pleasant male in no apparent distress.    HEENT: NC/AT.  PERRLA.  EOMI.  Sclerae white, not injected.  Nares clear.  Pharynx without erythema or exudate.  Dentition intact.    Neck: No adenopathy.  No thyromegaly. Carotids +2/2 bilaterally without bruits.  No jugular venous distension.   Heart: RRR. Normal S1, S2 splits physiologically. No murmur, rub, click, or gallop. The PMI is in the 5th ICS in the midclavicular line. There is no heave.    Lungs: CTA.  No ronchi, wheezes, rales.  No dullness to percussion.   Abdomen: Soft, nontender, nondistended. No organomegaly. No AAA.  No bruits.   Extremities: Unable to palpate DP/AT and PT pulses bilaterally  Palpable femoral pulses bilaterally  Left TMA healed well  No foot ulcers or leg ulcers  Healed scars on the shins bilaterally due to previous soccer related repetitive injuries     HISTORY: Carotid " stenosis, left     COMPARISON: None.     RIGHT CAROTID FINDINGS:  No significant plaque  Right ICA PSV:  114  cm/sec.  Right ICA EDV:  32 cm/sec.  Right ICA/CCA PSV Ratio:  0.8    These indicate less than 50% diameter stenosis of the right ICA.    Right Vertebral: Antegrade flow.   Right ECA: Antegrade flow.      LEFT CAROTID FINDINGS:  No significant plaque  Left ICA PSV:  112  cm/sec.  Left ICA EDV:  25 cm/sec.  Left ICA/CCA PSV Ratio:  0.9    These indicate less than 50% diameter stenosis of the left ICA.    Left Vertebral: Antegrade flow.   Left ECA: Antegrade flow.      Causes of Decreased Accuracy:  Vessel depth and body habitus.                                                                      IMPRESSION:    1. Less than 50% diameter stenosis of the right ICA relative to the  distal ICA diameter.   2. Less than 50% diameter stenosis of the left ICA relative to the  distal ICA diameter.      JOVANY THOMAS DO      US PIYUSH DOPPLER WITH EXERCISE BILATERAL   10/20/2022 2:50 PM      HISTORY: With toe pressures if unable to exercise. PAD (peripheral  artery disease) (H).     COMPARISON: 7/12/2022     FINDINGS:  Right PIYUSH:   PT: 0.73.  DP: Noncompressible vessels     Left PIYUSH:   PT: 0.5.  DP: 0.42      Right Digital brachial index: 0.2.  Left Digital Brachial index: Toe amputation     Waveforms: Monophasic bilaterally     Exercise: The patient was exercised on a treadmill at 0.5 mph at a 10%  incline for 5 minutes total. Asymptomatic with exercise.     Right exercise PIYUSH: 1.02.  Left exercise PIYUSH: 0.70                                                                      IMPRESSION:   1. Right resting PIYUSH shows moderate arterial insufficiency which  normalizes with exercise.  2. Left PIYUSH shows moderate arterial insufficiency which slightly  improves with exercise.     PIYUSH CRITERIA:  >1.4 NC  0.95-1.4 Normal  0.90 - 0.94 Mild  0.5 - 0.89 Moderate  0.2 - 0.49 Severe  <0.2 Critical     JOVANY THOMAS DO        A/P:  (I73.9) PAD (peripheral artery disease) (H)  (primary encounter diagnosis)  (I65.22) Carotid stenosis, left 50-69 % in 10/21 and in 10/2022 less than 50%   (E78.5) Hyperlipidemia LDL goal <70  (I10) Benign essential hypertension  (E11.42) Diabetic peripheral neuropathy (H)    For full details please see my initial consult note and subsequent office visit note in October 2022  This is a very pleasant 67-year-old -American male with multiple atherosclerotic risk factors poorly controlled diabetes with neuropathy and he has history of a peripheral arterial disease extensive evaluation done in the past and seen by Dr. Moreno then followed by currently following up at [facility] he underwent PIYUSH with TBI in October 2022 and also abdominal ultrasound in December 2022 no AAA and patent iliac vessels.   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  Tight control of the diabetes suggested monitor blood sugars at home watch diet and follow-up with the primary and get appropriate labs and adjustment of the medications  Walk as much as tolerates and increase the speed and distance and weekly basis  Monitor blood pressure at home if it is still elevated greater than 130 systolic consider through primary getting hydrochlorothiazide or chlorthalidone  Avoid NSAIDs  We will try again arterial pump through the bio tab for him given no revascularization options.  Will arrange PIYUSH with  TBI and bilateral carotid ultrasound in October then followed by virtual or office visit  This note was dictated by utilizing  Dragon software  Copy of this note to primary care physician    AVS with written instructions given    Felisa Wong MD, DEAN, FSSEDA, FNLA  Vascular Medicine  Clinical hypertension specialist  Clinical lipidologist

## 2023-03-22 NOTE — PROGRESS NOTES
"Patient is here to discuss follow up     BP (!) 153/78 (BP Location: Right arm, Patient Position: Chair, Cuff Size: Adult Large)   Pulse 91   Ht 5' 6\" (1.676 m)   Wt 194 lb (88 kg)   SpO2 98%   BMI 31.31 kg/m      Questions patient would like addressed today are: N/A.    Refills are needed: No    Has homecare services and agency name:  [facility]    "

## 2023-03-27 DIAGNOSIS — E11.9 TYPE 2 DIABETES MELLITUS WITH HEMOGLOBIN A1C GOAL OF 7.0%-8.0% (H): ICD-10-CM

## 2023-03-28 RX ORDER — METHYLPREDNISOLONE SODIUM SUCCINATE 125 MG/2ML
INJECTION, POWDER, FOR SOLUTION INTRAMUSCULAR; INTRAVENOUS
Refills: 0 | OUTPATIENT
Start: 2023-03-28

## 2023-05-22 DIAGNOSIS — E11.9 DIABETIC EYE EXAM (H): ICD-10-CM

## 2023-05-22 DIAGNOSIS — Z03.89 ENCOUNTER FOR OBSERVATION FOR OTHER SUSPECTED DISEASES AND CONDITIONS RULED OUT: ICD-10-CM

## 2023-05-22 DIAGNOSIS — Z01.00 DIABETIC EYE EXAM (H): ICD-10-CM

## 2023-05-22 DIAGNOSIS — H40.003 GLAUCOMA SUSPECT OF BOTH EYES: Primary | ICD-10-CM

## 2023-06-07 ENCOUNTER — OFFICE VISIT (OUTPATIENT)
Dept: OTHER | Facility: CLINIC | Age: 68
End: 2023-06-07
Attending: INTERNAL MEDICINE
Payer: MEDICARE

## 2023-06-07 VITALS
BODY MASS INDEX: 31.31 KG/M2 | SYSTOLIC BLOOD PRESSURE: 147 MMHG | WEIGHT: 194 LBS | HEART RATE: 85 BPM | DIASTOLIC BLOOD PRESSURE: 78 MMHG | OXYGEN SATURATION: 99 %

## 2023-06-07 DIAGNOSIS — I73.9 PAD (PERIPHERAL ARTERY DISEASE) (H): Primary | ICD-10-CM

## 2023-06-07 DIAGNOSIS — I65.22 CAROTID STENOSIS, LEFT: ICD-10-CM

## 2023-06-07 DIAGNOSIS — E78.5 HYPERLIPIDEMIA LDL GOAL <100: ICD-10-CM

## 2023-06-07 DIAGNOSIS — I10 BENIGN ESSENTIAL HYPERTENSION: ICD-10-CM

## 2023-06-07 DIAGNOSIS — E78.5 HYPERLIPIDEMIA LDL GOAL <70: ICD-10-CM

## 2023-06-07 DIAGNOSIS — E11.42 DIABETIC PERIPHERAL NEUROPATHY (H): ICD-10-CM

## 2023-06-07 PROCEDURE — 99215 OFFICE O/P EST HI 40 MIN: CPT | Performed by: INTERNAL MEDICINE

## 2023-06-07 PROCEDURE — G0463 HOSPITAL OUTPT CLINIC VISIT: HCPCS

## 2023-06-07 RX ORDER — ROSUVASTATIN CALCIUM 10 MG/1
10 TABLET, COATED ORAL DAILY
Qty: 90 TABLET | Refills: 3 | Status: SHIPPED | OUTPATIENT
Start: 2023-06-07

## 2023-06-07 NOTE — PATIENT INSTRUCTIONS
Continue current medications     Walk as much as you can and do exercise as tolerated     Follow up with Vascular surgeon as planned at [facility]     Per your request referral made to see cardiologist Dr. Ranjan Joshua     Take crestor 10 mg daily new Rx sent     Tight control of diabetes

## 2023-06-07 NOTE — PROGRESS NOTES
SUBJECTIVE:  Chief complaint:  Follow up visit  Review of imaging studies  Recently seen at [facility] underwent TBI    He says BS and BP better now at home   DM with neuropathy and burning sensation   He has been doing regular exercise 30 to 40 minutes 3 times a week working in the gym, doing stationary bike  He is also walking 30 minutes minutes on the treadmill  He wanted to see cardiologist requesting referral     History of present illness:  Rachell Paige is a 67 year old very pleasant male with history of type 2 diabetes mellitus with neuropathy, hypertension, hyperlipidemia, PAD did not tolerate multiple medications in the past previously seen and evaluated by Dr. Moreno and also followed by Dr. Parson experiencing burning sensation of the feet for quite some time.  He has a history of left foot TMA amputation.   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  He quit smoking many years ago smoked almost a pack a day for many years  He underwent PIYUSH/TBI in oct  2022 results as delineated below'     He also has history of carotid disease due for carotid ultrasound denies any TIA-like symptoms or strokelike symptoms  He started taking over-the-counter blood flow medication which contains L-arginine and nitric oxide ? Etc     On 10 March 2022 he underwent transright femoral aortoiliac arteriogram with right lower extremity arteriogram.  He was found to have diffuse disease of the superficial femoral artery with occlusion of anterior and posterior tibial arteries.  Proximal right peroneal artery had moderate stenosis.  Attempt was made to cross and recanalize the right anterior tibial artery but was unsuccessful.  Left lower extremity arteriogram showed mild diffuse disease in the superficial femoral artery with occlusion of the anterior and posterior tibial arteries.  Peroneal artery  was patent.  He has been following up with the vascular surgeon and primary care physician at the [facility] system  Recently underwent abdominal ultrasound no AAA and iliac arteries are patent  He did not tolerate Plavix and Pletal in the past  We tried to get arterial pump for him during last visit coverage was an issue?  He is requesting for another referral    HISTORIES:  PROBLEM LIST:   Patient Active Problem List   Diagnosis     Type 2 diabetes mellitus with hemoglobin A1c goal of 7.0%-8.0% (H)     HTN, goal below 140/90     Hyperlipidemia LDL goal <100     Financial problems     Anxiety     Rash     Microalbuminuria     Onychomycosis     Dermatitis     Does not have health insurance     Atypical chest pain     Epigastric pain     Generalized muscle weakness     2019 novel coronavirus disease (COVID-19)     Shortness of breath     Hyponatremia     Chest pain, unspecified type     S/P amputation of lesser toe, right (H)     Dyspnea on exertion     Other fatigue     PAD (peripheral artery disease) (H)     Acute osteomyelitis of left foot (H)     Anemia     Depression, recurrent (H)     Diabetes mellitus type II, uncontrolled     Diabetic peripheral neuropathy (H)     Dislocation of PIP joint of finger     Fracture of middle or proximal phalanx of finger     Gangrene of digit     Lumbago     Necrotic toes (H)     Overweight     Partial nontraumatic amputation of left foot (H)     Preventative health care     Pure hypercholesterolemia     Toe osteomyelitis, left (H)     Ulcer of left lower extremity with fat layer exposed (H)     PAST MEDICAL HISTORY:  Past Medical History:   Diagnosis Date     Anxiety 02/23/2015     Dermatitis 04/18/2015     DM type 2, goal A1C 7-8 02/23/2015     Financial problems 02/23/2015     Glaucoma (increased eye pressure)      HTN, goal below 140/90 02/23/2015     Hyperlipidemia LDL goal <100 02/23/2015     Microalbuminuria 04/18/2015     Onychomycosis 04/18/2015     Rash 02/23/2015     PAST  SURGICAL HISTORY:  Past Surgical History:   Procedure Laterality Date     AMPUTATE TOE(S) Right 12/31/2018    Procedure: Right fifth toe amputation;  Surgeon: Clark Lora DPM;  Location: [facility] OR     IR LOWER EXTREMITY ANGIOGRAM BILATERAL  3/10/2022     IR LOWER EXTREMITY ANGIOGRAM LEFT  1/22/2016     CURRENT MEDICATIONS:  Current Outpatient Medications   Medication Sig Dispense Refill     ACCU-CHEK GUIDE test strip USE TO TEST BLOOD SUGAR TWO TIMES A DAY OR AS DIRECTED 100 strip 1     alcohol swab prep pads Use to swab area of injection/pedro pablo as directed. 120 each 6     aspirin 81 MG tablet Take 1 tablet (81 mg) by mouth daily 30 tablet OTC     blood glucose (ACCU-CHEK GUIDE) test strip 1 strip by In Vitro route 4 times daily Use to test blood sugar 3 -4  times daily or as directed. 400 strip 0     blood glucose calibration (NO BRAND SPECIFIED) solution To accompany: Blood Glucose Monitor Brands: per insurance. 1 Bottle 3     blood glucose monitoring (NO BRAND SPECIFIED) meter device kit Use to test blood sugar 4 times daily or as directed. Preferred blood glucose meter OR supplies to accompany: Blood Glucose Monitor Brands: per insurance. 1 kit 0     dorzolamide (TRUSOPT) 2 % ophthalmic solution Place 1 drop into both eyes 2 times daily 10 mL 0     gabapentin (NEURONTIN) 300 MG capsule TAKE 1 CAPSULE (300 MG) BY MOUTH AT BEDTIME 90 capsule 0     insulin aspart (NOVOLOG FLEXPEN) 100 UNIT/ML pen Inject 1-10 Units Subcutaneous 2 times daily (with meals) With lunch and dinner 3 mL 0     insulin glargine (LANTUS SOLOSTAR) 100 UNIT/ML pen Inject 50 Units Subcutaneous At Bedtime 15 mL 0     lisinopril (ZESTRIL) 20 MG tablet Take 1 tablet (20 mg) by mouth daily 30 tablet 11     Omega-3 Fatty Acids (OMEGA-3 FISH OIL) 1200 MG CAPS Take 1 capsule by mouth       rosuvastatin (CRESTOR) 10 MG tablet TAKE 1 TABLET (10 MG) BY MOUTH DAILY 90 tablet 0     thin (NO BRAND SPECIFIED) lancets Use with lanceting device. To  "accompany: Blood Glucose Monitor Brands: per insurance. 200 each 6     timolol maleate (TIMOPTIC) 0.5 % ophthalmic solution Place 1 drop into both eyes 2 times daily 5 mL 0     ULTICARE SHORT 31G X 8 MM insulin pen needle USE 3 PEN NEEDLES DAILY OR AS DIRECTED. 100 each 0     zolpidem (AMBIEN) 10 MG tablet Take 10 mg by mouth       ALLERGIES:  Allergies   Allergen Reactions     Cilostazol Headache     Weakness     Aleve      HA sweats     Citalopram Itching     Clopidogrel Nausea and Vomiting     Pt to restart on 11/25/15 to see again if he tolerates it or not. His sx were only GI. Not a true allergy     Iodine      Naproxen Itching     About 10 yrs ago, itching all over from taking Aleve  \"get sick and really sick\"       Sulfamethoxazole-Trimethoprim      Other reaction(s): Renal Failure       SOCIAL HISTORY:  Social History     Socioeconomic History     Marital status:      Spouse name: Not on file     Number of children: Not on file     Years of education: Not on file     Highest education level: Not on file   Occupational History     Not on file   Tobacco Use     Smoking status: Former     Types: Cigarettes     Quit date:      Years since quittin.4     Smokeless tobacco: Never   Vaping Use     Vaping status: Not on file   Substance and Sexual Activity     Alcohol use: No     Drug use: No     Sexual activity: Yes     Partners: Female   Other Topics Concern     Parent/sibling w/ CABG, MI or angioplasty before 65F 55M? Not Asked   Social History Narrative    Former  ([place], [place])          Social Determinants of Health     Financial Resource Strain: Low Risk  (10/9/2020)    Overall Financial Resource Strain (CARDIA)      Difficulty of Paying Living Expenses: Not hard at all   Food Insecurity: No Food Insecurity (10/9/2020)    Hunger Vital Sign      Worried About Running Out of Food in the Last Year: Never true      Ran Out of Food in the Last Year: Never true "   Transportation Needs: No Transportation Needs (10/9/2020)    PRAPARE - Transportation      Lack of Transportation (Medical): No      Lack of Transportation (Non-Medical): No   Physical Activity: Not on file   Stress: No Stress Concern Present (10/9/2020)    [employer] [place] of Occupational [employer] - Occupational Stress Questionnaire      Feeling of Stress : Not at all   Social Connections: Unknown (10/9/2020)    Social Connection and Isolation Panel [NHANES]      Frequency of Communication with Friends and Family: Not on file      Frequency of Social Gatherings with Friends and Family: More than three times a week      Attends Jewish Services: Not on file      Active Member of Clubs or Organizations: Not on file      Attends Club or Organization Meetings: Not on file      Marital Status:    Intimate Partner Violence: Not on file   Housing Stability: Not on file     FAMILY HISTORY:  Family History   Problem Relation Age of Onset     Diabetes Other      LUNG DISEASE Mother      Glaucoma Father      Cerebrovascular Disease Son      Diabetes Son      Diabetes Daughter      REVIEW OF SYSTEMS:  General: No change in weight, sleep or appetite.  Normal energy.  No fever or chills  Eyes: Negative for vision changes or eye problems  ENT: No problems with ears, nose or throat.  No difficulty swallowing.  Resp: No coughing, wheezing or shortness of breath  CV: No chest pains or palpitations  GI: No nausea, vomiting,  heartburn, abdominal pain, diarrhea, constipation or change in bowel habits  : No urinary frequency or dysuria, bladder or kidney problems  Musculoskeletal: No significant muscle or joint pains  Neurologic: burning sensation of feet for years   Psychiatric: No problems with anxiety, depression or mental health  Heme/immune/allergy: No history of bleeding or clotting problems or anemia.  No allergies or immune system problems  Endocrine: No history of thyroid disease, diabetes or other endocrine  disorders  Skin: No rashes,worrisome lesions or skin problems  Vascular: History of PAD and also peripheral neuropathy status post left TMA amputation  Burning sensation in the bottom of the feet  No foot ulcers leg ulcers  EXAM:  BP (!) 147/78 (BP Location: Right arm, Patient Position: Chair, Cuff Size: Adult Regular)   Pulse 85   Wt 194 lb (88 kg)   SpO2 99%   BMI 31.31 kg/m    BMI: Body mass index is 31.31 kg/m .  In general, the patient is a pleasant male in no apparent distress.    HEENT: NC/AT.  PERRLA.  EOMI.  Sclerae white, not injected.  Nares clear.  Pharynx without erythema or exudate.  Dentition intact.    Neck: No adenopathy.  No thyromegaly. Carotids +2/2 bilaterally without bruits.  No jugular venous distension.   Heart: RRR. Normal S1, S2 splits physiologically. No murmur, rub, click, or gallop. The PMI is in the 5th ICS in the midclavicular line. There is no heave.    Lungs: CTA.  No ronchi, wheezes, rales.  No dullness to percussion.   Abdomen: Soft, nontender, nondistended. No organomegaly. No AAA.  No bruits.   Extremities: Unable to palpate DP/AT and PT pulses bilaterally  Palpable femoral pulses bilaterally  Left TMA healed well  No foot ulcers or leg ulcers  Healed scars on the shins bilaterally due to previous soccer related repetitive injuries     HISTORY: Carotid stenosis, left     COMPARISON: None.     RIGHT CAROTID FINDINGS:  No significant plaque  Right ICA PSV:  114  cm/sec.  Right ICA EDV:  32 cm/sec.  Right ICA/CCA PSV Ratio:  0.8    These indicate less than 50% diameter stenosis of the right ICA.    Right Vertebral: Antegrade flow.   Right ECA: Antegrade flow.      LEFT CAROTID FINDINGS:  No significant plaque  Left ICA PSV:  112  cm/sec.  Left ICA EDV:  25 cm/sec.  Left ICA/CCA PSV Ratio:  0.9    These indicate less than 50% diameter stenosis of the left ICA.    Left Vertebral: Antegrade flow.   Left ECA: Antegrade flow.      Causes of Decreased Accuracy:  Vessel depth and body  habitus.                                                                      IMPRESSION:    1. Less than 50% diameter stenosis of the right ICA relative to the  distal ICA diameter.   2. Less than 50% diameter stenosis of the left ICA relative to the  distal ICA diameter.      JOVANY THOMAS DO      US PIYUSH DOPPLER WITH EXERCISE BILATERAL   10/20/2022 2:50 PM      HISTORY: With toe pressures if unable to exercise. PAD (peripheral  artery disease) (H).     COMPARISON: 2022     FINDINGS:  Right PIYUSH:   PT: 0.73.  DP: Noncompressible vessels     Left PIYUSH:   PT: 0.5.  DP: 0.42      Right Digital brachial index: 0.2.  Left Digital Brachial index: Toe amputation     Waveforms: Monophasic bilaterally     Exercise: The patient was exercised on a treadmill at 0.5 mph at a 10%  incline for 5 minutes total. Asymptomatic with exercise.     Right exercise PIYUSH: 1.02.  Left exercise PIYUSH: 0.70                                                                      IMPRESSION:   1. Right resting PIUYSH shows moderate arterial insufficiency which  normalizes with exercise.  2. Left PIYUSH shows moderate arterial insufficiency which slightly  improves with exercise.     PIYUSH CRITERIA:  >1.4 NC  0.95-1.4 Normal  0.90 - 0.94 Mild  0.5 - 0.89 Moderate  0.2 - 0.49 Severe  <0.2 Critical     JOVANY THOMAS DO   VASCULAR ULTRASOUND REPORT     JOSELINE HERNANDEZ   MRN:    [id number] Accession#:   [id number]   :    1955  Study Date:   2023 2:00:00 PM   Age:    67 years   Tech:   Gender: M          Referring MD: JOHN LEGGETT       Site: [facility]     Study performed:      Lower extremity TBI, (right).   Indication for study: LE pain/numbness   Study Quality:        Good       TECHNIQUE:   Lower/upper extremity arteries were examined per exam protocol by duplex ultrasound, color-flow and spectral Doppler. Peak systolic velocities (PSV), Doppler waveform quality, velocity ratios and vessel size in cm, were documented at  protocol specific sites. Physiologic data including segmental pressures, ankle/brachial index (PIYUSH), digit PPG recordings, laser Doppler flowmetry, transcutaneous oximetry, and digit temperatures were documented at sites per exam protocol and test requirements.     IMPRESSION:    1. Toe-brachial index is severely reduced on the right at 0.28.     COMPARISON:   Compared to prior study 5/24/2022, there is no significant change.     FINDINGS:   Right TBI severely reduced at 0.28.   Right toe/brachial index indicates severe range.     Pressures   +-----+------------+--------+-----------+        RIGHT (mmHg)        LEFT (mmHg)   +-----+------------+--------+-----------+   Index    155     Brachial    147       +-----+------------+--------+-----------+   0.28      44     Digit 1               +-----+------------+--------+-----------+       Anders Schafre MD.     Electronically signed on 4/27/2023 5:01:48 PM     This study was performed and interpreted by a service accredited by the Intersocietal Accreditation Commission (IAC/Vascular), www.intersocietal.org/vascular     Report generated by [employer].        A/P:  (I73.9) PAD (peripheral artery disease) (H)  (primary encounter diagnosis)  (I65.22) Carotid stenosis, left 50-69 % in 10/21 and in 10/2022 less than 50%   (E78.5) Hyperlipidemia LDL goal <70  (I10) Benign essential hypertension  (E11.42) Diabetic peripheral neuropathy (H)    For full details please see my initial consult note and subsequent office visits  This is a very pleasant 67-year-old -American male with multiple atherosclerotic risk factors poorly controlled diabetes with neuropathy and he has history of a peripheral arterial disease extensive evaluation done in the past and seen by Dr. Moreno then followed by currently following up at [facility] system he underwent PIYUSH with TBI in October 2022 and also abdominal ultrasound in December 2022 no AAA and patent iliac vessels.    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  Tight control of the diabetes suggested monitor blood sugars at home watch diet and follow-up with the primary and get appropriate labs and adjustment of the medications  Walk as much as tolerates and increase the speed and distance and weekly basis  Monitor blood pressure at home if it is still elevated greater than 130 systolic consider through primary getting hydrochlorothiazide or chlorthalidone  Avoid NSAIDs  We will try again arterial pump through the bio tab for him given no revascularization options.  Follow up with vascular surgeon at [facility] as planned  He requested seeing cardiologist at [facility] , referral done .  Previously seen by Dr. Forrest Joshua  This note was dictated by utilizing  Dragon software  Copy of this note to primary care physician    SHAHEED with written instructions given  40 minutes spent on the date of the encounter doing chart review, review of recent outside imaging studies, history, exam, documentation and addressed above-mentioned multiple issues  He had a lot of questions and all of them were answered    Return to clinic in 6 months    Felisa Wong MD, DEAN, FS, FNLA  Vascular Medicine  Clinical hypertension specialist  Clinical lipidologist

## 2023-06-07 NOTE — PROGRESS NOTES
[facility]        Patient is here for a  follow up.      Pt is currently taking Aspirin and Statin.    BP (!) 147/78 (BP Location: Right arm, Patient Position: Chair, Cuff Size: Adult Regular)   Pulse 85   Wt 194 lb (88 kg)   SpO2 99%   BMI 31.31 kg/m      The provider has been notified that the patient has no concerns.     Questions patient would like addressed today are: N/A.    Refills are needed: N/A    Has homecare services and agency name:  Milagros Santos MA

## 2023-06-15 NOTE — TELEPHONE ENCOUNTER
Please abstract the following data from this visit with this patient into the appropriate field in Epic:    Tests that can be patient reported without a hard copy:    Eye exam with ophthalmology on this date: 10/22/2022 Exam Location: [facility] - Dr. Shakeel Dunham MD.     Other Tests found in the patient's chart through Chart Review/Care Everywhere:    Note to Abstraction: If this section is blank, no results were found via Chart Review/Care Everywhere.    Jonelle DOUGHERTY MA on 6/15/2023 at 10:30 AM

## 2023-07-12 ENCOUNTER — TELEPHONE (OUTPATIENT)
Dept: OTHER | Facility: CLINIC | Age: 68
End: 2023-07-12
Payer: MEDICARE

## 2023-07-12 NOTE — TELEPHONE ENCOUNTER
[facility]    Who is the name of the provider?:  Judith    What is the location you see this provider at/preferred location?: [place]  Person calling / Facility: Rachell Paige  Phone number:  [phone]  Nurse call back needed:  NO     Reason for call:  Patient asking for return visit to discuss worsening BLE circulatory concerns.    Pharmacy location:  n/a  Outside Imaging: n/a   Can we leave a detailed message on this number?  YES

## 2023-07-12 NOTE — TELEPHONE ENCOUNTER
Relayed Dr. Wong's instructions to patient.    Keyla Olvera RN BSN  [facility]  [phone]

## 2023-10-16 ENCOUNTER — OFFICE VISIT (OUTPATIENT)
Dept: URGENT CARE | Facility: URGENT CARE | Age: 68
End: 2023-10-16
Payer: MEDICARE

## 2023-10-16 VITALS
DIASTOLIC BLOOD PRESSURE: 66 MMHG | TEMPERATURE: 97.6 F | OXYGEN SATURATION: 98 % | WEIGHT: 193.5 LBS | HEART RATE: 80 BPM | SYSTOLIC BLOOD PRESSURE: 141 MMHG | BODY MASS INDEX: 31.23 KG/M2

## 2023-10-16 DIAGNOSIS — E11.9 TYPE 2 DIABETES MELLITUS WITHOUT COMPLICATION, WITH LONG-TERM CURRENT USE OF INSULIN (H): Primary | ICD-10-CM

## 2023-10-16 DIAGNOSIS — T14.8XXA SKIN ABRASION: ICD-10-CM

## 2023-10-16 DIAGNOSIS — Z79.4 TYPE 2 DIABETES MELLITUS WITHOUT COMPLICATION, WITH LONG-TERM CURRENT USE OF INSULIN (H): Primary | ICD-10-CM

## 2023-10-16 LAB
GLUCOSE BLD-MCNC: 257 MG/DL (ref 60–99)
HBA1C MFR BLD: 8.4 % (ref 0–5.6)

## 2023-10-16 PROCEDURE — 82947 ASSAY GLUCOSE BLOOD QUANT: CPT | Performed by: PHYSICIAN ASSISTANT

## 2023-10-16 PROCEDURE — 36415 COLL VENOUS BLD VENIPUNCTURE: CPT | Performed by: PHYSICIAN ASSISTANT

## 2023-10-16 PROCEDURE — 99214 OFFICE O/P EST MOD 30 MIN: CPT | Performed by: PHYSICIAN ASSISTANT

## 2023-10-16 PROCEDURE — 83036 HEMOGLOBIN GLYCOSYLATED A1C: CPT | Performed by: PHYSICIAN ASSISTANT

## 2023-10-16 RX ORDER — KETOROLAC TROMETHAMINE 5 MG/ML
SOLUTION OPHTHALMIC
COMMUNITY
Start: 2023-06-30

## 2023-10-16 RX ORDER — TADALAFIL 10 MG/1
10 TABLET ORAL
COMMUNITY
Start: 2022-06-29

## 2023-10-16 RX ORDER — BRIMONIDINE TARTRATE, TIMOLOL MALEATE 2; 5 MG/ML; MG/ML
1 SOLUTION/ DROPS OPHTHALMIC 2 TIMES DAILY
COMMUNITY
Start: 2023-02-16

## 2023-10-16 RX ORDER — MOXIFLOXACIN 5 MG/ML
SOLUTION/ DROPS OPHTHALMIC
COMMUNITY
Start: 2023-06-30

## 2023-10-16 RX ORDER — LATANOPROST 50 UG/ML
SOLUTION/ DROPS OPHTHALMIC
COMMUNITY
Start: 2023-06-29

## 2023-10-16 RX ORDER — LATANOPROST 50 UG/ML
SOLUTION/ DROPS OPHTHALMIC
COMMUNITY
Start: 2023-05-15

## 2023-10-16 RX ORDER — BENZONATATE 100 MG/1
100-200 CAPSULE ORAL
COMMUNITY
Start: 2023-01-30 | End: 2024-07-24

## 2023-10-16 RX ORDER — LATANOPROST 50 UG/ML
1 SOLUTION/ DROPS OPHTHALMIC AT BEDTIME
COMMUNITY
Start: 2023-08-02

## 2023-10-16 RX ORDER — PREDNISOLONE ACETATE 10 MG/ML
SUSPENSION/ DROPS OPHTHALMIC
COMMUNITY
Start: 2023-06-30

## 2023-10-16 RX ORDER — LATANOPROST 50 UG/ML
SOLUTION/ DROPS OPHTHALMIC
COMMUNITY
Start: 2023-08-02

## 2023-10-16 RX ORDER — COVID-19 ANTIGEN TEST
KIT MISCELLANEOUS
COMMUNITY
Start: 2023-02-01

## 2023-10-16 RX ORDER — LATANOPROST 50 UG/ML
SOLUTION/ DROPS OPHTHALMIC
COMMUNITY
Start: 2023-04-04

## 2023-10-16 NOTE — PROGRESS NOTES
Assessment & Plan     1. Type 2 diabetes mellitus without complication, with long-term current use of insulin (H)  Glucose running high, No sign of DKA or serious hyperglycemic problem / event  RX for another blood glucose monitoring kit  - Glucose, whole blood; Future  - Hemoglobin A1c; Future  - Glucose, whole blood  - Hemoglobin A1c  - blood glucose monitoring (NO BRAND SPECIFIED) meter device kit; Use to test blood sugar 4 times daily.  Dispense: 1 kit; Refill: 0    2. Skin abrasion  Cleaned in the clinic today.  No evidence of cellulitis or skin infection  Advised using bacitracin on the area, return to the clinic for signs of infection.       Return in about 5 days (around 10/21/2023), or if symptoms worsen or fail to improve.    Diagnosis and treatment plan was reviewed with patient and/or family.   We went over any labs or imaging. Discussed worsening symptoms or little to no relief despite treatment plan to follow-up with PCP or return to clinic.  Patient verbalizes understanding. All questions were addressed and answered.     Randa Desir PA-C  [facility]    CHIEF COMPLAINT:   Chief Complaint   Patient presents with    Feeding Problem     Start 2 days sx bilateral feet pain, burning in the feet, does have a scab on right shin, pt doesn't know A1C lost some supplies  hx diabetes tx Gabapentin      Subjective     Rachell is a 67 year old male who presents to clinic today for evaluation of lost diabetic supplies.  Several days ago, he lost his glucose meter and has not been able to test his blood sugar.  Feeling like his blood sugar may be high, as he has had a headache.    Additionally, he did hit his lower right leg on the car door 2 days ago, and sustained a scab.  Would like to have this cleaned out.  Area is tender.  No fever or chills.        Past Medical History:   Diagnosis Date    Anxiety 02/23/2015    Dermatitis 04/18/2015    DM type 2, goal A1C 7-8 02/23/2015     Financial problems 2015    Glaucoma (increased eye pressure)     HTN, goal below 140/90 2015    Hyperlipidemia LDL goal <100 2015    Microalbuminuria 2015    Onychomycosis 2015    Rash 2015     Past Surgical History:   Procedure Laterality Date    AMPUTATE TOE(S) Right 2018    Procedure: Right fifth toe amputation;  Surgeon: Clark Lora DPM;  Location: [facility] OR    IR LOWER EXTREMITY ANGIOGRAM BILATERAL  3/10/2022    IR LOWER EXTREMITY ANGIOGRAM LEFT  2016     Social History     Tobacco Use    Smoking status: Former     Types: Cigarettes     Quit date:      Years since quittin.8    Smokeless tobacco: Never   Substance Use Topics    Alcohol use: No     Current Outpatient Medications   Medication    alcohol swab prep pads    aspirin 81 MG tablet    benzonatate (TESSALON) 100 MG capsule    blood glucose monitoring (NO BRAND SPECIFIED) meter device kit    COMBIGAN 0.2-0.5 % ophthalmic solution    dorzolamide (TRUSOPT) 2 % ophthalmic solution    gabapentin (NEURONTIN) 300 MG capsule    insulin aspart (NOVOLOG FLEXPEN) 100 UNIT/ML pen    insulin glargine (LANTUS SOLOSTAR) 100 UNIT/ML pen    ketorolac (ACULAR) 0.5 % ophthalmic solution    latanoprost (XALATAN) 0.005 % ophthalmic solution    latanoprost (XALATAN) 0.005 % ophthalmic solution    latanoprost (XALATAN) 0.005 % ophthalmic solution    latanoprost (XALATAN) 0.005 % ophthalmic solution    latanoprost (XALATAN) 0.005 % ophthalmic solution    lisinopril (ZESTRIL) 20 MG tablet    moxifloxacin (VIGAMOX) 0.5 % ophthalmic solution    Omega-3 Fatty Acids (OMEGA-3 FISH OIL) 1200 MG CAPS    prednisoLONE acetate (PRED FORTE) 1 % ophthalmic suspension    QUICKVUE AT-HOME COVID-19 TEST KIT    rosuvastatin (CRESTOR) 10 MG tablet    tadalafil (CIALIS) 10 MG tablet    timolol maleate (TIMOPTIC) 0.5 % ophthalmic solution    ULTICARE SHORT 31G X 8 MM insulin pen needle    zolpidem (AMBIEN) 10 MG tablet    ACCU-CHEK GUIDE  "test strip    blood glucose (ACCU-CHEK GUIDE) test strip    blood glucose calibration (NO BRAND SPECIFIED) solution    blood glucose monitoring (NO BRAND SPECIFIED) meter device kit    thin (NO BRAND SPECIFIED) lancets     No current facility-administered medications for this visit.     Allergies   Allergen Reactions    Cilostazol Headache     Weakness    Aleve      HA sweats    Citalopram Itching    Clopidogrel Nausea and Vomiting     Pt to restart on 11/25/15 to see again if he tolerates it or not. His sx were only GI. Not a true allergy    Iodine     Naproxen Itching     About 10 yrs ago, itching all over from taking Aleve  \"get sick and really sick\"      Sulfamethoxazole-Trimethoprim      Other reaction(s): Renal Failure         10 point ROS of systems were all negative except for pertinent positives noted in my HPI.      Exam:   BP (!) 141/66   Pulse 80   Temp 97.6  F (36.4  C)   Wt 87.8 kg (193 lb 8 oz)   SpO2 98%   BMI 31.23 kg/m    Constitutional: healthy, alert and no distress  ENT: TMs clear and shiny jarrett, nasal mucosa pink and moist, throat without tonsillar hypertrophy or erythema  Neck: neck is supple, no cervical lymphadenopathy or nuchal rigidity  Cardiovascular: RRR  Respiratory: CTA bilaterally, no rhonchi or rales  M/S: RLL has a scab with small amount of surrounding bruising.   No erythema, swelling or warmth.   Neurologic: Speech clear, gait normal. Moves all extremities.    Results for orders placed or performed in visit on 10/16/23   Glucose, whole blood     Status: Abnormal   Result Value Ref Range    Glucose Whole Blood 257 (H) 60 - 99 mg/dL   Hemoglobin A1c     Status: Abnormal   Result Value Ref Range    Hemoglobin A1C 8.4 (H) 0.0 - 5.6 %           "

## 2023-10-23 ENCOUNTER — TELEPHONE (OUTPATIENT)
Dept: OPHTHALMOLOGY | Facility: CLINIC | Age: 68
End: 2023-10-23
Payer: MEDICARE

## 2023-10-26 ENCOUNTER — OFFICE VISIT (OUTPATIENT)
Dept: OPHTHALMOLOGY | Facility: CLINIC | Age: 68
End: 2023-10-26
Payer: MEDICARE

## 2023-10-26 DIAGNOSIS — H25.813 COMBINED FORMS OF AGE-RELATED CATARACT OF BOTH EYES: Primary | ICD-10-CM

## 2023-10-26 PROCEDURE — 99203 OFFICE O/P NEW LOW 30 MIN: CPT | Performed by: OPTOMETRIST

## 2023-10-26 ASSESSMENT — CONF VISUAL FIELD
OS_NORMAL: 1
OS_INFERIOR_NASAL_RESTRICTION: 0
OD_INFERIOR_TEMPORAL_RESTRICTION: 0
OD_SUPERIOR_NASAL_RESTRICTION: 0
OS_INFERIOR_TEMPORAL_RESTRICTION: 0
OD_SUPERIOR_TEMPORAL_RESTRICTION: 0
OD_NORMAL: 1
OS_SUPERIOR_NASAL_RESTRICTION: 0
OD_INFERIOR_NASAL_RESTRICTION: 0
OS_SUPERIOR_TEMPORAL_RESTRICTION: 0

## 2023-10-26 ASSESSMENT — VISUAL ACUITY
METHOD: SNELLEN - LINEAR
OD_CC: 20/200
OS_CC: 20/30

## 2023-10-26 ASSESSMENT — CUP TO DISC RATIO: OS_RATIO: 0.5

## 2023-10-26 ASSESSMENT — TONOMETRY
OS_IOP_MMHG: 22
OD_IOP_MMHG: 21
IOP_METHOD: APPLANATION

## 2023-10-26 ASSESSMENT — EXTERNAL EXAM - RIGHT EYE: OD_EXAM: NORMAL

## 2023-10-26 ASSESSMENT — REFRACTION_WEARINGRX
OD_CYLINDER: +1.00
OD_SPHERE: -0.75
OD_AXIS: 020
SPECS_TYPE: DISTANCE
OS_AXIS: 015
OS_CYLINDER: +0.75
OS_SPHERE: PLANO

## 2023-10-26 ASSESSMENT — SLIT LAMP EXAM - LIDS
COMMENTS: 1+ MGD
COMMENTS: 1+ MGD

## 2023-10-26 ASSESSMENT — EXTERNAL EXAM - LEFT EYE: OS_EXAM: NORMAL

## 2023-10-26 NOTE — PROGRESS NOTES
History  HPI       Eye Exam For Diabetes    In both eyes. Additional comments: GEOFF: 3 years ago. Currently wearing single vision distance only glasses, has a separate pair for NVO.   Glaucoma: POAG taking dorzolamide BID, and timolol BID. Reports good compliance. Last drop last night  Cataract and glaucoma surgery right eye in Nov 6th - [facility]   Wants a second opinion           Last edited by Dejon Thapa on 10/26/2023  3:18 PM.          Assessment/Plan  (H25.813) Combined forms of age-related cataract of both eyes  (primary encounter diagnosis)  Comment: Seeking second opinion on cataract surgery; visually significant cataracts right eye> left eye   Plan:  Educated patient on clinical findings. Explained that cataract surgery is indicated at this time. Continue with Dr. Alaniz as scheduled (at [facility]).    Complete documentation of historical and exam elements from today's encounter can  be found in the full encounter summary report (not reduplicated in this progress  note). I personally obtained the chief complaint(s) and history of present illness. I  confirmed and edited as necessary the review of systems, past medical/surgical  history, family history, social history, and examination findings as documented by  others; and I examined the patient myself. I personally reviewed the relevant tests,  images, and reports as documented above. I formulated and edited as necessary the  assessment and plan and discussed the findings and management plan with the  patient and family.    Timi Etienne, OD, FAAO

## 2023-12-18 NOTE — ANESTHESIA POSTPROCEDURE EVALUATION
Patient: Rachell Paige    Procedure(s):  AMPUTATE FIFTH TOE    Diagnosis:infected right fifth toe  Diagnosis Additional Information: Pre-operative diagnosis:         infected right fifth toe  Post-operative diagnosis        sp right 5th toe amputation  Procedure:      Procedure(s):  AMPUTATE FIFTH TOE        Anesthesia Type:  MAC    Note:  Anesthesia Post Evaluation    Patient location during evaluation: PACU  Patient participation: Able to fully participate in evaluation  Level of consciousness: awake  Pain management: adequate  Airway patency: patent  Cardiovascular status: acceptable  Respiratory status: acceptable  Hydration status: euvolemic  PONV: controlled     Anesthetic complications: None          Last vitals:  Vitals:    12/31/18 1930 12/31/18 2003 12/31/18 2018   BP: 155/84 136/65 141/62   Pulse:  80    Resp: 16 16 16   Temp: 97.4  F (36.3  C) 95.8  F (35.4  C) 96.5  F (35.8  C)   SpO2: 99% 96% 97%         Electronically Signed By: Srini Velez MD  December 31, 2018  8:34 PM  
Detail Level: Detailed

## 2024-01-24 NOTE — RESULT ENCOUNTER NOTE
Rachell- reviewed your labs ,   Normal iron level and stores. Normal muscle enzyme, called CK,   Normal electrolytes and kidney function, though slight decrease in filtration rate called GFR, please keep well hydrated.  calcium, protein/albumin and liver enzymes are normal.   Blood sugar elevated at 256.  D-dimer, test for blood clot is negative, which is reassuring.  Any further questions please let us know, please schedule with diabetic educator as advised  Dr Nichols
normal...

## 2024-04-23 ENCOUNTER — TELEPHONE (OUTPATIENT)
Dept: OTHER | Facility: CLINIC | Age: 69
End: 2024-04-23
Payer: MEDICARE

## 2024-04-23 DIAGNOSIS — I73.9 PAD (PERIPHERAL ARTERY DISEASE) (H): Primary | ICD-10-CM

## 2024-04-23 NOTE — TELEPHONE ENCOUNTER
Per Dr. Berrios  9/5/2023:    FUTURE DIAGNOSTIC TEST/PROCEDURE:  6 month follow up with Dr. Berrios with resting ABIs prior to the office visit     Called patient who states he cannot get in to see Dr. Berrios in a timely manner.  I advised he call and try to follow up with Dr. Berrios.    Rachell was very insistent in seeing someone at [facility].  We discussed Dr. Wong's last note on 6/27/23 regarding an arterial pump.  Apparently his insurancecoverage was denied in the past?  He would like to be scheduled for a visit with Dr. Wong to discuss trying to get an arterial pump again.    Routing to Dr. Wong to determine if he would like any imaging prior to seeing Rachell.

## 2024-04-23 NOTE — TELEPHONE ENCOUNTER
[facility]    Who is the name of the provider?:  MARILU RICH   What is the location you see this provider at/preferred location?: [place]  Person calling / Facility: Rachell Paige  Phone number:  [phone]  Nurse call back needed:  YES    Reason for call:  Patient describes new, pronounced pain and numbness in feet and is asking to speak to a nurse to discuss a follow up plan.    Pharmacy location:     [id number] - [place], [place] - [address], [place] - [address] [id number] [place] [address] [id number] - [place], [place] - [address] [facility] [address]  [id number] [address] AT [facility] [address] [facility] [address]  Outside Imaging: n/a   Can we leave a detailed message on this number?  YES     4/23/2024, 3:29 PM

## 2024-04-24 NOTE — TELEPHONE ENCOUNTER
Spot held with Dr Moreno Monday 4/29 at 1pm.    Left voicemail with instructions for patient to call back to schedule their appointment(s)    April 24, 2024 , 8:24 AM

## 2024-04-24 NOTE — TELEPHONE ENCOUNTER
Felisa Wong MD  You; Frandy Olsmtead MD12 hours ago (7:16 PM)     LG  He was seen by Dr. Olmstead Before . I am not sure what image he prefers ? Please check with him  Based on previous evaluation and imaging studies only thing I can offer is arterial pump which is not covered by his insurance and he may have to pay or rent it.  Not sure if he has any endovascular or surgical revasc options .  Let Dr. Olmstead decide    LG     Frandy Olmstead MD  You; Felisa Wong MD10 hours ago (9:31 PM)     KK  ABIs and TBIs.  I read my previous note and he had non reconstructible PAD. To say this with renewed conviction he will need an angiogram with the understanding that there may be nothing we can do.  In these situations I refer the patient for a lumbar sympathetic block and if that works a lumbar sympathectomy.  Marco       Routing to scheduling to arrange for:    PIYUSH with exercise with toe pressures  IN CLINIC visit with DR. OLMSTEAD immediately following.  Please schedule these within one week.    Patient has been instructed to go to the emergency room for cold feet, increased pain, loss of sensation, and any signs of tissue ischemia.      Appt Note:  To discuss possible angiogram and follow up care that may include lumbar sympathetic block.  PIYUSH with TBI prior.

## 2024-04-26 NOTE — TELEPHONE ENCOUNTER
Left another voice mail message (2nd message) for patient to call back and schedule appointments.  Informed him we are holding a spot on Monday, 4/29,

## 2024-04-29 NOTE — TELEPHONE ENCOUNTER
Patient failed to return call in timely manner and there is no further openings with Dr. Moreno as of note. Patient needs to be scheduled at next available    Dipti JIMENES, RN    [facility]  Office: [phone]  Fax: [phone]

## 2024-04-29 NOTE — TELEPHONE ENCOUNTER
Patient is scheduled for his Ultrasound on 05/02/24 and follow up visit with Dr Moreno on 05/13/24.

## 2024-04-29 NOTE — TELEPHONE ENCOUNTER
Patient called back at 12:59 pm today - patient stated that he was not informed to come at 1:00.  Patient is available to have his Ultrasound done Wed, Thurs or Fri this week but unfortuanately Dr Moreno has no openings until May 13th. Please advise.

## 2024-05-02 ENCOUNTER — HOSPITAL ENCOUNTER (OUTPATIENT)
Dept: ULTRASOUND IMAGING | Facility: CLINIC | Age: 69
Discharge: HOME | End: 2024-05-02
Attending: SURGERY | Admitting: SURGERY
Payer: MEDICARE

## 2024-05-02 DIAGNOSIS — I73.9 PAD (PERIPHERAL ARTERY DISEASE) (H): ICD-10-CM

## 2024-05-02 PROCEDURE — 93924 LWR XTR VASC STDY BILAT: CPT

## 2024-05-02 PROCEDURE — 93924 LWR XTR VASC STDY BILAT: CPT | Mod: 26 | Performed by: SURGERY

## 2024-05-13 ENCOUNTER — OFFICE VISIT (OUTPATIENT)
Dept: OTHER | Facility: CLINIC | Age: 69
End: 2024-05-13
Attending: SURGERY
Payer: MEDICARE

## 2024-05-13 VITALS — DIASTOLIC BLOOD PRESSURE: 78 MMHG | SYSTOLIC BLOOD PRESSURE: 134 MMHG | HEART RATE: 85 BPM

## 2024-05-13 DIAGNOSIS — I73.9 PERIPHERAL ARTERY DISEASE (H): Primary | ICD-10-CM

## 2024-05-13 PROCEDURE — G0463 HOSPITAL OUTPT CLINIC VISIT: HCPCS | Performed by: SURGERY

## 2024-05-13 PROCEDURE — 99213 OFFICE O/P EST LOW 20 MIN: CPT | Performed by: SURGERY

## 2024-05-13 NOTE — PROGRESS NOTES
[facility]        Patient is here for a  follow up.    Pt is currently taking Aspirin and Statin.    /78 (BP Location: Left arm, Patient Position: Chair, Cuff Size: Adult Regular)   Pulse 85     The provider has been notified that the patient has no concerns.     Questions patient would like addressed today are: N/A.    Refills are needed: N/A    Has homecare services and agency name:  Milagros Santos MA

## 2024-05-13 NOTE — PROGRESS NOTES
I had the pleasure of seeing Mr. Lovett in the vascular surgery clinic today.  He is a gentleman who I seen previously as well.  He is now 68 years old.Paige he had peripheral artery disease and based on an angiogram that was done in March 2022 he has severe tibial vessel disease.  He has occlusion of both anterior and posterior tibial arteries bilaterally with single-vessel runoff by way of peroneal arteries.  His present complaint is that of feeling burning in both his feet.  He tells me that he can walk any distance without problems.  He does not have any wounds or ulcers.    Recently he also saw Dr. Berrios at [facility] who has also continue to advocate nonoperative treatment.  I agree with the nonoperative approach for the following reasons.  He has a single-vessel runoff on both sides and trying to balloon angioplasty the moderate stenosis in the peroneal arteries 70 backfired and that can result in a situation where he could suffer from limb loss.  As far as the burning in his feet is concerned he was on gabapentin but stopped taking it due to concerns of interaction with his renal dysfunction.  I agree with the patient approach on that as well.  I did offer lumbar sympathetic block injection to see if that gives him any relief from the burning.  The burning I believe is due to neuropathy as well as ischemia so it is certainly multifactorial.  He is not interested in getting a lumbar sympathetic block injection.    I explained to him why I am not keen on recommending an arteriogram at this time.  This is because of the chronic kidney disease but also that if we do see a moderate stenosis and try to repair it then we are taking significant risk.  I also reviewed his noninvasive arterial studies with him.  He verbalizes understanding about our entire approach to this situation.  I will see him back in 6 months to reassess his symptoms.

## 2024-05-28 ENCOUNTER — TELEPHONE (OUTPATIENT)
Dept: OTHER | Facility: CLINIC | Age: 69
End: 2024-05-28
Payer: MEDICARE

## 2024-05-28 NOTE — TELEPHONE ENCOUNTER
"Patient stopped at our Vein Clinic.  Patient asked to make an appointment with a doctor to talk about his circulation  because \"I have 3 that go down and only one is open\"  I asked patient for name and  and reviewed chart and reminded him that he saw Dr. Moreno recently for his arteries and Dr. Moreno sees patients for vein issues at this clinic.  Patient states that Dr. Moreno mentioned that if he did a procedure he could lose his leg and he states he is 68 and wants something done.  Per Dr. Moreno's note, patient has also seen Dr. Berrios.  Patient admitted he has and that he wants to see him back in 1 year and Dr. Moreno wants to see him back in 6 months but patient said he wants it fixed now.  I encouraged patient to call our arterial clinic and either discuss his concerns further or to request a 3rd consult.  Provided patient number for the Vascular Clinic and patient states he already has it and he doesn't need to call because he wants to find a doctor to fix his legs even though he has been told the risk of the situation getting worse.    "

## 2024-07-24 ENCOUNTER — APPOINTMENT (OUTPATIENT)
Dept: GENERAL RADIOLOGY | Facility: CLINIC | Age: 69
End: 2024-07-24
Attending: EMERGENCY MEDICINE
Payer: MEDICARE

## 2024-07-24 ENCOUNTER — HOSPITAL ENCOUNTER (EMERGENCY)
Facility: CLINIC | Age: 69
Discharge: HOME | End: 2024-07-24
Attending: EMERGENCY MEDICINE | Admitting: EMERGENCY MEDICINE
Payer: MEDICARE

## 2024-07-24 VITALS
SYSTOLIC BLOOD PRESSURE: 187 MMHG | BODY MASS INDEX: 31.53 KG/M2 | RESPIRATION RATE: 18 BRPM | TEMPERATURE: 98.2 F | WEIGHT: 196.21 LBS | HEIGHT: 66 IN | DIASTOLIC BLOOD PRESSURE: 94 MMHG | HEART RATE: 89 BPM | OXYGEN SATURATION: 97 %

## 2024-07-24 DIAGNOSIS — J20.9 ACUTE BRONCHITIS, UNSPECIFIED ORGANISM: ICD-10-CM

## 2024-07-24 LAB
FLUAV RNA SPEC QL NAA+PROBE: NEGATIVE
FLUBV RNA RESP QL NAA+PROBE: NEGATIVE
RSV RNA SPEC NAA+PROBE: NEGATIVE
SARS-COV-2 RNA RESP QL NAA+PROBE: NEGATIVE

## 2024-07-24 PROCEDURE — 87637 SARSCOV2&INF A&B&RSV AMP PRB: CPT | Performed by: EMERGENCY MEDICINE

## 2024-07-24 PROCEDURE — 99284 EMERGENCY DEPT VISIT MOD MDM: CPT | Mod: 25

## 2024-07-24 PROCEDURE — 71046 X-RAY EXAM CHEST 2 VIEWS: CPT

## 2024-07-24 PROCEDURE — 250N000009 HC RX 250: Performed by: EMERGENCY MEDICINE

## 2024-07-24 PROCEDURE — 94640 AIRWAY INHALATION TREATMENT: CPT

## 2024-07-24 PROCEDURE — 250N000013 HC RX MED GY IP 250 OP 250 PS 637: Performed by: EMERGENCY MEDICINE

## 2024-07-24 RX ORDER — OXYMETAZOLINE HYDROCHLORIDE 0.05 G/100ML
2 SPRAY NASAL 2 TIMES DAILY
Qty: 1 ML | Refills: 0 | Status: SHIPPED | OUTPATIENT
Start: 2024-07-24 | End: 2024-07-27

## 2024-07-24 RX ORDER — ALBUTEROL SULFATE 90 UG/1
2 AEROSOL, METERED RESPIRATORY (INHALATION) ONCE
Status: COMPLETED | OUTPATIENT
Start: 2024-07-24 | End: 2024-07-24

## 2024-07-24 RX ORDER — ACETAMINOPHEN 325 MG/1
650 TABLET ORAL ONCE
Status: COMPLETED | OUTPATIENT
Start: 2024-07-24 | End: 2024-07-24

## 2024-07-24 RX ORDER — OXYMETAZOLINE HYDROCHLORIDE 0.05 G/100ML
1 SPRAY NASAL ONCE
Status: COMPLETED | OUTPATIENT
Start: 2024-07-24 | End: 2024-07-24

## 2024-07-24 RX ORDER — BENZONATATE 200 MG/1
200 CAPSULE ORAL 3 TIMES DAILY PRN
Qty: 21 CAPSULE | Refills: 0 | Status: SHIPPED | OUTPATIENT
Start: 2024-07-24 | End: 2024-07-31

## 2024-07-24 RX ADMIN — ACETAMINOPHEN 325 MG: 325 TABLET, FILM COATED ORAL at 16:39

## 2024-07-24 RX ADMIN — OXYMETAZOLINE HYDROCHLORIDE 1 SPRAY: 0.05 SPRAY NASAL at 17:11

## 2024-07-24 RX ADMIN — ALBUTEROL SULFATE 2 PUFF: 108 INHALANT RESPIRATORY (INHALATION) at 17:11

## 2024-07-24 ASSESSMENT — COLUMBIA-SUICIDE SEVERITY RATING SCALE - C-SSRS
2. HAVE YOU ACTUALLY HAD ANY THOUGHTS OF KILLING YOURSELF IN THE PAST MONTH?: NO
6. HAVE YOU EVER DONE ANYTHING, STARTED TO DO ANYTHING, OR PREPARED TO DO ANYTHING TO END YOUR LIFE?: NO
1. IN THE PAST MONTH, HAVE YOU WISHED YOU WERE DEAD OR WISHED YOU COULD GO TO SLEEP AND NOT WAKE UP?: NO

## 2024-07-24 ASSESSMENT — ACTIVITIES OF DAILY LIVING (ADL): ADLS_ACUITY_SCORE: 37

## 2024-07-24 NOTE — ED PROVIDER NOTES
Emergency Department Note      History of Present Illness     Chief Complaint   Cough and Generalized Body Aches    HPI   Rachell Paige is a 68 year old male with history of type 2 diabetes, hypertension, and hyperlipidemia who presents to the ED for evaluation of a cough and generalized body aches. Patient reports he has had a cold for the past 5 days. Patient endorses chills, rhinorrhea, weakness, dehydration, and he has been coughing occasionally. He denies sore throat, vomiting, diarrhea, rash, or urinary symptoms. He mentions that he has been using the air conditioner all night, which might be contributing to his symptoms. Patient adds that his granddaughter had a cold 2 weeks ago, but she is better now. He did take any Tylenol or ibuprofen today. He includes a history of diabetes, which he uses Novolog and Lantus for, and hypertension, which he uses lisinopril for. Patient notes his blood pressure is normally at 140-150's, but sometimes it is in the 180's.         Independent Historian   None    Review of External Notes       Past Medical History     Medical History and Problem List   Anxiety  Dermatitis  Type 2 diabetes  Glaucoma  Hypertension  Hyperlipidemia  Microalbuminuria  Onychomycosis  COVID-19  Hyponatremia  PAD  Acute osteomyelitis of left foot  Anemia  Depression  Diabetic peripheral neuropathy  Gangrene of digit  Necrotic toes  Pure hypercholesterolemia  Toe osteomyelitis, left  Ulcer of left lower extremity with fat layer exposed  Stage 3 CKD  Atheroembolism of right lower extremity   Simple chronic bronchitis  Peripheral gangrene   COPD  Diabetic ulcer of toe of right foot  Erectile dysfunction   Peripheral vascular disease   Tinea pedis of both feet    Medications   Aspirin 81 mg  Tessalon  Neurontin  Novolog Flexpen  Zestril  Crestor  Cialis  Ambien  Lantus Solostar  Basaglar  Zocor   Elavil   Percocet   Plavix   Roxicodone   Glucose     Surgical History   Right fifth toe amputation  IR  "lower extremity angiogram bilateral  IR lower extremity angiogram left  Left second toe amputation     Physical Exam     Patient Vitals for the past 24 hrs:   BP Temp Temp src Pulse Resp SpO2 Height Weight   07/24/24 1436 -- -- -- -- -- -- 1.676 m (5' 6\") 89 kg (196 lb 3.4 oz)   07/24/24 1434 (!) 187/94 98.2  F (36.8  C) Temporal 89 18 97 % -- --     Physical Exam  CV: ppi, regular   Resp: speaking in full sentences without any resp distress, lungs clear to auscultation bilaterally Skin: warm dry well perfused  Neuro: Alert, no gross motor or sensory deficits,  gait stable      HEENT: Mucous membranes moist, no posterior oropharyngeal exudative changes significant hypertrophy        Diagnostics     Lab Results   Labs Ordered and Resulted from Time of ED Arrival to Time of ED Departure   INFLUENZA A/B, RSV, & SARS-COV2 PCR - Normal       Result Value    Influenza A PCR Negative      Influenza B PCR Negative      RSV PCR Negative      SARS CoV2 PCR Negative       Imaging   XR Chest 2 Views   Final Result   IMPRESSION: Minimal change. Cardiomegaly with mild hilar fullness and interstitial prominence. No focal pneumonia or pleural effusion.        Independent Interpretation   See ED course.    ED Course      Medications Administered   Medications   oxymetazoline (AFRIN) 0.05 % spray 1 spray (1 spray Both Nostrils $Given 7/24/24 1711)   acetaminophen (TYLENOL) tablet 650 mg (325 mg Oral $Given 7/24/24 1639)   albuterol (PROVENTIL HFA/VENTOLIN HFA) inhaler (2 puffs Inhalation $Given 7/24/24 1711)     Discussion of Management   None    ED Course   ED Course as of 07/24/24 1736 Wed Jul 24, 2024   1626 I obtained history and examined the patient as noted above.    1648 I rechecked and updated the patient.     1701 Chest Radiograph without Pneumothorax, Lobar opacity, nor concerning cardiomegaly or pulm edema/pleural effusion     1702 I rechecked and updated the patient.      Optional/Additional " Documentation  None    Medical Decision Making / Diagnosis     CMS Diagnoses: None    MIPS       None    MDM   68-year-old male presenting with respiratory tract infectious symptoms.  Highly likely viral in origin.  Rapid COVID/flu/RSV swab negative, radiograph done showing no evidence of a lobar pattern pneumonia.  At this point I think with stratified low to discharge home with supportive care.    Disposition   The patient was discharged.     Diagnosis     ICD-10-CM    1. Acute bronchitis, unspecified organism  J20.9          Discharge Medications   Discharge Medication List as of 7/24/2024  5:15 PM        START taking these medications    Details   oxymetazoline (AFRIN NASAL SPRAY) 0.05 % nasal spray Spray 0.2 mLs (2 sprays) in nostril 2 times daily for 3 days, Disp-1 mL, R-0, E-PrescribeNo drip 12 hour formula please           Scribe Disclosure:  IKim, am serving as a scribe at 5:19 PM on 7/24/2024 to document services personally performed by Mauri Carrasco MD based on my observations and the provider's statements to me.     Scribe Disclosure:  IJENNIFER, am serving as a scribe at 5:27 PM on 7/24/2024 to document services personally performed by Mauri Carrasco MD based on my observations and the provider's statements to me.      Mauri Carrasco MD  07/24/24 2035

## 2024-07-24 NOTE — ED TRIAGE NOTES
Cough, body aches and tired for the past 5 days.      Triage Assessment (Adult)       Row Name 07/24/24 1436          Triage Assessment    Airway WDL WDL        Respiratory WDL    Respiratory WDL X     Rhythm/Pattern, Respiratory shortness of breath     Cough Type dry

## 2024-07-25 ENCOUNTER — OFFICE VISIT (OUTPATIENT)
Dept: URGENT CARE | Facility: URGENT CARE | Age: 69
End: 2024-07-25
Payer: MEDICARE

## 2024-07-25 VITALS
HEART RATE: 94 BPM | DIASTOLIC BLOOD PRESSURE: 62 MMHG | SYSTOLIC BLOOD PRESSURE: 120 MMHG | OXYGEN SATURATION: 99 % | BODY MASS INDEX: 31.64 KG/M2 | WEIGHT: 196 LBS | TEMPERATURE: 99.2 F

## 2024-07-25 DIAGNOSIS — J01.90 ACUTE SINUSITIS WITH COEXISTING CONDITION REQUIRING PROPHYLACTIC TREATMENT: Primary | ICD-10-CM

## 2024-07-25 PROCEDURE — 99213 OFFICE O/P EST LOW 20 MIN: CPT | Performed by: FAMILY MEDICINE

## 2024-07-25 RX ORDER — INSULIN GLARGINE 100 [IU]/ML
INJECTION, SOLUTION SUBCUTANEOUS
COMMUNITY
Start: 2024-04-05

## 2024-07-25 NOTE — PROGRESS NOTES
SUBJECTIVE:  Chief Complaint   Patient presents with    Urgent Care     X7 Days head cold, eye watering, runny nose, cough started last night, sob with cough, pain with cough, chest congestion, non productive cough, ( was told by nurse/doctor he has a head cold on the phone today, was told he could die from this) was seen in ER last night, was given nasal spray for every 12 hrs, says not helpful, used nyquil not helpful, not able to sleep      Rachell Paige is a 68 year old male who presents with a chief complaint of congestion for 7 days.    Was seen in ER yesterday for SOB, cough, chest congestion.  CXR negative, COVID/flu/RSV negative.  Told that has bronchitis, RX tessalon perles and RX Afrin given.    Worsening headache and sinus congestion.  Has been coughing a lot all night long.  Has not picked up tessalon perles yet    Past Medical History:   Diagnosis Date    Anxiety 02/23/2015    Dermatitis 04/18/2015    DM type 2, goal A1C 7-8 02/23/2015    Financial problems 02/23/2015    Glaucoma (increased eye pressure)     HTN, goal below 140/90 02/23/2015    Hyperlipidemia LDL goal <100 02/23/2015    Microalbuminuria 04/18/2015    Onychomycosis 04/18/2015    Rash 02/23/2015     Current Outpatient Medications   Medication Sig Dispense Refill    aspirin 81 MG tablet Take 1 tablet (81 mg) by mouth daily 30 tablet OTC    dorzolamide (TRUSOPT) 2 % ophthalmic solution Place 1 drop into both eyes 2 times daily 10 mL 0    insulin aspart (NOVOLOG FLEXPEN) 100 UNIT/ML pen Inject 1-10 Units Subcutaneous 2 times daily (with meals) With lunch and dinner 3 mL 0    latanoprost (XALATAN) 0.005 % ophthalmic solution       latanoprost (XALATAN) 0.005 % ophthalmic solution Apply 1 drop to eye at bedtime      lisinopril (ZESTRIL) 20 MG tablet Take 1 tablet (20 mg) by mouth daily 30 tablet 11    rosuvastatin (CRESTOR) 10 MG tablet Take 1 tablet (10 mg) by mouth daily 90 tablet 3    tadalafil (CIALIS) 10 MG tablet Take 10 mg by mouth       timolol maleate (TIMOPTIC) 0.5 % ophthalmic solution Place 1 drop into both eyes 2 times daily 5 mL 0    ULTICARE SHORT 31G X 8 MM insulin pen needle USE 3 PEN NEEDLES DAILY OR AS DIRECTED. 100 each 0    ACCU-CHEK GUIDE test strip USE TO TEST BLOOD SUGAR TWO TIMES A DAY OR AS DIRECTED (Patient not taking: Reported on 10/16/2023) 100 strip 1    alcohol swab prep pads Use to swab area of injection/pedro pablo as directed. (Patient not taking: Reported on 7/25/2024) 120 each 6    benzonatate (TESSALON) 200 MG capsule Take 1 capsule (200 mg) by mouth 3 times daily as needed for cough (Patient not taking: Reported on 7/25/2024) 21 capsule 0    blood glucose (ACCU-CHEK GUIDE) test strip 1 strip by In Vitro route 4 times daily Use to test blood sugar 3 -4  times daily or as directed. (Patient not taking: Reported on 10/16/2023) 400 strip 0    blood glucose calibration (NO BRAND SPECIFIED) solution To accompany: Blood Glucose Monitor Brands: per insurance. (Patient not taking: Reported on 10/16/2023) 1 Bottle 3    blood glucose monitoring (NO BRAND SPECIFIED) meter device kit Use to test blood sugar 4 times daily. (Patient not taking: Reported on 7/25/2024) 1 kit 0    blood glucose monitoring (NO BRAND SPECIFIED) meter device kit Use to test blood sugar 4 times daily or as directed. Preferred blood glucose meter OR supplies to accompany: Blood Glucose Monitor Brands: per insurance. (Patient not taking: Reported on 10/16/2023) 1 kit 0    COMBIGAN 0.2-0.5 % ophthalmic solution Place 1 drop into both eyes 2 times daily (Patient not taking: Reported on 7/25/2024)      gabapentin (NEURONTIN) 300 MG capsule TAKE 1 CAPSULE (300 MG) BY MOUTH AT BEDTIME (Patient not taking: Reported on 7/25/2024) 90 capsule 0    insulin glargine (LANTUS SOLOSTAR) 100 UNIT/ML pen Inject 50 Units Subcutaneous At Bedtime (Patient not taking: Reported on 7/25/2024) 15 mL 0    INSULIN GLARGINE 100 UNIT/ML pen INJECT 55 UNITS SUBCUTANEOUS BEFORE BEDTIME.  PRODUCT DESIRED: LANTUS SOLOSTAR (Patient not taking: Reported on 2024)      ketorolac (ACULAR) 0.5 % ophthalmic solution  (Patient not taking: Reported on 2024)      latanoprost (XALATAN) 0.005 % ophthalmic solution  (Patient not taking: Reported on 2024)      latanoprost (XALATAN) 0.005 % ophthalmic solution  (Patient not taking: Reported on 2024)      latanoprost (XALATAN) 0.005 % ophthalmic solution  (Patient not taking: Reported on 2024)      moxifloxacin (VIGAMOX) 0.5 % ophthalmic solution  (Patient not taking: Reported on 2024)      Omega-3 Fatty Acids (OMEGA-3 FISH OIL) 1200 MG CAPS Take 1 capsule by mouth      oxymetazoline (AFRIN NASAL SPRAY) 0.05 % nasal spray Spray 0.2 mLs (2 sprays) in nostril 2 times daily for 3 days 1 mL 0    prednisoLONE acetate (PRED FORTE) 1 % ophthalmic suspension  (Patient not taking: Reported on 2024)      QUICKVUE AT-HOME COVID-19 TEST KIT REFER TO MANUFACTURERS INSTRUCTIONS INCLUDED IN PACKAGING      thin (NO BRAND SPECIFIED) lancets Use with lanceting device. To accompany: Blood Glucose Monitor Brands: per insurance. (Patient not taking: Reported on 10/16/2023) 200 each 6    zolpidem (AMBIEN) 10 MG tablet Take 10 mg by mouth (Patient not taking: Reported on 2024)       Social History     Tobacco Use    Smoking status: Former     Current packs/day: 0.00     Types: Cigarettes     Quit date:      Years since quittin.5    Smokeless tobacco: Never   Substance Use Topics    Alcohol use: No       ROS:  Review of systems negative except as stated above.    EXAM:   /62   Pulse 94   Temp 99.2  F (37.3  C) (Tympanic)   Wt 88.9 kg (196 lb)   SpO2 99%   BMI 31.64 kg/m    GENERAL APPEARANCE: healthy, alert and no distress  PSYCH:alert, affect bright      ASSESSMENT/PLAN:  (J01.90) Acute sinusitis with coexisting condition requiring prophylactic treatment  (primary encounter diagnosis)  Plan: amoxicillin-clavulanate (AUGMENTIN)  875-125 MG         tablet            Reassurance given, reviewed that due to worsening sinus symptoms and high risk comorbid medical illness, will treat with RX Augmentin for sinus infection.  Encourage to  tessalon perles to help wih cough.    Follow up with primary provider if no improvement of symptoms in 1 week    Jesse Jack MD  July 25, 2024 7:04 PM

## 2024-09-09 ENCOUNTER — APPOINTMENT (OUTPATIENT)
Dept: GENERAL RADIOLOGY | Facility: CLINIC | Age: 69
End: 2024-09-09
Attending: EMERGENCY MEDICINE

## 2024-09-09 ENCOUNTER — HOSPITAL ENCOUNTER (EMERGENCY)
Facility: CLINIC | Age: 69
Discharge: HOME | End: 2024-09-10
Attending: EMERGENCY MEDICINE | Admitting: EMERGENCY MEDICINE

## 2024-09-09 DIAGNOSIS — S20.212A CHEST WALL CONTUSION, LEFT, INITIAL ENCOUNTER: ICD-10-CM

## 2024-09-09 DIAGNOSIS — R79.89 ELEVATED SERUM CREATININE: ICD-10-CM

## 2024-09-09 DIAGNOSIS — I51.7 CARDIOMEGALY: ICD-10-CM
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
## 2024-09-09 DIAGNOSIS — S13.4XXA WHIPLASH INJURY TO NECK, INITIAL ENCOUNTER: ICD-10-CM

## 2024-09-09 DIAGNOSIS — G44.319 ACUTE POST-TRAUMATIC HEADACHE, NOT INTRACTABLE: ICD-10-CM

## 2024-09-09 LAB
ALBUMIN SERPL BCG-MCNC: 4.5 G/DL (ref 3.5–5.2)
ALP SERPL-CCNC: 122 U/L (ref 40–150)
ALT SERPL W P-5'-P-CCNC: 18 U/L (ref 0–70)
ANION GAP SERPL CALCULATED.3IONS-SCNC: 10 MMOL/L (ref 7–15)
AST SERPL W P-5'-P-CCNC: 20 U/L (ref 0–45)
BASOPHILS # BLD AUTO: 0 10E3/UL (ref 0–0.2)
BASOPHILS NFR BLD AUTO: 0 %
BILIRUB SERPL-MCNC: 0.4 MG/DL
BUN SERPL-MCNC: 16.9 MG/DL (ref 8–23)
CALCIUM SERPL-MCNC: 9.1 MG/DL (ref 8.8–10.4)
CHLORIDE SERPL-SCNC: 105 MMOL/L (ref 98–107)
CREAT SERPL-MCNC: 1.52 MG/DL (ref 0.67–1.17)
EGFRCR SERPLBLD CKD-EPI 2021: 50 ML/MIN/1.73M2
EOSINOPHIL # BLD AUTO: 0.2 10E3/UL (ref 0–0.7)
EOSINOPHIL NFR BLD AUTO: 2 %
ERYTHROCYTE [DISTWIDTH] IN BLOOD BY AUTOMATED COUNT: 13.5 % (ref 10–15)
GLUCOSE SERPL-MCNC: 101 MG/DL (ref 70–99)
HCO3 SERPL-SCNC: 23 MMOL/L (ref 22–29)
HCT VFR BLD AUTO: 44.5 % (ref 40–53)
HGB BLD-MCNC: 14.5 G/DL (ref 13.3–17.7)
IMM GRANULOCYTES # BLD: 0 10E3/UL
IMM GRANULOCYTES NFR BLD: 0 %
LYMPHOCYTES # BLD AUTO: 3.1 10E3/UL (ref 0.8–5.3)
LYMPHOCYTES NFR BLD AUTO: 33 %
MCH RBC QN AUTO: 28.4 PG (ref 26.5–33)
MCHC RBC AUTO-ENTMCNC: 32.6 G/DL (ref 31.5–36.5)
MCV RBC AUTO: 87 FL (ref 78–100)
MONOCYTES # BLD AUTO: 0.8 10E3/UL (ref 0–1.3)
MONOCYTES NFR BLD AUTO: 8 %
NEUTROPHILS # BLD AUTO: 5.3 10E3/UL (ref 1.6–8.3)
NEUTROPHILS NFR BLD AUTO: 57 %
NRBC # BLD AUTO: 0 10E3/UL
NRBC BLD AUTO-RTO: 0 /100
PLATELET # BLD AUTO: 321 10E3/UL (ref 150–450)
POTASSIUM SERPL-SCNC: 4.4 MMOL/L (ref 3.4–5.3)
PROT SERPL-MCNC: 7.6 G/DL (ref 6.4–8.3)
RBC # BLD AUTO: 5.1 10E6/UL (ref 4.4–5.9)
SODIUM SERPL-SCNC: 138 MMOL/L (ref 135–145)
TROPONIN T SERPL HS-MCNC: 21 NG/L
WBC # BLD AUTO: 9.4 10E3/UL (ref 4–11)

## 2024-09-09 PROCEDURE — 84484 ASSAY OF TROPONIN QUANT: CPT | Performed by: EMERGENCY MEDICINE

## 2024-09-09 PROCEDURE — 36415 COLL VENOUS BLD VENIPUNCTURE: CPT | Performed by: EMERGENCY MEDICINE

## 2024-09-09 PROCEDURE — 93005 ELECTROCARDIOGRAM TRACING: CPT

## 2024-09-09 PROCEDURE — 71120 X-RAY EXAM BREASTBONE 2/>VWS: CPT

## 2024-09-09 PROCEDURE — 85025 COMPLETE CBC W/AUTO DIFF WBC: CPT | Performed by: EMERGENCY MEDICINE

## 2024-09-09 PROCEDURE — 250N000013 HC RX MED GY IP 250 OP 250 PS 637: Performed by: EMERGENCY MEDICINE

## 2024-09-09 PROCEDURE — 71046 X-RAY EXAM CHEST 2 VIEWS: CPT

## 2024-09-09 PROCEDURE — 99285 EMERGENCY DEPT VISIT HI MDM: CPT | Mod: 25

## 2024-09-09 PROCEDURE — 80053 COMPREHEN METABOLIC PANEL: CPT | Performed by: EMERGENCY MEDICINE

## 2024-09-09 RX ORDER — CYCLOBENZAPRINE HCL 10 MG
10 TABLET ORAL ONCE
Status: COMPLETED | OUTPATIENT
Start: 2024-09-09 | End: 2024-09-09

## 2024-09-09 RX ORDER — ACETAMINOPHEN 500 MG
1000 TABLET ORAL ONCE
Status: COMPLETED | OUTPATIENT
Start: 2024-09-09 | End: 2024-09-09

## 2024-09-09 RX ADMIN — ACETAMINOPHEN 1000 MG: 500 TABLET, FILM COATED ORAL at 22:06

## 2024-09-09 RX ADMIN — CYCLOBENZAPRINE 10 MG: 10 TABLET, FILM COATED ORAL at 22:06

## 2024-09-09 ASSESSMENT — ACTIVITIES OF DAILY LIVING (ADL)
ADLS_ACUITY_SCORE: 37
ADLS_ACUITY_SCORE: 37

## 2024-09-10 ENCOUNTER — APPOINTMENT (OUTPATIENT)
Dept: CT IMAGING | Facility: CLINIC | Age: 69
End: 2024-09-10
Attending: EMERGENCY MEDICINE

## 2024-09-10 VITALS
DIASTOLIC BLOOD PRESSURE: 99 MMHG | HEART RATE: 84 BPM | SYSTOLIC BLOOD PRESSURE: 153 MMHG | TEMPERATURE: 98.6 F | RESPIRATION RATE: 23 BRPM | OXYGEN SATURATION: 99 %

## 2024-09-10 LAB
ATRIAL RATE - MUSE: 98 BPM
DIASTOLIC BLOOD PRESSURE - MUSE: NORMAL MMHG
HOLD SPECIMEN: NORMAL
HOLD SPECIMEN: NORMAL
INTERPRETATION ECG - MUSE: NORMAL
P AXIS - MUSE: 65 DEGREES
PR INTERVAL - MUSE: 170 MS
QRS DURATION - MUSE: 74 MS
QT - MUSE: 328 MS
QTC - MUSE: 418 MS
R AXIS - MUSE: -41 DEGREES
SYSTOLIC BLOOD PRESSURE - MUSE: NORMAL MMHG
T AXIS - MUSE: 72 DEGREES
VENTRICULAR RATE- MUSE: 98 BPM

## 2024-09-10 PROCEDURE — 70450 CT HEAD/BRAIN W/O DYE: CPT | Mod: MA

## 2024-09-10 ASSESSMENT — ACTIVITIES OF DAILY LIVING (ADL)
ADLS_ACUITY_SCORE: 37
ADLS_ACUITY_SCORE: 37

## 2024-09-10 NOTE — ED NOTES
Patient signed out to me at shift change pending head CT.  This is negative for acute traumatic injury such as bleed or fracture.  Will discharge home per Dr. Monzon's instructions.  Please see read below    Head CT:   IMPRESSION:  1.  No CT evidence for acute intracranial process.  2.  Brain atrophy and presumed chronic microvascular ischemic changes as above.           Srini Johnson MD  09/10/24 0150
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
## 2024-09-10 NOTE — ED NOTES
Bed: ED16  Expected date:   Expected time:   Means of arrival:   Comments:  [facility] - two patients
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
## 2024-10-21 ENCOUNTER — HOSPITAL ENCOUNTER (OUTPATIENT)
Dept: ULTRASOUND IMAGING | Facility: CLINIC | Age: 69
Discharge: HOME | End: 2024-10-21
Attending: SURGERY
Payer: MEDICARE

## 2024-10-21 ENCOUNTER — OFFICE VISIT (OUTPATIENT)
Dept: OTHER | Facility: CLINIC | Age: 69
End: 2024-10-21
Attending: SURGERY
Payer: MEDICARE

## 2024-10-21 VITALS — HEART RATE: 83 BPM | SYSTOLIC BLOOD PRESSURE: 139 MMHG | DIASTOLIC BLOOD PRESSURE: 76 MMHG

## 2024-10-21 DIAGNOSIS — I73.9 PERIPHERAL ARTERY DISEASE (H): ICD-10-CM

## 2024-10-21 DIAGNOSIS — I73.9 PAD (PERIPHERAL ARTERY DISEASE) (H): Primary | ICD-10-CM

## 2024-10-21 DIAGNOSIS — E11.42 DIABETIC POLYNEUROPATHY ASSOCIATED WITH TYPE 2 DIABETES MELLITUS (H): Primary | ICD-10-CM

## 2024-10-21 PROCEDURE — 99213 OFFICE O/P EST LOW 20 MIN: CPT | Performed by: SURGERY

## 2024-10-21 PROCEDURE — G0463 HOSPITAL OUTPT CLINIC VISIT: HCPCS | Performed by: SURGERY

## 2024-10-21 PROCEDURE — 93924 LWR XTR VASC STDY BILAT: CPT

## 2024-10-21 PROCEDURE — 93924 LWR XTR VASC STDY BILAT: CPT | Mod: 26 | Performed by: SURGERY

## 2024-10-21 RX ORDER — GABAPENTIN 100 MG/1
100 CAPSULE ORAL 3 TIMES DAILY
Qty: 90 CAPSULE | Refills: 2 | Status: SHIPPED | OUTPATIENT
Start: 2024-10-21

## 2024-10-21 NOTE — PROGRESS NOTES
[facility]        Patient is here for a follow up.    Pt is currently taking Aspirin and Statin.    /76 (BP Location: Left arm, Patient Position: Sitting, Cuff Size: Adult Regular)   Pulse 83     The provider has been notified that the patient has no concerns.     Questions patient would like addressed today are: N/A.    Refills are needed: N/A    Has homecare services and agency name:  Milagros Perez MA

## 2024-10-22 NOTE — PROGRESS NOTES
"I had the pleasure of seeing Mr. Rachell Paige in the vascular [facility] clinic today.  He is a gentleman who had seen previously as well.  He notes 68 years old.  He has known peripheral artery disease based on an angiogram that was done in March 2022.  He is known to have severe tibial vessel disease.  His medical risk factors are hypertension, hyperlipidemia, diabetes and carotid artery stenosis.    Based on last imaging done at [facility] under the care of my colleague Dr. Berrios patient has single-vessel peroneal artery runoff bilaterally.  There is circulation all the way down to his foot.    Presently his problem is neuropathy because of diabetes.  He refuses to take gabapentin because he thinks that it \"kill my kidneys\".  His son succumbed to complications of end-stage renal disease.    He in fact tells me that he is better off in terms of all the symptoms when he is ambulating on a daily basis.  And he is able to ambulate without any claudication.    We did noninvasive arterial studies which show a right ankle-brachial index ankle of 0.84 and left ankle-brachial index of 0.84.    As I looked through the documentation by my colleague, Dr. Berrios, has pretty much made the same recommendations as I have suggested.    I continue to advise ambulation.    I think it is better that he follows up with 1 vascular surgeon.  It seems like he periodically sees me and Dr. Berrios.    It does not make sense for him to see myself and Dr. Berrios for exactly the same problem whilst both of us are dispensing very similar advice.    I suggest he stick with Dr. Berrios as his previous imaging has been done at [facility].   "

## 2024-10-28 NOTE — PATIENT INSTRUCTIONS
Thank you for allowing us to provide your medical care.  Please see below for the follow up instructions pertaining to your medical appointment with Dr. Moreno.    Follow up plan: as needed.    Our office will send you a letter by mail, closer to your follow up time frame, with a reminded to call to schedule appointment(s).  Please call our office with any concerns or questions [phone].

## 2025-03-02 ENCOUNTER — HOSPITAL ENCOUNTER (EMERGENCY)
Facility: CLINIC | Age: 70
Discharge: HOME | End: 2025-03-02
Attending: EMERGENCY MEDICINE | Admitting: EMERGENCY MEDICINE
Payer: MEDICARE

## 2025-03-02 VITALS
HEIGHT: 66 IN | WEIGHT: 194.45 LBS | HEART RATE: 81 BPM | TEMPERATURE: 97.4 F | SYSTOLIC BLOOD PRESSURE: 155 MMHG | DIASTOLIC BLOOD PRESSURE: 79 MMHG | RESPIRATION RATE: 20 BRPM | BODY MASS INDEX: 31.25 KG/M2 | OXYGEN SATURATION: 99 %

## 2025-03-02 DIAGNOSIS — T50.901A ACCIDENTAL DRUG INGESTION, INITIAL ENCOUNTER: ICD-10-CM

## 2025-03-02 LAB
ANION GAP SERPL CALCULATED.3IONS-SCNC: 7 MMOL/L (ref 7–15)
BASOPHILS # BLD AUTO: 0 10E3/UL (ref 0–0.2)
BASOPHILS NFR BLD AUTO: 0 %
BUN SERPL-MCNC: 9.6 MG/DL (ref 8–23)
CALCIUM SERPL-MCNC: 9.2 MG/DL (ref 8.8–10.4)
CHLORIDE SERPL-SCNC: 106 MMOL/L (ref 98–107)
CREAT SERPL-MCNC: 1.21 MG/DL (ref 0.67–1.17)
EGFRCR SERPLBLD CKD-EPI 2021: 65 ML/MIN/1.73M2
EOSINOPHIL # BLD AUTO: 0.2 10E3/UL (ref 0–0.7)
EOSINOPHIL NFR BLD AUTO: 3 %
ERYTHROCYTE [DISTWIDTH] IN BLOOD BY AUTOMATED COUNT: 13.6 % (ref 10–15)
GLUCOSE SERPL-MCNC: 151 MG/DL (ref 70–99)
HCO3 SERPL-SCNC: 24 MMOL/L (ref 22–29)
HCT VFR BLD AUTO: 42 % (ref 40–53)
HGB BLD-MCNC: 13.9 G/DL (ref 13.3–17.7)
IMM GRANULOCYTES # BLD: 0 10E3/UL
IMM GRANULOCYTES NFR BLD: 0 %
LYMPHOCYTES # BLD AUTO: 3.2 10E3/UL (ref 0.8–5.3)
LYMPHOCYTES NFR BLD AUTO: 38 %
MCH RBC QN AUTO: 28.5 PG (ref 26.5–33)
MCHC RBC AUTO-ENTMCNC: 33.1 G/DL (ref 31.5–36.5)
MCV RBC AUTO: 86 FL (ref 78–100)
MONOCYTES # BLD AUTO: 0.8 10E3/UL (ref 0–1.3)
MONOCYTES NFR BLD AUTO: 9 %
NEUTROPHILS # BLD AUTO: 4.2 10E3/UL (ref 1.6–8.3)
NEUTROPHILS NFR BLD AUTO: 50 %
NRBC # BLD AUTO: 0 10E3/UL
NRBC BLD AUTO-RTO: 0 /100
PLATELET # BLD AUTO: 316 10E3/UL (ref 150–450)
POTASSIUM SERPL-SCNC: 4.2 MMOL/L (ref 3.4–5.3)
RBC # BLD AUTO: 4.87 10E6/UL (ref 4.4–5.9)
SODIUM SERPL-SCNC: 137 MMOL/L (ref 135–145)
WBC # BLD AUTO: 8.5 10E3/UL (ref 4–11)

## 2025-03-02 PROCEDURE — 80048 BASIC METABOLIC PNL TOTAL CA: CPT | Performed by: EMERGENCY MEDICINE

## 2025-03-02 PROCEDURE — 99283 EMERGENCY DEPT VISIT LOW MDM: CPT

## 2025-03-02 PROCEDURE — 36415 COLL VENOUS BLD VENIPUNCTURE: CPT | Performed by: EMERGENCY MEDICINE

## 2025-03-02 PROCEDURE — 85025 COMPLETE CBC W/AUTO DIFF WBC: CPT | Performed by: EMERGENCY MEDICINE

## 2025-03-02 ASSESSMENT — COLUMBIA-SUICIDE SEVERITY RATING SCALE - C-SSRS
6. HAVE YOU EVER DONE ANYTHING, STARTED TO DO ANYTHING, OR PREPARED TO DO ANYTHING TO END YOUR LIFE?: NO
2. HAVE YOU ACTUALLY HAD ANY THOUGHTS OF KILLING YOURSELF IN THE PAST MONTH?: NO
1. IN THE PAST MONTH, HAVE YOU WISHED YOU WERE DEAD OR WISHED YOU COULD GO TO SLEEP AND NOT WAKE UP?: NO

## 2025-03-02 ASSESSMENT — ACTIVITIES OF DAILY LIVING (ADL): ADLS_ACUITY_SCORE: 48

## 2025-03-03 NOTE — ED PROVIDER NOTES
"  Emergency Department Note      History of Present Illness     Chief Complaint   Ingestion      HPI   Rachell Paige is a 69 year old male presenting for evaluation following ingestion. The patient feels overdosed after taking Vitamin D3 1.25 mg/50,000 units capsules once a day for three days when it was to be taken once weekly per instruction label. The patient feels nauseous, tired, and like he is breathing hard. The last dosage of the Vitamin D3 was yesterday. He denies vomiting.    Independent Historian   None    Review of External Notes       Past Medical History     Medical History and Problem List   Anxiety  Dermatitis  DM type 2  Financial problems  Glaucoma  Hypertension  Hyperlipidemia  Microalbuminuria  Onychomycosis  Rash    Medications   aspirin 81 MG tablet  Gabapentin  insulin aspart  insulin glargine  Lisinopril  Rosuvastatin  Tadalafil  Zolpidem    Surgical History   Amputate right toe  IR lower extremity angiogram bilateral  IR lower extremity angiogram left    Physical Exam     Patient Vitals for the past 24 hrs:   BP Temp Temp src Pulse Resp SpO2 Height Weight   03/02/25 1804 (!) 155/79 97.4  F (36.3  C) Temporal 81 20 99 % 1.676 m (5' 6\") 88.2 kg (194 lb 7.1 oz)     Physical Exam  Gen: well appearing, in no acute distress  Oral : Mucous membranes moist,   Nose: No rhinorhea  Ears: External near normal, without drainage  Eyes: periorbital tissues and sclera normal   Neck: supple, no abnormal swelling  Lungs: Clear bilaterally, no tachypnea or distress, speaks full sentences  CV: Regular rate, regular rhythm  Ext: no lower extremity edema  Skin: warm, dry, well perfused, no rashes/bruising/lesions on exposed skin  Neuro: alert, no gross motor or sensory deficits,   Psych: pleasant mood, normal affect     Diagnostics     Lab Results   Labs Ordered and Resulted from Time of ED Arrival to Time of ED Departure   BASIC METABOLIC PANEL - Abnormal       Result Value    Sodium 137      Potassium 4.2   "    Chloride 106      Carbon Dioxide (CO2) 24      Anion Gap 7      Urea Nitrogen 9.6      Creatinine 1.21 (*)     GFR Estimate 65      Calcium 9.2      Glucose 151 (*)    CBC WITH PLATELETS AND DIFFERENTIAL    WBC Count 8.5      RBC Count 4.87      Hemoglobin 13.9      Hematocrit 42.0      MCV 86      MCH 28.5      MCHC 33.1      RDW 13.6      Platelet Count 316      % Neutrophils 50      % Lymphocytes 38      % Monocytes 9      % Eosinophils 3      % Basophils 0      % Immature Granulocytes 0      NRBCs per 100 WBC 0      Absolute Neutrophils 4.2      Absolute Lymphocytes 3.2      Absolute Monocytes 0.8      Absolute Eosinophils 0.2      Absolute Basophils 0.0      Absolute Immature Granulocytes 0.0      Absolute NRBCs 0.0         Imaging   No orders to display       EKG       Independent Interpretation   None    ED Course      Medications Administered   Medications - No data to display    Procedures   Procedures     Discussion of Management   None    ED Course   ED Course as of 03/02/25 1930   Sun Mar 02, 2025   1813 I spoke to poison control regarding patient.   1816 I have obtained history and performed physical exam.       Additional Documentation  None    Medical Decision Making / Diagnosis     CMS Diagnoses: None    MIPS       None    [facility]   Rachell Paige is a 69 year old male presents the emergency department after accidental vitamin D overdose.  Called poison control and discussed the case with them they actually did not recommend any acute interventions and did not feel this was likely to cause any problems.  Discussed that with the patient he felt very unsettled by that recommendation and thought some testing was warranted.  Ordered CBC and metabolic panel is all within normal limits and discussed this with the patient he feels comfortable with discharge plan.  Encourage follow-up with PCP for recheck.    Disposition   The patient was discharged.     Diagnosis     ICD-10-CM    1. Accidental drug  ingestion, initial encounter  T50.901A            Discharge Medications   New Prescriptions    No medications on file         Scribe Disclosure:  I, luis Holder, am serving as a scribe at 6:26 PM on 3/2/2025 to document services personally performed by Kings Bhardwaj, based on my observations and the provider's statements to me.        Kings Bhardwaj MD  03/02/25 1930

## 2025-03-03 NOTE — ED TRIAGE NOTES
Pt arrives from home he took vitamin d3 3 26817 unit tab three days in a row  he states that he took them by mistake as it was confusing      Triage Assessment (Adult)       Row Name 03/02/25 4149          Triage Assessment    Airway WDL WDL        Respiratory WDL    Respiratory WDL WDL        Skin Circulation/Temperature WDL    Skin Circulation/Temperature WDL WDL        Cardiac WDL    Cardiac WDL X        Peripheral/Neurovascular WDL    Peripheral Neurovascular WDL WDL        Cognitive/Neuro/Behavioral WDL    Cognitive/Neuro/Behavioral WDL WDL

## 2025-04-16 ENCOUNTER — TELEPHONE (OUTPATIENT)
Dept: OTHER | Facility: CLINIC | Age: 70
End: 2025-04-16
Payer: MEDICARE

## 2025-04-16 NOTE — TELEPHONE ENCOUNTER
Patient was seen in ED on 3/2/25 for accidental drug ingestion of Vitamin D- he was instructed to follow up with his PCP. This is not something Dr Moreno will see him for.     Patient was also instructed at LOV with Dr Moreno on 10/21/24:  I think it is better that he follows up with 1 vascular surgeon.  It seems like he periodically sees me and Dr. Berrios.     It does not make sense for him to see myself and Dr. Berrios for exactly the same problem whilst both of us are dispensing very similar advice.     I suggest he stick with Dr. Berrios as his previous imaging has been done at [facility].      There is no follow up with Dr Moreno needed. Please direct patient to his PCP and Dr Berrios for follow up care.     Dipti JIMENES, RN    [facility]  [facility]  Office: [phone]  Fax: [phone]

## 2025-04-16 NOTE — TELEPHONE ENCOUNTER
[facility]    Who is the name of the provider?:  TAWNYA MORENO   What is the location you see this provider at/preferred location?: [place]  Person calling / Facility: Rachell Paige  Phone number:  [phone] (home)   Nurse call back needed: Yes or route to scheduling     Reason for call:  Patient is calling to scheduling a follow up appt with Dr Moreno per ED visit 3/2/25    Pharmacy location:    [id number] - [place], [place] - [address] [address] [id number], [place] [address] [id number] - [place], [place] - [address] [facility] [address]placed [address] [facility] [address]  [facility] #[id number] [address] AT [facility] & [facility]  Outside Imaging: n/a   Can we leave a detailed message on this number?  YES     4/16/2025, 2:25 PM

## 2025-04-17 NOTE — TELEPHONE ENCOUNTER
LM for patient to call us back to relay the message below:    There is no follow up with Dr Moreno needed. Please direct patient to his PCP and Dr Berrios for follow up care.

## 2025-04-24 NOTE — TELEPHONE ENCOUNTER
Patient called. Message was relayed to patient. Patient insists he does not want to see Dr Berrios, he would like to schedule soon to follow up with Dr Moreno due to multiple concerns.    Routing to RN Triage to advise.

## 2025-06-09 ENCOUNTER — OFFICE VISIT (OUTPATIENT)
Dept: OTHER | Facility: CLINIC | Age: 70
End: 2025-06-09
Attending: SURGERY
Payer: MEDICARE

## 2025-06-09 VITALS — SYSTOLIC BLOOD PRESSURE: 168 MMHG | DIASTOLIC BLOOD PRESSURE: 90 MMHG | HEART RATE: 94 BPM

## 2025-06-09 DIAGNOSIS — I70.223 CRITICAL LIMB ISCHEMIA OF BOTH LOWER EXTREMITIES (H): Primary | ICD-10-CM

## 2025-06-09 PROBLEM — R01.1 HEART MURMUR, SYSTOLIC: Status: ACTIVE | Noted: 2025-05-07

## 2025-06-09 PROBLEM — R91.1 SOLITARY PULMONARY NODULE: Status: ACTIVE | Noted: 2025-06-09

## 2025-06-09 PROCEDURE — 99213 OFFICE O/P EST LOW 20 MIN: CPT | Performed by: SURGERY

## 2025-06-09 PROCEDURE — 3077F SYST BP >= 140 MM HG: CPT | Performed by: SURGERY

## 2025-06-09 PROCEDURE — G0463 HOSPITAL OUTPT CLINIC VISIT: HCPCS | Performed by: SURGERY

## 2025-06-09 PROCEDURE — 3080F DIAST BP >= 90 MM HG: CPT | Performed by: SURGERY

## 2025-06-09 RX ORDER — DORZOLAMIDE HYDROCHLORIDE AND TIMOLOL MALEATE 20; 5 MG/ML; MG/ML
SOLUTION/ DROPS OPHTHALMIC
COMMUNITY
Start: 2025-06-02

## 2025-06-09 RX ORDER — PEN NEEDLE, DIABETIC 31 GX5/16"
NEEDLE, DISPOSABLE MISCELLANEOUS
COMMUNITY
Start: 2025-05-22

## 2025-06-09 RX ORDER — INSULIN GLARGINE-YFGN 100 [IU]/ML
INJECTION, SOLUTION SUBCUTANEOUS
COMMUNITY
Start: 2025-05-15

## 2025-06-09 RX ORDER — CHOLECALCIFEROL (VITAMIN D3) 50 MCG
2000 TABLET ORAL
COMMUNITY
Start: 2025-02-24

## 2025-06-09 NOTE — PROGRESS NOTES
[facility]        Patient is here for a  follow up.    Pt is currently taking Aspirin and Statin.    BP (!) 168/90 (BP Location: Right arm, Patient Position: Chair, Cuff Size: Adult Regular)   Pulse 94     The provider has been notified that the patient has no concerns.     Questions patient would like addressed today are: N/A.    Refills are needed: N/A    Has homecare services and agency name:  No    Patient has been identified as a fall risk.    Interventions were completed as noted below:  []Exam tables/chairs remained in the lowest position.   []Patient remained in wheelchair.    []Provider requested patient in exam chair.  Patient required assist and use of transfer belt.  [x]Exam room door remained open unless with a provider.  [x]A bell provided to patient to notify staff if assistance was needed.  [x]Provider notified patient was identified as a fall risk.      Cara Santos MA

## 2025-06-09 NOTE — PROGRESS NOTES
"I saw Mr. Rachell Paige back in the vascular surgery clinic today.  He is a 69-year-old gentleman with bilateral lower extremity peripheral artery disease due to diabetes.  He also has significant neuropathy which causes pain in his feet.  Presently he does not have any open wounds or ulcers on either side.  On the left side he has had a previous transmetatarsal amputation which is healed.    Interestingly he has chosen to see several doctors for the same problem.  He also follows up with Dr. Davian Berrios for the same problem.    He also tells me that his daughter \"forced\" to go to the emergency department at the [facility] and from there he received a referral to go and see Dr. Hughes at the [facility]. Dr. Hughes also felt that the pain was due to neuropathy and suggested no invasive intervention.    From March 2022 we have a catheter directed arteriogram which shows patency up to the popliteal artery on the right with high-grade stenosis of the proximal peroneal artery.  Anterior and posterior tibial arteries are occluded.  Peroneal artery reconstitutes a posterior tibial artery.  On the left side as runoff is by way of the peroneal artery as well.    He made an interesting request today that he wants me to give him which summarizes his medical history and he was going to use that to help with the emigration of his niece from [place].  I politely declined.    All in all I continue to advise medical management as opposed to any interventional management for his peripheral arterial disease.  He has single-vessel runoff on both sides and any misadventure with those will almost surely result in a major amputation.    I have also humbly requested him that he does not need 3 doctors to manage his peripheral arterial disease.  I have zero problem if he wants to stick with either Dr. Berrios or Dr. Hughes.  Both of whom I know personally and trust wholeheartedly.  But going to three different doctors for " the same problem shows mistrust towards all three of them.     For now I would just recommend that he follow-up as needed with us.

## 2025-06-12 ENCOUNTER — ANCILLARY PROCEDURE (OUTPATIENT)
Dept: GENERAL RADIOLOGY | Facility: CLINIC | Age: 70
End: 2025-06-12
Attending: FAMILY MEDICINE
Payer: MEDICARE

## 2025-06-12 ENCOUNTER — OFFICE VISIT (OUTPATIENT)
Dept: URGENT CARE | Facility: URGENT CARE | Age: 70
End: 2025-06-12
Payer: MEDICARE

## 2025-06-12 VITALS
DIASTOLIC BLOOD PRESSURE: 76 MMHG | HEART RATE: 77 BPM | TEMPERATURE: 97.5 F | HEIGHT: 66 IN | OXYGEN SATURATION: 98 % | WEIGHT: 190 LBS | SYSTOLIC BLOOD PRESSURE: 131 MMHG | RESPIRATION RATE: 20 BRPM | BODY MASS INDEX: 30.53 KG/M2

## 2025-06-12 DIAGNOSIS — J06.9 UPPER RESPIRATORY TRACT INFECTION, UNSPECIFIED TYPE: Primary | ICD-10-CM

## 2025-06-12 DIAGNOSIS — J06.9 UPPER RESPIRATORY TRACT INFECTION, UNSPECIFIED TYPE: ICD-10-CM

## 2025-06-12 LAB
BASOPHILS # BLD AUTO: 0 10E3/UL (ref 0–0.2)
BASOPHILS NFR BLD AUTO: 0 %
EOSINOPHIL # BLD AUTO: 0.2 10E3/UL (ref 0–0.7)
EOSINOPHIL NFR BLD AUTO: 3 %
ERYTHROCYTE [DISTWIDTH] IN BLOOD BY AUTOMATED COUNT: 13.5 % (ref 10–15)
HCT VFR BLD AUTO: 43.9 % (ref 40–53)
HGB BLD-MCNC: 14 G/DL (ref 13.3–17.7)
IMM GRANULOCYTES # BLD: 0 10E3/UL
IMM GRANULOCYTES NFR BLD: 0 %
LYMPHOCYTES # BLD AUTO: 2.7 10E3/UL (ref 0.8–5.3)
LYMPHOCYTES NFR BLD AUTO: 38 %
MCH RBC QN AUTO: 28.3 PG (ref 26.5–33)
MCHC RBC AUTO-ENTMCNC: 31.9 G/DL (ref 31.5–36.5)
MCV RBC AUTO: 89 FL (ref 78–100)
MONOCYTES # BLD AUTO: 0.8 10E3/UL (ref 0–1.3)
MONOCYTES NFR BLD AUTO: 11 %
NEUTROPHILS # BLD AUTO: 3.4 10E3/UL (ref 1.6–8.3)
NEUTROPHILS NFR BLD AUTO: 47 %
PLATELET # BLD AUTO: 292 10E3/UL (ref 150–450)
RBC # BLD AUTO: 4.94 10E6/UL (ref 4.4–5.9)
WBC # BLD AUTO: 7.1 10E3/UL (ref 4–11)

## 2025-06-12 RX ORDER — BENZONATATE 200 MG/1
200 CAPSULE ORAL 3 TIMES DAILY PRN
Qty: 15 CAPSULE | Refills: 0 | Status: SHIPPED | OUTPATIENT
Start: 2025-06-12 | End: 2025-06-12

## 2025-06-12 RX ORDER — BENZONATATE 200 MG/1
200 CAPSULE ORAL 3 TIMES DAILY PRN
Qty: 15 CAPSULE | Refills: 0 | Status: SHIPPED | OUTPATIENT
Start: 2025-06-12

## 2025-06-12 NOTE — PROGRESS NOTES
"Assessment & Plan     Upper respiratory tract infection, unspecified type  CXR neg  CBC wnl  Swab pending  - XR Chest 2 Views  - CBC with platelets and differential  - Influenza A/B, RSV and SARS-CoV2 PCR (COVID-19)  - Influenza A/B, RSV and SARS-CoV2 PCR (COVID-19) Nose  - CBC with platelets and differential             No follow-ups on file.    Phil Toscano MD  [facility] AGNES Lovett is a 69 year old male who presents to clinic today for the following health issues:  Chief Complaint   Patient presents with    Cold Symptoms     C/o runny nose, chest congestion, fatigue, cough and headache x4-5 days         6/12/2025     4:38 PM   Additional Questions   Roomed by Stefania PAREDES    Cough and weak  Has been taking OTC   Runny nose and cough.  No fever  No trouble breathing  Feels pain in chest with the cough.  No ST or ear pain        Review of Systems        Objective    /76 (BP Location: Right arm, Patient Position: Sitting, Cuff Size: Adult Regular)   Pulse 77   Temp 97.5  F (36.4  C) (Tympanic)   Resp 20   Ht 1.676 m (5' 6\")   Wt 86.2 kg (190 lb)   SpO2 98%   BMI 30.67 kg/m    Physical Exam  Vitals and nursing note reviewed.   Constitutional:       Appearance: Normal appearance.   HENT:      Right Ear: Tympanic membrane and ear canal normal.      Left Ear: Tympanic membrane and ear canal normal.      Mouth/Throat:      Mouth: Mucous membranes are moist.   Eyes:      Pupils: Pupils are equal, round, and reactive to light.   Cardiovascular:      Rate and Rhythm: Normal rate and regular rhythm.      Pulses: Normal pulses.      Heart sounds: Normal heart sounds.   Pulmonary:      Effort: Pulmonary effort is normal.      Breath sounds: Normal breath sounds.   Musculoskeletal:      Cervical back: Neck supple.   Neurological:      Mental Status: He is alert.                    "

## 2025-06-12 NOTE — PROGRESS NOTES
Urgent Care Clinic Visit    Chief Complaint   Patient presents with    Cold Symptoms     C/o runny nose, chest congestion, fatigue, cough and headache x4-5 days               6/12/2025     4:38 PM   Additional Questions   Roomed by Stefania

## 2025-06-12 NOTE — PATIENT INSTRUCTIONS
Xray was normal, no pneumonia    Blood count was normal.    Swabs return tomorrow    I put you on some good cough medicine to use.

## 2025-06-14 ENCOUNTER — HOSPITAL ENCOUNTER (EMERGENCY)
Facility: CLINIC | Age: 70
Discharge: HOME | End: 2025-06-14
Attending: EMERGENCY MEDICINE | Admitting: EMERGENCY MEDICINE
Payer: MEDICARE

## 2025-06-14 ENCOUNTER — APPOINTMENT (OUTPATIENT)
Dept: GENERAL RADIOLOGY | Facility: CLINIC | Age: 70
End: 2025-06-14
Attending: EMERGENCY MEDICINE
Payer: MEDICARE

## 2025-06-14 ENCOUNTER — RESULTS FOLLOW-UP (OUTPATIENT)
Dept: URGENT CARE | Facility: URGENT CARE | Age: 70
End: 2025-06-14
Payer: MEDICARE

## 2025-06-14 ENCOUNTER — TELEPHONE (OUTPATIENT)
Dept: URGENT CARE | Facility: URGENT CARE | Age: 70
End: 2025-06-14

## 2025-06-14 VITALS
HEIGHT: 66 IN | RESPIRATION RATE: 18 BRPM | HEART RATE: 79 BPM | SYSTOLIC BLOOD PRESSURE: 165 MMHG | BODY MASS INDEX: 31.02 KG/M2 | DIASTOLIC BLOOD PRESSURE: 81 MMHG | OXYGEN SATURATION: 98 % | WEIGHT: 193 LBS | TEMPERATURE: 98.5 F

## 2025-06-14 DIAGNOSIS — R07.9 CHEST PAIN, UNSPECIFIED TYPE: ICD-10-CM

## 2025-06-14 DIAGNOSIS — R05.1 ACUTE COUGH: ICD-10-CM

## 2025-06-14 LAB
ANION GAP SERPL CALCULATED.3IONS-SCNC: 11 MMOL/L (ref 7–15)
BASOPHILS # BLD AUTO: 0 10E3/UL (ref 0–0.2)
BASOPHILS NFR BLD AUTO: 0 %
BUN SERPL-MCNC: 12.1 MG/DL (ref 8–23)
CALCIUM SERPL-MCNC: 8.4 MG/DL (ref 8.8–10.4)
CHLORIDE SERPL-SCNC: 102 MMOL/L (ref 98–107)
CREAT SERPL-MCNC: 1.17 MG/DL (ref 0.67–1.17)
D DIMER PPP FEU-MCNC: 0.29 UG/ML FEU (ref 0–0.5)
EGFRCR SERPLBLD CKD-EPI 2021: 68 ML/MIN/1.73M2
EOSINOPHIL # BLD AUTO: 0.3 10E3/UL (ref 0–0.7)
EOSINOPHIL NFR BLD AUTO: 5 %
ERYTHROCYTE [DISTWIDTH] IN BLOOD BY AUTOMATED COUNT: 13.2 % (ref 10–15)
GLUCOSE BLDC GLUCOMTR-MCNC: 134 MG/DL (ref 70–99)
GLUCOSE SERPL-MCNC: 253 MG/DL (ref 70–99)
HCO3 SERPL-SCNC: 22 MMOL/L (ref 22–29)
HCT VFR BLD AUTO: 41.8 % (ref 40–53)
HGB BLD-MCNC: 13.8 G/DL (ref 13.3–17.7)
HOLD SPECIMEN: NORMAL
HOLD SPECIMEN: NORMAL
IMM GRANULOCYTES # BLD: 0 10E3/UL
IMM GRANULOCYTES NFR BLD: 1 %
LYMPHOCYTES # BLD AUTO: 1.7 10E3/UL (ref 0.8–5.3)
LYMPHOCYTES NFR BLD AUTO: 33 %
MCH RBC QN AUTO: 28.5 PG (ref 26.5–33)
MCHC RBC AUTO-ENTMCNC: 33 G/DL (ref 31.5–36.5)
MCV RBC AUTO: 86 FL (ref 78–100)
MONOCYTES # BLD AUTO: 0.4 10E3/UL (ref 0–1.3)
MONOCYTES NFR BLD AUTO: 8 %
NEUTROPHILS # BLD AUTO: 2.6 10E3/UL (ref 1.6–8.3)
NEUTROPHILS NFR BLD AUTO: 53 %
NRBC # BLD AUTO: 0 10E3/UL
NRBC BLD AUTO-RTO: 0 /100
PLATELET # BLD AUTO: 293 10E3/UL (ref 150–450)
POTASSIUM SERPL-SCNC: 4.1 MMOL/L (ref 3.4–5.3)
RBC # BLD AUTO: 4.85 10E6/UL (ref 4.4–5.9)
SODIUM SERPL-SCNC: 135 MMOL/L (ref 135–145)
TROPONIN T SERPL HS-MCNC: 15 NG/L
TROPONIN T SERPL HS-MCNC: 16 NG/L
WBC # BLD AUTO: 5 10E3/UL (ref 4–11)

## 2025-06-14 PROCEDURE — 84484 ASSAY OF TROPONIN QUANT: CPT | Performed by: EMERGENCY MEDICINE

## 2025-06-14 PROCEDURE — 94640 AIRWAY INHALATION TREATMENT: CPT

## 2025-06-14 PROCEDURE — 71046 X-RAY EXAM CHEST 2 VIEWS: CPT

## 2025-06-14 PROCEDURE — 80048 BASIC METABOLIC PNL TOTAL CA: CPT | Performed by: EMERGENCY MEDICINE

## 2025-06-14 PROCEDURE — 96361 HYDRATE IV INFUSION ADD-ON: CPT

## 2025-06-14 PROCEDURE — 82962 GLUCOSE BLOOD TEST: CPT

## 2025-06-14 PROCEDURE — 36415 COLL VENOUS BLD VENIPUNCTURE: CPT | Performed by: EMERGENCY MEDICINE

## 2025-06-14 PROCEDURE — 96360 HYDRATION IV INFUSION INIT: CPT

## 2025-06-14 PROCEDURE — 85004 AUTOMATED DIFF WBC COUNT: CPT | Performed by: EMERGENCY MEDICINE

## 2025-06-14 PROCEDURE — 93005 ELECTROCARDIOGRAM TRACING: CPT

## 2025-06-14 PROCEDURE — 258N000003 HC RX IP 258 OP 636: Performed by: EMERGENCY MEDICINE

## 2025-06-14 PROCEDURE — 99285 EMERGENCY DEPT VISIT HI MDM: CPT | Mod: 25

## 2025-06-14 PROCEDURE — 85379 FIBRIN DEGRADATION QUANT: CPT | Performed by: EMERGENCY MEDICINE

## 2025-06-14 PROCEDURE — 250N000013 HC RX MED GY IP 250 OP 250 PS 637: Performed by: EMERGENCY MEDICINE

## 2025-06-14 RX ORDER — ALBUTEROL SULFATE 90 UG/1
2 INHALANT RESPIRATORY (INHALATION) ONCE
Status: COMPLETED | OUTPATIENT
Start: 2025-06-14 | End: 2025-06-14

## 2025-06-14 RX ORDER — ALBUTEROL SULFATE 90 UG/1
2 INHALANT RESPIRATORY (INHALATION) EVERY 6 HOURS PRN
Qty: 18 G | Refills: 0 | Status: SHIPPED | OUTPATIENT
Start: 2025-06-14

## 2025-06-14 RX ORDER — BENZONATATE 100 MG/1
100 CAPSULE ORAL 3 TIMES DAILY PRN
Qty: 15 CAPSULE | Refills: 0 | Status: SHIPPED | OUTPATIENT
Start: 2025-06-14

## 2025-06-14 RX ORDER — ACETAMINOPHEN 325 MG/1
975 TABLET ORAL ONCE
Status: COMPLETED | OUTPATIENT
Start: 2025-06-14 | End: 2025-06-14

## 2025-06-14 RX ORDER — BENZONATATE 200 MG/1
200 CAPSULE ORAL 3 TIMES DAILY PRN
Qty: 15 CAPSULE | Refills: 0 | Status: SHIPPED | OUTPATIENT
Start: 2025-06-14

## 2025-06-14 RX ADMIN — SODIUM CHLORIDE 1000 ML: 0.9 INJECTION, SOLUTION INTRAVENOUS at 17:21

## 2025-06-14 RX ADMIN — ALBUTEROL SULFATE 2 PUFF: 90 AEROSOL, METERED RESPIRATORY (INHALATION) at 17:25

## 2025-06-14 ASSESSMENT — ACTIVITIES OF DAILY LIVING (ADL)
ADLS_ACUITY_SCORE: 48

## 2025-06-14 ASSESSMENT — COLUMBIA-SUICIDE SEVERITY RATING SCALE - C-SSRS
6. HAVE YOU EVER DONE ANYTHING, STARTED TO DO ANYTHING, OR PREPARED TO DO ANYTHING TO END YOUR LIFE?: NO
1. IN THE PAST MONTH, HAVE YOU WISHED YOU WERE DEAD OR WISHED YOU COULD GO TO SLEEP AND NOT WAKE UP?: NO
2. HAVE YOU ACTUALLY HAD ANY THOUGHTS OF KILLING YOURSELF IN THE PAST MONTH?: NO

## 2025-06-14 NOTE — TELEPHONE ENCOUNTER
Patient classified as COVID treatment eligible by Epic high risk algorithm:  No    Coronavirus (COVID-19) Notification    Reason for call  Notify of POSITIVE COVID-19 lab result, assess symptoms,  review [facility] recommendations    Lab Result   Lab test for 2019-nCoV rRt-PCR or SARS-COV-2 PCR  Oropharyngeal AND/OR nasopharyngeal swabs were POSITIVE for 2019-nCoV RNA [OR] SARS-COV-2 RNA (COVID-19) RNA     We have been unable to reach patient by phone at this time to notify of their Positive COVID-19 result.     A Positive COVID-19 letter will be sent via [employer] or the mail.    Garima Shaikh

## 2025-06-14 NOTE — ED PROVIDER NOTES
"  Emergency Department Note      History of Present Illness     Chief Complaint   Covid Concern    HPI   Rachell Paige is a 69 year old male with a history of diabetes mellitus, hypertension and neuropathy presenting with Covid concern. The patient's ill symptoms started with rhinorrhea 8 days ago (6/6/25). He was taking Nyquil to manage his symptoms, with temporary relief. Rachell then started coughing and presented to . He underwent blood work and Covid testing with a positive result. The patient presents today after the onset of right sided chest pain with coughing and weakness. He endorses not eating a lot this morning. He denies hemoptysis. No recent smoking history.     Independent Historian   None    Review of External Notes   I reviewed the  note from 6/12/25. History of diabetes mellitus. Patient tested positive for COVID.    Past Medical History     Medical History and Problem List   Anxiety  Dermatitis  DM type 2  Financial problems  Glaucoma  Hypertension  Hyperlipidemia  Microalbuminuria  Onychomycosis  Rash     Medications   aspirin 81 MG tablet  Gabapentin  insulin aspart  insulin glargine  Lisinopril  Rosuvastatin  Tadalafil  Zolpidem     Surgical History   Amputate right toe  IR lower extremity angiogram bilateral  IR lower extremity angiogram left    Physical Exam     Patient Vitals for the past 24 hrs:   BP Temp Temp src Pulse Resp SpO2 Height Weight   06/14/25 1918 (!) 165/81 -- -- 79 18 98 % -- --   06/14/25 1552 (!) 180/80 98.5  F (36.9  C) Oral 100 20 99 % 1.676 m (5' 6\") 87.5 kg (193 lb)     Physical Exam  General: Resting on the bed.  Head: No obvious trauma to head.  Ears, Nose, Throat:  External ears normal.  Nose normal.  No pharyngeal erythema, swelling or exudate.  Midline uvula.    Eyes:  Conjunctivae clear.  Pupils are equal, round, and reactive.   Neck: Normal range of motion.  Neck supple.   CV: Regular rate and rhythm.  No murmurs.  2+ radial pulses      Respiratory: Effort " normal and breath sounds normal.  No wheezing or crackles.   Gastrointestinal: Soft.  No distension. There is no tenderness.  There is no rigidity, no rebound and no guarding.   Musculoskeletal: Normal range of motion.  Non tender extremities to palpations.    Neuro: Alert. Moving all extremities appropriately.  Normal speech.    Neuro Exam:  Mental status  Alertness: Alert  Orientation: Oriented to self, place, and time  Language: normal naming, normal fluency, and no dysarthria  Cranial nerves  CN II: acuity grossly intact  CN III/IV/VI: EOMI  CN V: facial sensation intact to light touch throughout  CN VII: face symmetric  CN VIII: finger rub normal  CN IX/X: palate & uvula symmetric  CN XI: equal shoulder shrug  CN XII: tongue midline  Motor  no drift and normal strength in all four extremities  Sensory  intact sensation to light touch distally in all 4 extremities and face  Skin: Skin is warm and dry.  No rash noted.     Diagnostics     Lab Results   Labs Ordered and Resulted from Time of ED Arrival to Time of ED Departure   BASIC METABOLIC PANEL - Abnormal       Result Value    Sodium 135      Potassium 4.1      Chloride 102      Carbon Dioxide (CO2) 22      Anion Gap 11      Urea Nitrogen 12.1      Creatinine 1.17      GFR Estimate 68      Calcium 8.4 (*)     Glucose 253 (*)    GLUCOSE BY METER - Abnormal    GLUCOSE BY METER POCT 134 (*)    TROPONIN T, HIGH SENSITIVITY - Normal    Troponin T, High Sensitivity 16     D DIMER QUANTITATIVE - Normal    D-Dimer Quantitative 0.29     TROPONIN T, HIGH SENSITIVITY - Normal    Troponin T, High Sensitivity 15     CBC WITH PLATELETS AND DIFFERENTIAL    WBC Count 5.0      RBC Count 4.85      Hemoglobin 13.8      Hematocrit 41.8      MCV 86      MCH 28.5      MCHC 33.0      RDW 13.2      Platelet Count 293      % Neutrophils 53      % Lymphocytes 33      % Monocytes 8      % Eosinophils 5      % Basophils 0      % Immature Granulocytes 1      NRBCs per 100 WBC 0       Absolute Neutrophils 2.6      Absolute Lymphocytes 1.7      Absolute Monocytes 0.4      Absolute Eosinophils 0.3      Absolute Basophils 0.0      Absolute Immature Granulocytes 0.0      Absolute NRBCs 0.0       Imaging   XR Chest 2 Views   Final Result   IMPRESSION: No focal infiltrate or consolidation visualized. No pleural fluid. Normal heart size and pulmonary vascularity. Degenerative changes thoracic spine.        EKG   ECG results from 06/14/25   EKG 12-lead, tracing only     Value    Systolic Blood Pressure     Diastolic Blood Pressure     Ventricular Rate 96    Atrial Rate 96    AZ Interval 182    QRS Duration 88        QTc 424    P Axis 58    R AXIS -34    T Axis 79    Interpretation ECG      Sinus rhythm  Left axis deviation  Abnormal ECG  When compared with ECG of 09-Sep-2024 21:35,  No significant change was found  EKG interpreted by me at 1710        Independent Interpretation   CXR: No infiltrate.    ED Course      Medications Administered   Medications   sodium chloride 0.9% BOLUS 1,000 mL (0 mLs Intravenous Stopped 6/14/25 1910)   albuterol (PROVENTIL HFA/VENTOLIN HFA) inhaler (2 puffs Inhalation $Given 6/14/25 1725)     Procedures   Procedures     Discussion of Management   None    ED Course   ED Course as of 06/14/25 2009   Sat Jun 14, 2025   1710 I obtained the history and examined the patient as noted above.        2002 I rechecked and updated the patient.      2008 I discussed discharge instructions, patient is comfortable with discharge.         Additional Documentation  None    Medical Decision Making / Diagnosis     CMS Diagnoses: None    MIPS   None             [facility]   Rachell Paige is a 69 year old male who presents to the emergency department with cough.  Vital signs initially mild tachycardia but improved on recheck.  Broad differentials considered including but not limited to ACS, arrhythmia, PE, pneumonia, pneumothorax, effusion, etc.  CBC shows no leukocytosis or anemia.  BMP  without acute electrolyte, metabolic or renal dysfunction.  Hyperglycemia but no signs of acidosis.  Considered PE but fortunately D-dimer is negative making PE unlikely.  Considered dissection but no tearing pain, symmetric pulses, normal appearance of the cardiac silhouette does not appear consistent with dissection.  EKG shows sinus rhythm.  No acute ischemic change.  Troponin x 2 within normal limits.  Low suspicion for ACS, arrhythmia, etc.  Chest x-ray shows no evidence of acute pneumonia, pneumothorax or effusion.  I suspect this is ongoing cough related to COVID.  Patient is greater than 8 days from onset of symptoms therefore outside the window for Paxlovid.  Not hypoxic, no increased work of breathing, does not meet criteria for hospitalization.  Recommend supportive care including Tylenol, albuterol as needed, close follow-up with primary doctor.  If ongoing or worsening cough, fevers or other concerns please return to the ER.  All questions were answered.    Disposition   The patient was discharged.     Diagnosis     ICD-10-CM    1. Acute cough  R05.1       2. Chest pain, unspecified type  R07.9          Discharge Medications   New Prescriptions    No medications on file     Scribe Disclosure:  IMila, am serving as a scribe at 4:19 PM on 6/14/2025 to document services personally performed by Jazzy Parkinson MD based on my observations and the provider's statements to me.        Jazzy Parkinson MD  06/14/25 3929

## 2025-06-14 NOTE — ED TRIAGE NOTES
Pt presents for evaluation of weakness, feeling like his heart is racing and right sided chest pain with coughing. Has been taking OTC cough medicine. Went to UC 2 days ago, CBC, xray and COVID test done, positive COVID. Symptoms started 8 days ago. Hx of diabetes.

## 2025-06-15 NOTE — DISCHARGE INSTRUCTIONS
Please return to the ED if you have worsening cough, fevers >101, chest pain, shortness of breath, intractable vomiting, or other acute changes.  Please follow up with your PCP in the next 2-3 days.      Use tylenol for pain.  Drink plenty of fluids and rest.      May use honey for cough.  Use inhaler every 6 hours and tessalon pearles for cough.      Discharge Instructions  COVID-19    COVID-19 is a viral illness that spreads from person-to-person primarily by droplets when an infected person coughs or sneezes and the droplets are then breathed in by another person.    Symptoms of COVID-19  Many people have no symptoms or mild symptoms.  Symptoms usually appear within a few days after contact with a person with COVID-19.  A mild COVID-19 illness is like a cold and can have fever, cough, sneezing, sore throat, tiredness, headache, and muscle pain. Some patients also have stomach symptoms like nausea, vomiting, or diarrhea.  A moderate COVID-19 illness might include shortness of breath or pneumonia on a chest x-ray.  A severe COVID-19 illness causes significant breathing problems such as low oxygen levels or more serious pneumonia.  Some patients experience loss of taste or smell which is somewhat unique to COVID-19.    What should I do if I test positive?  If you test positive for COVID and have no symptoms, you should take precautions, as described below, for five days.  If you test positive for COVID and have symptoms, you should stay home and away from others. You can go back to normal activities when you are feeling better and have been without a fever (without using medications to treat the fever) for 24 hours. When you return to normal activities you should take precautions, as described below, for five days.  Precautions to prevent the spread of COVID-19  Clean Air. Viruses spread from person to person in the air. Bring fresh air into your home by opening doors or windows or using exhaust fans if possible.  Use an air filter. Be outdoors when possible.  Practice good hygiene. Cover your mouth and nose with a tissue when you cough or sneeze. Wash your hands often with soap and water for at least 20 seconds or use an       alcohol-based hand  containing at least 60% alcohol. Avoid touching your face. Clean surfaces such as countertops, handrails, and doorknobs.  Wear a facemask. Masks both prevent an infected person from spreading the virus and prevent a well person from getting the virus. Cloth masks are good, surgical/disposable masks are better, and respirators (N95) are the best.  Practice physical distancing. Avoid being close to someone with cough or other symptoms. Avoid crowded spaces.   What should I do for myself while I am sick?  Treat your symptoms. You can take Acetaminophen (Tylenol) to treat body aches and fever as needed for comfort. Ibuprofen (Advil or Motrin) can be used as well if you still have symptoms after taking Tylenol. Drink fluids. Rest.  Watch for worsening symptoms such as shortness of breath/difficulty breathing or very severe weakness.  Exercise/Sports in rare cases, COVID could affect your heart in a way that makes exercise or participation in sports dangerous.  If you have a mild COVID illness (fever, cough, sore throat, and similar symptoms but no difficulty breathing or abnormalities of the lung): After your COVID symptoms have resolved, you can return to exercise. If you develop difficulty breathing or chest discomfort with exercise, palpitations (a sensation of your heart racing or skipping), or lightheadedness/passing out, you should contact your doctor/clinic.  If you have more than a mild illness (meaning that you have problems with your breathing or lungs) or if you participate in competitive or strenuous activity or have a history of heart disease: Please see your primary doctor/provider prior to return to activity/competition.    COVID treatments such as antiviral  medications are available. They are recommended for those patients who have a risk for developing more severe COVID illness. Age is the biggest risk factor. Risk is increased for adults greater than 50 years old and particularly for adults greater than 65 years. Importantly, the treatments must be started early in the illness (within five days). These treatments may have been considered today during your visit. If you have other questions, contact your primary doctor/clinic.    You can learn more about COVID treatments from the [facility]:  [email or web address]            What should I do if I am exposed to COVID?  If you are exposed to COVID, you should monitor for symptoms and test if you develop symptoms. Practicing the precautions discussed above are a good idea, particularly if you plan to be around any person who is at higher risk of COVID complications such as older patients (>65) or people with significant medical problems.            Return to the Emergency Department if:  If you are developing worsening breathing, weakness, or feel worse you should seek medical attention.  If you are uncertain, contact your health care provider/clinic. If you need emergency medical attention, call [phone].       Discharge Instructions  Bronchitis, Pneumonia, Bronchospasm    You were seen today for a chest infection or inflammation. If your provider decided this was due to a bacterial infection, you may need an antibiotic. Sometimes these are caused by a virus, and then an antibiotic will not help.     Generally, every Emergency Department visit should have a follow-up clinic visit with either a primary or a specialty clinic/provider. Please follow-up as instructed by your emergency provider today.    Return to the Emergency Department if:  Your breathing gets much worse.  You are very weak, or feel much more ill.  You develop new symptoms, such as chest pain.  You  cough up blood.  You are vomiting (throwing up) enough that you cannot keep fluids or your medicine down.    What can I do to help myself?  Fill any prescriptions the provider gave you and take them right away--especially antibiotics. Be sure to finish the whole antibiotic prescription.  You may be given a prescription for an inhaler, which can help loosen tight air passages.  Use this as needed, but not more often than directed. Inhalers work much better when used with a spacer.   You may be given a prescription for a steroid to reduce inflammation. Used long-term, these can have side effects, but for short-term use they are safe. You may notice restlessness or increased appetite.      You may use non-prescription cough or cold medicines. Cough medicines may help, but don t make the cough go away completely.   Avoid smoke, because this can make your symptoms worse. If you smoke, this may be a good time to quit! Consider using nicotine lozenges, gum, or patches to reduce cravings.   If you have a fever, Tylenol  (acetaminophen), Motrin  (ibuprofen), or Advil  (ibuprofen) may help bring fever down and may help you feel more comfortable. Be sure to read and follow the package directions, and ask your provider if you have questions.  Be sure to get your flu shot each year.  For certain ages, the pneumonia shot can help prevent pneumonia.  If you were given a prescription for medicine here today, be sure to read all of the information (including the package insert) that comes with your prescription.  This will include important information about the medicine, its side effects, and any warnings that you need to know about.  The pharmacist who fills the prescription can provide more information and answer questions you may have about the medicine.  If you have questions or concerns that the pharmacist cannot address, please call or return to the Emergency Department.     Remember that you can always come back to the  Emergency Department if you are not able to see your regular provider in the amount of time listed above, if you get any new symptoms, or if there is anything that worries you.

## 2025-06-16 LAB
ATRIAL RATE - MUSE: 96 BPM
DIASTOLIC BLOOD PRESSURE - MUSE: NORMAL MMHG
INTERPRETATION ECG - MUSE: NORMAL
P AXIS - MUSE: 58 DEGREES
PR INTERVAL - MUSE: 182 MS
QRS DURATION - MUSE: 88 MS
QT - MUSE: 336 MS
QTC - MUSE: 424 MS
R AXIS - MUSE: -34 DEGREES
SYSTOLIC BLOOD PRESSURE - MUSE: NORMAL MMHG
T AXIS - MUSE: 79 DEGREES
VENTRICULAR RATE- MUSE: 96 BPM

## (undated) DEVICE — LINEN FULL SHEET 5511

## (undated) DEVICE — SUCTION CANISTER MEDIVAC LINER 3000ML W/LID 65651-530

## (undated) DEVICE — PREP CHLORAPREP 26ML TINTED ORANGE  260815

## (undated) DEVICE — LINEN HALF SHEET 5512

## (undated) DEVICE — PREP SKIN SCRUB TRAY 4461A

## (undated) DEVICE — SU ETHILON 4-0 PS-2 18" BLACK 1667H

## (undated) DEVICE — CAST PADDING 4" STERILE 9044S

## (undated) DEVICE — BNDG KLING 4" 2236

## (undated) DEVICE — ESU GROUND PAD ADULT W/CORD E7507

## (undated) DEVICE — LINEN TOWEL PACK X10 5473

## (undated) DEVICE — PREP POVIDONE IODINE SCRUB 7.5% 4OZ APL82212

## (undated) DEVICE — DRSG KERLIX FLUFFS X5

## (undated) DEVICE — PACK LOWER EXTREMITY RIDGES

## (undated) DEVICE — BAG CLEAR TRASH 1.3M 39X33" P4040C

## (undated) DEVICE — GOWN IMPERVIOUS SPECIALTY XLG/XLONG 32474

## (undated) DEVICE — GLOVE PROTEXIS POWDER FREE 7.5 ORTHOPEDIC 2D73ET75

## (undated) DEVICE — PREP POVIDONE IODINE SOLUTION 10% 4OZ

## (undated) DEVICE — TOURNIQUET CUFF 18" REPRO RED 60-7070-103

## (undated) DEVICE — CAST PADDING 4" UNSTERILE 9044

## (undated) RX ORDER — FENTANYL CITRATE 50 UG/ML
INJECTION, SOLUTION INTRAMUSCULAR; INTRAVENOUS
Status: DISPENSED
Start: 2018-12-31

## (undated) RX ORDER — PHENYLEPHRINE HCL IN 0.9% NACL 1 MG/10 ML
SYRINGE (ML) INTRAVENOUS
Status: DISPENSED
Start: 2018-12-29

## (undated) RX ORDER — NITROGLYCERIN 5 MG/ML
VIAL (ML) INTRAVENOUS
Status: DISPENSED
Start: 2022-03-10

## (undated) RX ORDER — EPHEDRINE SULFATE 50 MG/ML
INJECTION, SOLUTION INTRAMUSCULAR; INTRAVENOUS; SUBCUTANEOUS
Status: DISPENSED
Start: 2018-12-29

## (undated) RX ORDER — FENTANYL CITRATE 50 UG/ML
INJECTION, SOLUTION INTRAMUSCULAR; INTRAVENOUS
Status: DISPENSED
Start: 2022-03-10

## (undated) RX ORDER — PROPOFOL 10 MG/ML
INJECTION, EMULSION INTRAVENOUS
Status: DISPENSED
Start: 2018-12-29

## (undated) RX ORDER — FENTANYL CITRATE 50 UG/ML
INJECTION, SOLUTION INTRAMUSCULAR; INTRAVENOUS
Status: DISPENSED
Start: 2018-12-29

## (undated) RX ORDER — LIDOCAINE HYDROCHLORIDE 10 MG/ML
INJECTION, SOLUTION INFILTRATION; PERINEURAL
Status: DISPENSED
Start: 2022-03-10

## (undated) RX ORDER — ACETAMINOPHEN 325 MG/1
TABLET ORAL
Status: DISPENSED
Start: 2024-07-24

## (undated) RX ORDER — BUPIVACAINE HYDROCHLORIDE 2.5 MG/ML
INJECTION, SOLUTION EPIDURAL; INFILTRATION; INTRACAUDAL
Status: DISPENSED
Start: 2018-12-29

## (undated) RX ORDER — BUPIVACAINE HYDROCHLORIDE 2.5 MG/ML
INJECTION, SOLUTION EPIDURAL; INFILTRATION; INTRACAUDAL
Status: DISPENSED
Start: 2018-12-31

## (undated) RX ORDER — HEPARIN SODIUM 200 [USP'U]/100ML
INJECTION, SOLUTION INTRAVENOUS
Status: DISPENSED
Start: 2022-03-10

## (undated) RX ORDER — DIPHENHYDRAMINE HYDROCHLORIDE 50 MG/ML
INJECTION INTRAMUSCULAR; INTRAVENOUS
Status: DISPENSED
Start: 2022-03-10